# Patient Record
Sex: MALE | Race: BLACK OR AFRICAN AMERICAN | Employment: OTHER | URBAN - METROPOLITAN AREA
[De-identification: names, ages, dates, MRNs, and addresses within clinical notes are randomized per-mention and may not be internally consistent; named-entity substitution may affect disease eponyms.]

---

## 2018-09-11 ENCOUNTER — HOSPITAL ENCOUNTER (INPATIENT)
Age: 83
LOS: 45 days | Discharge: NURSING, REHAB OR LONG-TERM CARE | DRG: 003 | End: 2018-10-27
Attending: EMERGENCY MEDICINE | Admitting: INTERNAL MEDICINE
Payer: MEDICARE

## 2018-09-11 ENCOUNTER — APPOINTMENT (OUTPATIENT)
Dept: GENERAL RADIOLOGY | Age: 83
DRG: 003 | End: 2018-09-11
Attending: EMERGENCY MEDICINE
Payer: MEDICARE

## 2018-09-11 ENCOUNTER — APPOINTMENT (OUTPATIENT)
Dept: CT IMAGING | Age: 83
DRG: 003 | End: 2018-09-11
Attending: EMERGENCY MEDICINE
Payer: MEDICARE

## 2018-09-11 DIAGNOSIS — K92.0 HEMATEMESIS WITH NAUSEA: ICD-10-CM

## 2018-09-11 DIAGNOSIS — R53.81 DEBILITY: ICD-10-CM

## 2018-09-11 DIAGNOSIS — R04.2 HEMOPTYSIS: Primary | ICD-10-CM

## 2018-09-11 DIAGNOSIS — R41.89 UNRESPONSIVE: ICD-10-CM

## 2018-09-11 DIAGNOSIS — Z71.89 GOALS OF CARE, COUNSELING/DISCUSSION: ICD-10-CM

## 2018-09-11 DIAGNOSIS — J96.90 RESPIRATORY FAILURE REQUIRING INTUBATION (HCC): ICD-10-CM

## 2018-09-11 LAB
ALBUMIN SERPL-MCNC: 3.9 G/DL (ref 3.5–5)
ALBUMIN/GLOB SERPL: 0.8 {RATIO} (ref 1.1–2.2)
ALP SERPL-CCNC: 116 U/L (ref 45–117)
ALT SERPL-CCNC: 21 U/L (ref 12–78)
ANION GAP SERPL CALC-SCNC: 9 MMOL/L (ref 5–15)
AST SERPL-CCNC: 21 U/L (ref 15–37)
BASOPHILS # BLD: 0 K/UL (ref 0–0.1)
BASOPHILS NFR BLD: 0 % (ref 0–1)
BILIRUB SERPL-MCNC: 0.4 MG/DL (ref 0.2–1)
BUN SERPL-MCNC: 33 MG/DL (ref 6–20)
BUN/CREAT SERPL: 17 (ref 12–20)
CALCIUM SERPL-MCNC: 9.1 MG/DL (ref 8.5–10.1)
CHLORIDE SERPL-SCNC: 103 MMOL/L (ref 97–108)
CO2 SERPL-SCNC: 28 MMOL/L (ref 21–32)
CREAT SERPL-MCNC: 2 MG/DL (ref 0.7–1.3)
DIFFERENTIAL METHOD BLD: ABNORMAL
EOSINOPHIL # BLD: 0.2 K/UL (ref 0–0.4)
EOSINOPHIL NFR BLD: 1 % (ref 0–7)
ERYTHROCYTE [DISTWIDTH] IN BLOOD BY AUTOMATED COUNT: 15.5 % (ref 11.5–14.5)
GLOBULIN SER CALC-MCNC: 4.8 G/DL (ref 2–4)
GLUCOSE SERPL-MCNC: 130 MG/DL (ref 65–100)
HCT VFR BLD AUTO: 35 % (ref 36.6–50.3)
HGB BLD-MCNC: 11.1 G/DL (ref 12.1–17)
IMM GRANULOCYTES # BLD: 0.2 K/UL (ref 0–0.04)
IMM GRANULOCYTES NFR BLD AUTO: 1 % (ref 0–0.5)
INR PPP: 2.4 (ref 0.9–1.1)
LYMPHOCYTES # BLD: 0.4 K/UL (ref 0.8–3.5)
LYMPHOCYTES NFR BLD: 2 % (ref 12–49)
MCH RBC QN AUTO: 35.9 PG (ref 26–34)
MCHC RBC AUTO-ENTMCNC: 31.7 G/DL (ref 30–36.5)
MCV RBC AUTO: 113.3 FL (ref 80–99)
MONOCYTES # BLD: 1 K/UL (ref 0–1)
MONOCYTES NFR BLD: 5 % (ref 5–13)
NEUTS SEG # BLD: 18.8 K/UL (ref 1.8–8)
NEUTS SEG NFR BLD: 91 % (ref 32–75)
NRBC # BLD: 0 K/UL (ref 0–0.01)
NRBC BLD-RTO: 0 PER 100 WBC
PLATELET # BLD AUTO: 155 K/UL (ref 150–400)
PMV BLD AUTO: 11.8 FL (ref 8.9–12.9)
POTASSIUM SERPL-SCNC: 4.4 MMOL/L (ref 3.5–5.1)
PROT SERPL-MCNC: 8.7 G/DL (ref 6.4–8.2)
PROTHROMBIN TIME: 23.9 SEC (ref 9–11.1)
RBC # BLD AUTO: 3.09 M/UL (ref 4.1–5.7)
RBC MORPH BLD: ABNORMAL
SODIUM SERPL-SCNC: 140 MMOL/L (ref 136–145)
WBC # BLD AUTO: 20.6 K/UL (ref 4.1–11.1)

## 2018-09-11 PROCEDURE — 85610 PROTHROMBIN TIME: CPT | Performed by: EMERGENCY MEDICINE

## 2018-09-11 PROCEDURE — 71045 X-RAY EXAM CHEST 1 VIEW: CPT

## 2018-09-11 PROCEDURE — C9113 INJ PANTOPRAZOLE SODIUM, VIA: HCPCS | Performed by: EMERGENCY MEDICINE

## 2018-09-11 PROCEDURE — 82803 BLOOD GASES ANY COMBINATION: CPT | Performed by: EMERGENCY MEDICINE

## 2018-09-11 PROCEDURE — 99285 EMERGENCY DEPT VISIT HI MDM: CPT

## 2018-09-11 PROCEDURE — 85025 COMPLETE CBC W/AUTO DIFF WBC: CPT | Performed by: EMERGENCY MEDICINE

## 2018-09-11 PROCEDURE — 86900 BLOOD TYPING SEROLOGIC ABO: CPT | Performed by: EMERGENCY MEDICINE

## 2018-09-11 PROCEDURE — 96375 TX/PRO/DX INJ NEW DRUG ADDON: CPT

## 2018-09-11 PROCEDURE — 96361 HYDRATE IV INFUSION ADD-ON: CPT

## 2018-09-11 PROCEDURE — 83605 ASSAY OF LACTIC ACID: CPT | Performed by: EMERGENCY MEDICINE

## 2018-09-11 PROCEDURE — 80053 COMPREHEN METABOLIC PANEL: CPT | Performed by: EMERGENCY MEDICINE

## 2018-09-11 PROCEDURE — 74011250636 HC RX REV CODE- 250/636

## 2018-09-11 PROCEDURE — 36415 COLL VENOUS BLD VENIPUNCTURE: CPT | Performed by: EMERGENCY MEDICINE

## 2018-09-11 PROCEDURE — 74011250636 HC RX REV CODE- 250/636: Performed by: EMERGENCY MEDICINE

## 2018-09-11 PROCEDURE — 87040 BLOOD CULTURE FOR BACTERIA: CPT | Performed by: EMERGENCY MEDICINE

## 2018-09-11 PROCEDURE — 36600 WITHDRAWAL OF ARTERIAL BLOOD: CPT

## 2018-09-11 PROCEDURE — 74176 CT ABD & PELVIS W/O CONTRAST: CPT

## 2018-09-11 PROCEDURE — 86923 COMPATIBILITY TEST ELECTRIC: CPT | Performed by: EMERGENCY MEDICINE

## 2018-09-11 RX ORDER — ONDANSETRON 2 MG/ML
4 INJECTION INTRAMUSCULAR; INTRAVENOUS
Status: COMPLETED | OUTPATIENT
Start: 2018-09-11 | End: 2018-09-11

## 2018-09-11 RX ORDER — ONDANSETRON 2 MG/ML
INJECTION INTRAMUSCULAR; INTRAVENOUS
Status: COMPLETED
Start: 2018-09-11 | End: 2018-09-11

## 2018-09-11 RX ORDER — PANTOPRAZOLE SODIUM 40 MG/10ML
40 INJECTION, POWDER, LYOPHILIZED, FOR SOLUTION INTRAVENOUS
Status: COMPLETED | OUTPATIENT
Start: 2018-09-11 | End: 2018-09-11

## 2018-09-11 RX ADMIN — SODIUM CHLORIDE 1000 ML: 900 INJECTION, SOLUTION INTRAVENOUS at 22:34

## 2018-09-11 RX ADMIN — PANTOPRAZOLE SODIUM 40 MG: 40 INJECTION, POWDER, FOR SOLUTION INTRAVENOUS at 21:57

## 2018-09-11 RX ADMIN — ONDANSETRON 4 MG: 2 INJECTION INTRAMUSCULAR; INTRAVENOUS at 21:59

## 2018-09-12 ENCOUNTER — APPOINTMENT (OUTPATIENT)
Dept: ULTRASOUND IMAGING | Age: 83
DRG: 003 | End: 2018-09-12
Attending: INTERNAL MEDICINE
Payer: MEDICARE

## 2018-09-12 ENCOUNTER — APPOINTMENT (OUTPATIENT)
Dept: CT IMAGING | Age: 83
DRG: 003 | End: 2018-09-12
Attending: EMERGENCY MEDICINE
Payer: MEDICARE

## 2018-09-12 PROBLEM — K92.2 ACUTE GI BLEEDING: Status: ACTIVE | Noted: 2018-09-12

## 2018-09-12 PROBLEM — R04.89 PULMONARY HEMORRHAGE: Status: ACTIVE | Noted: 2018-09-12

## 2018-09-12 LAB
APPEARANCE UR: ABNORMAL
BACTERIA URNS QL MICRO: ABNORMAL /HPF
BILIRUB UR QL CFM: NEGATIVE
COLOR UR: ABNORMAL
EPITH CASTS URNS QL MICRO: ABNORMAL /LPF
GLUCOSE UR STRIP.AUTO-MCNC: NEGATIVE MG/DL
HGB BLD-MCNC: 7.6 G/DL (ref 12.1–17)
HGB BLD-MCNC: 7.9 G/DL (ref 12.1–17)
HGB BLD-MCNC: 7.9 G/DL (ref 12.1–17)
HGB UR QL STRIP: ABNORMAL
KETONES UR QL STRIP.AUTO: ABNORMAL MG/DL
LACTATE SERPL-SCNC: 1.4 MMOL/L (ref 0.4–2)
LEUKOCYTE ESTERASE UR QL STRIP.AUTO: ABNORMAL
NITRITE UR QL STRIP.AUTO: NEGATIVE
PH UR STRIP: 5 [PH] (ref 5–8)
PROT UR STRIP-MCNC: 30 MG/DL
RBC #/AREA URNS HPF: ABNORMAL /HPF (ref 0–5)
SP GR UR REFRACTOMETRY: 1.03 (ref 1–1.03)
UA: UC IF INDICATED,UAUC: ABNORMAL
UROBILINOGEN UR QL STRIP.AUTO: 1 EU/DL (ref 0.2–1)
WBC URNS QL MICRO: ABNORMAL /HPF (ref 0–4)

## 2018-09-12 PROCEDURE — 74011000250 HC RX REV CODE- 250: Performed by: INTERNAL MEDICINE

## 2018-09-12 PROCEDURE — 96366 THER/PROPH/DIAG IV INF ADDON: CPT

## 2018-09-12 PROCEDURE — 74011250636 HC RX REV CODE- 250/636: Performed by: INTERNAL MEDICINE

## 2018-09-12 PROCEDURE — 86038 ANTINUCLEAR ANTIBODIES: CPT | Performed by: INTERNAL MEDICINE

## 2018-09-12 PROCEDURE — 86235 NUCLEAR ANTIGEN ANTIBODY: CPT | Performed by: INTERNAL MEDICINE

## 2018-09-12 PROCEDURE — 81001 URINALYSIS AUTO W/SCOPE: CPT | Performed by: EMERGENCY MEDICINE

## 2018-09-12 PROCEDURE — 87070 CULTURE OTHR SPECIMN AEROBIC: CPT | Performed by: INTERNAL MEDICINE

## 2018-09-12 PROCEDURE — 76770 US EXAM ABDO BACK WALL COMP: CPT

## 2018-09-12 PROCEDURE — 96368 THER/DIAG CONCURRENT INF: CPT

## 2018-09-12 PROCEDURE — 86225 DNA ANTIBODY NATIVE: CPT | Performed by: INTERNAL MEDICINE

## 2018-09-12 PROCEDURE — 85018 HEMOGLOBIN: CPT | Performed by: INTERNAL MEDICINE

## 2018-09-12 PROCEDURE — C9113 INJ PANTOPRAZOLE SODIUM, VIA: HCPCS | Performed by: INTERNAL MEDICINE

## 2018-09-12 PROCEDURE — 84145 PROCALCITONIN (PCT): CPT | Performed by: INTERNAL MEDICINE

## 2018-09-12 PROCEDURE — 74011250636 HC RX REV CODE- 250/636: Performed by: EMERGENCY MEDICINE

## 2018-09-12 PROCEDURE — 83516 IMMUNOASSAY NONANTIBODY: CPT | Performed by: INTERNAL MEDICINE

## 2018-09-12 PROCEDURE — 65660000000 HC RM CCU STEPDOWN

## 2018-09-12 PROCEDURE — 71250 CT THORAX DX C-: CPT

## 2018-09-12 PROCEDURE — 96365 THER/PROPH/DIAG IV INF INIT: CPT

## 2018-09-12 PROCEDURE — 88112 CYTOPATH CELL ENHANCE TECH: CPT | Performed by: INTERNAL MEDICINE

## 2018-09-12 PROCEDURE — 77030032490 HC SLV COMPR SCD KNE COVD -B

## 2018-09-12 PROCEDURE — 77010033678 HC OXYGEN DAILY

## 2018-09-12 PROCEDURE — 83520 IMMUNOASSAY QUANT NOS NONAB: CPT | Performed by: INTERNAL MEDICINE

## 2018-09-12 PROCEDURE — 74011250637 HC RX REV CODE- 250/637: Performed by: INTERNAL MEDICINE

## 2018-09-12 PROCEDURE — 74011000258 HC RX REV CODE- 258: Performed by: EMERGENCY MEDICINE

## 2018-09-12 PROCEDURE — 36415 COLL VENOUS BLD VENIPUNCTURE: CPT | Performed by: INTERNAL MEDICINE

## 2018-09-12 PROCEDURE — 87086 URINE CULTURE/COLONY COUNT: CPT | Performed by: EMERGENCY MEDICINE

## 2018-09-12 RX ORDER — CHOLECALCIFEROL TAB 125 MCG (5000 UNIT) 125 MCG
5000 TAB ORAL DAILY
COMMUNITY

## 2018-09-12 RX ORDER — AMIODARONE HYDROCHLORIDE 200 MG/1
200 TABLET ORAL EVERY OTHER DAY
Status: DISCONTINUED | OUTPATIENT
Start: 2018-09-12 | End: 2018-09-14

## 2018-09-12 RX ORDER — DOCUSATE SODIUM 100 MG/1
100 CAPSULE, LIQUID FILLED ORAL
Status: DISCONTINUED | OUTPATIENT
Start: 2018-09-12 | End: 2018-10-01

## 2018-09-12 RX ORDER — WARFARIN SODIUM 5 MG/1
5 TABLET ORAL
Status: ON HOLD | COMMUNITY
End: 2018-09-12 | Stop reason: SINTOL

## 2018-09-12 RX ORDER — WARFARIN SODIUM 5 MG/1
10 TABLET ORAL
Status: ON HOLD | COMMUNITY
End: 2018-09-12 | Stop reason: SINTOL

## 2018-09-12 RX ORDER — SODIUM CHLORIDE 0.9 % (FLUSH) 0.9 %
5-10 SYRINGE (ML) INJECTION AS NEEDED
Status: DISCONTINUED | OUTPATIENT
Start: 2018-09-12 | End: 2018-10-27 | Stop reason: HOSPADM

## 2018-09-12 RX ORDER — AMIODARONE HYDROCHLORIDE 200 MG/1
200 TABLET ORAL EVERY OTHER DAY
COMMUNITY
Start: 2018-09-12

## 2018-09-12 RX ORDER — DORZOLAMIDE HYDROCHLORIDE AND TIMOLOL MALEATE 20; 5 MG/ML; MG/ML
1 SOLUTION/ DROPS OPHTHALMIC 2 TIMES DAILY
Status: DISCONTINUED | OUTPATIENT
Start: 2018-09-12 | End: 2018-10-27 | Stop reason: HOSPADM

## 2018-09-12 RX ORDER — ALBUTEROL SULFATE 90 UG/1
2 AEROSOL, METERED RESPIRATORY (INHALATION)
Status: DISCONTINUED | OUTPATIENT
Start: 2018-09-12 | End: 2018-10-27 | Stop reason: HOSPADM

## 2018-09-12 RX ORDER — METRONIDAZOLE 500 MG/100ML
500 INJECTION, SOLUTION INTRAVENOUS EVERY 8 HOURS
Status: DISCONTINUED | OUTPATIENT
Start: 2018-09-12 | End: 2018-09-15 | Stop reason: ALTCHOICE

## 2018-09-12 RX ORDER — AMIODARONE HYDROCHLORIDE 200 MG/1
200 TABLET ORAL EVERY OTHER DAY
Status: DISCONTINUED | OUTPATIENT
Start: 2018-09-12 | End: 2018-09-12

## 2018-09-12 RX ORDER — ACETAMINOPHEN 325 MG/1
650 TABLET ORAL
Status: DISCONTINUED | OUTPATIENT
Start: 2018-09-12 | End: 2018-10-01

## 2018-09-12 RX ORDER — SODIUM CHLORIDE 0.9 % (FLUSH) 0.9 %
5-10 SYRINGE (ML) INJECTION EVERY 8 HOURS
Status: DISCONTINUED | OUTPATIENT
Start: 2018-09-12 | End: 2018-10-27 | Stop reason: HOSPADM

## 2018-09-12 RX ORDER — FUROSEMIDE 80 MG/1
80 TABLET ORAL DAILY
COMMUNITY

## 2018-09-12 RX ORDER — ONDANSETRON 2 MG/ML
4 INJECTION INTRAMUSCULAR; INTRAVENOUS
Status: DISCONTINUED | OUTPATIENT
Start: 2018-09-12 | End: 2018-10-27 | Stop reason: HOSPADM

## 2018-09-12 RX ORDER — PRAVASTATIN SODIUM 40 MG/1
40 TABLET ORAL
Status: DISCONTINUED | OUTPATIENT
Start: 2018-09-12 | End: 2018-09-23

## 2018-09-12 RX ORDER — DILTIAZEM HYDROCHLORIDE 180 MG/1
360 CAPSULE, COATED, EXTENDED RELEASE ORAL DAILY
Status: DISCONTINUED | OUTPATIENT
Start: 2018-09-12 | End: 2018-09-12

## 2018-09-12 RX ORDER — DILTIAZEM HYDROCHLORIDE 360 MG/1
360 CAPSULE, EXTENDED RELEASE ORAL DAILY
COMMUNITY

## 2018-09-12 RX ORDER — LATANOPROST 50 UG/ML
1 SOLUTION/ DROPS OPHTHALMIC
Status: DISCONTINUED | OUTPATIENT
Start: 2018-09-12 | End: 2018-10-27 | Stop reason: HOSPADM

## 2018-09-12 RX ADMIN — CEFTRIAXONE SODIUM 2 G: 2 INJECTION, POWDER, FOR SOLUTION INTRAMUSCULAR; INTRAVENOUS at 01:12

## 2018-09-12 RX ADMIN — Medication 10 ML: at 14:00

## 2018-09-12 RX ADMIN — DORZOLAMIDE HYDROCHLORIDE AND TIMOLOL MALEATE 1 DROP: 20; 5 SOLUTION/ DROPS OPHTHALMIC at 18:35

## 2018-09-12 RX ADMIN — PRAVASTATIN SODIUM 40 MG: 40 TABLET ORAL at 21:46

## 2018-09-12 RX ADMIN — LATANOPROST 1 DROP: 50 SOLUTION OPHTHALMIC at 21:47

## 2018-09-12 RX ADMIN — SODIUM CHLORIDE 1000 ML: 900 INJECTION, SOLUTION INTRAVENOUS at 01:06

## 2018-09-12 RX ADMIN — DORZOLAMIDE HYDROCHLORIDE AND TIMOLOL MALEATE 1 DROP: 20; 5 SOLUTION/ DROPS OPHTHALMIC at 10:55

## 2018-09-12 RX ADMIN — AZITHROMYCIN MONOHYDRATE 500 MG: 500 INJECTION, POWDER, LYOPHILIZED, FOR SOLUTION INTRAVENOUS at 01:06

## 2018-09-12 RX ADMIN — SODIUM CHLORIDE 40 MG: 9 INJECTION, SOLUTION INTRAMUSCULAR; INTRAVENOUS; SUBCUTANEOUS at 21:46

## 2018-09-12 RX ADMIN — UMECLIDINIUM 1 PUFF: 62.5 AEROSOL, POWDER ORAL at 10:56

## 2018-09-12 RX ADMIN — AMIODARONE HYDROCHLORIDE 200 MG: 200 TABLET ORAL at 08:17

## 2018-09-12 RX ADMIN — Medication 10 ML: at 21:47

## 2018-09-12 RX ADMIN — SODIUM CHLORIDE 40 MG: 9 INJECTION, SOLUTION INTRAMUSCULAR; INTRAVENOUS; SUBCUTANEOUS at 08:18

## 2018-09-12 RX ADMIN — METRONIDAZOLE 500 MG: 500 INJECTION, SOLUTION INTRAVENOUS at 19:36

## 2018-09-12 NOTE — ED NOTES
Q6H Hg drawn and sent. Pt assisted to move up in bed for comfort. Remains on NC 6L . DR Shankar Agent to bedside. GI will not pursue work up at this time.   Pt cleared to eat by GI

## 2018-09-12 NOTE — ED NOTES
Bedside shift change report given to Clark (oncoming nurse) by Dmitry Jerry RN (offgoing nurse). Report included the following information ED Summary. Pt reports some SOB, continues to cough bloody sputum.

## 2018-09-12 NOTE — PROGRESS NOTES
1155 - TRANSFER - IN REPORT: 
 
Verbal report received from Dolly(name) on [address] [facility].  being received from ED(unit) for routine progression of care Report consisted of patients Situation, Background, Assessment and  
Recommendations(SBAR). Information from the following report(s) SBAR, Kardex, ED Summary, Procedure Summary, Intake/Output, MAR and Recent Results was reviewed with the receiving nurse. Opportunity for questions and clarification was provided.

## 2018-09-12 NOTE — ED PROVIDER NOTES
EMERGENCY DEPARTMENT HISTORY AND PHYSICAL EXAM 
 
 
Date: 9/11/2018 Patient Name: Dayron Villa 
 
History of Presenting Illness Chief Complaint Patient presents with  Vomiting Pt presents via EMS. pt called EMS because he was vomiting blood. patient reported to have been coughing up blood since Sunday. History Provided By: Patient HPI: Dayron Villa, 80 y.o. male with PMHx significant for AAA, HTN, glaucoma, a-fib, presents via EMS to the ED with cc of hematemesis at 1730 tonight with subsequent constant cough with bloody sputum. Pt reports that he started coughing bloody sputum starting on 9/9 with associated stomach tightening. He notes that since hematemetic episode tonight, he has had some relief from his abd tightness. His wife noted pt had dark stools this PM. Pt reports that he is normally on 2.5L O2 at home, however, tonight his spO2 was 86% on 2.5L. Pt denies taking any meds for his symptoms. There are no other complaints, changes, or physical findings at this time. Social Hx: Former Tobacco, -EtOH, -Illicit Drug Use PCP: PROVIDER UNKNOWN Current Facility-Administered Medications Medication Dose Route Frequency Provider Last Rate Last Dose  cefTRIAXone (ROCEPHIN) 2 g in 0.9% sodium chloride (MBP/ADV) 50 mL  2 g IntraVENous Q24H Susan Cox  mL/hr at 09/12/18 0112 2 g at 09/12/18 0112  
 azithromycin (ZITHROMAX) 500 mg in 0.9% sodium chloride (MBP/ADV) 250 mL  500 mg IntraVENous Q24H Susan Cox  mL/hr at 09/12/18 0106 500 mg at 09/12/18 0106  sodium chloride 0.9 % bolus infusion 1,000 mL  1,000 mL IntraVENous ONCE Susan Cox  mL/hr at 09/12/18 0106 1,000 mL at 09/12/18 0106 Current Outpatient Prescriptions Medication Sig Dispense Refill  furosemide (LASIX) 80 mg tablet Take 80 mg by mouth daily.  warfarin (COUMADIN) 5 mg tablet Take 5 mg by mouth six (6) days a week. Indications: take 1 tab daily except on Mondays  warfarin (COUMADIN) 5 mg tablet Take 10 mg by mouth every Monday.  ferrous sulfate (IRON) 325 mg (65 mg iron) cpER Take 1 Tab by mouth daily.  dilTIAZem (TIAZAC) 360 mg ER capsule Take 360 mg by mouth daily.  cholecalciferol, VITAMIN D3, (VITAMIN D3) 5,000 unit tab tablet Take 5,000 Units by mouth daily.  amiodarone (CORDARONE) 200 mg tablet Take 200 mg by mouth every other day.  tiotropium bromide 1.25 mcg/actuation mist Take  by inhalation.  latanoprost (XALATAN) 0.005 % ophthalmic solution Administer 1 Drop to both eyes nightly.  albuterol (PROAIR HFA) 90 mcg/actuation inhaler Take 2 Puffs by inhalation every four (4) hours as needed for Wheezing.  dorzolamide-timolol (COSOPT) 2-0.5 % ophthalmic solution Administer 1 Drop to both eyes two (2) times a day.  simvastatin (ZOCOR) 40 mg tablet Take 40 mg by mouth nightly.  nitroglycerin (NITROSTAT) 0.4 mg SL tablet by SubLINGual route every five (5) minutes as needed for Chest Pain. Past History Past Medical History: 
Past Medical History:  
Diagnosis Date  Aneurysm ([facility] [address] AAA  Arrhythmia   
 atrial fibrillation 2016  Hypertension  Other ill-defined conditions(799.89)   
 hx of broken arm left/cast  
 Other ill-defined conditions(799.89)   
 glaucoma  Other ill-defined conditions(799.89)   
 elevated cholesterol  Other ill-defined conditions(799.89)   
 hx bursitis knees Past Surgical History: 
Past Surgical History:  
Procedure Laterality Date  ABDOMEN SURGERY PROC UNLISTED  1/16/13 AORTIC ABDOMINAL ANUERYSM REPAIR ENDOVASCULAR   
 HX HEENT    
 bilat cataract  HX ORTHOPAEDIC    
 ligament surgery left arm  HX OTHER SURGICAL    
 broken left foot casted and healed  UPPER GI ENDOSCOPY,BIOPSY  4/10/2015 Family History: 
Family History Problem Relation Age of Onset  Cancer Mother  Cancer Father Social History: 
Social History Substance Use Topics  Smoking status: Former Smoker Packs/day: 0.50 Years: 20.00  Smokeless tobacco: Never Used  Alcohol use Yes Comment: social rare Allergies: 
No Known Allergies Review of Systems Review of Systems Constitutional: Negative for chills and fever. HENT: Negative. Negative for congestion, rhinorrhea, sneezing and sore throat. Eyes: Negative. Negative for redness and visual disturbance. Respiratory: Positive for cough (bloody sputum). Negative for shortness of breath and wheezing. Cardiovascular: Negative. Negative for chest pain. Gastrointestinal: Positive for abdominal pain (tightness), nausea and vomiting (hematemesis). Negative for diarrhea. +Dark stool Genitourinary: Negative. Negative for difficulty urinating, discharge and frequency. Musculoskeletal: Negative. Negative for arthralgias, back pain, myalgias and neck stiffness. Skin: Negative. Negative for color change and rash. Neurological: Negative. Negative for dizziness, syncope, weakness, numbness and headaches. Hematological: Negative for adenopathy. Psychiatric/Behavioral: Negative. All other systems reviewed and are negative. Physical Exam  
Physical Exam  
Constitutional: He is oriented to person, place, and time. He appears well-nourished. He appears distressed. Bright red blood in emesis bag with partially digested food HENT:  
Head: Atraumatic. Eyes: EOM are normal.  
Cardiovascular: Normal rate, regular rhythm, normal heart sounds and intact distal pulses. Exam reveals no gallop and no friction rub. No murmur heard. Diffuse bilat LE edema, pitting 2+, symmetric Pulmonary/Chest: No respiratory distress. He has wheezes. He has no rales. He exhibits no tenderness. Mild increased WOB Hypoxia on RA (mid80s)- requiring 4-5 L NC to maintain 02 sats > 94% Abdominal: Soft. Bowel sounds are normal. He exhibits distension.  He exhibits no mass. There is no tenderness. There is no rebound and no guarding. Musculoskeletal: Normal range of motion. He exhibits no edema or tenderness. Moving all extremities Neurological: He is alert and oriented to person, place, and time. Psychiatric: He has a normal mood and affect. Nursing note and vitals reviewed. Diagnostic Study Results Labs - Recent Results (from the past 12 hour(s)) PROTHROMBIN TIME + INR Collection Time: 09/11/18  9:55 PM  
Result Value Ref Range INR 2.4 (H) 0.9 - 1.1 Prothrombin time 23.9 (H) 9.0 - 11.1 sec CBC WITH AUTOMATED DIFF Collection Time: 09/11/18  9:55 PM  
Result Value Ref Range WBC 20.6 (H) 4.1 - 11.1 K/uL  
 RBC 3.09 (L) 4.10 - 5.70 M/uL  
 HGB 11.1 (L) 12.1 - 17.0 g/dL HCT 35.0 (L) 36.6 - 50.3 % .3 (H) 80.0 - 99.0 FL  
 MCH 35.9 (H) 26.0 - 34.0 PG  
 MCHC 31.7 30.0 - 36.5 g/dL  
 RDW 15.5 (H) 11.5 - 14.5 % PLATELET 787 425 - 441 K/uL MPV 11.8 8.9 - 12.9 FL  
 NRBC 0.0 0  WBC ABSOLUTE NRBC 0.00 0.00 - 0.01 K/uL NEUTROPHILS 91 (H) 32 - 75 % LYMPHOCYTES 2 (L) 12 - 49 % MONOCYTES 5 5 - 13 % EOSINOPHILS 1 0 - 7 % BASOPHILS 0 0 - 1 % IMMATURE GRANULOCYTES 1 (H) 0.0 - 0.5 % ABS. NEUTROPHILS 18.8 (H) 1.8 - 8.0 K/UL  
 ABS. LYMPHOCYTES 0.4 (L) 0.8 - 3.5 K/UL  
 ABS. MONOCYTES 1.0 0.0 - 1.0 K/UL  
 ABS. EOSINOPHILS 0.2 0.0 - 0.4 K/UL  
 ABS. BASOPHILS 0.0 0.0 - 0.1 K/UL  
 ABS. IMM. GRANS. 0.2 (H) 0.00 - 0.04 K/UL  
 DF AUTOMATED    
 RBC COMMENTS MACROCYTOSIS 
1+ METABOLIC PANEL, COMPREHENSIVE Collection Time: 09/11/18  9:55 PM  
Result Value Ref Range Sodium 140 136 - 145 mmol/L Potassium 4.4 3.5 - 5.1 mmol/L Chloride 103 97 - 108 mmol/L  
 CO2 28 21 - 32 mmol/L Anion gap 9 5 - 15 mmol/L Glucose 130 (H) 65 - 100 mg/dL BUN 33 (H) 6 - 20 MG/DL Creatinine 2.00 (H) 0.70 - 1.30 MG/DL  
 BUN/Creatinine ratio 17 12 - 20 GFR est AA 39 (L) >60 ml/min/1.73m2 GFR est non-AA 32 (L) >60 ml/min/1.73m2 Calcium 9.1 8.5 - 10.1 MG/DL Bilirubin, total 0.4 0.2 - 1.0 MG/DL  
 ALT (SGPT) 21 12 - 78 U/L  
 AST (SGOT) 21 15 - 37 U/L Alk. phosphatase 116 45 - 117 U/L Protein, total 8.7 (H) 6.4 - 8.2 g/dL Albumin 3.9 3.5 - 5.0 g/dL Globulin 4.8 (H) 2.0 - 4.0 g/dL A-G Ratio 0.8 (L) 1.1 - 2.2 TYPE & SCREEN Collection Time: 09/11/18  9:55 PM  
Result Value Ref Range Crossmatch Expiration 09/14/2018 ABO/Rh(D) B NEGATIVE Antibody screen NEG   
LACTIC ACID Collection Time: 09/11/18 11:15 PM  
Result Value Ref Range Lactic acid 1.4 0.4 - 2.0 MMOL/L Radiologic Studies -  
CT CHEST WO CONT Final Result CT ABD PELV WO CONT Final Result XR CHEST PORT Final Result CT Results  (Last 48 hours) 09/12/18 0057  CT CHEST WO CONT Final result Impression:  IMPRESSION:  
Severe emphysema with right basilar airspace disease and an oval 5.5 x 4.5 cm  
lesion with fluid fluid level concerning for internal hemorrhage. No internal  
gas. Differential diagnosis includes an infectious process including abscess,  
although neoplasm with internal hemorrhage also a possibility. Narrative:  INDICATION: hemoptysis, eval abscess vs hemorrhage COMPARISON: None TECHNIQUE:  Noncontrast 5 mm axial images were obtained through the chest. CT  
dose reduction was achieved through use of a standardized protocol tailored for  
this examination and automatic exposure control for dose modulation. FINDINGS:  
   
THYROID: Unremarkable. MEDIASTINUM/TRAVIS: Small calcified subcarinal lymph nodes. HEART/PERICARDIUM: Coronary atherosclerosis. Pacemaker. No pericardial effusion. Not significantly enlarged. LUNGS/PLEURA: Severe emphysema. Patchy right basilar airspace disease with a 5.5  
x 4.5 cm oval lesion in the right lower lobe with fluid fluid level.  No internal  
 gas. No significant pleural effusion. No pneumothorax. INCIDENTALLY IMAGED UPPER ABDOMEN: No significant abnormality. BONES: No destructive bone lesion. ADDITIONAL COMMENTS:  N/A.  
   
  
 09/12/18 0008  CT ABD PELV WO CONT Final result Impression:  IMPRESSION:  
   
1. Patchy airspace disease in the right lung base with an associated hyperdense 5.2 x 4.3 cm oval lesion. This may contain a fluid/fluid level indicating  
abscess/hemorrhage, with neoplasm felt to be less likely. However correlation  
with clinical findings and close follow-up recommended. 2. No acute abdominal or pelvic process. Narrative:  INDICATION: Abd pain, leukocytosis COMPARISON: None TECHNIQUE:   
Noncontrast thin axial images were obtained through the abdomen and pelvis. Coronal and sagittal reconstructions were generated. CT dose reduction was  
achieved through use of a standardized protocol tailored for this examination  
and automatic exposure control for dose modulation. FINDINGS:   
LUNG BASES: Patchy airspace disease in the right lung base, with a 5.2 x 4.3 cm   
mass lesion in the lower lobe. The lesion is slightly hyperdense with a possible  
fluid fluid level best seen on sagittal image 63. No pleural effusion. LIVER: No mass or biliary dilatation. GALLBLADDER: Unremarkable. SPLEEN: No enlargement or lesion. PANCREAS: No mass or ductal dilatation. ADRENALS: No mass. KIDNEYS: No nephrolithiasis or hydronephrosis. 2.2 cm right renal cyst.  
GI TRACT:  No bowel obstruction. Diverticulosis of the sigmoid colon without  
diverticulitis. PERITONEUM: No free air or free fluid. APPENDIX: Unremarkable. RETROPERITONEUM: Previous abdominal aortic aneurysm endograft repair. No  
lymphadenopathy. ADDITIONAL COMMENTS: N/A. URINARY BLADDER: Unremarkable. REPRODUCTIVE ORGANS: Enlarged prostate. LYMPH NODES:  None enlarged. FREE FLUID:  None. BONES: No destructive bone lesion. ADDITIONAL COMMENTS: N/A. CXR Results  (Last 48 hours) 09/11/18 2212  XR CHEST PORT Final result Impression:  IMPRESSION:  
Suspect early interstitial edema pattern. .  . Narrative:  INDICATION:  hypoxia EXAM: Chest single view. COMPARISON: 4/8/2015. FINDINGS: A single frontal view of the chest at 2206 hours shows bibasilar  
interstitial prominence with a mid inspiratory effort and bibasilar  
atelectasis. .  The heart, mediastinum and pulmonary vasculature are stable . Transvenous pacemaker from the right has been introduced with leads over the  
right atrium and right ventricle. The bony thorax is unremarkable for age. .  
   
  
  
 
Medical Decision Making I am the first provider for this patient. I reviewed the vital signs, available nursing notes, past medical history, past surgical history, family history and social history. Vital Signs-Reviewed the patient's vital signs. Patient Vitals for the past 12 hrs: 
 Temp Pulse Resp BP SpO2  
09/12/18 0126 - 69 18 - 94 % 09/12/18 0100 - 71 22 - 94 % 09/12/18 0023 - 74 18 - 95 % 09/11/18 2322 - 74 19 - 95 % 09/11/18 2315 100.1 °F (37.8 °C) - - - -  
09/11/18 2300 - 73 17 102/71 92 % 09/11/18 2232 - 74 19 98/46 92 % 09/11/18 2230 - 75 17 97/44 93 % 09/11/18 2200 - 77 22 109/50 92 % 09/11/18 2145 - 77 22 103/45 98 % 09/11/18 2141 99 °F (37.2 °C) 78 13 111/40 93 % Pulse Oximetry Analysis - 95% on NC 6L Records Reviewed: Nursing Notes, Old Medical Records, Ambulance Run Sheet, Previous Radiology Studies and Previous Laboratory Studies Provider Notes (Medical Decision Making): Pt with few days of hemoptysis not with episode of hematemesis. Possible later related to swallowing blood versus two separate problems.  Pt also with worsening SOB and oxygen requirement now, which is he typically doesn't require. Infectious etiology, neoplasm, concerning on  Coumadin and/or GI Bleed. ED Course:  
Initial assessment performed. The patients presenting problems have been discussed, and they are in agreement with the care plan formulated and outlined with them. I have encouraged them to ask questions as they arise throughout their visit. Progress Notes: 
CONSULT NOTE:  
10:59 PM 
Elvis Mcqueen MD spoke with Dr. Daya Meyer, Specialty: GI 
Discussed pt's hx, disposition, and available diagnostic and imaging results. Reviewed care plans. Consultant agrees with plans as outlined. He agreed with Protonix and fluid support. Admit to medicine. If there is any change in status and needs acute attention, call him back and he will see pt. Did not feel that pt needs NG tube. Written by DOTTY Edwardsibe, as dictated by Elvis Mcqueen MD. 
 
11:52 PM 
Overall endorses feeling better, nausea improved. No significant emesis but has continued to spit up blood. BP stable but low 100s/50s receiving IV fluids. updated on the plan. Family updated concurrently. Pt with hemorrhagic lesion right lung likely responsible for hemoptysis and possibly patient swallowing blood he is spitting up that led to episode of hematemesis. Written by DOTTY Edwardsibe, as dictated by Elvis Mcqueen MD  
 
12:32 AM 
Asked family about any increased NSAID use and over the last couple weeks, pt has been taking ibuprofen regularly after a fall. Written by DOTTY Edwards, as dictated by Elvis Mcqueen MD 
 
CONSULT NOTE:  
12:39 AM 
Elvis Mcqueen MD spoke with Dr. Elsy Madrigal, Specialty: Hospitalist 
Discussed pt's hx, disposition, and available diagnostic and imaging results. Reviewed care plans. Consultant will evaluate pt for admission. Agrees to cover pt for community acquired PNA during the interim. Written by DOTTY Edwards, as dictated by Elvis Mcqueen MD. Critical Care Time: CRITICAL CARE NOTE : 
 
11:18 PM 
 IMPENDING DETERIORATION -Respiratory ASSOCIATED RISK FACTORS - Hypoxia and Bleeding MANAGEMENT- Bedside Assessment and Supervision of Care INTERPRETATION -  Xrays, CT Scan, Blood Gases, ECG and Blood Pressure INTERVENTIONS - hemodynamic mngmt CASE REVIEW - Hospitalist, Medical Sub-Specialist, Nursing and Family TREATMENT RESPONSE -Stable PERFORMED BY - Self NOTES   : 
 
I have spent 122 minutes of critical care time involved in lab review, consultations with specialist, family decision- making, bedside attention and documentation. During this entire length of time I was immediately available to the patient Written by DOTTY Irizarry, as dictated by Silvina Yu MD 
 
Disposition: PLAN FOR ADMISSION: 
12:39 PM 
The patient is being admitted to the hospital. The results of their tests and reasons for their admission have been discussed with the patient and/or available family. The patient/family has conveyed agreement and understanding for the need to be admitted and for their admission diagnosis. Consultation has been made with the inpatient physician specialist for hospitalization. Written by DOTTY Irizarry, as dictated by Silvina Yu MD 
 
CLINICAL IMPRESSION: 
1. Hemoptysis 2. Hematemesis with nausea Plan: 1. Admit Diagnosis Clinical Impression: 1. Hemoptysis 2. Hematemesis with nausea Attestations: This note is prepared by Yadi Kessler, acting as scribe for MD Silvina Hernandez MD: The scribe's documentation has been prepared under my direction and personally reviewed by me in its entirety. I confirm that the note above accurately reflects all work, treatment, procedures, and medical decision making performed by me.

## 2018-09-12 NOTE — CONSULTS
[facility] Consult Service Note  Pulmonary, Critical Care, and Sleep Medicine    Name: Shari Heredia MRN: [id number]   : 1934 Hospital: [facility]   Date: 2018   Hospital Day: 2       Subjective/Interval History:   I have reviewed the notes from other providers and old records readily available and summarized findings below with the flowsheet. Seen earlier today on rounds. IMPRESSION:   1. Acute hemoptysis- doubt pulmonary  Hemorrhage- Ct scan show spillage of blood, has lung mass( CA vs inflammatory pseudotumor) and hematuria; could have Wegener's? Pulmonary renal syndrome but also anticoagulated;   2. Lung mass RLL- right lung base- would not be visible on conventional bronch and yield from transbronchial biopsy not guaranteed; had no lesion on chest Ct 2016   3. Acute kidney injury likely prerenal and related to medications (Lasix) Baseline creatinine is around 0.9 and currently creatinine is elevated at 2.0  4. Hematuria Abnormal urinalysis  5. Sepsis due to suspected pneumonia Patient reports having exertional shortness of breath along with cough and hemoptysis; CT of chest shows severe emphysema with the right basilar airspace disease in the oval mass with fluid level concerning for internal hemorrhage versus pneumonia ; inr is 2.4  6. 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 Anemia  8. Former 50+ pack yr smoker quit in ? 9. Asbestos exposure  10. History of atrial fibrillation on anticoagulation with warfarin  11. Hx of CAD s/p PCI in the past home amiodarone. Hold Cardizem  12. Dyslipidemia  13. Body mass index is 32.1 kg/(m^2). 15. Multiorgan dysfunction as outlined above: Pt has one or more acute or chronic illnesses with severe exacerbation with progression or side effects of treatment that poses a threat to life or bodily function  15.  Additional workup outlined below  16. Pt is requiring Drug therapy requiring intensive monitoring for toxicity  17. Pt is unstable, unpredictable needing PCU/CCU monitoring; is critically ill and at high risk of sudden decline and decompensation with life threatening consequenses and continued end organ dysfunction and failure  18. Prognosis guarded with significant risk of sudden decline       RECOMMENDATIONS/PLAN:   1. Too risky for bronch or biopsy while anticoagulated  2. Sputum culture  3. Sputum cytology  4. Empiric abx to treat possible lung abscess  5. Follow cultures  6. Daily coags  7. Serologies ANCA panel, JOSS, anti GBM  8. Agree with stopping warfarin  9. Renal ultrasound  10. Renal consult? 11. Supplemental O2 to keep sats > 93%  12. Labs to follow electrolytes, renal function and and blood counts  13. Glucose monitoring and SSI  14. Bronchial hygiene with respiratory therapy techniques, bronchodilators  15. DVT, SUP prophylaxis  16. Pt needs IV fluids with additives and Drug therapy requiring intensive monitoring for toxicity  17. Prescription drug management with home med reconciliation reviewed          My assessment/management discussed with: Consultants, Nursing, Pharmacy, Case Management, PT, OT, Respiratory Therapy, Hospitalist and Family for coordination of care    Pt's condition is acute and unstable requiring inpatient hospitalization. This care involved high complexity decision making which includes independently reviewing the patient's past medical records, current laboratory results, medication profiles that were immediately available to me and actual Xray images at the bedside in order to assess, support vital system function, and to treat this degree of vital organ system failure, and to prevent further deterioration of the patients condition.      Risk of deterioration: medium and high   [x] High complexity decision making was performed  [x] See my orders for details  Tubes: Subjective/Initial History:     I was asked by Lesa Yanez MD to see Robles Parsons  a 80 y.o.  male in consultation for a chief complaint of hemoptysis and abnormal     \"Ulysses BERMUDEZ is a 80 y.o.  male with hx Afib on Coumadin, CAD with prior PCI, AAA, HTN, and COPD on 2.5L via NC, who . .. was admitted via ER on presentation with 3d hx protracted coughing and hemoptysis, that later was associated with reported hematemesis. He noted during same time course that he experienced \"tightness\" in his abdomen that did not change with PO or defecation. He denied N, melena, BRBPR, hematochezia, or dysphagia. He is not on ASA or NSAIDs as OP. His wife reported that his stools were darker. He denies CP, increased SOB. He has not had any hematemesis here, but continued to cough and demonstrate hemoptysis here in ER. ER eval demonstrated patchy airspace disease in the right lung base with an associated hyperdense RLL 5.2 x 4.3 cm oval lesion, but no acute abdominal or pelvic process. WBC 20.6, BUN Cr 33/2.0, INR 2.4. Initial Hgb was 11.1, but when rechecked 8hrs later was noted to be 7.9. He had undergone EGD 2015 confirming gastritis without h.pylori infection. Last colonoscopy 2007 with internal hemorrhoids and sigmoid diverticulosis. Severe emphysema with right basilar airspace disease and an oval 5.5 x 4.5 cm lesion with fluid fluid level concerning for internal hemorrhage. \"    Pt seen by Dr. Jef Johnson who feels that source of bleeding is not Gi tract. I reviewed office notes where he was last seen this year by our NP. I have not seen pt since 2016. He was noted to have ABNORMAL CHEST CT (R93.8)   Jan 2015 scattered perihilar adenopathy does not explain his other sx. 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 Pt just quit smoking after 50+ years Jan 2016 CT scan with emphysma. no lung mass 12/07/15: CTA impression: No pulmonary emboli.  Mild ILD, similar to prior study. New irregular 10.6mm RLL lung nodule for f/u in 3 months. This could be infectious or inflammatory in nature, or potentially a small malignancy. [facility] Chest CT reviewed Jan 2016 no mass. Emphysema with mild pulmonary HTN. Pt has very severe COPD who quit smoking since Feb 2015, started age 16. Tried Albuterol or Combivent on and off for years but really only prn per PCP. Cost has been extravagant, especially Spiriva which he used on and off for three. June 2015 FEV1 1.16 to 1.4 liters (<50%). Pt has had no dysphagia, choking. No pneumonia. No chest pain. Weight stable. No sign or sx of chest infection. Claims he just completed course of abx couple weeks ago from our office but we have no record of a prescription order. Denied gross hematuria. No Known Allergies     MAR reviewed and pertinent medications noted or modified as needed     Current Facility-Administered Medications   Medication    cefTRIAXone (ROCEPHIN) 2 g in 0.9% sodium chloride (MBP/ADV) 50 mL    azithromycin (ZITHROMAX) 500 mg in 0.9% sodium chloride (MBP/ADV) 250 mL    albuterol (PROVENTIL HFA, VENTOLIN HFA, PROAIR HFA) inhaler 2 Puff    dorzolamide-timolol (COSOPT) 22.3-6.8 mg/mL ophthalmic solution 1 Drop    latanoprost (XALATAN) 0.005 % ophthalmic solution 1 Drop    pravastatin (PRAVACHOL) tablet 40 mg    umeclidinium (INCRUSE ELLIPTA) 62.5 mcg/actuation    sodium chloride (NS) flush 5-10 mL    sodium chloride (NS) flush 5-10 mL    acetaminophen (TYLENOL) tablet 650 mg    ondansetron (ZOFRAN) injection 4 mg    docusate sodium (COLACE) capsule 100 mg    pantoprazole (PROTONIX) 40 mg in sodium chloride 0.9% 10 mL injection    amiodarone (CORDARONE) tablet 200 mg     Current Outpatient Prescriptions   Medication Sig    furosemide (LASIX) 80 mg tablet Take 80 mg by mouth daily.  warfarin (COUMADIN) 5 mg tablet Take 5 mg by mouth six (6) days a week.  Indications: take 1 tab daily except on Mondays    warfarin (COUMADIN) 5 mg tablet Take 10 mg by mouth every Monday.  ferrous sulfate (IRON) 325 mg (65 mg iron) cpER Take 1 Tab by mouth daily.  dilTIAZem (TIAZAC) 360 mg ER capsule Take 360 mg by mouth daily.  cholecalciferol, VITAMIN D3, (VITAMIN D3) 5,000 unit tab tablet Take 5,000 Units by mouth daily.  amiodarone (CORDARONE) 200 mg tablet Take 200 mg by mouth every other day.  tiotropium bromide 1.25 mcg/actuation mist Take  by inhalation.  latanoprost (XALATAN) 0.005 % ophthalmic solution Administer 1 Drop to both eyes nightly.  albuterol (PROAIR HFA) 90 mcg/actuation inhaler Take 2 Puffs by inhalation every four (4) hours as needed for Wheezing.  dorzolamide-timolol (COSOPT) 2-0.5 % ophthalmic solution Administer 1 Drop to both eyes two (2) times a day.  simvastatin (ZOCOR) 40 mg tablet Take 40 mg by mouth nightly.  nitroglycerin (NITROSTAT) 0.4 mg SL tablet by SubLINGual route every five (5) minutes as needed for Chest Pain. Patient PCP: PROVIDER UNKNOWN  PMH:  has a past medical history of Aneurysm ([facility] [address] Arrhythmia; Hypertension; Other ill-defined conditions(799.89); Other ill-defined conditions(799.89); Other ill-defined conditions(799.89); and Other ill-defined conditions(799.89). He also has no past medical history of Difficult intubation; Malignant hyperthermia due to anesthesia; Nausea & vomiting; Pseudocholinesterase deficiency; or Unspecified adverse effect of anesthesia. PSH:   has a past surgical history that includes hx orthopaedic; hx other surgical; pr abdomen surgery proc unlisted (1/16/13); hx heent; and upper gi endoscopy,biopsy (4/10/2015). FHX: family history includes Cancer in his father and mother. SHX:  reports that he has quit smoking. He has a 10.00 pack-year smoking history. He has never used smokeless tobacco. He reports that he drinks alcohol.  He reports that he uses illicit drugs, including Prescription and OTC.     ROS:A comprehensive review of systems was negative except for that written in the HPI. Objective:     Vital Signs: Telemetry:    normal sinus rhythm Intake/Output:   Visit Vitals    /50    Pulse 63    Temp 98.2 °F (36.8 °C)    Resp 17    Ht 6' 2\" (1.88 m)    Wt 113.4 kg (250 lb)    SpO2 98%    BMI 32.1 kg/m2       Temp (24hrs), Av.1 °F (37.3 °C), Min:98.2 °F (36.8 °C), Max:100.1 °F (37.8 °C)        O2 Device: Nasal cannula O2 Flow Rate (L/min): 6 l/min       Wt Readings from Last 4 Encounters:   18 113.4 kg (250 lb)   16 111.1 kg (245 lb)   16 106.6 kg (235 lb)   04/08/15 103.4 kg (228 lb)          Intake/Output Summary (Last 24 hours) at 18 1008  Last data filed at 18 0757   Gross per 24 hour   Intake                0 ml   Output              150 ml   Net             -150 ml       Last shift:       07 -  1900  In: -   Out: 150 [Urine:150]  Last 3 shifts:         Physical Exam:    General:   male; well developed and well nourished, in no respiratory distress and acyanotic and alert; NC;    HEAD: Normocephalic, without obvious abnormality, atraumatic   EYES: conjunctivae clear. PERRL,  AN Icteric sclerae   NOSE: nares normal, no drainage, no nasal flaring,    THROAT: normal; Lips, mucosa dry; No Thrush;  crowded airway; tongue midline   Neck: Supple, symmetrical, trachea midline,  No accessory mm use; No Stridor/ cuff leak, No goiter or thyroid tenderness   LYMPH: No abnormally enlarged lymph nodes. in neck or groin   Chest: increased AP diameter   Lungs: rales right base    Heart: Regular rate and rhythm; NO edema   Abdomen: soft, non-tender, without masses or organomegaly, protuberant, distended   : no [place]    Musculoskeletal: mild kyphosis;  No spine or CVA tenderness; negative, cyanosis, clubbing; no joint swelling or erythema   Neuro: alert; speech fluent ;withdraws to pain; unable to check gait and station;  following simple commands   Psych: oriented to time, place and person; No agitation;  normal affect;    Skin: Pallor;    Pulses:Bilateral, Radial, 2+   Capillary refill: abnormal: ; sluggish capillary refill, pale,    Data:     All lab results for the last 24 hours reviewed. Labs:    Recent Labs      09/12/18   0744  09/11/18 2155   WBC   --   20.6*   HGB  7.9*  11.1*   PLT   --   155   INR   --   2.4*     Recent Labs      09/11/18   2315  09/11/18   2155   NA   --   140   K   --   4.4   CL   --   103   CO2   --   28   GLU   --   130*   BUN   --   33*   CREA   --   2.00*   CA   --   9.1   LAC  1.4   --    ALB   --   3.9   SGOT   --   21   ALT   --   21     No results for input(s): PH, PCO2, PO2, HCO3, FIO2 in the last 72 hours. No results for input(s): CPK, CKNDX, TROIQ in the last 72 hours. No lab exists for component: CPKMB  No results found for: BNPP, BNP   Lab Results   Component Value Date/Time    Culture result: NO GROWTH AFTER 7 HOURS 09/11/2018 11:15 PM   No results found for: TSH, TSHEXT    Imaging:          Results from Hospital Encounter encounter on 09/11/18   XR CHEST PORT   Narrative INDICATION:  hypoxia     EXAM: Chest single view. COMPARISON: 4/8/2015. FINDINGS: A single frontal view of the chest at 2206 hours shows bibasilar  interstitial prominence with a mid inspiratory effort and bibasilar  atelectasis. .  The heart, mediastinum and pulmonary vasculature are stable . Transvenous pacemaker from the right has been introduced with leads over the  right atrium and right ventricle. The bony thorax is unremarkable for age. .         Impression IMPRESSION:  Suspect early interstitial edema pattern. .  .             Results from [place] encounter on 09/11/18   CT CHEST WO CONT   Narrative INDICATION: hemoptysis, eval abscess vs hemorrhage    COMPARISON: None    TECHNIQUE:  Noncontrast 5 mm axial images were obtained through the chest. CT  dose reduction was achieved through use of a standardized protocol tailored for  this examination and automatic exposure control for dose modulation. FINDINGS:    THYROID: Unremarkable. MEDIASTINUM/TRAVIS: Small calcified subcarinal lymph nodes. HEART/PERICARDIUM: Coronary atherosclerosis. Pacemaker. No pericardial effusion. Not significantly enlarged. LUNGS/PLEURA: Severe emphysema. Patchy right basilar airspace disease with a 5.5  x 4.5 cm oval lesion in the right lower lobe with fluid fluid level. No internal  gas. No significant pleural effusion. No pneumothorax. INCIDENTALLY IMAGED UPPER ABDOMEN: No significant abnormality. BONES: No destructive bone lesion. ADDITIONAL COMMENTS:  N/A. Impression IMPRESSION:  Severe emphysema with right basilar airspace disease and an oval 5.5 x 4.5 cm  lesion with fluid fluid level concerning for internal hemorrhage. No internal  gas. Differential diagnosis includes an infectious process including abscess,  although neoplasm with internal hemorrhage also a possibility. I have personally and independently reviewed the patients interval and diagnostic data, radiographs and have reviewed the reports. I have ordered additional labs to follow the current medical conditions and to monitor treatment responses over the next 24 hours or sooner if needed. If the pt needs,  additional imaging will be obtained to follow longitudinal changes found on the most current imaging.       Medical Decision Making Today:  · Reviewed the flowsheet and previous days notes  · Reviewed and summarized records or history from previous days note or discussions with staff, family  · Parenteral controlled substances - Reviewed/ Adjusted / Hyla [employer] / Started  · High Risk Drug therapy requiring intensive monitoring for toxicity: eg steroids, pressors, antibiotics  · Reviewed and/or ordered Clinical lab tests  · Reviewed and/or ordered Radiology tests  · Reviewed and/or ordered of Medicine tests  · Independently visualized radiologic Images  · I have personally reviewed the patients ECG / Telemetry        Musa Joseph MD

## 2018-09-12 NOTE — H&P
Hospitalist Admission NoteNAME: Sukumar Marshall  
:  1934 MRN:  [id number] Date/Time:  2018 4:35 AM 
 
Patient PCP: PROVIDER UNKNOWN 
________________________________________________________________________ My assessment of this patient's clinical condition and my plan of care is as follows. Assessment / Plan: 
Acute upper GI bleed (hematemesis) hemodynamically stable Patient reports that he started to have blood in his phlegm and subsequently went on to develop hematemesis and also had few episodes of dark stools He also had some abdominal pain along with nausea He does have prior history of peptic ulcer disease in the past 
Start Protonix 40 mg IV every 12, hemoglobin every 6 hours, gastroenterology has already been consulted Insert 2 large-bore IV lines Consider fluids based on pt's blood pressure and o2 requirements due to pulmonary hemorrhage Sepsis due to suspected pneumonia with possible pulmonary hemorrhage Patient reports having exertional shortness of breath along with cough and hemoptysis CT of chest shows severe emphysema with the right basilar airspace disease in the oval mass with fluid level concerning for internal hemorrhage versus pneumonia Given his symptoms, fever and leukocytosis will treat this as pneumonia and assess in am 
Consult pulmonary since there is slight concern for pulmonary hemorrhage Blood cultures have already been sent explained Currently blood pressure is stable, consider starting on IV fluids if blood pressure is low History of atrial fibrillation on anticoagulation with warfarin Hx of CAD s/p PCI in the past 
Hold warfarin and resume home amiodarone. Hold Cardizem due to borderline low blood pressure 
inr is 2.4 Acute kidney injury likely prerenal and related to medications (Lasix) Baseline creatinine is around 0.9 and currently creatinine is elevated at 2.0 Hold home Lasix for now and repeat BMP in a.m. Consider starting on IV fluids if no improvement in creatinine by tomorrow Dyslipidemia COPD Resume home Pravachol, includes Abnormal urinalysis Asymptomatic, urine culture has been sent On ceftriaxone for pneumonia which should cover for UTI as well if  this is true infection History of glaucoma Resume home eyedrops    
  
     
   
 
 
Code Status: Full Surrogate Decision Maker: Wife Ligia Villasenor DVT Prophylaxis: [facility] GI Prophylaxis: protonix Baseline: From home independent Subjective: CHIEF COMPLAINT: GI bleeding HISTORY OF PRESENT ILLNESS:    
This is a 80-year-old male with past medical history of coronary artery disease, atrial fibrillation on anticoagulation with warfarin is coming to the hospital with chief complaints of vomiting blood since the last 2 days.  Patient reports being in his usual state of health until about 2 days ago when he started not feeling well. Avni Conti reports that he initially is noticed blood in his phlegm upon coughing and subsequently went on to develop bloody vomiting's.  He also reports that yesterday he developed abdominal pain in his abdomen became tense was distended.  He also reports noticing dark stools in the last few days. Avni Conti also reports increasing shortness of breath since the last few days. Avni Conti does not report any chest pain, palpitations or syncopal episodes. Avni Conti reports that he occasionally takes Motrin for musculoskeletal pain On arrival to the hospital he was noted to have borderline fever of 100.1 and was also hypoxic on 2.5 L per ER documentation.  On lab work he was noted to have a white count of 20,000.  He was also noted to have a creatinine of 2.0 with a lactic acid of 1.4.  G was contacted and was started on ceftriaxone and azithromycin. We were asked to admit for work up and evaluation of the above problems. Past Medical History:  
Diagnosis Date  Aneurysm ([facility] [address] AAA  Arrhythmia   
 atrial fibrillation 2016  Hypertension  Other ill-defined conditions(799.89)   
 hx of broken arm left/cast  
 Other ill-defined conditions(799.89)   
 glaucoma  Other ill-defined conditions(799.89)   
 elevated cholesterol  Other ill-defined conditions(799.89)   
 hx bursitis knees Past Surgical History:  
Procedure Laterality Date  ABDOMEN SURGERY PROC UNLISTED  1/16/13 AORTIC ABDOMINAL ANUERYSM REPAIR ENDOVASCULAR   
 HX HEENT    
 bilat cataract  HX ORTHOPAEDIC    
 ligament surgery left arm  HX OTHER SURGICAL    
 broken left foot casted and healed  UPPER GI ENDOSCOPY,BIOPSY  4/10/2015 Social History Substance Use Topics  Smoking status: Former Smoker Packs/day: 0.50 Years: 20.00  Smokeless tobacco: Never Used  Alcohol use Yes Comment: social rare Family History Problem Relation Age of Onset  Cancer Mother  Cancer Father No Known Allergies Prior to Admission medications Medication Sig Start Date End Date Taking? Authorizing Provider  
furosemide (LASIX) 80 mg tablet Take 80 mg by mouth daily. Yes Jessica Calderón MD  
warfarin (COUMADIN) 5 mg tablet Take 5 mg by mouth six (6) days a week. Indications: take 1 tab daily except on Mondays   Yes Jessica Calderón MD  
warfarin (COUMADIN) 5 mg tablet Take 10 mg by mouth every Monday. Yes Jessica Calderón MD  
ferrous sulfate (IRON) 325 mg (65 mg iron) cpER Take 1 Tab by mouth daily. Yes Jessica Calderón MD  
dilTIAZem (TIAZAC) 360 mg ER capsule Take 360 mg by mouth daily. Yes Jessica Calderón MD  
cholecalciferol, VITAMIN D3, (VITAMIN D3) 5,000 unit tab tablet Take 5,000 Units by mouth daily. Yes Jessica Calderón MD  
amiodarone (CORDARONE) 200 mg tablet Take 200 mg by mouth every other day. 9/12/18  Yes Jessica Calderón MD  
tiotropium bromide 1.25 mcg/actuation mist Take  by inhalation.    Yes Historical Provider  
latanoprost (XALATAN) 0.005 % ophthalmic solution Administer 1 Drop to both eyes nightly. Yes Historical Provider  
albuterol (PROAIR HFA) 90 mcg/actuation inhaler Take 2 Puffs by inhalation every four (4) hours as needed for Wheezing. Yes Historical Provider  
dorzolamide-timolol (COSOPT) 2-0.5 % ophthalmic solution Administer 1 Drop to both eyes two (2) times a day. Yes Historical Provider  
simvastatin (ZOCOR) 40 mg tablet Take 40 mg by mouth nightly. Yes Historical Provider  
nitroglycerin (NITROSTAT) 0.4 mg SL tablet by SubLINGual route every five (5) minutes as needed for Chest Pain. Historical Provider REVIEW OF SYSTEMS:    
I am not able to complete the review of systems because: The patient is intubated and sedated The patient has altered mental status due to his acute medical problems The patient has baseline aphasia from prior stroke(s) The patient has baseline dementia and is not reliable historian The patient is in acute medical distress and unable to provide information Total of 12 systems reviewed as follows:   
   POSITIVE= underlined text  Negative = text not underlined General:  fever, chills, sweats, generalized weakness, weight loss/gain,  
   loss of appetite Eyes:    blurred vision, eye pain, loss of vision, double vision ENT:    rhinorrhea, pharyngitis Respiratory:   cough, sputum production, SOB, ROY, wheezing, pleuritic pain  
Cardiology:   chest pain, palpitations, orthopnea, PND, edema, syncope Gastrointestinal:  abdominal pain , N/V, diarrhea, dysphagia, constipation, bleeding Genitourinary:  frequency, urgency, dysuria, hematuria, incontinence Muskuloskeletal :  arthralgia, myalgia, back pain Hematology:  easy bruising, nose or gum bleeding, lymphadenopathy Dermatological: rash, ulceration, pruritis, color change / jaundice Endocrine:   hot flashes or polydipsia Neurological:  headache, dizziness, confusion, focal weakness, paresthesia, Speech difficulties, memory loss, gait difficulty Psychological: Feelings of anxiety, depression, agitation Objective: VITALS:   
Visit Vitals  /44  Pulse 69  Temp 100.1 °F (37.8 °C)  Resp 18  Ht 6' 2\" (1.88 m)  Wt 113.4 kg (250 lb)  SpO2 94%  BMI 32.1 kg/m2 PHYSICAL EXAM: 
 
General:    Alert, cooperative, no distress, appears stated age. HEENT: Atraumatic, anicteric sclerae, pink conjunctivae No oral ulcers, mucosa moist, throat clear Neck:  Supple, symmetrical,  thyroid: non tender Lungs:   Clear to auscultation bilaterally. No Wheezing or Rhonchi. No rales. Chest wall:  No tenderness  No Accessory muscle use. Heart:   Regular  rhythm,  No  murmur   No edema Abdomen:   Soft, non-tender. distended. Bowel sounds normal 
Extremities: No cyanosis. No clubbing,   
  Skin turgor normal, Capillary refill normal, Radial dial pulse 2+ Skin:     Not pale. Not Jaundiced  No rashes Psych:  Not anxious or agitated. Neurologic: EOMs intact. No facial asymmetry. No aphasia or slurred speech. Symmetrical strength, Sensation grossly intact. Alert and oriented X 4.  
 
_______________________________________________________________________ Care Plan discussed with: 
  Comments Patient y Family RN y   
Care Manager Consultant:     
_______________________________________________________________________ Expected  Disposition:  
Home with Family y HH/PT/OT/RN   
SNF/LTC   
MERCEDES   
________________________________________________________________________ TOTAL TIME:  60  Minutes Critical Care Provided     Minutes non procedure based Comments  
 y Reviewed previous records  
>50% of visit spent in counseling and coordination of care y Discussion with patient and/or family and questions answered 
  
 
________________________________________________________________________ Signed: Jim Keller MD 
 
Procedures: see electronic medical records for all procedures/Xrays and details which were not copied into this note but were reviewed prior to creation of Plan. LAB DATA REVIEWED:   
Recent Results (from the past 24 hour(s)) PROTHROMBIN TIME + INR Collection Time: 09/11/18  9:55 PM  
Result Value Ref Range INR 2.4 (H) 0.9 - 1.1 Prothrombin time 23.9 (H) 9.0 - 11.1 sec CBC WITH AUTOMATED DIFF Collection Time: 09/11/18  9:55 PM  
Result Value Ref Range WBC 20.6 (H) 4.1 - 11.1 K/uL  
 RBC 3.09 (L) 4.10 - 5.70 M/uL  
 HGB 11.1 (L) 12.1 - 17.0 g/dL HCT 35.0 (L) 36.6 - 50.3 % .3 (H) 80.0 - 99.0 FL  
 MCH 35.9 (H) 26.0 - 34.0 PG  
 MCHC 31.7 30.0 - 36.5 g/dL  
 RDW 15.5 (H) 11.5 - 14.5 % PLATELET 448 548 - 535 K/uL MPV 11.8 8.9 - 12.9 FL  
 NRBC 0.0 0  WBC ABSOLUTE NRBC 0.00 0.00 - 0.01 K/uL NEUTROPHILS 91 (H) 32 - 75 % LYMPHOCYTES 2 (L) 12 - 49 % MONOCYTES 5 5 - 13 % EOSINOPHILS 1 0 - 7 % BASOPHILS 0 0 - 1 % IMMATURE GRANULOCYTES 1 (H) 0.0 - 0.5 % ABS. NEUTROPHILS 18.8 (H) 1.8 - 8.0 K/UL  
 ABS. LYMPHOCYTES 0.4 (L) 0.8 - 3.5 K/UL  
 ABS. MONOCYTES 1.0 0.0 - 1.0 K/UL  
 ABS. EOSINOPHILS 0.2 0.0 - 0.4 K/UL  
 ABS. BASOPHILS 0.0 0.0 - 0.1 K/UL  
 ABS. IMM. GRANS. 0.2 (H) 0.00 - 0.04 K/UL  
 DF AUTOMATED    
 RBC COMMENTS MACROCYTOSIS 
1+ METABOLIC PANEL, COMPREHENSIVE Collection Time: 09/11/18  9:55 PM  
Result Value Ref Range Sodium 140 136 - 145 mmol/L Potassium 4.4 3.5 - 5.1 mmol/L Chloride 103 97 - 108 mmol/L  
 CO2 28 21 - 32 mmol/L Anion gap 9 5 - 15 mmol/L Glucose 130 (H) 65 - 100 mg/dL BUN 33 (H) 6 - 20 MG/DL Creatinine 2.00 (H) 0.70 - 1.30 MG/DL  
 BUN/Creatinine ratio 17 12 - 20 GFR est AA 39 (L) >60 ml/min/1.73m2 GFR est non-AA 32 (L) >60 ml/min/1.73m2 Calcium 9.1 8.5 - 10.1 MG/DL Bilirubin, total 0.4 0.2 - 1.0 MG/DL  
 ALT (SGPT) 21 12 - 78 U/L  
 AST (SGOT) 21 15 - 37 U/L Alk. phosphatase 116 45 - 117 U/L Protein, total 8.7 (H) 6.4 - 8.2 g/dL Albumin 3.9 3.5 - 5.0 g/dL Globulin 4.8 (H) 2.0 - 4.0 g/dL A-G Ratio 0.8 (L) 1.1 - 2.2 TYPE & SCREEN Collection Time: 09/11/18  9:55 PM  
Result Value Ref Range Crossmatch Expiration 09/14/2018 ABO/Rh(D) B NEGATIVE Antibody screen NEG   
LACTIC ACID Collection Time: 09/11/18 11:15 PM  
Result Value Ref Range Lactic acid 1.4 0.4 - 2.0 MMOL/L  
URINALYSIS W/ REFLEX CULTURE Collection Time: 09/12/18  1:33 AM  
Result Value Ref Range Color DARK YELLOW Appearance CLOUDY (A) CLEAR Specific gravity 1.026 1.003 - 1.030    
 pH (UA) 5.0 5.0 - 8.0 Protein 30 (A) NEG mg/dL Glucose NEGATIVE  NEG mg/dL Ketone TRACE (A) NEG mg/dL Blood LARGE (A) NEG Urobilinogen 1.0 0.2 - 1.0 EU/dL Nitrites NEGATIVE  NEG Leukocyte Esterase MODERATE (A) NEG    
 WBC 5-10 0 - 4 /hpf  
 RBC 20-50 0 - 5 /hpf Epithelial cells FEW FEW /lpf Bacteria 1+ (A) NEG /hpf  
 UA:UC IF INDICATED URINE CULTURE ORDERED (A) CNI    
BILIRUBIN, CONFIRM Collection Time: 09/12/18  1:33 AM  
Result Value Ref Range  Bilirubin UA, confirm NEGATIVE  NEG

## 2018-09-12 NOTE — CONSULTS
GI Consultation Note Nina Moody)    NAME: Surendra Moscoso : 1934 MRN: [id number]   ATTG: Hospitalist PCP: PROVIDER UNKNOWN  Date/Time:  2018 8:09 AM  Subjective:   REASON FOR CONSULT:      Brandon Denson is a 80 y.o.  male with hx Afib on Coumadin, CAD with prior PCI, AAA, HTN, and COPD on 2.5L via NC, who I was asked to see for evaluation of GI bleeding. He was admitted via ER on presentation with 3d hx protracted coughing and hemoptysis, that later was associated with reported hematemesis. He noted during same time course that he experienced \"tightness\" in his abdomen that did not change with PO or defecation. He denied N, melena, BRBPR, hematochezia, or dysphagia. He is not on ASA or NSAIDs as OP. His wife reported that his stools were darker. He denies CP, increased SOB. He has not had any hematemesis here, but continued to cough and demonstrate hemoptysis here in ER. ER eval demonstrated patchy airspace disease in the right lung base with an associated hyperdense RLL 5.2 x 4.3 cm oval lesion, but no acute abdominal or pelvic process. WBC 20.6, BUN Cr 33/2.0, INR 2.4. Initial Hgb was 11.1, but when rechecked 8hrs later was noted to be 7.9. He had undergone EGD  confirming gastritis without h.pylori infection. Last colonoscopy  with internal hemorrhoids and sigmoid diverticulosis.     Past Medical History:   Diagnosis Date    Aneurysm [facility])     AAA    Arrhythmia     atrial fibrillation 2016    Hypertension     Other ill-defined conditions(799.89)     hx of broken arm left/cast    Other ill-defined conditions(799.89)     glaucoma    Other ill-defined conditions(799.89)     elevated cholesterol    Other ill-defined conditions(799.89)     hx bursitis knees      Past Surgical History:   Procedure Laterality Date    ABDOMEN SURGERY PROC UNLISTED  13    AORTIC ABDOMINAL ANUERYSM REPAIR ENDOVASCULAR     HX HEENT      bilat cataract    HX ORTHOPAEDIC      ligament surgery left arm    HX OTHER SURGICAL      broken left foot casted and healed    UPPER GI ENDOSCOPY,BIOPSY  4/10/2015          Social History   Substance Use Topics    Smoking status: Former Smoker     Packs/day: 0.50     Years: 20.00    Smokeless tobacco: Never Used    Alcohol use Yes      Comment: social rare      Family History   Problem Relation Age of Onset    Cancer Mother     Cancer Father       No Known Allergies   Home Medications:  Prior to Admission Medications   Prescriptions Last Dose Informant Patient Reported? Taking? albuterol (PROAIR HFA) 90 mcg/actuation inhaler 9/4/2018 at Unknown time  Yes Yes   Sig: Take 2 Puffs by inhalation every four (4) hours as needed for Wheezing. amiodarone (CORDARONE) 200 mg tablet 9/10/2018  Yes Yes   Sig: Take 200 mg by mouth every other day. cholecalciferol, VITAMIN D3, (VITAMIN D3) 5,000 unit tab tablet 9/11/2018 at Unknown time  Yes Yes   Sig: Take 5,000 Units by mouth daily. dilTIAZem (TIAZAC) 360 mg ER capsule 9/11/2018 at Unknown time  Yes Yes   Sig: Take 360 mg by mouth daily. dorzolamide-timolol (COSOPT) 2-0.5 % ophthalmic solution 9/11/2018 at Unknown time Self Yes Yes   Sig: Administer 1 Drop to both eyes two (2) times a day. ferrous sulfate (IRON) 325 mg (65 mg iron) cpER 9/11/2018 at Unknown time  Yes Yes   Sig: Take 1 Tab by mouth daily. furosemide (LASIX) 80 mg tablet 9/11/2018 at Unknown time  Yes Yes   Sig: Take 80 mg by mouth daily. latanoprost (XALATAN) 0.005 % ophthalmic solution 9/10/2018 at Unknown time  Yes Yes   Sig: Administer 1 Drop to both eyes nightly. nitroglycerin (NITROSTAT) 0.4 mg SL tablet Unknown at Unknown time  Yes No   Sig: by SubLINGual route every five (5) minutes as needed for Chest Pain.   simvastatin (ZOCOR) 40 mg tablet 9/10/2018 at Unknown time  Yes Yes   Sig: Take 40 mg by mouth nightly. tiotropium bromide 1.25 mcg/actuation mist 9/11/2018 at Unknown time  Yes Yes   Sig: Take  by inhalation.    warfarin (COUMADIN) 5 mg tablet 9/10/2018 at Unknown time  Yes Yes   Sig: Take 5 mg by mouth six (6) days a week. Indications: take 1 tab daily except on Mondays   warfarin (COUMADIN) 5 mg tablet 9/4/2018 at Unknown time  Yes Yes   Sig: Take 10 mg by mouth every Monday. Facility-Administered Medications: None     Hospital medications:  Current Facility-Administered Medications   Medication Dose Route Frequency    cefTRIAXone (ROCEPHIN) 2 g in 0.9% sodium chloride (MBP/ADV) 50 mL  2 g IntraVENous Q24H    azithromycin (ZITHROMAX) 500 mg in 0.9% sodium chloride (MBP/ADV) 250 mL  500 mg IntraVENous Q24H    albuterol (PROVENTIL HFA, VENTOLIN HFA, PROAIR HFA) inhaler 2 Puff  2 Puff Inhalation Q4H PRN    dorzolamide-timolol (COSOPT) 22.3-6.8 mg/mL ophthalmic solution 1 Drop  1 Drop Both Eyes BID    latanoprost (XALATAN) 0.005 % ophthalmic solution 1 Drop  1 Drop Both Eyes QHS    pravastatin (PRAVACHOL) tablet 40 mg  40 mg Oral QHS    umeclidinium (INCRUSE ELLIPTA) 62.5 mcg/actuation  1 Puff Inhalation DAILY    sodium chloride (NS) flush 5-10 mL  5-10 mL IntraVENous Q8H    sodium chloride (NS) flush 5-10 mL  5-10 mL IntraVENous PRN    acetaminophen (TYLENOL) tablet 650 mg  650 mg Oral Q6H PRN    ondansetron (ZOFRAN) injection 4 mg  4 mg IntraVENous Q6H PRN    docusate sodium (COLACE) capsule 100 mg  100 mg Oral DAILY PRN    pantoprazole (PROTONIX) 40 mg in sodium chloride 0.9% 10 mL injection  40 mg IntraVENous Q12H    amiodarone (CORDARONE) tablet 200 mg  200 mg Oral EVERY OTHER DAY     Current Outpatient Prescriptions   Medication Sig    furosemide (LASIX) 80 mg tablet Take 80 mg by mouth daily.  warfarin (COUMADIN) 5 mg tablet Take 5 mg by mouth six (6) days a week. Indications: take 1 tab daily except on Mondays    warfarin (COUMADIN) 5 mg tablet Take 10 mg by mouth every Monday.  ferrous sulfate (IRON) 325 mg (65 mg iron) cpER Take 1 Tab by mouth daily.     dilTIAZem (TIAZAC) 360 mg ER capsule Take 360 mg by mouth daily.    cholecalciferol, VITAMIN D3, (VITAMIN D3) 5,000 unit tab tablet Take 5,000 Units by mouth daily.  amiodarone (CORDARONE) 200 mg tablet Take 200 mg by mouth every other day.  tiotropium bromide 1.25 mcg/actuation mist Take  by inhalation.  latanoprost (XALATAN) 0.005 % ophthalmic solution Administer 1 Drop to both eyes nightly.  albuterol (PROAIR HFA) 90 mcg/actuation inhaler Take 2 Puffs by inhalation every four (4) hours as needed for Wheezing.  dorzolamide-timolol (COSOPT) 2-0.5 % ophthalmic solution Administer 1 Drop to both eyes two (2) times a day.  simvastatin (ZOCOR) 40 mg tablet Take 40 mg by mouth nightly.  nitroglycerin (NITROSTAT) 0.4 mg SL tablet by SubLINGual route every five (5) minutes as needed for Chest Pain. REVIEW OF SYSTEMS:     [x]    Total of 11 systems reviewed as follows:  Const:   negative fever, negative chills, negative weight loss  Eyes:   negative diplopia or visual changes, negative eye pain  ENT:   negative coryza, negative sore throat  Resp:   negative dyspnea  Cards:  negative for chest pain, palpitations, lower extremity edema  :  negative for frequency, dysuria and hematuria  Skin:   negative for rash and pruritus  Heme:   negative for easy bruising and gum/nose bleeding  MS:  negative for myalgias, arthralgias, back pain and muscle weakness  Neurolo:  negative for headaches, dizziness, vertigo, memory problems   Psych:  negative for feelings of anxiety, depression     Pertinent Positives include : +cough, hemoptysis    Objective:   VITALS:    Visit Vitals    /50    Pulse 63    Temp 98.2 °F (36.8 °C)    Resp 17    Ht 6' 2\" (1.88 m)    Wt 113.4 kg (250 lb)    SpO2 98%    BMI 32.1 kg/m2     Temp (24hrs), Av.1 °F (37.3 °C), Min:98.2 °F (36.8 °C), Max:100.1 °F (37.8 °C)    PHYSICAL EXAM:   General:    Alert, cooperative, no distress, appears stated age. Head:   Normocephalic, without obvious abnormality, atraumatic.   Eyes: Conjunctivae clear, anicteric sclerae. Pupils are equal  Nose:  Nares normal. No drainage or sinus tenderness. Throat:    Lips, mucosa, and tongue normal.  No Thrush  Neck:  Supple, symmetrical,  no adenopathy, thyroid: non tender  Back:    Symmetric,  No CVA tenderness. Lungs:   Decreased BS at R base. No wheezing/rhonchi/rales. Chest wall:  No tenderness or deformity. No Accessory muscle use. Heart:   Regular rate and rhythm,  no murmur, rub or gallop. Abdomen:   Soft, NT, +minimally tympanitic, minimally distended. NABS. No M/G/R  Extremities: Atraumatic, No cyanosis. 2+ b/l MICHELLE. No clubbing  Skin:     Texture, turgor normal. No rashes/lesions/jaundice  Lymph: Cervical, supraclavicular normal.  Psych:  Good insight. Not depressed. Not anxious or agitated. Neurologic: EOMs intact. Normal  strength, A/O X 3. LAB DATA REVIEWED:    Recent Results (from the past 48 hour(s))   PROTHROMBIN TIME + INR    Collection Time: 09/11/18  9:55 PM   Result Value Ref Range    INR 2.4 (H) 0.9 - 1.1      Prothrombin time 23.9 (H) 9.0 - 11.1 sec   CBC WITH AUTOMATED DIFF    Collection Time: 09/11/18  9:55 PM   Result Value Ref Range    WBC 20.6 (H) 4.1 - 11.1 K/uL    RBC 3.09 (L) 4.10 - 5.70 M/uL    HGB 11.1 (L) 12.1 - 17.0 g/dL    HCT 35.0 (L) 36.6 - 50.3 %    .3 (H) 80.0 - 99.0 FL    MCH 35.9 (H) 26.0 - 34.0 PG    MCHC 31.7 30.0 - 36.5 g/dL    RDW 15.5 (H) 11.5 - 14.5 %    PLATELET 708 880 - 810 K/uL    MPV 11.8 8.9 - 12.9 FL    NRBC 0.0 0  WBC    ABSOLUTE NRBC 0.00 0.00 - 0.01 K/uL    NEUTROPHILS 91 (H) 32 - 75 %    LYMPHOCYTES 2 (L) 12 - 49 %    MONOCYTES 5 5 - 13 %    EOSINOPHILS 1 0 - 7 %    BASOPHILS 0 0 - 1 %    IMMATURE GRANULOCYTES 1 (H) 0.0 - 0.5 %    ABS. NEUTROPHILS 18.8 (H) 1.8 - 8.0 K/UL    ABS. LYMPHOCYTES 0.4 (L) 0.8 - 3.5 K/UL    ABS. MONOCYTES 1.0 0.0 - 1.0 K/UL    ABS. EOSINOPHILS 0.2 0.0 - 0.4 K/UL    ABS. BASOPHILS 0.0 0.0 - 0.1 K/UL    ABS. IMM.  GRANS. 0.2 (H) 0.00 - 0.04 K/UL DF AUTOMATED      RBC COMMENTS MACROCYTOSIS  1+       METABOLIC PANEL, COMPREHENSIVE    Collection Time: 09/11/18  9:55 PM   Result Value Ref Range    Sodium 140 136 - 145 mmol/L    Potassium 4.4 3.5 - 5.1 mmol/L    Chloride 103 97 - 108 mmol/L    CO2 28 21 - 32 mmol/L    Anion gap 9 5 - 15 mmol/L    Glucose 130 (H) 65 - 100 mg/dL    BUN 33 (H) 6 - 20 MG/DL    Creatinine 2.00 (H) 0.70 - 1.30 MG/DL    BUN/Creatinine ratio 17 12 - 20      GFR est AA 39 (L) >60 ml/min/1.73m2    GFR est non-AA 32 (L) >60 ml/min/1.73m2    Calcium 9.1 8.5 - 10.1 MG/DL    Bilirubin, total 0.4 0.2 - 1.0 MG/DL    ALT (SGPT) 21 12 - 78 U/L    AST (SGOT) 21 15 - 37 U/L    Alk.  phosphatase 116 45 - 117 U/L    Protein, total 8.7 (H) 6.4 - 8.2 g/dL    Albumin 3.9 3.5 - 5.0 g/dL    Globulin 4.8 (H) 2.0 - 4.0 g/dL    A-G Ratio 0.8 (L) 1.1 - 2.2     TYPE & SCREEN    Collection Time: 09/11/18  9:55 PM   Result Value Ref Range    Crossmatch Expiration 09/14/2018     ABO/Rh(D) B NEGATIVE     Antibody screen NEG    CULTURE, BLOOD, PAIRED    Collection Time: 09/11/18 11:15 PM   Result Value Ref Range    Special Requests: NO SPECIAL REQUESTS      Culture result: NO GROWTH AFTER 7 HOURS     LACTIC ACID    Collection Time: 09/11/18 11:15 PM   Result Value Ref Range    Lactic acid 1.4 0.4 - 2.0 MMOL/L   URINALYSIS W/ REFLEX CULTURE    Collection Time: 09/12/18  1:33 AM   Result Value Ref Range    Color DARK YELLOW      Appearance CLOUDY (A) CLEAR      Specific gravity 1.026 1.003 - 1.030      pH (UA) 5.0 5.0 - 8.0      Protein 30 (A) NEG mg/dL    Glucose NEGATIVE  NEG mg/dL    Ketone TRACE (A) NEG mg/dL    Blood LARGE (A) NEG      Urobilinogen 1.0 0.2 - 1.0 EU/dL    Nitrites NEGATIVE  NEG      Leukocyte Esterase MODERATE (A) NEG      WBC 5-10 0 - 4 /hpf    RBC 20-50 0 - 5 /hpf    Epithelial cells FEW FEW /lpf    Bacteria 1+ (A) NEG /hpf    UA:UC IF INDICATED URINE CULTURE ORDERED (A) CNI     BILIRUBIN, CONFIRM    Collection Time: 09/12/18  1:33 AM Result Value Ref Range    Bilirubin UA, confirm NEGATIVE  NEG     HEMOGLOBIN    Collection Time: 09/12/18  7:44 AM   Result Value Ref Range    HGB 7.9 (L) 12.1 - 17.0 g/dL     IMAGING RESULTS:   [x]      I have personally reviewed the actual   []    CXR  [x]    CT  []     US    CT ABD/PELVIS WO CONTRAST 9/12/18  FINDINGS:   LUNG BASES: Patchy airspace disease in the right lung base, with a 5.2 x 4.3 cm   mass lesion in the lower lobe. The lesion is slightly hyperdense with a possible  fluid fluid level best seen on sagittal image 63. No pleural effusion. LIVER: No mass or biliary dilatation. GALLBLADDER: Unremarkable. SPLEEN: No enlargement or lesion. PANCREAS: No mass or ductal dilatation. ADRENALS: No mass. KIDNEYS: No nephrolithiasis or hydronephrosis. 2.2 cm right renal cyst.  GI TRACT:  No bowel obstruction. Diverticulosis of the sigmoid colon without  diverticulitis. PERITONEUM: No free air or free fluid. APPENDIX: Unremarkable. RETROPERITONEUM: Previous abdominal aortic aneurysm endograft repair. No  lymphadenopathy. URINARY BLADDER: Unremarkable. REPRODUCTIVE ORGANS: Enlarged prostate. LYMPH NODES:  None enlarged. FREE FLUID:  None. BONES: No destructive bone lesion. IMPRESSION:  1. Patchy airspace disease in the right lung base with an associated hyperdense  5.2 x 4.3 cm oval lesion. This may contain a fluid/fluid level indicating  abscess/hemorrhage, with neoplasm felt to be less likely. However correlation  with clinical findings and close follow-up recommended. 2. No acute abdominal or pelvic process. CT CHEST WO CONTRAST 9/12/18  FINDINGS:  THYROID: Unremarkable. MEDIASTINUM/TRAVIS: Small calcified subcarinal lymph nodes. HEART/PERICARDIUM: Coronary atherosclerosis. Pacemaker. No pericardial effusion. Not significantly enlarged. LUNGS/PLEURA: Severe emphysema. Patchy right basilar airspace disease with a 5.5  x 4.5 cm oval lesion in the right lower lobe with fluid fluid level.  No internal  gas. No significant pleural effusion. No pneumothorax. INCIDENTALLY IMAGED UPPER ABDOMEN: No significant abnormality. BONES: No destructive bone lesion. IMPRESSION:  Severe emphysema with right basilar airspace disease and an oval 5.5 x 4.5 cm  lesion with fluid fluid level concerning for internal hemorrhage. No internal  gas. Differential diagnosis includes an infectious process including abscess,  although neoplasm with internal hemorrhage also a possibility. Recommendations/Plan:      Active Problems:    Pulmonary hemorrhage (9/12/2018)      Acute GI bleeding (9/12/2018)       ___________________________________________________  RECOMMENDATIONS:    81yo M with reported hematemesis and confirmed hemoptysis with leukocytosis associated with air-space disease c/w pneumonia and possible RLL abscess. The reported dark stool may have represented swallowed blood related to his hemoptysis. I do not think GI bleeding is prior process at this time and would best be managed conservatively in setting of probable pneumonia.   Plan:  1) IV PPI  2) Hold Coumadin for now  3) 2 peripheral IVs  4) Type and screen  5) Obtain pulmonary consult  6) No plan for EGd at this time  7) OK to feed from GI standpoint    Discussed Code Status:    [x]    Full Code      []    DNR    ___________________________________________________  Care Plan discussed with:    [x]    Patient   []    Family   [x]    Nursing   []    Attending  Total Time :  50   minutes   ___________________________________________________  GI: Med Salcedo MD

## 2018-09-12 NOTE — ROUTINE PROCESS
TRANSFER - OUT REPORT: 
 
Verbal report given to Alfredo Pinzon RN (name) on Portia Gutierrez  being transferred to [phone] (unit) for routine progression of care Report consisted of patients Situation, Background, Assessment and  
Recommendations(SBAR). Information from the following report(s) ED Summary and MAR was reviewed with the receiving nurse. Lines:  
Peripheral IV 09/11/18 Right Antecubital (Active) Site Assessment Clean, dry, & intact 9/11/2018  9:54 PM  
Phlebitis Assessment 0 9/11/2018  9:54 PM  
Infiltration Assessment 0 9/11/2018  9:54 PM  
Dressing Status Clean, dry, & intact 9/11/2018  9:54 PM  
Hub Color/Line Status Green 9/11/2018  9:54 PM  
   
Peripheral IV 09/11/18 Left Antecubital (Active) Site Assessment Clean, dry, & intact 9/11/2018 10:02 PM  
Phlebitis Assessment 0 9/11/2018 10:02 PM  
Infiltration Assessment 0 9/11/2018 10:02 PM  
Dressing Status Clean, dry, & intact 9/11/2018 10:02 PM  
Hub Color/Line Status Pink 9/11/2018 10:02 PM  
  
 
Opportunity for questions and clarification was provided. Patient transported with: 
 Monitor for PCU Transported to [address] [facility] by ED tech.

## 2018-09-12 NOTE — ED NOTES
RN unable to take report at this time. Room assigned twice and just now receiving page. Will review chart and call back in few minutes.

## 2018-09-12 NOTE — ED NOTES
Patient arrived via EMS. Patient presented with complaint of vomiting blood. Per report patient had been coughing up blood since Tuesday started vomiting blood today. Per report, patient abdomen is tight but non-tender. Patient stated he takes warfarin. Patient is alert and oriented. Patient stated his wife is on her way to the hospital. Will continue to monitor and assess patient needs.

## 2018-09-12 NOTE — PROGRESS NOTES
Pt admitted this morning. Chart reviewed. Agree with plans set forth by Dr Claudia Paiz. No EGD today. Keep NPO until pulm eval pt. Hold coumadin, monitor H/H, transfuse prn 
Cont' empiric abx

## 2018-09-12 NOTE — PROGRESS NOTES
ABG obtained as ordered, results as follows: 
 
pH 7.389 
pCO2 42.8 
pO2 65.8 HCO3 25.3 BE 0.1 Pt is currently on 6lpm n/c.

## 2018-09-13 LAB
ANA SER QL: POSITIVE
ANION GAP SERPL CALC-SCNC: 6 MMOL/L (ref 5–15)
ANTICHROMATIN ABS, ACHRLT: <0.2 AI (ref 0–0.9)
APTT PPP: 37.4 SEC (ref 22.1–32)
BACTERIA SPEC CULT: NORMAL
BASOPHILS # BLD: 0 K/UL (ref 0–0.1)
BASOPHILS NFR BLD: 0 % (ref 0–1)
BUN SERPL-MCNC: 21 MG/DL (ref 6–20)
BUN/CREAT SERPL: 18 (ref 12–20)
C-ANCA TITR SER IF: NORMAL TITER
CALCIUM SERPL-MCNC: 7.6 MG/DL (ref 8.5–10.1)
CC UR VC: NORMAL
CHLORIDE SERPL-SCNC: 108 MMOL/L (ref 97–108)
CO2 SERPL-SCNC: 26 MMOL/L (ref 21–32)
CREAT SERPL-MCNC: 1.2 MG/DL (ref 0.7–1.3)
DIFFERENTIAL METHOD BLD: ABNORMAL
DSDNA AB SER-ACNC: <1 IU/ML (ref 0–9)
ENA SM AB SER-ACNC: <0.2 AI (ref 0–0.9)
ENA SM+RNP AB SER-ACNC: <0.2 AI (ref 0–0.9)
ENA SS-A AB SER-ACNC: <0.2 AI (ref 0–0.9)
ENA SS-B AB SER-ACNC: <0.2 AI (ref 0–0.9)
EOSINOPHIL # BLD: 0.1 K/UL (ref 0–0.4)
EOSINOPHIL NFR BLD: 1 % (ref 0–7)
ERYTHROCYTE [DISTWIDTH] IN BLOOD BY AUTOMATED COUNT: 15.8 % (ref 11.5–14.5)
FIBRINOGEN PPP-MCNC: 454 MG/DL (ref 200–475)
GBM IGG SER-ACNC: 5 UNITS (ref 0–20)
GLUCOSE SERPL-MCNC: 109 MG/DL (ref 65–100)
HCT VFR BLD AUTO: 24.9 % (ref 36.6–50.3)
HGB BLD-MCNC: 7 G/DL (ref 12.1–17)
HGB BLD-MCNC: 7.5 G/DL (ref 12.1–17)
HGB BLD-MCNC: 7.7 G/DL (ref 12.1–17)
IMM GRANULOCYTES # BLD: 0.1 K/UL (ref 0–0.04)
IMM GRANULOCYTES NFR BLD AUTO: 1 % (ref 0–0.5)
INR PPP: 2 (ref 0.9–1.1)
LYMPHOCYTES # BLD: 0.8 K/UL (ref 0.8–3.5)
LYMPHOCYTES NFR BLD: 9 % (ref 12–49)
MCH RBC QN AUTO: 35.4 PG (ref 26–34)
MCHC RBC AUTO-ENTMCNC: 30.1 G/DL (ref 30–36.5)
MCV RBC AUTO: 117.5 FL (ref 80–99)
MONOCYTES # BLD: 1.2 K/UL (ref 0–1)
MONOCYTES NFR BLD: 14 % (ref 5–13)
MYELOPEROXIDASE AB SER IA-ACNC: <9 U/ML (ref 0–9)
NEUTS SEG # BLD: 6.7 K/UL (ref 1.8–8)
NEUTS SEG NFR BLD: 75 % (ref 32–75)
NRBC # BLD: 0 K/UL (ref 0–0.01)
NRBC BLD-RTO: 0 PER 100 WBC
P-ANCA ATYPICAL TITR SER IF: NORMAL TITER
P-ANCA TITR SER IF: NORMAL TITER
PLATELET # BLD AUTO: 123 K/UL (ref 150–400)
PMV BLD AUTO: 11.5 FL (ref 8.9–12.9)
POTASSIUM SERPL-SCNC: 4.5 MMOL/L (ref 3.5–5.1)
PROTEINASE3 AB SER IA-ACNC: <3.5 U/ML (ref 0–3.5)
PROTHROMBIN TIME: 19.9 SEC (ref 9–11.1)
RBC # BLD AUTO: 2.12 M/UL (ref 4.1–5.7)
RBC MORPH BLD: ABNORMAL
RNP ABS, RNPRLT: <0.2 AI (ref 0–0.9)
SCLERODERMA AB, RSCLT: 1.1 AI (ref 0–0.9)
SEE BELOW:, 164879: ABNORMAL
SERVICE CMNT-IMP: NORMAL
SODIUM SERPL-SCNC: 140 MMOL/L (ref 136–145)
THERAPEUTIC RANGE,PTTT: ABNORMAL SECS (ref 58–77)
WBC # BLD AUTO: 8.9 K/UL (ref 4.1–11.1)

## 2018-09-13 PROCEDURE — 36415 COLL VENOUS BLD VENIPUNCTURE: CPT | Performed by: INTERNAL MEDICINE

## 2018-09-13 PROCEDURE — 77010033678 HC OXYGEN DAILY

## 2018-09-13 PROCEDURE — 85025 COMPLETE CBC W/AUTO DIFF WBC: CPT

## 2018-09-13 PROCEDURE — 74011250636 HC RX REV CODE- 250/636: Performed by: INTERNAL MEDICINE

## 2018-09-13 PROCEDURE — 80048 BASIC METABOLIC PNL TOTAL CA: CPT | Performed by: INTERNAL MEDICINE

## 2018-09-13 PROCEDURE — 74011000250 HC RX REV CODE- 250: Performed by: INTERNAL MEDICINE

## 2018-09-13 PROCEDURE — 74011250636 HC RX REV CODE- 250/636: Performed by: EMERGENCY MEDICINE

## 2018-09-13 PROCEDURE — 85610 PROTHROMBIN TIME: CPT | Performed by: INTERNAL MEDICINE

## 2018-09-13 PROCEDURE — 65660000000 HC RM CCU STEPDOWN

## 2018-09-13 PROCEDURE — 74011250637 HC RX REV CODE- 250/637: Performed by: INTERNAL MEDICINE

## 2018-09-13 PROCEDURE — 85018 HEMOGLOBIN: CPT | Performed by: INTERNAL MEDICINE

## 2018-09-13 PROCEDURE — 74011000258 HC RX REV CODE- 258: Performed by: EMERGENCY MEDICINE

## 2018-09-13 PROCEDURE — C9113 INJ PANTOPRAZOLE SODIUM, VIA: HCPCS | Performed by: INTERNAL MEDICINE

## 2018-09-13 RX ORDER — PANTOPRAZOLE SODIUM 40 MG/1
40 TABLET, DELAYED RELEASE ORAL
Status: DISCONTINUED | OUTPATIENT
Start: 2018-09-14 | End: 2018-09-15

## 2018-09-13 RX ADMIN — DORZOLAMIDE HYDROCHLORIDE AND TIMOLOL MALEATE 1 DROP: 20; 5 SOLUTION/ DROPS OPHTHALMIC at 19:24

## 2018-09-13 RX ADMIN — ACETAMINOPHEN 650 MG: 325 TABLET ORAL at 16:34

## 2018-09-13 RX ADMIN — DORZOLAMIDE HYDROCHLORIDE AND TIMOLOL MALEATE 1 DROP: 20; 5 SOLUTION/ DROPS OPHTHALMIC at 10:29

## 2018-09-13 RX ADMIN — PRAVASTATIN SODIUM 40 MG: 40 TABLET ORAL at 21:45

## 2018-09-13 RX ADMIN — METRONIDAZOLE 500 MG: 500 INJECTION, SOLUTION INTRAVENOUS at 03:42

## 2018-09-13 RX ADMIN — Medication 10 ML: at 16:35

## 2018-09-13 RX ADMIN — Medication 10 ML: at 21:45

## 2018-09-13 RX ADMIN — ONDANSETRON 4 MG: 2 INJECTION INTRAMUSCULAR; INTRAVENOUS at 05:56

## 2018-09-13 RX ADMIN — UMECLIDINIUM 1 PUFF: 62.5 AEROSOL, POWDER ORAL at 10:28

## 2018-09-13 RX ADMIN — METRONIDAZOLE 500 MG: 500 INJECTION, SOLUTION INTRAVENOUS at 19:59

## 2018-09-13 RX ADMIN — CEFTRIAXONE SODIUM 2 G: 2 INJECTION, POWDER, FOR SOLUTION INTRAMUSCULAR; INTRAVENOUS at 01:02

## 2018-09-13 RX ADMIN — SODIUM CHLORIDE 40 MG: 9 INJECTION, SOLUTION INTRAMUSCULAR; INTRAVENOUS; SUBCUTANEOUS at 10:28

## 2018-09-13 RX ADMIN — ACETAMINOPHEN 650 MG: 325 TABLET ORAL at 05:56

## 2018-09-13 RX ADMIN — ONDANSETRON 4 MG: 2 INJECTION INTRAMUSCULAR; INTRAVENOUS at 21:45

## 2018-09-13 RX ADMIN — LATANOPROST 1 DROP: 50 SOLUTION OPHTHALMIC at 22:00

## 2018-09-13 RX ADMIN — METRONIDAZOLE 500 MG: 500 INJECTION, SOLUTION INTRAVENOUS at 12:06

## 2018-09-13 RX ADMIN — Medication 10 ML: at 05:25

## 2018-09-13 NOTE — PROGRESS NOTES
GI Progress Note Lolis Harman) NAME:Ulysses Valle QFX:[birth year] VGS:[id number] ATTG: MD Usman  PCP: PROVIDER UNKNOWN 
Date/Time:  9/13/2018 10:21 AM  
Assessment:  
· Hematemesis- resolved; I suspect this was in fact hemoptysis · Anemia- stable Plan:  
· Continue daily PPI · Advance diet after completion of pulmonary w/u · No EGD planned Will sign off Subjective:  
Discussed with RN events overnight. Continued hemoptysis. No melena or hematemesis. Tolerating PO Complaint Y/N Description Abdominal Pain n Hematemesis n Hematochezia n Melena n   
Constipation n   
Diarrhea n Dyspepsia n Dysphagia n   
Jaundiced n   
Nausea/vomiting n Review of Systems: 
Symptom Y/N Comments  Symptom Y/N Comments Fever/Chills n   Chest Pain n   
Cough y bloody  Headaches n Sputum y bloody  Joint Pain n   
SOB/ROY n   Pruritis/Rash n Tolerating Diet y clear  Other Could NOT obtain due to:   
 
Objective: VITALS:  
Last 24hrs VS reviewed since prior progress note. Most recent are: 
Visit Vitals  /52  Pulse 60  Temp 98.7 °F (37.1 °C)  Resp 18  Ht 6' 2\" (1.88 m)  Wt 113.4 kg (250 lb)  SpO2 98%  BMI 32.1 kg/m2 Intake/Output Summary (Last 24 hours) at 09/13/18 1021 Last data filed at 09/13/18 8773 Gross per 24 hour Intake             1220 ml Output              725 ml Net              495 ml PHYSICAL EXAM: 
General: WD, WN. Alert, cooperative, no acute distress   
HEENT: NC, Atraumatic. PERRL. Anicteric sclerae. Lungs:  Decreased BS at R base. No Wheezing. Heart:  Regular  rhythm,  No murmur/Rub/Gallops Abdomen: Soft, Non distended, Non tender.  +BS Extremities: No c/c/e Neurologic:  CN 2-12 gi, A/O X 3. No acute neurological distress Psych:   Good insight. Not anxious nor agitated. Lab and Radiology Data Reviewed: (see below) Medications Reviewed: (see below) PMH/SH reviewed - no change compared to H&P 
 ________________________________________________________________________ Total time spent with patient: 15 minutes ________________________________________________________________________ Care Plan discussed with: 
Patient y Family RN y  
           Consultant:    
 
Sukhdeep Figueroa MD  
 
Procedures: see electronic medical records for all procedures/Xrays and details which were not copied into this note but were reviewed prior to creation of Plan. LABS: 
Recent Labs  
   09/13/18 0526 09/12/18 2151 09/11/18 2155 WBC   --    --    --   20.6* HGB  7.7*  7.6*   < >  11.1* HCT   --    --    --   35.0*  
PLT   --    --    --   155  
 < > = values in this interval not displayed. Recent Labs  
   09/13/18 0344 09/11/18 2155 NA  140  140  
K  4.5  4.4  
CL  108  103 CO2  26  28 BUN  21*  33* CREA  1.20  2.00* GLU  109*  130* CA  7.6*  9.1 Recent Labs  
   09/11/18 2155 SGOT  21  
AP  116  
TP  8.7* ALB  3.9 GLOB  4.8* Recent Labs  
   09/13/18 0344 09/11/18 2155 INR  2.0*  2.4* PTP  19.9*  23.9* APTT  37.4*   -- No results for input(s): FE, TIBC, PSAT, FERR in the last 72 hours. Lab Results Component Value Date/Time Folate 12.7 04/12/2015 04:50 AM  
 
No results for input(s): PH, PCO2, PO2 in the last 72 hours. No results for input(s): CPK, CKMB in the last 72 hours. No lab exists for component: TROPONINI Lab Results Component Value Date/Time  Color DARK YELLOW 09/12/2018 01:33 AM  
 Appearance CLOUDY (A) 09/12/2018 01:33 AM  
 Specific gravity 1.026 09/12/2018 01:33 AM  
 pH (UA) 5.0 09/12/2018 01:33 AM  
 Protein 30 (A) 09/12/2018 01:33 AM  
 Glucose NEGATIVE  09/12/2018 01:33 AM  
 Ketone TRACE (A) 09/12/2018 01:33 AM  
 Urobilinogen 1.0 09/12/2018 01:33 AM  
 Nitrites NEGATIVE  09/12/2018 01:33 AM  
 Leukocyte Esterase MODERATE (A) 09/12/2018 01:33 AM  
 Epithelial cells FEW 09/12/2018 01:33 AM  
 Bacteria 1+ (A) 09/12/2018 01:33 AM  
 WBC 5-10 09/12/2018 01:33 AM  
 RBC 20-50 09/12/2018 01:33 AM  
 
 
MEDICATIONS: 
Current Facility-Administered Medications Medication Dose Route Frequency  cefTRIAXone (ROCEPHIN) 2 g in 0.9% sodium chloride (MBP/ADV) 50 mL  2 g IntraVENous Q24H  
 albuterol (PROVENTIL HFA, VENTOLIN HFA, PROAIR HFA) inhaler 2 Puff  2 Puff Inhalation Q4H PRN  
 dorzolamide-timolol (COSOPT) 22.3-6.8 mg/mL ophthalmic solution 1 Drop  1 Drop Both Eyes BID  latanoprost (XALATAN) 0.005 % ophthalmic solution 1 Drop  1 Drop Both Eyes QHS  pravastatin (PRAVACHOL) tablet 40 mg  40 mg Oral QHS  umeclidinium (INCRUSE ELLIPTA) 62.5 mcg/actuation  1 Puff Inhalation DAILY  sodium chloride (NS) flush 5-10 mL  5-10 mL IntraVENous Q8H  
 sodium chloride (NS) flush 5-10 mL  5-10 mL IntraVENous PRN  
 acetaminophen (TYLENOL) tablet 650 mg  650 mg Oral Q6H PRN  
 ondansetron (ZOFRAN) injection 4 mg  4 mg IntraVENous Q6H PRN  
 docusate sodium (COLACE) capsule 100 mg  100 mg Oral DAILY PRN  pantoprazole (PROTONIX) 40 mg in sodium chloride 0.9% 10 mL injection  40 mg IntraVENous Q12H  
 amiodarone (CORDARONE) tablet 200 mg  200 mg Oral EVERY OTHER DAY  metroNIDAZOLE (FLAGYL) IVPB premix 500 mg  500 mg IntraVENous Q8H

## 2018-09-13 NOTE — PROGRESS NOTES
9:56AM 
 
Reason for Admission:   GI bleed RRAT Score:     15 Do you (patient/family) have any concerns for transition/discharge? None Plan for utilizing home health:     TBD Likelihood of readmission? Moderate Transition of Care Plan:      Home with spouse CM met with pt to complete assessment and discuss d/c planning. Pt is an 79 yo male admitted for GI bleed. Pt lives with his wife, Arnulfo Curtis, in a 2 story home. At baseline, pt is independent for ADLs and IADLs, including driving. Pt denies any DME use. No PT/OT ordered IP. Pt states that his PCP was Dr. Mark Whittington in [address] [place] [address]. but he is switching to a different provider in the same practice. Pt's wife will be the one to transport him home at d/c. CM will continue to follow and assist with d/c planning. Care Management Interventions PCP Verified by CM: Yes (Dr. Yue Herrera practice in [facility]) Mode of Transport at Discharge: Other (see comment) (Pt's wife ) Transition of Care Consult (CM Consult): Discharge Planning Discharge Durable Medical Equipment: No 
Physical Therapy Consult: No 
Occupational Therapy Consult: No 
Speech Therapy Consult: No 
Current Support Network: Own Home, Lives with Spouse Confirm Follow Up Transport: Family Plan discussed with Pt/Family/Caregiver: Yes Discharge Location Discharge Placement: Home with family assistance BHAVANI Cid Care Manager

## 2018-09-13 NOTE — PROGRESS NOTES
0515: Lab notified this RN of CBC sample not being enough and needs to be redrawn. Notified Primary RN [id number]

## 2018-09-13 NOTE — PROGRESS NOTES
Hospitalist Progress Note NAME: Tien Oseguera  
:  1934 MRN:  [id number] Assessment / Plan: 
Acute upper GI bleed(hematemesis) -may be due to hemoptysis since patient had been coughing as well. Patient admitted to taking total 800 mg/day Ibuprofen last few weeks for pain on left side. -GI consulted. It was felt that dark stool may be due swallowed blood. No EGD planned. -PPI Hemoptysis Sepsis due to suspected PNA w/ possible pulmonary hemorrhage 
-CT of chest shows severe emphysema with the right basilar airspace disease in the oval mass with fluid level concerning for internal hemorrhage versus pneumonia. -WBC elevated on admission. Now improved. -follow cultures 
-cont abx 
-Serology for ANCA, JOSS, anti GBM pending. RLL mass - unable to determine if hemorrhage 
-consider Wegener's per Dr. Juarez Margarette given hemoptysis and hematuria. -workup as above. -consider navigational bronch as outpatient. However, patient would have to be off anticoagulation. Acute blood loss anemia on admission 
-Hgb 11 on admission. Down to 7.5 on . 
-serial H&H 
 
H/o atrial fibrillation on chronic anticoagulation w/ coumadin 
-cont amiodarone 
-cardizem on hold due to borderline low bp 
-hold coumadin 
 
Coumadin-induced coagulopathy 
-INR remains elevated. ALAN - likely prerenal related to Lasix 
-follow renal function UTI, acute cystitis w/ hematuria, POA 
-cont abx; follow urine cx. COPD - stable HLD 
-cont pravachol Obesity - BMI 32 Body mass index is 32.1 kg/(m^2). Code status: Full Prophylaxis: SCD's Recommended Disposition: Home w/Family Subjective: Chief Complaint / Reason for Physician Visit Follow up for hemoptysis. Discussed with RN events overnight. Denies cp/sob/abd pain. No further cough. Review of Systems: 
Symptom Y/N Comments  Symptom Y/N Comments Fever/Chills    Chest Pain Poor Appetite    Edema Cough    Abdominal Pain Sputum    Joint Pain SOB/ROY    Pruritis/Rash Nausea/vomit    Tolerating PT/OT Diarrhea    Tolerating Diet Constipation    Other Could NOT obtain due to:   
 
Objective: VITALS:  
Last 24hrs VS reviewed since prior progress note. Most recent are: 
Patient Vitals for the past 24 hrs: 
 Temp Pulse Resp BP SpO2  
09/13/18 1126 98.5 °F (36.9 °C) 60 18 114/55 98 % 09/13/18 0341 98.7 °F (37.1 °C) 60 18 104/52 98 % 09/13/18 0103 98.6 °F (37 °C) 60 18 113/56 97 % 09/12/18 1942 - 60 18 102/52 97 % 09/12/18 1535 - 60 - - 99 % 09/12/18 1534 97.9 °F (36.6 °C) 60 19 97/52 98 % Intake/Output Summary (Last 24 hours) at 09/13/18 1422 Last data filed at 09/13/18 3066 Gross per 24 hour Intake             1220 ml Output              725 ml Net              495 ml PHYSICAL EXAM: 
General: WD, WN. Alert, cooperative, no acute distress   
EENT:  EOMI. Anicteric sclerae. MMM Resp:  CTA bilaterally, no wheezing or rales. No accessory muscle use CV:  Regular  rhythm,  1+ edema GI:  Soft, Non distended, Non tender.  +Bowel sounds Neurologic:  Alert and oriented X 3, normal speech, Psych:   Good insight. Not anxious nor agitated Skin:  No rashes. No jaundice Reviewed most current lab test results and cultures  YES Reviewed most current radiology test results   YES Review and summation of old records today    NO Reviewed patient's current orders and MAR    YES 
PMH/SH reviewed - no change compared to H&P 
________________________________________________________________________ Care Plan discussed with: 
  Comments Patient x Family RN Care Manager Consultant Multidiciplinary team rounds were held today with , nursing, pharmacist and clinical coordinator. Patient's plan of care was discussed; medications were reviewed and discharge planning was addressed. ________________________________________________________________________ Total NON critical care TIME:  30  Minutes Total CRITICAL CARE TIME Spent:   Minutes non procedure based Comments >50% of visit spent in counseling and coordination of care    
________________________________________________________________________ Leonardo Magana MD  
 
Procedures: see electronic medical records for all procedures/Xrays and details which were not copied into this note but were reviewed prior to creation of Plan. LABS: 
I reviewed today's most current labs and imaging studies. Pertinent labs include: 
Recent Labs  
   09/13/18 
 1042  09/13/18 
 0526  09/12/18 
 2151   09/11/18 
 2155 WBC  8.9   --    --    --   20.6* HGB  7.5*  7.7*  7.6*   < >  11.1*  
HCT  24.9*   --    --    --   35.0*  
PLT  123*   --    --    --   155  
 < > = values in this interval not displayed. Recent Labs  
   09/13/18 
 0344  09/11/18 
 2155 NA  140  140  
K  4.5  4.4  
CL  108  103 CO2  26  28 GLU  109*  130* BUN  21*  33* CREA  1.20  2.00* CA  7.6*  9.1 ALB   --   3.9 TBILI   --   0.4 SGOT   --   21 ALT   --   21 INR  2.0*  2.4* Signed: Leonardo Magana MD

## 2018-09-13 NOTE — PROGRESS NOTES
[facility] Consult Service NotePulmonary, Critical Care, and Sleep Medicine Name: Portia Gutierrez MRN: [id number] : 1934 Hospital: [facility] Date: 2018   Hospital Day: 3 Subjective/Interval History:  
I have reviewed the notes from other providers and old records readily available and summarized findings below with the flowsheet. Seen earlier today on rounds. IMPRESSION:  
1. Acute hemoptysis- doubt pulmonary  Hemorrhage- Ct scan show spillage of blood, has lung mass( CA vs inflammatory pseudotumor) and hematuria; could have Wegener's? Pulmonary renal syndrome but also anticoagulated; 2. Lung mass RLL- right lung base- would not be visible on conventional bronch and yield from transbronchial biopsy not guaranteed; had no lesion on chest Ct 2016 3. Coagulopathy from warfarin per ; INR down from 2.4 to 2.0 
4. Acute kidney injury likely prerenal and related to medications (Lasix) Baseline creatinine is around 0.9 and currently creatinine is elevated at 2.0 5. Hematuria Abnormal urinalysis 6. Sepsis due to suspected pneumonia Patient reports having exertional shortness of breath along with cough and hemoptysis; CT of chest shows severe emphysema with the right basilar airspace disease in the oval mass with fluid level concerning for internal hemorrhage versus pneumonia ;  
7. 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  
8. Anemia 9. Former 50+ pack yr smoker quit in ? 10. Asbestos exposure 11. History of atrial fibrillation on anticoagulation with warfarin 12. Hx of CAD s/p PCI in the past home amiodarone. Hold Cardizem 13. Dyslipidemia Body mass index is 32.1 kg/(m^2).  
15. Multiorgan dysfunction as outlined above: Pt has one or more acute or chronic illnesses with severe exacerbation with progression or side effects of treatment that poses a threat to life or bodily function 15. Additional workup outlined below 16. Pt is requiring Drug therapy requiring intensive monitoring for toxicity 17. Pt is unstable, unpredictable needing PCU/CCU monitoring; is critically ill and at high risk of sudden decline and decompensation with life threatening consequenses and continued end organ dysfunction and failure 18. Prognosis guarded with significant risk of sudden decline RECOMMENDATIONS/PLAN:  
1. Too risky for bronch or biopsy while anticoagulated; may be able to avoid bronch if cytology positive. The mass will not be visible by bronch; an option might be navigational bronch which we can help to arrange as outpt but would need to be off anticoagulation 2. Suggest asking  if warfarin should be restarted 3. Sputum culture 4. Sputum cytology 5. Empiric abx to treat possible lung abscess 6. Follow cultures 7. Daily coags off warfarin 8. Serologies ANCA panel, JOSS, anti GBM pending 9. Agree with stopping warfarin 10. Renal ultrasound 11. Renal consult? 12. Supplemental O2 to keep sats > 93% 13. Labs to follow electrolytes, renal function and and blood counts 14. Glucose monitoring and SSI 15. Bronchial hygiene with respiratory therapy techniques, bronchodilators 16. DVT, SUP prophylaxis 17. Pt needs IV fluids with additives and Drug therapy requiring intensive monitoring for toxicity 18. Prescription drug management with home med reconciliation reviewed My assessment/management discussed with: Consultants, Nursing, Pharmacy, Case Management, PT, OT, Respiratory Therapy, Hospitalist and Family for coordination of care Pt's condition is acute and unstable requiring inpatient hospitalization.  This care involved high complexity decision making which includes independently reviewing the patient's past medical records, current laboratory results, medication profiles that were immediately available to me and actual Xray images at the bedside in order to assess, support vital system function, and to treat this degree of vital organ system failure, and to prevent further deterioration of the patients condition. Risk of deterioration: medium and high  
[x] High complexity decision making was performed [x] See my orders for details Tubes:    
 
Subjective/Initial History:  
 
I was asked by Mirtha Jimenez MD to see Rubia Pradhan  a 80 y.o.  male in consultation for a chief complaint of hemoptysis and abnormal  
 
\"Ulysses BERMUDEZ is a 80 y.o.  male with hx Afib on Coumadin, CAD with prior PCI, AAA, HTN, and COPD on 2.5L via NC, who . .. was admitted via ER on presentation with 3d hx protracted coughing and hemoptysis, that later was associated with reported hematemesis. He noted during same time course that he experienced \"tightness\" in his abdomen that did not change with PO or defecation. He denied N, melena, BRBPR, hematochezia, or dysphagia. He is not on ASA or NSAIDs as OP. His wife reported that his stools were darker. He denies CP, increased SOB. He has not had any hematemesis here, but continued to cough and demonstrate hemoptysis here in ER. ER eval demonstrated patchy airspace disease in the right lung base with an associated hyperdense RLL 5.2 x 4.3 cm oval lesion, but no acute abdominal or pelvic process. WBC 20.6, BUN Cr 33/2.0, INR 2.4. Initial Hgb was 11.1, but when rechecked 8hrs later was noted to be 7.9. He had undergone EGD 2015 confirming gastritis without h.pylori infection. Last colonoscopy 2007 with internal hemorrhoids and sigmoid diverticulosis. Severe emphysema with right basilar airspace disease and an oval 5.5 x 4.5 cm lesion with fluid fluid level concerning for internal hemorrhage.  \" 
 
Pt seen by Dr. Xander Hale who feels that source of bleeding is not Gi tract. I reviewed office notes where he was last seen this year by our NP. I have not seen pt since 2016. He was noted to have ABNORMAL CHEST CT (R93.8)   Jan 2015 scattered perihilar adenopathy does not explain his other sx. 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 Pt just quit smoking after 50+ years Jan 2016 CT scan with emphysma. no lung mass 12/07/15: CTA impression: No pulmonary emboli. Mild ILD, similar to prior study. New irregular 10.6mm RLL lung nodule for f/u in 3 months. This could be infectious or inflammatory in nature, or potentially a small malignancy. [address] Chest CT reviewed Jan 2016 no mass. Emphysema with mild pulmonary HTN. Pt has very severe COPD who quit smoking since Feb 2015, started age 16. Tried Albuterol or Combivent on and off for years but really only prn per PCP. Cost has been extravagant, especially Spiriva which he used on and off for three. June 2015 FEV1 1.16 to 1.4 liters (<50%). Pt has had no dysphagia, choking. No pneumonia. No chest pain. Weight stable. No sign or sx of chest infection. Claims he just completed course of abx couple weeks ago from our office but we have no record of a prescription order. Denied gross hematuria. No Known Allergies MAR reviewed and pertinent medications noted or modified as needed Current Facility-Administered Medications Medication  [START ON 9/14/2018] pantoprazole (PROTONIX) tablet 40 mg  
 cefTRIAXone (ROCEPHIN) 2 g in 0.9% sodium chloride (MBP/ADV) 50 mL  albuterol (PROVENTIL HFA, VENTOLIN HFA, PROAIR HFA) inhaler 2 Puff  dorzolamide-timolol (COSOPT) 22.3-6.8 mg/mL ophthalmic solution 1 Drop  latanoprost (XALATAN) 0.005 % ophthalmic solution 1 Drop  pravastatin (PRAVACHOL) tablet 40 mg  
 umeclidinium (INCRUSE ELLIPTA) 62.5 mcg/actuation  sodium chloride (NS) flush 5-10 mL  sodium chloride (NS) flush 5-10 mL  acetaminophen (TYLENOL) tablet 650 mg  
  ondansetron (ZOFRAN) injection 4 mg  docusate sodium (COLACE) capsule 100 mg  
 amiodarone (CORDARONE) tablet 200 mg  
 metroNIDAZOLE (FLAGYL) IVPB premix 500 mg Patient PCP: PROVIDER UNKNOWN 
PMH:  has a past medical history of Aneurysm ([facility] [address] Arrhythmia; Hypertension; Other ill-defined conditions(799.89); Other ill-defined conditions(799.89); Other ill-defined conditions(799.89); and Other ill-defined conditions(799.89). He also has no past medical history of Difficult intubation; Malignant hyperthermia due to anesthesia; Nausea & vomiting; Pseudocholinesterase deficiency; or Unspecified adverse effect of anesthesia. PSH:   has a past surgical history that includes hx orthopaedic; hx other surgical; pr abdomen surgery proc unlisted (13); hx heent; and upper gi endoscopy,biopsy (4/10/2015). FHX: family history includes Cancer in his father and mother. SHX:  reports that he has quit smoking. He has a 10.00 pack-year smoking history. He has never used smokeless tobacco. He reports that he drinks alcohol. He reports that he uses illicit drugs, including Prescription and OTC. 
  
ROS:A comprehensive review of systems was negative except for that written in the HPI. Objective: 
 
Vital Signs: Telemetry:    normal sinus rhythmIntake/Output:  
Visit Vitals  /55  Pulse 60  Temp 98.5 °F (36.9 °C)  Resp 18  Ht 6' 2\" (1.88 m)  Wt 113.4 kg (250 lb)  SpO2 98%  BMI 32.1 kg/m2 Temp (24hrs), Av.4 °F (36.9 °C), Min:97.9 °F (36.6 °C), Max:98.7 °F (37.1 °C) O2 Device: Nasal cannula O2 Flow Rate (L/min): 5 l/min Wt Readings from Last 4 Encounters:  
18 113.4 kg (250 lb) 16 111.1 kg (245 lb)  
16 106.6 kg (235 lb) 04/08/15 103.4 kg (228 lb) Intake/Output Summary (Last 24 hours) at 18 1239 Last data filed at 18 0549 Gross per 24 hour Intake             1220 ml Output              725 ml  
 Net              495 ml Last shift:      09/13 0701 - 09/13 1900 In: -  
Out: 100 [Urine:100] Last 3 shifts: 09/11 1901 - 09/13 0700 In: 1798 [P.O.:920; I.V.:300] Out: 775 [Urine:775] Physical Exam:  
 General:   male; well developed and well nourished, in no respiratory distress and acyanotic and alert; NC;  
 HEAD: Normocephalic, without obvious abnormality, atraumatic EYES: conjunctivae clear. PERRL,  AN Icteric sclerae NOSE: nares normal, no drainage, no nasal flaring, THROAT: normal; Lips, mucosa dry; No Thrush;  crowded airway; tongue midline Neck: Supple, symmetrical, trachea midline,  No accessory mm use; No Stridor/ cuff leak, No goiter or thyroid tenderness LYMPH: No abnormally enlarged lymph nodes. in neck or groin Chest: increased AP diameter Lungs: rales right base Heart: Regular rate and rhythm; NO edema Abdomen: soft, non-tender, without masses or organomegaly, protuberant, distended : no [place] Musculoskeletal: mild kyphosis; No spine or CVA tenderness; negative, cyanosis, clubbing; no joint swelling or erythema Neuro: alert; speech fluent ;withdraws to pain; unable to check gait and station;  following simple commands Psych: oriented to time, place and person; No agitation;  normal affect;  
 Skin: Pallor;  
 Pulses:Bilateral, Radial, 2+ Capillary refill: abnormal: ; sluggish capillary refill, pale, 
 
Data:  
 
All lab results for the last 24 hours reviewed. Labs: 
 
Recent Labs  
   09/13/18 
 1042  09/13/18 
 0526  09/13/18 
 0344  09/12/18 
 2151 09/11/18 
 2155 WBC  8.9   --    --    --    --   20.6* HGB  7.5*  7.7*   --   7.6*   < >  11.1*  
PLT  123*   --    --    --    --   155 INR   --    --   2.0*   --    --   2.4* APTT   --    --   37.4*   --    --    --   
 < > = values in this interval not displayed. Recent Labs  
   09/13/18 
 0344  09/11/18 
 2315  09/11/18 
 5159 NA  140   --   140 K  4.5   --   4.4 CL  108   --   103 CO2  26   --   28  
GLU  109*   --   130* BUN  21*   --   33* CREA  1.20   --   2.00* CA  7.6*   --   9.1 LAC   --   1.4   --   
ALB   --    --   3.9 SGOT   --    --   21 ALT   --    --   21 No results for input(s): PH, PCO2, PO2, HCO3, FIO2 in the last 72 hours. No results for input(s): CPK, CKNDX, TROIQ in the last 72 hours. No lab exists for component: CPKMB No results found for: BNPP, BNP Lab Results Component Value Date/Time Culture result: PENDING 09/12/2018 03:50 PM  
 Culture result: PENDING 09/12/2018 11:21 AM  
 Culture result: NO GROWTH 1 DAY 09/12/2018 01:33 AM  
No results found for: TSH, TSHEXT, TSHEXT Imaging: 
 
   
 
 
Results from Hospital Encounter encounter on 09/11/18 XR CHEST PORT Narrative INDICATION:  hypoxia EXAM: Chest single view. COMPARISON: 4/8/2015. FINDINGS: A single frontal view of the chest at 2206 hours shows bibasilar 
interstitial prominence with a mid inspiratory effort and bibasilar 
atelectasis. .  The heart, mediastinum and pulmonary vasculature are stable . Transvenous pacemaker from the right has been introduced with leads over the 
right atrium and right ventricle. The bony thorax is unremarkable for age. . 
   
 
 Impression IMPRESSION: 
Suspect early interstitial edema pattern. .  . Results from Hospital Encounter encounter on 09/11/18 CT CHEST WO CONT Narrative INDICATION: hemoptysis, eval abscess vs hemorrhage COMPARISON: None TECHNIQUE:  Noncontrast 5 mm axial images were obtained through the chest. CT 
dose reduction was achieved through use of a standardized protocol tailored for 
this examination and automatic exposure control for dose modulation. FINDINGS: 
 
THYROID: Unremarkable. MEDIASTINUM/TRAVIS: Small calcified subcarinal lymph nodes. HEART/PERICARDIUM: Coronary atherosclerosis. Pacemaker. No pericardial effusion. Not significantly enlarged. LUNGS/PLEURA: Severe emphysema. Patchy right basilar airspace disease with a 5.5 
x 4.5 cm oval lesion in the right lower lobe with fluid fluid level. No internal 
gas. No significant pleural effusion. No pneumothorax. INCIDENTALLY IMAGED UPPER ABDOMEN: No significant abnormality. BONES: No destructive bone lesion. ADDITIONAL COMMENTS:  N/A. Impression IMPRESSION: 
Severe emphysema with right basilar airspace disease and an oval 5.5 x 4.5 cm 
lesion with fluid fluid level concerning for internal hemorrhage. No internal 
gas. Differential diagnosis includes an infectious process including abscess, 
although neoplasm with internal hemorrhage also a possibility. I have personally and independently reviewed the patients interval and diagnostic data, radiographs and have reviewed the reports. Medical Decision Making Today: · Reviewed the flowsheet and previous days notes · Reviewed and summarized records or history from previous days note or discussions with staff, family · Parenteral controlled substances - Reviewed/ Adjusted / Weaned / Started · High Risk Drug therapy requiring intensive monitoring for toxicity: eg steroids, pressors, antibiotics · Reviewed and/or ordered Clinical lab tests · Reviewed and/or ordered Radiology tests · Reviewed and/or ordered of Medicine tests · Independently visualized radiologic Images · I have personally reviewed the patients ECG / Telemetry Vahe Mae MD

## 2018-09-13 NOTE — PROGRESS NOTES
0830 - Pt coughing up moderate amount of bright red sputum in emesis bag,. Notified Dr. Joe Elias on rounds. 1153 - H&H sent. >7. No actions taken at this time per parameters. 1454 - Pt removed his oxygen and had tubing ion his hands. Sats dropped to 76% quickly. Replaced O2 NC and sats came back up to 93% immediately. 1634 - Pt c/o abdominal pain. Requested tylenol for pain. Provided. 1715 - Pt states tylenol helped. No pain at this time.

## 2018-09-14 ENCOUNTER — APPOINTMENT (OUTPATIENT)
Dept: INTERVENTIONAL RADIOLOGY/VASCULAR | Age: 83
DRG: 003 | End: 2018-09-14
Attending: INTERNAL MEDICINE
Payer: MEDICARE

## 2018-09-14 ENCOUNTER — APPOINTMENT (OUTPATIENT)
Dept: GENERAL RADIOLOGY | Age: 83
DRG: 003 | End: 2018-09-14
Attending: INTERNAL MEDICINE
Payer: MEDICARE

## 2018-09-14 LAB
ALBUMIN SERPL-MCNC: 2.5 G/DL (ref 3.5–5)
ALBUMIN/GLOB SERPL: 0.6 {RATIO} (ref 1.1–2.2)
ALP SERPL-CCNC: 78 U/L (ref 45–117)
ALT SERPL-CCNC: 35 U/L (ref 12–78)
ANION GAP SERPL CALC-SCNC: 10 MMOL/L (ref 5–15)
ANION GAP SERPL CALC-SCNC: 5 MMOL/L (ref 5–15)
APTT PPP: 36.2 SEC (ref 22.1–32)
AST SERPL-CCNC: 49 U/L (ref 15–37)
BACTERIA SPEC CULT: ABNORMAL
BACTERIA SPEC CULT: ABNORMAL
BACTERIA SPEC CULT: NORMAL
BASOPHILS # BLD: 0 K/UL (ref 0–0.1)
BASOPHILS NFR BLD: 0 % (ref 0–1)
BILIRUB SERPL-MCNC: 0.5 MG/DL (ref 0.2–1)
BUN SERPL-MCNC: 18 MG/DL (ref 6–20)
BUN SERPL-MCNC: 19 MG/DL (ref 6–20)
BUN/CREAT SERPL: 13 (ref 12–20)
BUN/CREAT SERPL: 15 (ref 12–20)
CALCIUM SERPL-MCNC: 7.6 MG/DL (ref 8.5–10.1)
CALCIUM SERPL-MCNC: 8.1 MG/DL (ref 8.5–10.1)
CHLORIDE SERPL-SCNC: 104 MMOL/L (ref 97–108)
CHLORIDE SERPL-SCNC: 107 MMOL/L (ref 97–108)
CO2 SERPL-SCNC: 21 MMOL/L (ref 21–32)
CO2 SERPL-SCNC: 27 MMOL/L (ref 21–32)
CREAT SERPL-MCNC: 1.19 MG/DL (ref 0.7–1.3)
CREAT SERPL-MCNC: 1.41 MG/DL (ref 0.7–1.3)
DIFFERENTIAL METHOD BLD: ABNORMAL
EOSINOPHIL # BLD: 0 K/UL (ref 0–0.4)
EOSINOPHIL NFR BLD: 0 % (ref 0–7)
ERYTHROCYTE [DISTWIDTH] IN BLOOD BY AUTOMATED COUNT: 15.8 % (ref 11.5–14.5)
FIBRINOGEN PPP-MCNC: 546 MG/DL (ref 200–475)
GLOBULIN SER CALC-MCNC: 4.2 G/DL (ref 2–4)
GLUCOSE SERPL-MCNC: 110 MG/DL (ref 65–100)
GLUCOSE SERPL-MCNC: 193 MG/DL (ref 65–100)
GRAM STN SPEC: ABNORMAL
GRAM STN SPEC: NORMAL
HCT VFR BLD AUTO: 22.4 % (ref 36.6–50.3)
HGB BLD-MCNC: 6.8 G/DL (ref 12.1–17)
HGB BLD-MCNC: 6.9 G/DL (ref 12.1–17)
HGB BLD-MCNC: 7.1 G/DL (ref 12.1–17)
IMM GRANULOCYTES # BLD: 0 K/UL (ref 0–0.04)
IMM GRANULOCYTES NFR BLD AUTO: 0 % (ref 0–0.5)
INR PPP: 1.6 (ref 0.9–1.1)
INR PPP: 1.8 (ref 0.9–1.1)
LACTATE SERPL-SCNC: 2.9 MMOL/L (ref 0.4–2)
LYMPHOCYTES # BLD: 1.5 K/UL (ref 0.8–3.5)
LYMPHOCYTES NFR BLD: 12 % (ref 12–49)
MAGNESIUM SERPL-MCNC: 2.6 MG/DL (ref 1.6–2.4)
MCH RBC QN AUTO: 36.1 PG (ref 26–34)
MCHC RBC AUTO-ENTMCNC: 30.8 G/DL (ref 30–36.5)
MCV RBC AUTO: 117.3 FL (ref 80–99)
METAMYELOCYTES NFR BLD MANUAL: 2 %
MONOCYTES # BLD: 1.2 K/UL (ref 0–1)
MONOCYTES NFR BLD: 10 % (ref 5–13)
MYELOCYTES NFR BLD MANUAL: 3 %
NEUTS BAND NFR BLD MANUAL: 3 %
NEUTS SEG # BLD: 9 K/UL (ref 1.8–8)
NEUTS SEG NFR BLD: 70 % (ref 32–75)
NRBC # BLD: 0.02 K/UL (ref 0–0.01)
NRBC BLD-RTO: 0.2 PER 100 WBC
PHOSPHATE SERPL-MCNC: 4.2 MG/DL (ref 2.6–4.7)
PLATELET # BLD AUTO: 133 K/UL (ref 150–400)
PMV BLD AUTO: 12 FL (ref 8.9–12.9)
POTASSIUM SERPL-SCNC: 4.4 MMOL/L (ref 3.5–5.1)
POTASSIUM SERPL-SCNC: 4.7 MMOL/L (ref 3.5–5.1)
PROCALCITONIN SERPL-MCNC: 1.69 NG/ML (ref 0–0.08)
PROT SERPL-MCNC: 6.7 G/DL (ref 6.4–8.2)
PROTHROMBIN TIME: 16.2 SEC (ref 9–11.1)
PROTHROMBIN TIME: 17.7 SEC (ref 9–11.1)
RBC # BLD AUTO: 1.91 M/UL (ref 4.1–5.7)
RBC MORPH BLD: ABNORMAL
SERVICE CMNT-IMP: ABNORMAL
SERVICE CMNT-IMP: NORMAL
SODIUM SERPL-SCNC: 135 MMOL/L (ref 136–145)
SODIUM SERPL-SCNC: 139 MMOL/L (ref 136–145)
THERAPEUTIC RANGE,PTTT: ABNORMAL SECS (ref 58–77)
WBC # BLD AUTO: 12.3 K/UL (ref 4.1–11.1)

## 2018-09-14 PROCEDURE — 74011250637 HC RX REV CODE- 250/637: Performed by: INTERNAL MEDICINE

## 2018-09-14 PROCEDURE — 84100 ASSAY OF PHOSPHORUS: CPT | Performed by: INTERNAL MEDICINE

## 2018-09-14 PROCEDURE — 74011000250 HC RX REV CODE- 250: Performed by: INTERNAL MEDICINE

## 2018-09-14 PROCEDURE — 83605 ASSAY OF LACTIC ACID: CPT | Performed by: INTERNAL MEDICINE

## 2018-09-14 PROCEDURE — 5A1955Z RESPIRATORY VENTILATION, GREATER THAN 96 CONSECUTIVE HOURS: ICD-10-PCS | Performed by: INTERNAL MEDICINE

## 2018-09-14 PROCEDURE — C1769 GUIDE WIRE: HCPCS

## 2018-09-14 PROCEDURE — 77030016712 HC CATH ANGI DX SVU3 ANGI -B

## 2018-09-14 PROCEDURE — C1892 INTRO/SHEATH,FIXED,PEEL-AWAY: HCPCS

## 2018-09-14 PROCEDURE — 74011636320 HC RX REV CODE- 636/320

## 2018-09-14 PROCEDURE — 77030010324 HC TBNG CNTRST MRTM -A

## 2018-09-14 PROCEDURE — 77030008771 HC TU NG SALEM SUMP -A

## 2018-09-14 PROCEDURE — C1894 INTRO/SHEATH, NON-LASER: HCPCS

## 2018-09-14 PROCEDURE — 94640 AIRWAY INHALATION TREATMENT: CPT

## 2018-09-14 PROCEDURE — 77030019563 HC DEV ATTCH FEED HOLL -A

## 2018-09-14 PROCEDURE — P9016 RBC LEUKOCYTES REDUCED: HCPCS | Performed by: EMERGENCY MEDICINE

## 2018-09-14 PROCEDURE — 74011250636 HC RX REV CODE- 250/636: Performed by: INTERNAL MEDICINE

## 2018-09-14 PROCEDURE — 77030011229 HC DIL VESL COON COOK -A

## 2018-09-14 PROCEDURE — B31L1ZZ FLUOROSCOPY OF INTERCOSTAL AND BRONCHIAL ARTERIES USING LOW OSMOLAR CONTRAST: ICD-10-PCS | Performed by: RADIOLOGY

## 2018-09-14 PROCEDURE — 93041 RHYTHM ECG TRACING: CPT

## 2018-09-14 PROCEDURE — 71045 X-RAY EXAM CHEST 1 VIEW: CPT

## 2018-09-14 PROCEDURE — 77030013140 HC MSK NEB VYRM -A

## 2018-09-14 PROCEDURE — 0BH17EZ INSERTION OF ENDOTRACHEAL AIRWAY INTO TRACHEA, VIA NATURAL OR ARTIFICIAL OPENING: ICD-10-PCS | Performed by: INTERNAL MEDICINE

## 2018-09-14 PROCEDURE — B3101ZZ FLUOROSCOPY OF THORACIC AORTA USING LOW OSMOLAR CONTRAST: ICD-10-PCS | Performed by: RADIOLOGY

## 2018-09-14 PROCEDURE — 77030026918 HC ADMN ST IV BLD BD -A

## 2018-09-14 PROCEDURE — 74011000258 HC RX REV CODE- 258: Performed by: INTERNAL MEDICINE

## 2018-09-14 PROCEDURE — 36245 INS CATH ABD/L-EXT ART 1ST: CPT

## 2018-09-14 PROCEDURE — 36415 COLL VENOUS BLD VENIPUNCTURE: CPT | Performed by: INTERNAL MEDICINE

## 2018-09-14 PROCEDURE — 85025 COMPLETE CBC W/AUTO DIFF WBC: CPT | Performed by: INTERNAL MEDICINE

## 2018-09-14 PROCEDURE — 77030021533 HC CATH ANGI DX PRFMA MRTM -A

## 2018-09-14 PROCEDURE — 85610 PROTHROMBIN TIME: CPT | Performed by: INTERNAL MEDICINE

## 2018-09-14 PROCEDURE — 83735 ASSAY OF MAGNESIUM: CPT | Performed by: INTERNAL MEDICINE

## 2018-09-14 PROCEDURE — 74011636320 HC RX REV CODE- 636/320: Performed by: RADIOLOGY

## 2018-09-14 PROCEDURE — 74011000250 HC RX REV CODE- 250: Performed by: RADIOLOGY

## 2018-09-14 PROCEDURE — 74011250636 HC RX REV CODE- 250/636: Performed by: EMERGENCY MEDICINE

## 2018-09-14 PROCEDURE — 77010033678 HC OXYGEN DAILY

## 2018-09-14 PROCEDURE — 80053 COMPREHEN METABOLIC PANEL: CPT | Performed by: INTERNAL MEDICINE

## 2018-09-14 PROCEDURE — 94002 VENT MGMT INPAT INIT DAY: CPT

## 2018-09-14 PROCEDURE — 77030018729 HC ELECTRD DEFIB PAD CARD -B

## 2018-09-14 PROCEDURE — 74011000258 HC RX REV CODE- 258: Performed by: EMERGENCY MEDICINE

## 2018-09-14 PROCEDURE — C1760 CLOSURE DEV, VASC: HCPCS

## 2018-09-14 PROCEDURE — 02HV33Z INSERTION OF INFUSION DEVICE INTO SUPERIOR VENA CAVA, PERCUTANEOUS APPROACH: ICD-10-PCS | Performed by: ANESTHESIOLOGY

## 2018-09-14 PROCEDURE — 5A12012 PERFORMANCE OF CARDIAC OUTPUT, SINGLE, MANUAL: ICD-10-PCS | Performed by: INTERNAL MEDICINE

## 2018-09-14 PROCEDURE — 75726 ARTERY X-RAYS ABDOMEN: CPT

## 2018-09-14 PROCEDURE — 36430 TRANSFUSION BLD/BLD COMPNT: CPT

## 2018-09-14 PROCEDURE — 65620000000 HC RM CCU GENERAL

## 2018-09-14 PROCEDURE — 85018 HEMOGLOBIN: CPT | Performed by: INTERNAL MEDICINE

## 2018-09-14 PROCEDURE — 74011250636 HC RX REV CODE- 250/636: Performed by: RADIOLOGY

## 2018-09-14 RX ORDER — HEPARIN SODIUM 200 [USP'U]/100ML
200 INJECTION, SOLUTION INTRAVENOUS ONCE
Status: COMPLETED | OUTPATIENT
Start: 2018-09-14 | End: 2018-09-14

## 2018-09-14 RX ORDER — SODIUM CHLORIDE 9 MG/ML
50 INJECTION, SOLUTION INTRAVENOUS CONTINUOUS
Status: DISCONTINUED | OUTPATIENT
Start: 2018-09-14 | End: 2018-09-21

## 2018-09-14 RX ORDER — EPINEPHRINE 0.1 MG/ML
INJECTION INTRACARDIAC; INTRAVENOUS
Status: COMPLETED | OUTPATIENT
Start: 2018-09-14 | End: 2018-09-14

## 2018-09-14 RX ORDER — ALBUTEROL SULFATE 0.83 MG/ML
2.5 SOLUTION RESPIRATORY (INHALATION)
Status: DISCONTINUED | OUTPATIENT
Start: 2018-09-14 | End: 2018-10-01

## 2018-09-14 RX ORDER — NOREPINEPHRINE BITARTRATE/D5W 8 MG/250ML
2-16 PLASTIC BAG, INJECTION (ML) INTRAVENOUS
Status: DISCONTINUED | OUTPATIENT
Start: 2018-09-14 | End: 2018-09-19

## 2018-09-14 RX ORDER — FENTANYL CITRATE 50 UG/ML
25 INJECTION, SOLUTION INTRAMUSCULAR; INTRAVENOUS
Status: DISCONTINUED | OUTPATIENT
Start: 2018-09-14 | End: 2018-10-10

## 2018-09-14 RX ORDER — FENTANYL CITRATE 50 UG/ML
100 INJECTION, SOLUTION INTRAMUSCULAR; INTRAVENOUS
Status: DISCONTINUED | OUTPATIENT
Start: 2018-09-14 | End: 2018-09-14

## 2018-09-14 RX ORDER — LIDOCAINE HYDROCHLORIDE 20 MG/ML
10 INJECTION, SOLUTION INFILTRATION; PERINEURAL ONCE
Status: COMPLETED | OUTPATIENT
Start: 2018-09-14 | End: 2018-09-14

## 2018-09-14 RX ORDER — IODIXANOL 320 MG/ML
INJECTION, SOLUTION INTRAVASCULAR
Status: COMPLETED
Start: 2018-09-14 | End: 2018-09-14

## 2018-09-14 RX ORDER — IODIXANOL 320 MG/ML
300 INJECTION, SOLUTION INTRAVASCULAR ONCE
Status: COMPLETED | OUTPATIENT
Start: 2018-09-14 | End: 2018-09-14

## 2018-09-14 RX ORDER — PROPOFOL 10 MG/ML
0-50 VIAL (ML) INTRAVENOUS
Status: DISCONTINUED | OUTPATIENT
Start: 2018-09-14 | End: 2018-09-22

## 2018-09-14 RX ORDER — SODIUM CHLORIDE 9 MG/ML
250 INJECTION, SOLUTION INTRAVENOUS AS NEEDED
Status: DISCONTINUED | OUTPATIENT
Start: 2018-09-14 | End: 2018-09-17 | Stop reason: ALTCHOICE

## 2018-09-14 RX ORDER — SODIUM CHLORIDE 9 MG/ML
25 INJECTION, SOLUTION INTRAVENOUS CONTINUOUS
Status: DISCONTINUED | OUTPATIENT
Start: 2018-09-14 | End: 2018-09-14

## 2018-09-14 RX ADMIN — Medication 1 MCG/MIN: at 21:05

## 2018-09-14 RX ADMIN — CEFTRIAXONE SODIUM 2 G: 2 INJECTION, POWDER, FOR SOLUTION INTRAMUSCULAR; INTRAVENOUS at 02:34

## 2018-09-14 RX ADMIN — ALBUTEROL SULFATE 2.5 MG: 2.5 SOLUTION RESPIRATORY (INHALATION) at 23:40

## 2018-09-14 RX ADMIN — FENTANYL CITRATE 25 MCG: 50 INJECTION, SOLUTION INTRAMUSCULAR; INTRAVENOUS at 14:20

## 2018-09-14 RX ADMIN — AMIODARONE HYDROCHLORIDE 200 MG: 200 TABLET ORAL at 08:22

## 2018-09-14 RX ADMIN — IOPAMIDOL 165 ML: 755 INJECTION, SOLUTION INTRAVENOUS at 15:00

## 2018-09-14 RX ADMIN — EPINEPHRINE 1 MG: 0.1 INJECTION, SOLUTION ENDOTRACHEAL; INTRACARDIAC; INTRAVENOUS at 16:06

## 2018-09-14 RX ADMIN — LIDOCAINE HYDROCHLORIDE 200 MG: 20 INJECTION, SOLUTION INFILTRATION; PERINEURAL at 15:00

## 2018-09-14 RX ADMIN — DORZOLAMIDE HYDROCHLORIDE AND TIMOLOL MALEATE 1 DROP: 20; 5 SOLUTION/ DROPS OPHTHALMIC at 20:17

## 2018-09-14 RX ADMIN — PROPOFOL 40 MCG/KG/MIN: 10 INJECTION, EMULSION INTRAVENOUS at 19:23

## 2018-09-14 RX ADMIN — PROPOFOL 30 MCG/KG/MIN: 10 INJECTION, EMULSION INTRAVENOUS at 23:50

## 2018-09-14 RX ADMIN — METRONIDAZOLE 500 MG: 500 INJECTION, SOLUTION INTRAVENOUS at 02:34

## 2018-09-14 RX ADMIN — SODIUM CHLORIDE 100 ML/HR: 900 INJECTION, SOLUTION INTRAVENOUS at 16:00

## 2018-09-14 RX ADMIN — SODIUM CHLORIDE 25 ML/HR: 900 INJECTION, SOLUTION INTRAVENOUS at 14:21

## 2018-09-14 RX ADMIN — ACETAMINOPHEN 650 MG: 325 TABLET ORAL at 02:37

## 2018-09-14 RX ADMIN — SODIUM BICARBONATE 2 ML: 0.2 INJECTION, SOLUTION INTRAVENOUS at 15:00

## 2018-09-14 RX ADMIN — METRONIDAZOLE 500 MG: 500 INJECTION, SOLUTION INTRAVENOUS at 11:25

## 2018-09-14 RX ADMIN — ACETAMINOPHEN 650 MG: 325 TABLET ORAL at 11:47

## 2018-09-14 RX ADMIN — HEPARIN SODIUM IN SODIUM CHLORIDE 200 UNITS: 200 INJECTION INTRAVENOUS at 15:00

## 2018-09-14 RX ADMIN — PANTOPRAZOLE SODIUM 40 MG: 40 TABLET, DELAYED RELEASE ORAL at 08:22

## 2018-09-14 RX ADMIN — IODIXANOL 200 ML: 320 INJECTION, SOLUTION INTRAVASCULAR at 15:00

## 2018-09-14 RX ADMIN — Medication 10 ML: at 18:21

## 2018-09-14 RX ADMIN — DORZOLAMIDE HYDROCHLORIDE AND TIMOLOL MALEATE 1 DROP: 20; 5 SOLUTION/ DROPS OPHTHALMIC at 08:23

## 2018-09-14 RX ADMIN — Medication 10 ML: at 06:00

## 2018-09-14 RX ADMIN — LATANOPROST 1 DROP: 50 SOLUTION OPHTHALMIC at 23:02

## 2018-09-14 RX ADMIN — PROPOFOL 30 MCG/KG/MIN: 10 INJECTION, EMULSION INTRAVENOUS at 17:00

## 2018-09-14 RX ADMIN — CALCIUM GLUCONATE 2 G: 94 INJECTION, SOLUTION INTRAVENOUS at 18:00

## 2018-09-14 RX ADMIN — EPINEPHRINE 1 MG: 0.1 INJECTION, SOLUTION ENDOTRACHEAL; INTRACARDIAC; INTRAVENOUS at 16:03

## 2018-09-14 RX ADMIN — UMECLIDINIUM 1 PUFF: 62.5 AEROSOL, POWDER ORAL at 08:22

## 2018-09-14 RX ADMIN — METRONIDAZOLE 500 MG: 500 INJECTION, SOLUTION INTRAVENOUS at 20:14

## 2018-09-14 RX ADMIN — Medication 10 ML: at 23:01

## 2018-09-14 RX ADMIN — ALBUTEROL SULFATE 2.5 MG: 2.5 SOLUTION RESPIRATORY (INHALATION) at 20:07

## 2018-09-14 NOTE — CONSULTS
[address] [place]  MR#: [id number]  : 1934  ACCOUNT #: [de-identified]   DATE OF SERVICE: 2018    HISTORY OF PRESENT ILLNESS:  The patient is an 80-year-old gentleman who was seen in reference to his prior history of atrial fibrillation. He has presented with increasing difficulty with hemoptysis and anemia, and has a lung mass that has been evaluated by Dr. Holley Vale. The patient tells me he is not having any chest pain, but he is short of breath if he tries to do anything. PAST MEDICAL HISTORY:  1. He has severe, chronic obstructive pulmonary disease. 2.  He has a history of permanent pacemaker placed -- . 3.  History of right coronary drug-eluting stent placed 03/15.  4.  History of atrial fibrillation with maintenance of sinus rhythm for approximately 1 year. SOCIAL HISTORY:  He is . Wife is very attentive. Tobacco:  He is a former smoker, stopped in 2014. FAMILY HISTORY:  Mother  at 64 with colon cancer. Father  at 80 of cancer. 2 brothers, 1 , but was a long-term smoker. He had endograft repair of abdominal aortic aneurysm --   by Dr. Katlin Hood. REVIEW OF SYSTEMS:    CONSTITUTIONAL:  He has had difficulty with cough, he has been weak, he has had shortness of breath. In general, he has felt poorly. He has not had any fever or weight change. No headaches or blurred vision. RESPIRATORY:  He has had cough and hemoptysis. GASTROINTESTINAL:  He has had no indigestion or heartburn. GENITOURINARY:  No frequency, dysuria. PHYSICAL EXAMINATION:  VITAL SIGNS:  Weight approximately 250, blood pressure 115/80, pulses are regular. HEAD, EARS, EYES, NOSE, AND THROAT:  Negative. GENERAL:  He is alert and oriented. LUNGS:  Reveal scattered rhonchi and rales. CARDIOVASCULAR:  Regular rhythm. ABDOMEN:  Nontender, obese. EXTREMITIES:  1+ edema. SKIN:  Warm and dry. LABORATORY DATA:    1.   EKG: Regular rhythm is evident. 2.  Pacemaker check:  Pacemaker check confirms atrial and ventricular pacing with 1:1 capture. 3.  2D echo:  A 2D echo done 02/2018 reveals EF 55% to 60%. There was RV hypertrophy. There was moderate pulmonary hypertension with RV systolic pressure of 47 mmHg. PROBLEM:    1. Hemoptysis. 2.  Severe pulmonary emphysema. 3.  Coronary heart disease, status post right coronary stent -- 03/15.  4.  History of atrial fibrillation. 5.  Status post permanent pacemaker. DISCUSSION AND RECOMMENDATION:  This gentleman appears to be in sinus rhythm. Given his hemoptysis, he certainly will need to discontinue anticoagulation. I believe at this time, I will discontinue his amiodarone as well, until his pulmonary situation is clarified. Thank you very much for asking me to see this pleasant gentleman. I will follow up with you.       MD ANA Batres / [email or web address].[email or web address]  D: 09/14/2018 18:07     T: 09/14/2018 19:28  JOB #: [id number]  CC: Racheal Long MD

## 2018-09-14 NOTE — PROGRESS NOTES
PCU SHIFT NURSING NOTE Bedside shift change report given to Siobhan Palacios (oncoming nurse) by Jim Inman RN (offgoing nurse). Report included the following information SBAR and Kardex. Shift Summary: 9337 
0252- AM medications given. Hgb sent. Dr. Quinton Hope into round. Patient continues to cough up moderated amount of bright red sputum in emesis bag. Patient sates it has been the isabel eamount for 5 days now. Will notify Dr. Monster Martin on rounds. 0845- EKG complete. 5073- Dr. Columba Serrano aware of HBG 6.8. Will redraw in 6 hours. 1200-Full CHG bath complete. Skin intact no breakdown noted. Patient tolerated well. Tylenol given for chest discomfort form cough. 12- Dr. Monster Martin updated of patient, Patient  NPO. Going down via bed by transport to IR. Dr. Monster Martin made wife aware patient going for embolization. Dr. Columba Serrano made aware. 1600- IR nurses at bedside to bring patient up. Patient  Unresponsive. RRT/Code blue called. Compression started. [id number]- Patient transfered to CCU. Bedside report given to [facility]. Family in waiting room, jose at side. Admission Date 9/11/2018 Admission Diagnosis Acute GI bleeding Pulmonary hemorrhage Consults IP CONSULT TO GASTROENTEROLOGY 
IP CONSULT TO HOSPITALIST 
IP CONSULT TO PULMONOLOGY Consults []PT []OT []Speech  
[]Case Management  
  
[] Palliative Cardiac Monitoring Order []Yes []No  
 
IV drips []Yes Drip:                            Dose: 
Drip:                            Dose: 
Drip:                            Dose:  
[]No  
 
GI Prophylaxis []Yes []No  
 
 
 
DVT Prophylaxis SCDs:  Sequential Compression Device: Bilateral  
     
 Dirk stockings:     
  
[] Medication []Contraindicated []None Activity Level Activity Level: Up with Assistance Activity Assistance: Partial (one person) Purposeful Rounding every 1-2 hour? []Yes Carmona Score  Total Score: 4 Bed Alarm (If score 3 or >) []Yes [] Refused (See signed refusal form in chart) Ayaz Score  Ayaz Score: 13 Ayaz Score (if score 14 or less) []PMT consult  
[]Wound Care consult []Specialty bed  
[] Nutrition consult Needs prior to discharge:  
Home O2 required:   
[]Yes []No  
 If yes, how much O2 required? Other:  
 Last Bowel Movement: Last Bowel Movement Date: 09/11/18 Influenza Vaccine Received Flu Vaccine for Current Season (usually Sept-March): Not Flu Season Pneumonia Vaccine Diet Active Orders Diet DIET CLEAR LIQUID  
  
LDAs Peripheral IV 09/11/18 Right Antecubital (Active) Site Assessment Clean, dry, & intact 9/14/2018  3:30 AM  
Phlebitis Assessment 0 9/14/2018  3:30 AM  
Infiltration Assessment 0 9/14/2018  3:30 AM  
Dressing Status Clean, dry, & intact 9/14/2018  3:30 AM  
Dressing Type Transparent 9/14/2018  3:30 AM  
Hub Color/Line Status Infusing 9/14/2018  3:30 AM  
Alcohol Cap Used Yes 9/14/2018  3:30 AM  
                  
Urinary Catheter Intake & Output Date 09/13/18 0700 - 09/14/18 0524 09/14/18 0700 - 09/15/18 3873 Shift 2802-0317 3062-7006 24 Hour Total 6784-7119 8478-7845 24 Hour Total  
I 
N 
T 
A 
K 
E 
 P.O. 1800  1800     
   P. O. 1800  1800 I.V. 
(mL/kg/hr) 100 
(0.1) 100 
(0.1) 200 
(0.1) Volume (metroNIDAZOLE (FLAGYL) IVPB premix 500 mg) 100 100 200 Shift Total 
(mL/kg) 1900 
(16.8) 100 
(0.9) 2000 
(17.6) O 
U T 
P 
U Ga Livier Urine (mL/kg/hr) 550 
(0.4) 925 
(0.7) 1475 
(0.5) Urine  925 925 Urine Voided 550  550 Emesis/NG output 20  20 Emesis 20  20 Shift Total 
(mL/kg) 570 
(5) 925 
(8.2) 1495 
(13.2) NET 1330 -825 505 Weight (kg) 113.4 113.4 113.4 113.4 113.4 113.4 Readmission Risk Assessment Tool Score Medium Risk 15 Total Score 3 Has Seen PCP in Last 6 Months (Yes=3, No=0) 2 . Living with Significant Other. Assisted Living. LTAC. SNF. or  
Rehab  
 5 Pt. Coverage (Medicare=5 , Medicaid, or Self-Pay=4) 5 Charlson Comorbidity Score (Age + Comorbid Conditions) Criteria that do not apply:  
 Patient Length of Stay (>5 days = 3) IP Visits Last 12 Months (1-3=4, 4=9, >4=11) Expected Length of Stay 4d 21h Actual Length of Stay 2

## 2018-09-14 NOTE — PROGRESS NOTES
Called to room 2265 for Code Blue in progress. Patient with PEA arrest. See code documentation for meds and interventions. Patient intubated by Dr. Chadnan Harden. Dr. Fabiola Velez and Dr. Juana Young at the bedside. Patient transferred to [phone] by Code team @ [phone].

## 2018-09-14 NOTE — PROGRESS NOTES
Patient arrived from room alert& oriented X4 and on 5L NC. Consent obtained after MD Ruiz Pack spoke with patient.

## 2018-09-14 NOTE — PROCEDURES
Interventional Radiology  Procedure Note        9/14/2018 4:46 PM    Patient: Mikayla Valdes     Informed consent obtained    Diagnosis: Hemoptysis     Procedure(s): Thoracic aortic and intercostal arteriograms demonstrating no enlarged bronchial artery supplying the right lower lobe of the lung. A normal appearing and normal sized right bronchial artery is seen without any hyperplasia or dominant right lower lobe supply. No embolization was performed.     Specimens removed:  none    Complications: None    Primary Physician: Niyah Guillermo MD    Recomendations: N/A    Discharge Disposition: Floor     Full dictated report to follow    Signed By: Niyah Guillermo MD

## 2018-09-14 NOTE — PROCEDURES
[facility]  Pulmonary, Critical Care, and Sleep Medicine    Name: Sha Hernandez MRN: [id number]   : 1934 Hospital: [facility]   Date: 2018        Intubation Report    Procedure: Intubation    Indication: Acute Respiratory Failure, Massive Pulmonary Hemorrhage, PEA arrest.     Consent/Treatment:Pt was emergently intubated during CPR. Dx: Pt has PEA arrest.     Anesthesia:   None, during CPR. Procedure Details:   Used the glidescope to evaluate the airway. Using MAC 4 blade, placed a 8. 0 ET tube, 25 cm at lip. The oropharynx was very bloody. Appeared to have a moderate amount of bleeding from airway. Able to pass the ET tube through the cords. +ETco2 change. Bilateral breath sounds were present.      Specimens:   none    Complications: none    Estimated Blood Loss: Minimal    Fly Hawley MD

## 2018-09-14 NOTE — PROGRESS NOTES
Patient take back to room, patient was alert during transport, once in room while hooking patient up to room monitors and suctioning patient. Patient began having anginal respiration and code called.

## 2018-09-14 NOTE — PROGRESS NOTES
[facility] Pulmonary Consult Service NotePulmonary, Critical Care, and Sleep Medicine Name: Jayne Jones MRN: [id number] : 1934 Hospital: [facility] Date: 2018   Hospital Day: 4 Moderate amounts of  Pulmonary hemorrhage S/p PEA arrest, severe acute respiratory failure. Required CPR, Intubation, support. Critically ill, 35 min CC, EOP. Pt just came back from angio and had acute cardiopulmonary arrest and moderate pulmonary hemorrhage. He underwent CPR for 6 mins and had ROSC. Pt was then hypertensive. Airway was placed. Had moderate pulmonary hemorrhage when first intubated. Was placed on mechanical vent support. Transferred to the ICU. Subjective/Interval History:  
I have reviewed the notes from other providers and old records readily available and summarized findings below with the flowsheet. Seen earlier today on rounds.  more hemoptysis all night and this AM. INR down to 1.8. Procalcitonin high at 1.69. No fever. On O2. Pt removed his oxygen and had tubing in his hands. Sats dropped to 86% . Replaced O2 NC and sats came back up to 93% immediately. JOSS positive but minimally positive anti sclerodermal ab. ANCA negative. D/w Dr. Raymundo Saliva earlier. REviewed options with pt and wife on phone [phone]. IMPRESSION:  
1. Acute hemoptysis- doubt pulmonary  Hemorrhage- Ct scan show spillage of blood, has lung mass( CA vs inflammatory pseudotumor) and microscopic hematuria; ANCA negative; JOSS barely positive. No longer anticoagulated but INR 1.8. Not getting better. Suspect LUNG mass the culprit but not sure if vessel present to embolize. Bronch will not help 2. Elevated procalcitonin- need to treat for possible lung abscess/pseudotumor 3. Lung mass RLL- right lung base- would not be visible on conventional bronch and yield from transbronchial biopsy not guaranteed; had no lesion on chest Ct 2016 4. Coagulopathy from warfarin per ; INR down from 2.4 to 2.0 
5. Acute kidney injury likely prerenal and related to medications (Lasix) Baseline creatinine is around 0.9 and currently creatinine is elevated at 1.2 down from 2.0 6. Hematuria Abnormal urinalysis 7. Sepsis due to suspected pneumonia Patient reports having exertional shortness of breath along with cough and hemoptysis; CT of chest shows severe emphysema with the right basilar airspace disease in the oval mass with fluid level concerning for internal hemorrhage versus pneumonia ;  
8. 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  
9. Anemia 10. Former 50+ pack yr smoker quit in 2015? 11. Asbestos exposure 12. History of atrial fibrillation on anticoagulation with warfarin 13. Hx of CAD s/p PCI in the past home amiodarone. Hold Cardizem 14. Dyslipidemia Body mass index is 32.1 kg/(m^2). 15. Multiorgan dysfunction as outlined above: Pt has one or more acute or chronic illnesses with severe exacerbation with progression or side effects of treatment that poses a threat to life or bodily function 16. Additional workup outlined below 17. Pt is requiring Drug therapy requiring intensive monitoring for toxicity 18. Pt is unstable, unpredictable needing PCU monitoring; is critically ill and at high risk of sudden decline and decompensation with life threatening consequenses and continued end organ dysfunction and failure 19. Prognosis guarded with significant risk of sudden decline RECOMMENDATIONS/PLAN:  
1. Consult IR for bronchial arteriogram with possible embolization - pt and wife understand risk of not proceeding and potential benefits-  
2. Too risky for bronch or biopsy while anticoagulated; may be able to avoid bronch if cytology positive.  The mass will not be visible by bronch; an option might be navigational bronch which we can help to arrange as outpt but would need to be off anticoagulation 3. Transfuse per Dr. Gibson Smith 4.  following if warfarin should be restarted 5. Sputum culture 6. Sputum cytology repeated and pending 7. Empiric abx to treat possible lung abscess 8. Follow cultures 9. Daily coags off warfarin 10. Agree with stopping warfarin for now 11. Supplemental O2 to keep sats > 93% 12. Labs to follow electrolytes, renal function and and blood counts 13. Glucose monitoring and SSI 14. Bronchial hygiene with respiratory therapy techniques, bronchodilators 15. DVT, SUP prophylaxis 16. Pt needs IV fluids with additives and Drug therapy requiring intensive monitoring for toxicity 17. Prescription drug management with home med reconciliation reviewed My assessment/management discussed with: Consultants, Nursing, Pharmacy, Case Management, PT, OT, Respiratory Therapy, Hospitalist and Family for coordination of care Pt's condition is acute and unstable requiring inpatient hospitalization. This care involved high complexity decision making which includes independently reviewing the patient's past medical records, current laboratory results, medication profiles that were immediately available to me and actual Xray images at the bedside in order to assess, support vital system function, and to treat this degree of vital organ system failure, and to prevent further deterioration of the patients condition. Risk of deterioration: medium and high  
[x] High complexity decision making was performed [x] See my orders for details Tubes:    
 
Subjective/Initial History:  
 
I was asked by Tam Verdugo MD to see Maritza Kinney  a 80 y.o.  male in consultation for a chief complaint of hemoptysis and abnormal  
 
\"Ulysses BERMUDEZ is a 80 y.o.  male with hx Afib on Coumadin, CAD with prior PCI, AAA, HTN, and COPD on 2.5L via NC, who . .. was admitted via ER on presentation with 3d hx protracted coughing and hemoptysis, that later was associated with reported hematemesis. He noted during same time course that he experienced \"tightness\" in his abdomen that did not change with PO or defecation. He denied N, melena, BRBPR, hematochezia, or dysphagia. He is not on ASA or NSAIDs as OP. His wife reported that his stools were darker. He denies CP, increased SOB. He has not had any hematemesis here, but continued to cough and demonstrate hemoptysis here in ER. ER eval demonstrated patchy airspace disease in the right lung base with an associated hyperdense RLL 5.2 x 4.3 cm oval lesion, but no acute abdominal or pelvic process. WBC 20.6, BUN Cr 33/2.0, INR 2.4. Initial Hgb was 11.1, but when rechecked 8hrs later was noted to be 7.9. He had undergone EGD 2015 confirming gastritis without h.pylori infection. Last colonoscopy 2007 with internal hemorrhoids and sigmoid diverticulosis. Severe emphysema with right basilar airspace disease and an oval 5.5 x 4.5 cm lesion with fluid fluid level concerning for internal hemorrhage. \" 
 
Pt seen by Dr. Jef Johnson who feels that source of bleeding is not Gi tract. I reviewed office notes where he was last seen this year by our NP. I have not seen pt since 2016. He was noted to have ABNORMAL CHEST CT (R93.8)   Jan 2015 scattered perihilar adenopathy does not explain his other sx. 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 Pt just quit smoking after 50+ years Jan 2016 CT scan with emphysma. no lung mass 12/07/15: CTA impression: No pulmonary emboli. Mild ILD, similar to prior study. New irregular 10.6mm RLL lung nodule for f/u in 3 months. This could be infectious or inflammatory in nature, or potentially a small malignancy. [address] Chest CT reviewed Jan 2016 no mass. Emphysema with mild pulmonary HTN. Pt has very severe COPD who quit smoking since Feb 2015, started age 16. Tried Albuterol or Combivent on and off for years but really only prn per PCP. Cost has been extravagant, especially Spiriva which he used on and off for three. June 2015 FEV1 1.16 to 1.4 liters (<50%). Pt has had no dysphagia, choking. No pneumonia. No chest pain. Weight stable. No sign or sx of chest infection. Claims he just completed course of abx couple weeks ago from our office but we have no record of a prescription order. Denied gross hematuria. No Known Allergies MAR reviewed and pertinent medications noted or modified as needed Current Facility-Administered Medications Medication  calcium gluconate 2 g in 0.9% sodium chloride 100 mL IVPB  EPINEPHrine (ADRENALIN) 0.1 mg/mL syringe  NOREPINephrine (LEVOPHED) 8 mg in 5% dextrose 250mL infusion  pantoprazole (PROTONIX) tablet 40 mg  
 cefTRIAXone (ROCEPHIN) 2 g in 0.9% sodium chloride (MBP/ADV) 50 mL  albuterol (PROVENTIL HFA, VENTOLIN HFA, PROAIR HFA) inhaler 2 Puff  dorzolamide-timolol (COSOPT) 22.3-6.8 mg/mL ophthalmic solution 1 Drop  latanoprost (XALATAN) 0.005 % ophthalmic solution 1 Drop  pravastatin (PRAVACHOL) tablet 40 mg  
 umeclidinium (INCRUSE ELLIPTA) 62.5 mcg/actuation  sodium chloride (NS) flush 5-10 mL  sodium chloride (NS) flush 5-10 mL  acetaminophen (TYLENOL) tablet 650 mg  
 ondansetron (ZOFRAN) injection 4 mg  docusate sodium (COLACE) capsule 100 mg  
 metroNIDAZOLE (FLAGYL) IVPB premix 500 mg Patient PCP: PROVIDER UNKNOWN 
PMH:  has a past medical history of Aneurysm ([facility] [address] Arrhythmia; Hypertension; Other ill-defined conditions(799.89); Other ill-defined conditions(799.89); Other ill-defined conditions(799.89); and Other ill-defined conditions(799.89). He also has no past medical history of Difficult intubation; Malignant hyperthermia due to anesthesia; Nausea & vomiting; Pseudocholinesterase deficiency; or Unspecified adverse effect of anesthesia. PSH:   has a past surgical history that includes hx orthopaedic; hx other surgical; pr abdomen surgery proc unlisted (13); hx heent; and upper gi endoscopy,biopsy (4/10/2015). FHX: family history includes Cancer in his father and mother. SHX:  reports that he has quit smoking. He has a 10.00 pack-year smoking history. He has never used smokeless tobacco. He reports that he drinks alcohol. He reports that he uses illicit drugs, including Prescription and OTC. 
  
ROS:A comprehensive review of systems was negative except for that written in the HPI. Objective: 
 
Vital Signs: Telemetry:    normal sinus rhythmIntake/Output:  
Visit Vitals  /68  Pulse (P) 97  Temp 98.4 °F (36.9 °C)  Resp (P) 25  
 Ht 6' 2\" (1.88 m)  Wt 113.4 kg (250 lb)  SpO2 (P) 97%  BMI 32.1 kg/m2 Temp (24hrs), Av.6 °F (37 °C), Min:98.1 °F (36.7 °C), Max:99.2 °F (37.3 °C) O2 Device: Ventilator O2 Flow Rate (L/min): 5 l/min Wt Readings from Last 4 Encounters:  
18 113.4 kg (250 lb) 16 111.1 kg (245 lb)  
16 106.6 kg (235 lb) 04/08/15 103.4 kg (228 lb) Intake/Output Summary (Last 24 hours) at 18 1622 Last data filed at 18 1129 Gross per 24 hour Intake              820 ml Output             1475 ml Net             -655 ml Last shift:      701 - 1900 In: -  
Out: 537 [ECWYC:290] Last 3 shifts: 1901 -  0700 In: 2500 [P.O.:2000; I.V.:500] Out: Alber Mcdonald [AWCJN:8020] Physical Exam:  
 General:   male; with agonal respiration, severe hypoxia. Not able to protect airway. HEAD: Normocephalic, without obvious abnormality, atraumatic EYES: conjunctivae clear. PERRL,  AN Icteric sclerae NOSE: nares normal, no drainage, no nasal flaring, THROAT: normal; Lips, mucosa dry; No Thrush;  crowded airway; tongue midline Neck: Supple, symmetrical, trachea midline,  No accessory mm use;  No Stridor/ cuff leak, No goiter or thyroid tenderness LYMPH: No abnormally enlarged lymph nodes. in neck or groin Chest: increased AP diameter Lungs: agonal, bilateral rhonchi. Heart: Regular rate and rhythm; NO edema Abdomen: soft, non-tender, without masses or organomegaly, protuberant, distended : no [place] Musculoskeletal: mild kyphosis; No spine or CVA tenderness; negative, cyanosis, clubbing; no joint swelling or erythema Neuro: was not  Responsive, had a gag with ET tube suctioning. Psych: oriented to time, place and person; No agitation;  normal affect;  
 Skin: Pallor;  
 Pulses:Bilateral, Radial, 2+ Capillary refill: abnormal: ; sluggish capillary refill, pale, 
 
Data:  
 
All lab results for the last 24 hours reviewed. Labs: 
 
Recent Labs  
   09/14/18 
 7753  09/14/18 
 0028  09/13/18 
 1929  09/13/18 
 1042   09/13/18 
 0344   09/11/18 
 2155 WBC   --    --    --   8.9   --    --    --   20.6* HGB  6.8*  7.1*  7.0*  7.5*   < >   --    < >  11.1*  
PLT   --    --    --   123*   --    --    --   155 INR   --   1.8*   --    --    --   2.0*   --   2.4* APTT   --   36.2*   --    --    --   37.4*   --    --   
 < > = values in this interval not displayed. Recent Labs  
   09/14/18 
 0028  09/13/18 0344 09/11/18 
 2315  09/11/18 
 2155 NA  139  140   --   140  
K  4.7  4.5   --   4.4 CL  107  108   --   103 CO2  27  26   --   28  
GLU  110*  109*   --   130* BUN  18  21*   --   33* CREA  1.19  1.20   --   2.00* CA  8.1*  7.6*   --   9.1 LAC   --    --   1.4   --   
ALB   --    --    --   3.9 SGOT   --    --    --   21 ALT   --    --    --   21 No results for input(s): PH, PCO2, PO2, HCO3, FIO2 in the last 72 hours. No results for input(s): CPK, CKNDX, TROIQ in the last 72 hours. No lab exists for component: CPKMB No results found for: BNPP, BNP Lab Results Component Value Date/Time Culture result: LIGHT NORMAL RESPIRATORY SHELDON 09/12/2018 03:50 PM  
 Culture result: MODERATE NORMAL RESPIRATORY SHELDON 09/12/2018 11:21 AM  
 Culture result: SCANT YEAST, (APPARENT CANDIDA ALBICANS) (A) 09/12/2018 11:21 AM  
No results found for: TSH, TSHEXT, TSHEXT Imaging: 
 
   
 
 
Results from Hospital Encounter encounter on 09/11/18 XR CHEST PORT Narrative INDICATION:  hypoxia EXAM: Chest single view. COMPARISON: 4/8/2015. FINDINGS: A single frontal view of the chest at 2206 hours shows bibasilar 
interstitial prominence with a mid inspiratory effort and bibasilar 
atelectasis. .  The heart, mediastinum and pulmonary vasculature are stable . Transvenous pacemaker from the right has been introduced with leads over the 
right atrium and right ventricle. The bony thorax is unremarkable for age. . 
   
 
 Impression IMPRESSION: 
Suspect early interstitial edema pattern. .  . Results from Hospital Encounter encounter on 09/11/18 CT CHEST WO CONT Narrative INDICATION: hemoptysis, eval abscess vs hemorrhage COMPARISON: None TECHNIQUE:  Noncontrast 5 mm axial images were obtained through the chest. CT 
dose reduction was achieved through use of a standardized protocol tailored for 
this examination and automatic exposure control for dose modulation. FINDINGS: 
 
THYROID: Unremarkable. MEDIASTINUM/TRAVIS: Small calcified subcarinal lymph nodes. HEART/PERICARDIUM: Coronary atherosclerosis. Pacemaker. No pericardial effusion. Not significantly enlarged. LUNGS/PLEURA: Severe emphysema. Patchy right basilar airspace disease with a 5.5 
x 4.5 cm oval lesion in the right lower lobe with fluid fluid level. No internal 
gas. No significant pleural effusion. No pneumothorax. INCIDENTALLY IMAGED UPPER ABDOMEN: No significant abnormality. BONES: No destructive bone lesion. ADDITIONAL COMMENTS:  N/A.  
   
 
 Impression IMPRESSION: 
 Severe emphysema with right basilar airspace disease and an oval 5.5 x 4.5 cm 
lesion with fluid fluid level concerning for internal hemorrhage. No internal 
gas. Differential diagnosis includes an infectious process including abscess, 
although neoplasm with internal hemorrhage also a possibility. Post intubation: 9-14-18: CXR was reviewed Had bilateral infiltrates. ET tube in position. I have personally and independently reviewed the patients interval and diagnostic data, radiographs and have reviewed the reports. Medical Decision Making Today: · Reviewed the flowsheet and previous days notes · Reviewed and summarized records or history from previous days note or discussions with staff, family · Parenteral controlled substances - Reviewed/ Adjusted / Weaned / Started · High Risk Drug therapy requiring intensive monitoring for toxicity: eg steroids, pressors, antibiotics · Reviewed and/or ordered Clinical lab tests · Reviewed and/or ordered Radiology tests · Reviewed and/or ordered of Medicine tests · Independently visualized radiologic Images · I have personally reviewed the patients ECG / Telemetry Sue Beal MD

## 2018-09-14 NOTE — PROGRESS NOTES
Hospitalist Progress Note NAME: Susan Baxter  
:  1934 MRN:  [id number] Assessment / Plan: 
Acute upper GI bleed(hematemesis) -may be due to hemoptysis since patient had been coughing as well. Patient admitted to taking total 800 mg/day Ibuprofen last few weeks for pain on left side. -GI consulted. It was felt that dark stool may be due swallowed blood. No EGD planned. -PPI 
 
-continue to hold anticoagulation. Follow H&H. Sepsis due to suspected PNA w/ possible pulmonary hemorrhage 
-CT of chest shows severe emphysema with the right basilar airspace disease in the oval mass with fluid level concerning for internal hemorrhage versus pneumonia. -WBC elevated on admission. Now improved. -follow cultures 
-cont abx Hemoptysis 
-unknown etiology. -work up for possible rheumatologic cause neg thus far: 
ANCA, anti-GBM, MPO ab, WV-3 ab, SSA/SSB, RNP ab, Arredondo/RNP, dsDNA, and Arredondo ab all negative. JOSS +. 
 
 
RLL mass - unable to determine if hemorrhage 
-Dr. Siddharth Pike considered Wegener's as possible cause since patient w/ hemoptysis and hematuria. However, work up neg thus far. 
-consider navigational bronch as outpatient. However, patient would have to be off anticoagulation per Dr. Siddharth Pike. Acute blood loss anemia on admission 
-Hgb 11 on admission. Trending down. 
-serial H&H 
 
H/o atrial fibrillation on chronic anticoagulation w/ coumadin 
-cont amiodarone 
-cardizem on hold due to borderline low bp 
-hold coumadin 
 
Coumadin-induced coagulopathy 
-INR remains elevated. Holding coumadin. ALAN - likely prerenal related to Lasix 
-follow renal function UTI, acute cystitis w/ hematuria, POA 
-cont abx; follow urine cx. COPD - stable HLD 
-cont pravachol Obesity - BMI 32 Plan: As above. If Hgb remains < 7, then transfuse PRBC. Addendum: 
Code blue called. Patient w/ agonal breathing after coming back from radiology. Loss pulse. Code blue called. CPR initiated. Epi x 2 given. Dr. Deejay Caldwell from critical care present. Patient intubated. Much blood suctioned. Pulse obtained. Patient moved to ICU. Family updated. Patient's condition critical . Body mass index is 32.1 kg/(m^2). Code status: Full Prophylaxis: SCD's Recommended Disposition: Home w/Family Subjective: Chief Complaint / Reason for Physician Visit Follow up for hemoptysis. Discussed with RN events overnight. C/o some sob this am. Still coughing up blood. Denies chest pain. Review of Systems: 
Symptom Y/N Comments  Symptom Y/N Comments Fever/Chills    Chest Pain Poor Appetite    Edema Cough    Abdominal Pain n   
Sputum    Joint Pain SOB/ROY    Pruritis/Rash Nausea/vomit n   Tolerating PT/OT Diarrhea    Tolerating Diet Constipation    Other Could NOT obtain due to:   
 
Objective: VITALS:  
Last 24hrs VS reviewed since prior progress note. Most recent are: 
Patient Vitals for the past 24 hrs: 
 Temp Pulse Resp BP SpO2  
09/14/18 1030 98.4 °F (36.9 °C) 60 24 128/49 96 % 09/14/18 0724 98.9 °F (37.2 °C) 66 24 128/56 96 % 09/14/18 0330 98.4 °F (36.9 °C) - 24 - -  
09/14/18 0100 - 66 - - 92 % 09/14/18 0000 98.1 °F (36.7 °C) 65 21 124/56 92 % 09/13/18 1945 98.8 °F (37.1 °C) 60 20 129/59 96 % 09/13/18 1638 99.2 °F (37.3 °C) 62 22 126/58 96 % 09/13/18 1133 - 60 - 114/55 98 % 09/13/18 1126 98.5 °F (36.9 °C) 60 18 114/55 98 % Intake/Output Summary (Last 24 hours) at 09/14/18 1055 Last data filed at 09/14/18 4376 Gross per 24 hour Intake             1520 ml Output             1575 ml Net              -55 ml PHYSICAL EXAM: 
General: WD, WN. Alert, cooperative, no acute distress   
EENT:  EOMI. Anicteric sclerae. MMM Resp:  Upper airway congestion. No accessory muscle use CV:  Regular  rhythm,  1+ bilateral leg edema GI:  Soft, Non distended, Non tender.  +Bowel sounds Neurologic:  Alert and oriented X 3, normal speech, Psych:   Appropriate affect Skin:  No rashes. No jaundice Reviewed most current lab test results and cultures  YES Reviewed most current radiology test results   YES Review and summation of old records today    NO Reviewed patient's current orders and MAR    YES 
PMH/SH reviewed - no change compared to H&P 
________________________________________________________________________ Care Plan discussed with: 
  Comments Patient x Family RN x Care Manager Consultant Multidiciplinary team rounds were held today with , nursing, pharmacist and clinical coordinator. Patient's plan of care was discussed; medications were reviewed and discharge planning was addressed. ________________________________________________________________________ Total NON critical care TIME:  30  Minutes Total CRITICAL CARE TIME Spent:   Minutes non procedure based Comments >50% of visit spent in counseling and coordination of care    
________________________________________________________________________ Katherine Barragan MD  
 
Procedures: see electronic medical records for all procedures/Xrays and details which were not copied into this note but were reviewed prior to creation of Plan. LABS: 
I reviewed today's most current labs and imaging studies. Pertinent labs include: 
Recent Labs  
   09/14/18 
 0824  09/14/18 
 0028  09/13/18 
 1929  09/13/18 
 1042   09/11/18 
 2155 WBC   --    --    --   8.9   --   20.6* HGB  6.8*  7.1*  7.0*  7.5*   < >  11.1* HCT   --    --    --   24.9*   --   35.0*  
PLT   --    --    --   123*   --   155  
 < > = values in this interval not displayed. Recent Labs  
   09/14/18 
 0028  09/13/18 
 0344  09/11/18 
 2155 NA  139  140  140  
K  4.7  4.5  4.4  
CL  107  108  103 CO2  27  26  28 GLU  110*  109*  130* BUN  18  21*  33* CREA  1.19  1.20  2.00* CA  8.1*  7.6*  9.1 ALB   --    --   3.9 TBILI   --    --   0.4 SGOT   --    --   21 ALT   --    --   21 INR  1.8*  2.0*  2.4* Signed: Valeriy Donis MD

## 2018-09-14 NOTE — PROGRESS NOTES
1620  Received patient from PCU intubated,unresponsive. Placed on vent. AC 14,500cc,100%,+6. Dr. Reanna Valdez at bedside. ogt placed. Labs sent, portable cxr obtained. Propofol infusing at 40mcgs/min. Blood suctioned from ET tube and OGT. Condom catheter placed. 1800  Dr. Shari Mccormick notified of lactic acid 2.9. Received order to transfuse 1 unit PRBC if hemoglobin less than 7. Family at bedside. Consent for blood obtained.

## 2018-09-14 NOTE — PROGRESS NOTES
Problem: Falls - Risk of 
Goal: *Absence of Falls Document Betty Benavides Fall Risk and appropriate interventions in the flowsheet. Outcome: Progressing Towards Goal 
Fall Risk Interventions: 
Mobility Interventions: Patient to call before getting OOB Mentation Interventions: Bed/chair exit alarm Medication Interventions: Bed/chair exit alarm, Patient to call before getting OOB, Teach patient to arise slowly Elimination Interventions: Call light in reach History of Falls Interventions: Door open when patient unattended, Bed/chair exit alarm Problem: Pressure Injury - Risk of 
Goal: *Prevention of pressure injury Document Ayaz Scale and appropriate interventions in the flowsheet. Outcome: Progressing Towards Goal 
Pressure Injury Interventions: 
Sensory Interventions: Float heels, Keep linens dry and wrinkle-free, Minimize linen layers Moisture Interventions: Absorbent underpads Activity Interventions: Increase time out of bed Mobility Interventions: Float heels Nutrition Interventions: Document food/fluid/supplement intake Friction and Shear Interventions: Feet elevated on foot rest, Minimize layers

## 2018-09-14 NOTE — PROGRESS NOTES
[facility] Pulmonary Consult Service NotePulmonary, Critical Care, and Sleep Medicine Name: Mikayla Valdes MRN: [id number] : 1934 Hospital: [facility] Date: 2018   Hospital Day: 4 Subjective/Interval History:  
I have reviewed the notes from other providers and old records readily available and summarized findings below with the flowsheet. Seen earlier today on rounds.  more hemoptysis all night and this AM. INR down to 1.8. Procalcitonin high at 1.69. No fever. On O2. Pt removed his oxygen and had tubing in his hands. Sats dropped to 86% . Replaced O2 NC and sats came back up to 93% immediately. JOSS positive but minimally positive anti sclerodermal ab. ANCA negative. D/w Dr. Halie Johnson earlier. REviewed options with pt and wife on phone [phone]. IMPRESSION:  
1. Acute hemoptysis- doubt pulmonary  Hemorrhage- Ct scan show spillage of blood, has lung mass( CA vs inflammatory pseudotumor) and microscopic hematuria; ANCA negative; JOSS barely positive. No longer anticoagulated but INR 1.8. Not getting better. Suspect LUNG mass the culprit but not sure if vessel present to embolize. Bronch will not help 2. Elevated procalcitonin- need to treat for possible lung abscess/pseudotumor 3. Lung mass RLL- right lung base- would not be visible on conventional bronch and yield from transbronchial biopsy not guaranteed; had no lesion on chest Ct 2016 4. Coagulopathy from warfarin per ; INR down from 2.4 to 2.0 
5. Acute kidney injury likely prerenal and related to medications (Lasix) Baseline creatinine is around 0.9 and currently creatinine is elevated at 1.2 down from 2.0 6. Hematuria Abnormal urinalysis 7.  Sepsis due to suspected pneumonia Patient reports having exertional shortness of breath along with cough and hemoptysis; CT of chest shows severe emphysema with the right basilar airspace disease in the oval mass with fluid level concerning for internal hemorrhage versus pneumonia ;  
8. 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  
9. Anemia 10. Former 50+ pack yr smoker quit in 2015? 11. Asbestos exposure 12. History of atrial fibrillation on anticoagulation with warfarin 13. Hx of CAD s/p PCI in the past home amiodarone. Hold Cardizem 14. Dyslipidemia Body mass index is 32.1 kg/(m^2). 15. Multiorgan dysfunction as outlined above: Pt has one or more acute or chronic illnesses with severe exacerbation with progression or side effects of treatment that poses a threat to life or bodily function 16. Additional workup outlined below 17. Pt is requiring Drug therapy requiring intensive monitoring for toxicity 18. Pt is unstable, unpredictable needing PCU monitoring; is critically ill and at high risk of sudden decline and decompensation with life threatening consequenses and continued end organ dysfunction and failure 19. Prognosis guarded with significant risk of sudden decline RECOMMENDATIONS/PLAN:  
1. Consult IR for bronchial arteriogram with possible embolization - pt and wife understand risk of not proceeding and potential benefits-  
2. Too risky for bronch or biopsy while anticoagulated; may be able to avoid bronch if cytology positive. The mass will not be visible by bronch; an option might be navigational bronch which we can help to arrange as outpt but would need to be off anticoagulation 3. Transfuse per Dr. Buck Ashley 4.  following if warfarin should be restarted 5. Sputum culture 6. Sputum cytology repeated and pending 7. Empiric abx to treat possible lung abscess 8. Follow cultures 9. Daily coags off warfarin 10. Agree with stopping warfarin for now 11. Supplemental O2 to keep sats > 93% 12. Labs to follow electrolytes, renal function and and blood counts 13. Glucose monitoring and SSI 14. Bronchial hygiene with respiratory therapy techniques, bronchodilators 15. DVT, SUP prophylaxis 16. Pt needs IV fluids with additives and Drug therapy requiring intensive monitoring for toxicity 17. Prescription drug management with home med reconciliation reviewed My assessment/management discussed with: Consultants, Nursing, Pharmacy, Case Management, PT, OT, Respiratory Therapy, Hospitalist and Family for coordination of care Pt's condition is acute and unstable requiring inpatient hospitalization. This care involved high complexity decision making which includes independently reviewing the patient's past medical records, current laboratory results, medication profiles that were immediately available to me and actual Xray images at the bedside in order to assess, support vital system function, and to treat this degree of vital organ system failure, and to prevent further deterioration of the patients condition. Risk of deterioration: medium and high  
[x] High complexity decision making was performed [x] See my orders for details Tubes:    
 
Subjective/Initial History:  
 
I was asked by Mercy Diaz MD to see Wells Kehr  a 80 y.o.  male in consultation for a chief complaint of hemoptysis and abnormal  
 
\"Ulysses BERMUDEZ is a 80 y.o.  male with hx Afib on Coumadin, CAD with prior PCI, AAA, HTN, and COPD on 2.5L via NC, who . .. was admitted via ER on presentation with 3d hx protracted coughing and hemoptysis, that later was associated with reported hematemesis. He noted during same time course that he experienced \"tightness\" in his abdomen that did not change with PO or defecation. He denied N, melena, BRBPR, hematochezia, or dysphagia. He is not on ASA or NSAIDs as OP. His wife reported that his stools were darker. He denies CP, increased SOB. He has not had any hematemesis here, but continued to cough and demonstrate hemoptysis here in ER. ER eval demonstrated patchy airspace disease in the right lung base with an associated hyperdense RLL 5.2 x 4.3 cm oval lesion, but no acute abdominal or pelvic process. WBC 20.6, BUN Cr 33/2.0, INR 2.4. Initial Hgb was 11.1, but when rechecked 8hrs later was noted to be 7.9. He had undergone EGD 2015 confirming gastritis without h.pylori infection. Last colonoscopy 2007 with internal hemorrhoids and sigmoid diverticulosis. Severe emphysema with right basilar airspace disease and an oval 5.5 x 4.5 cm lesion with fluid fluid level concerning for internal hemorrhage. \" 
 
Pt seen by Dr. Lani Ng who feels that source of bleeding is not Gi tract. I reviewed office notes where he was last seen this year by our NP. I have not seen pt since 2016. He was noted to have ABNORMAL CHEST CT (R93.8)   Jan 2015 scattered perihilar adenopathy does not explain his other sx. 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 Pt just quit smoking after 50+ years Jan 2016 CT scan with emphysma. no lung mass 12/07/15: CTA impression: No pulmonary emboli. Mild ILD, similar to prior study. New irregular 10.6mm RLL lung nodule for f/u in 3 months. This could be infectious or inflammatory in nature, or potentially a small malignancy. [facility] Chest CT reviewed Jan 2016 no mass. Emphysema with mild pulmonary HTN. Pt has very severe COPD who quit smoking since Feb 2015, started age 16. Tried Albuterol or Combivent on and off for years but really only prn per PCP. Cost has been extravagant, especially Spiriva which he used on and off for three. June 2015 FEV1 1.16 to 1.4 liters (<50%). Pt has had no dysphagia, choking. No pneumonia. No chest pain. Weight stable. No sign or sx of chest infection. Claims he just completed course of abx couple weeks ago from our office but we have no record of a prescription order. Denied gross hematuria. No Known Allergies MAR reviewed and pertinent medications noted or modified as needed Current Facility-Administered Medications Medication  calcium gluconate 2 g in 0.9% sodium chloride 100 mL IVPB  
 0.9% sodium chloride infusion  fentaNYL citrate (PF) injection 100 mcg  iopamidol (ISOVUE-370) 76 % injection 300 mL  sodium bicarbonate (4%) (NEUT) injection 2 mL  lidocaine (XYLOCAINE) 20 mg/mL (2 %) injection 200 mg  
 heparinized saline 2 units/mL infusion 200 Units  pantoprazole (PROTONIX) tablet 40 mg  
 cefTRIAXone (ROCEPHIN) 2 g in 0.9% sodium chloride (MBP/ADV) 50 mL  albuterol (PROVENTIL HFA, VENTOLIN HFA, PROAIR HFA) inhaler 2 Puff  dorzolamide-timolol (COSOPT) 22.3-6.8 mg/mL ophthalmic solution 1 Drop  latanoprost (XALATAN) 0.005 % ophthalmic solution 1 Drop  pravastatin (PRAVACHOL) tablet 40 mg  
 umeclidinium (INCRUSE ELLIPTA) 62.5 mcg/actuation  sodium chloride (NS) flush 5-10 mL  sodium chloride (NS) flush 5-10 mL  acetaminophen (TYLENOL) tablet 650 mg  
 ondansetron (ZOFRAN) injection 4 mg  docusate sodium (COLACE) capsule 100 mg  
 metroNIDAZOLE (FLAGYL) IVPB premix 500 mg Patient PCP: PROVIDER UNKNOWN 
PMH:  has a past medical history of Aneurysm (Devorah Colder); Arrhythmia; Hypertension; Other ill-defined conditions(799.89); Other ill-defined conditions(799.89); Other ill-defined conditions(799.89); and Other ill-defined conditions(799.89). He also has no past medical history of Difficult intubation; Malignant hyperthermia due to anesthesia; Nausea & vomiting; Pseudocholinesterase deficiency; or Unspecified adverse effect of anesthesia. PSH:   has a past surgical history that includes hx orthopaedic; hx other surgical; pr abdomen surgery proc unlisted (1/16/13); hx heent; and upper gi endoscopy,biopsy (4/10/2015). FHX: family history includes Cancer in his father and mother. SHX:  reports that he has quit smoking.  He has a 10.00 pack-year smoking history. He has never used smokeless tobacco. He reports that he drinks alcohol. He reports that he uses illicit drugs, including Prescription and OTC. 
  
ROS:A comprehensive review of systems was negative except for that written in the HPI. Objective: 
 
Vital Signs: Telemetry:    normal sinus rhythmIntake/Output:  
Visit Vitals  /51 (BP 1 Location: Left arm, BP Patient Position: Head of bed elevated (Comment degrees))  Pulse 62  Temp 98.4 °F (36.9 °C)  Resp 20  
 Ht 6' 2\" (1.88 m)  Wt 113.4 kg (250 lb)  SpO2 94%  BMI 32.1 kg/m2 Temp (24hrs), Av.6 °F (37 °C), Min:98.1 °F (36.7 °C), Max:99.2 °F (37.3 °C) O2 Device: Nasal cannula O2 Flow Rate (L/min): 5 l/min Wt Readings from Last 4 Encounters:  
18 113.4 kg (250 lb) 16 111.1 kg (245 lb)  
16 106.6 kg (235 lb) 04/08/15 103.4 kg (228 lb) Intake/Output Summary (Last 24 hours) at 18 1426 Last data filed at 18 1129 Gross per 24 hour Intake              820 ml Output             1475 ml Net             -655 ml Last shift:       07 -  1900 In: -  
Out: 669 [[id number]] Last 3 shifts: 1901 -  0700 In: 2500 [P.O.:2000; I.V.:500] Out: Kip Whitehead [[id number]] Physical Exam:  
 General:   male; well developed and well nourished, in no respiratory distress and acyanotic and alert; NC;  
 HEAD: Normocephalic, without obvious abnormality, atraumatic EYES: conjunctivae clear. PERRL,  AN Icteric sclerae NOSE: nares normal, no drainage, no nasal flaring, THROAT: normal; Lips, mucosa dry; No Thrush;  crowded airway; tongue midline Neck: Supple, symmetrical, trachea midline,  No accessory mm use; No Stridor/ cuff leak, No goiter or thyroid tenderness LYMPH: No abnormally enlarged lymph nodes. in neck or groin Chest: increased AP diameter Lungs: rales right base Heart: Regular rate and rhythm; NO edema Abdomen: soft, non-tender, without masses or organomegaly, protuberant, distended : no [place]ton Musculoskeletal: mild kyphosis; No spine or CVA tenderness; negative, cyanosis, clubbing; no joint swelling or erythema Neuro: alert; speech fluent ;withdraws to pain; unable to check gait and station;  following simple commands Psych: oriented to time, place and person; No agitation;  normal affect;  
 Skin: Pallor;  
 Pulses:Bilateral, Radial, 2+ Capillary refill: abnormal: ; sluggish capillary refill, pale, 
 
Data:  
 
All lab results for the last 24 hours reviewed. Labs: 
 
Recent Labs  
   09/14/18 
 8050  09/14/18 
 0028  09/13/18 
 1929  09/13/18 
 1042   09/13/18 0344 09/11/18 
 2155 WBC   --    --    --   8.9   --    --    --   20.6* HGB  6.8*  7.1*  7.0*  7.5*   < >   --    < >  11.1*  
PLT   --    --    --   123*   --    --    --   155 INR   --   1.8*   --    --    --   2.0*   --   2.4* APTT   --   36.2*   --    --    --   37.4*   --    --   
 < > = values in this interval not displayed. Recent Labs  
   09/14/18 0028 09/13/18 0344 09/11/18 
 2315  09/11/18 
 2155 NA  139  140   --   140  
K  4.7  4.5   --   4.4 CL  107  108   --   103 CO2  27  26   --   28  
GLU  110*  109*   --   130* BUN  18  21*   --   33* CREA  1.19  1.20   --   2.00* CA  8.1*  7.6*   --   9.1 LAC   --    --   1.4   --   
ALB   --    --    --   3.9 SGOT   --    --    --   21 ALT   --    --    --   21 No results for input(s): PH, PCO2, PO2, HCO3, FIO2 in the last 72 hours. No results for input(s): CPK, CKNDX, TROIQ in the last 72 hours. No lab exists for component: CPKMB No results found for: BNPP, BNP Lab Results Component Value Date/Time  Culture result: LIGHT NORMAL RESPIRATORY SHELDON 09/12/2018 03:50 PM  
 Culture result: MODERATE NORMAL RESPIRATORY SHELDON 09/12/2018 11:21 AM  
 Culture result: SCANT YEAST, (APPARENT CANDIDA ALBICANS) (A) 09/12/2018 11:21 AM  
 No results found for: TSH, TSHEXT, TSHEXT Imaging: 
 
   
 
 
Results from Hospital Encounter encounter on 09/11/18 XR CHEST PORT Narrative INDICATION:  hypoxia EXAM: Chest single view. COMPARISON: 4/8/2015. FINDINGS: A single frontal view of the chest at 2206 hours shows bibasilar 
interstitial prominence with a mid inspiratory effort and bibasilar 
atelectasis. .  The heart, mediastinum and pulmonary vasculature are stable . Transvenous pacemaker from the right has been introduced with leads over the 
right atrium and right ventricle. The bony thorax is unremarkable for age. . 
   
 
 Impression IMPRESSION: 
Suspect early interstitial edema pattern. .  . Results from Hospital Encounter encounter on 09/11/18 CT CHEST WO CONT Narrative INDICATION: hemoptysis, eval abscess vs hemorrhage COMPARISON: None TECHNIQUE:  Noncontrast 5 mm axial images were obtained through the chest. CT 
dose reduction was achieved through use of a standardized protocol tailored for 
this examination and automatic exposure control for dose modulation. FINDINGS: 
 
THYROID: Unremarkable. MEDIASTINUM/TRAVIS: Small calcified subcarinal lymph nodes. HEART/PERICARDIUM: Coronary atherosclerosis. Pacemaker. No pericardial effusion. Not significantly enlarged. LUNGS/PLEURA: Severe emphysema. Patchy right basilar airspace disease with a 5.5 
x 4.5 cm oval lesion in the right lower lobe with fluid fluid level. No internal 
gas. No significant pleural effusion. No pneumothorax. INCIDENTALLY IMAGED UPPER ABDOMEN: No significant abnormality. BONES: No destructive bone lesion. ADDITIONAL COMMENTS:  N/A. Impression IMPRESSION: 
Severe emphysema with right basilar airspace disease and an oval 5.5 x 4.5 cm 
lesion with fluid fluid level concerning for internal hemorrhage. No internal 
gas.  Differential diagnosis includes an infectious process including abscess, 
 although neoplasm with internal hemorrhage also a possibility. I have personally and independently reviewed the patients interval and diagnostic data, radiographs and have reviewed the reports. Medical Decision Making Today: · Reviewed the flowsheet and previous days notes · Reviewed and summarized records or history from previous days note or discussions with staff, family · Parenteral controlled substances - Reviewed/ Adjusted / Weaned / Started · High Risk Drug therapy requiring intensive monitoring for toxicity: eg steroids, pressors, antibiotics · Reviewed and/or ordered Clinical lab tests · Reviewed and/or ordered Radiology tests · Reviewed and/or ordered of Medicine tests · Independently visualized radiologic Images · I have personally reviewed the patients ECG / Telemetry David Ashraf MD

## 2018-09-14 NOTE — H&P
Radiology History and Physical 
 
Patient: Jayne Jones 80 y.o. male Chief Complaint: Vomiting (Pt presents via EMS. pt called EMS because he was vomiting blood. patient reported to have been coughing up blood since Sunday. ) History of Present Illness: hemoptysis History: 
 
Past Medical History:  
Diagnosis Date  Aneurysm ([address] AAA  Arrhythmia   
 atrial fibrillation 2016  Hypertension  Other ill-defined conditions(799.89)   
 hx of broken arm left/cast  
 Other ill-defined conditions(799.89)   
 glaucoma  Other ill-defined conditions(799.89)   
 elevated cholesterol  Other ill-defined conditions(799.89)   
 hx bursitis knees Family History Problem Relation Age of Onset  Cancer Mother  Cancer Father Social History Social History  Marital status:  Spouse name: N/A  
 Number of children: N/A  
 Years of education: N/A Occupational History  Not on file. Social History Main Topics  Smoking status: Former Smoker Packs/day: 0.50 Years: 20.00  Smokeless tobacco: Never Used  Alcohol use Yes Comment: social rare  Drug use: Yes Special: Prescription, OTC  Sexual activity: Not on file Other Topics Concern  Not on file Social History Narrative Allergies: No Known Allergies Current Medications: 
Current Facility-Administered Medications Medication Dose Route Frequency  calcium gluconate 2 g in 0.9% sodium chloride 100 mL IVPB  2 g IntraVENous DAILY  0.9% sodium chloride infusion  25 mL/hr IntraVENous CONTINUOUS  
 fentaNYL citrate (PF) injection 100 mcg  100 mcg IntraVENous Multiple  iopamidol (ISOVUE-370) 76 % injection 300 mL  300 mL InterCATHeter ONCE  
 sodium bicarbonate (4%) (NEUT) injection 2 mL  2 mL SubCUTAneous ONCE  
 lidocaine (XYLOCAINE) 20 mg/mL (2 %) injection 200 mg  10 mL SubCUTAneous ONCE  
  heparinized saline 2 units/mL infusion 200 Units  200 Units IntraSHEAth ONCE  pantoprazole (PROTONIX) tablet 40 mg  40 mg Oral ACB  cefTRIAXone (ROCEPHIN) 2 g in 0.9% sodium chloride (MBP/ADV) 50 mL  2 g IntraVENous Q24H  
 albuterol (PROVENTIL HFA, VENTOLIN HFA, PROAIR HFA) inhaler 2 Puff  2 Puff Inhalation Q4H PRN  
 dorzolamide-timolol (COSOPT) 22.3-6.8 mg/mL ophthalmic solution 1 Drop  1 Drop Both Eyes BID  latanoprost (XALATAN) 0.005 % ophthalmic solution 1 Drop  1 Drop Both Eyes QHS  pravastatin (PRAVACHOL) tablet 40 mg  40 mg Oral QHS  umeclidinium (INCRUSE ELLIPTA) 62.5 mcg/actuation  1 Puff Inhalation DAILY  sodium chloride (NS) flush 5-10 mL  5-10 mL IntraVENous Q8H  
 sodium chloride (NS) flush 5-10 mL  5-10 mL IntraVENous PRN  
 acetaminophen (TYLENOL) tablet 650 mg  650 mg Oral Q6H PRN  
 ondansetron (ZOFRAN) injection 4 mg  4 mg IntraVENous Q6H PRN  
 docusate sodium (COLACE) capsule 100 mg  100 mg Oral DAILY PRN  
 metroNIDAZOLE (FLAGYL) IVPB premix 500 mg  500 mg IntraVENous Q8H Physical Exam: 
Blood pressure 121/51, pulse 62, temperature 98.4 °F (36.9 °C), resp. rate 20, height 6' 2\" (1.88 m), weight 113.4 kg (250 lb), SpO2 94 %. LUNG: clear to auscultation bilaterally, HEART: regular rate and rhythm, S1, S2 normal, no murmur, click, rub or gallop Alerts:   
Hospital Problems  Date Reviewed: 4/8/2015 Codes Class Noted POA Pulmonary hemorrhage ICD-10-CM: R04.89 ICD-9-CM: 786.30  9/12/2018 Unknown Acute GI bleeding ICD-10-CM: K92.2 ICD-9-CM: 578.9  9/12/2018 Unknown Laboratory:     
Recent Labs  
   09/14/18 
 0824  09/14/18 
 0028   09/13/18 
 1042 HGB  6.8*  7.1*   < >  7.5* HCT   --    --    --   24.9* WBC   --    --    --   8.9 PLT   --    --    --   123* INR   --   1.8*   --    --   
BUN   --   18   --    --   
CREA   --   1.19   --    --   
K   --   4.7   --    --   
 < > = values in this interval not displayed. Plan of Care/Planned Procedure: 
Risks, benefits, and alternatives reviewed with patient and he agrees to proceed with the procedure. Plan is for bronchial arteriogram with possible embolization Aleida Paul MD

## 2018-09-15 ENCOUNTER — APPOINTMENT (OUTPATIENT)
Dept: GENERAL RADIOLOGY | Age: 83
DRG: 003 | End: 2018-09-15
Attending: INTERNAL MEDICINE
Payer: MEDICARE

## 2018-09-15 LAB
ABO + RH BLD: NORMAL
ALBUMIN SERPL-MCNC: 2.4 G/DL (ref 3.5–5)
ALBUMIN/GLOB SERPL: 0.6 {RATIO} (ref 1.1–2.2)
ALP SERPL-CCNC: 68 U/L (ref 45–117)
ALT SERPL-CCNC: 33 U/L (ref 12–78)
ANION GAP SERPL CALC-SCNC: 5 MMOL/L (ref 5–15)
APTT PPP: 33.4 SEC (ref 22.1–32)
AST SERPL-CCNC: 49 U/L (ref 15–37)
BASOPHILS # BLD: 0 K/UL (ref 0–0.1)
BASOPHILS NFR BLD: 0 % (ref 0–1)
BILIRUB SERPL-MCNC: 0.4 MG/DL (ref 0.2–1)
BLD PROD TYP BPU: NORMAL
BLOOD GROUP ANTIBODIES SERPL: NORMAL
BPU ID: NORMAL
BUN SERPL-MCNC: 19 MG/DL (ref 6–20)
BUN/CREAT SERPL: 15 (ref 12–20)
CALCIUM SERPL-MCNC: 8 MG/DL (ref 8.5–10.1)
CHLORIDE SERPL-SCNC: 106 MMOL/L (ref 97–108)
CO2 SERPL-SCNC: 26 MMOL/L (ref 21–32)
CREAT SERPL-MCNC: 1.28 MG/DL (ref 0.7–1.3)
CROSSMATCH RESULT,%XM: NORMAL
DIFFERENTIAL METHOD BLD: ABNORMAL
EOSINOPHIL # BLD: 0 K/UL (ref 0–0.4)
EOSINOPHIL NFR BLD: 0 % (ref 0–7)
ERYTHROCYTE [DISTWIDTH] IN BLOOD BY AUTOMATED COUNT: 19.7 % (ref 11.5–14.5)
FIBRINOGEN PPP-MCNC: 536 MG/DL (ref 200–475)
GLOBULIN SER CALC-MCNC: 3.9 G/DL (ref 2–4)
GLUCOSE SERPL-MCNC: 119 MG/DL (ref 65–100)
HCT VFR BLD AUTO: 23.6 % (ref 36.6–50.3)
HGB BLD-MCNC: 7.3 G/DL (ref 12.1–17)
HGB BLD-MCNC: 7.5 G/DL (ref 12.1–17)
IMM GRANULOCYTES # BLD: 0.2 K/UL (ref 0–0.04)
IMM GRANULOCYTES NFR BLD AUTO: 2 % (ref 0–0.5)
INR PPP: 1.4 (ref 0.9–1.1)
LACTATE SERPL-SCNC: 0.9 MMOL/L (ref 0.4–2)
LYMPHOCYTES # BLD: 0.6 K/UL (ref 0.8–3.5)
LYMPHOCYTES NFR BLD: 5 % (ref 12–49)
MAGNESIUM SERPL-MCNC: 2.6 MG/DL (ref 1.6–2.4)
MCH RBC QN AUTO: 34.3 PG (ref 26–34)
MCHC RBC AUTO-ENTMCNC: 30.9 G/DL (ref 30–36.5)
MCV RBC AUTO: 110.8 FL (ref 80–99)
MONOCYTES # BLD: 1.7 K/UL (ref 0–1)
MONOCYTES NFR BLD: 14 % (ref 5–13)
NEUTS SEG # BLD: 9.6 K/UL (ref 1.8–8)
NEUTS SEG NFR BLD: 79 % (ref 32–75)
NRBC # BLD: 0 K/UL (ref 0–0.01)
NRBC BLD-RTO: 0 PER 100 WBC
PHOSPHATE SERPL-MCNC: 2.7 MG/DL (ref 2.6–4.7)
PLATELET # BLD AUTO: 111 K/UL (ref 150–400)
PMV BLD AUTO: 11.8 FL (ref 8.9–12.9)
POTASSIUM SERPL-SCNC: 5 MMOL/L (ref 3.5–5.1)
PROT SERPL-MCNC: 6.3 G/DL (ref 6.4–8.2)
PROTHROMBIN TIME: 14.2 SEC (ref 9–11.1)
RBC # BLD AUTO: 2.13 M/UL (ref 4.1–5.7)
RBC MORPH BLD: ABNORMAL
RBC MORPH BLD: ABNORMAL
SODIUM SERPL-SCNC: 137 MMOL/L (ref 136–145)
SPECIMEN EXP DATE BLD: NORMAL
STATUS OF UNIT,%ST: NORMAL
THERAPEUTIC RANGE,PTTT: ABNORMAL SECS (ref 58–77)
UNIT DIVISION, %UDIV: 0
WBC # BLD AUTO: 12.1 K/UL (ref 4.1–11.1)

## 2018-09-15 PROCEDURE — 74011000258 HC RX REV CODE- 258: Performed by: INTERNAL MEDICINE

## 2018-09-15 PROCEDURE — 80053 COMPREHEN METABOLIC PANEL: CPT | Performed by: INTERNAL MEDICINE

## 2018-09-15 PROCEDURE — 74011000250 HC RX REV CODE- 250: Performed by: INTERNAL MEDICINE

## 2018-09-15 PROCEDURE — 94003 VENT MGMT INPAT SUBQ DAY: CPT

## 2018-09-15 PROCEDURE — C9113 INJ PANTOPRAZOLE SODIUM, VIA: HCPCS | Performed by: INTERNAL MEDICINE

## 2018-09-15 PROCEDURE — 94640 AIRWAY INHALATION TREATMENT: CPT

## 2018-09-15 PROCEDURE — 74011000258 HC RX REV CODE- 258: Performed by: EMERGENCY MEDICINE

## 2018-09-15 PROCEDURE — 83735 ASSAY OF MAGNESIUM: CPT | Performed by: INTERNAL MEDICINE

## 2018-09-15 PROCEDURE — 85730 THROMBOPLASTIN TIME PARTIAL: CPT | Performed by: INTERNAL MEDICINE

## 2018-09-15 PROCEDURE — 83605 ASSAY OF LACTIC ACID: CPT

## 2018-09-15 PROCEDURE — 85610 PROTHROMBIN TIME: CPT | Performed by: INTERNAL MEDICINE

## 2018-09-15 PROCEDURE — 74011250637 HC RX REV CODE- 250/637: Performed by: INTERNAL MEDICINE

## 2018-09-15 PROCEDURE — 87070 CULTURE OTHR SPECIMN AEROBIC: CPT | Performed by: INTERNAL MEDICINE

## 2018-09-15 PROCEDURE — 65620000000 HC RM CCU GENERAL

## 2018-09-15 PROCEDURE — 84100 ASSAY OF PHOSPHORUS: CPT | Performed by: INTERNAL MEDICINE

## 2018-09-15 PROCEDURE — 74011250636 HC RX REV CODE- 250/636: Performed by: EMERGENCY MEDICINE

## 2018-09-15 PROCEDURE — 71045 X-RAY EXAM CHEST 1 VIEW: CPT

## 2018-09-15 PROCEDURE — 85384 FIBRINOGEN ACTIVITY: CPT | Performed by: INTERNAL MEDICINE

## 2018-09-15 PROCEDURE — 85025 COMPLETE CBC W/AUTO DIFF WBC: CPT | Performed by: INTERNAL MEDICINE

## 2018-09-15 PROCEDURE — 85018 HEMOGLOBIN: CPT | Performed by: INTERNAL MEDICINE

## 2018-09-15 PROCEDURE — 74011250636 HC RX REV CODE- 250/636: Performed by: INTERNAL MEDICINE

## 2018-09-15 PROCEDURE — 36415 COLL VENOUS BLD VENIPUNCTURE: CPT

## 2018-09-15 RX ORDER — MUPIROCIN 20 MG/G
OINTMENT TOPICAL 2 TIMES DAILY
Status: COMPLETED | OUTPATIENT
Start: 2018-09-15 | End: 2018-09-19

## 2018-09-15 RX ADMIN — ALBUTEROL SULFATE 2.5 MG: 2.5 SOLUTION RESPIRATORY (INHALATION) at 07:41

## 2018-09-15 RX ADMIN — PIPERACILLIN SODIUM,TAZOBACTAM SODIUM 4.5 G: 4; .5 INJECTION, POWDER, FOR SOLUTION INTRAVENOUS at 10:13

## 2018-09-15 RX ADMIN — PROPOFOL 30 MCG/KG/MIN: 10 INJECTION, EMULSION INTRAVENOUS at 04:37

## 2018-09-15 RX ADMIN — METRONIDAZOLE 500 MG: 500 INJECTION, SOLUTION INTRAVENOUS at 04:37

## 2018-09-15 RX ADMIN — METRONIDAZOLE 500 MG: 500 INJECTION, SOLUTION INTRAVENOUS at 10:45

## 2018-09-15 RX ADMIN — FENTANYL CITRATE 25 MCG: 50 INJECTION, SOLUTION INTRAMUSCULAR; INTRAVENOUS at 15:33

## 2018-09-15 RX ADMIN — SODIUM CHLORIDE 40 MG: 9 INJECTION, SOLUTION INTRAMUSCULAR; INTRAVENOUS; SUBCUTANEOUS at 10:01

## 2018-09-15 RX ADMIN — PROPOFOL 30 MCG/KG/MIN: 10 INJECTION, EMULSION INTRAVENOUS at 15:54

## 2018-09-15 RX ADMIN — ALBUTEROL SULFATE 2.5 MG: 2.5 SOLUTION RESPIRATORY (INHALATION) at 15:14

## 2018-09-15 RX ADMIN — MUPIROCIN: 20 OINTMENT TOPICAL at 19:04

## 2018-09-15 RX ADMIN — SODIUM CHLORIDE 100 ML/HR: 900 INJECTION, SOLUTION INTRAVENOUS at 03:57

## 2018-09-15 RX ADMIN — ALBUTEROL SULFATE 2.5 MG: 2.5 SOLUTION RESPIRATORY (INHALATION) at 03:57

## 2018-09-15 RX ADMIN — MUPIROCIN: 20 OINTMENT TOPICAL at 10:00

## 2018-09-15 RX ADMIN — DORZOLAMIDE HYDROCHLORIDE AND TIMOLOL MALEATE 1 DROP: 20; 5 SOLUTION/ DROPS OPHTHALMIC at 18:55

## 2018-09-15 RX ADMIN — Medication 10 ML: at 06:05

## 2018-09-15 RX ADMIN — ALBUTEROL SULFATE 2.5 MG: 2.5 SOLUTION RESPIRATORY (INHALATION) at 19:35

## 2018-09-15 RX ADMIN — PIPERACILLIN SODIUM,TAZOBACTAM SODIUM 4.5 G: 4; .5 INJECTION, POWDER, FOR SOLUTION INTRAVENOUS at 23:33

## 2018-09-15 RX ADMIN — PIPERACILLIN SODIUM,TAZOBACTAM SODIUM 4.5 G: 4; .5 INJECTION, POWDER, FOR SOLUTION INTRAVENOUS at 15:34

## 2018-09-15 RX ADMIN — ALBUTEROL SULFATE 2.5 MG: 2.5 SOLUTION RESPIRATORY (INHALATION) at 11:16

## 2018-09-15 RX ADMIN — CEFTRIAXONE SODIUM 2 G: 2 INJECTION, POWDER, FOR SOLUTION INTRAMUSCULAR; INTRAVENOUS at 00:31

## 2018-09-15 RX ADMIN — PROPOFOL 35 MCG/KG/MIN: 10 INJECTION, EMULSION INTRAVENOUS at 19:04

## 2018-09-15 RX ADMIN — ALBUTEROL SULFATE 2.5 MG: 2.5 SOLUTION RESPIRATORY (INHALATION) at 23:23

## 2018-09-15 RX ADMIN — PROPOFOL 25 MCG/KG/MIN: 10 INJECTION, EMULSION INTRAVENOUS at 10:04

## 2018-09-15 RX ADMIN — Medication 2 MCG/MIN: at 01:15

## 2018-09-15 RX ADMIN — SODIUM CHLORIDE 50 ML/HR: 900 INJECTION, SOLUTION INTRAVENOUS at 22:29

## 2018-09-15 RX ADMIN — CALCIUM GLUCONATE 2 G: 94 INJECTION, SOLUTION INTRAVENOUS at 09:05

## 2018-09-15 RX ADMIN — DORZOLAMIDE HYDROCHLORIDE AND TIMOLOL MALEATE 1 DROP: 20; 5 SOLUTION/ DROPS OPHTHALMIC at 09:05

## 2018-09-15 RX ADMIN — Medication 10 ML: at 15:34

## 2018-09-15 RX ADMIN — Medication 10 ML: at 21:41

## 2018-09-15 RX ADMIN — LATANOPROST 1 DROP: 50 SOLUTION OPHTHALMIC at 21:40

## 2018-09-15 RX ADMIN — PROPOFOL 35 MCG/KG/MIN: 10 INJECTION, EMULSION INTRAVENOUS at 19:06

## 2018-09-15 NOTE — PROGRESS NOTES
Central Line Procedure Note Indication: Inadequate venous access Patient positioned in Trendelenburg. 7-Step Sterility Protocol followed. (cap, mask sterile gown, sterile gloves, large sterile sheet, hand hygiene, 2% chlorhexidine for cutaneous antisepsis) 5 mL 1% Lidocaine placed at insertion site. Right internal jugular cannulated x 1 attempt(s) utilizing the Seldinger technique. Catheter secured & Biopatch applied. Sterile Tegaderm placed. CXR pending.    
Care turned over to covering Attending MD.

## 2018-09-15 NOTE — PROGRESS NOTES
[facility] Pulmonary Consult Service NotePulmonary, Critical Care, and Sleep Medicine Name: Katerina Meneses MRN: [id number] : 1934 Hospital: [facility] Date: 9/15/2018   Hospital Day: 5 Subjective/Interval History:  
I have reviewed the notes from other providers and old records readily available and summarized findings below with the flowsheet. Seen earlier today on rounds.  more hemoptysis all night and this AM. INR down to 1.8. Procalcitonin high at 1.69. No fever. On O2. Pt removed his oxygen and had tubing in his hands. Sats dropped to 86% . Replaced O2 NC and sats came back up to 93% immediately. JOSS positive but minimally positive anti sclerodermal ab. ANCA negative. D/w Dr. Asia Hoang earlier. REviewed options with pt and wife on phone [phone]. 9-15-18: From yesterday,  Had moderate amounts of  Pulmonary hemorrhage S/p PEA arrest, severe acute respiratory failure. Required CPR, Intubation, support. Critically ill, 35 min CC, EOP. Pt just came back from angio and had acute cardiopulmonary arrest and moderate pulmonary hemorrhage. He underwent CPR for 6 mins and had ROSC. Pt was then hypertensive. Airway was placed. Had moderate pulmonary hemorrhage when first intubated. Was placed on mechanical vent support. Transferred to the ICU. Since last night on levophed drip. On diprivan for sedation, Still some bloody secretions from Et tube, NG tube. HOlding anticoagulation for now. IMPRESSION:  
1. S/p PEA arrest post anigo yesterday pm. Had 6 min of CPR and then ROSC. 2. Acute hemoptysis- doubt pulmonary  Hemorrhage- Ct scan show spillage of blood, has lung mass( CA vs inflammatory pseudotumor) and microscopic hematuria; ANCA negative; JOSS barely positive. No longer anticoagulated but INR 1.8. Not getting better. Suspect LUNG mass the culprit but not sure if vessel present to embolize. Bronch will not help 3. Elevated procalcitonin- need to treat for possible lung abscess/pseudotumor 4. Lung mass RLL- right lung base- would not be visible on conventional bronch and yield from transbronchial biopsy not guaranteed; had no lesion on chest Ct Jan 2016 5. Coagulopathy from warfarin per ; INR down from 2.4 to 2.0, now normalized, will hold anticoagulation for now. 6. Acute kidney injury likely prerenal and related to medications (Lasix) Baseline creatinine is around 0.9 and currently creatinine is elevated at 1.2 down from 2.0 7. Hematuria Abnormal urinalysis 8. Sepsis due to suspected pneumonia Patient reports having exertional shortness of breath along with cough and hemoptysis; CT of chest shows severe emphysema with the right basilar airspace disease in the oval mass with fluid level concerning for internal hemorrhage versus pneumonia ;  
9. 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  
10. Anemia 11. Former 50+ pack yr smoker quit in 2015? 12. Asbestos exposure 13. History of atrial fibrillation on anticoagulation with warfarin 14. Hx of CAD s/p PCI in the past home amiodarone. Hold Cardizem 15. Dyslipidemia Body mass index is 30.97 kg/(m^2). 12. Multiorgan dysfunction as outlined above: Pt has one or more acute or chronic illnesses with severe exacerbation with progression or side effects of treatment that poses a threat to life or bodily function 17. Additional workup outlined below 18. Pt is requiring Drug therapy requiring intensive monitoring for toxicity 19. Pt is unstable, unpredictable needing PCU monitoring; is critically ill and at high risk of sudden decline and decompensation with life threatening consequenses and continued end organ dysfunction and failure 20. Prognosis guarded with significant risk of sudden decline 21. Critically ill, 40 min cc, EOP.  Discussed with nurse this am. High risk of decompensation. RECOMMENDATIONS/PLAN:  
1. Hold anticoagulation for now. Place on SCDS. 2. May need bronch for recurrent bleeding. May be able to avoid bronch if cytology positive. The mass will not be visible by bronch; an option might be navigational bronch which we can help to arrange as outpt but would need to be off anticoagulation 3. Transfuse per Dr. Gibson Smith 4. Sputum culture 5. Sputum cytology repeated and pending 6. Pressors as needed to keep MAP over 65. On levophed drip at this point. 7. Empiric abx to treat possible lung abscess, changed to zosyn. 8. Follow cultures 9. Ongoing vent support for now. NOt ready for SAT, SBT due to moderate hemoptysis yesterday. 10. Labs to follow electrolytes, renal function and and blood counts 11. Glucose monitoring and SSI 12. Bronchial hygiene with respiratory therapy techniques, bronchodilators 13. DVT, SUP prophylaxis 14. Pt needs IV fluids with additives and Drug therapy requiring intensive monitoring for toxicity 15. Prescription drug management with home med reconciliation reviewed My assessment/management discussed with: Consultants, Nursing, Pharmacy, Case Management, PT, OT, Respiratory Therapy, Hospitalist and Family for coordination of care Pt's condition is acute and unstable requiring inpatient hospitalization. This care involved high complexity decision making which includes independently reviewing the patient's past medical records, current laboratory results, medication profiles that were immediately available to me and actual Xray images at the bedside in order to assess, support vital system function, and to treat this degree of vital organ system failure, and to prevent further deterioration of the patients condition. Risk of deterioration: medium and high  
[x] High complexity decision making was performed [x] See my orders for details Tubes:    
 
Subjective/Initial History: I was asked by Kp Abraham MD to see Loren Higginbotham  a 80 y.o.  male in consultation for a chief complaint of hemoptysis and abnormal  
 
\"Ulysses BERMUDEZ is a 80 y.o.  male with hx Afib on Coumadin, CAD with prior PCI, AAA, HTN, and COPD on 2.5L via NC, who . .. was admitted via ER on presentation with 3d hx protracted coughing and hemoptysis, that later was associated with reported hematemesis. He noted during same time course that he experienced \"tightness\" in his abdomen that did not change with PO or defecation. He denied N, melena, BRBPR, hematochezia, or dysphagia. He is not on ASA or NSAIDs as OP. His wife reported that his stools were darker. He denies CP, increased SOB. He has not had any hematemesis here, but continued to cough and demonstrate hemoptysis here in ER. ER eval demonstrated patchy airspace disease in the right lung base with an associated hyperdense RLL 5.2 x 4.3 cm oval lesion, but no acute abdominal or pelvic process. WBC 20.6, BUN Cr 33/2.0, INR 2.4. Initial Hgb was 11.1, but when rechecked 8hrs later was noted to be 7.9. He had undergone EGD 2015 confirming gastritis without h.pylori infection. Last colonoscopy 2007 with internal hemorrhoids and sigmoid diverticulosis. Severe emphysema with right basilar airspace disease and an oval 5.5 x 4.5 cm lesion with fluid fluid level concerning for internal hemorrhage. \" 
 
Pt seen by Dr. Romario Kendall who feels that source of bleeding is not Gi tract. I reviewed office notes where he was last seen this year by our NP. I have not seen pt since 2016. He was noted to have ABNORMAL CHEST CT (R93.8)   Jan 2015 scattered perihilar adenopathy does not explain his other sx. 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 Pt just quit smoking after 50+ years Jan 2016 CT scan with emphysma. no lung mass 12/07/15: CTA impression: No pulmonary emboli.  Mild ILD, similar to prior study. New irregular 10.6mm RLL lung nodule for f/u in 3 months. This could be infectious or inflammatory in nature, or potentially a small malignancy. [facility] Chest CT reviewed Jan 2016 no mass. Emphysema with mild pulmonary HTN. Pt has very severe COPD who quit smoking since Feb 2015, started age 16. Tried Albuterol or Combivent on and off for years but really only prn per PCP. Cost has been extravagant, especially Spiriva which he used on and off for three. June 2015 FEV1 1.16 to 1.4 liters (<50%). Pt has had no dysphagia, choking. No pneumonia. No chest pain. Weight stable. No sign or sx of chest infection. Claims he just completed course of abx couple weeks ago from our office but we have no record of a prescription order. Denied gross hematuria. No Known Allergies MAR reviewed and pertinent medications noted or modified as needed Current Facility-Administered Medications Medication  mupirocin (BACTROBAN) 2 % ointment  pantoprazole (PROTONIX) 40 mg in sodium chloride 0.9% 10 mL injection  calcium gluconate 2 g in 0.9% sodium chloride 100 mL IVPB  NOREPINephrine (LEVOPHED) 8 mg in 5% dextrose 250mL infusion  propofol (DIPRIVAN) infusion  albuterol (PROVENTIL VENTOLIN) nebulizer solution 2.5 mg  
 fentaNYL citrate (PF) injection 25 mcg  
 0.9% sodium chloride infusion  
 0.9% sodium chloride infusion 250 mL  cefTRIAXone (ROCEPHIN) 2 g in 0.9% sodium chloride (MBP/ADV) 50 mL  albuterol (PROVENTIL HFA, VENTOLIN HFA, PROAIR HFA) inhaler 2 Puff  dorzolamide-timolol (COSOPT) 22.3-6.8 mg/mL ophthalmic solution 1 Drop  latanoprost (XALATAN) 0.005 % ophthalmic solution 1 Drop  pravastatin (PRAVACHOL) tablet 40 mg  
 sodium chloride (NS) flush 5-10 mL  sodium chloride (NS) flush 5-10 mL  acetaminophen (TYLENOL) tablet 650 mg  
 ondansetron (ZOFRAN) injection 4 mg  docusate sodium (COLACE) capsule 100 mg  
  metroNIDAZOLE (FLAGYL) IVPB premix 500 mg Patient PCP: PROVIDER UNKNOWN 
PMH:  has a past medical history of Aneurysm ([facility] [address] Arrhythmia; Hypertension; Other ill-defined conditions(799.89); Other ill-defined conditions(799.89); Other ill-defined conditions(799.89); and Other ill-defined conditions(799.89). He also has no past medical history of Difficult intubation; Malignant hyperthermia due to anesthesia; Nausea & vomiting; Pseudocholinesterase deficiency; or Unspecified adverse effect of anesthesia. PSH:   has a past surgical history that includes hx orthopaedic; hx other surgical; pr abdomen surgery proc unlisted (13); hx heent; and upper gi endoscopy,biopsy (4/10/2015). FHX: family history includes Cancer in his father and mother. SHX:  reports that he has quit smoking. He has a 10.00 pack-year smoking history. He has never used smokeless tobacco. He reports that he drinks alcohol. He reports that he uses illicit drugs, including Prescription and OTC. 
  
ROS:A comprehensive review of systems was negative except for that written in the HPI. Objective: 
 
Vital Signs: Telemetry:    normal sinus rhythmIntake/Output:  
Visit Vitals  /50  Pulse 86  Temp 99 °F (37.2 °C)  Resp 23  
 Ht 6' 2\" (1.88 m)  Wt 109.4 kg (241 lb 2.9 oz)  SpO2 99%  BMI 30.97 kg/m2 Temp (24hrs), Av.4 °F (36.9 °C), Min:98.1 °F (36.7 °C), Max:99 °F (37.2 °C) O2 Device: Ventilator O2 Flow Rate (L/min): 5 l/min Wt Readings from Last 4 Encounters:  
09/15/18 109.4 kg (241 lb 2.9 oz) 16 111.1 kg (245 lb)  
16 106.6 kg (235 lb) 04/08/15 103.4 kg (228 lb) Intake/Output Summary (Last 24 hours) at 09/15/18 5279 Last data filed at 09/15/18 8851 Gross per 24 hour Intake          2885.34 ml Output              970 ml Net          1915.34 ml Last shift:      09/15 0701 - 09/15 1900 In: 432 [I.V.:432] Out: 175 [Urine:175] Last 3 shifts: 09/13 1901 - 09/15 0700 In: 2553.4 [I.V.:2213.8] Out: 1620 [XLFNF:8081] Physical Exam:  
 General:   male; with Et tube in place. Has right IJ CVC. On sedation and on vent. HEAD: Normocephalic, without obvious abnormality, atraumatic EYES: conjunctivae clear. PERRL,  AN Icteric sclerae NOSE: nares normal, no drainage, no nasal flaring, THROAT: normal; Lips, mucosa dry; No Thrush;  crowded airway; tongue midline Neck: Supple, symmetrical, trachea midline,  No accessory mm use; No Stridor/ cuff leak, No goiter or thyroid tenderness LYMPH: No abnormally enlarged lymph nodes. in neck or groin Chest: increased AP diameter Lungs: agonal, bilateral rhonchi. Seems to have good air  Movement Bilaterally. Heart: Regular rate and rhythm; NO edema Abdomen: soft, non-tender, without masses or organomegaly, protuberant, distended : no [place] Musculoskeletal: mild kyphosis; No spine or CVA tenderness; negative, cyanosis, clubbing; no joint swelling or erythema Neuro: was not  Responsive, had a gag with ET tube suctioning. , Sedated not able to fully assess. Psych: NOt able to assess due to being on sedation and on vent. Skin: Pallor;  
 Pulses:Bilateral, Radial, 2+ Capillary refill: abnormal: ; sluggish capillary refill, pale, 
 
Data:  
 
All lab results for the last 24 hours reviewed. Labs: 
 
Recent Labs  
   09/15/18 
 3592  09/15/18 
 0059  09/14/18 
 1653   09/14/18 
 0028   09/13/18 
 1042   09/13/18 
 0344 WBC   --   12.1*  12.3*   --    --    --   8.9   --    --   
HGB  7.5*  7.3*  6.9*   < >  7.1*   < >  7.5*   < >   --   
PLT   --   111*  133*   --    --    --   123*   --    --   
INR   --   1.4*  1.6*   --   1.8*   --    --    --   2.0* APTT   --   33.4*   --    --   36.2*   --    --    --   37.4*  
 < > = values in this interval not displayed. Recent Labs  
   09/15/18 
 0059  09/14/18 
 1653  09/14/18 
 0028 NA  137  135*  139  
K  5.0  4.4  4.7 CL  106  104  107 CO2  26  21  27 GLU  119*  193*  110* BUN  19  19  18 CREA  1.28  1.41*  1.19  
CA  8.0*  7.6*  8.1*  
MG  2.6*  2.6*   --   
PHOS  2.7  4.2   --   
LAC  0.9  2.9*   --   
ALB  2.4*  2.5*   --   
SGOT  49*  49*   --   
ALT  33  35   -- No results for input(s): PH, PCO2, PO2, HCO3, FIO2 in the last 72 hours. No results for input(s): CPK, CKNDX, TROIQ in the last 72 hours. No lab exists for component: CPKMB No results found for: BNPP, BNP Lab Results Component Value Date/Time Culture result: LIGHT NORMAL RESPIRATORY SHELDON 09/12/2018 03:50 PM  
 Culture result: MODERATE NORMAL RESPIRATORY SHELDON 09/12/2018 11:21 AM  
 Culture result: SCANT YEAST, (APPARENT CANDIDA ALBICANS) (A) 09/12/2018 11:21 AM  
No results found for: TSH, TSHEXT, TSHEXT Imaging: 
 
   
 
 
Results from Hospital Encounter encounter on 09/11/18 XR CHEST PORT Narrative PORTABLE CHEST RADIOGRAPH/S: 9/14/2018 8:46 PM 
 
Clinical history: Line placement INDICATION:   central line placement COMPARISON: 9/14/2018 FINDINGS: 
AP portable upright view of the chest demonstrates stable  cardiopericardial 
silhouette. Right IJ triple-lumen catheter tip projects over superior vena cava. Interstitial and parenchymal edema is slightly more pronounced on the prior 
study. ET tube unchanged. NG tube extends below the diaphragm. The lungs are 
adequately expanded. There is no pneumothorax. The osseous structures are 
unremarkable. Patient is on a cardiac monitor. Impression IMPRESSION: 
Right IJ catheter tip in superior vena cava and appropriate position for use. Increased interstitial and parenchymal edema. Interval NG tube as well. No other change. Results from Hospital Encounter encounter on 09/11/18 CT CHEST WO CONT Narrative INDICATION: hemoptysis, eval abscess vs hemorrhage COMPARISON: None TECHNIQUE:  Noncontrast 5 mm axial images were obtained through the chest. CT 
dose reduction was achieved through use of a standardized protocol tailored for 
this examination and automatic exposure control for dose modulation. FINDINGS: 
 
THYROID: Unremarkable. MEDIASTINUM/TRAVIS: Small calcified subcarinal lymph nodes. HEART/PERICARDIUM: Coronary atherosclerosis. Pacemaker. No pericardial effusion. Not significantly enlarged. LUNGS/PLEURA: Severe emphysema. Patchy right basilar airspace disease with a 5.5 
x 4.5 cm oval lesion in the right lower lobe with fluid fluid level. No internal 
gas. No significant pleural effusion. No pneumothorax. INCIDENTALLY IMAGED UPPER ABDOMEN: No significant abnormality. BONES: No destructive bone lesion. ADDITIONAL COMMENTS:  N/A. Impression IMPRESSION: 
Severe emphysema with right basilar airspace disease and an oval 5.5 x 4.5 cm 
lesion with fluid fluid level concerning for internal hemorrhage. No internal 
gas. Differential diagnosis includes an infectious process including abscess, 
although neoplasm with internal hemorrhage also a possibility. Post intubation: 9-14-18: CXR was reviewed Had bilateral infiltrates. ET tube in position. CXR: 9-15-18:  
Frontal chest radiograph submitted for review.  
  
Endotracheal tube is in stable position. Stable right-sided central line and 
partially visualized feeding tube. Right chest pacing device is again seen. Stable heart size. Unchanged interstitial edema and right basilar airspace 
disease. No definite new lung abnormality. Bilateral small effusions. No 
Pneumothorax. ET tube ok position.  
  
IMPRESSION: 
  
Overall stable exam  
 
 
I have personally and independently reviewed the patients interval and diagnostic data, radiographs and have reviewed the reports. Medical Decision Making Today: · Reviewed the flowsheet and previous days notes · Reviewed and summarized records or history from previous days note or discussions with staff, family · Parenteral controlled substances - Reviewed/ Adjusted / Weaned / Started · High Risk Drug therapy requiring intensive monitoring for toxicity: eg steroids, pressors, antibiotics · Reviewed and/or ordered Clinical lab tests · Reviewed and/or ordered Radiology tests · Reviewed and/or ordered of Medicine tests · Independently visualized radiologic Images · I have personally reviewed the patients ECG / Telemetry Michelle Euceda MD

## 2018-09-15 NOTE — PROGRESS NOTES
Problem: Pressure Injury - Risk of 
Goal: *Prevention of pressure injury Document Ayaz Scale and appropriate interventions in the flowsheet. Outcome: Progressing Towards Goal 
Pressure Injury Interventions: 
Sensory Interventions: Assess changes in LOC, Pressure redistribution bed/mattress (bed type), Turn and reposition approx. every two hours (pillows and wedges if needed) Moisture Interventions: Apply protective barrier, creams and emollients, Absorbent underpads, Maintain skin hydration (lotion/cream), Minimize layers Activity Interventions: Assess need for specialty bed, Pressure redistribution bed/mattress(bed type) Mobility Interventions: Float heels, HOB 30 degrees or less, Pressure redistribution bed/mattress (bed type), Turn and reposition approx. every two hours(pillow and wedges) Nutrition Interventions: Document food/fluid/supplement intake Friction and Shear Interventions: Feet elevated on foot rest, Minimize layers

## 2018-09-15 NOTE — PROGRESS NOTES
1930 Bedside and Verbal shift change report received from Irish Stinson. Report included the following information SBAR, Kardex, Procedure Summary, Intake/Output, MAR, Recent Results, Med Rec Status and Cardiac Rhythm Afib/AV Paced. 2000 In to assess patient, removed wrist restraints, patient making no attempts to pull out lines. 2030 Dr Oseas Nix in to place CVL. Consent at bedside. 2100 Chest xray complete and okay to use line given 
 
2200 Patient receiving 1 unit PRBC's at this time. 2501 [facility] Spoke with patient's wife and updated on patient status. 0100 Patient in bed resting quietly, turned and repositioned. Levophed drip continues at 2 mcg/min. Propofol infusing at 30 mcg/kg/min. Patient MAP >65.  
 
0300 Patient resting comfortably. 7309 Patient bathed, CHG wipes and basin bath. Repositioned in bed, linens changed

## 2018-09-15 NOTE — PROGRESS NOTES
Hospitalist Progress Note NAME: Mikayla Valdes  
:  1934 MRN:  [id number] Assessment / Plan: PEA arrest  Code blue called . Patient w/ agonal breathing after coming back from radiology after arteriogram completed. No pulse detected. CPR initiated. Epi x 2 given. Pulse obtained. Intubated by Dr. Sheryl Leos. Much bloody secretions obtained. Transferred to ICU. Family was updated at time of code. Acute upper GI bleed(hematemesis) -may be due to hemoptysis since patient had been coughing as well. Patient admitted to taking total 800 mg/day Ibuprofen last few weeks for pain on left side. -GI consulted. It was felt that dark stool may be due swallowed blood. No EGD planned. -PPI 
 
-continue to hold anticoagulation. Follow H&H. Sepsis due to suspected PNA w/ possible pulmonary hemorrhage 
-CT of chest shows severe emphysema with the right basilar airspace disease in the oval mass with fluid level concerning for internal hemorrhage versus pneumonia. -WBC elevated on admission. Now improved. -follow cultures 
-cont abx 
-Cytology ordered. Pending. Hemoptysis 
-unknown etiology. -work up for possible rheumatologic cause neg thus far: 
ANCA, anti-GBM, MPO ab, FL-3 ab, SSA/SSB, RNP ab, Arredondo/RNP, dsDNA, and Arredondo ab all negative. JOSS +. 
 
-Arteriogram : Thoracic aortic and intercostal arteriograms as described above demonstrating no 
enlarged bronchial artery supplying the right lower lobe of the lung. A normal 
appearing and normal sized right bronchial artery is seen without any 
hyperplasia or dominant right lower lobe supply. No embolization was performed. RLL mass - unable to determine if hemorrhage 
-Dr. Bessy Yan considered Wegener's as possible cause since patient w/ hemoptysis and hematuria. However, work up neg thus far. 
-consider navigational bronch as outpatient. However, patient would have to be off anticoagulation per Dr. Bessy Yan. Acute blood loss anemia on admission 
-Hgb 11 on admission.   
-serial H&H 
 
H/o atrial fibrillation on chronic anticoagulation w/ coumadin 
-cont amiodarone 
-cardizem on hold due to borderline low bp 
-hold coumadin 
 
Coumadin-induced coagulopathy 
-INR remains elevated. Holding coumadin. ALAN - likely prerenal related to Lasix 
-follow renal function UTI, acute cystitis w/ hematuria, POA 
-cont abx; follow urine cx. COPD - stable HLD 
-cont pravachol Obesity - BMI 32 Plan: As above. Transfuse for Hgb < 7 Levophed for pressor support Body mass index is 30.97 kg/(m^2). Code status: Full Prophylaxis: SCD's Recommended Disposition: Home w/Family Subjective: Chief Complaint / Reason for Physician Visit Follow up for hemoptysis. Discussed with RN events overnight. D/w nurse. Patient opens eyes when sedation decreased. Review of Systems: 
Symptom Y/N Comments  Symptom Y/N Comments Fever/Chills    Chest Pain Poor Appetite    Edema Cough    Abdominal Pain Sputum    Joint Pain SOB/ROY    Pruritis/Rash Nausea/vomit    Tolerating PT/OT Diarrhea    Tolerating Diet Constipation    Other Could NOT obtain due to: sedated Objective: VITALS:  
Last 24hrs VS reviewed since prior progress note. Most recent are: 
Patient Vitals for the past 24 hrs: 
 Temp Pulse Resp BP SpO2  
09/15/18 0900 - 86 23 122/50 99 % 09/15/18 0847 - 76 21 (!) 87/42 100 % 09/15/18 0830 - 73 17 97/48 100 % 09/15/18 0815 - 77 12 106/49 100 % 09/15/18 0800 99 °F (37.2 °C) 78 17 91/46 100 % 09/15/18 0745 - 75 15 (!) 95/38 100 % 09/15/18 0741 - 74 16 - 100 % 09/15/18 0715 - 77 14 93/48 100 % 09/15/18 0700 - 75 16 103/54 100 % 09/15/18 0645 - 80 18 103/53 100 % 09/15/18 0630 - 82 11 97/57 100 % 09/15/18 0600 - 80 23 118/49 -  
09/15/18 0530 - 76 19 109/41 100 % 09/15/18 0500 - 79 18 109/50 100 % 09/15/18 0430 - 76 (!) 4 100/43 100 % 09/15/18 0400 - 75 11 98/41 100 % 09/15/18 0343 - 75 16 - 100 % 09/15/18 0330 - 77 17 108/55 100 % 09/15/18 0300 98.1 °F (36.7 °C) 71 17 108/46 100 % 09/15/18 0230 - 72 16 106/56 100 % 09/15/18 0200 - 73 17 99/48 100 % 09/15/18 0130 - 73 19 95/53 100 % 09/15/18 0100 - 85 17 108/65 100 % 09/15/18 0030 - 80 17 110/56 100 % 09/15/18 0000 98.1 °F (36.7 °C) 79 14 99/41 100 % 09/14/18 2335 - 83 16 - 99 % 09/14/18 2330 - 79 17 98/53 99 % 09/14/18 2300 98.1 °F (36.7 °C) 75 17 101/53 100 % 09/14/18 2230 - 77 16 93/56 100 % 09/14/18 2200 - 79 15 98/56 100 % 09/14/18 2130 - 76 19 (!) 80/28 100 % 09/14/18 2111 - 80 20 (!) 78/40 100 % 09/14/18 2100 - 79 15 (!) 76/38 100 % 09/14/18 2050 98.6 °F (37 °C) 72 23 90/56 99 % 09/14/18 2014 - - - - 97 % 09/14/18 2002 - 76 14 - 97 % 09/14/18 2000 98.6 °F (37 °C) 77 15 90/56 97 % 09/14/18 1900 - 80 12 91/53 99 % 09/14/18 1800 - 82 15 100/52 99 % 09/14/18 1700 - 100 19 100/52 100 % 09/14/18 1645 - - - 102/55 -  
09/14/18 1644 - 96 14 - 100 % 09/14/18 1620 - 97 25 - 97 % 09/14/18 1619 - (!) 101 - 145/68 99 % 09/14/18 1611 - (!) 119 - (!) 199/91 -  
09/14/18 1607 - (!) 119 - - -  
09/14/18 1537 - 69 18 152/88 100 % 09/14/18 1533 - 82 20 175/88 100 % 09/14/18 1527 - 79 21 146/65 100 % 09/14/18 1523 - 74 20 150/80 100 % 09/14/18 1517 - 71 19 148/73 100 % 09/14/18 1513 - 69 20 143/70 100 % 09/14/18 1507 - 68 16 140/64 100 % 09/14/18 1503 - 66 14 144/60 100 % 09/14/18 1457 - 65 15 141/59 100 % 09/14/18 1453 - 65 18 141/58 100 % 09/14/18 1447 - 62 12 139/64 100 % 09/14/18 1443 - 63 13 123/74 100 % 09/14/18 1437 - 63 15 127/59 99 % 09/14/18 1433 - 63 20 129/61 99 % 09/14/18 1427 - 63 16 124/55 96 % 09/14/18 1423 - 62 13 131/67 94 % 09/14/18 1333 - 62 20 121/51 94 % Intake/Output Summary (Last 24 hours) at 09/15/18 1056 Last data filed at 09/15/18 2188 Gross per 24 hour Intake          2885.34 ml Output              970 ml Net          1915.34 ml PHYSICAL EXAM: 
General: WD, WN. Intubated. No distress. EENT:   Anicteric sclerae. MMM;  ET tube in place. Resp:  Decreased breath sounds. No accessory muscle use CV:  Regular  rhythm,  1+ bilateral leg edema GI:  Soft, Non distended, Non tender.  +Bowel sounds Neurologic:  Sedated. No posturing. Psych:   No agitation Skin:  No rashes. No jaundice Reviewed most current lab test results and cultures  YES Reviewed most current radiology test results   YES Review and summation of old records today    NO Reviewed patient's current orders and MAR    YES 
PMH/ reviewed - no change compared to H&P 
________________________________________________________________________ Care Plan discussed with: 
  Comments Patient Family RN x Care Manager Consultant Multidiciplinary team rounds were held today with , nursing, pharmacist and clinical coordinator. Patient's plan of care was discussed; medications were reviewed and discharge planning was addressed. ________________________________________________________________________ Total NON critical care TIME:  30  Minutes Total CRITICAL CARE TIME Spent:   Minutes non procedure based Comments >50% of visit spent in counseling and coordination of care    
________________________________________________________________________ Naima Singh MD  
 
Procedures: see electronic medical records for all procedures/Xrays and details which were not copied into this note but were reviewed prior to creation of Plan. LABS: 
I reviewed today's most current labs and imaging studies. Pertinent labs include: 
Recent Labs  
   09/15/18 
 0721  09/15/18 
 0059  09/14/18 
 1653   09/13/18 
 1042 WBC   --   12.1*  12.3*   --   8.9 HGB  7.5*  7.3*  6.9*   < >  7.5* HCT   --   23.6*  22.4*   --   24.9*  
PLT   --   111*  133*   --   123* < > = values in this interval not displayed. Recent Labs  
   09/15/18 
 0059  09/14/18 
 1653  09/14/18 
 0028 NA  137  135*  139  
K  5.0  4.4  4.7 CL  106  104  107 CO2  26  21  27 GLU  119*  193*  110* BUN  19  19  18 CREA  1.28  1.41*  1.19  
CA  8.0*  7.6*  8.1*  
MG  2.6*  2.6*   --   
PHOS  2.7  4.2   --   
ALB  2.4*  2.5*   --   
TBILI  0.4  0.5   --   
SGOT  49*  49*   --   
ALT  33  35   --   
INR  1.4*  1.6*  1.8* Signed: Parker Brice MD

## 2018-09-15 NOTE — PROGRESS NOTES
Problem: Falls - Risk of 
Goal: *Absence of Falls Document Barbara Chaney Fall Risk and appropriate interventions in the flowsheet. Outcome: Progressing Towards Goal 
Fall Risk Interventions: 
Mobility Interventions: Patient to call before getting OOB Mentation Interventions: Bed/chair exit alarm Medication Interventions: Bed/chair exit alarm, Patient to call before getting OOB, Teach patient to arise slowly Elimination Interventions: Call light in reach History of Falls Interventions: Door open when patient unattended, Bed/chair exit alarm

## 2018-09-15 NOTE — PROGRESS NOTES
Bedside and Verbal shift change report given to Giovanna Torres RN (oncoming nurse) by Raphael Delgado (offgoing nurse). Report included the following information SBAR, Kardex, Procedure Summary, Intake/Output, MAR, Recent Results, Med Rec Status and Cardiac Rhythm Afib/Paced.

## 2018-09-16 ENCOUNTER — APPOINTMENT (OUTPATIENT)
Dept: GENERAL RADIOLOGY | Age: 83
DRG: 003 | End: 2018-09-16
Attending: INTERNAL MEDICINE
Payer: MEDICARE

## 2018-09-16 LAB
ALBUMIN SERPL-MCNC: 2.3 G/DL (ref 3.5–5)
ALBUMIN/GLOB SERPL: 0.6 {RATIO} (ref 1.1–2.2)
ALP SERPL-CCNC: 63 U/L (ref 45–117)
ALT SERPL-CCNC: 25 U/L (ref 12–78)
ANION GAP SERPL CALC-SCNC: 6 MMOL/L (ref 5–15)
APTT PPP: 30.5 SEC (ref 22.1–32)
AST SERPL-CCNC: 23 U/L (ref 15–37)
BACTERIA SPEC CULT: NORMAL
BASOPHILS # BLD: 0 K/UL (ref 0–0.1)
BASOPHILS NFR BLD: 0 % (ref 0–1)
BILIRUB SERPL-MCNC: 0.4 MG/DL (ref 0.2–1)
BUN SERPL-MCNC: 16 MG/DL (ref 6–20)
BUN/CREAT SERPL: 14 (ref 12–20)
CALCIUM SERPL-MCNC: 8.1 MG/DL (ref 8.5–10.1)
CHLORIDE SERPL-SCNC: 109 MMOL/L (ref 97–108)
CO2 SERPL-SCNC: 25 MMOL/L (ref 21–32)
CREAT SERPL-MCNC: 1.16 MG/DL (ref 0.7–1.3)
DIFFERENTIAL METHOD BLD: ABNORMAL
EOSINOPHIL # BLD: 0 K/UL (ref 0–0.4)
EOSINOPHIL NFR BLD: 0 % (ref 0–7)
ERYTHROCYTE [DISTWIDTH] IN BLOOD BY AUTOMATED COUNT: 18.4 % (ref 11.5–14.5)
FIBRINOGEN PPP-MCNC: 666 MG/DL (ref 200–475)
GLOBULIN SER CALC-MCNC: 4 G/DL (ref 2–4)
GLUCOSE SERPL-MCNC: 120 MG/DL (ref 65–100)
HCT VFR BLD AUTO: 22.8 % (ref 36.6–50.3)
HGB BLD-MCNC: 7.2 G/DL (ref 12.1–17)
HGB BLD-MCNC: 7.2 G/DL (ref 12.1–17)
IMM GRANULOCYTES # BLD: 0.1 K/UL (ref 0–0.04)
IMM GRANULOCYTES NFR BLD AUTO: 1 % (ref 0–0.5)
INR PPP: 1.3 (ref 0.9–1.1)
LYMPHOCYTES # BLD: 0.8 K/UL (ref 0.8–3.5)
LYMPHOCYTES NFR BLD: 8 % (ref 12–49)
MAGNESIUM SERPL-MCNC: 2.5 MG/DL (ref 1.6–2.4)
MCH RBC QN AUTO: 34.3 PG (ref 26–34)
MCHC RBC AUTO-ENTMCNC: 31.6 G/DL (ref 30–36.5)
MCV RBC AUTO: 108.6 FL (ref 80–99)
MONOCYTES # BLD: 1.4 K/UL (ref 0–1)
MONOCYTES NFR BLD: 14 % (ref 5–13)
NEUTS SEG # BLD: 7.5 K/UL (ref 1.8–8)
NEUTS SEG NFR BLD: 77 % (ref 32–75)
NRBC # BLD: 0 K/UL (ref 0–0.01)
NRBC BLD-RTO: 0 PER 100 WBC
PHOSPHATE SERPL-MCNC: 2.3 MG/DL (ref 2.6–4.7)
PLATELET # BLD AUTO: 128 K/UL (ref 150–400)
PMV BLD AUTO: 11.5 FL (ref 8.9–12.9)
POTASSIUM SERPL-SCNC: 4.6 MMOL/L (ref 3.5–5.1)
PROT SERPL-MCNC: 6.3 G/DL (ref 6.4–8.2)
PROTHROMBIN TIME: 13 SEC (ref 9–11.1)
RBC # BLD AUTO: 2.1 M/UL (ref 4.1–5.7)
RBC MORPH BLD: ABNORMAL
RBC MORPH BLD: ABNORMAL
SERVICE CMNT-IMP: NORMAL
SODIUM SERPL-SCNC: 140 MMOL/L (ref 136–145)
THERAPEUTIC RANGE,PTTT: NORMAL SECS (ref 58–77)
WBC # BLD AUTO: 9.8 K/UL (ref 4.1–11.1)

## 2018-09-16 PROCEDURE — 74011000258 HC RX REV CODE- 258: Performed by: INTERNAL MEDICINE

## 2018-09-16 PROCEDURE — 80053 COMPREHEN METABOLIC PANEL: CPT | Performed by: INTERNAL MEDICINE

## 2018-09-16 PROCEDURE — 36415 COLL VENOUS BLD VENIPUNCTURE: CPT | Performed by: INTERNAL MEDICINE

## 2018-09-16 PROCEDURE — 87116 MYCOBACTERIA CULTURE: CPT | Performed by: INTERNAL MEDICINE

## 2018-09-16 PROCEDURE — 85384 FIBRINOGEN ACTIVITY: CPT | Performed by: INTERNAL MEDICINE

## 2018-09-16 PROCEDURE — 71045 X-RAY EXAM CHEST 1 VIEW: CPT

## 2018-09-16 PROCEDURE — 77030018836 HC SOL IRR NACL ICUM -A

## 2018-09-16 PROCEDURE — 0B9F8ZZ DRAINAGE OF RIGHT LOWER LUNG LOBE, VIA NATURAL OR ARTIFICIAL OPENING ENDOSCOPIC: ICD-10-PCS | Performed by: INTERNAL MEDICINE

## 2018-09-16 PROCEDURE — 74011000250 HC RX REV CODE- 250: Performed by: INTERNAL MEDICINE

## 2018-09-16 PROCEDURE — 76010000379 HC BRONCHOSCOPY DIAG/THERAPEUTIC

## 2018-09-16 PROCEDURE — 83735 ASSAY OF MAGNESIUM: CPT | Performed by: INTERNAL MEDICINE

## 2018-09-16 PROCEDURE — 84100 ASSAY OF PHOSPHORUS: CPT | Performed by: INTERNAL MEDICINE

## 2018-09-16 PROCEDURE — 94003 VENT MGMT INPAT SUBQ DAY: CPT

## 2018-09-16 PROCEDURE — 87070 CULTURE OTHR SPECIMN AEROBIC: CPT | Performed by: INTERNAL MEDICINE

## 2018-09-16 PROCEDURE — 94640 AIRWAY INHALATION TREATMENT: CPT

## 2018-09-16 PROCEDURE — 74011250636 HC RX REV CODE- 250/636: Performed by: INTERNAL MEDICINE

## 2018-09-16 PROCEDURE — C9113 INJ PANTOPRAZOLE SODIUM, VIA: HCPCS | Performed by: INTERNAL MEDICINE

## 2018-09-16 PROCEDURE — 85730 THROMBOPLASTIN TIME PARTIAL: CPT | Performed by: INTERNAL MEDICINE

## 2018-09-16 PROCEDURE — 88112 CYTOPATH CELL ENHANCE TECH: CPT | Performed by: INTERNAL MEDICINE

## 2018-09-16 PROCEDURE — 85025 COMPLETE CBC W/AUTO DIFF WBC: CPT | Performed by: INTERNAL MEDICINE

## 2018-09-16 PROCEDURE — 74011250637 HC RX REV CODE- 250/637: Performed by: INTERNAL MEDICINE

## 2018-09-16 PROCEDURE — 87102 FUNGUS ISOLATION CULTURE: CPT | Performed by: INTERNAL MEDICINE

## 2018-09-16 PROCEDURE — 65620000000 HC RM CCU GENERAL

## 2018-09-16 PROCEDURE — 85018 HEMOGLOBIN: CPT | Performed by: INTERNAL MEDICINE

## 2018-09-16 PROCEDURE — 85610 PROTHROMBIN TIME: CPT | Performed by: INTERNAL MEDICINE

## 2018-09-16 RX ORDER — SODIUM,POTASSIUM PHOSPHATES 280-250MG
2 POWDER IN PACKET (EA) ORAL
Status: COMPLETED | OUTPATIENT
Start: 2018-09-16 | End: 2018-09-16

## 2018-09-16 RX ORDER — CHLORHEXIDINE GLUCONATE 1.2 MG/ML
15 RINSE ORAL 2 TIMES DAILY
Status: DISCONTINUED | OUTPATIENT
Start: 2018-09-16 | End: 2018-10-27 | Stop reason: HOSPADM

## 2018-09-16 RX ADMIN — ALBUTEROL SULFATE 2.5 MG: 2.5 SOLUTION RESPIRATORY (INHALATION) at 13:25

## 2018-09-16 RX ADMIN — DORZOLAMIDE HYDROCHLORIDE AND TIMOLOL MALEATE 1 DROP: 20; 5 SOLUTION/ DROPS OPHTHALMIC at 09:59

## 2018-09-16 RX ADMIN — Medication 7 MCG/MIN: at 22:22

## 2018-09-16 RX ADMIN — CHLORHEXIDINE GLUCONATE 15 ML: 1.2 RINSE ORAL at 17:35

## 2018-09-16 RX ADMIN — MUPIROCIN: 20 OINTMENT TOPICAL at 10:00

## 2018-09-16 RX ADMIN — PROPOFOL 35 MCG/KG/MIN: 10 INJECTION, EMULSION INTRAVENOUS at 09:58

## 2018-09-16 RX ADMIN — PROPOFOL 35 MCG/KG/MIN: 10 INJECTION, EMULSION INTRAVENOUS at 16:45

## 2018-09-16 RX ADMIN — Medication 10 ML: at 22:16

## 2018-09-16 RX ADMIN — Medication 10 ML: at 06:24

## 2018-09-16 RX ADMIN — ALBUTEROL SULFATE 2.5 MG: 2.5 SOLUTION RESPIRATORY (INHALATION) at 03:22

## 2018-09-16 RX ADMIN — PIPERACILLIN SODIUM,TAZOBACTAM SODIUM 4.5 G: 4; .5 INJECTION, POWDER, FOR SOLUTION INTRAVENOUS at 23:39

## 2018-09-16 RX ADMIN — PIPERACILLIN SODIUM,TAZOBACTAM SODIUM 4.5 G: 4; .5 INJECTION, POWDER, FOR SOLUTION INTRAVENOUS at 10:00

## 2018-09-16 RX ADMIN — SODIUM CHLORIDE 50 ML/HR: 900 INJECTION, SOLUTION INTRAVENOUS at 18:12

## 2018-09-16 RX ADMIN — LATANOPROST 1 DROP: 50 SOLUTION OPHTHALMIC at 22:00

## 2018-09-16 RX ADMIN — PROPOFOL 30 MCG/KG/MIN: 10 INJECTION, EMULSION INTRAVENOUS at 20:45

## 2018-09-16 RX ADMIN — PIPERACILLIN SODIUM,TAZOBACTAM SODIUM 4.5 G: 4; .5 INJECTION, POWDER, FOR SOLUTION INTRAVENOUS at 16:44

## 2018-09-16 RX ADMIN — ALBUTEROL SULFATE 2.5 MG: 2.5 SOLUTION RESPIRATORY (INHALATION) at 16:59

## 2018-09-16 RX ADMIN — CHLORHEXIDINE GLUCONATE 15 ML: 1.2 RINSE ORAL at 09:00

## 2018-09-16 RX ADMIN — ALBUTEROL SULFATE 2.5 MG: 2.5 SOLUTION RESPIRATORY (INHALATION) at 19:42

## 2018-09-16 RX ADMIN — Medication 5 MCG/MIN: at 00:06

## 2018-09-16 RX ADMIN — PROPOFOL 30 MCG/KG/MIN: 10 INJECTION, EMULSION INTRAVENOUS at 04:07

## 2018-09-16 RX ADMIN — Medication 10 ML: at 14:00

## 2018-09-16 RX ADMIN — DORZOLAMIDE HYDROCHLORIDE AND TIMOLOL MALEATE 1 DROP: 20; 5 SOLUTION/ DROPS OPHTHALMIC at 17:35

## 2018-09-16 RX ADMIN — POTASSIUM & SODIUM PHOSPHATES POWDER PACK 280-160-250 MG 2 PACKET: 280-160-250 PACK at 10:01

## 2018-09-16 RX ADMIN — ALBUTEROL SULFATE 2.5 MG: 2.5 SOLUTION RESPIRATORY (INHALATION) at 07:38

## 2018-09-16 RX ADMIN — PROPOFOL 30 MCG/KG/MIN: 10 INJECTION, EMULSION INTRAVENOUS at 00:06

## 2018-09-16 RX ADMIN — SODIUM CHLORIDE 40 MG: 9 INJECTION, SOLUTION INTRAMUSCULAR; INTRAVENOUS; SUBCUTANEOUS at 10:00

## 2018-09-16 RX ADMIN — MUPIROCIN: 20 OINTMENT TOPICAL at 17:35

## 2018-09-16 NOTE — PROGRESS NOTES
Bedside and Verbal shift change report given to Grisel Floyd RN (oncoming nurse) by Junior Neida dorantes. Report included the following information SBAR, Kardex, Procedure Summary, Intake/Output, MAR, Recent Results, Cardiac Rhythm Afib and Alarm Parameters .

## 2018-09-16 NOTE — PROGRESS NOTES
[facility] Consult Service NotePulmonary, Critical Care, and Sleep Medicine Name: Jaycee Max MRN: [id number] : 1934 Hospital: [facility] Date: 2018   Hospital Day: 6 Subjective/Interval History:  
I have reviewed the notes from other providers and old records readily available and summarized findings below with the flowsheet. Seen earlier today on rounds.  more hemoptysis all night and this AM. INR down to 1.8. Procalcitonin high at 1.69. No fever. On O2. Pt removed his oxygen and had tubing in his hands. Sats dropped to 86% . Replaced O2 NC and sats came back up to 93% immediately. JOSS positive but minimally positive anti sclerodermal ab. ANCA negative. D/w Dr. Daljit Gutiérrez earlier. REviewed options with pt and wife on phone [phone]. 9-15-18: From yesterday,  Had moderate amounts of  Pulmonary hemorrhage S/p PEA arrest, severe acute respiratory failure. Required CPR, Intubation, support. Critically ill, 35 min CC, EOP. Pt just came back from angio and had acute cardiopulmonary arrest and moderate pulmonary hemorrhage. He underwent CPR for 6 mins and had ROSC. Pt was then hypertensive. Airway was placed. Had moderate pulmonary hemorrhage when first intubated. Was placed on mechanical vent support. Transferred to the ICU. Since last night on levophed drip. On diprivan for sedation, Still some bloody secretions from Et tube, NG tube. HOlding anticoagulation for now. 18: Pt had more hemoptysis in his ET tube this am.  
STill on levophed. Has not required blood transfusion. This am discussed with his wife and got permission for Bronch. NO overt endobronchial lesions. Did have bleeding from RLL. NO other acute issues last 24 hrs. IMPRESSION:  
1. S/p PEA arrest post anigo yesterday pm. Had 6 min of CPR and then ROSC. 2. Acute hemoptysis- Noted to have bleeding from RLL on Bronch.  No endobronchial lesion. has lung mass( CA vs inflammatory pseudotumor) and microscopic hematuria; ANCA negative; JOSS barely positive. No longer anticoagulated but INR 1.8. Not getting better. Suspect LUNG mass the culprit but not sure if vessel present to embolize. Bronch will not help 3. Elevated procalcitonin- need to treat for possible lung abscess/pseudotumor 4. Lung mass RLL- right lung base- would not be visible on conventional bronch and yield from transbronchial biopsy not guaranteed; had no lesion on chest Ct Jan 2016 5. Coagulopathy from warfarin per ; INR down from 2.4 to 2.0, now normalized, will hold anticoagulation for now. 6. Acute kidney injury likely prerenal and related to medications (Lasix) Baseline creatinine is around 0.9 and currently creatinine is elevated at 1.2 down from 2.0 7. Hematuria Abnormal urinalysis 8. Sepsis due to suspected pneumonia Patient reports having exertional shortness of breath along with cough and hemoptysis; CT of chest shows severe emphysema with the right basilar airspace disease in the oval mass with fluid level concerning for internal hemorrhage versus pneumonia ;  
9. 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  
10. Anemia 11. Former 50+ pack yr smoker quit in 2015? 12. Asbestos exposure 13. History of atrial fibrillation on anticoagulation with warfarin 14. Hx of CAD s/p PCI in the past home amiodarone. Hold Cardizem 15. Dyslipidemia Body mass index is 31.53 kg/(m^2). 12. Multiorgan dysfunction as outlined above: Pt has one or more acute or chronic illnesses with severe exacerbation with progression or side effects of treatment that poses a threat to life or bodily function 17. Additional workup outlined below 18. Pt is requiring Drug therapy requiring intensive monitoring for toxicity 19.  Pt is unstable, unpredictable needing PCU monitoring; is critically ill and at high risk of sudden decline and decompensation with life threatening consequenses and continued end organ dysfunction and failure 20. Prognosis guarded with significant risk of sudden decline 21. Critically ill, 35 min cc, EOP. Discussed with nurse this am. High risk of decompensation. RECOMMENDATIONS/PLAN:  
1. If massive bleeding Would advance ET tube to Left mainstem bronchus. 2. Obtained Bronchial washing for Cx and Cytology this am.  
3. Hold anticoagulation for now. Place on SCDS. 4. May need bronch for recurrent bleeding. May be able to avoid bronch if cytology positive. The mass will not be visible by bronch; an option might be navigational bronch which we can help to arrange as outpt but would need to be off anticoagulation 5. Transfuse per Dr. Columba Serrano 6. Sputum culture 7. Sputum cytology repeated and pending 8. Pressors as needed to keep MAP over 65. On levophed drip at this point. 9. Empiric abx to treat possible lung abscess, changed to zosyn. 10. Follow cultures 11. Ongoing vent support for now. NOt ready for SAT, SBT due to moderate hemoptysis yesterday. 12. Labs to follow electrolytes, renal function and and blood counts 13. Glucose monitoring and SSI 14. Bronchial hygiene with respiratory therapy techniques, bronchodilators 15. DVT, SUP prophylaxis 16. Pt needs IV fluids with additives and Drug therapy requiring intensive monitoring for toxicity 17. Prescription drug management with home med reconciliation reviewed My assessment/management discussed with: Consultants, Nursing, Pharmacy, Case Management, PT, OT, Respiratory Therapy, Hospitalist and Family for coordination of care Pt's condition is acute and unstable requiring inpatient hospitalization.  This care involved high complexity decision making which includes independently reviewing the patient's past medical records, current laboratory results, medication profiles that were immediately available to me and actual Xray images at the bedside in order to assess, support vital system function, and to treat this degree of vital organ system failure, and to prevent further deterioration of the patients condition. Risk of deterioration: medium and high  
[x] High complexity decision making was performed [x] See my orders for details Tubes:    
 
Subjective/Initial History:  
 
I was asked by Vilma Triplett MD to see Sha Hernandez  a 80 y.o.  male in consultation for a chief complaint of hemoptysis and abnormal  
 
\"Ulysses BERMUDEZ is a 80 y.o.  male with hx Afib on Coumadin, CAD with prior PCI, AAA, HTN, and COPD on 2.5L via NC, who . .. was admitted via ER on presentation with 3d hx protracted coughing and hemoptysis, that later was associated with reported hematemesis. He noted during same time course that he experienced \"tightness\" in his abdomen that did not change with PO or defecation. He denied N, melena, BRBPR, hematochezia, or dysphagia. He is not on ASA or NSAIDs as OP. His wife reported that his stools were darker. He denies CP, increased SOB. He has not had any hematemesis here, but continued to cough and demonstrate hemoptysis here in ER. ER eval demonstrated patchy airspace disease in the right lung base with an associated hyperdense RLL 5.2 x 4.3 cm oval lesion, but no acute abdominal or pelvic process. WBC 20.6, BUN Cr 33/2.0, INR 2.4. Initial Hgb was 11.1, but when rechecked 8hrs later was noted to be 7.9. He had undergone EGD 2015 confirming gastritis without h.pylori infection. Last colonoscopy 2007 with internal hemorrhoids and sigmoid diverticulosis. Severe emphysema with right basilar airspace disease and an oval 5.5 x 4.5 cm lesion with fluid fluid level concerning for internal hemorrhage.  \" 
 
Pt seen by Dr. Edinson Parra who feels that source of bleeding is not Gi tract. I reviewed office notes where he was last seen this year by our NP. I have not seen pt since 2016. He was noted to have ABNORMAL CHEST CT (R93.8)   Jan 2015 scattered perihilar adenopathy does not explain his other sx. 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 Pt just quit smoking after 50+ years Jan 2016 CT scan with emphysma. no lung mass 12/07/15: CTA impression: No pulmonary emboli. Mild ILD, similar to prior study. New irregular 10.6mm RLL lung nodule for f/u in 3 months. This could be infectious or inflammatory in nature, or potentially a small malignancy. [facility] Chest CT reviewed Jan 2016 no mass. Emphysema with mild pulmonary HTN. Pt has very severe COPD who quit smoking since Feb 2015, started age 16. Tried Albuterol or Combivent on and off for years but really only prn per PCP. Cost has been extravagant, especially Spiriva which he used on and off for three. June 2015 FEV1 1.16 to 1.4 liters (<50%). Pt has had no dysphagia, choking. No pneumonia. No chest pain. Weight stable. No sign or sx of chest infection. Claims he just completed course of abx couple weeks ago from our office but we have no record of a prescription order. Denied gross hematuria. No Known Allergies MAR reviewed and pertinent medications noted or modified as needed Current Facility-Administered Medications Medication  chlorhexidine (PERIDEX) 0.12 % mouthwash 15 mL  mupirocin (BACTROBAN) 2 % ointment  pantoprazole (PROTONIX) 40 mg in sodium chloride 0.9% 10 mL injection  piperacillin-tazobactam (ZOSYN) 4.5 g in 0.9% sodium chloride (MBP/ADV) 100 mL  NOREPINephrine (LEVOPHED) 8 mg in 5% dextrose 250mL infusion  propofol (DIPRIVAN) infusion  albuterol (PROVENTIL VENTOLIN) nebulizer solution 2.5 mg  
 fentaNYL citrate (PF) injection 25 mcg  
 0.9% sodium chloride infusion  
 0.9% sodium chloride infusion 250 mL  albuterol (PROVENTIL HFA, VENTOLIN HFA, PROAIR HFA) inhaler 2 Puff  dorzolamide-timolol (COSOPT) 22.3-6.8 mg/mL ophthalmic solution 1 Drop  latanoprost (XALATAN) 0.005 % ophthalmic solution 1 Drop  pravastatin (PRAVACHOL) tablet 40 mg  
 sodium chloride (NS) flush 5-10 mL  sodium chloride (NS) flush 5-10 mL  acetaminophen (TYLENOL) tablet 650 mg  
 ondansetron (ZOFRAN) injection 4 mg  docusate sodium (COLACE) capsule 100 mg Patient PCP: PROVIDER UNKNOWN 
PMH:  has a past medical history of Aneurysm ([facility] [address] Arrhythmia; Hypertension; Other ill-defined conditions(799.89); Other ill-defined conditions(799.89); Other ill-defined conditions(799.89); and Other ill-defined conditions(799.89). He also has no past medical history of Difficult intubation; Malignant hyperthermia due to anesthesia; Nausea & vomiting; Pseudocholinesterase deficiency; or Unspecified adverse effect of anesthesia. PSH:   has a past surgical history that includes hx orthopaedic; hx other surgical; pr abdomen surgery proc unlisted (13); hx heent; and upper gi endoscopy,biopsy (4/10/2015). FHX: family history includes Cancer in his father and mother. SHX:  reports that he has quit smoking. He has a 10.00 pack-year smoking history. He has never used smokeless tobacco. He reports that he drinks alcohol. He reports that he uses illicit drugs, including Prescription and OTC. 
  
ROS:A comprehensive review of systems was negative except for that written in the HPI. Objective: 
 
Vital Signs: Telemetry:    normal sinus rhythmIntake/Output:  
Visit Vitals  /52  Pulse 96  Temp 99 °F (37.2 °C)  Resp 20  
 Ht 6' 2\" (1.88 m)  Wt 111.4 kg (245 lb 9.5 oz)  SpO2 100%  BMI 31.53 kg/m2 Temp (24hrs), Av °F (37.2 °C), Min:98.3 °F (36.8 °C), Max:99.7 °F (37.6 °C) O2 Device: Ventilator O2 Flow Rate (L/min): 5 l/min Wt Readings from Last 4 Encounters: 09/16/18 111.4 kg (245 lb 9.5 oz) 05/05/16 111.1 kg (245 lb)  
03/25/16 106.6 kg (235 lb) 04/08/15 103.4 kg (228 lb) Intake/Output Summary (Last 24 hours) at 09/16/18 1111 Last data filed at 09/16/18 1022 Gross per 24 hour Intake          1935.52 ml Output             1510 ml Net           425.52 ml Last shift:      09/16 0701 - 09/16 1900 In: 61 Out: 475 [Urine:475] Last 3 shifts: 09/14 1901 - 09/16 0700 In: 4408.8 [I.V.:4069.2] Out: 1905 [DLPMO:[id number]] Physical Exam:  
 General:   male; with Et tube in place. Has right IJ CVC. On sedation and on vent. Has moderate blood in Et Tube when seen earlier. HEAD: Normocephalic, without obvious abnormality, atraumatic EYES: conjunctivae clear. PERRL,  AN Icteric sclerae NOSE: nares normal, no drainage, no nasal flaring, THROAT: normal; Lips, mucosa dry; No Thrush;  crowded airway; tongue midline Neck: Supple, symmetrical, trachea midline,  No accessory mm use; No Stridor/ cuff leak, No goiter or thyroid tenderness LYMPH: No abnormally enlarged lymph nodes. in neck or groin Chest: increased AP diameter Lungs: agonal, bilateral rhonchi. Seems to have good air  Movement Bilaterally. Heart: Regular rate and rhythm; NO edema Abdomen: soft, non-tender, without masses or organomegaly, protuberant, distended : no [place] Musculoskeletal: mild kyphosis; No spine or CVA tenderness; negative, cyanosis, clubbing; no joint swelling or erythema Neuro: was not  Responsive, had a gag with ET tube suctioning. , Sedated not able to fully assess. Psych: NOt able to assess due to being on sedation and on vent. Skin: Pallor;  
 Pulses:Bilateral, Radial, 2+ Capillary refill: abnormal: ; sluggish capillary refill, pale, 
 
Data:  
 
All lab results for the last 24 hours reviewed. Labs: 
 
Recent Labs  
   09/16/18 
 0412  09/15/18 
 1908  09/15/18 
 1320   09/15/18 
 0059  09/14/18 99 [id number]   09/14/18 
 0028 WBC  9.8   --    --    --   12.1*  12.3*   --    --   
HGB  7.2*  7.5*  7.5*   < >  7.3*  6.9*   < >  7.1*  
PLT  128*   --    --    --   111*  133*   --    --   
INR  1.3*   --    --    --   1.4*  1.6*   --   1.8* APTT  30.5   --    --    --   33.4*   --    --   36.2*  
 < > = values in this interval not displayed. Recent Labs  
   09/16/18 
 0412  09/15/18 
 0059  09/14/18 
 1653 NA  140  137  135* K  4.6  5.0  4.4 CL  109*  106  104 CO2  25  26  21 GLU  120*  119*  193* BUN  16  19  19 CREA  1.16  1.28  1.41* CA  8.1*  8.0*  7.6*  
MG  2.5*  2.6*  2.6* PHOS  2.3*  2.7  4.2 LAC   --   0.9  2.9* ALB  2.3*  2.4*  2.5* SGOT  23  49*  49* ALT  25  33  35 No results for input(s): PH, PCO2, PO2, HCO3, FIO2 in the last 72 hours. No results for input(s): CPK, CKNDX, TROIQ in the last 72 hours. No lab exists for component: CPKMB No results found for: BNPP, BNP Lab Results Component Value Date/Time Culture result: PENDING 09/15/2018 01:20 PM  
 Culture result: LIGHT NORMAL RESPIRATORY SHELDON 09/12/2018 03:50 PM  
 Culture result: MODERATE NORMAL RESPIRATORY SHELDON 09/12/2018 11:21 AM  
 Culture result: SCANT YEAST, (APPARENT CANDIDA ALBICANS) (A) 09/12/2018 11:21 AM  
No results found for: TSH, TSHEXT, TSHEXT Imaging: 
 
   
 
 
Results from Hospital Encounter encounter on 09/11/18 XR CHEST PORT Narrative INDICATION:    interval changes. EXAMINATION:  AP CHEST, PORTABLE 
 
COMPARISON: 9/15/2018 FINDINGS: Single AP portable view of the chest at 436 hours demonstrates no 
change in position of the lines and tubes. The cardiomediastinal silhouette is 
stable. There is no new airspace disease. There are chronic bibasilar opacities 
with chronic cardiomegaly. Bilateral pleural effusions are suspected. Impression IMPRESSION:  
No significant change. Results from Hospital Encounter encounter on 09/11/18 CT CHEST WO CONT Narrative INDICATION: hemoptysis, eval abscess vs hemorrhage COMPARISON: None TECHNIQUE:  Noncontrast 5 mm axial images were obtained through the chest. CT 
dose reduction was achieved through use of a standardized protocol tailored for 
this examination and automatic exposure control for dose modulation. FINDINGS: 
 
THYROID: Unremarkable. MEDIASTINUM/TRAVIS: Small calcified subcarinal lymph nodes. HEART/PERICARDIUM: Coronary atherosclerosis. Pacemaker. No pericardial effusion. Not significantly enlarged. LUNGS/PLEURA: Severe emphysema. Patchy right basilar airspace disease with a 5.5 
x 4.5 cm oval lesion in the right lower lobe with fluid fluid level. No internal 
gas. No significant pleural effusion. No pneumothorax. INCIDENTALLY IMAGED UPPER ABDOMEN: No significant abnormality. BONES: No destructive bone lesion. ADDITIONAL COMMENTS:  N/A. Impression IMPRESSION: 
Severe emphysema with right basilar airspace disease and an oval 5.5 x 4.5 cm 
lesion with fluid fluid level concerning for internal hemorrhage. No internal 
gas. Differential diagnosis includes an infectious process including abscess, 
although neoplasm with internal hemorrhage also a possibility. Post intubation: 9-14-18: CXR was reviewed Had bilateral infiltrates. ET tube in position. CXR: 9-15-18:  
Frontal chest radiograph submitted for review.  
  
Endotracheal tube is in stable position. Stable right-sided central line and 
partially visualized feeding tube. Right chest pacing device is again seen. Stable heart size. Unchanged interstitial edema and right basilar airspace 
disease. No definite new lung abnormality. Bilateral small effusions. No 
Pneumothorax. ET tube ok position.  
  
IMPRESSION: 
  
Overall stable exam  
 
9-16-18: CXR:  
COMPARISON: 9/15/2018 
  
FINDINGS: Single AP portable view of the chest at 436 hours demonstrates no 
change in position of the lines and tubes.  The cardiomediastinal silhouette is 
stable. There is no new airspace disease. There are chronic bibasilar opacities 
with chronic cardiomegaly. Bilateral pleural effusions are suspected. 
  
IMPRESSION:  
No significant change. I have personally and independently reviewed the patients interval and diagnostic data, radiographs and have reviewed the reports. Medical Decision Making Today: · Reviewed the flowsheet and previous days notes · Reviewed and summarized records or history from previous days note or discussions with staff, family · Parenteral controlled substances - Reviewed/ Adjusted / Weaned / Started · High Risk Drug therapy requiring intensive monitoring for toxicity: eg steroids, pressors, antibiotics · Reviewed and/or ordered Clinical lab tests · Reviewed and/or ordered Radiology tests · Reviewed and/or ordered of Medicine tests · Independently visualized radiologic Images · I have personally reviewed the patients ECG / Telemetry Siva Garsia MD

## 2018-09-16 NOTE — PROCEDURES
[facility]  Pulmonary, Critical Care, and Sleep Medicine    Name: Bravo Cool MRN: [id number]   : 1934 Hospital: [address]   Date: 2018        Bronchoscopy Report    Procedure: Diagnostic bronchoscopy. Indication: Hemoptysis    Consent/Treatment: Informed consent was obtained from the  family after risks, benefits and alternatives were explained. Timeout verified the correct patient and correct procedure. Anesthesia:   Patient on ventilator and receiving  Propofol drip 30 mcg, lidocaine down ET tube. Pt was on vent. Hemodynamic and oxygen monitoring in place. Procedure Details:   -- The bronchoscope was introduced through an endotracheal tube. -- The vocal cords not seen. .  -- The trachea and susy were completely inspected and noted to have moderate blood present. .  -- The right-sided endobronchial anatomy was completely inspected and noted to be anatomically normal. Did have ongoing blood from the RLL. NO endobronchial lesions. Performed bronchial washing in multiple segment bleeding seem to decrease. But was still present. -- The left-sided endobronchial anatomy was completely inspected and were found to be normal.     Specimens: Will sent bronchial washing from RLL for Culture and Cytology.        Complications: none    Estimated Blood Loss: less than 50     Alison Adams MD

## 2018-09-16 NOTE — PROGRESS NOTES
1144 
Bronch completed, tolerated well. Propofol and Lidocaine used during procedure. Collected bronchial washing from 2 bottles, Bottle #2 Obtained from RLL sent for cytology. Specimens sent to lab via Gilberto Guzman, [employer].

## 2018-09-16 NOTE — PROGRESS NOTES
Problem: Falls - Risk of 
Goal: *Absence of Falls Document Prisca Pierre Fall Risk and appropriate interventions in the flowsheet. Outcome: Progressing Towards Goal 
Fall Risk Interventions: 
Mobility Interventions: Assess mobility with egress test 
 
Mentation Interventions: Adequate sleep, hydration, pain control, Door open when patient unattended, Evaluate medications/consider consulting pharmacy, More frequent rounding, Reorient patient Medication Interventions: Assess postural VS orthostatic hypotension, Bed/chair exit alarm, Evaluate medications/consider consulting pharmacy Elimination Interventions: Call light in reach, Bed/chair exit alarm, Toileting schedule/hourly rounds History of Falls Interventions: Bed/chair exit alarm, Door open when patient unattended, Investigate reason for fall, Evaluate medications/consider consulting pharmacy, Consult care management for discharge planning

## 2018-09-16 NOTE — PROGRESS NOTES
Hospitalist Progress Note NAME: Mack Torres  
:  1934 MRN:  [id number] Assessment / Plan: PEA arrest  Code blue called . Patient w/ agonal breathing after coming back from radiology after arteriogram completed. No pulse detected. CPR initiated. Epi x 2 given. Pulse obtained. Intubated by Dr. Teresa Luna. Much bloody secretions obtained. Transferred to ICU. Family was updated at time of code. Acute upper GI bleed(hematemesis) -may be due to hemoptysis since patient had been coughing as well. Patient admitted to taking total 800 mg/day Ibuprofen last few weeks for pain on left side. -GI consulted. It was felt that dark stool may be due swallowed blood. No EGD planned. -PPI 
 
-continue to hold anticoagulation. Follow H&H. Sepsis due to suspected PNA w/ possible pulmonary hemorrhage 
-CT of chest shows severe emphysema with the right basilar airspace disease in the oval mass with fluid level concerning for internal hemorrhage versus pneumonia. -WBC elevated on admission. Now improved. -follow cultures 
-cont abx 
-Cytology ordered. Pending. Hemoptysis 
-unknown etiology. -work up for possible rheumatologic cause neg thus far: 
ANCA, anti-GBM, MPO ab, CT-3 ab, SSA/SSB, RNP ab, Arredondo/RNP, dsDNA, and Arredondo ab all negative. JOSS +. 
 
-Arteriogram : Thoracic aortic and intercostal arteriograms as described above demonstrating no 
enlarged bronchial artery supplying the right lower lobe of the lung. A normal 
appearing and normal sized right bronchial artery is seen without any 
hyperplasia or dominant right lower lobe supply. No embolization was performed. RLL mass - unable to determine if hemorrhage 
-Dr. Luba Dias considered Wegener's as possible cause since patient w/ hemoptysis and hematuria. However, work up neg thus far. -diagnostic bronch done . No endobronchial lesions seen.  
 
Acute blood loss anemia on admission 
-Hgb 11 on admission.   
-serial H&H 
 
 H/o atrial fibrillation on chronic anticoagulation w/ coumadin 
-cont amiodarone 
-cardizem on hold due to borderline low bp 
-hold coumadin 
 
Coumadin-induced coagulopathy 
-INR remains elevated. Holding coumadin. ALAN - likely prerenal related to Lasix 
-follow renal function UTI, acute cystitis w/ hematuria, POA 
-cont abx; follow urine cx. COPD - stable HLD 
-cont pravachol Obesity - BMI 32 Plan: As above. Transfuse for Hgb < 7 Levophed for pressor support Body mass index is 31.53 kg/(m^2). Code status: Full Prophylaxis: SCD's Recommended Disposition: Home w/Family Subjective: Chief Complaint / Reason for Physician Visit Follow up for hemoptysis. Discussed with RN events overnight. D/w nurse. Patient opens eyes when sedation decreased. Review of Systems: 
Symptom Y/N Comments  Symptom Y/N Comments Fever/Chills    Chest Pain Poor Appetite    Edema Cough    Abdominal Pain Sputum    Joint Pain SOB/ROY    Pruritis/Rash Nausea/vomit    Tolerating PT/OT Diarrhea    Tolerating Diet Constipation    Other Could NOT obtain due to: sedated Objective: VITALS:  
Last 24hrs VS reviewed since prior progress note. Most recent are: 
Patient Vitals for the past 24 hrs: 
 Temp Pulse Resp BP SpO2  
09/16/18 1326 - - - - 100 % 09/16/18 1130 - 94 23 113/52 100 % 09/16/18 1110 99.1 °F (37.3 °C) 96 20 122/52 100 % 09/16/18 1109 - 96 17 - 100 % 09/16/18 1048 99 °F (37.2 °C) - - 113/51 -  
09/16/18 1042 - 90 17 - 100 % 09/16/18 1015 - 96 19 128/56 100 % 09/16/18 1000 - 94 19 126/58 100 % 09/16/18 0945 - 95 21 117/59 92 % 09/16/18 0930 - 88 24 111/48 91 % 09/16/18 0915 - 97 20 123/58 91 % 09/16/18 0900 - 96 18 126/59 92 % 09/16/18 0845 - 93 17 116/55 91 % 09/16/18 0830 - 93 17 108/52 92 % 09/16/18 0815 - 92 17 114/44 92 % 09/16/18 0800 99 °F (37.2 °C) 97 16 116/57 97 % 09/16/18 0745 - 92 15 124/55 99 % 09/16/18 0738 - 91 19 - 99 % 09/16/18 0730 - 98 16 126/57 99 % 09/16/18 0715 - 96 20 122/51 99 % 09/16/18 0700 - (!) 102 24 127/53 98 % 09/16/18 0600 - 89 18 116/53 98 % 09/16/18 0530 - 100 17 123/63 99 % 09/16/18 0500 - 98 18 114/54 100 % 09/16/18 0430 - 90 16 115/58 99 % 09/16/18 0400 98.4 °F (36.9 °C) 87 18 114/46 99 % 09/16/18 0330 - 88 18 112/56 99 % 09/16/18 0322 - 84 18 - 99 % 09/16/18 0100 - 93 16 114/48 100 % 09/16/18 0000 99.1 °F (37.3 °C) 95 18 113/56 100 % 09/15/18 2330 - 87 16 116/54 100 % 09/15/18 2323 - 94 18 - 100 % 09/15/18 2200 - 86 15 114/50 100 % 09/15/18 2130 - 89 16 118/52 100 % 09/15/18 2100 - 87 16 118/60 100 % 09/15/18 2000 99.3 °F (37.4 °C) 94 15 106/50 100 % 09/15/18 1935 - (!) 101 18 - 100 % 09/15/18 1900 - (!) 105 17 112/55 100 % 09/15/18 1845 - (!) 105 15 118/63 100 % 09/15/18 1830 - (!) 102 17 117/53 100 % 09/15/18 1815 - (!) 110 15 123/61 100 % 09/15/18 1800 98.3 °F (36.8 °C) (!) 105 16 130/57 100 % 09/15/18 1745 - 94 17 119/55 100 % 09/15/18 1730 - (!) 103 18 121/57 100 % 09/15/18 1715 - (!) 107 18 119/57 100 % 09/15/18 1700 - 94 18 118/51 100 % 09/15/18 1645 - 100 19 117/52 100 % 09/15/18 1630 - 87 17 124/66 99 % 09/15/18 1615 - (!) 116 13 112/47 100 % 09/15/18 1600 99.7 °F (37.6 °C) 89 17 110/52 99 % 09/15/18 1545 - (!) 117 15 105/53 100 % 09/15/18 1530 - (!) 118 24 131/62 100 % 09/15/18 1515 - - - - 100 % 09/15/18 1440 - 99 16 - 100 % Intake/Output Summary (Last 24 hours) at 09/16/18 1403 Last data filed at 09/16/18 1022 Gross per 24 hour Intake          1935.52 ml Output             1385 ml Net           550.52 ml PHYSICAL EXAM: 
General: WD, WN. Intubated. No distress. EENT:   Anicteric sclerae. MMM;  ET tube in place. Resp:  Decreased breath sounds. Clear anteriorly. No accessory muscle use CV:  Regular  rhythm,  1+ bilateral leg edema GI:  Soft, Non distended, Non tender.  +Bowel sounds Neurologic:  Sedated. No posturing. Psych:   No agitation Skin:  No rashes. No jaundice Reviewed most current lab test results and cultures  YES Reviewed most current radiology test results   YES Review and summation of old records today    NO Reviewed patient's current orders and MAR    YES 
PMH/SH reviewed - no change compared to H&P 
________________________________________________________________________ Care Plan discussed with: 
  Comments Patient Family RN x Care Manager Consultant Multidiciplinary team rounds were held today with , nursing, pharmacist and clinical coordinator. Patient's plan of care was discussed; medications were reviewed and discharge planning was addressed. ________________________________________________________________________ Total NON critical care TIME:  30  Minutes Total CRITICAL CARE TIME Spent:   Minutes non procedure based Comments >50% of visit spent in counseling and coordination of care    
________________________________________________________________________ Jun Mao MD  
 
Procedures: see electronic medical records for all procedures/Xrays and details which were not copied into this note but were reviewed prior to creation of Plan. LABS: 
I reviewed today's most current labs and imaging studies. Pertinent labs include: 
Recent Labs  
   09/16/18 
 0412  09/15/18 
 1908  09/15/18 
 1320   09/15/18 
 0059  09/14/18 
 1653 WBC  9.8   --    --    --   12.1*  12.3* HGB  7.2*  7.5*  7.5*   < >  7.3*  6.9*  
HCT  22.8*   --    --    --   23.6*  22.4*  
PLT  128*   --    --    --   111*  133*  
 < > = values in this interval not displayed. Recent Labs  
   09/16/18 
 0412  09/15/18 
 0059  09/14/18 
 1653 NA  140  137  135* K  4.6  5.0  4.4 CL  109*  106  104 CO2  25  26  21 GLU  120*  119*  193* BUN  16  19  19  
 CREA  1.16  1.28  1.41* CA  8.1*  8.0*  7.6*  
MG  2.5*  2.6*  2.6* PHOS  2.3*  2.7  4.2 ALB  2.3*  2.4*  2.5* TBILI  0.4  0.4  0.5 SGOT  23  49*  49* ALT  25  33  35 INR  1.3*  1.4*  1.6* Signed: Krista Montejo MD

## 2018-09-17 ENCOUNTER — APPOINTMENT (OUTPATIENT)
Dept: GENERAL RADIOLOGY | Age: 83
DRG: 003 | End: 2018-09-17
Attending: INTERNAL MEDICINE
Payer: MEDICARE

## 2018-09-17 ENCOUNTER — APPOINTMENT (OUTPATIENT)
Dept: CT IMAGING | Age: 83
DRG: 003 | End: 2018-09-17
Attending: INTERNAL MEDICINE
Payer: MEDICARE

## 2018-09-17 ENCOUNTER — APPOINTMENT (OUTPATIENT)
Dept: INTERVENTIONAL RADIOLOGY/VASCULAR | Age: 83
DRG: 003 | End: 2018-09-17
Attending: INTERNAL MEDICINE
Payer: MEDICARE

## 2018-09-17 LAB
ALBUMIN SERPL-MCNC: 2.2 G/DL (ref 3.5–5)
ALBUMIN/GLOB SERPL: 0.5 {RATIO} (ref 1.1–2.2)
ALP SERPL-CCNC: 64 U/L (ref 45–117)
ALT SERPL-CCNC: 21 U/L (ref 12–78)
ANION GAP SERPL CALC-SCNC: 6 MMOL/L (ref 5–15)
APTT PPP: 28.6 SEC (ref 22.1–32)
ARTERIAL PATENCY WRIST A: ABNORMAL
AST SERPL-CCNC: 19 U/L (ref 15–37)
ATRIAL RATE: 68 BPM
BACTERIA SPEC CULT: NORMAL
BASE DEFICIT BLDA-SCNC: 1.6 MMOL/L
BASOPHILS # BLD: 0 K/UL (ref 0–0.1)
BASOPHILS NFR BLD: 0 % (ref 0–1)
BDY SITE: ABNORMAL
BILIRUB SERPL-MCNC: 0.5 MG/DL (ref 0.2–1)
BUN SERPL-MCNC: 15 MG/DL (ref 6–20)
BUN/CREAT SERPL: 15 (ref 12–20)
CALCIUM SERPL-MCNC: 8 MG/DL (ref 8.5–10.1)
CALCULATED R AXIS, ECG10: 46 DEGREES
CALCULATED T AXIS, ECG11: 86 DEGREES
CHLORIDE SERPL-SCNC: 110 MMOL/L (ref 97–108)
CO2 SERPL-SCNC: 27 MMOL/L (ref 21–32)
CREAT SERPL-MCNC: 1.02 MG/DL (ref 0.7–1.3)
DIAGNOSIS, 93000: NORMAL
DIFFERENTIAL METHOD BLD: ABNORMAL
EOSINOPHIL # BLD: 0 K/UL (ref 0–0.4)
EOSINOPHIL NFR BLD: 0 % (ref 0–7)
EPAP/CPAP/PEEP, PAPEEP: 6
ERYTHROCYTE [DISTWIDTH] IN BLOOD BY AUTOMATED COUNT: 18.4 % (ref 11.5–14.5)
FIBRINOGEN PPP-MCNC: 724 MG/DL (ref 200–475)
FIO2 ON VENT: 60 %
GAS FLOW.O2 SETTING OXYMISER: 14 L/MIN
GLOBULIN SER CALC-MCNC: 4.2 G/DL (ref 2–4)
GLUCOSE SERPL-MCNC: 128 MG/DL (ref 65–100)
GRAM STN SPEC: NORMAL
HCO3 BLDA-SCNC: 23 MMOL/L (ref 22–26)
HCT VFR BLD AUTO: 22.6 % (ref 36.6–50.3)
HGB BLD-MCNC: 7.1 G/DL (ref 12.1–17)
IMM GRANULOCYTES # BLD: 0.1 K/UL (ref 0–0.04)
IMM GRANULOCYTES NFR BLD AUTO: 1 % (ref 0–0.5)
INR PPP: 1.2 (ref 0.9–1.1)
LYMPHOCYTES # BLD: 0.7 K/UL (ref 0.8–3.5)
LYMPHOCYTES NFR BLD: 6 % (ref 12–49)
MAGNESIUM SERPL-MCNC: 2.4 MG/DL (ref 1.6–2.4)
MCH RBC QN AUTO: 34.6 PG (ref 26–34)
MCHC RBC AUTO-ENTMCNC: 31.4 G/DL (ref 30–36.5)
MCV RBC AUTO: 110.2 FL (ref 80–99)
MONOCYTES # BLD: 1.4 K/UL (ref 0–1)
MONOCYTES NFR BLD: 13 % (ref 5–13)
NEUTS SEG # BLD: 8.9 K/UL (ref 1.8–8)
NEUTS SEG NFR BLD: 80 % (ref 32–75)
NRBC # BLD: 0 K/UL (ref 0–0.01)
NRBC BLD-RTO: 0 PER 100 WBC
PCO2 BLDA: 39 MMHG (ref 35–45)
PH BLDA: 7.39 [PH] (ref 7.35–7.45)
PHOSPHATE SERPL-MCNC: 2 MG/DL (ref 2.6–4.7)
PLATELET # BLD AUTO: 143 K/UL (ref 150–400)
PMV BLD AUTO: 11.5 FL (ref 8.9–12.9)
PO2 BLDA: 72 MMHG (ref 80–100)
POTASSIUM SERPL-SCNC: 4.1 MMOL/L (ref 3.5–5.1)
PROT SERPL-MCNC: 6.4 G/DL (ref 6.4–8.2)
PROTHROMBIN TIME: 11.9 SEC (ref 9–11.1)
Q-T INTERVAL, ECG07: 414 MS
QRS DURATION, ECG06: 104 MS
QTC CALCULATION (BEZET), ECG08: 437 MS
RBC # BLD AUTO: 2.05 M/UL (ref 4.1–5.7)
RBC MORPH BLD: ABNORMAL
SAO2 % BLD: 94 % (ref 92–97)
SAO2% DEVICE SAO2% SENSOR NAME: ABNORMAL
SERVICE CMNT-IMP: NORMAL
SODIUM SERPL-SCNC: 143 MMOL/L (ref 136–145)
SPECIMEN SITE: ABNORMAL
THERAPEUTIC RANGE,PTTT: NORMAL SECS (ref 58–77)
VENTILATION MODE VENT: ABNORMAL
VENTRICULAR RATE, ECG03: 67 BPM
VT SETTING VENT: 500 ML
WBC # BLD AUTO: 11.1 K/UL (ref 4.1–11.1)

## 2018-09-17 PROCEDURE — C1894 INTRO/SHEATH, NON-LASER: HCPCS

## 2018-09-17 PROCEDURE — C1769 GUIDE WIRE: HCPCS

## 2018-09-17 PROCEDURE — 74011250636 HC RX REV CODE- 250/636: Performed by: INTERNAL MEDICINE

## 2018-09-17 PROCEDURE — 74011000250 HC RX REV CODE- 250: Performed by: INTERNAL MEDICINE

## 2018-09-17 PROCEDURE — 77030007163 HC COIL EMB AZUR TERU -G

## 2018-09-17 PROCEDURE — 37244 VASC EMBOLIZE/OCCLUDE BLEED: CPT

## 2018-09-17 PROCEDURE — 77030021533 HC CATH ANGI DX PRFMA MRTM -A

## 2018-09-17 PROCEDURE — 77030033508 HC COIL EMB AZUR CX TERU -G

## 2018-09-17 PROCEDURE — 80053 COMPREHEN METABOLIC PANEL: CPT | Performed by: INTERNAL MEDICINE

## 2018-09-17 PROCEDURE — 77030007178 HC COIL EMB NEST COOK -B

## 2018-09-17 PROCEDURE — C1887 CATHETER, GUIDING: HCPCS

## 2018-09-17 PROCEDURE — 94003 VENT MGMT INPAT SUBQ DAY: CPT

## 2018-09-17 PROCEDURE — B31S1ZZ FLUOROSCOPY OF RIGHT PULMONARY ARTERY USING LOW OSMOLAR CONTRAST: ICD-10-PCS | Performed by: RADIOLOGY

## 2018-09-17 PROCEDURE — 74011000258 HC RX REV CODE- 258: Performed by: INTERNAL MEDICINE

## 2018-09-17 PROCEDURE — C1892 INTRO/SHEATH,FIXED,PEEL-AWAY: HCPCS

## 2018-09-17 PROCEDURE — 71045 X-RAY EXAM CHEST 1 VIEW: CPT

## 2018-09-17 PROCEDURE — 84100 ASSAY OF PHOSPHORUS: CPT | Performed by: INTERNAL MEDICINE

## 2018-09-17 PROCEDURE — 36014 PLACE CATHETER IN ARTERY: CPT

## 2018-09-17 PROCEDURE — 85610 PROTHROMBIN TIME: CPT | Performed by: INTERNAL MEDICINE

## 2018-09-17 PROCEDURE — 77030020268 HC MISC GENERAL SUPPLY

## 2018-09-17 PROCEDURE — 75741 ARTERY X-RAYS LUNG: CPT

## 2018-09-17 PROCEDURE — 94640 AIRWAY INHALATION TREATMENT: CPT

## 2018-09-17 PROCEDURE — 85384 FIBRINOGEN ACTIVITY: CPT | Performed by: INTERNAL MEDICINE

## 2018-09-17 PROCEDURE — 82803 BLOOD GASES ANY COMBINATION: CPT | Performed by: INTERNAL MEDICINE

## 2018-09-17 PROCEDURE — C9113 INJ PANTOPRAZOLE SODIUM, VIA: HCPCS | Performed by: INTERNAL MEDICINE

## 2018-09-17 PROCEDURE — 36415 COLL VENOUS BLD VENIPUNCTURE: CPT | Performed by: INTERNAL MEDICINE

## 2018-09-17 PROCEDURE — 71275 CT ANGIOGRAPHY CHEST: CPT

## 2018-09-17 PROCEDURE — 02LQ3DZ OCCLUSION OF RIGHT PULMONARY ARTERY WITH INTRALUMINAL DEVICE, PERCUTANEOUS APPROACH: ICD-10-PCS | Performed by: RADIOLOGY

## 2018-09-17 PROCEDURE — 85025 COMPLETE CBC W/AUTO DIFF WBC: CPT | Performed by: INTERNAL MEDICINE

## 2018-09-17 PROCEDURE — 74011636320 HC RX REV CODE- 636/320: Performed by: RADIOLOGY

## 2018-09-17 PROCEDURE — 77030009378 HC DEV TORQ OLCT F/GWIRE MANIP COOK -A

## 2018-09-17 PROCEDURE — 86923 COMPATIBILITY TEST ELECTRIC: CPT | Performed by: INTERNAL MEDICINE

## 2018-09-17 PROCEDURE — 36600 WITHDRAWAL OF ARTERIAL BLOOD: CPT | Performed by: INTERNAL MEDICINE

## 2018-09-17 PROCEDURE — 86900 BLOOD TYPING SEROLOGIC ABO: CPT | Performed by: INTERNAL MEDICINE

## 2018-09-17 PROCEDURE — 77030007181 HC COIL EMB TORN COOK -B

## 2018-09-17 PROCEDURE — 77030014021 HC SYR ANGI FIX LOK MRTM -A

## 2018-09-17 PROCEDURE — 65620000000 HC RM CCU GENERAL

## 2018-09-17 PROCEDURE — 85730 THROMBOPLASTIN TIME PARTIAL: CPT | Performed by: INTERNAL MEDICINE

## 2018-09-17 PROCEDURE — 77030019698 HC SYR ANGI MDLON MRTM -A

## 2018-09-17 PROCEDURE — 77030014109 HC TRNSDUC SP PROC MRTM -B

## 2018-09-17 PROCEDURE — 77030004822 HC CATH COIL PUSH BSC -C

## 2018-09-17 PROCEDURE — 74011636320 HC RX REV CODE- 636/320: Performed by: INTERNAL MEDICINE

## 2018-09-17 PROCEDURE — 83735 ASSAY OF MAGNESIUM: CPT | Performed by: INTERNAL MEDICINE

## 2018-09-17 PROCEDURE — 77030010324 HC TBNG CNTRST MRTM -A

## 2018-09-17 PROCEDURE — 74011250636 HC RX REV CODE- 250/636: Performed by: RADIOLOGY

## 2018-09-17 RX ORDER — SODIUM CHLORIDE 0.9 % (FLUSH) 0.9 %
10 SYRINGE (ML) INJECTION
Status: COMPLETED | OUTPATIENT
Start: 2018-09-17 | End: 2018-09-17

## 2018-09-17 RX ORDER — LIDOCAINE HYDROCHLORIDE 20 MG/ML
50 INJECTION, SOLUTION INFILTRATION; PERINEURAL ONCE
Status: COMPLETED | OUTPATIENT
Start: 2018-09-17 | End: 2018-09-17

## 2018-09-17 RX ORDER — HEPARIN SODIUM 200 [USP'U]/100ML
800 INJECTION, SOLUTION INTRAVENOUS ONCE
Status: COMPLETED | OUTPATIENT
Start: 2018-09-17 | End: 2018-09-17

## 2018-09-17 RX ORDER — SODIUM CHLORIDE 9 MG/ML
50 INJECTION, SOLUTION INTRAVENOUS
Status: COMPLETED | OUTPATIENT
Start: 2018-09-17 | End: 2018-09-20

## 2018-09-17 RX ADMIN — ALBUTEROL SULFATE 2.5 MG: 2.5 SOLUTION RESPIRATORY (INHALATION) at 00:01

## 2018-09-17 RX ADMIN — MUPIROCIN: 20 OINTMENT TOPICAL at 12:39

## 2018-09-17 RX ADMIN — PROPOFOL 30 MCG/KG/MIN: 10 INJECTION, EMULSION INTRAVENOUS at 00:44

## 2018-09-17 RX ADMIN — PIPERACILLIN SODIUM,TAZOBACTAM SODIUM 4.5 G: 4; .5 INJECTION, POWDER, FOR SOLUTION INTRAVENOUS at 09:10

## 2018-09-17 RX ADMIN — ALBUTEROL SULFATE 2.5 MG: 2.5 SOLUTION RESPIRATORY (INHALATION) at 07:34

## 2018-09-17 RX ADMIN — PROPOFOL 30 MCG/KG/MIN: 10 INJECTION, EMULSION INTRAVENOUS at 20:02

## 2018-09-17 RX ADMIN — ALBUTEROL SULFATE 2.5 MG: 2.5 SOLUTION RESPIRATORY (INHALATION) at 03:46

## 2018-09-17 RX ADMIN — HEPARIN SODIUM IN SODIUM CHLORIDE 400 UNITS: 200 INJECTION INTRAVENOUS at 16:00

## 2018-09-17 RX ADMIN — SODIUM CHLORIDE 50 ML/HR: 900 INJECTION, SOLUTION INTRAVENOUS at 09:00

## 2018-09-17 RX ADMIN — PIPERACILLIN SODIUM,TAZOBACTAM SODIUM 4.5 G: 4; .5 INJECTION, POWDER, FOR SOLUTION INTRAVENOUS at 18:23

## 2018-09-17 RX ADMIN — CHLORHEXIDINE GLUCONATE 15 ML: 1.2 RINSE ORAL at 18:23

## 2018-09-17 RX ADMIN — SODIUM CHLORIDE 40 MG: 9 INJECTION, SOLUTION INTRAMUSCULAR; INTRAVENOUS; SUBCUTANEOUS at 09:10

## 2018-09-17 RX ADMIN — IOPAMIDOL 100 ML: 755 INJECTION, SOLUTION INTRAVENOUS at 09:00

## 2018-09-17 RX ADMIN — ALBUTEROL SULFATE 2.5 MG: 2.5 SOLUTION RESPIRATORY (INHALATION) at 11:17

## 2018-09-17 RX ADMIN — POTASSIUM PHOSPHATE, MONOBASIC AND POTASSIUM PHOSPHATE, DIBASIC: 224; 236 INJECTION, SOLUTION INTRAVENOUS at 12:42

## 2018-09-17 RX ADMIN — ALBUTEROL SULFATE 2.5 MG: 2.5 SOLUTION RESPIRATORY (INHALATION) at 19:25

## 2018-09-17 RX ADMIN — PROPOFOL 20 MCG/KG/MIN: 10 INJECTION, EMULSION INTRAVENOUS at 05:28

## 2018-09-17 RX ADMIN — CHLORHEXIDINE GLUCONATE 15 ML: 1.2 RINSE ORAL at 08:14

## 2018-09-17 RX ADMIN — IOPAMIDOL 260 ML: 755 INJECTION, SOLUTION INTRAVENOUS at 16:00

## 2018-09-17 RX ADMIN — Medication 10 ML: at 18:23

## 2018-09-17 RX ADMIN — MUPIROCIN: 20 OINTMENT TOPICAL at 18:23

## 2018-09-17 RX ADMIN — Medication 10 ML: at 05:05

## 2018-09-17 RX ADMIN — DORZOLAMIDE HYDROCHLORIDE AND TIMOLOL MALEATE 1 DROP: 20; 5 SOLUTION/ DROPS OPHTHALMIC at 12:39

## 2018-09-17 RX ADMIN — FENTANYL CITRATE 25 MCG: 50 INJECTION, SOLUTION INTRAMUSCULAR; INTRAVENOUS at 01:19

## 2018-09-17 RX ADMIN — ALBUTEROL SULFATE 2.5 MG: 2.5 SOLUTION RESPIRATORY (INHALATION) at 23:05

## 2018-09-17 RX ADMIN — DORZOLAMIDE HYDROCHLORIDE AND TIMOLOL MALEATE 1 DROP: 20; 5 SOLUTION/ DROPS OPHTHALMIC at 18:31

## 2018-09-17 RX ADMIN — Medication 10 ML: at 09:00

## 2018-09-17 RX ADMIN — PROPOFOL 30 MCG/KG/MIN: 10 INJECTION, EMULSION INTRAVENOUS at 12:48

## 2018-09-17 RX ADMIN — LIDOCAINE HYDROCHLORIDE 200 MG: 20 INJECTION, SOLUTION INFILTRATION; PERINEURAL at 16:00

## 2018-09-17 NOTE — PROGRESS NOTES
[facility] Consult Service NotePulmonary, Critical Care, and Sleep Medicine Name: Rubia Pradhan MRN: [id number] : 1934 Hospital: [facility] Date: 2018   Hospital Day: 7 Subjective/Interval History:  
I have reviewed the notes from other providers and old records readily available and summarized findings below with the flowsheet. Seen earlier today on rounds.  more hemoptysis all night and this AM. INR down to 1.8. Procalcitonin high at 1.69. No fever. On O2. Pt removed his oxygen and had tubing in his hands. Sats dropped to 86% . Replaced O2 NC and sats came back up to 93% immediately. JOSS positive but minimally positive anti sclerodermal ab. ANCA negative. D/w Dr. Alona Freitas earlier. REviewed options with pt and wife on phone [phone]. 9-15-18: From yesterday,  Had moderate amounts of  Pulmonary hemorrhage S/p PEA arrest, severe acute respiratory failure. Required CPR, Intubation, support. Critically ill, 35 min CC, EOP. Pt just came back from angio and had acute cardiopulmonary arrest and moderate pulmonary hemorrhage. He underwent CPR for 6 mins and had ROSC. Pt was then hypertensive. Airway was placed. Had moderate pulmonary hemorrhage when first intubated. Was placed on mechanical vent support. Transferred to the ICU. Since last night on levophed drip. On diprivan for sedation, Still some bloody secretions from Et tube, NG tube. HOlding anticoagulation for now. 18: Pt had more hemoptysis in his ET tube this am.  
STill on levophed. Has not required blood transfusion. This am discussed with his wife and got permission for Bronch. NO overt endobronchial lesions. Did have bleeding from RLL. NO other acute issues last 24 hrs.  
 
 Pt on vent, arousable when sedation reduced. d/ wDr. Becki Pizza and nursing. More bleeding overnight.  Bronch at bedside yesterday showed ongoing blood from the RLL. NO endobronchial lesions. Performed bronchial washing in multiple segment bleeding seem to decrease. But was still present. ET suction with moderate thick bloody sputum this AM. On IV levophed. IMPRESSION:  
1. S/p PEA arrest post anigo 9/14 pm. Had 6 min of CPR and then ROSC. 2. Acute hemoptysis- persistent; anbio didf not show hypertrophied vessel but still bleeding- Noted to have bleeding from RLL on Bronch. No endobronchial lesion. has lung mass( CA vs inflammatory pseudotumor) and microscopic hematuria; ANCA negative; JOSS barely positive. No longer anticoagulated but INR 1.8. Not getting better. Suspect LUNG mass the culprit but not sure if vessel present to embolize. Bronch will not help; 3. Elevated procalcitonin- treating for possible lung abscess/pseudotumor 4. Lung mass RLL- right lung base- would not be visible on conventional bronch and yield from transbronchial biopsy not guaranteed; had no lesion on chest Ct Jan 2016 5. Coagulopathy from warfarin per ; INR down from 2.4 to 2.0, now normalized, will hold anticoagulation for now. 6. Acute kidney injury likely prerenal and related to medications (Lasix) Baseline creatinine is around 0.9 and currently creatinine is elevated at 1.2 down from 2.0 7. Hematuria Abnormal urinalysis 8. Sepsis due to suspected pneumonia Patient reports having exertional shortness of breath along with cough and hemoptysis; CT of chest shows severe emphysema with the right basilar airspace disease in the oval mass with fluid level concerning for internal hemorrhage versus pneumonia ;  
9. 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  
10. Anemia 11. Former 50+ pack yr smoker quit in 2015? 12. Asbestos exposure 13. History of atrial fibrillation on anticoagulation with warfarin 14. Hx of CAD s/p PCI in the past home amiodarone. Hold Cardizem 15. Dyslipidemia Body mass index is 29.47 kg/(m^2). 12. Multiorgan dysfunction as outlined above: Pt has one or more acute or chronic illnesses with severe exacerbation with progression or side effects of treatment that poses a threat to life or bodily function 17. Additional workup outlined below 18. Pt is requiring Drug therapy requiring intensive monitoring for toxicity 19. Pt is unstable, unpredictable needing PCU monitoring; is critically ill and at high risk of sudden decline and decompensation with life threatening consequenses and continued end organ dysfunction and failure 20. Prognosis guarded with significant risk of sudden decline 21. Critically ill, 35 min cc, EOP. Discussed with nurse this am. High risk of decompensation. RECOMMENDATIONS/PLAN:  
1. CTA chest 
2. Will ask IR to review CTA for another bronchial arteriogram for empiric embolization 3. If massive bleeding Would advance ET tube to Left mainstem bronchus 4. Follow Bronchial washing for Cx and Cytology this am.  
5. Hold anticoagulation for now. Place on SCDS. 6. May need bronch for recurrent bleeding. May be able to avoid bronch if cytology positive. The mass will not be visible by bronch; an option might be navigational bronch which we can help to arrange as outpt but would need to be off anticoagulation 7. If cytology positive for mailgnancy, would ask Rad Onc to see for XRT 8. Transfuse per Dr. Leslye Byrd 9. Sputum culture 10. Sputum cytology repeated and pending 11. Pressors as needed to keep MAP over 65. On levophed drip at this point. 12. Empiric abx to treat possible lung abscess, changed to zosyn. 13. Follow cultures 14. Ongoing vent support for now. NOt ready for SAT, SBT due to moderate hemoptysis yesterday. 15. Labs to follow electrolytes, renal function and and blood counts 16. Glucose monitoring and SSI 17. Bronchial hygiene with respiratory therapy techniques, bronchodilators 18. DVT, SUP prophylaxis 19. Pt needs IV fluids with additives and Drug therapy requiring intensive monitoring for toxicity 20. Prescription drug management with home med reconciliation reviewed My assessment/management discussed with: Consultants, Nursing, Pharmacy, Case Management, PT, OT, Respiratory Therapy, Hospitalist and Family for coordination of care Pt's condition is acute and unstable requiring inpatient hospitalization. This care involved high complexity decision making which includes independently reviewing the patient's past medical records, current laboratory results, medication profiles that were immediately available to me and actual Xray images at the bedside in order to assess, support vital system function, and to treat this degree of vital organ system failure, and to prevent further deterioration of the patients condition. Risk of deterioration: medium and high  
[x] High complexity decision making was performed [x] See my orders for details Tubes:    
 
Subjective/Initial History:  
 
I was asked by Shamir Monsalve MD to see Cordell Pruett  a 80 y.o.  male in consultation for a chief complaint of hemoptysis and abnormal  
 
\"Ulysses BERMUDEZ is a 80 y.o.  male with hx Afib on Coumadin, CAD with prior PCI, AAA, HTN, and COPD on 2.5L via NC, who . .. was admitted via ER on presentation with 3d hx protracted coughing and hemoptysis, that later was associated with reported hematemesis. He noted during same time course that he experienced \"tightness\" in his abdomen that did not change with PO or defecation. He denied N, melena, BRBPR, hematochezia, or dysphagia. He is not on ASA or NSAIDs as OP. His wife reported that his stools were darker. He denies CP, increased SOB. He has not had any hematemesis here, but continued to cough and demonstrate hemoptysis here in ER.  
 
ER eval demonstrated patchy airspace disease in the right lung base with an associated hyperdense RLL 5.2 x 4.3 cm oval lesion, but no acute abdominal or pelvic process. WBC 20.6, BUN Cr 33/2.0, INR 2.4. Initial Hgb was 11.1, but when rechecked 8hrs later was noted to be 7.9. He had undergone EGD 2015 confirming gastritis without h.pylori infection. Last colonoscopy 2007 with internal hemorrhoids and sigmoid diverticulosis. Severe emphysema with right basilar airspace disease and an oval 5.5 x 4.5 cm lesion with fluid fluid level concerning for internal hemorrhage. \" 
 
Pt seen by Dr. July Leger who feels that source of bleeding is not Gi tract. I reviewed office notes where he was last seen this year by our NP. I have not seen pt since 2016. He was noted to have ABNORMAL CHEST CT (R93.8)   Jan 2015 scattered perihilar adenopathy does not explain his other sx. 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 Pt just quit smoking after 50+ years Jan 2016 CT scan with emphysma. no lung mass 12/07/15: CTA impression: No pulmonary emboli. Mild ILD, similar to prior study. New irregular 10.6mm RLL lung nodule for f/u in 3 months. This could be infectious or inflammatory in nature, or potentially a small malignancy. [facility] Chest CT reviewed Jan 2016 no mass. Emphysema with mild pulmonary HTN. Pt has very severe COPD who quit smoking since Feb 2015, started age 16. Tried Albuterol or Combivent on and off for years but really only prn per PCP. Cost has been extravagant, especially Spiriva which he used on and off for three. June 2015 FEV1 1.16 to 1.4 liters (<50%). Pt has had no dysphagia, choking. No pneumonia. No chest pain. Weight stable. No sign or sx of chest infection. Claims he just completed course of abx couple weeks ago from our office but we have no record of a prescription order. Denied gross hematuria. No Known Allergies MAR reviewed and pertinent medications noted or modified as needed Current Facility-Administered Medications Medication  chlorhexidine (PERIDEX) 0.12 % mouthwash 15 mL  mupirocin (BACTROBAN) 2 % ointment  pantoprazole (PROTONIX) 40 mg in sodium chloride 0.9% 10 mL injection  piperacillin-tazobactam (ZOSYN) 4.5 g in 0.9% sodium chloride (MBP/ADV) 100 mL  NOREPINephrine (LEVOPHED) 8 mg in 5% dextrose 250mL infusion  propofol (DIPRIVAN) infusion  albuterol (PROVENTIL VENTOLIN) nebulizer solution 2.5 mg  
 fentaNYL citrate (PF) injection 25 mcg  
 0.9% sodium chloride infusion  albuterol (PROVENTIL HFA, VENTOLIN HFA, PROAIR HFA) inhaler 2 Puff  dorzolamide-timolol (COSOPT) 22.3-6.8 mg/mL ophthalmic solution 1 Drop  latanoprost (XALATAN) 0.005 % ophthalmic solution 1 Drop  pravastatin (PRAVACHOL) tablet 40 mg  
 sodium chloride (NS) flush 5-10 mL  sodium chloride (NS) flush 5-10 mL  acetaminophen (TYLENOL) tablet 650 mg  
 ondansetron (ZOFRAN) injection 4 mg  docusate sodium (COLACE) capsule 100 mg Patient PCP: PROVIDER UNKNOWN 
PMH:  has a past medical history of Aneurysm ([facility] [address] Arrhythmia; Hypertension; Other ill-defined conditions(799.89); Other ill-defined conditions(799.89); Other ill-defined conditions(799.89); and Other ill-defined conditions(799.89). He also has no past medical history of Difficult intubation; Malignant hyperthermia due to anesthesia; Nausea & vomiting; Pseudocholinesterase deficiency; or Unspecified adverse effect of anesthesia. PSH:   has a past surgical history that includes hx orthopaedic; hx other surgical; pr abdomen surgery proc unlisted (1/16/13); hx heent; and upper gi endoscopy,biopsy (4/10/2015). FHX: family history includes Cancer in his father and mother. SHX:  reports that he has quit smoking. He has a 10.00 pack-year smoking history. He has never used smokeless tobacco. He reports that he drinks alcohol. He reports that he uses illicit drugs, including Prescription and OTC. ROS:A comprehensive review of systems was negative except for that written in the HPI. Objective: 
 
Vital Signs: Telemetry:    normal sinus rhythmIntake/Output:  
Visit Vitals  /49  Pulse 87  Temp 98.9 °F (37.2 °C)  Resp 23  
 Ht 6' 2\" (1.88 m)  Wt 104.1 kg (229 lb 8 oz)  SpO2 97%  BMI 29.47 kg/m2 Temp (24hrs), Av.2 °F (37.3 °C), Min:98.9 °F (37.2 °C), Max:99.6 °F (37.6 °C) O2 Device: Endotracheal tube, Ventilator O2 Flow Rate (L/min): 5 l/min Wt Readings from Last 4 Encounters:  
18 104.1 kg (229 lb 8 oz) 16 111.1 kg (245 lb)  
16 106.6 kg (235 lb) 04/08/15 103.4 kg (228 lb) Intake/Output Summary (Last 24 hours) at 18 3242 Last data filed at 18 0700 Gross per 24 hour Intake          2530.61 ml Output             1409 ml Net          1121.61 ml Last shift:        
Last 3 shifts: 09/15 1901 -  0700 In: 3540.7 [I.V.:3450.7] Out: 2169 [LXQLV:[id number]] Physical Exam:  
 General:   male; with Et tube in place. Has right IJ CVC. On sedation and on vent. Has moderate blood in Et Tube when seen earlier. HEAD: Normocephalic, without obvious abnormality, atraumatic EYES: conjunctivae clear. PERRL,  AN Icteric sclerae NOSE: nares normal, no drainage, no nasal flaring, THROAT: normal; Lips, mucosa dry; No Thrush;  crowded airway; tongue midline Neck: Supple, symmetrical, trachea midline,  No accessory mm use; No Stridor/ cuff leak, No goiter or thyroid tenderness LYMPH: No abnormally enlarged lymph nodes. in neck or groin Chest: increased AP diameter Lungs: agonal, bilateral rhonchi. Seems to have good air  Movement Bilaterally. Heart: Regular rate and rhythm; NO edema Abdomen: soft, non-tender, without masses or organomegaly, protuberant, distended : no Overton Musculoskeletal: mild kyphosis;  No spine or CVA tenderness; negative, cyanosis, clubbing; no joint swelling or erythema Neuro: was not  Responsive, had a gag with ET tube suctioning. , Sedated not able to fully assess. Psych: NOt able to assess due to being on sedation and on vent. Skin: Pallor;  
 Pulses:Bilateral, Radial, 2+ Capillary refill: abnormal: ; sluggish capillary refill, pale, 
 
Data:  
 
All lab results for the last 24 hours reviewed. Labs: 
 
Recent Labs  
   09/17/18 
 0303  09/16/18 
 1648  09/16/18 
 6640   09/15/18 
 0059 WBC  11.1   --   9.8   --   12.1* HGB  7.1*  7.2*  7.2*   < >  7.3*  
PLT  143*   --   128*   --   111* INR  1.2*   --   1.3*   --   1.4* APTT  28.6   --   30.5   --   33.4*  
 < > = values in this interval not displayed. Recent Labs  
   09/17/18 
 0303  09/16/18 
 4712  09/15/18 
 0059  09/14/18 
 1653 NA  143  140  137  135* K  4.1  4.6  5.0  4.4 CL  110*  109*  106  104 CO2  27  25  26  21 GLU  128*  120*  119*  193* BUN  15  16  19  19 CREA  1.02  1.16  1.28  1.41* CA  8.0*  8.1*  8.0*  7.6*  
MG  2.4  2.5*  2.6*  2.6* PHOS  2.0*  2.3*  2.7  4.2 LAC   --    --   0.9  2.9* ALB  2.2*  2.3*  2.4*  2.5* SGOT  19  23  49*  49* ALT  21  25  33  35 No results for input(s): PH, PCO2, PO2, HCO3, FIO2 in the last 72 hours. No results for input(s): CPK, CKNDX, TROIQ in the last 72 hours. No lab exists for component: CPKMB No results found for: BNPP, BNP Lab Results Component Value Date/Time Culture result: PENDING 09/16/2018 11:15 AM  
 Culture result: LIGHT APPARENT NORMAL RESPIRATORY SHELDON 09/15/2018 01:20 PM  
 Culture result: LIGHT NORMAL RESPIRATORY SHELDON 09/12/2018 03:50 PM  
No results found for: TSH, TSHEXT, TSHEXT Imaging: 
 
   
 
 
Results from Hospital Encounter encounter on 09/11/18 XR CHEST PORT Narrative EXAM:  XR CHEST PORT INDICATION: Tube Placement COMPARISON: One day prior.  
 
TECHNIQUE: Single frontal view of the chest. 
 
 FINDINGS: Bilateral layering effusions and adjacent airspace disease. Unchanged 
interstitial edema. No visualized pneumothorax. Multiple support lines and tubes 
do not appear changed in position. Unchanged enlarged cardiomediastinal 
silhouette. Impression IMPRESSION: Similar appearance of the chest including bilateral effusions and 
adjacent airspace disease as well as interstitial edema. Results from Hospital Encounter encounter on 09/11/18 CT CHEST WO CONT Narrative INDICATION: hemoptysis, eval abscess vs hemorrhage COMPARISON: None TECHNIQUE:  Noncontrast 5 mm axial images were obtained through the chest. CT 
dose reduction was achieved through use of a standardized protocol tailored for 
this examination and automatic exposure control for dose modulation. FINDINGS: 
 
THYROID: Unremarkable. MEDIASTINUM/TRAVIS: Small calcified subcarinal lymph nodes. HEART/PERICARDIUM: Coronary atherosclerosis. Pacemaker. No pericardial effusion. Not significantly enlarged. LUNGS/PLEURA: Severe emphysema. Patchy right basilar airspace disease with a 5.5 
x 4.5 cm oval lesion in the right lower lobe with fluid fluid level. No internal 
gas. No significant pleural effusion. No pneumothorax. INCIDENTALLY IMAGED UPPER ABDOMEN: No significant abnormality. BONES: No destructive bone lesion. ADDITIONAL COMMENTS:  N/A. Impression IMPRESSION: 
Severe emphysema with right basilar airspace disease and an oval 5.5 x 4.5 cm 
lesion with fluid fluid level concerning for internal hemorrhage. No internal 
gas. Differential diagnosis includes an infectious process including abscess, 
although neoplasm with internal hemorrhage also a possibility. Post intubation: 9-14-18: CXR was reviewed Had bilateral infiltrates. ET tube in position. CXR: 9-15-18:  
Frontal chest radiograph submitted for review.  
  
Endotracheal tube is in stable position.  Stable right-sided central line and 
 partially visualized feeding tube. Right chest pacing device is again seen. Stable heart size. Unchanged interstitial edema and right basilar airspace 
disease. No definite new lung abnormality. Bilateral small effusions. No 
Pneumothorax. ET tube ok position.  
  
IMPRESSION: 
  
Overall stable exam  
 
9-16-18: CXR:  
COMPARISON: 9/15/2018 
  
FINDINGS: Single AP portable view of the chest at 436 hours demonstrates no 
change in position of the lines and tubes. The cardiomediastinal silhouette is 
stable. There is no new airspace disease. There are chronic bibasilar opacities 
with chronic cardiomegaly. Bilateral pleural effusions are suspected. 
  
IMPRESSION:  
No significant change. I have personally and independently reviewed the patients interval and diagnostic data, radiographs and have reviewed the reports. Medical Decision Making Today: · Reviewed the flowsheet and previous days notes · Reviewed and summarized records or history from previous days note or discussions with staff, family · Parenteral controlled substances - Reviewed/ Adjusted / Weaned / Started · High Risk Drug therapy requiring intensive monitoring for toxicity: eg steroids, pressors, antibiotics · Reviewed and/or ordered Clinical lab tests · Reviewed and/or ordered Radiology tests · Reviewed and/or ordered of Medicine tests · Independently visualized radiologic Images · I have personally reviewed the patients ECG / Telemetry Nickie Sheridan MD

## 2018-09-17 NOTE — PROGRESS NOTES
Bedside and Verbal shift change report given to [address] RN ([place]) (oncoming nurse) by Sarah Zacarias RN (offgoing nurse). Report included the following information SBAR, Kardex, Intake/Output, MAR, Recent Results, Med Rec Status and Cardiac Rhythm AFib. A/C14, 500, 6 peep, 70%. RR 19. Sedated on propofol. Responds to pian/stiumulus. Cough and gag present with suction and movement. Pupils equal and reactive. Does not appear to be in pain. Tolerating ventilation. Lungs coarse, bloody/tan secretions from ETT. OGT to suction (placement verified but have been having trouble getting it to suction per report). BS hypoactive. Pulses palpable. Skin intact. 2-3+ edema, pitting in some areas. Scattered ecchymosis. 2000 RT decreased FIO2 60%. O2 sats dipped to 90-91% for about 30 minutes, after suctioning have remained around 95-95%. 2055 Updated wife on current status. [address] Decreased propofol to 30, patient was able to follow simple commands, squeezing both hands. Tried to open his eyes. 0000 No change in assessment. Bloody secretions continue from ETT, has not increased in amount. Elizabeth Sagastume RN informed Dr. Louann Staton of [facility] UO, MD wants to monitor through the night, ok with him making about 20 an hour. [address] Patient bathed. CHG completed. O2 sats 91-92% after suctioning 96%. TAPs system criteria met and placed under patient. [address] Pacemaker firing randomly, not capturing. Seems to be happening when patient is turned on the right side. Took pillow out and the pacing has stopped. [address] Dark red blood from ETT, a little more in amount. VSS, Will continue to monitor and will draw labs early. [address] [facility] Labs drawn. [facility] 7.1.  
 
0400 No change in assessment. [place] Patient does not qualify for SBT, FIO2 60%. Slowly weaning propofol. O2 sats dip to 91% when patient needs to be suctioned and increases up to 95-97%. 2-3+ edema continues, elevating limbs; UO about 30-40 an hour.  Titrating levophed to keep MAP>65. Type and screen , redrawn and sent to lab.  
 
0630 Propofol at 10 and levophed at 3. Patient nodding appropriately and trying to tiesha BUE some. UO about 400. 150 from NGT. About 225 of blood in cannister from ETT suctioning. Bedside and Verbal shift change report given to  [facility] (oncoming nurse) by Rylee Deluca RN (offgoing nurse). Report included the following information SBAR, Kardex, Intake/Output, MAR, Recent Results, Med Rec Status and Cardiac Rhythm Afib.

## 2018-09-17 NOTE — PROGRESS NOTES
1930 - Bedside and Verbal shift change report given to Debbie Robledo RN (oncoming nurse) by Cydney Porter, PADDY (offgoing nurse). Report included the following information SBAR, Kardex, Procedure Summary, Intake/Output, MAR and Cardiac Rhythm A-fib . 2000 - Shift assessment completed. HR 95, RR 21, /51, O2 97, temp 99.6. On vent AC 14, 500, 70%, 6. Pt resting comfortably. Not opening eyes or following commands. Gums at ETT. ET suction with moderate thick bloody sputum. Upper and Lower extremity 2+ edema. Urine output adequate at this time. Propofol @ 35, Levo @ 6.  
 
2200 - Pt responding to voice and attempting to follow commands with propofol turned down to 30. Urine output adequate but decreasing over the past 2 hours. Will monitor. Pt O2 sats 98% with RR 21 although breathing appears less labored. 2350 - Reassessment completed. Pt VSS. O2 94%. Suctioned pt and O2 back up to %. Urine output 23 mL each hour for the past 2 hours. Will page hospitalist. 
 
Twila Ya - Spoke to Dr Keri Alexander regarding decreased urine output. Advised to watch output, no action ordered. 0400 - Reassessment completed. No change in pt status. 6349 - Propofol decreased to 15. Pt tolerating well. Bedside and Verbal shift change report given to Mary Henderson RN (oncoming nurse) by Maria Del Carmen Figueroa RN (offgoing nurse). Report included the following information SBAR, Kardex, Intake/Output, MAR, Recent Results and Cardiac Rhythm A-Fib.

## 2018-09-17 NOTE — PROGRESS NOTES
1900 Report received from Ayah Uribe RN 
 
2000 Assessment complete. Patient is intubated and sedated. Does withdrawal to pain. Lung sounds are coarse. Bowel sounds are hypoactive. Pulses are palpable. Right IJ with Normal Saline infusing @ 50mL/hr, Propofol @ 30mcg/kg/min, Levophed @ 5mcg/min. Overton catheter in place draining hazy paige urine. Right groin insertion site from IR. Dressing is clear dry and intact. No bleeding, No hematoma. 0000 Reassessment complete. No changes from previous. 0400 Reassessment complete. No changes from previous. 8576 Levophed gtt down to 2mcg/min.  Will continue to titrate to keep SBP> 90 
 
0700 Report given to [facility]

## 2018-09-17 NOTE — PROCEDURES
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 The feeding artery was coiled and occluded. Full dictation to follow. Pt tolerated the procedure well.      Specimens removed:  None     Complications: None    Primary Physician: Agnes Paul MD    Recomendations: N/A    Discharge Disposition: ICU    Full dictated report to follow    Signed By: Agnes Paul MD

## 2018-09-17 NOTE — PROGRESS NOTES
Attended Critical Care Unit Interdisciplinary Rounds, where patient care was discussed. MARCO ANTONIO De Leon, [facility], [facility] Service  [phone] ([phone])

## 2018-09-17 NOTE — PROGRESS NOTES
Nutrition Assessment: 
 
RECOMMENDATIONS:  
Consider initiation of TF via OGT (Pt NPO x 5 days): Recommend TwoCal HN @ 10mL/h, advance rate 10mL q 8h as tolerated to Goal Rate of 30mL/h + Prosource BID + 50mL H2O flush q 4h (provides 1560kcals/90gPro/804mL) DIETITIANS INTERVENTIONS/PLAN:  
TF recommendations Monitor pressors ASSESSMENT:  
Pt admitted with pulmonary hemorrhage. PMH: CAD, HTN, COPD. Chart reviewed, case discussed during CCU rounds. Pt intubated s/p PEA arrest on 9/14. He is NPO x 5 days today. GI signed off as bleeding was 2' hemoptysis. MST negative for previous nutritional risk factors. OGT to suction with minimal OP. Propofol @ 20.4mL/h, which provides 538 kcals daily. Levo at 5. Per discussion with Dr. Loida Parikh, plan is to hold off on TF today and possibly start it tomorrow. Provided TF recommendations above. Phos 2.0, being repleted. Pt is up 4.5L and has moderate edema. TF at goal rate + propofol will meet 96% kcal and 108% protein needs. SUBJECTIVE/OBJECTIVE:  
Pt intubated and sedated Diet Order: NPO 
% Eaten:  No data found. Pertinent Medications:protonix, zosyn, KPhos; [email or web address]); Drips: propofol, levo. Chemistries: 
Lab Results Component Value Date/Time Sodium 143 09/17/2018 03:03 AM  
 Potassium 4.1 09/17/2018 03:03 AM  
 Chloride 110 (H) 09/17/2018 03:03 AM  
 CO2 27 09/17/2018 03:03 AM  
 Anion gap 6 09/17/2018 03:03 AM  
 Glucose 128 (H) 09/17/2018 03:03 AM  
 BUN 15 09/17/2018 03:03 AM  
 Creatinine 1.02 09/17/2018 03:03 AM  
 BUN/Creatinine ratio 15 09/17/2018 03:03 AM  
 GFR est AA >60 09/17/2018 03:03 AM  
 GFR est non-AA >60 09/17/2018 03:03 AM  
 Calcium 8.0 (L) 09/17/2018 03:03 AM  
 AST (SGOT) 19 09/17/2018 03:03 AM  
 Alk.  phosphatase 64 09/17/2018 03:03 AM  
 Protein, total 6.4 09/17/2018 03:03 AM  
 Albumin 2.2 (L) 09/17/2018 03:03 AM  
 Globulin 4.2 (H) 09/17/2018 03:03 AM  
 A-G Ratio 0.5 (L) 09/17/2018 03:03 AM  
 ALT (SGPT) 21 09/17/2018 03:03 AM  
  
Anthropometrics: Height: 6' 2\" (188 cm) Weight: 104.1 kg (229 lb 8 oz)  []bed scale    []stated   [x]unknown(9/17) IBW (%IBW):   ( ) UBW (%UBW):   (  %) BMI: Body mass index is 29.47 kg/(m^2). This BMI is indicative of: 
[]Underweight   []Normal   [x]Overweight   [] Obesity   [] Extreme Obesity (BMI>40) Estimated Nutrition Needs (Based on): 2196 Kcals/day (PSU (MSJ 1801)) , 83 g (0.8gPro/kg) Protein Carbohydrate: At Least 130 g/day  Fluids: 1800 mL/day or per MD 
 
Last BM: 9/11   [x]Active     []Hyperactive  []Hypoactive       [] Absent   BS Skin:    [x] Intact   [] Incision  [] Breakdown   [] DTI   [] Tears/Excoriation/Abrasion  [x]Edema(+2-generalized; +2 nonpitting-BUE; +1-BLE) [] Other: Wt Readings from Last 30 Encounters:  
09/17/18 104.1 kg (229 lb 8 oz) 05/05/16 111.1 kg (245 lb)  
03/25/16 106.6 kg (235 lb) 04/08/15 103.4 kg (228 lb)  
03/25/15 100.7 kg (222 lb)  
01/16/13 105.3 kg (232 lb 3.2 oz) 01/03/13 103.3 kg (227 lb 11.8 oz) NUTRITION DIAGNOSES:  
Problem:  Inadequate protein-energy intake Etiology: related to pt NPO 2' vent Signs/Symptoms: as evidenced by NPO + propofol meets <25% kcal and 0% protein needs. NUTRITION INTERVENTIONS: 
  Enteral/Parenteral Nutrition: Initiate enteral nutrition GOAL:  
Pt will be start on nutrition vs nutrition support in 2-3 days. Cultural, Jehovah's witness, or Ethnic Dietary Needs: None LEARNING NEEDS (Diet, Food/Nutrient-Drug Interaction):  
 [x] None Identified 
 [] Identified and Education Provided/Documented 
 [] Identified and Pt declined/was not appropriate [x] Interdisciplinary Care Plan Reviewed/Documented  
 [x] Participated in Discharge Planning: Unable to determine 
 [x] Interdisciplinary Rounds NUTRITION RISK:  
 [x] High              [] Moderate           []  Low  []  Minimal/Uncompromised PT SEEN FOR:  
 []  MD Consult: []Calorie Count []Diabetic Diet Education []Diet Education []Electrolyte Management []General Nutrition Management and Supplements []Management of Tube Feeding []TPN Recommendations []  RN Referral:  []MST score >=2 
   []Enteral/Parenteral Nutrition PTA []Pregnant: Gestational DM or Multigestation  
[]  Low BMI []  Re-Screen  
[]  LOS [x]  NPO/clears x 5 days []  New TF/TPN Dinora Gomez RD, [facility] Pager [phone] Weekend Pager [phone]

## 2018-09-17 NOTE — PROGRESS NOTES
0800  Spoke with Dr. Sanchez Chappell at bedside regarding pt condition. Pt still having significant bright red blood from ETT with suctioning. Pt to go for CTA chest. 
0910  Pt taken to CT 
0945  Return from [address] [facility] with pt's wife via phone to update her on pt condition and plan for procedure this afternoon. Telephone consent obtained. Dr. Kashmir Arellano spoke with pt's wife via phone to explain procedure. 1230  Pt's RR 25 and pt abdominal breathing. Pt appears uncomfortable. Propofol increased to 30mcg/kg/min. [id number]  Wife called for update. Discussed procedure scheduled for 1530. 
1510  Pt taken to IR for arteriogram.  Propofol increased to 50mcg/kg/min and levo increased to 7mcg for procedure. [phone]  Pt returned from [place] [id number] with pt's wife and gave her post-procedure update.

## 2018-09-17 NOTE — INTERDISCIPLINARY ROUNDS
Interdisciplinary team rounds were held 9/17/18 with the following team members:Care Management, Diabetes Treatment Specialist, Nursing, Nutrition, Pastoral Care, Pharmacy, Physical Therapy, Physician, Respiratory Therapy and Clinical Coordinator. Plan of care discussed. Goal: See MD orders and progress notes for further  interventions and desired outcomes.

## 2018-09-17 NOTE — PROGRESS NOTES
Hospitalist Progress Note NAME: Loren Higginbotham  
:  1934 MRN:  [id number] Assessment / Plan: PEA arrest  Code blue called . Patient w/ agonal breathing after coming back from radiology after arteriogram completed. No pulse detected. CPR initiated. Epi x 2 given. Pulse obtained. Intubated by Dr. Stephie Majano. Much bloody secretions obtained. Transferred to ICU. Family was updated at time of code. Acute respiratory w/ hypoxia, s/p cardiac arrest 
-vent management per Pulmonary Acute upper GI bleed(hematemesis) -may be due to hemoptysis since patient had been coughing as well. Patient admitted to taking total 800 mg/day Ibuprofen last few weeks for pain on left side. -GI consulted. It was felt that dark stool may be due swallowed blood. No EGD planned. -PPI 
 
-continue to hold anticoagulation. Follow H&H. Sepsis due to suspected PNA w/ possible pulmonary hemorrhage 
-CT of chest shows severe emphysema with the right basilar airspace disease in the oval mass with fluid level concerning for internal hemorrhage versus pneumonia. -WBC elevated on admission. Now improved. -follow cultures 
-cont abx 
-sputum cytology collected  negative. Hemoptysis 
-unknown etiology. -work up for possible rheumatologic cause neg thus far: 
ANCA, anti-GBM, MPO ab, AR-3 ab, SSA/SSB, RNP ab, Arredondo/RNP, dsDNA, and Arredondo ab all negative. JOSS +. 
 
-Arteriogram : Thoracic aortic and intercostal arteriograms as described above demonstrating no 
enlarged bronchial artery supplying the right lower lobe of the lung. A normal 
appearing and normal sized right bronchial artery is seen without any 
hyperplasia or dominant right lower lobe supply. No embolization was performed. RLL mass - unable to determine if hemorrhage 
-Dr. Evelyn White considered Wegener's as possible cause since patient w/ hemoptysis and hematuria. However, work up neg thus far. -diagnostic bronch done 9/16. No endobronchial lesions seen. CTA chest done 9/17 due to persistent bloody pulmonary secretions. Results: 
Right lower lobe masslike abnormality demonstrates increased internal density 
and has increased in size measuring 6.7 x 6.7 cm, compared to 5.7 x 4.4 cm. This 
is compatible with internal hemorrhage. There is new moderate right lower lobe 
partial collapse/atelectasis.  
  
Normal sized right bronchial artery is visualized without any hyperplasia or or 
evidence for supply to the right lower lobe lung abnormality. It is unlikely 
that the hemoptysis related to bronchial arterial injury/supply. Prior bronchial 
arteriogram was unremarkable. No evidence for pulmonary artery pseudoaneurysm or 
active hemorrhage. However, it is more likely that the masslike abnormality in 
the right lower lobe the lung has eroded into an adjacent pulmonary artery then 
a bronchial artery. Therefore, plan is for pulmonary arteriogram with possible 
embolization if an abnormality is seen in the right lower lobe. 6 mm and 4 mm right lung nodules. Small bilateral pleural effusions. Minimally enlarged bilateral hilar and mediastinal lymph nodes Acute blood loss anemia on admission 
-Hgb 11 on admission.   
-serial H&H 
 
H/o atrial fibrillation on chronic anticoagulation w/ coumadin 
-amiodarone on hold 
-cardizem on hold due to borderline low bp 
-hold coumadin 
 
Coumadin-induced coagulopathy 
-INR improved. Holding coumadin. ALAN - likely prerenal related to Lasix. Renal function stable. 
-avoid nephrotoxic agents. UTI, acute cystitis w/ hematuria, POA 
-cont abx; follow urine cx. COPD - stable HLD 
-cont pravachol Obesity - BMI 32 Plan: As above. Transfuse for Hgb < 7 Cont Levophed for pressor support Difficult case. Body mass index is 29.47 kg/(m^2). Code status: Full Prophylaxis: SCD's Recommended Disposition: Home w/Family Subjective: Chief Complaint / Reason for Physician Visit Follow up for hemoptysis. Discussed with RN events overnight. D/w nurse. Still w/ bloody pulmonary secretions. Review of Systems: 
Symptom Y/N Comments  Symptom Y/N Comments Fever/Chills    Chest Pain Poor Appetite    Edema Cough    Abdominal Pain Sputum    Joint Pain SOB/ROY    Pruritis/Rash Nausea/vomit    Tolerating PT/OT Diarrhea    Tolerating Diet Constipation    Other Could NOT obtain due to: sedated Objective: VITALS:  
Last 24hrs VS reviewed since prior progress note. Most recent are: 
Patient Vitals for the past 24 hrs: 
 Temp Pulse Resp BP SpO2  
09/17/18 1130 - - - 130/55 -  
09/17/18 1122 - 98 24 - 93 % 09/17/18 1117 - - - - 93 % 09/17/18 1030 - 91 21 128/55 93 % 09/17/18 1000 - 96 - (!) 113/35 (!) 21 % 09/17/18 0949 - - - (!) 113/39 -  
09/17/18 0907 - - - 108/45 -  
09/17/18 0902 - - - (!) 88/32 -  
09/17/18 0900 - 90 18 (!) 92/26 -  
09/17/18 6019 - - - (!) 83/32 -  
09/17/18 0830 - - - (!) 86/23 -  
09/17/18 0815 - 88 - 119/47 -  
09/17/18 0800 99.2 °F (37.3 °C) 87 19 110/58 97 % 09/17/18 0735 - 87 23 - 97 % 09/17/18 0700 - 90 22 110/49 97 % 09/17/18 0600 - 87 23 118/62 97 % 09/17/18 0500 - 99 23 114/60 94 % 09/17/18 0400 98.9 °F (37.2 °C) 97 21 115/52 98 % 09/17/18 0346 - 98 22 - 98 % 09/17/18 0300 - 95 21 123/66 97 % 09/17/18 0200 - 100 25 118/62 96 % 09/17/18 0100 - 98 24 115/54 95 % 09/17/18 0002 - 98 21 - 98 % 09/17/18 0001 99.4 °F (37.4 °C) 95 22 116/57 97 % 09/16/18 2300 - (!) 101 19 104/49 98 % 09/16/18 2200 - 97 21 106/48 98 % 09/16/18 2100 - 98 23 112/52 94 % 09/16/18 2000 99.6 °F (37.6 °C) 95 21 109/51 97 % 09/16/18 1942 - 87 18 - 99 % 09/16/18 1930 - 97 17 110/50 97 % 09/16/18 1900 - 95 16 102/51 98 % 09/16/18 1800 - 97 21 113/49 99 % 09/16/18 1745 - 93 23 115/57 97 % 09/16/18 1730 - 98 21 113/59 96 % 09/16/18 1715 - 93 20 108/52 97 % 09/16/18 1701 - - - - 99 % 09/16/18 1700 - 89 22 119/67 99 % 09/16/18 1645 - 96 22 111/59 98 % 09/16/18 1630 - 95 21 103/59 98 % 09/16/18 1615 - 95 22 113/58 100 % 09/16/18 1600 99.4 °F (37.4 °C) 98 22 124/57 98 % 09/16/18 1545 - 96 22 128/65 97 % 09/16/18 1530 - 97 23 127/66 97 % 09/16/18 1515 - 96 21 121/58 100 % 09/16/18 1514 - 93 22 - 100 % 09/16/18 1500 - 96 19 125/56 100 % 09/16/18 1445 - 96 18 119/57 100 % 09/16/18 1430 - 93 20 119/56 100 % 09/16/18 1415 - 94 20 116/53 100 % 09/16/18 1400 - 97 17 134/59 100 % 09/16/18 1345 - 100 14 114/65 100 % 09/16/18 1330 - 95 17 126/59 100 % 09/16/18 1326 - - - - 100 % 09/16/18 1315 - 99 21 130/53 100 % 09/16/18 1300 - 97 20 125/56 100 % 09/16/18 1245 - 99 19 119/65 99 % 09/16/18 1230 - 99 19 125/56 99 % Intake/Output Summary (Last 24 hours) at 09/17/18 1224 Last data filed at 09/17/18 1000 Gross per 24 hour Intake          2147.54 ml Output             1044 ml Net          1103.54 ml PHYSICAL EXAM: 
General: WD, WN. Intubated. No distress. EENT:   Anicteric sclerae. MMM;  ET tube in place. Resp:  Decreased breath sounds. Clear anteriorly. No rhonchi. No accessory muscle use CV:  Regular  rhythm,  1+ bilateral leg edema GI:  Soft, Non distended, Non tender.  +Bowel sounds Neurologic:  Sedated. No posturing. Psych:   No agitation Skin:  No rashes. No jaundice Reviewed most current lab test results and cultures  YES Reviewed most current radiology test results   YES Review and summation of old records today    NO Reviewed patient's current orders and MAR    YES 
PMH/ reviewed - no change compared to H&P 
________________________________________________________________________ Care Plan discussed with: 
  Comments Patient Family RN x Care Manager Consultant                   Multidiciplinary team rounds were held today with case manager, nursing, pharmacist and clinical coordinator. Patient's plan of care was discussed; medications were reviewed and discharge planning was addressed. ________________________________________________________________________ Total NON critical care TIME:  30  Minutes Total CRITICAL CARE TIME Spent:   Minutes non procedure based Comments >50% of visit spent in counseling and coordination of care    
________________________________________________________________________ Mike Martinez MD  
 
Procedures: see electronic medical records for all procedures/Xrays and details which were not copied into this note but were reviewed prior to creation of Plan. LABS: 
I reviewed today's most current labs and imaging studies. Pertinent labs include: 
Recent Labs  
   09/17/18 
 0303  09/16/18 
 1648  09/16/18 
 0412   09/15/18 
 0059 WBC  11.1   --   9.8   --   12.1* HGB  7.1*  7.2*  7.2*   < >  7.3* HCT  22.6*   --   22.8*   --   23.6*  
PLT  143*   --   128*   --   111*  
 < > = values in this interval not displayed. Recent Labs  
   09/17/18 
 0303  09/16/18 
 6924  09/15/18 
 0059 NA  143  140  137  
K  4.1  4.6  5.0  
CL  110*  109*  106 CO2  27  25  26 GLU  128*  120*  119* BUN  15  16  19 CREA  1.02  1.16  1.28  
CA  8.0*  8.1*  8.0*  
MG  2.4  2.5*  2.6* PHOS  2.0*  2.3*  2.7 ALB  2.2*  2.3*  2.4* TBILI  0.5  0.4  0.4 SGOT  19  23  49* ALT  21  25  33 INR  1.2*  1.3*  1.4* Signed: Mike Martinez MD

## 2018-09-17 NOTE — PROGRESS NOTES
Spiritual Care Assessment/Progress Note [facility] 
 
 
NAME: Robles Parsons      MRN: [id number] AGE: 80 y.o. SEX: male Uats[place] Affiliation: Fredy Herndon Language: [place] 9/17/2018     Total Time (in minutes): 5 Spiritual Assessment begun in [facility] 2 CRITICAL CARE 1 through conversation with: 
  
    [x]Patient        [] Family    [] Friend(s) Reason for Consult: Initial/Spiritual assessment, critical care Spiritual beliefs: (Please include comment if needed) 
   [] Identifies with a mitchel tradition:     
   [] Supported by a mitchel community:        
   [] Claims no spiritual orientation:       
   [] Seeking spiritual identity:            
   [] Adheres to an individual form of spirituality:       
   [x] Not able to assess:                   
 
    
Identified resources for coping:  
   [] Prayer                           
   [] Music                  [] Guided Imagery 
   [] Family/friends                 [] Pet visits [] Devotional reading                         [x] Unknown 
   [] Other:                                          
 
 
Interventions offered during this visit: (See comments for more details) Patient Interventions: Interdisciplinary rounds Plan of Care: 
 
 [] Support spiritual and/or cultural needs  
 [] Support AMD and/or advance care planning process    
 [] Support grieving process 
 [] Coordinate Rites and/or Rituals  
 [] Coordination with community clergy 
 [x] No spiritual needs identified at this time 
 [] Detailed Plan of Care below (See Comments)  [] Make referral to Music Therapy 
[] Make referral to Pet Therapy    
[] Make referral to Addiction services 
[] Make referral to [facility] 
[] Make referral to Spiritual Care Partner 
[] No future visits requested       
[] Follow up visits as needed Comments: Attempted to complete initial spiritual assessment with patient on CCU. Patient is currently intubated and not able to participate in visit. No family present at this time. Chaplains are available as needed and desired for support to patient and/or family. Chaplain Val Arias M.Div.  Paging Service [phone])

## 2018-09-17 NOTE — H&P
Radiology History and Physical 
 
Patient: Susan Baxter 80 y.o. male Chief Complaint: Vomiting (Pt presents via EMS. pt called EMS because he was vomiting blood. patient reported to have been coughing up blood since Sunday. ) History of Present Illness: hemoptysis History: 
 
Past Medical History:  
Diagnosis Date  Aneurysm ([address] AAA  Arrhythmia   
 atrial fibrillation 2016  Hypertension  Other ill-defined conditions(799.89)   
 hx of broken arm left/cast  
 Other ill-defined conditions(799.89)   
 glaucoma  Other ill-defined conditions(799.89)   
 elevated cholesterol  Other ill-defined conditions(799.89)   
 hx bursitis knees Family History Problem Relation Age of Onset  Cancer Mother  Cancer Father Social History Social History  Marital status:  Spouse name: N/A  
 Number of children: N/A  
 Years of education: N/A Occupational History  Not on file. Social History Main Topics  Smoking status: Former Smoker Packs/day: 0.50 Years: 20.00  Smokeless tobacco: Never Used  Alcohol use Yes Comment: social rare  Drug use: Yes Special: Prescription, OTC  Sexual activity: Not on file Other Topics Concern  Not on file Social History Narrative Allergies: No Known Allergies Current Medications: 
Current Facility-Administered Medications Medication Dose Route Frequency  potassium phosphate 30 mmol in 0.9% sodium chloride 250 mL infusion   IntraVENous ONCE  
 heparinized saline 2 units/mL infusion 1,600 Units  800 mL Irrigation ONCE  
 lidocaine (XYLOCAINE) 20 mg/mL (2 %) injection 1,000 mg  50 mL SubCUTAneous ONCE  
 iopamidol (ISOVUE-370) 76 % injection 300 mL  300 mL IntraarTERial ONCE  chlorhexidine (PERIDEX) 0.12 % mouthwash 15 mL  15 mL Oral BID  mupirocin (BACTROBAN) 2 % ointment   Both Nostrils BID  
  pantoprazole (PROTONIX) 40 mg in sodium chloride 0.9% 10 mL injection  40 mg IntraVENous DAILY  piperacillin-tazobactam (ZOSYN) 4.5 g in 0.9% sodium chloride (MBP/ADV) 100 mL  4.5 g IntraVENous Q8H  
 NOREPINephrine (LEVOPHED) 8 mg in 5% dextrose 250mL infusion  2-16 mcg/min IntraVENous TITRATE  propofol (DIPRIVAN) infusion  0-50 mcg/kg/min IntraVENous TITRATE  albuterol (PROVENTIL VENTOLIN) nebulizer solution 2.5 mg  2.5 mg Nebulization Q4H RT  
 fentaNYL citrate (PF) injection 25 mcg  25 mcg IntraVENous Q4H PRN  
 0.9% sodium chloride infusion  50 mL/hr IntraVENous CONTINUOUS  
 albuterol (PROVENTIL HFA, VENTOLIN HFA, PROAIR HFA) inhaler 2 Puff  2 Puff Inhalation Q4H PRN  
 dorzolamide-timolol (COSOPT) 22.3-6.8 mg/mL ophthalmic solution 1 Drop  1 Drop Both Eyes BID  latanoprost (XALATAN) 0.005 % ophthalmic solution 1 Drop  1 Drop Both Eyes QHS  pravastatin (PRAVACHOL) tablet 40 mg  40 mg Oral QHS  sodium chloride (NS) flush 5-10 mL  5-10 mL IntraVENous Q8H  
 sodium chloride (NS) flush 5-10 mL  5-10 mL IntraVENous PRN  
 acetaminophen (TYLENOL) tablet 650 mg  650 mg Oral Q6H PRN  
 ondansetron (ZOFRAN) injection 4 mg  4 mg IntraVENous Q6H PRN  
 docusate sodium (COLACE) capsule 100 mg  100 mg Oral DAILY PRN Physical Exam: 
Blood pressure 108/54, pulse 88, temperature 98.7 °F (37.1 °C), resp. rate 19, height 6' 2\" (1.88 m), weight 104.1 kg (229 lb 8 oz), SpO2 97 %. LUNG: clear to auscultation bilaterally, HEART: regular rate and rhythm, S1, S2 normal, no murmur, click, rub or gallop Alerts:   
Hospital Problems  Date Reviewed: 4/8/2015 Codes Class Noted POA Pulmonary hemorrhage ICD-10-CM: R04.89 ICD-9-CM: 786.30  9/12/2018 Unknown Acute GI bleeding ICD-10-CM: K92.2 ICD-9-CM: 578.9  9/12/2018 Unknown Laboratory:     
Recent Labs  
   09/17/18 
 0303 HGB  7.1*  
HCT  22.6* WBC  11.1 PLT  143* INR  1.2*  
BUN  15  
CREA  1.02  
 K  4.1 Plan of Care/Planned Procedure: 
Risks, benefits, and alternatives reviewed with patient and he agrees to proceed with the procedure. Plan is for pulmonary arteriogram with possible embolization Alexandria Hannah MD

## 2018-09-18 ENCOUNTER — APPOINTMENT (OUTPATIENT)
Dept: GENERAL RADIOLOGY | Age: 83
DRG: 003 | End: 2018-09-18
Attending: INTERNAL MEDICINE
Payer: MEDICARE

## 2018-09-18 LAB
ALBUMIN SERPL-MCNC: 2.1 G/DL (ref 3.5–5)
ALBUMIN/GLOB SERPL: 0.5 {RATIO} (ref 1.1–2.2)
ALP SERPL-CCNC: 59 U/L (ref 45–117)
ALT SERPL-CCNC: 18 U/L (ref 12–78)
ANION GAP SERPL CALC-SCNC: 7 MMOL/L (ref 5–15)
APTT PPP: 29.5 SEC (ref 22.1–32)
ARTERIAL PATENCY WRIST A: YES
AST SERPL-CCNC: 18 U/L (ref 15–37)
BACTERIA SPEC CULT: NORMAL
BASE DEFICIT BLDA-SCNC: 1.4 MMOL/L
BASOPHILS # BLD: 0 K/UL (ref 0–0.1)
BASOPHILS NFR BLD: 0 % (ref 0–1)
BDY SITE: ABNORMAL
BILIRUB SERPL-MCNC: 0.4 MG/DL (ref 0.2–1)
BUN SERPL-MCNC: 13 MG/DL (ref 6–20)
BUN/CREAT SERPL: 15 (ref 12–20)
CA-I BLD-SCNC: 1.12 MMOL/L (ref 1.13–1.32)
CALCIUM SERPL-MCNC: 8 MG/DL (ref 8.5–10.1)
CHLORIDE SERPL-SCNC: 111 MMOL/L (ref 97–108)
CO2 SERPL-SCNC: 25 MMOL/L (ref 21–32)
CREAT SERPL-MCNC: 0.87 MG/DL (ref 0.7–1.3)
DIFFERENTIAL METHOD BLD: ABNORMAL
EOSINOPHIL # BLD: 0.1 K/UL (ref 0–0.4)
EOSINOPHIL NFR BLD: 1 % (ref 0–7)
EPAP/CPAP/PEEP, PAPEEP: 6
ERYTHROCYTE [DISTWIDTH] IN BLOOD BY AUTOMATED COUNT: 18.6 % (ref 11.5–14.5)
FIBRINOGEN PPP-MCNC: >800 MG/DL (ref 200–475)
FIO2 ON VENT: 60 %
GAS FLOW.O2 SETTING OXYMISER: 14 L/MIN
GLOBULIN SER CALC-MCNC: 4.3 G/DL (ref 2–4)
GLUCOSE SERPL-MCNC: 109 MG/DL (ref 65–100)
GRAM STN SPEC: NORMAL
HCO3 BLDA-SCNC: 24 MMOL/L (ref 22–26)
HCT VFR BLD AUTO: 22.1 % (ref 36.6–50.3)
HGB BLD-MCNC: 6.8 G/DL (ref 12.1–17)
IMM GRANULOCYTES # BLD: 0.2 K/UL (ref 0–0.04)
IMM GRANULOCYTES NFR BLD AUTO: 2 % (ref 0–0.5)
INR PPP: 1.1 (ref 0.9–1.1)
LYMPHOCYTES # BLD: 0.5 K/UL (ref 0.8–3.5)
LYMPHOCYTES NFR BLD: 5 % (ref 12–49)
MAGNESIUM SERPL-MCNC: 2.5 MG/DL (ref 1.6–2.4)
MCH RBC QN AUTO: 34.9 PG (ref 26–34)
MCHC RBC AUTO-ENTMCNC: 30.8 G/DL (ref 30–36.5)
MCV RBC AUTO: 113.3 FL (ref 80–99)
MONOCYTES # BLD: 1.1 K/UL (ref 0–1)
MONOCYTES NFR BLD: 11 % (ref 5–13)
NEUTS SEG # BLD: 8.3 K/UL (ref 1.8–8)
NEUTS SEG NFR BLD: 81 % (ref 32–75)
NRBC # BLD: 0 K/UL (ref 0–0.01)
NRBC BLD-RTO: 0 PER 100 WBC
PCO2 BLDA: 44 MMHG (ref 35–45)
PH BLDA: 7.36 [PH] (ref 7.35–7.45)
PHOSPHATE SERPL-MCNC: 2.4 MG/DL (ref 2.6–4.7)
PLATELET # BLD AUTO: 133 K/UL (ref 150–400)
PMV BLD AUTO: 11.5 FL (ref 8.9–12.9)
PO2 BLDA: 101 MMHG (ref 80–100)
POTASSIUM SERPL-SCNC: 4.1 MMOL/L (ref 3.5–5.1)
PROT SERPL-MCNC: 6.4 G/DL (ref 6.4–8.2)
PROTHROMBIN TIME: 11.3 SEC (ref 9–11.1)
RBC # BLD AUTO: 1.95 M/UL (ref 4.1–5.7)
RBC MORPH BLD: ABNORMAL
SAO2 % BLD: 97 % (ref 92–97)
SAO2% DEVICE SAO2% SENSOR NAME: ABNORMAL
SERVICE CMNT-IMP: NORMAL
SODIUM SERPL-SCNC: 143 MMOL/L (ref 136–145)
SPECIMEN SITE: ABNORMAL
THERAPEUTIC RANGE,PTTT: NORMAL SECS (ref 58–77)
VENTILATION MODE VENT: ABNORMAL
VT SETTING VENT: 500 ML
WBC # BLD AUTO: 10.2 K/UL (ref 4.1–11.1)

## 2018-09-18 PROCEDURE — C9113 INJ PANTOPRAZOLE SODIUM, VIA: HCPCS | Performed by: INTERNAL MEDICINE

## 2018-09-18 PROCEDURE — 74011250636 HC RX REV CODE- 250/636: Performed by: INTERNAL MEDICINE

## 2018-09-18 PROCEDURE — 82330 ASSAY OF CALCIUM: CPT | Performed by: INTERNAL MEDICINE

## 2018-09-18 PROCEDURE — 74011000250 HC RX REV CODE- 250: Performed by: INTERNAL MEDICINE

## 2018-09-18 PROCEDURE — 65620000000 HC RM CCU GENERAL

## 2018-09-18 PROCEDURE — 36600 WITHDRAWAL OF ARTERIAL BLOOD: CPT | Performed by: INTERNAL MEDICINE

## 2018-09-18 PROCEDURE — 94003 VENT MGMT INPAT SUBQ DAY: CPT

## 2018-09-18 PROCEDURE — 85610 PROTHROMBIN TIME: CPT | Performed by: INTERNAL MEDICINE

## 2018-09-18 PROCEDURE — 85730 THROMBOPLASTIN TIME PARTIAL: CPT | Performed by: INTERNAL MEDICINE

## 2018-09-18 PROCEDURE — 83735 ASSAY OF MAGNESIUM: CPT | Performed by: INTERNAL MEDICINE

## 2018-09-18 PROCEDURE — 85025 COMPLETE CBC W/AUTO DIFF WBC: CPT | Performed by: INTERNAL MEDICINE

## 2018-09-18 PROCEDURE — 94640 AIRWAY INHALATION TREATMENT: CPT

## 2018-09-18 PROCEDURE — 36430 TRANSFUSION BLD/BLD COMPNT: CPT

## 2018-09-18 PROCEDURE — 84100 ASSAY OF PHOSPHORUS: CPT | Performed by: INTERNAL MEDICINE

## 2018-09-18 PROCEDURE — 82803 BLOOD GASES ANY COMBINATION: CPT | Performed by: INTERNAL MEDICINE

## 2018-09-18 PROCEDURE — 80053 COMPREHEN METABOLIC PANEL: CPT | Performed by: INTERNAL MEDICINE

## 2018-09-18 PROCEDURE — 74011000258 HC RX REV CODE- 258: Performed by: INTERNAL MEDICINE

## 2018-09-18 PROCEDURE — P9016 RBC LEUKOCYTES REDUCED: HCPCS | Performed by: INTERNAL MEDICINE

## 2018-09-18 PROCEDURE — 36415 COLL VENOUS BLD VENIPUNCTURE: CPT | Performed by: INTERNAL MEDICINE

## 2018-09-18 PROCEDURE — 71045 X-RAY EXAM CHEST 1 VIEW: CPT

## 2018-09-18 PROCEDURE — 74011250637 HC RX REV CODE- 250/637: Performed by: INTERNAL MEDICINE

## 2018-09-18 PROCEDURE — 85384 FIBRINOGEN ACTIVITY: CPT | Performed by: INTERNAL MEDICINE

## 2018-09-18 RX ORDER — SODIUM CHLORIDE 9 MG/ML
250 INJECTION, SOLUTION INTRAVENOUS AS NEEDED
Status: DISCONTINUED | OUTPATIENT
Start: 2018-09-18 | End: 2018-09-29 | Stop reason: ALTCHOICE

## 2018-09-18 RX ADMIN — PROPOFOL 16 MCG/KG/MIN: 10 INJECTION, EMULSION INTRAVENOUS at 11:27

## 2018-09-18 RX ADMIN — ALBUTEROL SULFATE 2.5 MG: 2.5 SOLUTION RESPIRATORY (INHALATION) at 23:00

## 2018-09-18 RX ADMIN — Medication 10 ML: at 22:02

## 2018-09-18 RX ADMIN — PROPOFOL 30 MCG/KG/MIN: 10 INJECTION, EMULSION INTRAVENOUS at 06:16

## 2018-09-18 RX ADMIN — PIPERACILLIN SODIUM,TAZOBACTAM SODIUM 4.5 G: 4; .5 INJECTION, POWDER, FOR SOLUTION INTRAVENOUS at 17:39

## 2018-09-18 RX ADMIN — PRAVASTATIN SODIUM 40 MG: 40 TABLET ORAL at 21:59

## 2018-09-18 RX ADMIN — DORZOLAMIDE HYDROCHLORIDE AND TIMOLOL MALEATE 1 DROP: 20; 5 SOLUTION/ DROPS OPHTHALMIC at 17:40

## 2018-09-18 RX ADMIN — CHLORHEXIDINE GLUCONATE 15 ML: 1.2 RINSE ORAL at 17:41

## 2018-09-18 RX ADMIN — ALBUTEROL SULFATE 2.5 MG: 2.5 SOLUTION RESPIRATORY (INHALATION) at 11:10

## 2018-09-18 RX ADMIN — ALBUTEROL SULFATE 2.5 MG: 2.5 SOLUTION RESPIRATORY (INHALATION) at 15:20

## 2018-09-18 RX ADMIN — PRAVASTATIN SODIUM 40 MG: 40 TABLET ORAL at 00:34

## 2018-09-18 RX ADMIN — MUPIROCIN: 20 OINTMENT TOPICAL at 09:10

## 2018-09-18 RX ADMIN — LATANOPROST 1 DROP: 50 SOLUTION OPHTHALMIC at 22:02

## 2018-09-18 RX ADMIN — ALBUTEROL SULFATE 2.5 MG: 2.5 SOLUTION RESPIRATORY (INHALATION) at 03:05

## 2018-09-18 RX ADMIN — CHLORHEXIDINE GLUCONATE 15 ML: 1.2 RINSE ORAL at 09:09

## 2018-09-18 RX ADMIN — PIPERACILLIN SODIUM,TAZOBACTAM SODIUM 4.5 G: 4; .5 INJECTION, POWDER, FOR SOLUTION INTRAVENOUS at 00:34

## 2018-09-18 RX ADMIN — POTASSIUM PHOSPHATE, MONOBASIC AND POTASSIUM PHOSPHATE, DIBASIC: 224; 236 INJECTION, SOLUTION INTRAVENOUS at 11:02

## 2018-09-18 RX ADMIN — SODIUM CHLORIDE 40 MG: 9 INJECTION, SOLUTION INTRAMUSCULAR; INTRAVENOUS; SUBCUTANEOUS at 09:09

## 2018-09-18 RX ADMIN — SODIUM CHLORIDE 50 ML/HR: 900 INJECTION, SOLUTION INTRAVENOUS at 17:42

## 2018-09-18 RX ADMIN — Medication 10 ML: at 17:40

## 2018-09-18 RX ADMIN — ALBUTEROL SULFATE 2.5 MG: 2.5 SOLUTION RESPIRATORY (INHALATION) at 19:27

## 2018-09-18 RX ADMIN — ALBUTEROL SULFATE 2.5 MG: 2.5 SOLUTION RESPIRATORY (INHALATION) at 07:54

## 2018-09-18 RX ADMIN — MUPIROCIN: 20 OINTMENT TOPICAL at 17:40

## 2018-09-18 RX ADMIN — Medication 10 ML: at 00:34

## 2018-09-18 RX ADMIN — DORZOLAMIDE HYDROCHLORIDE AND TIMOLOL MALEATE 1 DROP: 20; 5 SOLUTION/ DROPS OPHTHALMIC at 09:09

## 2018-09-18 RX ADMIN — PROPOFOL 20 MCG/KG/MIN: 10 INJECTION, EMULSION INTRAVENOUS at 21:54

## 2018-09-18 RX ADMIN — LATANOPROST 1 DROP: 50 SOLUTION OPHTHALMIC at 00:34

## 2018-09-18 RX ADMIN — PIPERACILLIN SODIUM,TAZOBACTAM SODIUM 4.5 G: 4; .5 INJECTION, POWDER, FOR SOLUTION INTRAVENOUS at 09:08

## 2018-09-18 NOTE — PROGRESS NOTES
[facility] Consult Service NotePulmonary, Critical Care, and Sleep Medicine Name: Rubia Pradhan MRN: [id number] : 1934 Hospital: [facility] Date: 2018   Hospital Day: 8 IMPRESSION:  
1. S/p PEA arrest post anigo 9/14 pm. Had 6 min of CPR and then ROSC. 2. Acute hemoptysis- Noted to have bleeding from RLL on Bronch. No endobronchial lesion. has lung mass(CA vs inflammatory pseudotumor) and microscopic hematuria; ANCA negative; JOSS barely positive. Suspect LUNG mass the culprit. Bronch will not help; 3. Elevated procalcitonin- treating for possible lung abscess/pseudotumor 4. Lung mass RLL- right lung base- would not be visible on conventional bronch and yield from transbronchial biopsy not guaranteed; had no lesion on chest Ct 2016 5. Coagulopathy from warfarin per , hold anticoagulation for now. 6. Acute kidney injury likely prerenal and related to medications (Lasix) Baseline creatinine is around 0.9 7. Hematuria 8. Sepsis due to suspected pneumonia. Patient reports having exertional shortness of breath along with cough and hemoptysis; CT of chest shows severe emphysema with the right basilar airspace disease in the oval mass with fluid level concerning for internal hemorrhage versus pneumonia ;  
9. 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  
10. Anemia 11. Former 50+ pack yr smoker quit in ? 12. Asbestos exposure 13. History of atrial fibrillation on anticoagulation with warfarin 14. Hx of CAD s/p PCI in the past home amiodarone. Hold Cardizem 15. Dyslipidemia 16. Multiorgan dysfunction as outlined above: Pt has one or more acute or chronic illnesses with severe exacerbation with progression or side effects of treatment that poses a threat to life or bodily function 17. Additional workup outlined below 25. Pt is requiring Drug therapy requiring intensive monitoring for toxicity 19. Pt is unstable, unpredictable needing PCU monitoring; is critically ill and at high risk of sudden decline and decompensation with life threatening consequenses and continued end organ dysfunction and failure 20. Prognosis guarded with significant risk of sudden decline 21. Critically ill, 35 min cc, EOP. Discussed with nurse this am. High risk of decompensation. RECOMMENDATIONS/PLAN:  
1. Full vent support 2. IR performed embolization of pulmonary vein 3. Follow Bronchial washing for Cx and Cytology 4. Hold anticoagulation for now. Place on SCDS. 5. If cytology positive for mailgnancy, would ask Rad Onc to see for XRT 6. Transfuse today 7. Wean off pressors today 8. Empiric abx to treat possible lung abscess 9. Follow cultures 10. Labs to follow electrolytes, renal function and and blood counts 11. Glucose monitoring and SSI 12. Bronchial hygiene with respiratory therapy techniques, bronchodilators 13. DVT, SUP prophylaxis 14. Pt needs IV fluids with additives and Drug therapy requiring intensive monitoring for toxicity 15. Prescription drug management with home med reconciliation reviewed My assessment/management discussed with: Consultants, Nursing, Pharmacy, Case Management, PT, OT, Respiratory Therapy, Hospitalist and Family for coordination of care Pt's condition is acute and unstable requiring inpatient hospitalization. This care involved high complexity decision making which includes independently reviewing the patient's past medical records, current laboratory results, medication profiles that were immediately available to me and actual Xray images at the bedside in order to assess, support vital system function, and to treat this degree of vital organ system failure, and to prevent further deterioration of the patients condition. Risk of deterioration: medium and high [x] High complexity decision making was performed [x] See my orders for details Tubes:    
 
Subjective/Initial History: S/P pulmonary vein embolization Decreased bloody secretions overnight No acute events overnight MAR reviewed and pertinent medications noted or modified as needed Current Facility-Administered Medications Medication  chlorhexidine (PERIDEX) 0.12 % mouthwash 15 mL  mupirocin (BACTROBAN) 2 % ointment  pantoprazole (PROTONIX) 40 mg in sodium chloride 0.9% 10 mL injection  piperacillin-tazobactam (ZOSYN) 4.5 g in 0.9% sodium chloride (MBP/ADV) 100 mL  NOREPINephrine (LEVOPHED) 8 mg in 5% dextrose 250mL infusion  propofol (DIPRIVAN) infusion  albuterol (PROVENTIL VENTOLIN) nebulizer solution 2.5 mg  
 fentaNYL citrate (PF) injection 25 mcg  
 0.9% sodium chloride infusion  albuterol (PROVENTIL HFA, VENTOLIN HFA, PROAIR HFA) inhaler 2 Puff  dorzolamide-timolol (COSOPT) 22.3-6.8 mg/mL ophthalmic solution 1 Drop  latanoprost (XALATAN) 0.005 % ophthalmic solution 1 Drop  pravastatin (PRAVACHOL) tablet 40 mg  
 sodium chloride (NS) flush 5-10 mL  sodium chloride (NS) flush 5-10 mL  acetaminophen (TYLENOL) tablet 650 mg  
 ondansetron (ZOFRAN) injection 4 mg  docusate sodium (COLACE) capsule 100 mg  
  
 
  
ROS:A comprehensive review of systems was negative except for that written in the HPI. Objective: 
 
Vital Signs: Telemetry:    normal sinus rhythmIntake/Output:  
Visit Vitals  /46  Pulse 76  Temp 98.6 °F (37 °C)  Resp 21  
 Ht 6' 2\" (1.88 m)  Wt 104.1 kg (229 lb 8 oz)  SpO2 95%  BMI 29.47 kg/m2 Temp (24hrs), Av.5 °F (36.9 °C), Min:98.1 °F (36.7 °C), Max:98.7 °F (37.1 °C) O2 Device: Endotracheal tube, Ventilator O2 Flow Rate (L/min): 5 l/min Wt Readings from Last 4 Encounters:  
18 104.1 kg (229 lb 8 oz) 16 111.1 kg (245 lb)  
16 106.6 kg (235 lb) 04/08/15 103.4 kg (228 lb) Intake/Output Summary (Last 24 hours) at 09/18/18 5887 Last data filed at 09/18/18 0600 Gross per 24 hour Intake          2006.25 ml Output             1065 ml Net           941.25 ml Last shift:        
Last 3 shifts: 09/16 1901 - 09/18 0700 In: 3171.5 [I.V.:3141.5] Out: 1759 [RHUNH:[id number]] Physical Exam:  
 General:   male; with Et tube in place. Has right IJ CVC. On sedation and on vent. HEAD: Normocephalic, without obvious abnormality, atraumatic EYES: conjunctivae clear. PERRL,  AN Icteric sclerae NOSE: nares normal, no drainage, no nasal flaring, THROAT: normal; Lips, mucosa dry; No Thrush;  crowded airway; tongue midline Neck: Supple, symmetrical, trachea midline,  No accessory mm use; No Stridor/ cuff leak, No goiter or thyroid tenderness LYMPH: No abnormally enlarged lymph nodes. in neck or groin Chest: increased AP diameter Lungs: agonal, bilateral rhonchi. Seems to have good air  Movement Bilaterally. Heart: Regular rate and rhythm; NO edema Abdomen: soft, non-tender, without masses or organomegaly, protuberant, distended : no Overton Musculoskeletal: mild kyphosis; No spine or CVA tenderness; negative, cyanosis, clubbing; no joint swelling or erythema Neuro: was not  Responsive, had a gag with ET tube suctioning. , Sedated not able to fully assess. Psych: Not able to assess due to being on sedation and on vent. Skin: Pallor;  
 Pulses:Bilateral, Radial, 2+ Capillary refill: abnormal: ; sluggish capillary refill, pale, 
 
Data:  
 
All lab results for the last 24 hours reviewed. Labs: 
 
Recent Labs  
   09/18/18 
 0409  09/17/18 
 0303  09/16/18 
 1648  09/16/18 
 0149 WBC  10.2  11.1   --   9.8 HGB  6.8*  7.1*  7.2*  7.2*  
PLT  133*  143*   --   128* INR  1.1  1.2*   --   1.3* APTT  29.5  28.6   --   30.5 Recent Labs  
   09/18/18 
 0409  09/17/18 
 0303  09/16/18 0412  
NA  143  143  140  
K  4.1  4.1  4.6 CL  111*  110*  109* CO2  25  27  25 GLU  109*  128*  120* BUN  13  15  16 CREA  0.87  1.02  1.16  
CA  8.0*  8.0*  8.1*  
MG  2.5*  2.4  2.5* PHOS  2.4*  2.0*  2.3* ALB  2.1*  2.2*  2.3*  
SGOT  18  19  23 ALT  18  21  25 Recent Labs  
   09/18/18 
 0511  09/17/18 
 1009 PH  7.36  7.39  
PCO2  44  39 PO2  101*  72* HCO3  24  23 FIO2  60  60 No results for input(s): CPK, CKNDX, TROIQ in the last 72 hours. No lab exists for component: CPKMB No results found for: BNPP, BNP Lab Results Component Value Date/Time Culture result: MODERATE NORMAL RESPIRATORY SHELDON SO FAR 09/16/2018 11:15 AM  
 Culture result: LIGHT NORMAL RESPIRATORY SHELDON 09/15/2018 01:20 PM  
 Culture result: LIGHT NORMAL RESPIRATORY SHELDON 09/12/2018 03:50 PM  
No results found for: TSH, TSHEXT, TSHEXT Imaging: CXR: mild edema. I have personally and independently reviewed the patients interval and diagnostic data, radiographs and have reviewed the reports. Medical Decision Making Today: · Reviewed the flowsheet and previous days notes · Reviewed and summarized records or history from previous days note or discussions with staff, family · Parenteral controlled substances - Reviewed/ Adjusted / Weaned / Started · High Risk Drug therapy requiring intensive monitoring for toxicity: eg steroids, pressors, antibiotics · Reviewed and/or ordered Clinical lab tests · Reviewed and/or ordered Radiology tests · Reviewed and/or ordered of Medicine tests · Independently visualized radiologic Images · I have personally reviewed the patients ECG / Telemetry Milagros Lopez MD

## 2018-09-18 NOTE — PROGRESS NOTES
Nutrition:  Chart reviewed, case discussed during CCU rounds. Pt remains intubated and sedated on propofol. Per discussion with Dr. Rebel Reed okay to start TF via OGT. Initiate TwoCal HN @ 10mL/h, advance rate 10mL q 8h as tolerated to Goal Rate of 30mL/h + Prosource BID + 50mL H2O flush q 4h (provides 1560kcals/90gPro/804mL) Will continue to monitor pt's case closely. Thank you! Elsa Hendricks RD, [facility] Pager [phone]

## 2018-09-18 NOTE — PROGRESS NOTES
Hospitalist Progress Note NAME: Rubia Pradhan  
:  1934 MRN:  [id number] Assessment / Plan: PEA arrest  Code blue called . Patient w/ agonal breathing after coming back from radiology after arteriogram completed. No pulse detected. CPR initiated. Epi x 2 given. Pulse obtained. Intubated by Dr. Becki Soliman. Much bloody secretions obtained. Transferred to ICU. Family was updated at time of code. Acute respiratory w/ hypoxia, s/p cardiac arrest 
-vent management per Pulmonary Acute upper GI bleed(hematemesis) -may be due to hemoptysis since patient had been coughing as well. Patient admitted to taking total 800 mg/day Ibuprofen last few weeks for pain on left side. -GI consulted. It was felt that dark stool may be due swallowed blood. No EGD planned. -PPI 
-continue to hold anticoagulation. Follow H&H. 
-transfusing gtoday Sepsis due to suspected PNA w/ possible pulmonary hemorrhage 
-CT of chest shows severe emphysema with the right basilar airspace disease in the oval mass with fluid level concerning for internal hemorrhage versus pneumonia. -WBC elevated on admission. Now improved. -follow cultures 
-cont abx 
-sputum cytology collected  negative. Hemoptysis 
-unknown etiology. -work up for possible rheumatologic cause neg thus far: 
ANCA, anti-GBM, MPO ab, WA-3 ab, SSA/SSB, RNP ab, Arredondo/RNP, dsDNA, and Arredondo ab all negative. JOSS +. 
-Arteriogram : Thoracic aortic and intercostal arteriograms as described above demonstrating no 
enlarged bronchial artery supplying the right lower lobe of the lung. A normal 
appearing and normal sized right bronchial artery is seen without any 
hyperplasia or dominant right lower lobe supply. No embolization was performed. RLL mass - unable to determine if hemorrhage 
-Dr. Olimpia Ugalde considered Wegener's as possible cause since patient w/ hemoptysis and hematuria. However, work up neg thus far. -diagnostic bronch done 9/16. No endobronchial lesions seen. CTA chest done 9/17 due to persistent bloody pulmonary secretions. Results: 
Right lower lobe masslike abnormality demonstrates increased internal density 
and has increased in size measuring 6.7 x 6.7 cm, compared to 5.7 x 4.4 cm. This 
is compatible with internal hemorrhage. There is new moderate right lower lobe 
partial collapse/atelectasis.  
  
Normal sized right bronchial artery is visualized without any hyperplasia or or 
evidence for supply to the right lower lobe lung abnormality. It is unlikely 
that the hemoptysis related to bronchial arterial injury/supply. Prior bronchial 
arteriogram was unremarkable. No evidence for pulmonary artery pseudoaneurysm or 
active hemorrhage. However, it is more likely that the masslike abnormality in 
the right lower lobe the lung has eroded into an adjacent pulmonary artery then 
a bronchial artery. Therefore, plan is for pulmonary arteriogram with possible 
embolization if an abnormality is seen in the right lower lobe. 6 mm and 4 mm right lung nodules. Small bilateral pleural effusions. Minimally enlarged bilateral hilar and mediastinal lymph nodes Acute blood loss anemia on admission 
-Hgb 11 on admission.   
-serial H&H 
 
H/o atrial fibrillation on chronic anticoagulation w/ coumadin 
-amiodarone on hold 
-cardizem on hold due to borderline low bp 
-hold coumadin 
 
Coumadin-induced coagulopathy 
-INR improved. Holding coumadin. ALAN - likely prerenal related to Lasix. Renal function stable. 
-avoid nephrotoxic agents. UTI, acute cystitis w/ hematuria, POA 
-cont abx; follow urine cx. COPD - stable HLD 
-cont pravachol Obesity - BMI 32 Cont Levophed for pressor support Difficult case. Code: Full DVT prophylaxis: SCDs GI prophylaxis: PPI Subjective: Chief Complaint / Reason for Physician Visit Follow up for hemoptysis. Intubated, sedated. Discussed with RN events overnight. Review of Systems: 
Symptom Y/N Comments  Symptom Y/N Comments Fever/Chills    Chest Pain Poor Appetite    Edema Cough    Abdominal Pain Sputum    Joint Pain SOB/ROY    Pruritis/Rash Nausea/vomit    Tolerating PT/OT Diarrhea    Tolerating Diet Constipation    Other Could NOT obtain due to: sedated Objective: VITALS:  
Last 24hrs VS reviewed since prior progress note. Most recent are: 
Patient Vitals for the past 24 hrs: 
 Temp Pulse Resp BP SpO2  
09/18/18 1200 98.6 °F (37 °C) 77 28 113/53 98 % 09/18/18 1145 98.6 °F (37 °C) 76 27 112/56 99 % 09/18/18 1132 - 70 24 - 99 % 09/18/18 1130 - 72 25 111/51 99 % 09/18/18 1128 98.1 °F (36.7 °C) 72 22 111/54 99 % 09/18/18 1110 - - - - 100 % 09/18/18 1100 98.1 °F (36.7 °C) 74 23 107/52 99 % 09/18/18 1030 - 72 22 112/57 99 % 09/18/18 1000 98.6 °F (37 °C) 76 22 113/53 99 % 09/18/18 0930 - 77 23 106/56 98 % 09/18/18 0915 99.1 °F (37.3 °C) 79 21 119/58 98 % 09/18/18 0900 - 80 22 119/58 99 % 09/18/18 0852 98.6 °F (37 °C) 88 22 118/60 99 % 09/18/18 0830 - 83 22 118/60 98 % 09/18/18 0800 - 75 24 99/51 99 % 09/18/18 0755 - - - - 95 % 09/18/18 0745 - 76 21 - 96 % 09/18/18 0730 - 74 22 107/46 97 % 09/18/18 0700 - 77 23 106/54 96 % 09/18/18 0630 - 69 20 97/46 94 % 09/18/18 0600 - 70 20 (!) 88/44 95 % 09/18/18 0530 - 70 19 96/46 97 % 09/18/18 0500 - 75 19 102/55 99 % 09/18/18 0430 - 80 19 105/49 99 % 09/18/18 0400 98.6 °F (37 °C) 77 18 111/52 98 % 09/18/18 0342 - 83 17 - 98 % 09/18/18 0330 - 78 22 111/54 98 % 09/18/18 0305 - - - - 98 % 09/18/18 0300 - 75 22 110/52 100 % 09/18/18 0230 - 77 20 111/52 100 % 09/18/18 0200 - 78 19 106/55 100 % 09/18/18 0130 - 76 20 108/51 100 % 09/18/18 0100 - 78 18 107/50 99 % 09/18/18 0030 - 78 17 119/46 100 % 09/18/18 0001 98.7 °F (37.1 °C) 79 20 115/59 100 % 09/17/18 2330 - 76 20 108/50 100 % 09/17/18 2327 - 76 21 - 99 % 09/17/18 2305 - - - - 99 % 09/17/18 2300 - 78 20 109/53 99 % 09/17/18 2230 - 76 20 110/57 100 % 09/17/18 2200 - 80 21 120/50 100 % 09/17/18 2130 - 81 21 123/47 100 % 09/17/18 2100 - 84 19 125/58 99 % 09/17/18 2030 - 84 18 112/42 98 % 09/17/18 2000 98.2 °F (36.8 °C) 84 18 111/42 98 % 09/17/18 1930 - 84 18 112/45 99 % 09/17/18 1925 - - - - 99 % 09/17/18 1915 - 88 19 117/49 99 % 09/17/18 1900 - 86 20 118/45 99 % 09/17/18 1847 - 89 18 - 99 % 09/17/18 1845 - 88 21 128/48 99 % 09/17/18 1830 - 90 21 132/42 98 % 09/17/18 1815 98.1 °F (36.7 °C) 92 20 141/58 98 % 09/17/18 1745 - 98 20 131/56 96 % 09/17/18 1740 - 94 20 129/57 96 % 09/17/18 1735 - 94 20 124/45 96 % 09/17/18 1730 - 88 20 131/55 96 % 09/17/18 1725 - 87 21 128/51 96 % 09/17/18 1720 - 93 22 140/68 96 % 09/17/18 1715 - 94 23 122/58 94 % 09/17/18 1710 - 98 19 135/52 96 % 09/17/18 1705 - 92 20 131/56 96 % 09/17/18 1700 - 83 21 129/48 96 % 09/17/18 1655 - 96 20 124/56 96 % 09/17/18 1650 - 90 21 (!) 108/39 96 % 09/17/18 1645 - 95 20 122/50 96 % 09/17/18 1640 - 93 20 120/49 97 % 09/17/18 1635 - 96 21 142/55 97 % 09/17/18 1630 - (!) 102 23 125/54 97 % 09/17/18 1625 - 89 19 122/60 97 % 09/17/18 1620 - 92 22 126/58 97 % 09/17/18 1615 - 90 23 121/58 98 % 09/17/18 1610 - (!) 103 25 125/68 98 % 09/17/18 1605 - 97 29 122/54 97 % 09/17/18 1601 - 93 25 137/58 96 % 09/17/18 1558 - 96 26 124/56 97 % 09/17/18 1501 - 88 19 - 97 % 09/17/18 1454 - 89 20 111/48 97 % 09/17/18 1430 - 89 20 108/54 97 % 09/17/18 1400 - 92 28 119/55 94 % 09/17/18 1330 - 87 26 124/51 95 % Intake/Output Summary (Last 24 hours) at 09/18/18 1321 Last data filed at 09/18/18 1141 Gross per 24 hour Intake          2316.09 ml Output              905 ml Net          1411.09 ml PHYSICAL EXAM: 
General: WD, WN. Intubated. No distress. EENT:   Anicteric sclerae. MMM;  ET tube in place. Resp:  Decreased breath sounds. Clear anteriorly. No rhonchi. No accessory muscle use CV:  Regular  rhythm,  1+ bilateral leg edema GI:  Soft, Non distended, Non tender.  +Bowel sounds Neurologic:  Sedated. No posturing. Psych:   No agitation Skin:  No rashes. No jaundice Reviewed most current lab test results and cultures  YES Reviewed most current radiology test results   YES Review and summation of old records today    NO Reviewed patient's current orders and MAR    YES 
PMH/SH reviewed - no change compared to H&P 
________________________________________________________________________ Care Plan discussed with: 
  Comments Patient Family RN x Care Manager Consultant Multidiciplinary team rounds were held today with , nursing, pharmacist and clinical coordinator. Patient's plan of care was discussed; medications were reviewed and discharge planning was addressed. ________________________________________________________________________ Total NON critical care TIME:  25  Minutes Total CRITICAL CARE TIME Spent:   Minutes non procedure based Comments >50% of visit spent in counseling and coordination of care    
________________________________________________________________________ Mounika Lopez MD  
 
Procedures: see electronic medical records for all procedures/Xrays and details which were not copied into this note but were reviewed prior to creation of Plan. LABS: 
I reviewed today's most current labs and imaging studies. Pertinent labs include: 
Recent Labs  
   09/18/18 
 0409  09/17/18 
 0303  09/16/18 
 1648  09/16/18 
 0827 WBC  10.2  11.1   --   9.8 HGB  6.8*  7.1*  7.2*  7.2* HCT  22.1*  22.6*   --   22.8*  
PLT  133*  143*   --   128* Recent Labs  
   09/18/18 
 0409  09/17/18 
 0303  09/16/18 
 6343 NA  143  143  140  
K  4.1  4.1  4.6 CL  111*  110*  109* CO2  25  27  25 GLU  109*  128*  120* BUN  13  15  16 CREA  0.87  1.02  1.16  
CA  8.0*  8.0*  8.1*  
MG  2.5*  2.4  2.5* PHOS  2.4*  2.0*  2.3* ALB  2.1*  2.2*  2.3* TBILI  0.4  0.5  0.4 SGOT  18  19  23 ALT  18  21  25 INR  1.1  1.2*  1.3* Signed: Zenaida Finn MD

## 2018-09-18 NOTE — INTERDISCIPLINARY ROUNDS
Interdisciplinary team rounds were held 9/18/18 with the following team members:Care Management, Diabetes Treatment Specialist, Nursing, Nutrition, Pharmacy, Physical Therapy, Physician and Clinical Coordinator. Plan of care discussed. Goal: See MD orders and progress notes for further  interventions and desired outcomes.

## 2018-09-19 ENCOUNTER — APPOINTMENT (OUTPATIENT)
Dept: GENERAL RADIOLOGY | Age: 83
DRG: 003 | End: 2018-09-19
Attending: INTERNAL MEDICINE
Payer: MEDICARE

## 2018-09-19 LAB
ALBUMIN SERPL-MCNC: 2 G/DL (ref 3.5–5)
ALBUMIN/GLOB SERPL: 0.4 {RATIO} (ref 1.1–2.2)
ALP SERPL-CCNC: 70 U/L (ref 45–117)
ALT SERPL-CCNC: 22 U/L (ref 12–78)
ANION GAP SERPL CALC-SCNC: 7 MMOL/L (ref 5–15)
APTT PPP: 28.3 SEC (ref 22.1–32)
ARTERIAL PATENCY WRIST A: YES
AST SERPL-CCNC: 30 U/L (ref 15–37)
BASE DEFICIT BLDA-SCNC: 2.7 MMOL/L
BDY SITE: ABNORMAL
BILIRUB SERPL-MCNC: 0.8 MG/DL (ref 0.2–1)
BUN SERPL-MCNC: 23 MG/DL (ref 6–20)
BUN/CREAT SERPL: 20 (ref 12–20)
CALCIUM SERPL-MCNC: 8 MG/DL (ref 8.5–10.1)
CHLORIDE SERPL-SCNC: 112 MMOL/L (ref 97–108)
CO2 SERPL-SCNC: 25 MMOL/L (ref 21–32)
CREAT SERPL-MCNC: 1.14 MG/DL (ref 0.7–1.3)
EPAP/CPAP/PEEP, PAPEEP: 6
ERYTHROCYTE [DISTWIDTH] IN BLOOD BY AUTOMATED COUNT: 21.8 % (ref 11.5–14.5)
FIBRINOGEN PPP-MCNC: >800 MG/DL (ref 200–475)
FIO2 ON VENT: 50 %
GAS FLOW.O2 SETTING OXYMISER: 12 L/MIN
GLOBULIN SER CALC-MCNC: 4.5 G/DL (ref 2–4)
GLUCOSE SERPL-MCNC: 129 MG/DL (ref 65–100)
HCO3 BLDA-SCNC: 22 MMOL/L (ref 22–26)
HCT VFR BLD AUTO: 25.4 % (ref 36.6–50.3)
HGB BLD-MCNC: 7.9 G/DL (ref 12.1–17)
INR PPP: 1.1 (ref 0.9–1.1)
MCH RBC QN AUTO: 33.1 PG (ref 26–34)
MCHC RBC AUTO-ENTMCNC: 31.1 G/DL (ref 30–36.5)
MCV RBC AUTO: 106.3 FL (ref 80–99)
NRBC # BLD: 0.02 K/UL (ref 0–0.01)
NRBC BLD-RTO: 0.2 PER 100 WBC
PCO2 BLDA: 39 MMHG (ref 35–45)
PH BLDA: 7.37 [PH] (ref 7.35–7.45)
PLATELET # BLD AUTO: 119 K/UL (ref 150–400)
PMV BLD AUTO: 11.5 FL (ref 8.9–12.9)
PO2 BLDA: 69 MMHG (ref 80–100)
POTASSIUM SERPL-SCNC: 4.1 MMOL/L (ref 3.5–5.1)
PROT SERPL-MCNC: 6.5 G/DL (ref 6.4–8.2)
PROTHROMBIN TIME: 11.2 SEC (ref 9–11.1)
RBC # BLD AUTO: 2.39 M/UL (ref 4.1–5.7)
SAO2 % BLD: 94 % (ref 92–97)
SAO2% DEVICE SAO2% SENSOR NAME: ABNORMAL
SODIUM SERPL-SCNC: 144 MMOL/L (ref 136–145)
SPECIMEN SITE: ABNORMAL
THERAPEUTIC RANGE,PTTT: ABNORMAL SECS (ref 58–77)
VENTILATION MODE VENT: ABNORMAL
VT SETTING VENT: 500 ML
WBC # BLD AUTO: 11.6 K/UL (ref 4.1–11.1)

## 2018-09-19 PROCEDURE — 94640 AIRWAY INHALATION TREATMENT: CPT

## 2018-09-19 PROCEDURE — 85027 COMPLETE CBC AUTOMATED: CPT | Performed by: INTERNAL MEDICINE

## 2018-09-19 PROCEDURE — 65620000000 HC RM CCU GENERAL

## 2018-09-19 PROCEDURE — 82803 BLOOD GASES ANY COMBINATION: CPT | Performed by: INTERNAL MEDICINE

## 2018-09-19 PROCEDURE — 36600 WITHDRAWAL OF ARTERIAL BLOOD: CPT | Performed by: INTERNAL MEDICINE

## 2018-09-19 PROCEDURE — C9113 INJ PANTOPRAZOLE SODIUM, VIA: HCPCS | Performed by: INTERNAL MEDICINE

## 2018-09-19 PROCEDURE — 85610 PROTHROMBIN TIME: CPT | Performed by: INTERNAL MEDICINE

## 2018-09-19 PROCEDURE — 94003 VENT MGMT INPAT SUBQ DAY: CPT

## 2018-09-19 PROCEDURE — 74011250637 HC RX REV CODE- 250/637: Performed by: INTERNAL MEDICINE

## 2018-09-19 PROCEDURE — 74011000258 HC RX REV CODE- 258: Performed by: INTERNAL MEDICINE

## 2018-09-19 PROCEDURE — 74011250636 HC RX REV CODE- 250/636: Performed by: INTERNAL MEDICINE

## 2018-09-19 PROCEDURE — 80053 COMPREHEN METABOLIC PANEL: CPT | Performed by: INTERNAL MEDICINE

## 2018-09-19 PROCEDURE — 71045 X-RAY EXAM CHEST 1 VIEW: CPT

## 2018-09-19 PROCEDURE — 74011000250 HC RX REV CODE- 250: Performed by: INTERNAL MEDICINE

## 2018-09-19 PROCEDURE — 36415 COLL VENOUS BLD VENIPUNCTURE: CPT | Performed by: INTERNAL MEDICINE

## 2018-09-19 PROCEDURE — 36430 TRANSFUSION BLD/BLD COMPNT: CPT

## 2018-09-19 RX ADMIN — Medication 10 ML: at 13:25

## 2018-09-19 RX ADMIN — PIPERACILLIN SODIUM,TAZOBACTAM SODIUM 4.5 G: 4; .5 INJECTION, POWDER, FOR SOLUTION INTRAVENOUS at 00:26

## 2018-09-19 RX ADMIN — PROPOFOL 18 MCG/KG/MIN: 10 INJECTION, EMULSION INTRAVENOUS at 18:55

## 2018-09-19 RX ADMIN — MUPIROCIN: 20 OINTMENT TOPICAL at 17:06

## 2018-09-19 RX ADMIN — SODIUM CHLORIDE 50 ML/HR: 900 INJECTION, SOLUTION INTRAVENOUS at 10:53

## 2018-09-19 RX ADMIN — CHLORHEXIDINE GLUCONATE 15 ML: 1.2 RINSE ORAL at 17:06

## 2018-09-19 RX ADMIN — ALBUTEROL SULFATE 2.5 MG: 2.5 SOLUTION RESPIRATORY (INHALATION) at 23:25

## 2018-09-19 RX ADMIN — PIPERACILLIN SODIUM,TAZOBACTAM SODIUM 4.5 G: 4; .5 INJECTION, POWDER, FOR SOLUTION INTRAVENOUS at 17:02

## 2018-09-19 RX ADMIN — PROPOFOL 18 MCG/KG/MIN: 10 INJECTION, EMULSION INTRAVENOUS at 11:14

## 2018-09-19 RX ADMIN — PROPOFOL 18 MCG/KG/MIN: 10 INJECTION, EMULSION INTRAVENOUS at 06:55

## 2018-09-19 RX ADMIN — ALBUTEROL SULFATE 2.5 MG: 2.5 SOLUTION RESPIRATORY (INHALATION) at 11:08

## 2018-09-19 RX ADMIN — MUPIROCIN: 20 OINTMENT TOPICAL at 08:10

## 2018-09-19 RX ADMIN — ALBUTEROL SULFATE 2.5 MG: 2.5 SOLUTION RESPIRATORY (INHALATION) at 07:18

## 2018-09-19 RX ADMIN — Medication 10 ML: at 05:28

## 2018-09-19 RX ADMIN — PRAVASTATIN SODIUM 40 MG: 40 TABLET ORAL at 21:28

## 2018-09-19 RX ADMIN — CHLORHEXIDINE GLUCONATE 15 ML: 1.2 RINSE ORAL at 08:10

## 2018-09-19 RX ADMIN — DORZOLAMIDE HYDROCHLORIDE AND TIMOLOL MALEATE 1 DROP: 20; 5 SOLUTION/ DROPS OPHTHALMIC at 17:06

## 2018-09-19 RX ADMIN — LATANOPROST 1 DROP: 50 SOLUTION OPHTHALMIC at 21:29

## 2018-09-19 RX ADMIN — PIPERACILLIN SODIUM,TAZOBACTAM SODIUM 4.5 G: 4; .5 INJECTION, POWDER, FOR SOLUTION INTRAVENOUS at 08:34

## 2018-09-19 RX ADMIN — DORZOLAMIDE HYDROCHLORIDE AND TIMOLOL MALEATE 1 DROP: 20; 5 SOLUTION/ DROPS OPHTHALMIC at 08:10

## 2018-09-19 RX ADMIN — SODIUM CHLORIDE 40 MG: 9 INJECTION, SOLUTION INTRAMUSCULAR; INTRAVENOUS; SUBCUTANEOUS at 08:35

## 2018-09-19 RX ADMIN — Medication 10 ML: at 21:29

## 2018-09-19 RX ADMIN — ALBUTEROL SULFATE 2.5 MG: 2.5 SOLUTION RESPIRATORY (INHALATION) at 19:38

## 2018-09-19 RX ADMIN — ALBUTEROL SULFATE 2.5 MG: 2.5 SOLUTION RESPIRATORY (INHALATION) at 15:37

## 2018-09-19 RX ADMIN — ALBUTEROL SULFATE 2.5 MG: 2.5 SOLUTION RESPIRATORY (INHALATION) at 03:00

## 2018-09-19 NOTE — PROGRESS NOTES
[facility] Pulmonary Consult Service NotePulmonary, Critical Care, and Sleep Medicine Name: Dwain Walker MRN: [id number] : 1934 Hospital: [facility] Date: 2018   Hospital Day: 5 IMPRESSION:  
1. S/p PEA arrest post anigo 9/14 pm. Had 6 min of CPR and then ROSC. 2. Acute hemoptysis- Noted to have bleeding from RLL on Bronch. No endobronchial lesion. has lung mass(CA vs inflammatory pseudotumor) and microscopic hematuria; ANCA negative; JOSS barely positive. Suspect LUNG mass the culprit. Bronch will not help; 3. Elevated procalcitonin- treating for possible lung abscess/pseudotumor 4. Lung mass RLL- right lung base- would not be visible on conventional bronch and yield from transbronchial biopsy not guaranteed; had no lesion on chest Ct 2016 5. Coagulopathy from warfarin per , hold anticoagulation for now. 6. Acute kidney injury likely prerenal and related to medications (Lasix) Baseline creatinine is around 0.9 7. Hematuria 8. Sepsis due to suspected pneumonia. Patient reports having exertional shortness of breath along with cough and hemoptysis; CT of chest shows severe emphysema with the right basilar airspace disease in the oval mass with fluid level concerning for internal hemorrhage versus pneumonia ;  
9. 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  
10. Anemia 11. Former 50+ pack yr smoker quit in ? 12. Asbestos exposure 13. History of atrial fibrillation on anticoagulation with warfarin 14. Hx of CAD s/p PCI in the past home amiodarone. Hold Cardizem 15. Dyslipidemia 16. Multiorgan dysfunction as outlined above: Pt has one or more acute or chronic illnesses with severe exacerbation with progression or side effects of treatment that poses a threat to life or bodily function 17. Additional workup outlined below 25. Pt is requiring Drug therapy requiring intensive monitoring for toxicity 19. Pt is unstable, unpredictable needing PCU monitoring; is critically ill and at high risk of sudden decline and decompensation with life threatening consequenses and continued end organ dysfunction and failure 20. Prognosis guarded with significant risk of sudden decline 21. Critically ill, 35 min cc, EOP. Discussed with nurse this am. High risk of decompensation. RECOMMENDATIONS/PLAN:  
1. Full vent support 2. IR performed embolization of pulmonary artery 3. Follow Bronchial washing for Cx and Cytology 4. Hold anticoagulation for now. SCDS. 5. If cytology positive for mailgnancy, would ask Rad Onc to see for XRT 6. off pressors 7. Empiric abx to treat possible lung abscess 8. Follow cultures 9. Labs to follow electrolytes, renal function and and blood counts 10. Glucose monitoring and SSI 11. Bronchial hygiene with respiratory therapy techniques, bronchodilators 12. DVT, SUP prophylaxis 13. Pt needs IV fluids with additives and Drug therapy requiring intensive monitoring for toxicity 14. Prescription drug management with home med reconciliation reviewed My assessment/management discussed with: Consultants, Nursing, Pharmacy, Case Management, PT, OT, Respiratory Therapy, Hospitalist and Family for coordination of care Pt's condition is acute and unstable requiring inpatient hospitalization. This care involved high complexity decision making which includes independently reviewing the patient's past medical records, current laboratory results, medication profiles that were immediately available to me and actual Xray images at the bedside in order to assess, support vital system function, and to treat this degree of vital organ system failure, and to prevent further deterioration of the patients condition. Risk of deterioration: medium and high [x] High complexity decision making was performed [x] See my orders for details Tubes:    
 
Subjective/Initial History: S/P pulmonary artery embolization Decreased bloody secretions No acute events overnight Still on full vent support MAR reviewed and pertinent medications noted or modified as needed Current Facility-Administered Medications Medication  0.9% sodium chloride infusion 250 mL  chlorhexidine (PERIDEX) 0.12 % mouthwash 15 mL  mupirocin (BACTROBAN) 2 % ointment  pantoprazole (PROTONIX) 40 mg in sodium chloride 0.9% 10 mL injection  piperacillin-tazobactam (ZOSYN) 4.5 g in 0.9% sodium chloride (MBP/ADV) 100 mL  NOREPINephrine (LEVOPHED) 8 mg in 5% dextrose 250mL infusion  propofol (DIPRIVAN) infusion  albuterol (PROVENTIL VENTOLIN) nebulizer solution 2.5 mg  
 fentaNYL citrate (PF) injection 25 mcg  
 0.9% sodium chloride infusion  albuterol (PROVENTIL HFA, VENTOLIN HFA, PROAIR HFA) inhaler 2 Puff  dorzolamide-timolol (COSOPT) 22.3-6.8 mg/mL ophthalmic solution 1 Drop  latanoprost (XALATAN) 0.005 % ophthalmic solution 1 Drop  pravastatin (PRAVACHOL) tablet 40 mg  
 sodium chloride (NS) flush 5-10 mL  sodium chloride (NS) flush 5-10 mL  acetaminophen (TYLENOL) tablet 650 mg  
 ondansetron (ZOFRAN) injection 4 mg  docusate sodium (COLACE) capsule 100 mg  
  
 
  
ROS:A comprehensive review of systems was negative except for that written in the HPI. Objective: 
 
Vital Signs: Telemetry:    normal sinus rhythmIntake/Output:  
Visit Vitals  /58  Pulse 84  Temp 99.4 °F (37.4 °C)  Resp 21  
 Ht 6' 2\" (1.88 m)  Wt 104.1 kg (229 lb 8 oz)  SpO2 100%  BMI 29.47 kg/m2 Temp (24hrs), Av.6 °F (37 °C), Min:98.1 °F (36.7 °C), Max:99.4 °F (37.4 °C) O2 Device: Endotracheal tube, Ventilator O2 Flow Rate (L/min): 5 l/min Wt Readings from Last 4 Encounters:  
18 104.1 kg (229 lb 8 oz) 05/05/16 111.1 kg (245 lb)  
03/25/16 106.6 kg (235 lb) 04/08/15 103.4 kg (228 lb) Intake/Output Summary (Last 24 hours) at 09/19/18 0815 Last data filed at 09/19/18 0700 Gross per 24 hour Intake          3019.18 ml Output              595 ml Net          2424.18 ml Last shift:        
Last 3 shifts: 09/17 1901 - 09/19 0700 In: 4188.1 [I.V.:2857.7] Out: 1120 [BRZSH:5483] Physical Exam:  
 General:   male; with Et tube in place. Has right IJ CVC. On sedation and on vent. HEAD: Normocephalic, without obvious abnormality, atraumatic EYES: conjunctivae clear. PERRL,  AN Icteric sclerae NOSE: nares normal, no drainage, no nasal flaring, THROAT: normal; Lips, mucosa dry; No Thrush;  crowded airway; tongue midline Neck: Supple, symmetrical, trachea midline,  No accessory mm use; No Stridor/ cuff leak, No goiter or thyroid tenderness LYMPH: No abnormally enlarged lymph nodes. in neck or groin Chest: increased AP diameter Lungs: agonal, bilateral rhonchi. Seems to have good air  Movement Bilaterally. Heart: Regular rate and rhythm; NO edema Abdomen: soft, non-tender, without masses or organomegaly, protuberant, distended : no Overton Musculoskeletal: mild kyphosis; No spine or CVA tenderness; negative, cyanosis, clubbing; no joint swelling or erythema Neuro: was not  Responsive, had a gag with ET tube suctioning. , Sedated not able to fully assess. Psych: Not able to assess due to being on sedation and on vent. Skin: Pallor;  
 Pulses:Bilateral, Radial, 2+ Data:  
 
All lab results for the last 24 hours reviewed. Labs: 
 
Recent Labs  
   09/19/18 
 0510  09/18/18 
 0409  09/17/18 
 0303 WBC  11.6*  10.2  11.1 HGB  7.9*  6.8*  7.1*  
PLT  119*  133*  143* INR  1.1  1.1  1.2* APTT  28.3  29.5  28.6 Recent Labs  
   09/19/18 
 0510  09/18/18 
 0409  09/17/18 
 0303 NA  144  143  143  
K  4.1  4.1  4.1 CL  112*  111*  110* CO2  25  25  27 GLU  129*  109*  128* BUN  23*  13  15 CREA  1.14  0.87  1.02  
CA  8.0*  8.0*  8.0*  
MG   --   2.5*  2.4 PHOS   --   2.4*  2.0* ALB  2.0*  2.1*  2.2*  
SGOT  30  18  19 ALT  22  18  21 Recent Labs  
   09/19/18 
 0407  09/18/18 
 0511  09/17/18 
 1009 PH  7.37  7.36  7.39  
PCO2  39  44  39 PO2  69*  101*  72* HCO3  22  24  23 FIO2  50  60  60 No results for input(s): CPK, CKNDX, TROIQ in the last 72 hours. No lab exists for component: CPKMB No results found for: BNPP, BNP Lab Results Component Value Date/Time Culture result: HEAVY NORMAL RESPIRATORY SHELDON 09/16/2018 11:15 AM  
 Culture result: LIGHT NORMAL RESPIRATORY SHELDON 09/15/2018 01:20 PM  
 Culture result: LIGHT NORMAL RESPIRATORY SHELDON 09/12/2018 03:50 PM  
No results found for: TSH, TSHEXT, TSHEXT Imaging: CXR: mild edema, no acute changes I have personally and independently reviewed the patients interval and diagnostic data, radiographs and have reviewed the reports. Medical Decision Making Today: · Reviewed the flowsheet and previous days notes · Reviewed and summarized records or history from previous days note or discussions with staff, family · Parenteral controlled substances - Reviewed/ Adjusted / Weaned / Started · High Risk Drug therapy requiring intensive monitoring for toxicity: eg steroids, pressors, antibiotics · Reviewed and/or ordered Clinical lab tests · Reviewed and/or ordered Radiology tests · Reviewed and/or ordered of Medicine tests · Independently visualized radiologic Images · I have personally reviewed the patients ECG / Telemetry Amy Worthington MD

## 2018-09-19 NOTE — PROGRESS NOTES
Hospitalist Progress Note NAME: Bravo Cool  
:  1934 MRN:  [id number] Assessment / Plan: PEA arrest  Code blue called . Patient w/ agonal breathing after coming back from radiology after arteriogram completed. No pulse detected. CPR initiated. Epi x 2 given. Pulse obtained. Intubated by Dr. Kam Hence. Much bloody secretions obtained. Transferred to ICU. Family was updated at time of code. Acute respiratory w/ hypoxia, s/p cardiac arrest 
Sepsis due to suspected PNA w/ possible pulmonary hemorrhage 
-CT of chest shows severe emphysema with the right basilar airspace disease in the oval mass with fluid level concerning for internal hemorrhage versus pneumonia. -Bcx Neg, sputum cytology collected  negative except scant yeast. 
-cont zosyn. End date  
-cytology from bronchial washing on  is atypical, favor benign reactive process 
-vent management per pulm Hemoptysis 
-noted to have bleeding from RLL on bronchoscopy. -work up for possible rheumatologic cause neg thus far: 
ANCA, anti-GBM, MPO ab, CA-3 ab, SSA/SSB, RNP ab, Arredondo/RNP, dsDNA, and Arredondo ab all negative. JOSS +. 
-Arteriogram : Thoracic aortic and intercostal arteriograms as described above demonstrating no 
enlarged bronchial artery supplying the right lower lobe of the lung. A normal 
appearing and normal sized right bronchial artery is seen without any 
hyperplasia or dominant right lower lobe supply. No embolization was performed. RLL mass - unable to determine if hemorrhage 
-Dr. Sha Pavonly considered Wegener's as possible cause since patient w/ hemoptysis and hematuria. However, work up neg thus far. -diagnostic bronch done . No endobronchial lesions seen. -CTA chest done  due to persistent bloody pulmonary secretions. Results: 
Right lower lobe masslike abnormality demonstrates increased internal density 
and has increased in size measuring 6.7 x 6.7 cm, compared to 5.7 x 4.4 cm. This is compatible with internal hemorrhage. There is new moderate right lower lobe 
partial collapse/atelectasis.   
Normal sized right bronchial artery is visualized without any hyperplasia or or 
evidence for supply to the right lower lobe lung abnormality. It is unlikely 
that the hemoptysis related to bronchial arterial injury/supply. Prior bronchial 
arteriogram was unremarkable. No evidence for pulmonary artery pseudoaneurysm or 
active hemorrhage. However, it is more likely that the masslike abnormality in 
the right lower lobe the lung has eroded into an adjacent pulmonary artery then 
a bronchial artery. Therefore, plan is for pulmonary arteriogram with possible 
embolization if an abnormality is seen in the right lower lobe. 6 mm and 4 mm right lung nodules. Small bilateral pleural effusions. Minimally enlarged bilateral hilar and mediastinal lymph nodes Acute blood loss anemia on admission 
-Hgb 11 on admission. S/p 3 units PRBC so far 
-serial H&H 
 
H/o atrial fibrillation on chronic anticoagulation w/ coumadin 
-amiodarone on hold 
-cardizem on hold due to borderline low bp 
-hold coumadin due to hemoptysis Coumadin-induced coagulopathy 
-INR 1.1. Holding coumadin. ALAN - likely prerenal related to Lasix. Renal function stable. 
-avoid nephrotoxic agents. UTI, acute cystitis w/ hematuria, POA 
-on abx as above. No Ucx sent on admission COPD - stable HLD 
-cont pravachol Obesity - BMI 32 Code: Full DVT prophylaxis: SCDs GI prophylaxis: PPI Subjective: Chief Complaint / Reason for Physician Visit Follow up for hemoptysis. Intubated, sedated. no acute changes. Had multiple BM this morning Discussed with RN events overnight. Review of Systems: 
Symptom Y/N Comments  Symptom Y/N Comments Fever/Chills    Chest Pain Poor Appetite    Edema Cough    Abdominal Pain Sputum    Joint Pain SOB/ROY    Pruritis/Rash Nausea/vomit    Tolerating PT/OT Diarrhea    Tolerating Diet Constipation    Other Could NOT obtain due to: sedated Objective: VITALS:  
Last 24hrs VS reviewed since prior progress note. Most recent are: 
Patient Vitals for the past 24 hrs: 
 Temp Pulse Resp BP SpO2  
09/19/18 1300 - 73 24 119/57 94 % 09/19/18 1230 - 77 25 116/53 94 % 09/19/18 1200 - 73 28 110/53 94 % 09/19/18 1130 - 71 26 115/61 94 % 09/19/18 1109 - - - - 95 % 09/19/18 1101 - 75 22 - 93 % 09/19/18 1100 - 73 21 105/57 93 % 09/19/18 1030 - 74 21 113/50 92 % 09/19/18 1000 - 71 23 106/55 93 % 09/19/18 0930 - 79 26 - 93 % 09/19/18 0900 - 72 23 106/47 95 % 09/19/18 0830 - 74 26 99/43 94 % 09/19/18 0800 - 80 27 113/51 93 % 09/19/18 0730 - 75 28 102/52 95 % 09/19/18 0718 - - - - 100 % 09/19/18 0700 - 79 20 118/58 94 % 09/19/18 0600 - 80 27 113/52 94 % 09/19/18 0500 - 76 27 93/41 94 % 09/19/18 0400 99.4 °F (37.4 °C) 78 26 113/51 94 % 09/19/18 0300 - 78 28 109/46 96 % 09/19/18 0200 - 76 27 106/53 95 % 09/19/18 0100 - 72 26 100/48 97 % 09/19/18 0000 98.2 °F (36.8 °C) 73 28 100/52 96 % 09/18/18 2333 - 72 21 - 96 % 09/18/18 2300 - 68 28 107/48 96 % 09/18/18 2200 - 70 25 108/48 96 % 09/18/18 2100 - 72 24 101/50 96 % 09/18/18 2000 98.7 °F (37.1 °C) 70 24 105/60 96 % 09/18/18 1927 - - - - 96 % 09/18/18 1918 - 68 21 - 96 % 09/18/18 1900 - 78 25 102/50 96 % Intake/Output Summary (Last 24 hours) at 09/19/18 1331 Last data filed at 09/19/18 0700 Gross per 24 hour Intake          2228.02 ml Output              520 ml Net          1708.02 ml PHYSICAL EXAM: 
General: WD, WN. Intubated. No distress. EENT:   Anicteric sclerae. MMM;  ET tube in place. Resp:  Decreased breath sounds. Clear anteriorly. No rhonchi. No accessory muscle use CV:  Regular  rhythm,  1+ bilateral leg edema GI:  Soft, Non distended, Non tender.  +Bowel sounds Neurologic:  Sedated. No posturing. Psych:   No agitation Skin:  No rashes. No jaundice Reviewed most current lab test results and cultures  YES Reviewed most current radiology test results   YES Review and summation of old records today    NO Reviewed patient's current orders and MAR    YES 
PMH/SH reviewed - no change compared to H&P 
________________________________________________________________________ Care Plan discussed with: 
  Comments Patient Family RN x Care Manager Consultant Multidiciplinary team rounds were held today with , nursing, pharmacist and clinical coordinator. Patient's plan of care was discussed; medications were reviewed and discharge planning was addressed. ________________________________________________________________________ Total NON critical care TIME:  25  Minutes Total CRITICAL CARE TIME Spent:   Minutes non procedure based Comments >50% of visit spent in counseling and coordination of care    
________________________________________________________________________ Hong Watters MD  
 
Procedures: see electronic medical records for all procedures/Xrays and details which were not copied into this note but were reviewed prior to creation of Plan. LABS: 
I reviewed today's most current labs and imaging studies. Pertinent labs include: 
Recent Labs  
   09/19/18 
 0510  09/18/18 
 0409  09/17/18 
 0303 WBC  11.6*  10.2  11.1 HGB  7.9*  6.8*  7.1*  
HCT  25.4*  22.1*  22.6*  
PLT  119*  133*  143* Recent Labs  
   09/19/18 
 0510  09/18/18 
 0409  09/17/18 
 0303 NA  144  143  143  
K  4.1  4.1  4.1 CL  112*  111*  110* CO2  25  25  27 GLU  129*  109*  128* BUN  23*  13  15 CREA  1.14  0.87  1.02  
CA  8.0*  8.0*  8.0*  
MG   --   2.5*  2.4 PHOS   --   2.4*  2.0* ALB  2.0*  2.1*  2.2* TBILI  0.8  0.4  0.5 SGOT  30  18  19 ALT  22  18  21 INR  1.1  1.1  1.2*  
 
 
 Signed: Shanika Mcwilliams MD

## 2018-09-19 NOTE — PROGRESS NOTES
09/19/18 4103 ABCDEF Bundle SBT Safety Screen Passed Yes SBT Trial Passed No  
SBT Trial Reason for Failure Respiratory rate > 35;RSBI>105

## 2018-09-19 NOTE — PROGRESS NOTES
1930 - Bedside and Verbal shift change report given to Rozelle Brunner (oncoming nurse) by Sandy Denton (offgoing nurse). Report included the following information SBAR, Kardex, OR Summary, Procedure Summary, Intake/Output, MAR, Recent Results, Med Rec Status and Cardiac Rhythm A fib.  
 
2000 - Shift assessment complete. See flow sheet for details. Intubated, light sedation, decreased commands following.  
 
2200 - Repositioned for comfort, call bell within reach. ETT / OGT in place. Bloody secretion noted with inline suction. 0000 - Reassessment complete, no changes noted from previous assessment. See summary for details. 0200 - Repositioned for comfort, call bell within reach. 0220 - Incontinence care done, large dark BM noted. Bathed with soap / water / CHG wipes. Bed pad changed. 0400 - Reassessment complete, no changes noted from previous assessment. See summary for details. Tube feed increased to 20 ml /hr 
 
0555 - Propofol on hold for SAT. 7007 - Resumed propofol @ 18 mcg to keep comfortable. 0730 - Bedside and Verbal shift change report given to Sandy Denton (oncoming nurse) by Rozelle Brunner (offgoing nurse). Report included the following information SBAR, Kardex, ED Summary, Procedure Summary, Intake/Output, MAR, Recent Results, Med Rec Status and Cardiac Rhythm A fib.

## 2018-09-20 ENCOUNTER — APPOINTMENT (OUTPATIENT)
Dept: GENERAL RADIOLOGY | Age: 83
DRG: 003 | End: 2018-09-20
Attending: INTERNAL MEDICINE
Payer: MEDICARE

## 2018-09-20 LAB
ABO + RH BLD: NORMAL
ABO + RH BLD: NORMAL
ANION GAP SERPL CALC-SCNC: 4 MMOL/L (ref 5–15)
APTT PPP: 28.2 SEC (ref 22.1–32)
ARTERIAL PATENCY WRIST A: YES
BASE DEFICIT BLDA-SCNC: 1.4 MMOL/L
BDY SITE: ABNORMAL
BLD PROD TYP BPU: NORMAL
BLD PROD TYP BPU: NORMAL
BLOOD GROUP ANTIBODIES SERPL: NORMAL
BLOOD GROUP ANTIBODIES SERPL: NORMAL
BPU ID: NORMAL
BPU ID: NORMAL
BUN SERPL-MCNC: 28 MG/DL (ref 6–20)
BUN/CREAT SERPL: 25 (ref 12–20)
CALCIUM SERPL-MCNC: 7.7 MG/DL (ref 8.5–10.1)
CHLORIDE SERPL-SCNC: 114 MMOL/L (ref 97–108)
CO2 SERPL-SCNC: 27 MMOL/L (ref 21–32)
CREAT SERPL-MCNC: 1.13 MG/DL (ref 0.7–1.3)
CROSSMATCH RESULT,%XM: NORMAL
CROSSMATCH RESULT,%XM: NORMAL
EPAP/CPAP/PEEP, PAPEEP: 6
ERYTHROCYTE [DISTWIDTH] IN BLOOD BY AUTOMATED COUNT: 21.5 % (ref 11.5–14.5)
FIBRINOGEN PPP-MCNC: >800 MG/DL (ref 200–475)
FIO2 ON VENT: 50 %
GAS FLOW.O2 SETTING OXYMISER: 12 L/MIN
GLUCOSE SERPL-MCNC: 141 MG/DL (ref 65–100)
HCO3 BLDA-SCNC: 24 MMOL/L (ref 22–26)
HCT VFR BLD AUTO: 25.6 % (ref 36.6–50.3)
HGB BLD-MCNC: 7.8 G/DL (ref 12.1–17)
INR PPP: 1.1 (ref 0.9–1.1)
MCH RBC QN AUTO: 33.1 PG (ref 26–34)
MCHC RBC AUTO-ENTMCNC: 30.5 G/DL (ref 30–36.5)
MCV RBC AUTO: 108.5 FL (ref 80–99)
NRBC # BLD: 0 K/UL (ref 0–0.01)
NRBC BLD-RTO: 0 PER 100 WBC
PCO2 BLDA: 43 MMHG (ref 35–45)
PH BLDA: 7.37 [PH] (ref 7.35–7.45)
PLATELET # BLD AUTO: 121 K/UL (ref 150–400)
PMV BLD AUTO: 11.6 FL (ref 8.9–12.9)
PO2 BLDA: 65 MMHG (ref 80–100)
POTASSIUM SERPL-SCNC: 3.7 MMOL/L (ref 3.5–5.1)
PROTHROMBIN TIME: 11 SEC (ref 9–11.1)
RBC # BLD AUTO: 2.36 M/UL (ref 4.1–5.7)
SAO2 % BLD: 92 % (ref 92–97)
SAO2% DEVICE SAO2% SENSOR NAME: ABNORMAL
SODIUM SERPL-SCNC: 145 MMOL/L (ref 136–145)
SPECIMEN EXP DATE BLD: NORMAL
SPECIMEN EXP DATE BLD: NORMAL
SPECIMEN SITE: ABNORMAL
STATUS OF UNIT,%ST: NORMAL
STATUS OF UNIT,%ST: NORMAL
THERAPEUTIC RANGE,PTTT: ABNORMAL SECS (ref 58–77)
UNIT DIVISION, %UDIV: 0
UNIT DIVISION, %UDIV: 0
VENTILATION MODE VENT: ABNORMAL
VT SETTING VENT: 500 ML
WBC # BLD AUTO: 10.8 K/UL (ref 4.1–11.1)

## 2018-09-20 PROCEDURE — 77030033269 HC SLV COMPR SCD KNE2 CARD -B

## 2018-09-20 PROCEDURE — 65620000000 HC RM CCU GENERAL

## 2018-09-20 PROCEDURE — 85610 PROTHROMBIN TIME: CPT | Performed by: INTERNAL MEDICINE

## 2018-09-20 PROCEDURE — 36600 WITHDRAWAL OF ARTERIAL BLOOD: CPT | Performed by: INTERNAL MEDICINE

## 2018-09-20 PROCEDURE — 94003 VENT MGMT INPAT SUBQ DAY: CPT

## 2018-09-20 PROCEDURE — 74011250636 HC RX REV CODE- 250/636: Performed by: INTERNAL MEDICINE

## 2018-09-20 PROCEDURE — 80048 BASIC METABOLIC PNL TOTAL CA: CPT | Performed by: INTERNAL MEDICINE

## 2018-09-20 PROCEDURE — 85027 COMPLETE CBC AUTOMATED: CPT | Performed by: INTERNAL MEDICINE

## 2018-09-20 PROCEDURE — 94640 AIRWAY INHALATION TREATMENT: CPT

## 2018-09-20 PROCEDURE — 74011000250 HC RX REV CODE- 250: Performed by: INTERNAL MEDICINE

## 2018-09-20 PROCEDURE — 77030037878 HC DRSG MEPILEX >48IN BORD MOLN -B

## 2018-09-20 PROCEDURE — 74011000258 HC RX REV CODE- 258: Performed by: INTERNAL MEDICINE

## 2018-09-20 PROCEDURE — 77030018798 HC PMP KT ENTRL FED COVD -A

## 2018-09-20 PROCEDURE — 74011250637 HC RX REV CODE- 250/637: Performed by: INTERNAL MEDICINE

## 2018-09-20 PROCEDURE — 82803 BLOOD GASES ANY COMBINATION: CPT | Performed by: INTERNAL MEDICINE

## 2018-09-20 PROCEDURE — C9113 INJ PANTOPRAZOLE SODIUM, VIA: HCPCS | Performed by: INTERNAL MEDICINE

## 2018-09-20 PROCEDURE — 71045 X-RAY EXAM CHEST 1 VIEW: CPT

## 2018-09-20 PROCEDURE — 86900 BLOOD TYPING SEROLOGIC ABO: CPT | Performed by: INTERNAL MEDICINE

## 2018-09-20 PROCEDURE — 36415 COLL VENOUS BLD VENIPUNCTURE: CPT | Performed by: INTERNAL MEDICINE

## 2018-09-20 RX ADMIN — SODIUM CHLORIDE 50 ML/HR: 900 INJECTION, SOLUTION INTRAVENOUS at 05:50

## 2018-09-20 RX ADMIN — CHLORHEXIDINE GLUCONATE 15 ML: 1.2 RINSE ORAL at 08:12

## 2018-09-20 RX ADMIN — ALBUTEROL SULFATE 2.5 MG: 2.5 SOLUTION RESPIRATORY (INHALATION) at 08:06

## 2018-09-20 RX ADMIN — PRAVASTATIN SODIUM 40 MG: 40 TABLET ORAL at 21:43

## 2018-09-20 RX ADMIN — ALBUTEROL SULFATE 2.5 MG: 2.5 SOLUTION RESPIRATORY (INHALATION) at 15:52

## 2018-09-20 RX ADMIN — ALBUTEROL SULFATE 2.5 MG: 2.5 SOLUTION RESPIRATORY (INHALATION) at 20:36

## 2018-09-20 RX ADMIN — PROPOFOL 15 MCG/KG/MIN: 10 INJECTION, EMULSION INTRAVENOUS at 12:21

## 2018-09-20 RX ADMIN — Medication 10 ML: at 13:43

## 2018-09-20 RX ADMIN — CHLORHEXIDINE GLUCONATE 15 ML: 1.2 RINSE ORAL at 17:52

## 2018-09-20 RX ADMIN — PIPERACILLIN SODIUM,TAZOBACTAM SODIUM 4.5 G: 4; .5 INJECTION, POWDER, FOR SOLUTION INTRAVENOUS at 00:29

## 2018-09-20 RX ADMIN — ALBUTEROL SULFATE 2.5 MG: 2.5 SOLUTION RESPIRATORY (INHALATION) at 11:37

## 2018-09-20 RX ADMIN — FAMOTIDINE 20 MG: 10 INJECTION, SOLUTION INTRAVENOUS at 20:06

## 2018-09-20 RX ADMIN — DORZOLAMIDE HYDROCHLORIDE AND TIMOLOL MALEATE 1 DROP: 20; 5 SOLUTION/ DROPS OPHTHALMIC at 17:52

## 2018-09-20 RX ADMIN — PROPOFOL 15 MCG/KG/MIN: 10 INJECTION, EMULSION INTRAVENOUS at 20:05

## 2018-09-20 RX ADMIN — DORZOLAMIDE HYDROCHLORIDE AND TIMOLOL MALEATE 1 DROP: 20; 5 SOLUTION/ DROPS OPHTHALMIC at 08:13

## 2018-09-20 RX ADMIN — Medication 10 ML: at 05:48

## 2018-09-20 RX ADMIN — PROPOFOL 18 MCG/KG/MIN: 10 INJECTION, EMULSION INTRAVENOUS at 00:30

## 2018-09-20 RX ADMIN — SODIUM CHLORIDE 40 MG: 9 INJECTION, SOLUTION INTRAMUSCULAR; INTRAVENOUS; SUBCUTANEOUS at 08:12

## 2018-09-20 RX ADMIN — LATANOPROST 1 DROP: 50 SOLUTION OPHTHALMIC at 21:43

## 2018-09-20 RX ADMIN — ALBUTEROL SULFATE 2.5 MG: 2.5 SOLUTION RESPIRATORY (INHALATION) at 03:00

## 2018-09-20 RX ADMIN — Medication 10 ML: at 21:44

## 2018-09-20 NOTE — PROGRESS NOTES
[facility] [place] Pul[facility] Consult Service NotePulmonary, Critical Care, and Sleep Medicine Name: Alix Notice MRN: [id number] : 1934 Hospital: [facility] Date: 2018   Hospital Day: 10 IMPRESSION:  
1. S/p PEA arrest post anigo 9/14 pm. Had 6 min of CPR and then ROSC. 2. Acute hemoptysis- Noted to have bleeding from RLL on Bronch. No endobronchial lesion. has lung mass(CA vs inflammatory pseudotumor) and microscopic hematuria; ANCA negative; JOSS barely positive. Suspect LUNG mass the culprit. Bronch will not help; 3. Elevated procalcitonin- treating for possible lung abscess/pseudotumor 4. Lung mass RLL- right lung base- would not be visible on conventional bronch and yield from transbronchial biopsy not guaranteed; had no lesion on chest Ct 2016 5. Coagulopathy from warfarin per , hold anticoagulation for now. 6. Acute kidney injury likely prerenal and related to medications (Lasix) Baseline creatinine is around 0.9 7. Hematuria 8. Sepsis due to suspected pneumonia. Patient reports having exertional shortness of breath along with cough and hemoptysis; CT of chest shows severe emphysema with the right basilar airspace disease in the oval mass with fluid level concerning for internal hemorrhage versus pneumonia ;  
9. 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  
10. Anemia 11. Former 50+ pack yr smoker quit in ? 12. Asbestos exposure 13. History of atrial fibrillation on anticoagulation with warfarin 14. Hx of CAD s/p PCI in the past home amiodarone. Hold Cardizem 15. Dyslipidemia 16. Multiorgan dysfunction as outlined above: Pt has one or more acute or chronic illnesses with severe exacerbation with progression or side effects of treatment that poses a threat to life or bodily function 17. Additional workup outlined below 25. Pt is requiring Drug therapy requiring intensive monitoring for toxicity 19. Pt is unstable, unpredictable needing PCU monitoring; is critically ill and at high risk of sudden decline and decompensation with life threatening consequenses and continued end organ dysfunction and failure 20. Prognosis guarded with significant risk of sudden decline 21. Critically ill, 35 min cc, EOP. Discussed with nurse this am. High risk of decompensation. RECOMMENDATIONS/PLAN:  
1. Full vent support 2. IR performed embolization of pulmonary artery 3. Hold anticoagulation for now. SCDS. 4. off pressors 5. Empiric abx to treat possible lung abscess 6. Follow cultures 7. Labs to follow electrolytes, renal function and and blood counts 8. Glucose monitoring and SSI 9. Bronchial hygiene with respiratory therapy techniques, bronchodilators 10. DVT, SUP prophylaxis 11. Pt needs IV fluids with additives and Drug therapy requiring intensive monitoring for toxicity 12. Prescription drug management with home med reconciliation reviewed My assessment/management discussed with: Consultants, Nursing, Pharmacy, Case Management, PT, OT, Respiratory Therapy, Hospitalist and Family for coordination of care Pt's condition is acute and unstable requiring inpatient hospitalization. This care involved high complexity decision making which includes independently reviewing the patient's past medical records, current laboratory results, medication profiles that were immediately available to me and actual Xray images at the bedside in order to assess, support vital system function, and to treat this degree of vital organ system failure, and to prevent further deterioration of the patients condition. Risk of deterioration: medium and high  
[x] High complexity decision making was performed [x] See my orders for details Tubes:    
 
Subjective/Initial History: S/P pulmonary artery embolization Decreased bloody secretions No acute events overnight Still on full vent support MAR reviewed and pertinent medications noted or modified as needed Current Facility-Administered Medications Medication  0.9% sodium chloride infusion 250 mL  chlorhexidine (PERIDEX) 0.12 % mouthwash 15 mL  pantoprazole (PROTONIX) 40 mg in sodium chloride 0.9% 10 mL injection  propofol (DIPRIVAN) infusion  albuterol (PROVENTIL VENTOLIN) nebulizer solution 2.5 mg  
 fentaNYL citrate (PF) injection 25 mcg  
 0.9% sodium chloride infusion  albuterol (PROVENTIL HFA, VENTOLIN HFA, PROAIR HFA) inhaler 2 Puff  dorzolamide-timolol (COSOPT) 22.3-6.8 mg/mL ophthalmic solution 1 Drop  latanoprost (XALATAN) 0.005 % ophthalmic solution 1 Drop  pravastatin (PRAVACHOL) tablet 40 mg  
 sodium chloride (NS) flush 5-10 mL  sodium chloride (NS) flush 5-10 mL  acetaminophen (TYLENOL) tablet 650 mg  
 ondansetron (ZOFRAN) injection 4 mg  docusate sodium (COLACE) capsule 100 mg  
  
 
  
ROS:A comprehensive review of systems was negative except for that written in the HPI. Objective: 
 
Vital Signs: Telemetry:    normal sinus rhythmIntake/Output:  
Visit Vitals  /48  Pulse 68  Temp 98.2 °F (36.8 °C)  Resp 19  
 Ht 6' 2\" (1.88 m)  Wt 115.6 kg (254 lb 13.6 oz)  SpO2 96%  BMI 32.72 kg/m2 Temp (24hrs), Av.1 °F (37.3 °C), Min:98.2 °F (36.8 °C), Max:99.7 °F (37.6 °C) O2 Device: Ventilator O2 Flow Rate (L/min): 5 l/min Wt Readings from Last 4 Encounters:  
18 115.6 kg (254 lb 13.6 oz) 16 111.1 kg (245 lb)  
16 106.6 kg (235 lb) 04/08/15 103.4 kg (228 lb) Intake/Output Summary (Last 24 hours) at 18 0750 Last data filed at 18 0700 Gross per 24 hour Intake          2757.76 ml Output              750 ml Net          2007.76 ml Last shift:        
Last 3 shifts: 1901 -  07 In: 3919.4 [I.V.:2689.4] Out: 1195 [[facility]:[id number]] Physical Exam:  
 General:   male; with Et tube in place. Has right IJ CVC. On sedation and on vent. HEAD: Normocephalic, without obvious abnormality, atraumatic EYES: conjunctivae clear. PERRL,  AN Icteric sclerae NOSE: nares normal, no drainage, no nasal flaring, THROAT: normal; Lips, mucosa dry; No Thrush;  crowded airway; tongue midline Neck: Supple, symmetrical, trachea midline,  No accessory mm use; No Stridor/ cuff leak, No goiter or thyroid tenderness LYMPH: No abnormally enlarged lymph nodes. in neck or groin Chest: increased AP diameter Lungs: agonal, bilateral rhonchi. Seems to have good air  Movement Bilaterally. Heart: Regular rate and rhythm; NO edema Abdomen: soft, non-tender, without masses or organomegaly, protuberant, distended : no [place] Musculoskeletal: mild kyphosis; No spine or CVA tenderness; negative, cyanosis, clubbing; no joint swelling or erythema Neuro: was not  Responsive, had a gag with ET tube suctioning. , Sedated not able to fully assess. Psych: Not able to assess due to being on sedation and on vent. Skin: Pallor;  
 Pulses:Bilateral, Radial, 2+ Data:  
 
All lab results for the last 24 hours reviewed. Labs: 
 
Recent Labs  
   09/20/18 
 0313  09/19/18 
 0510  09/18/18 
 0409 WBC  10.8  11.6*  10.2 HGB  7.8*  7.9*  6.8*  
PLT  121*  119*  133* INR  1.1  1.1  1.1 APTT  28.2  28.3  29.5 Recent Labs  
   09/20/18 
 0313  09/19/18 
 0510  09/18/18 
 0409 NA  145  144  143  
K  3.7  4.1  4.1 CL  114*  112*  111* CO2  27  25  25 GLU  141*  129*  109* BUN  28*  23*  13  
CREA  1.13  1.14  0.87 CA  7.7*  8.0*  8.0*  
MG   --    --   2.5* PHOS   --    --   2.4* ALB   --   2.0*  2.1*  
SGOT   --   30  18 ALT   --   22  18 Recent Labs  
   09/20/18 
 0433  09/19/18 
 0407  09/18/18 
 3339 PH  7.37  7.37  7.36  
PCO2  43  39  44 PO2  65*  69*  101* HCO3  24  22  24 FIO2  50  50  60 No results for input(s): CPK, CKNDX, TROIQ in the last 72 hours. No lab exists for component: CPKMB No results found for: BNPP, BNP Lab Results Component Value Date/Time Culture result: HEAVY NORMAL RESPIRATORY SHELDON 09/16/2018 11:15 AM  
 Culture result: LIGHT NORMAL RESPIRATORY SHELDON 09/15/2018 01:20 PM  
 Culture result: LIGHT NORMAL RESPIRATORY SHELDON 09/12/2018 03:50 PM  
No results found for: TSH, TSHEXT, TSHEXT Imaging: CXR: no acute changes I have personally and independently reviewed the patients interval and diagnostic data, radiographs and have reviewed the reports. Medical Decision Making Today: · Reviewed the flowsheet and previous days notes · Reviewed and summarized records or history from previous days note or discussions with staff, family · Parenteral controlled substances - Reviewed/ Adjusted / Weaned / Started · High Risk Drug therapy requiring intensive monitoring for toxicity: eg steroids, pressors, antibiotics · Reviewed and/or ordered Clinical lab tests · Reviewed and/or ordered Radiology tests · Reviewed and/or ordered of Medicine tests · Independently visualized radiologic Images · I have personally reviewed the patients ECG / Telemetry Yara Martinez MD

## 2018-09-20 NOTE — PROGRESS NOTES
Attended Critical Care Unit Interdisciplinary Rounds, where patient care was discussed. MARCO ANTONIO Cummins, [facility] Paging Service  [phone])

## 2018-09-20 NOTE — PROGRESS NOTES
Nutrition Assessment: 
 
RECOMMENDATIONS:  
Continue TF as ordered ASSESSMENT:  
Chart reviewed, case discussed during CCU rounds. Pt remains intubated and sedated on propofol @ 10.9mL/h, which provides 288 kcals daily. TF at goal rate with minimal residuals. Wt gain noted, pt is up 9.8L. BM noted today. TF + propofol meets 80% kcal and 108% protein needs, okay for now given BMI >30. TF currently meets 16kcals/kg which is inline with permissive underfeeding as 100% protein needs are met. Dietitians Intervention(s)/Plan(s): Continue TF SUBJECTIVE/OBJECTIVE:  
Pt intubated and sedated Diet Order: NPO, Other (comment) (TF via OGT: TwoCal @ 30mL/h + Prosource BID + 50mL flush q 4h (provides 1560kcals/90gPro/804mL) ) 
% Eaten:  No data found. TwoCal HN  at 30 mL/hr flush with 50 mL  Q4H  via OG Tube   Residuals: 0 mL Pertinent Medications:pepcid; [email or web address]); Drips: propofol. Chemistries: 
Lab Results Component Value Date/Time Sodium 145 09/20/2018 03:13 AM  
 Potassium 3.7 09/20/2018 03:13 AM  
 Chloride 114 (H) 09/20/2018 03:13 AM  
 CO2 27 09/20/2018 03:13 AM  
 Anion gap 4 (L) 09/20/2018 03:13 AM  
 Glucose 141 (H) 09/20/2018 03:13 AM  
 BUN 28 (H) 09/20/2018 03:13 AM  
 Creatinine 1.13 09/20/2018 03:13 AM  
 BUN/Creatinine ratio 25 (H) 09/20/2018 03:13 AM  
 GFR est AA >60 09/20/2018 03:13 AM  
 GFR est non-AA >60 09/20/2018 03:13 AM  
 Calcium 7.7 (L) 09/20/2018 03:13 AM  
 Albumin 2.0 (L) 09/19/2018 05:10 AM  
  
Anthropometrics: Height: 6' 2\" (188 cm) Weight: 115.6 kg (254 lb 13.6 oz)   []bed scale    []stated   [x]unknown(9/20) IBW (%IBW):   ( ) UBW (%UBW):   (  %) BMI: Body mass index is 32.72 kg/(m^2). This BMI is indicative of: 
[]Underweight   []Normal   []Overweight   [x] Obesity   [] Extreme Obesity (BMI>40) Estimated Nutrition Needs (Based on): 2316 Kcals/day (PSU (MSJ 1916)) , 83 g (0.8gPro/kg) Protein Carbohydrate: At Least 130 g/day  Fluids: 1800 mL/day or per MD  
 
Last BM: 9/20   []Active     []Hyperactive  [x]Hypoactive       [] Absent   BS Skin:    [x] Intact   [] Incision  [] Breakdown   [] DTI   [] Tears/Excoriation/Abrasion  [x]Edema(+3-generalized; +2-BUE; +2 pitting-BLE)  [] Other: Wt Readings from Last 30 Encounters:  
09/20/18 115.6 kg (254 lb 13.6 oz) 05/05/16 111.1 kg (245 lb)  
03/25/16 106.6 kg (235 lb) 04/08/15 103.4 kg (228 lb)  
03/25/15 100.7 kg (222 lb)  
01/16/13 105.3 kg (232 lb 3.2 oz) 01/03/13 103.3 kg (227 lb 11.8 oz) NUTRITION DIAGNOSES:  
Problem:  Inadequate protein-energy intake Etiology: related to pt NPO 2' vent Signs/Symptoms: as evidenced by NPO + propofol meets <25% kcal and 0% protein needs. Previous dx re: inadequate protein energy intake resolved, TF + propofol meets 80% kcal and 100% protein needs. NUTRITION INTERVENTIONS: 
  Enteral/Parenteral Nutrition: Other (Continue TF as ordered) GOAL:  
Pt will tolerate TF @ goal rate with residuals <250mL, meeting >80% kcal and 100% protein needs in 2-4 days. NUTRITION MONITORING AND EVALUATION Previous Goal: Pt will be started on nutrition vs nutrition support in 2-3 days Previous Goal Met: Yes Previous Recommendations Implemented: Yes Cultural, Baptist, or Ethnic Dietary Needs: None LEARNING NEEDS (Diet, Food/Nutrient-Drug Interaction):  
 [x] None Identified 
 [] Identified and Education Provided/Documented 
 [] Identified and Pt declined/was not appropriate [x] Interdisciplinary Care Plan Reviewed/Documented  
 [x] Participated in Discharge Planning: Unable to determine  
 [x] Interdisciplinary Rounds NUTRITION RISK:  
 [x] High              [] Moderate           []  Low  []  Minimal/Uncompromised [address], [facility] Pager [phone] Weekend Pager [phone]

## 2018-09-20 NOTE — CONSULTS
Palliative Medicine Consult  Aleks: [phone] ([phone])    Patient Name: Sha Hernandez  YOB: 1934    Date of Initial Consult: 9/20/18  Reason for Consult: care decisons  Requesting Provider: Willam Yoder MD  Primary Care Physician: PROVIDER UNKNOWN     SUMMARY:   Sha Hernandez is a 80 y.o. Male with a past history of cad s/p stent , severe COPD, oxygen dependant , atrial fibrillation on anticoagulation , who was admitted on 9/11/2018 from home  with a diagnosis of  Hematemesis , hemoptysis  sob , ough and lung mass with hemorrhage . Hospital course is notable :   Code blue 9/14 PEA arrest , after coming back from radiolgy after arteriogram .  Diagnostis bronch 9/16/18 negative for endo bronchial region . 9/17 : pulmonary artery embolization . Current medical issues leading to Palliative Medicine involvement include:  lung mass with pulmonary Hemorrhage , severe copd, acute hypoxic resp failure s/p cardiac arrest .    Lives at home with his wife of 46 years ,  Has six children his two sons lives with him , he has h/o smoking quit 5 years ago , retired  , at [employer] he is independent in function , though slowed down in last 4 years . PALLIATIVE DIAGNOSES:     1. Goals of care   2. Unresponsive   3. Sob   4. Blood loss anemia   5. Anasarca  6. Lung mass with pulmonary hemorrhage . PLAN:   1. Met with patient wife /next of kin Tate Ryder , son Aurelia Ellis and sister Iron Gonsalez (from out of town). 2. Introduce palliative care service in detailed. 3. Wife shared patient had been very functional active , positive person , however has slowed down in last  4 years because of COPD, stopped driving last year , independent for activities of daily living . 4.  His wife has not had a chance of talking to any phyiscian since he is here , however has been updated by the nursing staff , wife perceives , patient is doing \" better \".  5. Family would like to know more specific information .  6. We agreed to meet with family including pulmonologist  Dr Rashmi Sheppard tomorrow at 1 pm .  7. Wife shared patient  has not completed advance directives. 8. We will continue to build rapport and support . 9. Initial consult note routed to primary continuity provider  10. Communicated plan of care with: Palliative IDT, bed side Rn and Dr Rashmi Sheppard . GOALS OF CARE / TREATMENT PREFERENCES:     GOALS OF CARE:  Patient/Health Care Proxy Stated Goals:  (not discussed today .)      TREATMENT PREFERENCES:   Code Status: Full Code    Advance Care Planning:  Advance Care Planning 9/12/2018   Patient's Healthcare Decision Maker is: Legal Next of Kin   Primary Decision Maker Name -   Confirm Advance Directive None   Patient Would Like to Complete Advance Directive -   Does the patient have other document types -           Other Instructions: Other:    As far as possible, the palliative care team has discussed with patient / health care proxy about goals of care / treatment preferences for patient. HISTORY:     History obtained from: chart , wife and bed side Rn .    CHIEF COMPLAINT: admitted for above , currently intubated and sedated. HPI/SUBJECTIVE:    The patient is:       [] Non-participatory due to:   Per wife patient had sudden onset of  abdominal pain x few hrs , coughed up blood , subsequently \" vomitted blood \" and was brought to ER by EMS . Currently intubated and sedated .     Clinical Pain Assessment (nonverbal scale for severity on nonverbal patients):   Clinical Pain Assessment  Severity: 0     Activity (Movement): Lying quietly, normal position    Duration: for how long has pt been experiencing pain (e.g., 2 days, 1 month, years)  Frequency: how often pain is an issue (e.g., several times per day, once every few days, constant)     FUNCTIONAL ASSESSMENT:     Palliative Performance Scale (PPS):  PPS: 30       PSYCHOSOCIAL/SPIRITUAL SCREENING:     Palliative IDT has assessed this patient for cultural preferences / practices and a referral made as appropriate to needs (Cultural Services, Patient Advocacy, Ethics, etc.)    Advance Care Planning:  Advance Care Planning 9/12/2018   Patient's Healthcare Decision Maker is: Legal Next Alton Hernandez   Primary Decision Maker Name -   Confirm Advance Directive None   Patient Would Like to Complete Advance Directive -   Does the patient have other document types -       Any spiritual / Alevism concerns:  [] Yes /  [x] No    Caregiver Burnout:  [] Yes /  [x] No /  [] No Caregiver Present      Anticipatory grief assessment:   [] Normal  / [] Maladaptive       ESAS Anxiety:      ESAS Depression:     Unable to determine above because of patient factors. REVIEW OF SYSTEMS:     Positive and pertinent negative findings in ROS are noted above in HPI. The following systems were [] reviewed / [x] unable to be reviewed as noted in HPI  Other findings are noted below. Systems: constitutional, ears/nose/mouth/throat, respiratory, gastrointestinal, genitourinary, musculoskeletal, integumentary, neurologic, psychiatric, endocrine. Positive findings noted below. Modified ESAS Completed by: provider           Pain: 0     Nausea: 0     Dyspnea: 0           Stool Occurrence(s): 1        PHYSICAL EXAM:     From RN flowsheet:  Wt Readings from Last 3 Encounters:   09/20/18 254 lb 13.6 oz (115.6 kg)   05/05/16 245 lb (111.1 kg)   03/25/16 235 lb (106.6 kg)     Blood pressure 126/53, pulse 80, temperature 98.8 °F (37.1 °C), resp. rate 25, height 6' 2\" (1.88 m), weight 254 lb 13.6 oz (115.6 kg), SpO2 97 %. Pain Scale 1: Behavioral Pain Scale (BPS)  Pain Intensity 1: 3     Pain Location 1: Abdomen     Pain Description 1: Aching  Pain Intervention(s) 1: Medication (see MAR) (pre medicate for bath )  Last bowel movement, if known:     Constitutional: intubated , sedated  Eyes: pupils equal, anicteric  ENMT: Et tube in place , OG tube .   Cardiovascular: regular rhythm. Respiratory: breathing not labored, symmetric  Gastrointestinal: soft non-tender, +bowel sounds  Skin: warm, dry, anasarca. Neurologic: intubated sedated. HISTORY:     Active Problems:    Pulmonary hemorrhage (9/12/2018)      Acute GI bleeding (9/12/2018)      Past Medical History:   Diagnosis Date    Aneurysm ([facility] [address]     AAA    Arrhythmia     atrial fibrillation 2016    Hypertension     Other ill-defined conditions(799.89)     hx of broken arm left/cast    Other ill-defined conditions(799.89)     glaucoma    Other ill-defined conditions(799.89)     elevated cholesterol    Other ill-defined conditions(799.89)     hx bursitis knees      Past Surgical History:   Procedure Laterality Date    ABDOMEN SURGERY PROC UNLISTED  1/16/13    AORTIC ABDOMINAL ANUERYSM REPAIR ENDOVASCULAR     HX HEENT      bilat cataract    HX ORTHOPAEDIC      ligament surgery left arm    HX OTHER SURGICAL      broken left foot casted and healed    UPPER GI ENDOSCOPY,BIOPSY  4/10/2015           Family History   Problem Relation Age of Onset    Cancer Mother     Cancer Father       History reviewed, no pertinent family history.   Social History   Substance Use Topics    Smoking status: Former Smoker     Packs/day: 0.50     Years: 20.00    Smokeless tobacco: Never Used    Alcohol use Yes      Comment: social rare     No Known Allergies   Current Facility-Administered Medications   Medication Dose Route Frequency    0.45% sodium chloride infusion  50 mL/hr IntraVENous CONTINUOUS    famotidine (PF) (PEPCID) 20 mg in sodium chloride 0.9% 10 mL injection  20 mg IntraVENous Q12H    0.9% sodium chloride infusion 250 mL  250 mL IntraVENous PRN    chlorhexidine (PERIDEX) 0.12 % mouthwash 15 mL  15 mL Oral BID    propofol (DIPRIVAN) infusion  0-50 mcg/kg/min IntraVENous TITRATE    albuterol (PROVENTIL VENTOLIN) nebulizer solution 2.5 mg  2.5 mg Nebulization Q4H RT    fentaNYL citrate (PF) injection 25 mcg  25 mcg IntraVENous Q4H PRN    albuterol (PROVENTIL HFA, VENTOLIN HFA, PROAIR HFA) inhaler 2 Puff  2 Puff Inhalation Q4H PRN    dorzolamide-timolol (COSOPT) 22.3-6.8 mg/mL ophthalmic solution 1 Drop  1 Drop Both Eyes BID    latanoprost (XALATAN) 0.005 % ophthalmic solution 1 Drop  1 Drop Both Eyes QHS    pravastatin (PRAVACHOL) tablet 40 mg  40 mg Oral QHS    sodium chloride (NS) flush 5-10 mL  5-10 mL IntraVENous Q8H    sodium chloride (NS) flush 5-10 mL  5-10 mL IntraVENous PRN    acetaminophen (TYLENOL) tablet 650 mg  650 mg Oral Q6H PRN    ondansetron (ZOFRAN) injection 4 mg  4 mg IntraVENous Q6H PRN    docusate sodium (COLACE) capsule 100 mg  100 mg Oral DAILY PRN          LAB AND IMAGING FINDINGS:     Lab Results   Component Value Date/Time    WBC 10.1 09/21/2018 04:09 AM    HGB 7.7 (L) 09/21/2018 04:09 AM    PLATELET 419 (L) 69/06/4374 04:09 AM     Lab Results   Component Value Date/Time    Sodium 149 (H) 09/21/2018 04:09 AM    Potassium 4.1 09/21/2018 04:09 AM    Chloride 116 (H) 09/21/2018 04:09 AM    CO2 28 09/21/2018 04:09 AM    BUN 30 (H) 09/21/2018 04:09 AM    Creatinine 1.01 09/21/2018 04:09 AM    Calcium 8.1 (L) 09/21/2018 04:09 AM    Magnesium 2.5 (H) 09/18/2018 04:09 AM    Phosphorus 2.4 (L) 09/18/2018 04:09 AM      Lab Results   Component Value Date/Time    AST (SGOT) 30 09/19/2018 05:10 AM    Alk.  phosphatase 70 09/19/2018 05:10 AM    Protein, total 6.5 09/19/2018 05:10 AM    Albumin 2.0 (L) 09/19/2018 05:10 AM    Globulin 4.5 (H) 09/19/2018 05:10 AM     Lab Results   Component Value Date/Time    INR 1.1 09/21/2018 04:09 AM    Prothrombin time 10.9 09/21/2018 04:09 AM    aPTT 28.3 09/21/2018 04:09 AM      No results found for: IRON, FE, TIBC, IBCT, PSAT, FERR   Lab Results   Component Value Date/Time    pH 7.36 09/21/2018 06:04 AM    PCO2 48 (H) 09/21/2018 06:04 AM    PO2 85 09/21/2018 06:04 AM     No components found for: [place]   Lab Results   Component Value Date/Time    CK 54 04/08/2015 12:35 PM    CK - MB 2.8 04/08/2015 12:35 PM     CTA of chest 9/17/18 : IMPRESSION  IMPRESSION:     Right lower lobe masslike abnormality demonstrates increased internal density  and has increased in size measuring 6.7 x 6.7 cm, compared to 5.7 x 4.4 cm. This  is compatible with internal hemorrhage. There is new moderate right lower lobe  partial collapse/atelectasis.      Normal sized right bronchial artery is visualized without any hyperplasia or or  evidence for supply to the right lower lobe lung abnormality. It is unlikely  that the hemoptysis related to bronchial arterial injury/supply. Prior bronchial  arteriogram was unremarkable. No evidence for pulmonary artery pseudoaneurysm or  active hemorrhage. However, it is more likely that the masslike abnormality in  the right lower lobe the lung has eroded into an adjacent pulmonary artery then  a bronchial artery. Therefore, plan is for pulmonary arteriogram with possible  embolization if an abnormality is seen in the right lower lobe.              Total time:   Counseling / coordination time, spent as noted above:   > 50% counseling / coordination?:     Prolonged service was provided for  []30 min   []75 min in face to face time in the presence of the patient, spent as noted above. Time Start:   Time End:   Note: this can only be billed with 39495 (initial) or 04239 (follow up). If multiple start / stop times, list each separately.

## 2018-09-20 NOTE — PROGRESS NOTES
09/20/18 6384 ABCDEF Bundle SBT Safety Screen Passed No  
SBT Screen Reason for Failure FiO2 > 50%; Increased inspiratory effort

## 2018-09-20 NOTE — PROGRESS NOTES
Care Management: 
 
Patient post code and critical in the ICU. He is vented and prognosis poor. Palliative working with family. Chart reviewed and we will cont to follow for discharge needs as appropriate. Bianka Erazo [facility] [id number]

## 2018-09-20 NOTE — PROGRESS NOTES
1930 - Bedside and Verbal shift change report given to Torin Jovel (oncoming nurse) by Brendia Meckel (offgoing nurse). Report included the following information SBAR, Kardex, ED Summary, OR Summary, Procedure Summary, Intake/Output, MAR, Recent Results, Med Rec Status and Cardiac Rhythm A fib.  
 
2000 - Shift assessment complete. Family at bedside questions answered, reassurance  Given. Sedated, intubated, decreased commands following. Bloody drainage noted from suction in inline catheter. 2200 - Repositioned for comfort, call bell within reach. 0000 - Reassessment complete, no changes from previous assessment. See summary for details. Bloody drainage from inline suction, large black BM and dark urine noted on assessment. Will continue to monitor VS. 
 
0028 - Incontinence care done, bathed with soap / water / CHG wipes. Linen / Callejas Siddiqui / bed pad changed. Repositioned for comfort. 0200 - Sedated in bed, call light within reach. 0330 - Type and screen done. 0400 - Reassessment complete , no changes noted from previous assessment see flow sheet for details. CL dressing changed. 0530 - Propofol held for SAT / SBT failed with FIO2 > 60  %. 
 
0600 - Sedated in bed, call bell within reach. 0730 - Bedside and Verbal shift change report given to Rex Flores (oncoming nurse) by Torin Jovel (offgoing nurse). Report included the following information SBAR, Kardex, ED Summary, OR Summary, Procedure Summary, Intake/Output, MAR, Recent Results, Med Rec Status and Cardiac Rhythm Paced.

## 2018-09-20 NOTE — PROGRESS NOTES
600 Francois Espinoza EdD, MDiv [facility] Page [phone] ([phone])Spiritual Care Assessment/Progress Note Formerly [facility], [facility] 
 
 
NAME: Shari Heredia      MRN: [id number] AGE: 80 y.o. SEX: male Hindu Affiliation: Wong Language: English  
 
9/20/2018     Total Time (in minutes): 15 Spiritual Assessment begun in [facility] 2 CRITICAL CARE 1 through conversation with: 
  
    []Patient        [x] Family    [] Friend(s) Reason for Consult: Palliative Care, Initial/Spiritual Assessment Spiritual beliefs: (Please include comment if needed) 
   [] Identifies with a mitchel tradition:     
   [] Supported by a mitchel community:        
   [] Claims no spiritual orientation:       
   [] Seeking spiritual identity:            
   [] Adheres to an individual form of spirituality:       
   [x] Not able to assess:                   
 
    
Identified resources for coping:  
   [] Prayer                           
   [] Music                  [] Guided Imagery 
   [] Family/friends                 [] Pet visits [] Devotional reading                         [x] Unknown 
   [] Other:                                          
 
 
Interventions offered during this visit: (See comments for more details) Patient Interventions: Interdisciplinary rounds Family/Friend(s): Affirmation of mitchel, Affirmation of emotions/emotional suffering, Iconic (affirming the presence of God/Higher Power), Prayer (assurance of), Prayer (actual) Plan of Care: 
 
 [] Support spiritual and/or cultural needs  
 [] Support AMD and/or advance care planning process    
 [] Support grieving process 
 [] Coordinate Rites and/or Rituals  
 [] Coordination with community clergy 
 [x] No spiritual needs identified at this time 
 [] Detailed Plan of Care below (See Comments)  [] Make referral to Music Therapy 
[] Make referral to Pet Therapy    
[] Make referral to Addiction services [] Make referral to [facility] 
[] Make referral to Spiritual Care Partner 
[] No future visits requested       
[x] Follow up visits as needed Comments: The patient was in bed and intubated. The patient's spouse of 47 years, patient's sister, and the oldest son were present. The patient's spouse responded to my inquiry about the patient in general. The spouse of the patient expressed her understanding is that the patient is improving. I offered prayer as requested by the patient's sister and supported by others. We touched and had prayer. I assured the family that I will keep the patient in my prayers.

## 2018-09-20 NOTE — PROGRESS NOTES
Hospitalist Progress Note NAME: Elana Alexandre  
:  1934 MRN:  [id number] Assessment / Plan: PEA arrest  Code blue called . Patient w/ agonal breathing after coming back from radiology after arteriogram completed. No pulse detected. CPR initiated. Epi x 2 given. Pulse obtained. Intubated by Dr. Mio Gurrola. Much bloody secretions obtained. Transferred to ICU. Family was updated at time of code. Acute respiratory w/ hypoxia, s/p cardiac arrest 
Sepsis due to suspected PNA w/ possible pulmonary hemorrhage 
-CT of chest shows severe emphysema with the right basilar airspace disease in the oval mass with fluid level concerning for internal hemorrhage versus pneumonia. -Bcx Neg, sputum cytology collected  negative except scant yeast. 
-cont zosyn for possible lung abscess. End date  
-cytology from bronchial washing on  is atypical, favor benign reactive process 
-vent management per pulm Hemoptysis 
-noted to have bleeding from RLL on bronchoscopy. -work up for possible rheumatologic cause neg thus far: 
ANCA, anti-GBM, MPO ab, NH-3 ab, SSA/SSB, RNP ab, Arredondo/RNP, dsDNA, and Arredondo ab all negative. JOSS +. 
-Arteriogram : Thoracic aortic and intercostal arteriograms as described above demonstrating no 
enlarged bronchial artery supplying the right lower lobe of the lung. A normal 
appearing and normal sized right bronchial artery is seen without any 
hyperplasia or dominant right lower lobe supply. No embolization was performed. RLL mass - unable to determine if hemorrhage 
-Dr. Sanchez Chappell considered Wegener's as possible cause since patient w/ hemoptysis and hematuria. However, work up neg thus far. -diagnostic bronch done . No endobronchial lesions seen. -CTA chest done  due to persistent bloody pulmonary secretions. Right lower lobe masslike abnormality demonstrates increased internal density and has increased in size measuring 6.7 x 6.7 cm, compared to 5.7 x 4.4 cm. This 
is compatible with internal hemorrhage. There is new moderate right lower lobe 
partial collapse/atelectasis.   
Normal sized right bronchial artery is visualized without any hyperplasia or or evidence for supply to the right lower lobe lung abnormality. It is unlikely that the hemoptysis related to bronchial arterial injury/supply. Prior bronchial arteriogram was unremarkable. No evidence for pulmonary artery pseudoaneurysm or active hemorrhage. However, it is more likely that the masslike abnormality in the right lower lobe the lung has eroded into an adjacent pulmonary artery then a bronchial artery. Therefore, plan is for pulmonary arteriogram with possible embolization if an abnormality is seen in the right lower lobe. 6 mm and 4 mm right lung nodules. Small bilateral pleural effusions. Minimally enlarged bilateral hilar and mediastinal lymph nodes Acute blood loss anemia on admission 
-Hgb 11 on admission. S/p 3 units PRBC so far 
-serial H&H 
 
H/o atrial fibrillation on chronic anticoagulation w/ coumadin 
-amiodarone on hold 
-cardizem on hold due to borderline low bp 
-hold coumadin due to hemoptysis Coumadin-induced coagulopathy 
-INR 1.1. Holding coumadin. ALAN - likely prerenal related to Lasix. Renal function stable. 
-avoid nephrotoxic agents. UTI, acute cystitis w/ hematuria, POA 
-on abx as above. No Ucx sent on admission COPD - stable HLD 
-cont pravachol Obesity - BMI 32 Code: Full DVT prophylaxis: SCDs GI prophylaxis: PPI Subjective: Chief Complaint / Reason for Physician Visit Follow up for hemoptysis. Intubated, sedated Discussed with RN events overnight. Review of Systems: 
Symptom Y/N Comments  Symptom Y/N Comments Fever/Chills    Chest Pain Poor Appetite    Edema Cough    Abdominal Pain Sputum    Joint Pain SOB/ROY    Pruritis/Rash Nausea/vomit    Tolerating PT/OT Diarrhea    Tolerating Diet Constipation    Other Could NOT obtain due to: sedated Objective: VITALS:  
Last 24hrs VS reviewed since prior progress note. Most recent are: 
Patient Vitals for the past 24 hrs: 
 Temp Pulse Resp BP SpO2  
09/20/18 1600 97.9 °F (36.6 °C) 71 25 109/45 98 %  
09/20/18 1552 - - - - 98 %  
09/20/18 1500 - 69 24 126/58 100 % 09/20/18 1400 - 70 25 122/62 97 % 09/20/18 1300 - 70 25 120/57 97 % 09/20/18 1200 97.8 °F (36.6 °C) 71 25 128/58 98 %  
09/20/18 1137 - - - - 98 %  
09/20/18 1100 - 68 20 117/59 97 % 09/20/18 1000 - 68 19 117/56 100 % 09/20/18 0900 - 68 22 104/59 98 %  
09/20/18 0806 - - - - 97 % 09/20/18 0800 99.1 °F (37.3 °C) 72 25 116/51 97 % 09/20/18 0758 - 66 24 - 97 % 09/20/18 0700 - 68 19 122/48 96 %  
09/20/18 0645 - 78 22 114/44 96 %  
09/20/18 0600 - 75 26 93/46 96 %  
09/20/18 0500 - 75 25 (!) 97/38 96 %  
09/20/18 0430 - 75 28 (!) 94/37 92 %  
09/20/18 0400 98.2 °F (36.8 °C) 74 26 (!) 94/37 93 % 09/20/18 0353 - 73 28 - 94 % 09/20/18 0330 - 75 23 104/44 92 %  
09/20/18 0300 - 75 25 (!) 97/36 91 %  
09/20/18 0230 - 73 26 113/48 92 %  
09/20/18 0200 - 74 (!) 36 110/49 93 % 09/20/18 0130 - 73 24 115/54 92 %  
09/20/18 0100 - 73 24 115/54 92 %  
09/20/18 0030 - 75 28 120/51 94 % 09/20/18 0000 99.7 °F (37.6 °C) 71 27 112/52 95 % 09/19/18 2330 - 68 25 119/50 94 % 09/19/18 2325 - - - - 94 % 09/19/18 2300 - 71 25 130/54 94 % 09/19/18 2230 - 69 24 118/52 96 % 09/19/18 2200 - 73 26 125/46 93 % 09/19/18 2130 - 68 24 118/50 98 % 09/19/18 2100 - 72 22 103/41 100 % 09/19/18 2030 - 73 29 109/50 (!) 89 % 09/19/18 2000 99.1 °F (37.3 °C) 69 27 108/49 94 % 09/19/18 1938 - - - - 97 % 09/19/18 1930 - 69 30 100/52 95 % 09/19/18 1902 - 68 24 - 95 % 09/19/18 1900 - 69 26 102/50 95 % 09/19/18 1830 - 70 25 104/45 95 % 09/19/18 1800 - 70 28 113/49 97 % 09/19/18 1730 - 69 23 106/47 95 % 09/19/18 1700 - 75 29 113/47 95 % Intake/Output Summary (Last 24 hours) at 09/20/18 1636 Last data filed at 09/20/18 1600 Gross per 24 hour Intake          3592.26 ml Output              985 ml Net          2607.26 ml PHYSICAL EXAM: 
General: WD, WN. Intubated. No distress. EENT:   Anicteric sclerae. MMM;  ET tube in place. Resp:  Decreased breath sounds. Clear anteriorly. No rhonchi. No accessory muscle use CV:  Regular  rhythm,  1+ bilateral leg edema GI:  Soft, Non distended, Non tender.  +Bowel sounds Neurologic:  Sedated. No posturing. Psych:   No agitation Skin:  No rashes. No jaundice Reviewed most current lab test results and cultures  YES Reviewed most current radiology test results   YES Review and summation of old records today    NO Reviewed patient's current orders and MAR    YES 
PMH/ reviewed - no change compared to H&P 
________________________________________________________________________ Care Plan discussed with: 
  Comments Patient Family RN x Care Manager Consultant Multidiciplinary team rounds were held today with , nursing, pharmacist and clinical coordinator. Patient's plan of care was discussed; medications were reviewed and discharge planning was addressed. ________________________________________________________________________ Total NON critical care TIME:  25  Minutes Total CRITICAL CARE TIME Spent:   Minutes non procedure based Comments >50% of visit spent in counseling and coordination of care    
________________________________________________________________________ Ventura Soto MD  
 
Procedures: see electronic medical records for all procedures/Xrays and details which were not copied into this note but were reviewed prior to creation of Plan. LABS: 
I reviewed today's most current labs and imaging studies. Pertinent labs include: 
Recent Labs  
   09/20/18 1678  09/19/18 
 0510  09/18/18 
 0505 WBC  10.8  11.6*  10.2 HGB  7.8*  7.9*  6.8* HCT  25.6*  25.4*  22.1*  
PLT  121*  119*  133* Recent Labs  
   09/20/18 
 0313  09/19/18 
 0510  09/18/18 
 0409 NA  145  144  143  
K  3.7  4.1  4.1 CL  114*  112*  111* CO2  27  25  25 GLU  141*  129*  109* BUN  28*  23*  13  
CREA  1.13  1.14  0.87 CA  7.7*  8.0*  8.0*  
MG   --    --   2.5* PHOS   --    --   2.4* ALB   --   2.0*  2.1* TBILI   --   0.8  0.4 SGOT   --   30  18 ALT   --   22  18 INR  1.1  1.1  1.1 Signed: Ventura Soto MD

## 2018-09-20 NOTE — PROGRESS NOTES
0700 Bedside and Verbal shift change report received from Magdalena Bernard, [address] (offgoing nurse). Report included the following information SBAR, Kardex, Intake/Output, MAR, Accordion and Recent Results. 0800 Assessment complete. See flowsheet for details. 1200 Reassessment unchanged. Will continue to monitor. 1330 Family at bedside. Update given. 1600 Reassessment unchanged. Will continue to monitor. 1900 Bedside and Verbal shift change report given to Patrick RN (oncoming nurse). Report included the following information SBAR, Kardex, Intake/Output, MAR, Accordion and Recent Results.

## 2018-09-20 NOTE — PROGRESS NOTES
Problem: Falls - Risk of 
Goal: *Absence of Falls Document Kimberley Chiquis Fall Risk and appropriate interventions in the flowsheet. Outcome: Progressing Towards Goal 
Fall Risk Interventions: 
 
Mobility Interventions: Bed/chair exit alarm, Communicate number of staff needed for ambulation/transfer, Mechanical lift, Strengthening exercises (ROM-active/passive) Mentation Interventions: Adequate sleep, hydration, pain control, Bed/chair exit alarm, Door open when patient unattended, Evaluate medications/consider consulting pharmacy, More frequent rounding, Reorient patient Medication Interventions: Bed/chair exit alarm, Evaluate medications/consider consulting pharmacy Elimination Interventions: Bed/chair exit alarm, Call light in reach, Toileting schedule/hourly rounds History of Falls Interventions: Bed/chair exit alarm, Consult care management for discharge planning, Evaluate medications/consider consulting pharmacy, Investigate reason for fall, Room close to nurse's station

## 2018-09-21 ENCOUNTER — APPOINTMENT (OUTPATIENT)
Dept: GENERAL RADIOLOGY | Age: 83
DRG: 003 | End: 2018-09-21
Attending: INTERNAL MEDICINE
Payer: MEDICARE

## 2018-09-21 LAB
ANION GAP SERPL CALC-SCNC: 5 MMOL/L (ref 5–15)
APTT PPP: 28.3 SEC (ref 22.1–32)
ARTERIAL PATENCY WRIST A: ABNORMAL
BASE EXCESS BLDA CALC-SCNC: 0 MMOL/L
BDY SITE: ABNORMAL
BUN SERPL-MCNC: 30 MG/DL (ref 6–20)
BUN/CREAT SERPL: 30 (ref 12–20)
CALCIUM SERPL-MCNC: 8.1 MG/DL (ref 8.5–10.1)
CHLORIDE SERPL-SCNC: 116 MMOL/L (ref 97–108)
CO2 SERPL-SCNC: 28 MMOL/L (ref 21–32)
CREAT SERPL-MCNC: 1.01 MG/DL (ref 0.7–1.3)
EPAP/CPAP/PEEP, PAPEEP: 6
ERYTHROCYTE [DISTWIDTH] IN BLOOD BY AUTOMATED COUNT: 21.2 % (ref 11.5–14.5)
FIBRINOGEN PPP-MCNC: >800 MG/DL (ref 200–475)
FIO2 ON VENT: 60 %
GAS FLOW.O2 SETTING OXYMISER: 12 L/MIN
GLUCOSE SERPL-MCNC: 140 MG/DL (ref 65–100)
HCO3 BLDA-SCNC: 26 MMOL/L (ref 22–26)
HCT VFR BLD AUTO: 26 % (ref 36.6–50.3)
HGB BLD-MCNC: 7.7 G/DL (ref 12.1–17)
INR PPP: 1.1 (ref 0.9–1.1)
MCH RBC QN AUTO: 33.2 PG (ref 26–34)
MCHC RBC AUTO-ENTMCNC: 29.6 G/DL (ref 30–36.5)
MCV RBC AUTO: 112.1 FL (ref 80–99)
NRBC # BLD: 0.02 K/UL (ref 0–0.01)
NRBC BLD-RTO: 0.2 PER 100 WBC
PCO2 BLDA: 48 MMHG (ref 35–45)
PH BLDA: 7.36 [PH] (ref 7.35–7.45)
PLATELET # BLD AUTO: 123 K/UL (ref 150–400)
PMV BLD AUTO: 12.2 FL (ref 8.9–12.9)
PO2 BLDA: 85 MMHG (ref 80–100)
POTASSIUM SERPL-SCNC: 4.1 MMOL/L (ref 3.5–5.1)
PROTHROMBIN TIME: 10.9 SEC (ref 9–11.1)
RBC # BLD AUTO: 2.32 M/UL (ref 4.1–5.7)
SAO2 % BLD: 96 % (ref 92–97)
SAO2% DEVICE SAO2% SENSOR NAME: ABNORMAL
SODIUM SERPL-SCNC: 149 MMOL/L (ref 136–145)
SPECIMEN SITE: ABNORMAL
THERAPEUTIC RANGE,PTTT: ABNORMAL SECS (ref 58–77)
VENTILATION MODE VENT: ABNORMAL
VT SETTING VENT: 500 ML
WBC # BLD AUTO: 10.1 K/UL (ref 4.1–11.1)

## 2018-09-21 PROCEDURE — 85610 PROTHROMBIN TIME: CPT | Performed by: INTERNAL MEDICINE

## 2018-09-21 PROCEDURE — 94640 AIRWAY INHALATION TREATMENT: CPT

## 2018-09-21 PROCEDURE — 74011000250 HC RX REV CODE- 250: Performed by: INTERNAL MEDICINE

## 2018-09-21 PROCEDURE — 36415 COLL VENOUS BLD VENIPUNCTURE: CPT | Performed by: INTERNAL MEDICINE

## 2018-09-21 PROCEDURE — 85027 COMPLETE CBC AUTOMATED: CPT | Performed by: INTERNAL MEDICINE

## 2018-09-21 PROCEDURE — 74011250636 HC RX REV CODE- 250/636: Performed by: INTERNAL MEDICINE

## 2018-09-21 PROCEDURE — 80048 BASIC METABOLIC PNL TOTAL CA: CPT | Performed by: INTERNAL MEDICINE

## 2018-09-21 PROCEDURE — 36600 WITHDRAWAL OF ARTERIAL BLOOD: CPT | Performed by: INTERNAL MEDICINE

## 2018-09-21 PROCEDURE — 65620000000 HC RM CCU GENERAL

## 2018-09-21 PROCEDURE — 71045 X-RAY EXAM CHEST 1 VIEW: CPT

## 2018-09-21 PROCEDURE — 77030020291 HC FLEXSEAL FMS BMS -C

## 2018-09-21 PROCEDURE — 82803 BLOOD GASES ANY COMBINATION: CPT | Performed by: INTERNAL MEDICINE

## 2018-09-21 PROCEDURE — 94003 VENT MGMT INPAT SUBQ DAY: CPT

## 2018-09-21 PROCEDURE — 74011000258 HC RX REV CODE- 258: Performed by: INTERNAL MEDICINE

## 2018-09-21 PROCEDURE — 74011250637 HC RX REV CODE- 250/637: Performed by: INTERNAL MEDICINE

## 2018-09-21 RX ORDER — SODIUM CHLORIDE 450 MG/100ML
25 INJECTION, SOLUTION INTRAVENOUS CONTINUOUS
Status: DISCONTINUED | OUTPATIENT
Start: 2018-09-21 | End: 2018-09-29 | Stop reason: ALTCHOICE

## 2018-09-21 RX ADMIN — CHLORHEXIDINE GLUCONATE 15 ML: 1.2 RINSE ORAL at 08:34

## 2018-09-21 RX ADMIN — Medication 10 ML: at 15:58

## 2018-09-21 RX ADMIN — ALBUTEROL SULFATE 2.5 MG: 2.5 SOLUTION RESPIRATORY (INHALATION) at 15:01

## 2018-09-21 RX ADMIN — FAMOTIDINE 20 MG: 10 INJECTION, SOLUTION INTRAVENOUS at 08:33

## 2018-09-21 RX ADMIN — PROPOFOL 15 MCG/KG/MIN: 10 INJECTION, EMULSION INTRAVENOUS at 05:03

## 2018-09-21 RX ADMIN — ALBUTEROL SULFATE 2.5 MG: 2.5 SOLUTION RESPIRATORY (INHALATION) at 08:07

## 2018-09-21 RX ADMIN — SODIUM CHLORIDE 50 ML/HR: 450 INJECTION, SOLUTION INTRAVENOUS at 21:55

## 2018-09-21 RX ADMIN — ALBUTEROL SULFATE 2.5 MG: 2.5 SOLUTION RESPIRATORY (INHALATION) at 11:21

## 2018-09-21 RX ADMIN — Medication 10 ML: at 21:58

## 2018-09-21 RX ADMIN — CHLORHEXIDINE GLUCONATE 15 ML: 1.2 RINSE ORAL at 15:56

## 2018-09-21 RX ADMIN — FAMOTIDINE 20 MG: 10 INJECTION, SOLUTION INTRAVENOUS at 21:55

## 2018-09-21 RX ADMIN — ALBUTEROL SULFATE 2.5 MG: 2.5 SOLUTION RESPIRATORY (INHALATION) at 03:46

## 2018-09-21 RX ADMIN — CHLORHEXIDINE GLUCONATE 15 ML: 1.2 RINSE ORAL at 18:08

## 2018-09-21 RX ADMIN — DORZOLAMIDE HYDROCHLORIDE AND TIMOLOL MALEATE 1 DROP: 20; 5 SOLUTION/ DROPS OPHTHALMIC at 18:08

## 2018-09-21 RX ADMIN — ALBUTEROL SULFATE 2.5 MG: 2.5 SOLUTION RESPIRATORY (INHALATION) at 00:09

## 2018-09-21 RX ADMIN — LATANOPROST 1 DROP: 50 SOLUTION OPHTHALMIC at 18:07

## 2018-09-21 RX ADMIN — DORZOLAMIDE HYDROCHLORIDE AND TIMOLOL MALEATE 1 DROP: 20; 5 SOLUTION/ DROPS OPHTHALMIC at 08:34

## 2018-09-21 RX ADMIN — ALBUTEROL SULFATE 2.5 MG: 2.5 SOLUTION RESPIRATORY (INHALATION) at 19:47

## 2018-09-21 RX ADMIN — Medication 10 ML: at 08:33

## 2018-09-21 RX ADMIN — PROPOFOL 15 MCG/KG/MIN: 10 INJECTION, EMULSION INTRAVENOUS at 11:25

## 2018-09-21 RX ADMIN — PRAVASTATIN SODIUM 40 MG: 40 TABLET ORAL at 21:56

## 2018-09-21 RX ADMIN — SODIUM CHLORIDE 50 ML/HR: 450 INJECTION, SOLUTION INTRAVENOUS at 10:00

## 2018-09-21 RX ADMIN — LATANOPROST 1 DROP: 50 SOLUTION OPHTHALMIC at 22:00

## 2018-09-21 NOTE — PROGRESS NOTES
1900- Report from [facility] SERV. Patient ventilated. Stable VSS at this time. Afib on CM. Anasarca noted. Protein +2cal HN via OG at 30, NG advanced from 50 to 54. Vent settings A/C RR12, , Fi02 60%, 6 PEEP.

## 2018-09-21 NOTE — PROGRESS NOTES
Hospitalist Progress Note NAME: Kvng Zamorano  
:  1934 MRN:  [id number] Assessment / Plan: PEA arrest  Code blue called . Patient w/ agonal breathing after coming back from radiology after arteriogram completed. No pulse detected. CPR initiated. Epi x 2 given. Pulse obtained. Intubated by Dr. Wily Godinez. Much bloody secretions obtained. Transferred to ICU. Acute respiratory w/ hypoxia, s/p cardiac arrest 
Sepsis due to suspected PNA w/ possible pulmonary hemorrhage 
-CT of chest shows severe emphysema with the right basilar airspace disease in the oval mass with fluid level concerning for internal hemorrhage versus pneumonia. -Bcx Neg, sputum cytology collected  negative except scant yeast. 
-cont zosyn for possible lung abscess. End date  
-cytology from bronchial washing on  is atypical, favor benign reactive process 
-off pressors 
-vent management per pulm Hemoptysis 
-noted to have bleeding from RLL on bronchoscopy. -work up for possible rheumatologic cause neg thus far: 
ANCA, anti-GBM, MPO ab, NV-3 ab, SSA/SSB, RNP ab, Arredondo/RNP, dsDNA, and Arredondo ab all negative. JOSS +. 
-Arteriogram : Thoracic aortic and intercostal arteriograms as described above demonstrating no 
enlarged bronchial artery supplying the right lower lobe of the lung. A normal 
appearing and normal sized right bronchial artery is seen without any 
hyperplasia or dominant right lower lobe supply. No embolization was performed. RLL mass  
-work up for [employer] neg thus far. -diagnostic bronch done . No endobronchial lesions seen. -CTA chest done  due to persistent bloody pulmonary secretions. Right lower lobe masslike abnormality demonstrates increased internal density 
and has increased in size measuring 6.7 x 6.7 cm, compared to 5.7 x 4.4 cm. This 
is compatible with internal hemorrhage. There is new moderate right lower lobe 
partial collapse/atelectasis.    
 Normal sized right bronchial artery is visualized without any hyperplasia or or evidence for supply to the right lower lobe lung abnormality. It is unlikely that the hemoptysis related to bronchial arterial injury/supply. Prior bronchial arteriogram was unremarkable. No evidence for pulmonary artery pseudoaneurysm or active hemorrhage. However, it is more likely that the masslike abnormality in the right lower lobe the lung has eroded into an adjacent pulmonary artery then a bronchial artery. Therefore, plan is for pulmonary arteriogram with possible embolization if an abnormality is seen in the right lower lobe. 6 mm and 4 mm right lung nodules. Small bilateral pleural effusions. Minimally enlarged bilateral hilar and mediastinal lymph nodes Hypernatremia 
-on 1/2NS, monitor lab Acute blood loss anemia on admission 
-s/p 3 units PRBC so far 
-serial H&H, transfuse prn 
 
H/o atrial fibrillation on chronic anticoagulation w/ coumadin 
-amiodarone on hold 
-cardizem on hold due to borderline low bp 
-hold coumadin due to hemoptysis Coumadin-induced coagulopathy 
-INR 1.1. Holding coumadin. ALAN - likely prerenal related to Lasix. Renal function stable. 
-avoid nephrotoxic agents. UTI, acute cystitis w/ hematuria, POA 
-on abx as above. No Ucx sent on admission COPD - stable HLD 
-cont pravachol Obesity - BMI 32 Code: Full DVT prophylaxis: SCDs GI prophylaxis: PPI Subjective: Chief Complaint / Reason for Physician Visit Follow up for hemoptysis. Intubated, sedated Discussed with RN events overnight. Review of Systems: 
Symptom Y/N Comments  Symptom Y/N Comments Fever/Chills    Chest Pain Poor Appetite    Edema Cough    Abdominal Pain Sputum    Joint Pain SOB/ROY    Pruritis/Rash Nausea/vomit    Tolerating PT/OT Diarrhea    Tolerating Diet Constipation    Other Could NOT obtain due to: sedated Objective: VITALS:  
 Last 24hrs VS reviewed since prior progress note. Most recent are: 
Patient Vitals for the past 24 hrs: 
 Temp Pulse Resp BP SpO2  
09/21/18 1300 - 75 25 118/44 96 %  
09/21/18 1200 98.6 °F (37 °C) 75 26 120/52 97 % 09/21/18 1122 - 72 25 - 96 %  
09/21/18 1121 - - - - 96 %  
09/21/18 1100 - 73 26 117/57 98 %  
09/21/18 1000 - 74 25 119/53 95 % 09/21/18 0900 - 80 25 126/53 97 % 09/21/18 0808 - 74 26 - 94 % 09/21/18 0807 - - - - 94 % 09/21/18 0800 98.8 °F (37.1 °C) 71 26 131/50 94 % 09/21/18 0700 - 74 27 132/46 91 %  
09/21/18 0600 - 70 23 124/54 97 % 09/21/18 0500 - 73 23 121/60 97 % 09/21/18 0400 98.7 °F (37.1 °C) 65 20 118/64 96 %  
09/21/18 0342 - 72 20 - 96 %  
09/21/18 0300 - 73 20 124/49 96 %  
09/21/18 0200 - 73 24 119/49 97 % 09/21/18 0100 - 76 26 118/52 97 % 09/21/18 0006 - 74 22 - 96 %  
09/21/18 0000 99.4 °F (37.4 °C) 70 20 115/51 96 %  
09/20/18 2300 - 75 23 124/51 96 %  
09/20/18 2200 - 75 25 116/60 95 % 09/20/18 2100 - 74 21 121/48 97 % 09/20/18 2032 - 72 24 - 97 % 09/20/18 2000 99.5 °F (37.5 °C) 78 26 129/58 97 % 09/20/18 1900 - 72 27 121/56 96 %  
09/20/18 1800 - 71 25 106/48 98 %  
09/20/18 1700 - 68 24 116/47 97 % 09/20/18 1600 97.9 °F (36.6 °C) 71 25 109/45 98 %  
09/20/18 1552 - - - - 98 %  
09/20/18 1500 - 69 24 126/58 100 % 09/20/18 1400 - 70 25 122/62 97 % Intake/Output Summary (Last 24 hours) at 09/21/18 1354 Last data filed at 09/21/18 1200 Gross per 24 hour Intake           2514.1 ml Output             1285 ml Net           1229.1 ml PHYSICAL EXAM: 
General: WD, WN. Intubated. No distress. EENT:   Anicteric sclerae. MMM;  ET tube in place. Resp:  Decreased breath sounds. Clear anteriorly. No rhonchi. No accessory muscle use CV:  Regular  rhythm,  1+ bilateral leg edema GI:  Soft, Non distended, Non tender.  +Bowel sounds Neurologic:  Sedated. No posturing. Psych:   No agitation Skin:  No rashes. No jaundice Reviewed most current lab test results and cultures  YES Reviewed most current radiology test results   YES Review and summation of old records today    NO Reviewed patient's current orders and MAR    YES 
PMH/SH reviewed - no change compared to H&P 
________________________________________________________________________ Care Plan discussed with: 
  Comments Patient Family RN x Care Manager Consultant Multidiciplinary team rounds were held today with , nursing, pharmacist and clinical coordinator. Patient's plan of care was discussed; medications were reviewed and discharge planning was addressed. ________________________________________________________________________ Total NON critical care TIME:  25  Minutes Total CRITICAL CARE TIME Spent:   Minutes non procedure based Comments >50% of visit spent in counseling and coordination of care    
________________________________________________________________________ Elier Mcknight MD  
 
Procedures: see electronic medical records for all procedures/Xrays and details which were not copied into this note but were reviewed prior to creation of Plan. LABS: 
I reviewed today's most current labs and imaging studies. Pertinent labs include: 
Recent Labs  
   09/21/18 
 0409  09/20/18 
 0313  09/19/18 
 0510 WBC  10.1  10.8  11.6* HGB  7.7*  7.8*  7.9*  
HCT  26.0*  25.6*  25.4*  
PLT  123*  121*  119* Recent Labs  
   09/21/18 
 0409  09/20/18 
 0313  09/19/18 
 0510 NA  149*  145  144  
K  4.1  3.7  4.1 CL  116*  114*  112* CO2  28  27  25 GLU  140*  141*  129* BUN  30*  28*  23* CREA  1.01  1.13  1.14  
CA  8.1*  7.7*  8.0* ALB   --    --   2.0*  
TBILI   --    --   0.8 SGOT   --    --   30 ALT   --    --   22 INR  1.1  1.1  1.1 Signed: Elier Mcknight MD

## 2018-09-21 NOTE — PROGRESS NOTES
[facility] Consult Service NotePulmonary, Critical Care, and Sleep Medicine Name: Jaycee Max MRN: [id number] : 1934 Hospital: [facility] Date: 2018   Hospital Day: 6 IMPRESSION:  
1. S/p PEA arrest post anigo 9/14 pm. Had 6 min of CPR and then ROSC. 2. Acute hemoptysis- Noted to have bleeding from RLL on Bronch. No endobronchial lesion. has lung mass(CA vs inflammatory pseudotumor) and microscopic hematuria; ANCA negative; JOSS barely positive. Suspect LUNG mass the culprit. Bronch will not help; 3. Elevated procalcitonin- treating for possible lung abscess/pseudotumor 4. Lung mass RLL- right lung base- would not be visible on conventional bronch and yield from transbronchial biopsy not guaranteed; had no lesion on chest Ct 2016 5. Coagulopathy from warfarin per , hold anticoagulation for now. 6. Acute kidney injury likely prerenal and related to medications (Lasix) Baseline creatinine is around 0.9 7. Hematuria 8. Sepsis due to suspected pneumonia. Patient reports having exertional shortness of breath along with cough and hemoptysis; CT of chest shows severe emphysema with the right basilar airspace disease in the oval mass with fluid level concerning for internal hemorrhage versus pneumonia ;  
9. 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  
10. Anemia 11. Former 50+ pack yr smoker quit in ? 12. Asbestos exposure 13. History of atrial fibrillation on anticoagulation with warfarin 14. Hx of CAD s/p PCI in the past home amiodarone. Hold Cardizem 15. Dyslipidemia 16. Multiorgan dysfunction as outlined above: Pt has one or more acute or chronic illnesses with severe exacerbation with progression or side effects of treatment that poses a threat to life or bodily function 17. Additional workup outlined below 25. Pt is requiring Drug therapy requiring intensive monitoring for toxicity 19. Pt is unstable, unpredictable needing PCU monitoring; is critically ill and at high risk of sudden decline and decompensation with life threatening consequenses and continued end organ dysfunction and failure 20. Prognosis guarded with significant risk of sudden decline 21. Critically ill, 35 min cc, EOP. Discussed with nurse this am. High risk of decompensation. RECOMMENDATIONS/PLAN:  
1. Full vent support, not ready for SBT 2. IR performed embolization of pulmonary artery 3. Hold anticoagulation for now. SCDS. 4. off pressors 5. Empiric abx to treat possible lung abscess 6. Follow cultures 7. Labs to follow electrolytes, renal function and and blood counts 8. Glucose monitoring and SSI 9. Bronchial hygiene with respiratory therapy techniques, bronchodilators 10. DVT, SUP prophylaxis 11. Pt needs IV fluids with additives and Drug therapy requiring intensive monitoring for toxicity 12. Prescription drug management with home med reconciliation reviewed My assessment/management discussed with: Consultants, Nursing, Pharmacy, Case Management, PT, OT, Respiratory Therapy, Hospitalist and Family for coordination of care Pt's condition is acute and unstable requiring inpatient hospitalization. This care involved high complexity decision making which includes independently reviewing the patient's past medical records, current laboratory results, medication profiles that were immediately available to me and actual Xray images at the bedside in order to assess, support vital system function, and to treat this degree of vital organ system failure, and to prevent further deterioration of the patients condition. Risk of deterioration: medium and high  
[x] High complexity decision making was performed [x] See my orders for details Tubes:    
 
Subjective/Initial History: S/P pulmonary artery embolization No acute events overnight Still on full vent support MAR reviewed and pertinent medications noted or modified as needed Current Facility-Administered Medications Medication  famotidine (PF) (PEPCID) 20 mg in sodium chloride 0.9% 10 mL injection  0.9% sodium chloride infusion 250 mL  chlorhexidine (PERIDEX) 0.12 % mouthwash 15 mL  propofol (DIPRIVAN) infusion  albuterol (PROVENTIL VENTOLIN) nebulizer solution 2.5 mg  
 fentaNYL citrate (PF) injection 25 mcg  
 0.9% sodium chloride infusion  albuterol (PROVENTIL HFA, VENTOLIN HFA, PROAIR HFA) inhaler 2 Puff  dorzolamide-timolol (COSOPT) 22.3-6.8 mg/mL ophthalmic solution 1 Drop  latanoprost (XALATAN) 0.005 % ophthalmic solution 1 Drop  pravastatin (PRAVACHOL) tablet 40 mg  
 sodium chloride (NS) flush 5-10 mL  sodium chloride (NS) flush 5-10 mL  acetaminophen (TYLENOL) tablet 650 mg  
 ondansetron (ZOFRAN) injection 4 mg  docusate sodium (COLACE) capsule 100 mg  
  
 
  
ROS:A comprehensive review of systems was negative except for that written in the HPI. Objective: 
 
Vital Signs: Telemetry:    normal sinus rhythmIntake/Output:  
Visit Vitals  /54  Pulse 70  Temp 98.7 °F (37.1 °C)  Resp 23  
 Ht 6' 2\" (1.88 m)  Wt 115.6 kg (254 lb 13.6 oz)  SpO2 94%  BMI 32.72 kg/m2 Temp (24hrs), Av.7 °F (37.1 °C), Min:97.8 °F (36.6 °C), Max:99.5 °F (37.5 °C) O2 Device: Ventilator O2 Flow Rate (L/min): 5 l/min Wt Readings from Last 4 Encounters:  
18 115.6 kg (254 lb 13.6 oz) 16 111.1 kg (245 lb)  
16 106.6 kg (235 lb) 04/08/15 103.4 kg (228 lb) Intake/Output Summary (Last 24 hours) at 18 7507 Last data filed at 18 0600 Gross per 24 hour Intake          2472.05 ml Output             1020 ml Net          1452.05 ml Last shift:        
Last 3 shifts:  1901 -  0700 In: 3964.3 [I.V.:2169.3] Out: 1485 [SYUCW:8117] Physical Exam:  
 General:   male; with Et tube in place. Has right IJ CVC. On sedation and on vent. HEAD: Normocephalic, without obvious abnormality, atraumatic EYES: conjunctivae clear. PERRL,  AN Icteric sclerae NOSE: nares normal, no drainage, no nasal flaring, THROAT: normal; Lips, mucosa dry; No Thrush;  crowded airway; tongue midline Neck: Supple, symmetrical, trachea midline,  No accessory mm use; No Stridor/ cuff leak, No goiter or thyroid tenderness LYMPH: No abnormally enlarged lymph nodes. in neck or groin Chest: increased AP diameter Lungs: agonal, bilateral rhonchi. Seems to have good air  Movement Bilaterally. Heart: Regular rate and rhythm; NO edema Abdomen: soft, non-tender, without masses or organomegaly, protuberant, distended : no Overton Musculoskeletal: mild kyphosis; No spine or CVA tenderness; negative, cyanosis, clubbing; no joint swelling or erythema Neuro: was not  Responsive, had a gag with ET tube suctioning. , Sedated not able to fully assess. Psych: Not able to assess due to being on sedation and on vent. Skin: Pallor;  
 Pulses:Bilateral, Radial, 2+ Data:  
 
All lab results for the last 24 hours reviewed. Labs: 
 
Recent Labs  
   09/21/18 
 0409  09/20/18 
 0313  09/19/18 
 0510 WBC  10.1  10.8  11.6* HGB  7.7*  7.8*  7.9*  
PLT  123*  121*  119* INR  1.1  1.1  1.1 APTT  28.3  28.2  28.3 Recent Labs  
   09/21/18 
 0409  09/20/18 
 0313  09/19/18 
 0510 NA  149*  145  144  
K  4.1  3.7  4.1 CL  116*  114*  112* CO2  28  27  25 GLU  140*  141*  129* BUN  30*  28*  23* CREA  1.01  1.13  1.14  
CA  8.1*  7.7*  8.0* ALB   --    --   2.0*  
SGOT   --    --   30 ALT   --    --   22 Recent Labs  
   09/21/18 
 0604  09/20/18 
 2321  09/19/18 
 0407 PH  7.36  7.37  7.37  
PCO2  48*  43  39 PO2  85  65*  69* HCO3  26  24  22 FIO2  60  50  50 No results for input(s): CPK, CKNDX, TROIQ in the last 72 hours. No lab exists for component: CPKMB No results found for: BNPP, BNP Lab Results Component Value Date/Time Culture result: HEAVY NORMAL RESPIRATORY SHELDON 09/16/2018 11:15 AM  
 Culture result: LIGHT NORMAL RESPIRATORY SHELDON 09/15/2018 01:20 PM  
 Culture result: LIGHT NORMAL RESPIRATORY SHELDON 09/12/2018 03:50 PM  
No results found for: TSH, TSHEXT, TSHEXT Imaging: CXR: no acute changes I have personally and independently reviewed the patients interval and diagnostic data, radiographs and have reviewed the reports. Medical Decision Making Today: · Reviewed the flowsheet and previous days notes · Reviewed and summarized records or history from previous days note or discussions with staff, family · Parenteral controlled substances - Reviewed/ Adjusted / Weaned / Started · High Risk Drug therapy requiring intensive monitoring for toxicity: eg steroids, pressors, antibiotics · Reviewed and/or ordered Clinical lab tests · Reviewed and/or ordered Radiology tests · Reviewed and/or ordered of Medicine tests · Independently visualized radiologic Images · I have personally reviewed the patients ECG / Telemetry Megan Celeste MD

## 2018-09-21 NOTE — PROGRESS NOTES
0700: Bedside report received, orders and chart reviewed. IV drips and Fluids verified with previous RN. Cardiac rhythm reviewed and verified. 0815: Assessment completed and noted. VSS. Copious mouth and ETT secretions. Pt turned and noted to have large liquid BM. Pebbles-care completed, linen changed. 4085: Interdisciplinary Rounds completed. Orders received. Pt had large liquid brown/black BM. Orders for Flexi-seal received from Dr. Shaunna Lanes. 1048: Flexiseal placed per order. Linens changed, Gown changed. Pt bathed. Pt tolerated well. VSS. Pt turned to left side. Will continue to monitor. 1200: Assessment completed and noted. No significant changes noted. Will continue to monitor. 1311: Bedside shift change report given to Edinson Carranza RN by Alex Beebe RN. Report included the following information SBAR, Kardex, OR Summary, Procedure Summary, Intake/Output, MAR, Accordion, Recent Results, Cardiac Rhythm Afib/flutter/Paced and Alarm Parameters .

## 2018-09-21 NOTE — PROGRESS NOTES
Attended family meeting with Dr. Katie Alvarez, Dr. Michael Mathew, patient's sister, son,and spouse. Family was made aware of the patient's medical challenges and impending decisions. Katya1 Francois Espinoza EdD, MDiv For [facility] Page [phone])

## 2018-09-21 NOTE — CONSULTS
Palliative Medicine Consult  Aleks: [phone])    Patient Name: Babar Gray  YOB: 1934    Date of Initial Consult: 9/20/18  Reason for Consult: care decisons  Requesting Provider: Ginette Severance MD  Primary Care Physician: PROVIDER UNKNOWN     SUMMARY:   Babar Gray is a 80 y.o. Male with a past history of cad s/p stent , severe COPD, oxygen dependant , atrial fibrillation on anticoagulation , who was admitted on 9/11/2018 from home  with a diagnosis of  Hematemesis , hemoptysis  sob , ough and lung mass with hemorrhage . Hospital course is notable :   Code blue 9/14 PEA arrest , after coming back from radiolgy after arteriogram .  Diagnostis bronch 9/16/18 negative for endo bronchial region . 9/17 : pulmonary artery embolization . Current medical issues leading to Palliative Medicine involvement include:  lung mass with pulmonary Hemorrhage , severe copd, acute hypoxic resp failure s/p cardiac arrest .    Lives at home with his wife of 46 years ,  Has six children his two sons lives with him , he has h/o smoking quit 5 years ago , retired  , at [employer] he is independent in function , though slowed down in last 4 years . PALLIATIVE DIAGNOSES:     1. Goals of care   2. Unresponsive   3. Sob   4. Blood loss anemia   5. Anasarca  6. Lung mass with pulmonary hemorrhage . PLAN:   1. Myself , dr Ginette Severance and chaplain Samir alaniz met with patient wife /next of kin Niko Nair , son Gaby Fraser and sister Lonnei Noyola (from out of town). 2. Dr Ginette Severance lead the meeting . 3. Wife percieves , patient has \" no cancer and is getting better \" . 4. Long meting , dr Kimberly Barragan explained in detail regarding on evidence of progress, vent dependancy , there is probability of lung cancer , however he is high risk for biopsy . Explained 14 days cut off time for trach , long course of rehab if he gets better , and the chances are he may not get better.   5. Wife tells us \" I understand all the information , however am not able to accept it \"  6. Wife is tear ful \" I am going to take one day at a time \".  7. She hopes he can come \" home\"  8. Wife shared patient  has not completed advance directives, and she does not know if patient would want to live tied to tube and machines . 9. Family appreciated the information , I have encouraged family to call us for any questions , support . 10. We will continue to build rapport and support . 11. Initial consult note routed to primary continuity provider  12. Communicated plan of care with: Palliative IDT, bed side Rn .       GOALS OF CARE / TREATMENT PREFERENCES:     GOALS OF CARE:  Patient/Health Care Proxy Stated Goals:  (not discussed today .)      TREATMENT PREFERENCES:   Code Status: Full Code    Advance Care Planning:  Advance Care Planning 9/12/2018   Patient's Healthcare Decision Maker is: Legal Next of Kin   Primary Decision Maker Name -   Confirm Advance Directive None   Patient Would Like to Complete Advance Directive -   Does the patient have other document types -           Other Instructions: Other:    As far as possible, the palliative care team has discussed with patient / health care proxy about goals of care / treatment preferences for patient. HISTORY:     History obtained from: chart , wife and bed side Rn .    CHIEF COMPLAINT: admitted for above , currently intubated and sedated. HPI/SUBJECTIVE:    The patient is:       [] Non-participatory due to:   Per wife patient had sudden onset of  abdominal pain x few hrs , coughed up blood , subsequently \" vomitted blood \" and was brought to ER by EMS . Currently intubated and sedated .     9/21/18 : intubated , sedated , anasarca, failed breathing trial .  Clinical Pain Assessment (nonverbal scale for severity on nonverbal patients):   Clinical Pain Assessment  Severity: 0     Activity (Movement): Lying quietly, normal position    Duration: for how long has pt been experiencing pain (e.g., 2 days, 1 month, years)  Frequency: how often pain is an issue (e.g., several times per day, once every few days, constant)     FUNCTIONAL ASSESSMENT:     Palliative Performance Scale (PPS):  PPS: 30       PSYCHOSOCIAL/SPIRITUAL SCREENING:     Palliative IDT has assessed this patient for cultural preferences / practices and a referral made as appropriate to needs (Cultural Services, Patient Advocacy, Ethics, etc.)    Advance Care Planning:  Advance Care Planning 9/12/2018   Patient's Healthcare Decision Maker is: Legal Next of Tye 69   Primary Decision Maker Name -   Confirm Advance Directive None   Patient Would Like to Complete Advance Directive -   Does the patient have other document types -       Any spiritual / Shinto concerns:  [] Yes /  [x] No    Caregiver Burnout:  [] Yes /  [x] No /  [] No Caregiver Present      Anticipatory grief assessment:   [] Normal  / [] Maladaptive       ESAS Anxiety:      ESAS Depression:     Unable to determine above because of patient factors. REVIEW OF SYSTEMS:     Positive and pertinent negative findings in ROS are noted above in HPI. The following systems were [] reviewed / [x] unable to be reviewed as noted in HPI  Other findings are noted below. Systems: constitutional, ears/nose/mouth/throat, respiratory, gastrointestinal, genitourinary, musculoskeletal, integumentary, neurologic, psychiatric, endocrine. Positive findings noted below. Modified ESAS Completed by: provider           Pain: 0     Nausea: 0     Dyspnea: 0           Stool Occurrence(s): 1        PHYSICAL EXAM:     From RN flowsheet:  Wt Readings from Last 3 Encounters:   09/20/18 254 lb 13.6 oz (115.6 kg)   05/05/16 245 lb (111.1 kg)   03/25/16 235 lb (106.6 kg)     Blood pressure 120/52, pulse 75, temperature 98.6 °F (37 °C), resp. rate 26, height 6' 2\" (1.88 m), weight 254 lb 13.6 oz (115.6 kg), SpO2 97 %.     Pain Scale 1: Behavioral Pain Scale (BPS)  Pain Intensity 1: 3     Pain Location 1: Abdomen     Pain Description 1: Aching  Pain Intervention(s) 1: Medication (see MAR) (pre medicate for bath )  Last bowel movement, if known:     Constitutional: intubated , sedated  Eyes: pupils equal, anicteric  ENMT: Et tube in place , OG tube . Cardiovascular: regular rhythm. Respiratory: breathing not labored, symmetric  Gastrointestinal: soft non-tender, +bowel sounds  Skin: warm, dry, anasarca. Neurologic: intubated sedated. HISTORY:     Active Problems:    Pulmonary hemorrhage (9/12/2018)      Acute GI bleeding (9/12/2018)      Past Medical History:   Diagnosis Date    Aneurysm ([facility] [address]     AAA    Arrhythmia     atrial fibrillation 2016    Hypertension     Other ill-defined conditions(799.89)     hx of broken arm left/cast    Other ill-defined conditions(799.89)     glaucoma    Other ill-defined conditions(799.89)     elevated cholesterol    Other ill-defined conditions(799.89)     hx bursitis knees      Past Surgical History:   Procedure Laterality Date    ABDOMEN SURGERY PROC UNLISTED  1/16/13    AORTIC ABDOMINAL ANUERYSM REPAIR ENDOVASCULAR     HX HEENT      bilat cataract    HX ORTHOPAEDIC      ligament surgery left arm    HX OTHER SURGICAL      broken left foot casted and healed    UPPER GI ENDOSCOPY,BIOPSY  4/10/2015           Family History   Problem Relation Age of Onset    Cancer Mother     Cancer Father       History reviewed, no pertinent family history.   Social History   Substance Use Topics    Smoking status: Former Smoker     Packs/day: 0.50     Years: 20.00    Smokeless tobacco: Never Used    Alcohol use Yes      Comment: social rare     No Known Allergies   Current Facility-Administered Medications   Medication Dose Route Frequency    0.45% sodium chloride infusion  50 mL/hr IntraVENous CONTINUOUS    famotidine (PF) (PEPCID) 20 mg in sodium chloride 0.9% 10 mL injection  20 mg IntraVENous Q12H    0.9% sodium chloride infusion 250 mL  250 mL IntraVENous PRN    chlorhexidine (PERIDEX) 0.12 % mouthwash 15 mL  15 mL Oral BID    propofol (DIPRIVAN) infusion  0-50 mcg/kg/min IntraVENous TITRATE    albuterol (PROVENTIL VENTOLIN) nebulizer solution 2.5 mg  2.5 mg Nebulization Q4H RT    fentaNYL citrate (PF) injection 25 mcg  25 mcg IntraVENous Q4H PRN    albuterol (PROVENTIL HFA, VENTOLIN HFA, PROAIR HFA) inhaler 2 Puff  2 Puff Inhalation Q4H PRN    dorzolamide-timolol (COSOPT) 22.3-6.8 mg/mL ophthalmic solution 1 Drop  1 Drop Both Eyes BID    latanoprost (XALATAN) 0.005 % ophthalmic solution 1 Drop  1 Drop Both Eyes QHS    pravastatin (PRAVACHOL) tablet 40 mg  40 mg Oral QHS    sodium chloride (NS) flush 5-10 mL  5-10 mL IntraVENous Q8H    sodium chloride (NS) flush 5-10 mL  5-10 mL IntraVENous PRN    acetaminophen (TYLENOL) tablet 650 mg  650 mg Oral Q6H PRN    ondansetron (ZOFRAN) injection 4 mg  4 mg IntraVENous Q6H PRN    docusate sodium (COLACE) capsule 100 mg  100 mg Oral DAILY PRN          LAB AND IMAGING FINDINGS:     Lab Results   Component Value Date/Time    WBC 10.1 09/21/2018 04:09 AM    HGB 7.7 (L) 09/21/2018 04:09 AM    PLATELET 865 (L) 30/49/1266 04:09 AM     Lab Results   Component Value Date/Time    Sodium 149 (H) 09/21/2018 04:09 AM    Potassium 4.1 09/21/2018 04:09 AM    Chloride 116 (H) 09/21/2018 04:09 AM    CO2 28 09/21/2018 04:09 AM    BUN 30 (H) 09/21/2018 04:09 AM    Creatinine 1.01 09/21/2018 04:09 AM    Calcium 8.1 (L) 09/21/2018 04:09 AM    Magnesium 2.5 (H) 09/18/2018 04:09 AM    Phosphorus 2.4 (L) 09/18/2018 04:09 AM      Lab Results   Component Value Date/Time    AST (SGOT) 30 09/19/2018 05:10 AM    Alk.  phosphatase 70 09/19/2018 05:10 AM    Protein, total 6.5 09/19/2018 05:10 AM    Albumin 2.0 (L) 09/19/2018 05:10 AM    Globulin 4.5 (H) 09/19/2018 05:10 AM     Lab Results   Component Value Date/Time    INR 1.1 09/21/2018 04:09 AM    Prothrombin time 10.9 09/21/2018 04:09 AM    aPTT 28.3 09/21/2018 04:09 AM      No results found for: IRON, FE, TIBC, IBCT, PSAT, FERR   Lab Results   Component Value Date/Time    pH 7.36 09/21/2018 06:04 AM    PCO2 48 (H) 09/21/2018 06:04 AM    PO2 85 09/21/2018 06:04 AM     No components found for: [place]   Lab Results   Component Value Date/Time    CK 54 04/08/2015 12:35 PM    CK - MB 2.8 04/08/2015 12:35 PM     CTA of chest 9/17/18 : IMPRESSION  IMPRESSION:     Right lower lobe masslike abnormality demonstrates increased internal density  and has increased in size measuring 6.7 x 6.7 cm, compared to 5.7 x 4.4 cm. This  is compatible with internal hemorrhage. There is new moderate right lower lobe  partial collapse/atelectasis.      Normal sized right bronchial artery is visualized without any hyperplasia or or  evidence for supply to the right lower lobe lung abnormality. It is unlikely  that the hemoptysis related to bronchial arterial injury/supply. Prior bronchial  arteriogram was unremarkable. No evidence for pulmonary artery pseudoaneurysm or  active hemorrhage. However, it is more likely that the masslike abnormality in  the right lower lobe the lung has eroded into an adjacent pulmonary artery then  a bronchial artery. Therefore, plan is for pulmonary arteriogram with possible  embolization if an abnormality is seen in the right lower lobe.              Total time:   Counseling / coordination time, spent as noted above:   > 50% counseling / coordination?:     Prolonged service was provided for  []30 min   []75 min in face to face time in the presence of the patient, spent as noted above. Time Start:   Time End:   Note: this can only be billed with 21960 (initial) or 10267 (follow up). If multiple start / stop times, list each separately.

## 2018-09-22 ENCOUNTER — APPOINTMENT (OUTPATIENT)
Dept: GENERAL RADIOLOGY | Age: 83
DRG: 003 | End: 2018-09-22
Attending: INTERNAL MEDICINE
Payer: MEDICARE

## 2018-09-22 LAB
ALBUMIN SERPL-MCNC: 2 G/DL (ref 3.5–5)
ALBUMIN/GLOB SERPL: 0.4 {RATIO} (ref 1.1–2.2)
ALP SERPL-CCNC: 146 U/L (ref 45–117)
ALT SERPL-CCNC: 83 U/L (ref 12–78)
ANION GAP SERPL CALC-SCNC: 4 MMOL/L (ref 5–15)
APTT PPP: 28.2 SEC (ref 22.1–32)
ARTERIAL PATENCY WRIST A: ABNORMAL
AST SERPL-CCNC: 139 U/L (ref 15–37)
BASE EXCESS BLDA CALC-SCNC: 0.3 MMOL/L
BASOPHILS # BLD: 0 K/UL (ref 0–0.1)
BASOPHILS NFR BLD: 0 % (ref 0–1)
BDY SITE: ABNORMAL
BILIRUB SERPL-MCNC: 0.5 MG/DL (ref 0.2–1)
BUN SERPL-MCNC: 30 MG/DL (ref 6–20)
BUN/CREAT SERPL: 30 (ref 12–20)
CALCIUM SERPL-MCNC: 8 MG/DL (ref 8.5–10.1)
CHLORIDE SERPL-SCNC: 117 MMOL/L (ref 97–108)
CO2 SERPL-SCNC: 29 MMOL/L (ref 21–32)
CREAT SERPL-MCNC: 0.99 MG/DL (ref 0.7–1.3)
DIFFERENTIAL METHOD BLD: ABNORMAL
EOSINOPHIL # BLD: 0 K/UL (ref 0–0.4)
EOSINOPHIL NFR BLD: 0 % (ref 0–7)
EPAP/CPAP/PEEP, PAPEEP: 6
ERYTHROCYTE [DISTWIDTH] IN BLOOD BY AUTOMATED COUNT: 21 % (ref 11.5–14.5)
FIBRINOGEN PPP-MCNC: >800 MG/DL (ref 200–475)
FIO2 ON VENT: 50 %
GAS FLOW.O2 SETTING OXYMISER: 12 L/MIN
GLOBULIN SER CALC-MCNC: 4.8 G/DL (ref 2–4)
GLUCOSE SERPL-MCNC: 144 MG/DL (ref 65–100)
HCO3 BLDA-SCNC: 25 MMOL/L (ref 22–26)
HCT VFR BLD AUTO: 26.9 % (ref 36.6–50.3)
HGB BLD-MCNC: 7.9 G/DL (ref 12.1–17)
IMM GRANULOCYTES # BLD: 0.2 K/UL (ref 0–0.04)
IMM GRANULOCYTES NFR BLD AUTO: 2 % (ref 0–0.5)
INR PPP: 1.1 (ref 0.9–1.1)
LYMPHOCYTES # BLD: 0.5 K/UL (ref 0.8–3.5)
LYMPHOCYTES NFR BLD: 4 % (ref 12–49)
MCH RBC QN AUTO: 32.8 PG (ref 26–34)
MCHC RBC AUTO-ENTMCNC: 29.4 G/DL (ref 30–36.5)
MCV RBC AUTO: 111.6 FL (ref 80–99)
MONOCYTES # BLD: 1.1 K/UL (ref 0–1)
MONOCYTES NFR BLD: 9 % (ref 5–13)
NEUTS SEG # BLD: 9.9 K/UL (ref 1.8–8)
NEUTS SEG NFR BLD: 85 % (ref 32–75)
NRBC # BLD: 0.02 K/UL (ref 0–0.01)
NRBC BLD-RTO: 0.2 PER 100 WBC
PCO2 BLDA: 42 MMHG (ref 35–45)
PH BLDA: 7.4 [PH] (ref 7.35–7.45)
PLATELET # BLD AUTO: 138 K/UL (ref 150–400)
PMV BLD AUTO: 11.5 FL (ref 8.9–12.9)
PO2 BLDA: 59 MMHG (ref 80–100)
POTASSIUM SERPL-SCNC: 4.3 MMOL/L (ref 3.5–5.1)
PROT SERPL-MCNC: 6.8 G/DL (ref 6.4–8.2)
PROTHROMBIN TIME: 11.2 SEC (ref 9–11.1)
RBC # BLD AUTO: 2.41 M/UL (ref 4.1–5.7)
RBC MORPH BLD: ABNORMAL
SAO2 % BLD: 91 % (ref 92–97)
SAO2% DEVICE SAO2% SENSOR NAME: ABNORMAL
SODIUM SERPL-SCNC: 150 MMOL/L (ref 136–145)
SPECIMEN SITE: ABNORMAL
THERAPEUTIC RANGE,PTTT: ABNORMAL SECS (ref 58–77)
VENTILATION MODE VENT: ABNORMAL
VT SETTING VENT: 500 ML
WBC # BLD AUTO: 11.7 K/UL (ref 4.1–11.1)

## 2018-09-22 PROCEDURE — 36415 COLL VENOUS BLD VENIPUNCTURE: CPT | Performed by: INTERNAL MEDICINE

## 2018-09-22 PROCEDURE — 74011250636 HC RX REV CODE- 250/636: Performed by: INTERNAL MEDICINE

## 2018-09-22 PROCEDURE — 36600 WITHDRAWAL OF ARTERIAL BLOOD: CPT | Performed by: INTERNAL MEDICINE

## 2018-09-22 PROCEDURE — 94640 AIRWAY INHALATION TREATMENT: CPT

## 2018-09-22 PROCEDURE — 94003 VENT MGMT INPAT SUBQ DAY: CPT

## 2018-09-22 PROCEDURE — 80053 COMPREHEN METABOLIC PANEL: CPT | Performed by: INTERNAL MEDICINE

## 2018-09-22 PROCEDURE — 65620000000 HC RM CCU GENERAL

## 2018-09-22 PROCEDURE — 71045 X-RAY EXAM CHEST 1 VIEW: CPT

## 2018-09-22 PROCEDURE — 85025 COMPLETE CBC W/AUTO DIFF WBC: CPT | Performed by: INTERNAL MEDICINE

## 2018-09-22 PROCEDURE — 85610 PROTHROMBIN TIME: CPT | Performed by: INTERNAL MEDICINE

## 2018-09-22 PROCEDURE — 77030018798 HC PMP KT ENTRL FED COVD -A

## 2018-09-22 PROCEDURE — 74011000258 HC RX REV CODE- 258: Performed by: INTERNAL MEDICINE

## 2018-09-22 PROCEDURE — 74011250637 HC RX REV CODE- 250/637: Performed by: INTERNAL MEDICINE

## 2018-09-22 PROCEDURE — 74011000250 HC RX REV CODE- 250: Performed by: INTERNAL MEDICINE

## 2018-09-22 PROCEDURE — 82803 BLOOD GASES ANY COMBINATION: CPT | Performed by: INTERNAL MEDICINE

## 2018-09-22 RX ORDER — FENTANYL CITRATE 50 UG/ML
25-50 INJECTION, SOLUTION INTRAMUSCULAR; INTRAVENOUS
Status: DISCONTINUED | OUTPATIENT
Start: 2018-09-22 | End: 2018-10-10

## 2018-09-22 RX ORDER — FUROSEMIDE 10 MG/ML
80 INJECTION INTRAMUSCULAR; INTRAVENOUS 2 TIMES DAILY
Status: DISCONTINUED | OUTPATIENT
Start: 2018-09-22 | End: 2018-09-24

## 2018-09-22 RX ADMIN — LATANOPROST 1 DROP: 50 SOLUTION OPHTHALMIC at 21:49

## 2018-09-22 RX ADMIN — ALBUTEROL SULFATE 2.5 MG: 2.5 SOLUTION RESPIRATORY (INHALATION) at 04:02

## 2018-09-22 RX ADMIN — Medication 10 ML: at 08:29

## 2018-09-22 RX ADMIN — ALBUTEROL SULFATE 2.5 MG: 2.5 SOLUTION RESPIRATORY (INHALATION) at 00:36

## 2018-09-22 RX ADMIN — ALBUTEROL SULFATE 2.5 MG: 2.5 SOLUTION RESPIRATORY (INHALATION) at 20:35

## 2018-09-22 RX ADMIN — Medication 10 ML: at 14:41

## 2018-09-22 RX ADMIN — PRAVASTATIN SODIUM 40 MG: 40 TABLET ORAL at 21:47

## 2018-09-22 RX ADMIN — FAMOTIDINE 20 MG: 10 INJECTION, SOLUTION INTRAVENOUS at 21:47

## 2018-09-22 RX ADMIN — FUROSEMIDE 80 MG: 10 INJECTION, SOLUTION INTRAMUSCULAR; INTRAVENOUS at 11:44

## 2018-09-22 RX ADMIN — ALBUTEROL SULFATE 2.5 MG: 2.5 SOLUTION RESPIRATORY (INHALATION) at 11:23

## 2018-09-22 RX ADMIN — ALBUTEROL SULFATE 2.5 MG: 2.5 SOLUTION RESPIRATORY (INHALATION) at 15:19

## 2018-09-22 RX ADMIN — ALBUTEROL SULFATE 2.5 MG: 2.5 SOLUTION RESPIRATORY (INHALATION) at 23:11

## 2018-09-22 RX ADMIN — Medication 10 ML: at 21:48

## 2018-09-22 RX ADMIN — DORZOLAMIDE HYDROCHLORIDE AND TIMOLOL MALEATE 1 DROP: 20; 5 SOLUTION/ DROPS OPHTHALMIC at 08:24

## 2018-09-22 RX ADMIN — FUROSEMIDE 80 MG: 10 INJECTION, SOLUTION INTRAMUSCULAR; INTRAVENOUS at 21:47

## 2018-09-22 RX ADMIN — CHLORHEXIDINE GLUCONATE 15 ML: 1.2 RINSE ORAL at 17:03

## 2018-09-22 RX ADMIN — SODIUM CHLORIDE 50 ML/HR: 450 INJECTION, SOLUTION INTRAVENOUS at 17:03

## 2018-09-22 RX ADMIN — FAMOTIDINE 20 MG: 10 INJECTION, SOLUTION INTRAVENOUS at 08:23

## 2018-09-22 RX ADMIN — Medication 10 ML: at 05:32

## 2018-09-22 RX ADMIN — CHLORHEXIDINE GLUCONATE 15 ML: 1.2 RINSE ORAL at 08:23

## 2018-09-22 RX ADMIN — DORZOLAMIDE HYDROCHLORIDE AND TIMOLOL MALEATE 1 DROP: 20; 5 SOLUTION/ DROPS OPHTHALMIC at 17:04

## 2018-09-22 RX ADMIN — ALBUTEROL SULFATE 2.5 MG: 2.5 SOLUTION RESPIRATORY (INHALATION) at 07:35

## 2018-09-22 NOTE — PROGRESS NOTES
Hospitalist Progress Note NAME: Loren Higginbotham  
:  1934 MRN:  [id number] Assessment / Plan: PEA arrest  Code blue called . Patient w/ agonal breathing after coming back from radiology after arteriogram completed. No pulse detected. CPR initiated. Epi x 2 given. Pulse obtained. Intubated by Dr. Stephie Majano. Much bloody secretions obtained. Transferred to ICU. Acute respiratory w/ hypoxia, s/p cardiac arrest 
Sepsis due to suspected PNA w/ possible pulmonary hemorrhage 
-CT of chest shows severe emphysema with the right basilar airspace disease in the oval mass with fluid level concerning for internal hemorrhage versus pneumonia. -Bcx Neg, sputum cytology collected  negative except scant yeast. 
-cont zosyn for possible lung abscess. End date  
-cytology from bronchial washing on  is atypical, favor benign reactive process 
-off pressors 
-vent management per pulm Hemoptysis 
-noted to have bleeding from RLL on bronchoscopy. -work up for possible rheumatologic cause neg thus far: 
ANCA, anti-GBM, MPO ab, PA-3 ab, SSA/SSB, RNP ab, Arredondo/RNP, dsDNA, and Arredondo ab all negative. JOSS +. 
-Arteriogram : Thoracic aortic and intercostal arteriograms as described above demonstrating no 
enlarged bronchial artery supplying the right lower lobe of the lung. A normal 
appearing and normal sized right bronchial artery is seen without any 
hyperplasia or dominant right lower lobe supply. No embolization was performed. RLL mass  
-work up for [employer] neg thus far. -diagnostic bronch done . No endobronchial lesions seen. -CTA chest done  due to persistent bloody pulmonary secretions. Right lower lobe masslike abnormality demonstrates increased internal density 
and has increased in size measuring 6.7 x 6.7 cm, compared to 5.7 x 4.4 cm. This 
is compatible with internal hemorrhage. There is new moderate right lower lobe 
partial collapse/atelectasis.    
 Normal sized right bronchial artery is visualized without any hyperplasia or or evidence for supply to the right lower lobe lung abnormality. It is unlikely that the hemoptysis related to bronchial arterial injury/supply. Prior bronchial arteriogram was unremarkable. No evidence for pulmonary artery pseudoaneurysm or active hemorrhage. However, it is more likely that the masslike abnormality in the right lower lobe the lung has eroded into an adjacent pulmonary artery then a bronchial artery. Therefore, plan is for pulmonary arteriogram with possible embolization if an abnormality is seen in the right lower lobe. 6 mm and 4 mm right lung nodules. Small bilateral pleural effusions. Minimally enlarged bilateral hilar and mediastinal lymph nodes Hypernatremia 
-on 1/2NS, monitor lab Acute blood loss anemia on admission 
-s/p 3 units PRBC so far 
-serial H&H, transfuse prn 
 
H/o atrial fibrillation on chronic anticoagulation w/ coumadin 
-amiodarone on hold 
-cardizem on hold due to borderline low bp 
-hold coumadin due to hemoptysis Coumadin-induced coagulopathy 
-INR 1.1. Holding coumadin. ALAN - likely prerenal related to Lasix. Renal function stable. 
-avoid nephrotoxic agents. UTI, acute cystitis w/ hematuria, POA 
-on abx as above. No Ucx sent on admission COPD - stable HLD 
-cont pravachol Obesity - BMI 32 Code: Full DVT prophylaxis: SCDs GI prophylaxis: PPI Subjective: Chief Complaint / Reason for Physician Visit Follow up for hemoptysis. Intubated, sedated. Still with old blood seen in NG tube Discussed with RN events overnight. Review of Systems: 
Symptom Y/N Comments  Symptom Y/N Comments Fever/Chills    Chest Pain Poor Appetite    Edema Cough    Abdominal Pain Sputum    Joint Pain SOB/ROY    Pruritis/Rash Nausea/vomit    Tolerating PT/OT Diarrhea    Tolerating Diet Constipation    Other Could NOT obtain due to: intubated Objective: VITALS:  
Last 24hrs VS reviewed since prior progress note. Most recent are: 
Patient Vitals for the past 24 hrs: 
 Temp Pulse Resp BP SpO2  
09/22/18 1148 98.8 °F (37.1 °C) - - - -  
09/22/18 1123 - 82 24 - 96 %  
09/22/18 1000 - 82 25 143/63 97 % 09/22/18 0900 - 86 25 (!) 154/93 99 % 09/22/18 0800 - 82 30 127/51 96 %  
09/22/18 0735 - 84 26 - 96 %  
09/22/18 0700 - 81 27 138/54 95 % 09/22/18 0600 - 86 25 125/53 95 % 09/22/18 0533 - 87 (!) 31 - 90 % 09/22/18 0500 - 84 24 139/62 95 % 09/22/18 0400 99.3 °F (37.4 °C) 81 24 138/62 96 %  
09/22/18 0357 - 83 24 - 95 % 09/22/18 0300 - 80 24 130/56 94 % 09/22/18 0200 - 83 25 138/59 94 % 09/22/18 0100 - 81 26 136/64 95 % 09/22/18 0033 - 84 24 - 96 %  
09/22/18 0000 - 87 25 146/74 97 % 09/21/18 2300 - 80 26 127/60 97 % 09/21/18 2200 - 82 26 121/51 96 %  
09/21/18 2000 99.5 °F (37.5 °C) 78 22 148/68 96 %  
09/21/18 1944 - 73 25 - 98 %  
09/21/18 1800 - 87 27 127/61 90 % 09/21/18 1700 - 80 27 126/54 94 % 09/21/18 1600 98.1 °F (36.7 °C) 76 25 126/54 96 %  
09/21/18 1502 - 73 25 - 97 % 09/21/18 1501 - - - - 96 %  
09/21/18 1500 99.1 °F (37.3 °C) 77 26 116/55 96 %  
09/21/18 1400 - 76 27 119/48 97 % Intake/Output Summary (Last 24 hours) at 09/22/18 1322 Last data filed at 09/22/18 1147 Gross per 24 hour Intake          1774.86 ml Output             1975 ml Net          -200.14 ml PHYSICAL EXAM: 
General: WD, WN. Intubated. No distress. EENT:   Anicteric sclerae. MMM;  ET tube in place. Resp:  Decreased breath sounds. Clear anteriorly. No rhonchi. No accessory muscle use CV:  Regular  rhythm,  1+ bilateral leg edema GI:  Soft, Non distended, Non tender.  +Bowel sounds Neurologic:  Sedated. No posturing. Psych:   No agitation Skin:  No rashes. No jaundice Reviewed most current lab test results and cultures  YES Reviewed most current radiology test results   YES 
 Review and summation of old records today    NO Reviewed patient's current orders and MAR    YES 
PMH/SH reviewed - no change compared to H&P 
________________________________________________________________________ Care Plan discussed with: 
  Comments Patient Family RN x Care Manager Consultant Multidiciplinary team rounds were held today with , nursing, pharmacist and clinical coordinator. Patient's plan of care was discussed; medications were reviewed and discharge planning was addressed. ________________________________________________________________________ Total NON critical care TIME:  25  Minutes Total CRITICAL CARE TIME Spent:   Minutes non procedure based Comments >50% of visit spent in counseling and coordination of care    
________________________________________________________________________ Odilon Coleman MD  
 
Procedures: see electronic medical records for all procedures/Xrays and details which were not copied into this note but were reviewed prior to creation of Plan. LABS: 
I reviewed today's most current labs and imaging studies. Pertinent labs include: 
Recent Labs  
   09/22/18 
 0307  09/21/18 
 0409  09/20/18 2021 WBC  11.7*  10.1  10.8 HGB  7.9*  7.7*  7.8* HCT  26.9*  26.0*  25.6*  
PLT  138*  123*  121* Recent Labs  
   09/22/18 
 0157  09/21/18 
 0409  09/20/18 
 1910 NA  150*  149*  145  
K  4.3  4.1  3.7 CL  117*  116*  114* CO2  29  28  27 GLU  144*  140*  141* BUN  30*  30*  28* CREA  0.99  1.01  1.13  
CA  8.0*  8.1*  7.7* ALB  2.0*   --    --   
TBILI  0.5   --    --   
SGOT  139*   --    --   
ALT  83*   --    --   
INR  1.1  1.1  1.1 Signed: Odilon Coleman MD

## 2018-09-22 NOTE — PROGRESS NOTES
[facility] Pulmonary Consult Service NotePulmonary, Critical Care, and Sleep Medicine Name: Viki Russo MRN: [id number] : 1934 Hospital: [facility] Date: 2018   Hospital Day: 12  
 
 old blood in ETT. Propofol has been stopped as of this AM. Unresponsive. Anasarca. D/w nursing at bedside. No fever. On TF. Overton was clogged, flushed and now patent. IMPRESSION:  
1. Acute respiratory failure-on vent- not ready to liberate- need to assess neurostatus 2. IR performed embolization of pulmonary artery 3. S/p PEA arrest post anigo  pm. Had 6 min of CPR and then ROSC. 4. Acute hemoptysis- Noted to have bleeding from RLL on Bronch. No endobronchial lesion. has lung mass(CA vs inflammatory pseudotumor) and microscopic hematuria; ANCA negative; JOSS barely positive. Suspect LUNG mass the culprit. Bronch will not help; 
5. Elevated procalcitonin- treating for possible lung abscess/pseudotumor 6. Lung mass RLL- right lung base- would not be visible on conventional bronch and yield from transbronchial biopsy not guaranteed; had no lesion on chest Ct 2016 7. Coagulopathy from warfarin per , hold anticoagulation for now. 8. Acute kidney injury likely prerenal and related to medications (Lasix) Baseline creatinine is around 0.9 9. Hematuria 10. Enechalopathy- High NA not helping 11. Volume overload- over 10 liters positive, was on Lasix at home; anasarca 12. Sepsis due to suspected pneumonia. Patient reports having exertional shortness of breath along with cough and hemoptysis; CT of chest shows severe emphysema with the right basilar airspace disease in the oval mass with fluid level concerning for internal hemorrhage versus pneumonia ;  
13. 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  
14. Anemia 15. Former 50+ pack yr smoker quit in ? 16. Asbestos exposure 17. History of atrial fibrillation on anticoagulation with warfarin 18. Hx of CAD s/p PCI in the past home amiodarone. Hold Cardizem 19. Dyslipidemia 20. Multiorgan dysfunction as outlined above: Pt has one or more acute or chronic illnesses with severe exacerbation with progression or side effects of treatment that poses a threat to life or bodily function 21. Additional workup outlined below 22. Pt is requiring Drug therapy requiring intensive monitoring for toxicity 23. Pt is unstable, unpredictable needing PCU monitoring; is critically ill and at high risk of sudden decline and decompensation with life threatening consequenses and continued end organ dysfunction and failure 24. Prognosis guarded with significant risk of sudden decline 25. Critically ill, 35 min cc, EOP. Discussed with nurse this am. High risk of decompensation. RECOMMENDATIONS/PLAN:  
1. Full vent support, not ready for SBT 2. Stop propofol 3. Add free water, follow Na 4. Follow neuro status, consider scan after weekend of head and lung 5. Hold anticoagulation for now. SCDS. 6. off pressors 7. Empiric abx to treat possible lung abscess 8. Follow cultures 9. Labs to follow electrolytes, renal function and and blood counts 10. Glucose monitoring and SSI 11. Bronchial hygiene with respiratory therapy techniques, bronchodilators 12. DVT, SUP prophylaxis 13. Pt needs IV fluids with additives and Drug therapy requiring intensive monitoring for toxicity 14. Prescription drug management with home med reconciliation reviewed My assessment/management discussed with: Consultants, Nursing, Pharmacy, Case Management, PT, OT, Respiratory Therapy, Hospitalist and Family for coordination of care Pt's condition is acute and unstable requiring inpatient hospitalization.  This care involved high complexity decision making which includes independently reviewing the patient's past medical records, current laboratory results, medication profiles that were immediately available to me and actual Xray images at the bedside in order to assess, support vital system function, and to treat this degree of vital organ system failure, and to prevent further deterioration of the patients condition. Risk of deterioration: high  
[x] High complexity decision making was performed [x] See my orders for details Tubes:    
 
Subjective/Initial History: S/P pulmonary artery embolization No acute events overnight Still on full vent support MAR reviewed and pertinent medications noted or modified as needed Current Facility-Administered Medications Medication  fentaNYL citrate (PF) injection 25-50 mcg  furosemide (LASIX) injection 80 mg  
 0.45% sodium chloride infusion  famotidine (PF) (PEPCID) 20 mg in sodium chloride 0.9% 10 mL injection  0.9% sodium chloride infusion 250 mL  chlorhexidine (PERIDEX) 0.12 % mouthwash 15 mL  albuterol (PROVENTIL VENTOLIN) nebulizer solution 2.5 mg  
 fentaNYL citrate (PF) injection 25 mcg  albuterol (PROVENTIL HFA, VENTOLIN HFA, PROAIR HFA) inhaler 2 Puff  dorzolamide-timolol (COSOPT) 22.3-6.8 mg/mL ophthalmic solution 1 Drop  latanoprost (XALATAN) 0.005 % ophthalmic solution 1 Drop  pravastatin (PRAVACHOL) tablet 40 mg  
 sodium chloride (NS) flush 5-10 mL  sodium chloride (NS) flush 5-10 mL  acetaminophen (TYLENOL) tablet 650 mg  
 ondansetron (ZOFRAN) injection 4 mg  docusate sodium (COLACE) capsule 100 mg  
  
 
  
ROS:A comprehensive review of systems was negative except for that written in the HPI. Objective: 
 
Vital Signs: Telemetry:    normal sinus rhythmIntake/Output:  
Visit Vitals  /63  Pulse 82  Temp 99.3 °F (37.4 °C)  Resp 25  
 Ht 6' 2\" (1.88 m)  Wt 120.6 kg (265 lb 14 oz)  SpO2 97%  BMI 34.14 kg/m2 Temp (24hrs), Av.9 °F (37.2 °C), Min:98.1 °F (36.7 °C), Max:99.5 °F (37.5 °C) O2 Device: Ventilator O2 Flow Rate (L/min): 5 l/min Wt Readings from Last 4 Encounters:  
18 120.6 kg (265 lb 14 oz) 16 111.1 kg (245 lb)  
16 106.6 kg (235 lb) 04/08/15 103.4 kg (228 lb) Intake/Output Summary (Last 24 hours) at 18 1101 Last data filed at 18 1046 Gross per 24 hour Intake          2039.71 ml Output             1975 ml Net            64.71 ml Last shift:       07 - 1900 In: 398.3 [I.V.:338.3] Out: 275 [Urine:200; Drains:75] Last 3 shifts: 1901 -  0700 In: 3740.6 [I.V.:2195.6] Out: 2400 [Urine:2235; Drains:165] Physical Exam:  
 General:   male; with Et tube in place. Has right IJ CVC. On sedation and on vent. HEAD: Normocephalic, without obvious abnormality, atraumatic EYES: conjunctivae clear. PERRL,  AN Icteric sclerae NOSE: nares normal, no drainage, no nasal flaring, THROAT: normal; Lips, mucosa dry; No Thrush;  crowded airway; tongue midline Neck: Supple, symmetrical, trachea midline,  No accessory mm use; No Stridor/ cuff leak, No goiter or thyroid tenderness LYMPH: No abnormally enlarged lymph nodes. in neck or groin Chest: increased AP diameter Lungs: agonal, bilateral rhonchi. Seems to have good air  Movement Bilaterally. Heart: Regular rate and rhythm; NO edema Abdomen: soft, non-tender, without masses or organomegaly, protuberant, distended : no Overton Musculoskeletal: mild kyphosis; No spine or CVA tenderness; negative, cyanosis, clubbing; no joint swelling or erythema Neuro: was not  Responsive, had a gag with ET tube suctioning. , Sedated not able to fully assess. Psych: Not able to assess due to being on sedation and on vent. Skin: Pallor;  
 Pulses:Bilateral, Radial, 2+ Data:  
 
All lab results for the last 24 hours reviewed. Labs: Recent Labs  
   09/22/18 
 2072  09/21/18 
 0409  09/20/18 
 2393 WBC  11.7*  10.1  10.8 HGB  7.9*  7.7*  7.8*  
PLT  138*  123*  121* INR  1.1  1.1  1.1 APTT  28.2  28.3  28.2 Recent Labs  
   09/22/18 
 7234  09/21/18 
 0409  09/20/18 
 9973 NA  150*  149*  145  
K  4.3  4.1  3.7 CL  117*  116*  114* CO2  29  28  27 GLU  144*  140*  141* BUN  30*  30*  28* CREA  0.99  1.01  1.13  
CA  8.0*  8.1*  7.7* ALB  2.0*   --    --   
SGOT  139*   --    --   
ALT  83*   --    --   
 
Recent Labs  
   09/22/18 
 0528  09/21/18 
 0604  09/20/18 
 0986 PH  7.40  7.36  7.37  
PCO2  42  48*  43  
PO2  59*  85  65* HCO3  25  26  24 FIO2  50  60  50 No results for input(s): CPK, CKNDX, TROIQ in the last 72 hours. No lab exists for component: CPKMB No results found for: BNPP, BNP Lab Results Component Value Date/Time Culture result: HEAVY NORMAL RESPIRATORY SHELDON 09/16/2018 11:15 AM  
 Culture result: LIGHT NORMAL RESPIRATORY SHELDON 09/15/2018 01:20 PM  
 Culture result: LIGHT NORMAL RESPIRATORY SHELDON 09/12/2018 03:50 PM  
No results found for: TSH, TSHEXT, TSHEXT Imaging: CXR: no acute changes I have personally and independently reviewed the patients interval and diagnostic data, radiographs and have reviewed the reports. Medical Decision Making Today: · Reviewed the flowsheet and previous days notes · Reviewed and summarized records or history from previous days note or discussions with staff, family · Parenteral controlled substances - Reviewed/ Adjusted / Weaned / Started · High Risk Drug therapy requiring intensive monitoring for toxicity: eg steroids, pressors, antibiotics · Reviewed and/or ordered Clinical lab tests · Reviewed and/or ordered Radiology tests · Reviewed and/or ordered of Medicine tests · Independently visualized radiologic Images · I have personally reviewed the patients ECG / Telemetry Cary Fry MD

## 2018-09-22 NOTE — PROGRESS NOTES
1930  
Report received from Lina Kruger of care discussed  
Gloriann Lat turned off to see if patient response increases in time 2501 [facility] Patient remains off sedation. No changes in condition 0000 Total bath given, CHG wipes [address] Patient fio2 decreased to 50%, will monitor 8842 Patient fio2 increased back to 60%. Patient remains off sedation, responds to painful stimulus  
0700 Bedside report given to Collin5 Lina Csat of care discussed

## 2018-09-23 ENCOUNTER — APPOINTMENT (OUTPATIENT)
Dept: GENERAL RADIOLOGY | Age: 83
DRG: 003 | End: 2018-09-23
Attending: INTERNAL MEDICINE
Payer: MEDICARE

## 2018-09-23 LAB
ALBUMIN SERPL-MCNC: 1.9 G/DL (ref 3.5–5)
ALBUMIN/GLOB SERPL: 0.4 {RATIO} (ref 1.1–2.2)
ALP SERPL-CCNC: 143 U/L (ref 45–117)
ALT SERPL-CCNC: 116 U/L (ref 12–78)
ANION GAP SERPL CALC-SCNC: 2 MMOL/L (ref 5–15)
APTT PPP: 26.1 SEC (ref 22.1–32)
AST SERPL-CCNC: 175 U/L (ref 15–37)
BILIRUB SERPL-MCNC: 0.5 MG/DL (ref 0.2–1)
BUN SERPL-MCNC: 33 MG/DL (ref 6–20)
BUN/CREAT SERPL: 30 (ref 12–20)
CALCIUM SERPL-MCNC: 8.2 MG/DL (ref 8.5–10.1)
CHLORIDE SERPL-SCNC: 113 MMOL/L (ref 97–108)
CO2 SERPL-SCNC: 34 MMOL/L (ref 21–32)
CREAT SERPL-MCNC: 1.1 MG/DL (ref 0.7–1.3)
ERYTHROCYTE [DISTWIDTH] IN BLOOD BY AUTOMATED COUNT: 20.6 % (ref 11.5–14.5)
FIBRINOGEN PPP-MCNC: >800 MG/DL (ref 200–475)
GLOBULIN SER CALC-MCNC: 5 G/DL (ref 2–4)
GLUCOSE SERPL-MCNC: 133 MG/DL (ref 65–100)
HCT VFR BLD AUTO: 26 % (ref 36.6–50.3)
HGB BLD-MCNC: 7.7 G/DL (ref 12.1–17)
INR PPP: 1.1 (ref 0.9–1.1)
MCH RBC QN AUTO: 32.9 PG (ref 26–34)
MCHC RBC AUTO-ENTMCNC: 29.6 G/DL (ref 30–36.5)
MCV RBC AUTO: 111.1 FL (ref 80–99)
NRBC # BLD: 0 K/UL (ref 0–0.01)
NRBC BLD-RTO: 0 PER 100 WBC
PLATELET # BLD AUTO: 142 K/UL (ref 150–400)
PMV BLD AUTO: 11.9 FL (ref 8.9–12.9)
POTASSIUM SERPL-SCNC: 3.8 MMOL/L (ref 3.5–5.1)
PROT SERPL-MCNC: 6.9 G/DL (ref 6.4–8.2)
PROTHROMBIN TIME: 11.6 SEC (ref 9–11.1)
RBC # BLD AUTO: 2.34 M/UL (ref 4.1–5.7)
SODIUM SERPL-SCNC: 149 MMOL/L (ref 136–145)
THERAPEUTIC RANGE,PTTT: ABNORMAL SECS (ref 58–77)
WBC # BLD AUTO: 11.5 K/UL (ref 4.1–11.1)

## 2018-09-23 PROCEDURE — 80053 COMPREHEN METABOLIC PANEL: CPT | Performed by: INTERNAL MEDICINE

## 2018-09-23 PROCEDURE — 74011250636 HC RX REV CODE- 250/636: Performed by: INTERNAL MEDICINE

## 2018-09-23 PROCEDURE — 85610 PROTHROMBIN TIME: CPT | Performed by: INTERNAL MEDICINE

## 2018-09-23 PROCEDURE — 36415 COLL VENOUS BLD VENIPUNCTURE: CPT | Performed by: INTERNAL MEDICINE

## 2018-09-23 PROCEDURE — 74011000250 HC RX REV CODE- 250: Performed by: INTERNAL MEDICINE

## 2018-09-23 PROCEDURE — 71045 X-RAY EXAM CHEST 1 VIEW: CPT

## 2018-09-23 PROCEDURE — 74011000258 HC RX REV CODE- 258: Performed by: INTERNAL MEDICINE

## 2018-09-23 PROCEDURE — 94003 VENT MGMT INPAT SUBQ DAY: CPT

## 2018-09-23 PROCEDURE — 74011250637 HC RX REV CODE- 250/637: Performed by: INTERNAL MEDICINE

## 2018-09-23 PROCEDURE — 65620000000 HC RM CCU GENERAL

## 2018-09-23 PROCEDURE — 94640 AIRWAY INHALATION TREATMENT: CPT

## 2018-09-23 PROCEDURE — 85027 COMPLETE CBC AUTOMATED: CPT | Performed by: INTERNAL MEDICINE

## 2018-09-23 RX ORDER — POTASSIUM CHLORIDE 1.5 G/1.77G
20 POWDER, FOR SOLUTION ORAL 2 TIMES DAILY
Status: COMPLETED | OUTPATIENT
Start: 2018-09-23 | End: 2018-09-24

## 2018-09-23 RX ADMIN — ALBUTEROL SULFATE 2.5 MG: 2.5 SOLUTION RESPIRATORY (INHALATION) at 07:48

## 2018-09-23 RX ADMIN — ALBUTEROL SULFATE 2.5 MG: 2.5 SOLUTION RESPIRATORY (INHALATION) at 20:10

## 2018-09-23 RX ADMIN — Medication 10 ML: at 07:44

## 2018-09-23 RX ADMIN — FAMOTIDINE 20 MG: 10 INJECTION, SOLUTION INTRAVENOUS at 21:30

## 2018-09-23 RX ADMIN — CHLORHEXIDINE GLUCONATE 15 ML: 1.2 RINSE ORAL at 08:22

## 2018-09-23 RX ADMIN — ALBUTEROL SULFATE 2.5 MG: 2.5 SOLUTION RESPIRATORY (INHALATION) at 11:18

## 2018-09-23 RX ADMIN — SODIUM CHLORIDE 50 ML/HR: 450 INJECTION, SOLUTION INTRAVENOUS at 23:57

## 2018-09-23 RX ADMIN — FUROSEMIDE 80 MG: 10 INJECTION, SOLUTION INTRAMUSCULAR; INTRAVENOUS at 17:34

## 2018-09-23 RX ADMIN — POTASSIUM CHLORIDE 20 MEQ: 1.5 POWDER, FOR SOLUTION ORAL at 17:34

## 2018-09-23 RX ADMIN — Medication 10 ML: at 21:46

## 2018-09-23 RX ADMIN — FAMOTIDINE 20 MG: 10 INJECTION, SOLUTION INTRAVENOUS at 08:22

## 2018-09-23 RX ADMIN — DORZOLAMIDE HYDROCHLORIDE AND TIMOLOL MALEATE 1 DROP: 20; 5 SOLUTION/ DROPS OPHTHALMIC at 18:15

## 2018-09-23 RX ADMIN — CHLORHEXIDINE GLUCONATE 15 ML: 1.2 RINSE ORAL at 18:15

## 2018-09-23 RX ADMIN — DORZOLAMIDE HYDROCHLORIDE AND TIMOLOL MALEATE 1 DROP: 20; 5 SOLUTION/ DROPS OPHTHALMIC at 08:22

## 2018-09-23 RX ADMIN — FUROSEMIDE 80 MG: 10 INJECTION, SOLUTION INTRAMUSCULAR; INTRAVENOUS at 08:22

## 2018-09-23 RX ADMIN — Medication 10 ML: at 15:46

## 2018-09-23 RX ADMIN — ACETAMINOPHEN 650 MG: 325 TABLET ORAL at 07:41

## 2018-09-23 RX ADMIN — ALBUTEROL SULFATE 2.5 MG: 2.5 SOLUTION RESPIRATORY (INHALATION) at 15:42

## 2018-09-23 RX ADMIN — Medication 10 ML: at 06:21

## 2018-09-23 RX ADMIN — ALBUTEROL SULFATE 2.5 MG: 2.5 SOLUTION RESPIRATORY (INHALATION) at 03:38

## 2018-09-23 RX ADMIN — POTASSIUM CHLORIDE 20 MEQ: 1.5 POWDER, FOR SOLUTION ORAL at 15:44

## 2018-09-23 RX ADMIN — LATANOPROST 1 DROP: 50 SOLUTION OPHTHALMIC at 21:47

## 2018-09-23 NOTE — PROGRESS NOTES
[facility] Pulmonary Consult Service NotePulmonary, Critical Care, and Sleep Medicine Name: Nancy Barker MRN: [id number] : 1934 Hospital: [facility] Date: 2018   Hospital Day: 13  
 
 old blood in ETT. Propofol has been stopped as of this AM. Unresponsive. Anasarca. D/w nursing at bedside. No fever. On TF. Overton was clogged, flushed and now patent.  good diuresis. Weak. Intermittently responds. No new hemoptysis on vent. K 3.8. LFTs up. Normal Bili IMPRESSION:  
1. Acute respiratory failure-on vent- not ready to liberate- need to assess neurostatus 2. IR performed embolization of pulmonary artery 3. S/p PEA arrest post anigo  pm. Had 6 min of CPR and then ROSC. 4. Acute hemoptysis- Noted to have bleeding from RLL on Bronch. No endobronchial lesion. has lung mass(CA vs inflammatory pseudotumor) and microscopic hematuria; ANCA negative; JOSS barely positive. Suspect LUNG mass the culprit. Bronch will not help; 
5. Elevated procalcitonin- treating for possible lung abscess/pseudotumor 6. Increasing LFTs-  
7. Lung mass RLL- right lung base- would not be visible on conventional bronch and yield from transbronchial biopsy not guaranteed; had no lesion on chest Ct 2016  
8. Coagulopathy from warfarin per , hold anticoagulation for now. 9. Acute kidney injury likely prerenal and related to medications (Lasix) Baseline creatinine is around 0.9 10. Hematuria normal serologies 11. Enechalopathy- High NA not helping 12. Volume overload- over 10 liters positive, was on Lasix at home; anasarca 13. Sepsis due to suspected pneumonia.  Patient reports having exertional shortness of breath along with cough and hemoptysis; CT of chest shows severe emphysema with the right basilar airspace disease in the oval mass with fluid level concerning for internal hemorrhage versus pneumonia ;  
 14. 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  
15. Anemia 16. Former 50+ pack yr smoker quit in 2015? 17. Asbestos exposure 18. History of atrial fibrillation on anticoagulation with warfarin 19. Hx of CAD s/p PCI in the past home amiodarone. Hold Cardizem 20. Dyslipidemia 21. Multiorgan dysfunction as outlined above: Pt has one or more acute or chronic illnesses with severe exacerbation with progression or side effects of treatment that poses a threat to life or bodily function 22. Additional workup outlined below 23. Pt is requiring Drug therapy requiring intensive monitoring for toxicity 24. Pt is unstable, unpredictable needing PCU monitoring; is critically ill and at high risk of sudden decline and decompensation with life threatening consequenses and continued end organ dysfunction and failure 25. Prognosis guarded with significant risk of sudden decline 26. Critically ill, 35 min cc, EOP. Discussed with nurse this am. High risk of decompensation. RECOMMENDATIONS/PLAN:  
1. Full vent support, not ready for SBT 2. Frequent neuro exams 3. Continue diuresis 4. Stop propofol 5. Stop pravachol 6. Add free water, follow Na 7. Follow neuro status, consider scan after weekend of head and lung 8. Hold anticoagulation for now. SCDS. 9. off pressors 10. Empiric abx to treat possible lung abscess 11. Follow cultures 12. Labs to follow electrolytes, renal function and and blood counts 13. Glucose monitoring and SSI 14. Bronchial hygiene with respiratory therapy techniques, bronchodilators 15. DVT, SUP prophylaxis 16. Pt needs IV fluids with additives and Drug therapy requiring intensive monitoring for toxicity 17. Prescription drug management with home med reconciliation reviewed My assessment/management discussed with: Consultants, Nursing, Pharmacy, Case Management, PT, OT, Respiratory Therapy, Hospitalist and Family for coordination of care Pt's condition is acute and unstable requiring inpatient hospitalization. This care involved high complexity decision making which includes independently reviewing the patient's past medical records, current laboratory results, medication profiles that were immediately available to me and actual Xray images at the bedside in order to assess, support vital system function, and to treat this degree of vital organ system failure, and to prevent further deterioration of the patients condition. Risk of deterioration: high  
[x] High complexity decision making was performed [x] See my orders for details Tubes:    
 
Subjective/Initial History: S/P pulmonary artery embolization No acute events overnight Still on full vent support MAR reviewed and pertinent medications noted or modified as needed Current Facility-Administered Medications Medication  fentaNYL citrate (PF) injection 25-50 mcg  furosemide (LASIX) injection 80 mg  
 0.45% sodium chloride infusion  famotidine (PF) (PEPCID) 20 mg in sodium chloride 0.9% 10 mL injection  0.9% sodium chloride infusion 250 mL  chlorhexidine (PERIDEX) 0.12 % mouthwash 15 mL  albuterol (PROVENTIL VENTOLIN) nebulizer solution 2.5 mg  
 fentaNYL citrate (PF) injection 25 mcg  albuterol (PROVENTIL HFA, VENTOLIN HFA, PROAIR HFA) inhaler 2 Puff  dorzolamide-timolol (COSOPT) 22.3-6.8 mg/mL ophthalmic solution 1 Drop  latanoprost (XALATAN) 0.005 % ophthalmic solution 1 Drop  pravastatin (PRAVACHOL) tablet 40 mg  
 sodium chloride (NS) flush 5-10 mL  sodium chloride (NS) flush 5-10 mL  acetaminophen (TYLENOL) tablet 650 mg  
 ondansetron (ZOFRAN) injection 4 mg  docusate sodium (COLACE) capsule 100 mg  
  
 
  
ROS:A comprehensive review of systems was negative except for that written in the HPI. Objective: Vital Signs: Telemetry:    normal sinus rhythmIntake/Output:  
Visit Vitals  /53  Pulse 85  Temp 99.1 °F (37.3 °C)  Resp 16  
 Ht 6' 2\" (1.88 m)  Wt 114.7 kg (252 lb 13.9 oz)  SpO2 99%  BMI 32.47 kg/m2 Temp (24hrs), Av.2 °F (37.3 °C), Min:99 °F (37.2 °C), Max:99.5 °F (37.5 °C) O2 Device: Ventilator O2 Flow Rate (L/min): 5 l/min Wt Readings from Last 4 Encounters:  
18 114.7 kg (252 lb 13.9 oz) 16 111.1 kg (245 lb)  
16 106.6 kg (235 lb) 04/08/15 103.4 kg (228 lb) Intake/Output Summary (Last 24 hours) at 18 1217 Last data filed at 18 1102 Gross per 24 hour Intake          1855.84 ml Output             6725 ml Net         -4869.16 ml Last shift:       07 -  1900 In: 375.2 [I.V.:219.2] Out: 2100 [Urine:2100] Last 3 shifts:  190 -  0700 In: 2699 [I.V.:1700] Out: 1158 [Urine:5650; Drains:200] Physical Exam:  
 General:   male; with Et tube in place. Has right IJ CVC. On sedation and on vent. HEAD: Normocephalic, without obvious abnormality, atraumatic EYES: conjunctivae clear. PERRL,  AN Icteric sclerae NOSE: nares normal, no drainage, no nasal flaring, THROAT: normal; Lips, mucosa dry; No Thrush;  crowded airway; tongue midline Neck: Supple, symmetrical, trachea midline,  No accessory mm use; No Stridor/ cuff leak, No goiter or thyroid tenderness LYMPH: No abnormally enlarged lymph nodes. in neck or groin Chest: increased AP diameter Lungs: agonal, bilateral rhonchi. Seems to have good air  Movement Bilaterally. Heart: Regular rate and rhythm; NO edema Abdomen: soft, non-tender, without masses or organomegaly, protuberant, distended : no Overton Musculoskeletal: mild kyphosis; No spine or CVA tenderness; negative, cyanosis, clubbing; no joint swelling or erythema  Neuro: was not  Responsive, had a gag with ET tube suctioning. , Sedated not able to fully assess. Psych: Not able to assess due to being on sedation and on vent. Skin: Pallor;  
 Pulses:Bilateral, Radial, 2+ Data:  
 
All lab results for the last 24 hours reviewed. Labs: 
 
Recent Labs  
   09/23/18 
 0248  09/22/18 
 5770  09/21/18 
 0409 WBC  11.5*  11.7*  10.1 HGB  7.7*  7.9*  7.7* PLT  142*  138*  123* INR  1.1  1.1  1.1 APTT  26.1  28.2  28.3 Recent Labs  
   09/23/18 
 0248  09/22/18 
 0307  09/21/18 
 0409 NA  149*  150*  149*  
K  3.8  4.3  4.1 CL  113*  117*  116* CO2  34*  29  28 GLU  133*  144*  140* BUN  33*  30*  30* CREA  1.10  0.99  1.01  
CA  8.2*  8.0*  8.1* ALB  1.9*  2.0*   --   
SGOT  175*  139*   --   
ALT  116*  83*   --   
 
Recent Labs  
   09/22/18 
 0528  09/21/18 
 0604 PH  7.40  7.36  
PCO2  42  48* PO2  59*  85 HCO3  25  26 FIO2  50  60 No results for input(s): CPK, CKNDX, TROIQ in the last 72 hours. No lab exists for component: CPKMB No results found for: BNPP, BNP Lab Results Component Value Date/Time Culture result: HEAVY NORMAL RESPIRATORY SHELDON 09/16/2018 11:15 AM  
 Culture result: LIGHT NORMAL RESPIRATORY SHELDON 09/15/2018 01:20 PM  
 Culture result: LIGHT NORMAL RESPIRATORY SHELDON 09/12/2018 03:50 PM  
No results found for: TSH, TSHEXT, TSHEXT Imaging: CXR: no acute changes I have personally and independently reviewed the patients interval and diagnostic data, radiographs and have reviewed the reports. Medical Decision Making Today: · Reviewed the flowsheet and previous days notes · Reviewed and summarized records or history from previous days note or discussions with staff, family · Parenteral controlled substances - Reviewed/ Adjusted / Weaned / Started · High Risk Drug therapy requiring intensive monitoring for toxicity: eg steroids, pressors, antibiotics · Reviewed and/or ordered Clinical lab tests · Reviewed and/or ordered Radiology tests · Reviewed and/or ordered of Medicine tests · Independently visualized radiologic Images · I have personally reviewed the patients ECG / Telemetry Odalis Ruff MD

## 2018-09-23 NOTE — PROGRESS NOTES
Hospitalist Progress Note NAME: Odilia Gutiérrez  
:  1934 MRN:  [id number] Assessment / Plan: PEA arrest  Code blue called . Patient w/ agonal breathing after coming back from radiology after arteriogram completed. No pulse detected. CPR initiated. Epi x 2 given. Pulse obtained. Intubated by Dr. Earl Sauer. Much bloody secretions obtained. Transferred to ICU. Acute respiratory w/ hypoxia, s/p cardiac arrest 
Sepsis due to suspected PNA w/ possible pulmonary hemorrhage 
-CT of chest shows severe emphysema with the right basilar airspace disease in the oval mass with fluid level concerning for internal hemorrhage versus pneumonia. -Bcx Neg, sputum cytology collected  negative except scant yeast. 
-cont zosyn for possible lung abscess. End date  
-cytology from bronchial washing on  is atypical, favor benign reactive process 
-off pressors 
-vent management per pulm Hemoptysis 
-noted to have bleeding from RLL on bronchoscopy. -work up for possible rheumatologic cause neg thus far: 
ANCA, anti-GBM, MPO ab, MI-3 ab, SSA/SSB, RNP ab, Arredondo/RNP, dsDNA, and Arredondo ab all negative. JOSS +. 
-Arteriogram : Thoracic aortic and intercostal arteriograms as described above demonstrating no 
enlarged bronchial artery supplying the right lower lobe of the lung. A normal 
appearing and normal sized right bronchial artery is seen without any 
hyperplasia or dominant right lower lobe supply. No embolization was performed. RLL mass  
-work up for [employer] neg thus far. -diagnostic bronch done . No endobronchial lesions seen. -CTA chest done  due to persistent bloody pulmonary secretions. Right lower lobe masslike abnormality demonstrates increased internal density 
and has increased in size measuring 6.7 x 6.7 cm, compared to 5.7 x 4.4 cm. This 
is compatible with internal hemorrhage. There is new moderate right lower lobe 
partial collapse/atelectasis.    
 Normal sized right bronchial artery is visualized without any hyperplasia or or evidence for supply to the right lower lobe lung abnormality. It is unlikely that the hemoptysis related to bronchial arterial injury/supply. Prior bronchial arteriogram was unremarkable. No evidence for pulmonary artery pseudoaneurysm or active hemorrhage. However, it is more likely that the masslike abnormality in the right lower lobe the lung has eroded into an adjacent pulmonary artery then a bronchial artery. Therefore, plan is for pulmonary arteriogram with possible embolization if an abnormality is seen in the right lower lobe. 6 mm and 4 mm right lung nodules. Small bilateral pleural effusions. Minimally enlarged bilateral hilar and mediastinal lymph nodes Hypernatremia 
-on 1/2NS, monitor lab Acute blood loss anemia on admission 
-s/p 3 units PRBC so far 
-serial H&H, transfuse prn 
 
H/o atrial fibrillation on chronic anticoagulation w/ coumadin 
-amiodarone on hold 
-cardizem on hold due to borderline low bp 
-hold coumadin due to hemoptysis Coumadin-induced coagulopathy 
-INR 1.1. Holding coumadin. ALAN - likely prerenal related to Lasix. Renal function stable. 
-avoid nephrotoxic agents. UTI, acute cystitis w/ hematuria, POA 
-on abx as above. No Ucx sent on admission COPD - stable HLD 
-cont pravachol Obesity - BMI 32 Code: Full DVT prophylaxis: SCDs GI prophylaxis: PPI Subjective: Chief Complaint / Reason for Physician Visit Follow up for hemoptysis. Intubated, sedated. No acute changes Discussed with RN events overnight. Review of Systems: 
Symptom Y/N Comments  Symptom Y/N Comments Fever/Chills    Chest Pain Poor Appetite    Edema Cough    Abdominal Pain Sputum    Joint Pain SOB/ROY    Pruritis/Rash Nausea/vomit    Tolerating PT/OT Diarrhea    Tolerating Diet Constipation    Other Could NOT obtain due to: intubated Objective: VITALS:  
Last 24hrs VS reviewed since prior progress note. Most recent are: 
Patient Vitals for the past 24 hrs: 
 Temp Pulse Resp BP SpO2  
09/23/18 1200 - 85 16 114/53 99 % 09/23/18 1118 - 88 19 - 100 % 09/23/18 1100 - 79 17 110/51 99 % 09/23/18 1000 - 83 24 115/53 97 % 09/23/18 0900 - 84 20 118/53 98 %  
09/23/18 0800 - 83 23 125/58 96 %  
09/23/18 0748 - 84 24 - 97 % 09/23/18 0743 99.1 °F (37.3 °C) - - - -  
09/23/18 0700 - 85 21 120/53 98 %  
09/23/18 0600 - 82 18 118/59 99 % 09/23/18 0500 - 84 21 128/49 98 %  
09/23/18 0400 99.1 °F (37.3 °C) 76 20 110/56 98 %  
09/23/18 0339 - 78 12 - 99 % 09/23/18 0337 - - - - 99 % 09/23/18 0200 - 80 18 104/49 98 %  
09/23/18 0100 - 82 20 124/45 94 % 09/23/18 0000 99.5 °F (37.5 °C) 89 22 124/54 97 % 09/22/18 2312 - 99 12 - 97 % 09/22/18 2310 - - - - 97 % 09/22/18 2300 - 91 23 150/62 96 %  
09/22/18 2200 - 82 23 131/53 98 %  
09/22/18 2100 - 74 19 119/48 99 % 09/22/18 2037 - 71 20 - 99 % 09/22/18 2030 - - - - 99 % 09/22/18 2000 99.5 °F (37.5 °C) 80 21 119/49 99 % 09/22/18 1800 - 80 21 116/52 97 % 09/22/18 1700 - 83 (!) 36 131/59 95 % 09/22/18 1600 - 88 22 143/58 95 % 09/22/18 1520 - 83 22 - 93 % 09/22/18 1501 99 °F (37.2 °C) - - - -  
09/22/18 1500 - 85 26 132/49 97 % 09/22/18 1400 - 82 23 131/51 97 % 09/22/18 1300 - 83 25 145/49 96 % Intake/Output Summary (Last 24 hours) at 09/23/18 1223 Last data filed at 09/23/18 1102 Gross per 24 hour Intake          1855.84 ml Output             6725 ml Net         -4869.16 ml PHYSICAL EXAM: 
General: WD, WN. Intubated. No distress. EENT:   Anicteric sclerae. MMM;  ET tube in place. Resp:  Decreased breath sounds. Clear anteriorly. No rhonchi. No accessory muscle use CV:  Regular  rhythm,  1+ bilateral leg edema GI:  Soft, Non distended, Non tender.  +Bowel sounds Neurologic:  Sedated. No posturing. Psych:   No agitation Skin:  No rashes. No jaundice Reviewed most current lab test results and cultures  YES Reviewed most current radiology test results   YES Review and summation of old records today    NO Reviewed patient's current orders and MAR    YES 
PMH/SH reviewed - no change compared to H&P 
________________________________________________________________________ Care Plan discussed with: 
  Comments Patient Family RN x Care Manager Consultant Multidiciplinary team rounds were held today with , nursing, pharmacist and clinical coordinator. Patient's plan of care was discussed; medications were reviewed and discharge planning was addressed. ________________________________________________________________________ Total NON critical care TIME:  25  Minutes Total CRITICAL CARE TIME Spent:   Minutes non procedure based Comments >50% of visit spent in counseling and coordination of care    
________________________________________________________________________ Ventura Soto MD  
 
Procedures: see electronic medical records for all procedures/Xrays and details which were not copied into this note but were reviewed prior to creation of Plan. LABS: 
I reviewed today's most current labs and imaging studies. Pertinent labs include: 
Recent Labs  
   09/23/18 
 0248 09/22/18 
 0307 09/21/18 
 0409 WBC  11.5*  11.7*  10.1 HGB  7.7*  7.9*  7.7* HCT  26.0*  26.9*  26.0*  
PLT  142*  138*  123* Recent Labs  
   09/23/18 
 0248 09/22/18 
 0307  09/21/18 
 0409 NA  149*  150*  149*  
K  3.8  4.3  4.1 CL  113*  117*  116* CO2  34*  29  28 GLU  133*  144*  140* BUN  33*  30*  30* CREA  1.10  0.99  1.01  
CA  8.2*  8.0*  8.1* ALB  1.9*  2.0*   --   
TBILI  0.5  0.5   --   
SGOT  175*  139*   --   
ALT  116*  83*   --   
INR  1.1  1.1  1.1 Signed: Ventura Soto MD

## 2018-09-23 NOTE — PROGRESS NOTES
1915 
Bedside report received from [address] [place] [address] discussed  
0000 Patient incontinent of urine, leaking around catheter site. Changed out catheter with 18 Salvadorean almaraz, sterile technique Total bath given, linens changed. Right IJ dressing changed 0400 Patient resting quietly with eyes closed. Responds to painful stimulus. 0700 Bedside report given to [address] [place] [address] discussed

## 2018-09-24 ENCOUNTER — APPOINTMENT (OUTPATIENT)
Dept: GENERAL RADIOLOGY | Age: 83
DRG: 003 | End: 2018-09-24
Attending: INTERNAL MEDICINE
Payer: MEDICARE

## 2018-09-24 LAB
ALBUMIN SERPL-MCNC: 1.9 G/DL (ref 3.5–5)
ALBUMIN/GLOB SERPL: 0.4 {RATIO} (ref 1.1–2.2)
ALP SERPL-CCNC: 146 U/L (ref 45–117)
ALT SERPL-CCNC: 166 U/L (ref 12–78)
ANION GAP SERPL CALC-SCNC: 3 MMOL/L (ref 5–15)
APTT PPP: 26 SEC (ref 22.1–32)
AST SERPL-CCNC: 240 U/L (ref 15–37)
BILIRUB SERPL-MCNC: 0.4 MG/DL (ref 0.2–1)
BUN SERPL-MCNC: 35 MG/DL (ref 6–20)
BUN/CREAT SERPL: 31 (ref 12–20)
CALCIUM SERPL-MCNC: 8.4 MG/DL (ref 8.5–10.1)
CHLORIDE SERPL-SCNC: 111 MMOL/L (ref 97–108)
CO2 SERPL-SCNC: 37 MMOL/L (ref 21–32)
CREAT SERPL-MCNC: 1.14 MG/DL (ref 0.7–1.3)
FIBRINOGEN PPP-MCNC: >800 MG/DL (ref 200–475)
GLOBULIN SER CALC-MCNC: 5.1 G/DL (ref 2–4)
GLUCOSE SERPL-MCNC: 139 MG/DL (ref 65–100)
INR PPP: 1.1 (ref 0.9–1.1)
MAGNESIUM SERPL-MCNC: 2.5 MG/DL (ref 1.6–2.4)
PHOSPHATE SERPL-MCNC: 2.4 MG/DL (ref 2.6–4.7)
POTASSIUM SERPL-SCNC: 3.5 MMOL/L (ref 3.5–5.1)
PROT SERPL-MCNC: 7 G/DL (ref 6.4–8.2)
PROTHROMBIN TIME: 11.4 SEC (ref 9–11.1)
SODIUM SERPL-SCNC: 151 MMOL/L (ref 136–145)
THERAPEUTIC RANGE,PTTT: ABNORMAL SECS (ref 58–77)

## 2018-09-24 PROCEDURE — 74011250637 HC RX REV CODE- 250/637: Performed by: INTERNAL MEDICINE

## 2018-09-24 PROCEDURE — 71045 X-RAY EXAM CHEST 1 VIEW: CPT

## 2018-09-24 PROCEDURE — 94003 VENT MGMT INPAT SUBQ DAY: CPT

## 2018-09-24 PROCEDURE — 74011000250 HC RX REV CODE- 250: Performed by: INTERNAL MEDICINE

## 2018-09-24 PROCEDURE — 85610 PROTHROMBIN TIME: CPT | Performed by: INTERNAL MEDICINE

## 2018-09-24 PROCEDURE — 94640 AIRWAY INHALATION TREATMENT: CPT

## 2018-09-24 PROCEDURE — 36415 COLL VENOUS BLD VENIPUNCTURE: CPT | Performed by: INTERNAL MEDICINE

## 2018-09-24 PROCEDURE — 65620000000 HC RM CCU GENERAL

## 2018-09-24 PROCEDURE — 83735 ASSAY OF MAGNESIUM: CPT | Performed by: INTERNAL MEDICINE

## 2018-09-24 PROCEDURE — 74011250636 HC RX REV CODE- 250/636: Performed by: INTERNAL MEDICINE

## 2018-09-24 PROCEDURE — 84100 ASSAY OF PHOSPHORUS: CPT | Performed by: INTERNAL MEDICINE

## 2018-09-24 PROCEDURE — 74011000258 HC RX REV CODE- 258: Performed by: INTERNAL MEDICINE

## 2018-09-24 PROCEDURE — 80053 COMPREHEN METABOLIC PANEL: CPT | Performed by: INTERNAL MEDICINE

## 2018-09-24 RX ADMIN — ALBUTEROL SULFATE 2.5 MG: 2.5 SOLUTION RESPIRATORY (INHALATION) at 00:04

## 2018-09-24 RX ADMIN — ALBUTEROL SULFATE 2.5 MG: 2.5 SOLUTION RESPIRATORY (INHALATION) at 07:28

## 2018-09-24 RX ADMIN — ALBUTEROL SULFATE 2.5 MG: 2.5 SOLUTION RESPIRATORY (INHALATION) at 11:11

## 2018-09-24 RX ADMIN — CHLORHEXIDINE GLUCONATE 15 ML: 1.2 RINSE ORAL at 09:58

## 2018-09-24 RX ADMIN — LATANOPROST 1 DROP: 50 SOLUTION OPHTHALMIC at 21:11

## 2018-09-24 RX ADMIN — SODIUM CHLORIDE: 900 INJECTION, SOLUTION INTRAVENOUS at 10:52

## 2018-09-24 RX ADMIN — ALBUTEROL SULFATE 2.5 MG: 2.5 SOLUTION RESPIRATORY (INHALATION) at 15:41

## 2018-09-24 RX ADMIN — Medication 10 ML: at 13:50

## 2018-09-24 RX ADMIN — FAMOTIDINE 20 MG: 10 INJECTION, SOLUTION INTRAVENOUS at 21:10

## 2018-09-24 RX ADMIN — POTASSIUM CHLORIDE 20 MEQ: 1.5 POWDER, FOR SOLUTION ORAL at 09:58

## 2018-09-24 RX ADMIN — ALBUTEROL SULFATE 2.5 MG: 2.5 SOLUTION RESPIRATORY (INHALATION) at 03:27

## 2018-09-24 RX ADMIN — Medication 10 ML: at 05:41

## 2018-09-24 RX ADMIN — POTASSIUM CHLORIDE 20 MEQ: 1.5 POWDER, FOR SOLUTION ORAL at 18:47

## 2018-09-24 RX ADMIN — CHLORHEXIDINE GLUCONATE 15 ML: 1.2 RINSE ORAL at 21:11

## 2018-09-24 RX ADMIN — ALBUTEROL SULFATE 2.5 MG: 2.5 SOLUTION RESPIRATORY (INHALATION) at 19:30

## 2018-09-24 RX ADMIN — DORZOLAMIDE HYDROCHLORIDE AND TIMOLOL MALEATE 1 DROP: 20; 5 SOLUTION/ DROPS OPHTHALMIC at 09:59

## 2018-09-24 RX ADMIN — FAMOTIDINE 20 MG: 10 INJECTION, SOLUTION INTRAVENOUS at 09:58

## 2018-09-24 RX ADMIN — Medication 10 ML: at 21:10

## 2018-09-24 NOTE — PROGRESS NOTES
1920  
Bedside report received from [address]  [facility] discussed 2130 Patient bathed. Shaved and top linens changed. CHG wipes used  
0000 Patient resting quietly with eyes closed. No ss of pain or distress observed 0600 Patient with uneventful night. VSS  
0700 Bedside report given to Lina Mccracken of care discussed

## 2018-09-24 NOTE — PROGRESS NOTES
1915- bedside report rec'd from [address] RN and assumed care of the pt. 
 
0345- pt resting well tonight. Continues to have large copious tan secretions. 0400- am labs drawn. 0700- report given to [facility], RN.

## 2018-09-24 NOTE — PROGRESS NOTES
Nutrition Assessment: 
 
INTERVENTIONS/RECOMMENDATIONS:  
Enteral/Parenteral Nutrition: Modify rate, concentration, composition, and schedule Recommend increasing TF rate: 
TwoCal @ 45 mL/h + 250 mL flush q 3 h (provides 2160kcals/90gPro/2756mL) ASSESSMENT:  
Chart reviewed; case discussed during CCU rounds. Rt remains intubated. No longer on propofol, needs re-estimated. TwoCal @ 45 mL/h meets 97% kcal needs and 98% protein needs. Diuresis on hold secondary to elevated Na. Pulmonology managing water flushes for now. Will continue to monitor tolerance of TF. Diet Order: NPO, Other (comment) (TF via OGT: TwoCal @ 30mL/h + Prosource BID + 50mL flush q 4h (provides 1560kcals/90gPro/804mL) ) 
% Eaten:  No data found. Pertinent Medications: [x] Reviewed []Other: Colace, Zofran, KCl, Kphos Pertinent Labs: [x]Reviewed  []Other: Na 151, phos 2.4, Mg 2.5 Food Allergies: [x]None []Other:   Last BM: 9/23   []Active     []Hyperactive  [x]Hypoactive       [] Absent  BS Skin:    [] Intact   [] Incision  [] Breakdown   [x]Edema 2+ generalized pitting, 1+ UE pitting, 2+ LE pitting  []Other: Anthropometrics: Height: 6' 2\" (188 cm) Weight: 114.7 kg (252 lb 13.9 oz) IBW (%IBW):   ( ) UBW (%UBW):   (  %) BMI: Body mass index is 32.47 kg/(m^2). This BMI is indicative of: 
[]Underweight   []Normal   []Overweight   [x] Obesity   [] Extreme Obesity (BMI>40) Last Weight Metrics: 
Weight Loss Metrics 9/23/2018 5/5/2016 3/25/2016 4/8/2015 3/25/2015 1/16/2013 1/3/2013 Today's Wt 252 lb 13.9 oz 245 lb 235 lb 228 lb 222 lb 232 lb 3.2 oz 227 lb 11.8 oz BMI 32.47 kg/m2 31.44 kg/m2 30.16 kg/m2 29.26 kg/m2 28.49 kg/m2 29.8 kg/m2 29.23 kg/m2 Estimated Nutrition Needs (Based on): 2316 Kcals/day (PSU (MSJ 1916)) , 83 g (0.8gPro/kg) Protein Carbohydrate: At Least 130 g/day  Fluids: 1800 mL/day NUTRITION DIAGNOSES:  
Problem:  Inadequate protein-energy intake Etiology: related to d/c propofol Signs/Symptoms: as evidenced by TF meets <65% kcal needs Previous Nutrition Dx:  [] Resolved  [] Unresolved           [x] Progressing NUTRITION INTERVENTIONS: 
  Enteral/Parenteral Nutrition: Other (Continue TF as ordered) GOAL:  
Pt will tolerate TF @ goal rate with residuals <250mL, meeting >80% kcal and 100% protein needs in 2-4 days. NUTRITION MONITORING AND EVALUATION Food/Nutrient Intake Outcomes: Enteral/parenteral nutrition intake, IV fluids Physical Signs/Symptoms Outcomes: GI, Glucose profile, GI profile, Electrolyte and renal profile, Weight/weight change Previous Goal Met: 
 [x] Met              [] Progressing Towards Goal              [] Not Progressing Towards Goal  
Previous Recommendations: 
 [x] Implemented          [] Not Implemented          [] Not Applicable LEARNING NEEDS (Diet, Food/Nutrient-Drug Interaction):  
 [x] None Identified 
 [] Identified and Education Provided/Documented 
 [] Identified and Pt declined/was not appropriate Cultural, Yarsani, OR Ethnic Dietary Needs:  
 [x] None Identified 
 [] Identified and Addressed 
 
 [x] Interdisciplinary Care Plan Reviewed/Documented  
 [x] Discharge Planning: TBD [x] Participated in Interdisciplinary Rounds NUTRITION RISK:  
 [x] Patient At Nutritional Risk 
  [x] High              [] Moderate/Mild           []  Low   
 [] Patient Not At Nutritional Risk [facility] Pager [phone] Weekend Pager [phone]

## 2018-09-24 NOTE — PROGRESS NOTES
Care Management: 
 
Patient remains critical and vent dependent in the ICU. Family meeting end of last week and wife would like to continue treatment. Palliative Care involved in case. Patient remains Full Code. Chart reviewed and we will cont to follow for possible placement needs. Sushil Jacobsen crm acm [id number]

## 2018-09-24 NOTE — PROGRESS NOTES
[facility] Pulmonary Consult Service NotePulmonary, Critical Care, and Sleep Medicine Name: Trudi Rao MRN: [id number] : 1934 Hospital: [facility] Date: 2018   Hospital Day: 15  
 
 old blood in ETT. Propofol has been stopped as of this AM. Unresponsive. Anasarca. D/w nursing at bedside. No fever. On TF. Overton was clogged, flushed and now patent.  good diuresis. Weak. Intermittently responds. No new hemoptysis on vent. K 3.8. LFTs up. Normal Bili 
 
 BUN/ Cr slightly up  After good diuresis. Weak. Intermittently responds. Thick secretions. No new hemoptysis on vent. K 3.5. LFTs up. Normal Bili. Hgb 7.5 IMPRESSION:  
1. Acute respiratory failure-on vent- not ready to liberate- need to assess neurostatus 2. IR performed embolization of pulmonary artery 3. S/p PEA arrest post anigo  pm. Had 6 min of CPR and then ROSC. 4. Acute hemoptysis- Noted to have bleeding from RLL on Bronch. No endobronchial lesion. has lung mass(CA vs inflammatory pseudotumor) and microscopic hematuria; ANCA negative; JOSS barely positive. Suspect LUNG mass the culprit. Bronch will not help; 
5. Elevated procalcitonin- treating for possible lung abscess/pseudotumor 6. Increasing LFTs-  
7. Lung mass RLL- right lung base- would not be visible on conventional bronch and yield from transbronchial biopsy not guaranteed; had no lesion on chest Ct 2016; cytology x 2 negative for malignant cells 8. Coagulopathy from warfarin per , hold anticoagulation for now. 9. Acute kidney injury likely prerenal and related to medications (Lasix) Baseline creatinine is around 0.9 10. Hematuria normal serologies 11. Enechalopathy- High NA not helping 12. Volume overload- over 10 liters positive, was on Lasix at home; anasarca 13. Sepsis due to suspected pneumonia.  Patient reports having exertional shortness of breath along with cough and hemoptysis; CT of chest shows severe emphysema with the right basilar airspace disease in the oval mass with fluid level concerning for internal hemorrhage versus pneumonia ;  
14. 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  
15. Anemia 16. Former 50+ pack yr smoker quit in 2015? 17. Asbestos exposure 18. History of atrial fibrillation on anticoagulation with warfarin 19. Hx of CAD s/p PCI in the past home amiodarone. Hold Cardizem 20. Dyslipidemia 21. Multiorgan dysfunction as outlined above: Pt has one or more acute or chronic illnesses with severe exacerbation with progression or side effects of treatment that poses a threat to life or bodily function 22. Additional workup outlined below 23. Pt is requiring Drug therapy requiring intensive monitoring for toxicity 24. Pt is unstable, unpredictable needing PCU monitoring; is critically ill and at high risk of sudden decline and decompensation with life threatening consequenses and continued end organ dysfunction and failure 25. Prognosis guarded with significant risk of sudden decline 26. Critically ill, 35 min cc, EOP. Discussed with nurse this am. High risk of decompensation. RECOMMENDATIONS/PLAN:  
1. Full vent support, not ready for SBT 2. Frequent neuro exams 3. stop diuresis today 4. Follow renal function- d/w wife on phone this AM 
5. Stop propofol 6. Stop pravachol 7. Enteral feedings 8. Add free water, follow Na 9. Follow neuro status, 
10. Transfuse prn to keep Hgb > 7 
11. Hold anticoagulation for now. SCDS. 12. off pressors 13. Empiric abx to treat possible lung abscess 14. Follow cultures 15. Labs to follow electrolytes, renal function and and blood counts 16. Glucose monitoring and SSI 17. Bronchial hygiene with respiratory therapy techniques, bronchodilators 18. DVT, SUP prophylaxis 19. Pt needs IV fluids with additives and Drug therapy requiring intensive monitoring for toxicity 20. Prescription drug management with home med reconciliation reviewed My assessment/management discussed with: Consultants, Nursing, Pharmacy, Case Management, PT, OT, Respiratory Therapy, Hospitalist and Family for coordination of care Pt's condition is acute and unstable requiring inpatient hospitalization. This care involved high complexity decision making which includes independently reviewing the patient's past medical records, current laboratory results, medication profiles that were immediately available to me and actual Xray images at the bedside in order to assess, support vital system function, and to treat this degree of vital organ system failure, and to prevent further deterioration of the patients condition. Risk of deterioration: high  
[x] High complexity decision making was performed [x] See my orders for details Tubes:    
 
Subjective/Initial History: S/P pulmonary artery embolization No acute events overnight Still on full vent support MAR reviewed and pertinent medications noted or modified as needed Current Facility-Administered Medications Medication  potassium chloride (KLOR-CON) packet 20 mEq  fentaNYL citrate (PF) injection 25-50 mcg  
 0.45% sodium chloride infusion  famotidine (PF) (PEPCID) 20 mg in sodium chloride 0.9% 10 mL injection  0.9% sodium chloride infusion 250 mL  chlorhexidine (PERIDEX) 0.12 % mouthwash 15 mL  albuterol (PROVENTIL VENTOLIN) nebulizer solution 2.5 mg  
 fentaNYL citrate (PF) injection 25 mcg  albuterol (PROVENTIL HFA, VENTOLIN HFA, PROAIR HFA) inhaler 2 Puff  dorzolamide-timolol (COSOPT) 22.3-6.8 mg/mL ophthalmic solution 1 Drop  latanoprost (XALATAN) 0.005 % ophthalmic solution 1 Drop  sodium chloride (NS) flush 5-10 mL  sodium chloride (NS) flush 5-10 mL  acetaminophen (TYLENOL) tablet 650 mg  
 ondansetron (ZOFRAN) injection 4 mg  docusate sodium (COLACE) capsule 100 mg  
  
 
  
ROS:A comprehensive review of systems was negative except for that written in the HPI. Objective: 
 
Vital Signs: Telemetry:    normal sinus rhythmIntake/Output:  
Visit Vitals  /48 (BP 1 Location: Left arm, BP Patient Position: At rest)  Pulse 81  Temp 98.3 °F (36.8 °C)  Resp 19  
 Ht 6' 2\" (1.88 m)  Wt 114.7 kg (252 lb 13.9 oz)  SpO2 100%  BMI 32.47 kg/m2 Temp (24hrs), Av.9 °F (37.2 °C), Min:98.3 °F (36.8 °C), Max:99.5 °F (37.5 °C) O2 Device: Endotracheal tube, Ventilator O2 Flow Rate (L/min): 5 l/min Wt Readings from Last 4 Encounters:  
18 114.7 kg (252 lb 13.9 oz) 16 111.1 kg (245 lb)  
16 106.6 kg (235 lb) 04/08/15 103.4 kg (228 lb) Intake/Output Summary (Last 24 hours) at 18 0722 Last data filed at 18 0600 Gross per 24 hour Intake          1714.17 ml Output             4905 ml Net         -3190.83 ml Last shift:        
Last 3 shifts:  1901 -  0700 In: 2663.3 [I.V.:1408.3] Out: 7230 [IYJAI:9532] Physical Exam:  
 General:   male; with Et tube in place. Has right IJ CVC. On sedation and on vent. HEAD: Normocephalic, without obvious abnormality, atraumatic EYES: conjunctivae clear. PERRL,  AN Icteric sclerae NOSE: nares normal, no drainage, no nasal flaring, THROAT: normal; Lips, mucosa dry; No Thrush;  crowded airway; tongue midline Neck: Supple, symmetrical, trachea midline,  No accessory mm use; No Stridor/ cuff leak, No goiter or thyroid tenderness LYMPH: No abnormally enlarged lymph nodes. in neck or groin Chest: increased AP diameter Lungs: agonal, bilateral rhonchi. Seems to have good air  Movement Bilaterally. Heart: Regular rate and rhythm; NO edema Abdomen: soft, non-tender, without masses or organomegaly, protuberant, distended : no Overton Musculoskeletal: mild kyphosis; No spine or CVA tenderness; negative, cyanosis, clubbing; no joint swelling or erythema Neuro: was not  Responsive, had a gag with ET tube suctioning. , Sedated not able to fully assess. Psych: Not able to assess due to being on sedation and on vent. Skin: Pallor;  
 Pulses:Bilateral, Radial, 2+ Data:  
 
All lab results for the last 24 hours reviewed. Labs: 
 
Recent Labs  
   09/24/18 0417 09/23/18 0248 09/22/18 
 0234 WBC   --   11.5*  11.7* HGB   --   7.7*  7.9*  
PLT   --   142*  138* INR  1.1  1.1  1.1 APTT  26.0  26.1  28.2 Recent Labs  
   09/24/18 0417 09/23/18 0248 09/22/18 
 2392 NA  151*  149*  150*  
K  3.5  3.8  4.3 CL  111*  113*  117* CO2  37*  34*  29 GLU  139*  133*  144* BUN  35*  33*  30* CREA  1.14  1.10  0.99 CA  8.4*  8.2*  8.0*  
MG  2.5*   --    --   
PHOS  2.4*   --    --   
ALB  1.9*  1.9*  2.0*  
SGOT  240*  175*  139* ALT  166*  116*  83* Recent Labs  
   09/22/18 
 0868 PH  7.40 PCO2  42 PO2  59* HCO3  25 FIO2  50 No results for input(s): CPK, CKNDX, TROIQ in the last 72 hours. No lab exists for component: CPKMB No results found for: BNPP, BNP Lab Results Component Value Date/Time Culture result: HEAVY NORMAL RESPIRATORY SHELDON 09/16/2018 11:15 AM  
 Culture result: LIGHT NORMAL RESPIRATORY SHELDON 09/15/2018 01:20 PM  
 Culture result: LIGHT NORMAL RESPIRATORY SHELDON 09/12/2018 03:50 PM  
No results found for: TSH, TSHEXT, TSHEXT Imaging: CXR: no acute changes I have personally and independently reviewed the patients interval and diagnostic data, radiographs and have reviewed the reports. Medical Decision Making Today: · Reviewed the flowsheet and previous days notes · Reviewed and summarized records or history from previous days note or discussions with staff, family · Parenteral controlled substances - Reviewed/ Adjusted / Weaned / Started · High Risk Drug therapy requiring intensive monitoring for toxicity: eg steroids, pressors, antibiotics · Reviewed and/or ordered Clinical lab tests · Reviewed and/or ordered Radiology tests · Reviewed and/or ordered of Medicine tests · Independently visualized radiologic Images · I have personally reviewed the patients ECG / Telemetry Carolyn Nye MD

## 2018-09-24 NOTE — PROGRESS NOTES
Hospitalist Progress Note NAME: Jaycee Max  
:  1934 MRN:  [id number] Assessment / Plan: PEA arrest  Code blue called . Patient w/ agonal breathing after coming back from radiology after arteriogram completed. No pulse detected. CPR initiated. Epi x 2 given. Pulse obtained. Intubated by Dr. Last Steiner. Much bloody secretions obtained. Transferred to ICU. Acute respiratory w/ hypoxia, s/p cardiac arrest 
Sepsis due to suspected PNA w/ possible pulmonary hemorrhage 
-CT of chest shows severe emphysema with the right basilar airspace disease in the oval mass with fluid level concerning for internal hemorrhage versus pneumonia. -Bcx Neg, sputum cytology collected  negative except scant yeast. 
-completed zosyn for possible lung abscess. End date  
-cytology from bronchial washing on  is atypical, favor benign reactive process 
-off pressors, on TF 
-vent management per pulm Hemoptysis 
-noted to have bleeding from RLL on bronchoscopy. -work up for possible rheumatologic cause neg thus far: 
ANCA, anti-GBM, MPO ab, SC-3 ab, SSA/SSB, RNP ab, Arredondo/RNP, dsDNA, and Arredondo ab all negative. JOSS +. 
-Arteriogram : Thoracic aortic and intercostal arteriograms as described above demonstrating no 
enlarged bronchial artery supplying the right lower lobe of the lung. A normal 
appearing and normal sized right bronchial artery is seen without any 
hyperplasia or dominant right lower lobe supply. No embolization was performed. RLL mass  
-work up for [employer] neg thus far. -diagnostic bronch done . No endobronchial lesions seen. -CTA chest done  due to persistent bloody pulmonary secretions. Right lower lobe masslike abnormality demonstrates increased internal density 
and has increased in size measuring 6.7 x 6.7 cm, compared to 5.7 x 4.4 cm. This 
is compatible with internal hemorrhage. There is new moderate right lower lobe partial collapse/atelectasis.   
Normal sized right bronchial artery is visualized without any hyperplasia or or evidence for supply to the right lower lobe lung abnormality. It is unlikely that the hemoptysis related to bronchial arterial injury/supply. Prior bronchial arteriogram was unremarkable. No evidence for pulmonary artery pseudoaneurysm or active hemorrhage. However, it is more likely that the masslike abnormality in the right lower lobe the lung has eroded into an adjacent pulmonary artery then a bronchial artery. Therefore, plan is for pulmonary arteriogram with possible embolization if an abnormality is seen in the right lower lobe. 6 mm and 4 mm right lung nodules. Small bilateral pleural effusions. Minimally enlarged bilateral hilar and mediastinal lymph nodes Hypernatremia 
-on 1/2NS, monitor lab Acute blood loss anemia on admission 
-s/p 3 units PRBC so far 
-serial H&H, transfuse prn 
 
H/o atrial fibrillation on chronic anticoagulation w/ coumadin 
-amiodarone on hold 
-cardizem on hold due to borderline low bp 
-hold coumadin due to hemoptysis Coumadin-induced coagulopathy Elevated LFT 
-INR 1.1. Holding coumadin. 
-monitor LFT ALAN - likely prerenal related to Lasix. Renal function stable. 
-avoid nephrotoxic agents. UTI, acute cystitis w/ hematuria, POA 
-on abx as above. No Ucx sent on admission COPD - stable HLD 
-stop pravachol Obesity - BMI 32 Code: Full DVT prophylaxis: SCDs GI prophylaxis: PPI Subjective: Chief Complaint / Reason for Physician Visit Follow up for hemoptysis. Remains intubated. No acute changes. Discussed with RN events overnight. Review of Systems: 
Symptom Y/N Comments  Symptom Y/N Comments Fever/Chills    Chest Pain Poor Appetite    Edema Cough    Abdominal Pain Sputum    Joint Pain SOB/ROY    Pruritis/Rash Nausea/vomit    Tolerating PT/OT Diarrhea    Tolerating Diet Constipation    Other Could NOT obtain due to: intubated Objective: VITALS:  
Last 24hrs VS reviewed since prior progress note. Most recent are: 
Patient Vitals for the past 24 hrs: 
 Temp Pulse Resp BP SpO2  
09/24/18 1330 - 81 20 119/51 97 % 09/24/18 1200 99 °F (37.2 °C) 78 19 110/57 95 % 09/24/18 1111 - 84 22 - 97 % 09/24/18 1100 - 89 18 138/59 100 % 09/24/18 1000 - 84 16 108/52 95 % 09/24/18 0900 - 90 17 118/50 93 % 09/24/18 0800 98.3 °F (36.8 °C) 93 18 124/48 100 % 09/24/18 0730 - 81 19 - 100 % 09/24/18 0722 98.3 °F (36.8 °C) 85 16 119/48 98 %  
09/24/18 0700 - 83 17 119/54 100 % 09/24/18 0600 - 89 18 120/50 99 % 09/24/18 0500 - 86 17 116/56 97 % 09/24/18 0400 - 89 18 119/49 97 % 09/24/18 0328 - 85 16 - (!) 10 % 09/24/18 0326 - - - - 100 % 09/24/18 0300 - 85 16 110/50 99 % 09/24/18 0200 - 84 19 104/48 100 % 09/24/18 0100 - 84 19 120/50 98 %  
09/24/18 0004 - 91 19 - 99 % 09/24/18 0002 - - - - 99 % 09/24/18 0000 - 84 18 103/55 99 % 09/23/18 2340 99.3 °F (37.4 °C) - - - -  
09/23/18 2300 - 87 20 103/51 99 % 09/23/18 2200 - 88 18 124/52 98 %  
09/23/18 2100 - 92 17 141/52 100 % 09/23/18 2011 - 82 17 - 98 %  
09/23/18 2009 - - - - 98 %  
09/23/18 2000 99.5 °F (37.5 °C) 88 20 131/51 99 % 09/23/18 1800 - 86 20 125/46 96 %  
09/23/18 1700 - 85 18 118/57 99 % 09/23/18 1630 98.9 °F (37.2 °C) - - - -  
09/23/18 1600 - 82 18 - 99 % 09/23/18 1543 - 85 21 - 99 % 09/23/18 1500 - 86 18 125/54 98 % Intake/Output Summary (Last 24 hours) at 09/24/18 1414 Last data filed at 09/24/18 1330 Gross per 24 hour Intake          2753.17 ml Output             3780 ml Net         -1026.83 ml PHYSICAL EXAM: 
General: WD, WN. Intubated. No distress. EENT:   Anicteric sclerae. MMM;  ET tube in place. Resp:  Decreased breath sounds. Clear anteriorly. No rhonchi. No accessory muscle use CV:  Regular  rhythm,  1+ bilateral leg edema GI:  Soft, Non distended, Non tender.  +Bowel sounds Neurologic:  Sedated. No posturing. Psych:   No agitation Skin:  No rashes. No jaundice Reviewed most current lab test results and cultures  YES Reviewed most current radiology test results   YES Review and summation of old records today    NO Reviewed patient's current orders and MAR    YES 
PMH/SH reviewed - no change compared to H&P 
________________________________________________________________________ Care Plan discussed with: 
  Comments Patient Family RN x Care Manager Consultant Multidiciplinary team rounds were held today with , nursing, pharmacist and clinical coordinator. Patient's plan of care was discussed; medications were reviewed and discharge planning was addressed. ________________________________________________________________________ Total NON critical care TIME:  25  Minutes Total CRITICAL CARE TIME Spent:   Minutes non procedure based Comments >50% of visit spent in counseling and coordination of care    
________________________________________________________________________ James Rucker MD  
 
Procedures: see electronic medical records for all procedures/Xrays and details which were not copied into this note but were reviewed prior to creation of Plan. LABS: 
I reviewed today's most current labs and imaging studies. Pertinent labs include: 
Recent Labs  
   09/23/18 0248 09/22/18 
 7772 WBC  11.5*  11.7* HGB  7.7*  7.9*  
HCT  26.0*  26.9*  
PLT  142*  138* Recent Labs  
   09/24/18 
 0417  09/23/18 0248  09/22/18 
 1134 NA  151*  149*  150*  
K  3.5  3.8  4.3 CL  111*  113*  117* CO2  37*  34*  29 GLU  139*  133*  144* BUN  35*  33*  30* CREA  1.14  1.10  0.99 CA  8.4*  8.2*  8.0*  
MG  2.5*   --    --   
PHOS  2.4*   --    --   
ALB  1.9*  1.9*  2.0*  
TBILI  0.4  0.5  0.5 SGOT  240*  175*  139* ALT  166*  116*  83* INR  1.1  1.1  1.1 Signed: Odilon Coleman MD

## 2018-09-24 NOTE — PROGRESS NOTES
1510  Bedside and verbal report from Chitra Pathak RN. 
1600  Assessment completed; agree with documented assessment [phone] from Chitra Pathak RN. Family members at bedside. 1800  Vitals stable. ETT suctioned for large amount thick tan secretions. 1900  Bedside and Verbal shift change report given to Ivone Verma RN (oncoming nurse) by Alo Tuttle RN (offgoing nurse). Report included the following information SBAR, Kardex, Intake/Output, MAR, Accordion, Recent Results, Med Rec Status, Cardiac Rhythm atrial fib/flutter and Alarm Parameters .

## 2018-09-24 NOTE — PROGRESS NOTES
Critical care interdisciplinary rounds held on [id number]. Following members present, Pharmacy, Diabetes Treatment, Case Management, Occupational Therapy, Physical Therapy, Pastoral Care  and Nutrition. Plan of care discussed. See clinical pathway fo plan of care and interventions and desired outcomes.

## 2018-09-24 NOTE — PROGRESS NOTES
Per Dr. Abhishek Pathak, advanced patient ET Tube 2cm. Tube was 22@ lips and is now 24@ lips. Will continue to monitor patient Johanne Gaona, RRT

## 2018-09-25 ENCOUNTER — APPOINTMENT (OUTPATIENT)
Dept: CT IMAGING | Age: 83
DRG: 003 | End: 2018-09-25
Attending: INTERNAL MEDICINE
Payer: MEDICARE

## 2018-09-25 ENCOUNTER — APPOINTMENT (OUTPATIENT)
Dept: GENERAL RADIOLOGY | Age: 83
DRG: 003 | End: 2018-09-25
Attending: INTERNAL MEDICINE
Payer: MEDICARE

## 2018-09-25 LAB
ALBUMIN SERPL-MCNC: 1.9 G/DL (ref 3.5–5)
ALBUMIN/GLOB SERPL: 0.4 {RATIO} (ref 1.1–2.2)
ALP SERPL-CCNC: 149 U/L (ref 45–117)
ALT SERPL-CCNC: 171 U/L (ref 12–78)
ANION GAP SERPL CALC-SCNC: 4 MMOL/L (ref 5–15)
APTT PPP: 26.1 SEC (ref 22.1–32)
AST SERPL-CCNC: 215 U/L (ref 15–37)
BILIRUB SERPL-MCNC: 0.5 MG/DL (ref 0.2–1)
BUN SERPL-MCNC: 32 MG/DL (ref 6–20)
BUN/CREAT SERPL: 34 (ref 12–20)
CALCIUM SERPL-MCNC: 8.4 MG/DL (ref 8.5–10.1)
CHLORIDE SERPL-SCNC: 111 MMOL/L (ref 97–108)
CO2 SERPL-SCNC: 35 MMOL/L (ref 21–32)
CREAT SERPL-MCNC: 0.95 MG/DL (ref 0.7–1.3)
ERYTHROCYTE [DISTWIDTH] IN BLOOD BY AUTOMATED COUNT: 20.4 % (ref 11.5–14.5)
FIBRINOGEN PPP-MCNC: >800 MG/DL (ref 200–475)
GLOBULIN SER CALC-MCNC: 5.3 G/DL (ref 2–4)
GLUCOSE SERPL-MCNC: 123 MG/DL (ref 65–100)
HCT VFR BLD AUTO: 25.9 % (ref 36.6–50.3)
HGB BLD-MCNC: 7.6 G/DL (ref 12.1–17)
INR PPP: 1.1 (ref 0.9–1.1)
MCH RBC QN AUTO: 33 PG (ref 26–34)
MCHC RBC AUTO-ENTMCNC: 29.3 G/DL (ref 30–36.5)
MCV RBC AUTO: 112.6 FL (ref 80–99)
NRBC # BLD: 0 K/UL (ref 0–0.01)
NRBC BLD-RTO: 0 PER 100 WBC
PHOSPHATE SERPL-MCNC: 2.3 MG/DL (ref 2.6–4.7)
PLATELET # BLD AUTO: 174 K/UL (ref 150–400)
PMV BLD AUTO: 11.8 FL (ref 8.9–12.9)
POTASSIUM SERPL-SCNC: 3.7 MMOL/L (ref 3.5–5.1)
PROT SERPL-MCNC: 7.2 G/DL (ref 6.4–8.2)
PROTHROMBIN TIME: 11.4 SEC (ref 9–11.1)
RBC # BLD AUTO: 2.3 M/UL (ref 4.1–5.7)
SODIUM SERPL-SCNC: 150 MMOL/L (ref 136–145)
THERAPEUTIC RANGE,PTTT: ABNORMAL SECS (ref 58–77)
WBC # BLD AUTO: 9.4 K/UL (ref 4.1–11.1)

## 2018-09-25 PROCEDURE — 94640 AIRWAY INHALATION TREATMENT: CPT

## 2018-09-25 PROCEDURE — 36415 COLL VENOUS BLD VENIPUNCTURE: CPT | Performed by: INTERNAL MEDICINE

## 2018-09-25 PROCEDURE — 85610 PROTHROMBIN TIME: CPT | Performed by: INTERNAL MEDICINE

## 2018-09-25 PROCEDURE — 74011000250 HC RX REV CODE- 250: Performed by: INTERNAL MEDICINE

## 2018-09-25 PROCEDURE — 84100 ASSAY OF PHOSPHORUS: CPT | Performed by: INTERNAL MEDICINE

## 2018-09-25 PROCEDURE — 80053 COMPREHEN METABOLIC PANEL: CPT | Performed by: INTERNAL MEDICINE

## 2018-09-25 PROCEDURE — 65620000000 HC RM CCU GENERAL

## 2018-09-25 PROCEDURE — 74011250636 HC RX REV CODE- 250/636: Performed by: INTERNAL MEDICINE

## 2018-09-25 PROCEDURE — 71045 X-RAY EXAM CHEST 1 VIEW: CPT

## 2018-09-25 PROCEDURE — 94003 VENT MGMT INPAT SUBQ DAY: CPT

## 2018-09-25 PROCEDURE — 71250 CT THORAX DX C-: CPT

## 2018-09-25 PROCEDURE — 85027 COMPLETE CBC AUTOMATED: CPT | Performed by: INTERNAL MEDICINE

## 2018-09-25 PROCEDURE — 74011000258 HC RX REV CODE- 258: Performed by: INTERNAL MEDICINE

## 2018-09-25 PROCEDURE — 77030018798 HC PMP KT ENTRL FED COVD -A

## 2018-09-25 PROCEDURE — 70450 CT HEAD/BRAIN W/O DYE: CPT

## 2018-09-25 PROCEDURE — 76450000000

## 2018-09-25 RX ADMIN — FAMOTIDINE 20 MG: 10 INJECTION, SOLUTION INTRAVENOUS at 21:12

## 2018-09-25 RX ADMIN — SODIUM CHLORIDE: 900 INJECTION, SOLUTION INTRAVENOUS at 11:52

## 2018-09-25 RX ADMIN — Medication 10 ML: at 13:28

## 2018-09-25 RX ADMIN — ALBUTEROL SULFATE 2.5 MG: 2.5 SOLUTION RESPIRATORY (INHALATION) at 11:48

## 2018-09-25 RX ADMIN — CHLORHEXIDINE GLUCONATE 15 ML: 1.2 RINSE ORAL at 21:12

## 2018-09-25 RX ADMIN — ALBUTEROL SULFATE 2.5 MG: 2.5 SOLUTION RESPIRATORY (INHALATION) at 07:29

## 2018-09-25 RX ADMIN — Medication 10 ML: at 21:12

## 2018-09-25 RX ADMIN — DORZOLAMIDE HYDROCHLORIDE AND TIMOLOL MALEATE 1 DROP: 20; 5 SOLUTION/ DROPS OPHTHALMIC at 10:34

## 2018-09-25 RX ADMIN — ALBUTEROL SULFATE 2.5 MG: 2.5 SOLUTION RESPIRATORY (INHALATION) at 03:02

## 2018-09-25 RX ADMIN — FAMOTIDINE 20 MG: 10 INJECTION, SOLUTION INTRAVENOUS at 08:37

## 2018-09-25 RX ADMIN — DORZOLAMIDE HYDROCHLORIDE AND TIMOLOL MALEATE 1 DROP: 20; 5 SOLUTION/ DROPS OPHTHALMIC at 17:51

## 2018-09-25 RX ADMIN — Medication 10 ML: at 05:17

## 2018-09-25 RX ADMIN — ALBUTEROL SULFATE 2.5 MG: 2.5 SOLUTION RESPIRATORY (INHALATION) at 19:11

## 2018-09-25 RX ADMIN — LATANOPROST 1 DROP: 50 SOLUTION OPHTHALMIC at 21:13

## 2018-09-25 RX ADMIN — ALBUTEROL SULFATE 2.5 MG: 2.5 SOLUTION RESPIRATORY (INHALATION) at 16:15

## 2018-09-25 RX ADMIN — SODIUM CHLORIDE 50 ML/HR: 450 INJECTION, SOLUTION INTRAVENOUS at 15:34

## 2018-09-25 RX ADMIN — CHLORHEXIDINE GLUCONATE 15 ML: 1.2 RINSE ORAL at 08:37

## 2018-09-25 NOTE — PROGRESS NOTES
Dr. Richard Arshad in to see patient. Tube feedings turned off at this time and patient placed with [facility] down to make sure that patient can tolerate head of bed down, so that patient can have CT scan today. 1627 Patient to CT scan at this time on portable vent and monitor. 2385 Patient back to room from CT scan. 1200 Family in to visit with patient. 1600 No changes. Dr. Richard Arshad in to talk with family. 1900 No changes. Report to Maria D Avendaño RN.

## 2018-09-25 NOTE — PROGRESS NOTES
Hospitalist Progress Note NAME: Edgardo Mace  
:  1934 MRN:  [id number] Assessment / Plan: PEA arrest  Code blue called . Patient w/ agonal breathing after coming back from radiology after arteriogram completed. No pulse detected. CPR initiated. Epi x 2 given. Pulse obtained. Intubated by Dr. Windy Mcneill. Much bloody secretions obtained. Transferred to ICU. -head CT  showed small age-indeterminate infarct in the left corona radiata. If there is concern for acute ischemia, consider MRI for further evaluation. Acute respiratory w/ hypoxia, s/p cardiac arrest 
Sepsis due to suspected PNA w/ possible pulmonary hemorrhage 
-CT of chest shows severe emphysema with the right basilar airspace disease in the oval mass with fluid level concerning for internal hemorrhage versus pneumonia. -repeat CT  showed persistent hematoma in the right lower lobe region with surrounding patchy 
atelectasis/infiltrate and small ipsilateral pleural effusion. Left basilar subsegmental atelectasis. Coils in right lower lobe region consistent with recent pulmonary artery branch embolization. 
-Bcx Neg, sputum cytology collected  negative except scant yeast. 
-completed zosyn for possible lung abscess. End date  
-cytology from bronchial washing on  is atypical, favor benign reactive process 
-off pressors, on TF 
-vent management per pulm Hemoptysis 
-noted to have bleeding from RLL on bronchoscopy. -work up for possible rheumatologic cause neg thus far: 
ANCA, anti-GBM, MPO ab, MO-3 ab, SSA/SSB, RNP ab, Arredondo/RNP, dsDNA, and Arredondo ab all negative. JOSS +. 
-Arteriogram : Thoracic aortic and intercostal arteriograms as described above demonstrating no 
enlarged bronchial artery supplying the right lower lobe of the lung. A normal 
appearing and normal sized right bronchial artery is seen without any 
hyperplasia or dominant right lower lobe supply.  No embolization was performed. RLL mass  
-work up for [employer] neg thus far. -diagnostic bronch done 9/16. No endobronchial lesions seen. -CTA chest done 9/17 due to persistent bloody pulmonary secretions. Right lower lobe masslike abnormality demonstrates increased internal density 
and has increased in size measuring 6.7 x 6.7 cm, compared to 5.7 x 4.4 cm. This 
is compatible with internal hemorrhage. There is new moderate right lower lobe 
partial collapse/atelectasis.   
Normal sized right bronchial artery is visualized without any hyperplasia or or evidence for supply to the right lower lobe lung abnormality. It is unlikely that the hemoptysis related to bronchial arterial injury/supply. Prior bronchial arteriogram was unremarkable. No evidence for pulmonary artery pseudoaneurysm or active hemorrhage. However, it is more likely that the masslike abnormality in the right lower lobe the lung has eroded into an adjacent pulmonary artery then a bronchial artery. Therefore, plan is for pulmonary arteriogram with possible embolization if an abnormality is seen in the right lower lobe. 6 mm and 4 mm right lung nodules. Small bilateral pleural effusions. Minimally enlarged bilateral hilar and mediastinal lymph nodes Hypernatremia 
-on 1/2NS. Add free water. monitor lab Acute blood loss anemia on admission 
-s/p 3 units PRBC so far 
-serial H&H, transfuse prn 
 
H/o atrial fibrillation on chronic anticoagulation w/ coumadin 
-amiodarone on hold 
-cardizem on hold due to borderline low bp 
-hold coumadin due to hemoptysis Coumadin-induced coagulopathy Elevated LFT 
-INR 1.1. Holding coumadin. 
-monitor LFT ALAN - likely prerenal related to Lasix. Renal function stable. 
-avoid nephrotoxic agents. UTI, acute cystitis w/ hematuria, POA 
-on abx as above. No Ucx sent on admission COPD - stable HLD 
-stop pravachol Obesity - BMI 32 Code: Full DVT prophylaxis: SCDs GI prophylaxis: PPI  
 
 Subjective: Chief Complaint / Reason for Physician Visit Follow up for hemoptysis. Remains intubated. No acute changes. Discussed with RN events overnight. Review of Systems: 
Symptom Y/N Comments  Symptom Y/N Comments Fever/Chills    Chest Pain Poor Appetite    Edema Cough    Abdominal Pain Sputum    Joint Pain SOB/ROY    Pruritis/Rash Nausea/vomit    Tolerating PT/OT Diarrhea    Tolerating Diet Constipation    Other Could NOT obtain due to: intubated Objective: VITALS:  
Last 24hrs VS reviewed since prior progress note. Most recent are: 
Patient Vitals for the past 24 hrs: 
 Temp Pulse Resp BP SpO2  
09/25/18 1200 99.5 °F (37.5 °C) 79 19 120/55 98 %  
09/25/18 1148 - 82 17 - 97 % 09/25/18 1100 - 80 18 120/60 98 %  
09/25/18 1000 - 80 19 104/50 96 %  
09/25/18 0900 - 80 20 118/57 98 %  
09/25/18 0800 99.8 °F (37.7 °C) 80 19 120/51 96 %  
09/25/18 0730 - 78 21 - 96 %  
09/25/18 0700 - 85 21 119/53 96 %  
09/25/18 0600 - 85 22 123/57 96 %  
09/25/18 0500 - 87 26 120/57 92 %  
09/25/18 0400 98.9 °F (37.2 °C) 83 19 113/52 96 %  
09/25/18 0302 - - - - 96 %  
09/25/18 0254 - 81 21 - 96 %  
09/25/18 0200 - 80 23 140/69 -  
09/25/18 0100 - 81 20 120/48 97 % 09/25/18 0000 98.9 °F (37.2 °C) 81 24 120/48 96 %  
09/24/18 2300 - 84 19 135/57 96 %  
09/24/18 2200 - 80 22 123/52 97 % 09/24/18 2100 - 83 22 118/50 96 %  
09/24/18 2000 99.5 °F (37.5 °C) 87 21 118/45 97 % 09/24/18 1930 - 83 20 - 97 % 09/24/18 1900 - 83 20 120/44 96 %  
09/24/18 1800 - 85 20 116/47 96 %  
09/24/18 1700 - 84 21 114/52 97 % 09/24/18 1613 - 81 22 - 99 % 09/24/18 1600 98.9 °F (37.2 °C) 80 19 111/44 97 % 09/24/18 1541 - 81 17 - 97 % 09/24/18 1519 - 81 16 - 97 % 09/24/18 1500 - 79 20 110/57 97 % 09/24/18 1400 - 78 19 114/50 98 %  
09/24/18 1330 - 81 20 119/51 97 % Intake/Output Summary (Last 24 hours) at 09/25/18 1322 Last data filed at 09/25/18 1200 Gross per 24 hour Intake             2835 ml Output             1502 ml Net             1333 ml PHYSICAL EXAM: 
General: WD, WN. Intubated. No distress. EENT:   Anicteric sclerae. MMM;  ET tube in place. Resp:  Decreased breath sounds. Clear anteriorly. No rhonchi. No accessory muscle use CV:  Regular  rhythm,  1+ bilateral leg edema GI:  Soft, Non distended, Non tender.  +Bowel sounds Neurologic:  Sedated. No posturing. Psych:   No agitation Skin:  No rashes. No jaundice Reviewed most current lab test results and cultures  YES Reviewed most current radiology test results   YES Review and summation of old records today    NO Reviewed patient's current orders and MAR    YES 
PMH/SH reviewed - no change compared to H&P 
________________________________________________________________________ Care Plan discussed with: 
  Comments Patient Family  x daughter RN x Care Manager Consultant Multidiciplinary team rounds were held today with , nursing, pharmacist and clinical coordinator. Patient's plan of care was discussed; medications were reviewed and discharge planning was addressed. ________________________________________________________________________ Total NON critical care TIME:  25  Minutes Total CRITICAL CARE TIME Spent:   Minutes non procedure based Comments >50% of visit spent in counseling and coordination of care    
________________________________________________________________________ Yumiko Florian MD  
 
Procedures: see electronic medical records for all procedures/Xrays and details which were not copied into this note but were reviewed prior to creation of Plan. LABS: 
I reviewed today's most current labs and imaging studies. Pertinent labs include: 
Recent Labs  
   09/25/18 
 0300  09/23/18 
 0248 WBC  9.4  11.5* HGB  7.6*  7.7* HCT  25.9*  26.0*  
PLT  174  142* Recent Labs  
   09/25/18 
 0300  09/24/18 8869  09/23/18 
 9412 NA  150*  151*  149*  
K  3.7  3.5  3.8 CL  111*  111*  113* CO2  35*  37*  34* GLU  123*  139*  133* BUN  32*  35*  33* CREA  0.95  1.14  1.10 CA  8.4*  8.4*  8.2* MG   --   2.5*   --   
PHOS  2.3*  2.4*   --   
ALB  1.9*  1.9*  1.9* TBILI  0.5  0.4  0.5 SGOT  215*  240*  175* ALT  171*  166*  116* INR  1.1  1.1  1.1 Signed: Milena Pritchard MD

## 2018-09-25 NOTE — PROGRESS NOTES
[facility] Pul[facility] Consult Service NotePulmonary, Critical Care, and Sleep Medicine Name: Kvng Zamorano MRN: [id number] : 1934 Hospital: [facility] Date: 2018   Hospital Day: 13  
 
 old blood in ETT. Propofol has been stopped as of this AM. Unresponsive. Anasarca. D/w nursing at bedside. No fever. On TF. Overton was clogged, flushed and now patent.  good diuresis. Weak. Intermittently responds. No new hemoptysis on vent. K 3.8. LFTs up. Normal Bili 
 
 BUN/ Cr slightly up  After good diuresis. Weak. Intermittently responds. Thick secretions. No new hemoptysis on vent. K 3.5. LFTs up. Normal Bili. Hgb 7.5 
 
 onvent. Willopen eyes, moves fingers and toes. No more hemoptysis. IMPRESSION:  
1. Acute respiratory failure-on vent- not ready to liberate- would like to see imrpoved neurostatus 2. IR performed embolization of RLL pulmonary artery 3. S/p PEA arrest post anigo  pm. Had 6 min of CPR and then ROSC. 4. Acute hemoptysis- Noted to have bleeding from RLL on Bronch. No endobronchial lesion. has lung mass(CA vs inflammatory pseudotumor) and microscopic hematuria; ANCA negative; JOSS barely positive. Suspect LUNG mass the culprit. Bronch will not help; 
5. HYpernatremia 6. Volume overload, anasarca 7. encehalopathy multifactorial - ssomnolent but arousable- has hearingl oss as well 8. Head CT small possible left CVA but age ? ? Significant and relevance? ? No bleeed which is good; following commands and moves fingers, toes 9. Elevated procalcitonin- treating for possible lung abscess/pseudotumor 10. Increasing LFTs- no worse 11. Lung mass RLL- right lung base- would not be visible on conventional bronch and yield from transbronchial biopsy not guaranteed; had no lesion on chest Ct 2016; cytology x 2 negative for malignant cells; repeat chest CT scan  no reall change though slightly smaller?  Has small effusion as well 12. Coagulopathy from warfarin per , hold anticoagulation for now. 13. Acute kidney injury likely prerenal and related to medications (Lasix) Baseline creatinine is around 0.9- no worse 14. Hematuria normal serologies 15. Enechalopathy- High NA not helping 16. Volume overload- over 10 liters positive, was on Lasix at home; anasarca 17. Sepsis due to suspected pneumonia. Patient reports having exertional shortness of breath along with cough and hemoptysis; CT of chest shows severe emphysema with the right basilar airspace disease in the oval mass with fluid level concerning for internal hemorrhage versus pneumonia ;  
18. 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  
19. Anemia 20. Former 50+ pack yr smoker quit in 2015? 21. Asbestos exposure 22. History of atrial fibrillation on anticoagulation with warfarin 23. Hx of CAD s/p PCI in the past home amiodarone. Hold Cardizem 24. Dyslipidemia 25. Multiorgan dysfunction as outlined above: Pt has one or more acute or chronic illnesses with severe exacerbation with progression or side effects of treatment that poses a threat to life or bodily function 26. Additional workup outlined below 27. Pt is requiring Drug therapy requiring intensive monitoring for toxicity 28. Pt is unstable, unpredictable needing PCU monitoring; is critically ill and at high risk of sudden decline and decompensation with life threatening consequenses and continued end organ dysfunction and failure 29. Prognosis guarded with significant risk of sudden decline 30. Critically ill, 35 min cc, EOP. Discussed with nurse this am. High risk of decompensation. RECOMMENDATIONS/PLAN:  
1. Full vent support, not ready for SBT 2. CT head and chest reviewed and discussed with nursing 3. Frequent neuro exams 4. Held diuresis for 24 hours, may resume tonight? 5. Follow renal function- d/w wife on phone this AM 
6. Stop propofol 7. Stop pravachol 8. Enteral feedings 9. Add free water, follow Na 
10. Follow neuro status, 
11. Transfuse prn to keep Hgb > 7 
12. Hold anticoagulation for now. SCDS. 13. off pressors 14. Empiric abx to treat possible lung abscess 15. Follow cultures 16. Labs to follow electrolytes, renal function and and blood counts 17. Glucose monitoring and SSI 18. Bronchial hygiene with respiratory therapy techniques, bronchodilators 19. DVT, SUP prophylaxis 20. Pt needs IV fluids with additives and Drug therapy requiring intensive monitoring for toxicity 21. Prescription drug management with home med reconciliation reviewed My assessment/management discussed with: Consultants, Nursing, Pharmacy, Case Management, PT, OT, Respiratory Therapy, Hospitalist and Family for coordination of care Pt's condition is acute and unstable requiring inpatient hospitalization. This care involved high complexity decision making which includes independently reviewing the patient's past medical records, current laboratory results, medication profiles that were immediately available to me and actual Xray images at the bedside in order to assess, support vital system function, and to treat this degree of vital organ system failure, and to prevent further deterioration of the patients condition. Risk of deterioration: high  
[x] High complexity decision making was performed [x] See my orders for details Tubes:    
 
Subjective/Initial History: S/P pulmonary artery embolization No acute events overnight Still on full vent support MAR reviewed and pertinent medications noted or modified as needed Current Facility-Administered Medications Medication  fentaNYL citrate (PF) injection 25-50 mcg  
 0.45% sodium chloride infusion  famotidine (PF) (PEPCID) 20 mg in sodium chloride 0.9% 10 mL injection  0.9% sodium chloride infusion 250 mL  chlorhexidine (PERIDEX) 0.12 % mouthwash 15 mL  albuterol (PROVENTIL VENTOLIN) nebulizer solution 2.5 mg  
 fentaNYL citrate (PF) injection 25 mcg  albuterol (PROVENTIL HFA, VENTOLIN HFA, PROAIR HFA) inhaler 2 Puff  dorzolamide-timolol (COSOPT) 22.3-6.8 mg/mL ophthalmic solution 1 Drop  latanoprost (XALATAN) 0.005 % ophthalmic solution 1 Drop  sodium chloride (NS) flush 5-10 mL  sodium chloride (NS) flush 5-10 mL  acetaminophen (TYLENOL) tablet 650 mg  
 ondansetron (ZOFRAN) injection 4 mg  docusate sodium (COLACE) capsule 100 mg  
  
 
  
ROS:A comprehensive review of systems was negative except for that written in the HPI. Objective: 
 
Vital Signs: Telemetry:    normal sinus rhythmIntake/Output:  
Visit Vitals  /57  Pulse 78  Temp 98.9 °F (37.2 °C)  Resp 21  
 Ht 6' 2\" (1.88 m)  Wt 111.4 kg (245 lb 9.5 oz)  SpO2 96%  BMI 31.53 kg/m2 Temp (24hrs), Av.9 °F (37.2 °C), Min:98.3 °F (36.8 °C), Max:99.5 °F (37.5 °C) O2 Device: Endotracheal tube, Ventilator O2 Flow Rate (L/min): 5 l/min Wt Readings from Last 4 Encounters:  
18 111.4 kg (245 lb 9.5 oz) 16 111.1 kg (245 lb)  
16 106.6 kg (235 lb) 04/08/15 103.4 kg (228 lb) Intake/Output Summary (Last 24 hours) at 18 2214 Last data filed at 18 0400 Gross per 24 hour Intake          3144.17 ml Output             1730 ml Net          1414.17 ml Last shift:        
Last 3 shifts:  1901 -  0700 In: 4274.2 [I.V.:1564.2] Out: 2552 [Urine:3785; Drains:100] Physical Exam:  
 General:   male; with Et tube in place. Has right IJ CVC. On sedation and on vent. HEAD: Normocephalic, without obvious abnormality, atraumatic EYES: conjunctivae clear. PERRL,  AN Icteric sclerae  NOSE: nares normal, no drainage, no nasal flaring,  
 THROAT: normal; Lips, mucosa dry; No Thrush;  crowded airway; tongue midline Neck: Supple, symmetrical, trachea midline,  No accessory mm use; No Stridor/ cuff leak, No goiter or thyroid tenderness LYMPH: No abnormally enlarged lymph nodes. in neck or groin Chest: increased AP diameter Lungs: agonal, bilateral rhonchi. Seems to have good air  Movement Bilaterally. Heart: Regular rate and rhythm; NO edema Abdomen: soft, non-tender, without masses or organomegaly, protuberant, distended : no Overton Musculoskeletal: mild kyphosis; No spine or CVA tenderness; negative, cyanosis, clubbing; no joint swelling or erythema Neuro: was not  Responsive, had a gag with ET tube suctioning. , Sedated not able to fully assess. Psych: Not able to assess due to being on sedation and on vent. Skin: Pallor;  
 Pulses:Bilateral, Radial, 2+ Data:  
 
All lab results for the last 24 hours reviewed. Labs: 
 
Recent Labs  
   09/25/18 0300 09/24/18 0417 09/23/18 
 0248 WBC  9.4   --   11.5* HGB  7.6*   --   7.7* PLT  174   --   142* INR  1.1  1.1  1.1 APTT  26.1  26.0  26.1 Recent Labs  
   09/25/18 0300 09/24/18 0417 09/23/18 
 0248 NA  150*  151*  149*  
K  3.7  3.5  3.8 CL  111*  111*  113* CO2  35*  37*  34* GLU  123*  139*  133* BUN  32*  35*  33* CREA  0.95  1.14  1.10 CA  8.4*  8.4*  8.2* MG   --   2.5*   --   
PHOS  2.3*  2.4*   --   
ALB  1.9*  1.9*  1.9*  
SGOT  215*  240*  175* ALT  171*  166*  116* No results for input(s): PH, PCO2, PO2, HCO3, FIO2 in the last 72 hours. No results for input(s): CPK, CKNDX, TROIQ in the last 72 hours. No lab exists for component: CPKMB No results found for: BNPP, BNP Lab Results Component Value Date/Time  Culture result: NO FUNGUS ISOLATED 8 DAYS 09/16/2018 11:18 AM  
 Culture result: HEAVY NORMAL RESPIRATORY SHELDON 09/16/2018 11:15 AM  
 Culture result: LIGHT NORMAL RESPIRATORY SHELDON 09/15/2018 01:20 PM  
No results found for: TSH, TSHEXT, TSHEXT Imaging: CXR: no acute changes I have personally and independently reviewed the patients interval and diagnostic data, radiographs and have reviewed the reports. Medical Decision Making Today: · Reviewed the flowsheet and previous days notes · Reviewed and summarized records or history from previous days note or discussions with staff, family · Parenteral controlled substances - Reviewed/ Adjusted / Weaned / Started · High Risk Drug therapy requiring intensive monitoring for toxicity: eg steroids, pressors, antibiotics · Reviewed and/or ordered Clinical lab tests · Reviewed and/or ordered Radiology tests · Reviewed and/or ordered of Medicine tests · Independently visualized radiologic Images · I have personally reviewed the patients ECG / Telemetry Odalis Ruff MD

## 2018-09-25 NOTE — PROGRESS NOTES
Nutrition:  Chart reviewed, case discussed during CCU rounds. Pt on TF at goal rate, Na remains elevated. Per discussion with Dr. Luba Dias, will increase 250mL H2O flushes to q 2h (TF + water flushes will provide 3756mL free water daily). Will continue to monitor Na level daily. Ruslan Glasgow RD, [facility] Pager [phone]

## 2018-09-25 NOTE — PROGRESS NOTES
Interdisciplinary team rounds were held 9/25/2018 with the following team members:Care Management, Diabetes Treatment Specialist, Nursing, Nutrition, Palliative Care, Pastoral Care, Pharmacy, Physician, respiratory therapy and Clinical Coordinator. Plan of care discussed. Goal: See MD orders and progress notes for further  interventions and desired outcomes.

## 2018-09-25 NOTE — PROGRESS NOTES
1900  
Bedside report received from Ethan Lainez, Jen of care discussed 0400 Total bath given, linens changed. flexiseal care done 7191 Tube feeding turned off. Patient having bronchoscopy scheduled for today  
0700 Bedside report given to 1401 Jen Cross of care discussed

## 2018-09-26 ENCOUNTER — APPOINTMENT (OUTPATIENT)
Dept: GENERAL RADIOLOGY | Age: 83
DRG: 003 | End: 2018-09-26
Attending: INTERNAL MEDICINE
Payer: MEDICARE

## 2018-09-26 LAB
ALBUMIN SERPL-MCNC: 1.9 G/DL (ref 3.5–5)
ALBUMIN/GLOB SERPL: 0.4 {RATIO} (ref 1.1–2.2)
ALP SERPL-CCNC: 137 U/L (ref 45–117)
ALT SERPL-CCNC: 139 U/L (ref 12–78)
ANION GAP SERPL CALC-SCNC: 3 MMOL/L (ref 5–15)
APTT PPP: 26.1 SEC (ref 22.1–32)
AST SERPL-CCNC: 133 U/L (ref 15–37)
BILIRUB SERPL-MCNC: 0.4 MG/DL (ref 0.2–1)
BUN SERPL-MCNC: 29 MG/DL (ref 6–20)
BUN/CREAT SERPL: 31 (ref 12–20)
CALCIUM SERPL-MCNC: 8.2 MG/DL (ref 8.5–10.1)
CHLORIDE SERPL-SCNC: 108 MMOL/L (ref 97–108)
CO2 SERPL-SCNC: 34 MMOL/L (ref 21–32)
CREAT SERPL-MCNC: 0.95 MG/DL (ref 0.7–1.3)
FIBRINOGEN PPP-MCNC: >800 MG/DL (ref 200–475)
GLOBULIN SER CALC-MCNC: 5 G/DL (ref 2–4)
GLUCOSE SERPL-MCNC: 117 MG/DL (ref 65–100)
INR PPP: 1.1 (ref 0.9–1.1)
PHOSPHATE SERPL-MCNC: 2.8 MG/DL (ref 2.6–4.7)
POTASSIUM SERPL-SCNC: 3.8 MMOL/L (ref 3.5–5.1)
PROT SERPL-MCNC: 6.9 G/DL (ref 6.4–8.2)
PROTHROMBIN TIME: 11.5 SEC (ref 9–11.1)
SODIUM SERPL-SCNC: 145 MMOL/L (ref 136–145)
THERAPEUTIC RANGE,PTTT: ABNORMAL SECS (ref 58–77)

## 2018-09-26 PROCEDURE — 0BJ08ZZ INSPECTION OF TRACHEOBRONCHIAL TREE, VIA NATURAL OR ARTIFICIAL OPENING ENDOSCOPIC: ICD-10-PCS | Performed by: INTERNAL MEDICINE

## 2018-09-26 PROCEDURE — 77030018798 HC PMP KT ENTRL FED COVD -A

## 2018-09-26 PROCEDURE — 71045 X-RAY EXAM CHEST 1 VIEW: CPT

## 2018-09-26 PROCEDURE — 80053 COMPREHEN METABOLIC PANEL: CPT | Performed by: INTERNAL MEDICINE

## 2018-09-26 PROCEDURE — 74011250636 HC RX REV CODE- 250/636: Performed by: INTERNAL MEDICINE

## 2018-09-26 PROCEDURE — 76010000379 HC BRONCHOSCOPY DIAG/THERAPEUTIC

## 2018-09-26 PROCEDURE — 74011000258 HC RX REV CODE- 258: Performed by: INTERNAL MEDICINE

## 2018-09-26 PROCEDURE — 94003 VENT MGMT INPAT SUBQ DAY: CPT

## 2018-09-26 PROCEDURE — 36415 COLL VENOUS BLD VENIPUNCTURE: CPT | Performed by: INTERNAL MEDICINE

## 2018-09-26 PROCEDURE — 74011250637 HC RX REV CODE- 250/637: Performed by: INTERNAL MEDICINE

## 2018-09-26 PROCEDURE — 85610 PROTHROMBIN TIME: CPT | Performed by: INTERNAL MEDICINE

## 2018-09-26 PROCEDURE — 94640 AIRWAY INHALATION TREATMENT: CPT

## 2018-09-26 PROCEDURE — 84100 ASSAY OF PHOSPHORUS: CPT | Performed by: INTERNAL MEDICINE

## 2018-09-26 PROCEDURE — 65620000000 HC RM CCU GENERAL

## 2018-09-26 PROCEDURE — 74011000250 HC RX REV CODE- 250: Performed by: INTERNAL MEDICINE

## 2018-09-26 RX ORDER — SCOLOPAMINE TRANSDERMAL SYSTEM 1 MG/1
1 PATCH, EXTENDED RELEASE TRANSDERMAL
Status: DISCONTINUED | OUTPATIENT
Start: 2018-09-26 | End: 2018-10-02

## 2018-09-26 RX ORDER — FUROSEMIDE 10 MG/ML
40 INJECTION INTRAMUSCULAR; INTRAVENOUS DAILY
Status: DISCONTINUED | OUTPATIENT
Start: 2018-09-26 | End: 2018-09-28

## 2018-09-26 RX ADMIN — FAMOTIDINE 20 MG: 10 INJECTION, SOLUTION INTRAVENOUS at 21:03

## 2018-09-26 RX ADMIN — ACETAMINOPHEN 650 MG: 325 TABLET ORAL at 23:48

## 2018-09-26 RX ADMIN — DORZOLAMIDE HYDROCHLORIDE AND TIMOLOL MALEATE 1 DROP: 20; 5 SOLUTION/ DROPS OPHTHALMIC at 19:37

## 2018-09-26 RX ADMIN — ALBUTEROL SULFATE 2.5 MG: 2.5 SOLUTION RESPIRATORY (INHALATION) at 00:34

## 2018-09-26 RX ADMIN — Medication 10 ML: at 14:09

## 2018-09-26 RX ADMIN — FUROSEMIDE 40 MG: 10 INJECTION, SOLUTION INTRAMUSCULAR; INTRAVENOUS at 10:03

## 2018-09-26 RX ADMIN — ALBUTEROL SULFATE 2.5 MG: 2.5 SOLUTION RESPIRATORY (INHALATION) at 04:21

## 2018-09-26 RX ADMIN — FENTANYL CITRATE 25 MCG: 50 INJECTION, SOLUTION INTRAMUSCULAR; INTRAVENOUS at 12:03

## 2018-09-26 RX ADMIN — DORZOLAMIDE HYDROCHLORIDE AND TIMOLOL MALEATE 1 DROP: 20; 5 SOLUTION/ DROPS OPHTHALMIC at 19:36

## 2018-09-26 RX ADMIN — Medication 10 ML: at 21:01

## 2018-09-26 RX ADMIN — ALBUTEROL SULFATE 2.5 MG: 2.5 SOLUTION RESPIRATORY (INHALATION) at 12:04

## 2018-09-26 RX ADMIN — LATANOPROST 1 DROP: 50 SOLUTION OPHTHALMIC at 21:01

## 2018-09-26 RX ADMIN — FAMOTIDINE 20 MG: 10 INJECTION, SOLUTION INTRAVENOUS at 09:58

## 2018-09-26 RX ADMIN — ALBUTEROL SULFATE 2.5 MG: 2.5 SOLUTION RESPIRATORY (INHALATION) at 07:41

## 2018-09-26 RX ADMIN — SODIUM CHLORIDE 50 ML/HR: 450 INJECTION, SOLUTION INTRAVENOUS at 10:09

## 2018-09-26 RX ADMIN — CHLORHEXIDINE GLUCONATE 15 ML: 1.2 RINSE ORAL at 20:47

## 2018-09-26 RX ADMIN — Medication 10 ML: at 05:20

## 2018-09-26 RX ADMIN — ALBUTEROL SULFATE 2.5 MG: 2.5 SOLUTION RESPIRATORY (INHALATION) at 23:00

## 2018-09-26 RX ADMIN — ALBUTEROL SULFATE 2.5 MG: 2.5 SOLUTION RESPIRATORY (INHALATION) at 16:14

## 2018-09-26 RX ADMIN — CHLORHEXIDINE GLUCONATE 15 ML: 1.2 RINSE ORAL at 10:04

## 2018-09-26 RX ADMIN — ALBUTEROL SULFATE 2.5 MG: 2.5 SOLUTION RESPIRATORY (INHALATION) at 19:03

## 2018-09-26 RX ADMIN — DORZOLAMIDE HYDROCHLORIDE AND TIMOLOL MALEATE 1 DROP: 20; 5 SOLUTION/ DROPS OPHTHALMIC at 10:03

## 2018-09-26 NOTE — PROGRESS NOTES
1349 time out for Bronch 1349 scope in 
1354 scope out 1407 OGT position confirmed, tube feeds resumed at 45/hr  
1600 Patient's wife and daughters at bedside, updated about plan. 1800 Pebbles care performed. 1900 Bedside shift change report given to Cortez (oncoming nurse) Ankit(offgoing nurse). Report included the following information SBAR, Kardex, ED Summary, Procedure Summary, Intake/Output, MAR, Accordion, Recent Results, Med Rec Status, Cardiac Rhythm Afib/Aflutter and Alarm Parameters .

## 2018-09-26 NOTE — PROGRESS NOTES
[facility] Consult Service NotePulmonary, Critical Care, and Sleep Medicine Name: Jayne Jones MRN: [id number] : 1934 Hospital: [facility] Date: 2018   Hospital Day: 12  
 
 old blood in ETT. Propofol has been stopped as of this AM. Unresponsive. Anasarca. D/w nursing at bedside. No fever. On TF. Overton was clogged, flushed and now patent.  good diuresis. Weak. Intermittently responds. No new hemoptysis on vent. K 3.8. LFTs up. Normal Bili 
 
 BUN/ Cr slightly up  After good diuresis. Weak. Intermittently responds. Thick secretions. No new hemoptysis on vent. K 3.5. LFTs up. Normal Bili. Hgb 7.5 
 
 onvent. Will open eyes, moves fingers and toes. No more hemoptysis.  Creatinine slightly better. dicsussed yesterday's chest and head Ct findings with wife an daughter. Anasraca. Weak despite Na better. No more hemoptysis IMPRESSION:  
1. Acute respiratory failure-on vent- not ready to liberate- would like to see imrpoved neurostatus 2. IR performed embolization of RLL pulmonary artery  
3. S/p PEA arrest post anigo  pm. Had 6 min of CPR and then ROSC. 4. Acute hemoptysis- Noted to have bleeding from RLL on Bronch. No endobronchial lesion. has lung mass(CA vs inflammatory pseudotumor) and microscopic hematuria; ANCA negative; JOSS barely positive. Suspect LUNG mass the culprit. Bronch will not help; 
5. HYpernatremia 6. Volume overload, anasarca 7. encehalopathy multifactorial - ssomnolent but arousable- has hearingl oss as well 8. Head CT small possible left CVA but age ? ? Significant and relevance? ? No bleeed which is good; following commands and moves fingers, toes 9. Elevated procalcitonin- treating for possible lung abscess/pseudotumor 10. Increasing LFTs- better 11.  Lung mass RLL- right lung base- would not be visible on conventional bronch and yield from transbronchial biopsy not guaranteed; had no lesion on chest Ct Jan 2016; cytology x 2 negative for malignant cells; repeat chest CT scan 9/25 no reall change though slightly smaller? Has small effusion as well 12. Coagulopathy from warfarin per , hold anticoagulation for now. 13. Acute kidney injury likely prerenal and related to medications (Lasix) Baseline creatinine is around 0.9- no worse 14. Hematuria normal serologies 15. Enechalopathy- High NA not helping 16. Volume overload- over 10 liters positive, was on Lasix at home; anasarca 17. Sepsis due to suspected pneumonia. Patient reports having exertional shortness of breath along with cough and hemoptysis; CT of chest shows severe emphysema with the right basilar airspace disease in the oval mass with fluid level concerning for internal hemorrhage versus pneumonia ;  
18. 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  
19. Anemia 20. Former 50+ pack yr smoker quit in 2015? 21. Asbestos exposure 22. History of atrial fibrillation on anticoagulation with warfarin 23. Hx of CAD s/p PCI in the past home amiodarone. Hold Cardizem 24. Dyslipidemia 25. Multiorgan dysfunction as outlined above: Pt has one or more acute or chronic illnesses with severe exacerbation with progression or side effects of treatment that poses a threat to life or bodily function 26. Additional workup outlined below 27. Pt is requiring Drug therapy requiring intensive monitoring for toxicity 28. Pt is unstable, unpredictable needing PCU monitoring; is critically ill and at high risk of sudden decline and decompensation with life threatening consequenses and continued end organ dysfunction and failure 29. Prognosis guarded with significant risk of sudden decline 30. Critically ill, 35 min cc, EOP. Discussed with nurse this am. High risk of decompensation. RECOMMENDATIONS/PLAN:  
1. Full vent support,  
2. Bedside bronch-  
3. Daily SAT, SBT 4. resume diuresis 5. Follow renal function- d/w wife on phone this AM 
6. Stop pravachol due to LFTs 7. Enteral feedings 8. Add free water, follow Na 9. Follow neuro status, 
10. Transfuse prn to keep Hgb > 7 
11. Hold anticoagulation for now. SCDS. 12. off pressors 13. Empiric abx to treat possible lung abscess 14. Follow cultures 15. Labs to follow electrolytes, renal function and and blood counts 16. Glucose monitoring and SSI 17. Bronchial hygiene with respiratory therapy techniques, bronchodilators 18. DVT, SUP prophylaxis 19. Pt needs IV fluids with additives and Drug therapy requiring intensive monitoring for toxicity 20. Prescription drug management with home med reconciliation reviewed My assessment/management discussed with: Consultants, Nursing, Pharmacy, Case Management, PT, OT, Respiratory Therapy, Hospitalist and Family for coordination of care Pt's condition is acute and unstable requiring inpatient hospitalization. This care involved high complexity decision making which includes independently reviewing the patient's past medical records, current laboratory results, medication profiles that were immediately available to me and actual Xray images at the bedside in order to assess, support vital system function, and to treat this degree of vital organ system failure, and to prevent further deterioration of the patients condition. Risk of deterioration: high  
[x] High complexity decision making was performed [x] See my orders for details Tubes:    
 
Subjective/Initial History:  
 
9/17 S/P pulmonary artery embolization MAR reviewed and pertinent medications noted or modified as needed Current Facility-Administered Medications Medication  furosemide (LASIX) injection 40 mg  
 scopolamine (TRANSDERM-SCOP) 1 mg over 3 days 1 Patch  fentaNYL citrate (PF) injection 25-50 mcg  
 0.45% sodium chloride infusion  famotidine (PF) (PEPCID) 20 mg in sodium chloride 0.9% 10 mL injection  0.9% sodium chloride infusion 250 mL  chlorhexidine (PERIDEX) 0.12 % mouthwash 15 mL  albuterol (PROVENTIL VENTOLIN) nebulizer solution 2.5 mg  
 fentaNYL citrate (PF) injection 25 mcg  albuterol (PROVENTIL HFA, VENTOLIN HFA, PROAIR HFA) inhaler 2 Puff  dorzolamide-timolol (COSOPT) 22.3-6.8 mg/mL ophthalmic solution 1 Drop  latanoprost (XALATAN) 0.005 % ophthalmic solution 1 Drop  sodium chloride (NS) flush 5-10 mL  sodium chloride (NS) flush 5-10 mL  acetaminophen (TYLENOL) tablet 650 mg  
 ondansetron (ZOFRAN) injection 4 mg  docusate sodium (COLACE) capsule 100 mg  
  
 
  
ROS:A comprehensive review of systems was negative except for that written in the HPI. Objective: 
 
Vital Signs: Telemetry:    normal sinus rhythmIntake/Output:  
Visit Vitals  /48 (BP 1 Location: Left arm, BP Patient Position: At rest)  Pulse 78  Temp 99.1 °F (37.3 °C)  Resp 21  
 Ht 6' 2\" (1.88 m)  Wt 111.4 kg (245 lb 9.5 oz)  SpO2 98%  BMI 31.53 kg/m2 Temp (24hrs), Av °F (37.2 °C), Min:98.5 °F (36.9 °C), Max:99.3 °F (37.4 °C) O2 Device: Endotracheal tube O2 Flow Rate (L/min): 5 l/min Wt Readings from Last 4 Encounters:  
18 111.4 kg (245 lb 9.5 oz) 16 111.1 kg (245 lb)  
16 106.6 kg (235 lb) 04/08/15 103.4 kg (228 lb) Intake/Output Summary (Last 24 hours) at 18 1445 Last data filed at 18 1200 Gross per 24 hour Intake             4725 ml Output             3339 ml Net             1386 ml Last shift:       07 -  1900 In: 350 [I.V.:350] Out: 2450 [Urine:2450] Last 3 shifts: 1901 -  0700 In: 1027 [I.V.:2160] Out:  [BPXLW:0207; Drains:250] Physical Exam: General:   male; with Et tube in place. Has right IJ CVC. On sedation and on vent. HEAD: Normocephalic, without obvious abnormality, atraumatic EYES: conjunctivae clear. PERRL,  AN Icteric sclerae NOSE: nares normal, no drainage, no nasal flaring, THROAT: normal; Lips, mucosa dry; No Thrush;  crowded airway; tongue midline Neck: Supple, symmetrical, trachea midline,  No accessory mm use; No Stridor/ cuff leak, No goiter or thyroid tenderness LYMPH: No abnormally enlarged lymph nodes. in neck or groin Chest: increased AP diameter Lungs: agonal, bilateral rhonchi. Seems to have good air  Movement Bilaterally. Heart: Regular rate and rhythm; NO edema Abdomen: soft, non-tender, without masses or organomegaly, protuberant, distended : no Overton Musculoskeletal: mild kyphosis; No spine or CVA tenderness; negative, cyanosis, clubbing; no joint swelling or erythema Neuro: was not  Responsive, had a gag with ET tube suctioning. , Sedated not able to fully assess. Psych: Not able to assess due to being on sedation and on vent. Skin: Pallor;  
 Pulses:Bilateral, Radial, 2+ Data:  
 
All lab results for the last 24 hours reviewed. Labs: 
 
Recent Labs  
   09/26/18 0352 09/25/18 
 0300  09/24/18 0417 WBC   --   9.4   --   
HGB   --   7.6*   --   
PLT   --   174   --   
INR  1.1  1.1  1.1 APTT  26.1  26.1  26.0 Recent Labs  
   09/26/18 
 0352 09/25/18 
 0300  09/24/18 
 0417 NA  145  150*  151*  
K  3.8  3.7  3.5 CL  108  111*  111* CO2  34*  35*  37* GLU  117*  123*  139* BUN  29*  32*  35* CREA  0.95  0.95  1.14  
CA  8.2*  8.4*  8.4* MG   --    --   2.5* PHOS  2.8  2.3*  2.4* ALB  1.9*  1.9*  1.9*  
SGOT  133*  215*  240* ALT  139*  171*  166* No results for input(s): PH, PCO2, PO2, HCO3, FIO2 in the last 72 hours. No results for input(s): CPK, CKNDX, TROIQ in the last 72 hours. No lab exists for component: CPKMB No results found for: BNPP, BNP Lab Results Component Value Date/Time Culture result: NO FUNGUS ISOLATED 8 DAYS 09/16/2018 11:18 AM  
 Culture result: HEAVY NORMAL RESPIRATORY SHELDON 09/16/2018 11:15 AM  
 Culture result: LIGHT NORMAL RESPIRATORY SHELDON 09/15/2018 01:20 PM  
No results found for: TSH, TSHEXT, TSHEXT Imaging: CXR: no acute changes I have personally and independently reviewed the patients interval and diagnostic data, radiographs and have reviewed the reports. Medical Decision Making Today: · Reviewed the flowsheet and previous days notes · Reviewed and summarized records or history from previous days note or discussions with staff, family · Parenteral controlled substances - Reviewed/ Adjusted / Weaned / Started · High Risk Drug therapy requiring intensive monitoring for toxicity: eg steroids, pressors, antibiotics · Reviewed and/or ordered Clinical lab tests · Reviewed and/or ordered Radiology tests · Reviewed and/or ordered of Medicine tests · Independently visualized radiologic Images · I have personally reviewed the patients ECG / Telemetry Una Hatch MD

## 2018-09-26 NOTE — PROGRESS NOTES
Interdisciplinary team rounds were held  9/26/2018 with the following team members: Care Management, [facility],Nursing, Nutrition, Pharmacy, Physician and  Respiratory therapy. Plan of care discussed. Goal: Bronchoscopy tentatively for today, adjust medications, continue to monitor and support. See MD orders and progress notes for further  interventions and desired outcomes.

## 2018-09-26 NOTE — PROGRESS NOTES
Hospitalist Progress Note NAME: Tien Oseguera  
:  1934 MRN:  [id number] Assessment / Plan: PEA arrest  Code blue called . Patient w/ agonal breathing after coming back from radiology after arteriogram completed. No pulse detected. CPR initiated. Epi x 2 given. Pulse obtained. Intubated by Dr. Angel Camarena. Much bloody secretions obtained. Transferred to ICU. -head CT  showed small age-indeterminate infarct in the left corona radiata. If there is concern for acute ischemia, consider MRI for further evaluation. Acute respiratory w/ hypoxia, s/p cardiac arrest 
Sepsis due to suspected PNA w/ possible pulmonary hemorrhage 
-CT of chest shows severe emphysema with the right basilar airspace disease in the oval mass with fluid level concerning for internal hemorrhage versus pneumonia. -repeat CT  showed persistent hematoma in the right lower lobe region with surrounding patchy 
atelectasis/infiltrate and small ipsilateral pleural effusion. Left basilar subsegmental atelectasis. Coils in right lower lobe region consistent with recent pulmonary artery branch embolization. 
-Bcx Neg, sputum cytology collected  negative except scant yeast. 
-completed zosyn for possible lung abscess. End date  
-cytology from bronchial washing on  is atypical, favor benign reactive process 
-off pressors, on TF 
-IV lasix 
-vent management per pulm Hemoptysis 
-noted to have bleeding from RLL on bronchoscopy. -work up for possible rheumatologic cause neg thus far: 
ANCA, anti-GBM, MPO ab, NM-3 ab, SSA/SSB, RNP ab, Arredondo/RNP, dsDNA, and Arredondo ab all negative. JOSS +. 
-Arteriogram : Thoracic aortic and intercostal arteriograms as described above demonstrating no 
enlarged bronchial artery supplying the right lower lobe of the lung. A normal 
appearing and normal sized right bronchial artery is seen without any 
hyperplasia or dominant right lower lobe supply.  No embolization was performed. RLL mass  
-work up for [employer] neg thus far. -diagnostic bronch done 9/16. No endobronchial lesions seen. -CTA chest done 9/17 due to persistent bloody pulmonary secretions. Right lower lobe masslike abnormality demonstrates increased internal density 
and has increased in size measuring 6.7 x 6.7 cm, compared to 5.7 x 4.4 cm. This 
is compatible with internal hemorrhage. There is new moderate right lower lobe 
partial collapse/atelectasis.   
Normal sized right bronchial artery is visualized without any hyperplasia or or evidence for supply to the right lower lobe lung abnormality. It is unlikely that the hemoptysis related to bronchial arterial injury/supply. Prior bronchial arteriogram was unremarkable. No evidence for pulmonary artery pseudoaneurysm or active hemorrhage. However, it is more likely that the masslike abnormality in the right lower lobe the lung has eroded into an adjacent pulmonary artery then a bronchial artery. Therefore, plan is for pulmonary arteriogram with possible embolization if an abnormality is seen in the right lower lobe. 6 mm and 4 mm right lung nodules. Small bilateral pleural effusions. Minimally enlarged bilateral hilar and mediastinal lymph nodes Hypernatremia 
-on 1/2NS. Add free water. monitor lab Acute blood loss anemia on admission 
-s/p 3 units PRBC so far 
-serial H&H, transfuse prn 
 
H/o atrial fibrillation on chronic anticoagulation w/ coumadin 
-amiodarone on hold 
-cardizem on hold due to borderline low bp 
-hold coumadin due to hemoptysis Coumadin-induced coagulopathy Elevated LFT 
-INR 1.1. Holding coumadin. 
-monitor LFT 
-stop statin ALAN - likely prerenal related to Lasix. Renal function stable. 
-avoid nephrotoxic agents. UTI, acute cystitis w/ hematuria, POA 
-on abx as above. No Ucx sent on admission COPD - stable HLD 
-stop pravachol due to elevated LFT Obesity - BMI 32 Code: Full DVT prophylaxis: SCDs GI prophylaxis: PPI Subjective: Chief Complaint / Reason for Physician Visit Follow up for hemoptysis. No acute changes. Discussed with RN events overnight. Review of Systems: 
Symptom Y/N Comments  Symptom Y/N Comments Fever/Chills    Chest Pain Poor Appetite    Edema Cough    Abdominal Pain Sputum    Joint Pain SOB/ROY    Pruritis/Rash Nausea/vomit    Tolerating PT/OT Diarrhea    Tolerating Diet Constipation    Other Could NOT obtain due to: intubated Objective: VITALS:  
Last 24hrs VS reviewed since prior progress note. Most recent are: 
Patient Vitals for the past 24 hrs: 
 Temp Pulse Resp BP SpO2  
09/26/18 1500 - 74 20 102/43 96 %  
09/26/18 1400 - 86 15 136/62 100 % 09/26/18 1300 - 72 20 90/41 97 % 09/26/18 1205 - - - - 98 %  
09/26/18 1200 99.1 °F (37.3 °C) 78 21 101/48 98 %  
09/26/18 1100 - 76 22 110/50 98 %  
09/26/18 1000 - 75 21 107/55 98 %  
09/26/18 0900 - 80 23 110/48 99 % 09/26/18 0800 99.3 °F (37.4 °C) 80 17 125/53 97 % 09/26/18 0742 - - - - 98 %  
09/26/18 0700 - 79 20 116/49 98 %  
09/26/18 0600 - 78 22 114/52 98 %  
09/26/18 0500 - 80 23 110/52 99 % 09/26/18 0421 - 85 24 - 100 % 09/26/18 0400 - 78 22 111/60 96 %  
09/26/18 0330 - - - - 96 %  
09/26/18 0300 - 75 22 105/43 96 %  
09/26/18 0200 - 76 22 122/62 97 % 09/26/18 0100 - 76 22 126/49 97 % 09/26/18 0034 - 83 24 - 96 %  
09/26/18 0000 99.1 °F (37.3 °C) 77 21 115/48 97 % 09/25/18 2300 - 76 21 120/59 97 % 09/25/18 2200 - 72 23 106/52 97 % 09/25/18 2100 - 78 22 126/60 98 %  
09/25/18 2000 98.5 °F (36.9 °C) 76 22 119/53 97 % 09/25/18 1911 - 74 20 - 98 %  
09/25/18 1900 - 77 20 121/53 98 %  
09/25/18 1800 - 82 21 136/67 97 % 09/25/18 1700 - 78 24 121/59 97 % 09/25/18 1615 - 82 23 - 96 %  
09/25/18 1600 98.9 °F (37.2 °C) 81 19 137/64 98 % Intake/Output Summary (Last 24 hours) at 09/26/18 1554 Last data filed at 09/26/18 1500 Gross per 24 hour Intake             4750 ml Output             3309 ml Net             1441 ml PHYSICAL EXAM: 
General: WD, WN. Intubated. No distress. EENT:   Anicteric sclerae. MMM;  ET tube in place. Resp:  Decreased breath sounds. Clear anteriorly. No rhonchi. No accessory muscle use CV:  Regular  rhythm,  1+ bilateral leg edema GI:  Soft, Non distended, Non tender.  +Bowel sounds Neurologic:  Sedated. No posturing. Psych:   No agitation Skin:  No rashes. No jaundice Reviewed most current lab test results and cultures  YES Reviewed most current radiology test results   YES Review and summation of old records today    NO Reviewed patient's current orders and MAR    YES 
PMH/SH reviewed - no change compared to H&P 
________________________________________________________________________ Care Plan discussed with: 
  Comments Patient Family RN x Care Manager Consultant Multidiciplinary team rounds were held today with , nursing, pharmacist and clinical coordinator. Patient's plan of care was discussed; medications were reviewed and discharge planning was addressed. ________________________________________________________________________ Total NON critical care TIME:  25  Minutes Total CRITICAL CARE TIME Spent:   Minutes non procedure based Comments >50% of visit spent in counseling and coordination of care    
________________________________________________________________________ Mariah Bejarano MD  
 
Procedures: see electronic medical records for all procedures/Xrays and details which were not copied into this note but were reviewed prior to creation of Plan. LABS: 
I reviewed today's most current labs and imaging studies. Pertinent labs include: 
Recent Labs  
   09/25/18 
 0300 WBC  9.4 HGB  7.6* HCT  25.9*  
PLT  174 Recent Labs  
   09/26/18 
 0352  09/25/18 
 0300  09/24/18 
 0417 NA  145  150*  151* K  3.8  3.7  3.5 CL  108  111*  111* CO2  34*  35*  37* GLU  117*  123*  139* BUN  29*  32*  35* CREA  0.95  0.95  1.14  
CA  8.2*  8.4*  8.4* MG   --    --   2.5* PHOS  2.8  2.3*  2.4* ALB  1.9*  1.9*  1.9* TBILI  0.4  0.5  0.4 SGOT  133*  215*  240* ALT  139*  171*  166* INR  1.1  1.1  1.1 Signed: Irma Sampson MD

## 2018-09-27 ENCOUNTER — APPOINTMENT (OUTPATIENT)
Dept: GENERAL RADIOLOGY | Age: 83
DRG: 003 | End: 2018-09-27
Attending: INTERNAL MEDICINE
Payer: MEDICARE

## 2018-09-27 LAB
ALBUMIN SERPL-MCNC: 1.9 G/DL (ref 3.5–5)
ALBUMIN/GLOB SERPL: 0.4 {RATIO} (ref 1.1–2.2)
ALP SERPL-CCNC: 132 U/L (ref 45–117)
ALT SERPL-CCNC: 102 U/L (ref 12–78)
ANION GAP SERPL CALC-SCNC: 4 MMOL/L (ref 5–15)
APTT PPP: 27.9 SEC (ref 22.1–32)
ARTERIAL PATENCY WRIST A: YES
AST SERPL-CCNC: 88 U/L (ref 15–37)
BASE EXCESS BLDA CALC-SCNC: 6.5 MMOL/L
BDY SITE: ABNORMAL
BILIRUB SERPL-MCNC: 0.4 MG/DL (ref 0.2–1)
BREATHS.SPONTANEOUS ON VENT: 24
BUN SERPL-MCNC: 31 MG/DL (ref 6–20)
BUN/CREAT SERPL: 29 (ref 12–20)
CALCIUM SERPL-MCNC: 8.3 MG/DL (ref 8.5–10.1)
CHLORIDE SERPL-SCNC: 106 MMOL/L (ref 97–108)
CO2 SERPL-SCNC: 33 MMOL/L (ref 21–32)
CREAT SERPL-MCNC: 1.07 MG/DL (ref 0.7–1.3)
EPAP/CPAP/PEEP, PAPEEP: 6
FIBRINOGEN PPP-MCNC: >800 MG/DL (ref 200–475)
FIO2 ON VENT: 40 %
GLOBULIN SER CALC-MCNC: 4.9 G/DL (ref 2–4)
GLUCOSE SERPL-MCNC: 123 MG/DL (ref 65–100)
HCO3 BLDA-SCNC: 33 MMOL/L (ref 22–26)
INR PPP: 1.2 (ref 0.9–1.1)
PCO2 BLDA: 52 MMHG (ref 35–45)
PH BLDA: 7.42 [PH] (ref 7.35–7.45)
PHOSPHATE SERPL-MCNC: 2.9 MG/DL (ref 2.6–4.7)
PO2 BLDA: 86 MMHG (ref 80–100)
POTASSIUM SERPL-SCNC: 3.6 MMOL/L (ref 3.5–5.1)
PRESSURE SUPPORT SETTING VENT: 6 CM[H2O]
PROT SERPL-MCNC: 6.8 G/DL (ref 6.4–8.2)
PROTHROMBIN TIME: 11.9 SEC (ref 9–11.1)
SAO2 % BLD: 97 % (ref 92–97)
SAO2% DEVICE SAO2% SENSOR NAME: ABNORMAL
SODIUM SERPL-SCNC: 143 MMOL/L (ref 136–145)
SPECIMEN SITE: ABNORMAL
THERAPEUTIC RANGE,PTTT: ABNORMAL SECS (ref 58–77)
VENTILATION MODE VENT: ABNORMAL

## 2018-09-27 PROCEDURE — 71045 X-RAY EXAM CHEST 1 VIEW: CPT

## 2018-09-27 PROCEDURE — 74011250636 HC RX REV CODE- 250/636: Performed by: INTERNAL MEDICINE

## 2018-09-27 PROCEDURE — 82803 BLOOD GASES ANY COMBINATION: CPT | Performed by: INTERNAL MEDICINE

## 2018-09-27 PROCEDURE — 80053 COMPREHEN METABOLIC PANEL: CPT | Performed by: INTERNAL MEDICINE

## 2018-09-27 PROCEDURE — 74011000250 HC RX REV CODE- 250: Performed by: INTERNAL MEDICINE

## 2018-09-27 PROCEDURE — 74018 RADEX ABDOMEN 1 VIEW: CPT

## 2018-09-27 PROCEDURE — 77030008771 HC TU NG SALEM SUMP -A

## 2018-09-27 PROCEDURE — 77030019563 HC DEV ATTCH FEED HOLL -A

## 2018-09-27 PROCEDURE — 74011000258 HC RX REV CODE- 258: Performed by: INTERNAL MEDICINE

## 2018-09-27 PROCEDURE — 94003 VENT MGMT INPAT SUBQ DAY: CPT

## 2018-09-27 PROCEDURE — 36600 WITHDRAWAL OF ARTERIAL BLOOD: CPT | Performed by: INTERNAL MEDICINE

## 2018-09-27 PROCEDURE — 36415 COLL VENOUS BLD VENIPUNCTURE: CPT | Performed by: INTERNAL MEDICINE

## 2018-09-27 PROCEDURE — 85610 PROTHROMBIN TIME: CPT | Performed by: INTERNAL MEDICINE

## 2018-09-27 PROCEDURE — 84100 ASSAY OF PHOSPHORUS: CPT | Performed by: INTERNAL MEDICINE

## 2018-09-27 PROCEDURE — 94640 AIRWAY INHALATION TREATMENT: CPT

## 2018-09-27 PROCEDURE — 65620000000 HC RM CCU GENERAL

## 2018-09-27 RX ORDER — GLYCOPYRROLATE 0.2 MG/ML
0.1 INJECTION INTRAMUSCULAR; INTRAVENOUS 3 TIMES DAILY
Status: COMPLETED | OUTPATIENT
Start: 2018-09-27 | End: 2018-09-27

## 2018-09-27 RX ORDER — POTASSIUM CHLORIDE 29.8 MG/ML
20 INJECTION INTRAVENOUS
Status: COMPLETED | OUTPATIENT
Start: 2018-09-27 | End: 2018-09-27

## 2018-09-27 RX ORDER — METHYLPHENIDATE HYDROCHLORIDE 5 MG/1
5 TABLET ORAL DAILY
Status: DISCONTINUED | OUTPATIENT
Start: 2018-09-28 | End: 2018-09-28

## 2018-09-27 RX ADMIN — GLYCOPYRROLATE 0.1 MG: 0.2 INJECTION, SOLUTION INTRAMUSCULAR; INTRAVENOUS at 17:42

## 2018-09-27 RX ADMIN — ALBUTEROL SULFATE 2.5 MG: 2.5 SOLUTION RESPIRATORY (INHALATION) at 19:18

## 2018-09-27 RX ADMIN — FAMOTIDINE 20 MG: 10 INJECTION, SOLUTION INTRAVENOUS at 21:04

## 2018-09-27 RX ADMIN — FUROSEMIDE 40 MG: 10 INJECTION, SOLUTION INTRAMUSCULAR; INTRAVENOUS at 10:15

## 2018-09-27 RX ADMIN — ALBUTEROL SULFATE 2.5 MG: 2.5 SOLUTION RESPIRATORY (INHALATION) at 15:45

## 2018-09-27 RX ADMIN — DORZOLAMIDE HYDROCHLORIDE AND TIMOLOL MALEATE 1 DROP: 20; 5 SOLUTION/ DROPS OPHTHALMIC at 17:48

## 2018-09-27 RX ADMIN — SODIUM CHLORIDE 50 ML/HR: 450 INJECTION, SOLUTION INTRAVENOUS at 06:56

## 2018-09-27 RX ADMIN — POTASSIUM CHLORIDE 20 MEQ: 400 INJECTION, SOLUTION INTRAVENOUS at 11:02

## 2018-09-27 RX ADMIN — ALBUTEROL SULFATE 2.5 MG: 2.5 SOLUTION RESPIRATORY (INHALATION) at 03:03

## 2018-09-27 RX ADMIN — Medication 10 ML: at 21:04

## 2018-09-27 RX ADMIN — Medication 10 ML: at 05:11

## 2018-09-27 RX ADMIN — FAMOTIDINE 20 MG: 10 INJECTION, SOLUTION INTRAVENOUS at 10:15

## 2018-09-27 RX ADMIN — DORZOLAMIDE HYDROCHLORIDE AND TIMOLOL MALEATE 1 DROP: 20; 5 SOLUTION/ DROPS OPHTHALMIC at 10:22

## 2018-09-27 RX ADMIN — GLYCOPYRROLATE 0.1 MG: 0.2 INJECTION, SOLUTION INTRAMUSCULAR; INTRAVENOUS at 10:15

## 2018-09-27 RX ADMIN — CHLORHEXIDINE GLUCONATE 15 ML: 1.2 RINSE ORAL at 09:12

## 2018-09-27 RX ADMIN — POTASSIUM CHLORIDE 20 MEQ: 400 INJECTION, SOLUTION INTRAVENOUS at 11:48

## 2018-09-27 RX ADMIN — POTASSIUM CHLORIDE 20 MEQ: 400 INJECTION, SOLUTION INTRAVENOUS at 10:16

## 2018-09-27 RX ADMIN — ALBUTEROL SULFATE 2.5 MG: 2.5 SOLUTION RESPIRATORY (INHALATION) at 23:28

## 2018-09-27 RX ADMIN — ALBUTEROL SULFATE 2.5 MG: 2.5 SOLUTION RESPIRATORY (INHALATION) at 11:11

## 2018-09-27 RX ADMIN — ALBUTEROL SULFATE 2.5 MG: 2.5 SOLUTION RESPIRATORY (INHALATION) at 07:46

## 2018-09-27 RX ADMIN — CHLORHEXIDINE GLUCONATE 15 ML: 1.2 RINSE ORAL at 21:05

## 2018-09-27 RX ADMIN — Medication 10 ML: at 14:30

## 2018-09-27 NOTE — PROGRESS NOTES
2000, Bedside and Verbal shift change report given to Jordin Thomas RN (oncoming nurse) by Thaddeus Moody RN (offgoing nurse). Report included the following information SBAR, Kardex, Intake/Output, MAR, Accordion, Recent Results, Med Rec Status and Cardiac Rhythm A Fib/ Paced. Temp;  100.1 axi > tylenol 2 tab PRN > 99.8 Neuro; confused slight drowsy, follow command,  eyes contact, pupils sluggish  GCS;  Eye; 3, verbal; 1 for ETT, motor; 6. 
Reassessment; no sedation, still drowsy. Respiratory; Sat 98% on Ventilator A/C mode, , RR 12, Peep 6, FiO2 40%, ETT 23 cm at gumline, secretion; small, thick Tan pink tinged, coarse diminished lung sounds. Reassessment; Passed SBT Cardiac; A Fib/Paced ( pacer at right side of the chest), 74 B/min, NIBP 119/48 mmHg, Reassessment; No changes GI; NPO with OG tube ( 55 cm at lips) TowCal HN 45 ml/h, free water 200 Q 2 hr, residual; nil, obese Abd semi soft with active bowel sound, on  NS 0.45% at 50 ml/hr. Flexiseal present with minimum browned blackish watery. Reassessment; No changes Renal; almaraz cath with adequate urine out put cloudy sedimentation present. Reassessment; Adequate UOP Skin; excoriation at sacral, edema both arms +2 , feet +3. Endo; none, Lines; ETT, right IJ CVP,OGT, Almaraz. Code; Full. Lab;  
DVT; SCD both legs. Plan; reorient the patient, ventilator management, pain and agitation management, follow lab tomorrow,   
0730, Bedside and Verbal shift change report given to SALOMÓN Chairez RN (oncoming nurse) by Jordin Thomas RN (offgoing nurse). Report included the following information SBAR, Kardex, Intake/Output, MAR, Accordion, Recent Results, Med Rec Status and Cardiac Rhythm A Fib/ paced.

## 2018-09-27 NOTE — PROGRESS NOTES
Interdisciplinary team rounds were held 9/27/2018  with the following team members:Care Management, Diabetes Treatment Specialist, Nursing, Nutrition, Physician and Respiratory Therapy. Plan of care discussed. Goal: See MD orders and progress notes for further  interventions and desired outcomes.

## 2018-09-27 NOTE — PROGRESS NOTES
Nutrition Assessment: 
 
RECOMMENDATIONS:  
Continue TF as ordered (Pulmonology managing H2O flushes, currently TF + water flushes provides 2556mL H2O daily) ASSESSMENT:  
Chart reviewed, case discussed during CCU rounds. Pt remains intubated and is off of sedation. TF at goal rate via OGT with minimal residuals, which meets 95% kcal and 108% protein needs. Appropriate at this time. Pulmonology has been managing H2O flushes daily to bring Na down (which is WNL today). Diuresis resumed, will monitor need for 2.5L free water tomorrow pending discussion during rounds. Noted plan for possible extubation today with plans to switch to NGT as it is suspected he will need to continue on NGT feedings given prolonged time on vent. Dietitians Intervention(s)/Plan(s): Continue TF as ordered SUBJECTIVE/OBJECTIVE:  
Pt intubated Diet Order: NPO, Other (comment) (TF via OGT: TwoCal HN @ 45mL/h + 150mL H2O flush q 2h (provides 2160kcals/90gPro/2556mL) ) 
% Eaten:  No data found. TwoCal HN  at 45 mL/hr flush with 150 mL  Other (q 2h )  via NG Tube   Residuals: 5 mL Pertinent Medications:pepcid, lasix, KCl; [email or web address]). Chemistries: 
Lab Results Component Value Date/Time Sodium 143 09/27/2018 04:25 AM  
 Potassium 3.6 09/27/2018 04:25 AM  
 Chloride 106 09/27/2018 04:25 AM  
 CO2 33 (H) 09/27/2018 04:25 AM  
 Anion gap 4 (L) 09/27/2018 04:25 AM  
 Glucose 123 (H) 09/27/2018 04:25 AM  
 BUN 31 (H) 09/27/2018 04:25 AM  
 Creatinine 1.07 09/27/2018 04:25 AM  
 BUN/Creatinine ratio 29 (H) 09/27/2018 04:25 AM  
 GFR est AA >60 09/27/2018 04:25 AM  
 GFR est non-AA >60 09/27/2018 04:25 AM  
 Calcium 8.3 (L) 09/27/2018 04:25 AM  
 Albumin 1.9 (L) 09/27/2018 04:25 AM  
  
Anthropometrics: Height: 6' 2\" (188 cm) Weight: 111.4 kg (245 lb 9.5 oz)   [x]bed scale (9/25)   []stated   []unknown IBW (%IBW):   ( ) UBW (%UBW):   (  %) BMI: Body mass index is 31.53 kg/(m^2). This BMI is indicative of: []Underweight   []Normal   []Overweight   [x] Obesity   [] Extreme Obesity (BMI>40) Estimated Nutrition Needs (Based on): 7239 MKZIZ/GNJ (U [facility] 2523)) , 83 g (0.8gPro/kg) Protein Carbohydrate: At Least 130 g/day  Fluids: 2000 mL/day or per Pulmonology Last BM: flexiseal-9/26   [x]Active     []Hyperactive  []Hypoactive       [] Absent   BS Skin:    [x] Intact   [] Incision  [] Breakdown   [] DTI   [] Tears/Excoriation/Abrasion  [x]Edema(+2-generalized; +1-BUE; +2 pitting-BLE)  [] Other: Wt Readings from Last 30 Encounters:  
09/25/18 111.4 kg (245 lb 9.5 oz) 05/05/16 111.1 kg (245 lb)  
03/25/16 106.6 kg (235 lb) 04/08/15 103.4 kg (228 lb)  
03/25/15 100.7 kg (222 lb)  
01/16/13 105.3 kg (232 lb 3.2 oz) 01/03/13 103.3 kg (227 lb 11.8 oz) NUTRITION DIAGNOSES:  
Problem:  No new nutritional dx at this time. NUTRITION INTERVENTIONS: 
  Enteral/Parenteral Nutrition: Other (Continue TF as ordered) GOAL:  
Pt will tolerate TF @ goal rate with residuals <250mL in 2-4 days. NUTRITION MONITORING AND EVALUATION Previous Goal: Pt will tolerate TF @ goal rate with residuals <250mL, meeting >80% kcal and 100% protein needs in 2-4 days Previous Goal Met: Yes Previous Recommendations Implemented: Yes Cultural, Judaism, or Ethnic Dietary Needs: None LEARNING NEEDS (Diet, Food/Nutrient-Drug Interaction):  
 [x] None Identified 
 [] Identified and Education Provided/Documented 
 [] Identified and Pt declined/was not appropriate [x] Interdisciplinary Care Plan Reviewed/Documented  
 [x] Participated in Discharge Planning: Unable to determine  
 [x] Interdisciplinary Rounds NUTRITION RISK:  
 [x] High              [] Moderate           []  Low  []  Minimal/Uncompromised Celina Ortega RD, [facility] Pager [phone] Weekend Pager [phone]

## 2018-09-27 NOTE — PROGRESS NOTES
Hospitalist Progress Note NAME: Clifton Galindo  
:  1934 MRN:  [id number] Assessment / Plan: PEA arrest  Code blue called . Patient w/ agonal breathing after coming back from radiology after arteriogram completed. No pulse detected. CPR initiated. Epi x 2 given. Pulse obtained. Intubated by Dr. Sheila Ricks. Much bloody secretions obtained. Transferred to ICU. -head CT  showed small age-indeterminate infarct in the left corona radiata. If there is concern for acute ischemia, consider MRI for further evaluation. Acute respiratory w/ hypoxia, s/p cardiac arrest 
Sepsis due to suspected PNA w/ possible pulmonary hemorrhage 
-CT of chest shows severe emphysema with the right basilar airspace disease in the oval mass with fluid level concerning for internal hemorrhage versus pneumonia. -repeat CT  showed persistent hematoma in the right lower lobe region with surrounding patchy 
atelectasis/infiltrate and small ipsilateral pleural effusion. Left basilar subsegmental atelectasis. Coils in right lower lobe region consistent with recent pulmonary artery branch embolization. 
-Bcx Neg, sputum cytology collected  negative except scant yeast. 
-completed zosyn for possible lung abscess. End date  
-cytology from bronchial washing on  is atypical, favor benign reactive process 
-off pressors, on TF 
-extubated on  and placed on VM 50% 
-continue diuresis with lasix 
-continue bronchodilator Hemoptysis 
-noted to have bleeding from RLL on bronchoscopy. -work up for possible rheumatologic cause neg thus far: 
ANCA, anti-GBM, MPO ab, NV-3 ab, SSA/SSB, RNP ab, Arredondo/RNP, dsDNA, and Arredondo ab all negative. JOSS +. 
-Arteriogram : Thoracic aortic and intercostal arteriograms as described above demonstrating no 
enlarged bronchial artery supplying the right lower lobe of the lung. A normal 
appearing and normal sized right bronchial artery is seen without any hyperplasia or dominant right lower lobe supply. No embolization was performed. RLL mass  
-work up for [employer] neg thus far. -diagnostic bronch done 9/16. No endobronchial lesions seen. -CTA chest done 9/17 due to persistent bloody pulmonary secretions. Right lower lobe masslike abnormality demonstrates increased internal density 
and has increased in size measuring 6.7 x 6.7 cm, compared to 5.7 x 4.4 cm. This 
is compatible with internal hemorrhage. There is new moderate right lower lobe 
partial collapse/atelectasis.   
Normal sized right bronchial artery is visualized without any hyperplasia or or evidence for supply to the right lower lobe lung abnormality. It is unlikely that the hemoptysis related to bronchial arterial injury/supply. Prior bronchial arteriogram was unremarkable. No evidence for pulmonary artery pseudoaneurysm or active hemorrhage. However, it is more likely that the masslike abnormality in the right lower lobe the lung has eroded into an adjacent pulmonary artery then a bronchial artery. Therefore, plan is for pulmonary arteriogram with possible embolization if an abnormality is seen in the right lower lobe. 6 mm and 4 mm right lung nodules. Small bilateral pleural effusions. Minimally enlarged bilateral hilar and mediastinal lymph nodes Hypernatremia: resolved -Monitor labs 
-continue IVFs Acute blood loss anemia on admission: stable 
-s/p 3 units PRBC so far 
-monitor labs H/o atrial fibrillation on chronic anticoagulation w/ coumadin 
-amiodarone on hold 
-cardizem on hold due to borderline low bp 
-hold coumadin due to hemoptysis Coumadin-induced coagulopathy Elevated LFT 
-INR 1.2 Holding coumadin. 
-monitor LFT 
-stop statin ALAN - likely prerenal related to Lasix. Renal function stable. 
-avoid nephrotoxic agents. UTI, acute cystitis w/ hematuria, POA 
-on abx as above. No Ucx sent on admission COPD - stable HLD 
 -stop pravachol due to elevated LFT Obesity - BMI 32 Code: Full DVT prophylaxis: SCDs GI prophylaxis: PPI Subjective: Chief Complaint / Reason for Physician Visit Was extubated today but remains on ventimask 50% and is very somnolent and lethargic. Not answering much other than denying chest pain then fell back to sleep. Review of Systems: 
Symptom Y/N Comments  Symptom Y/N Comments Fever/Chills    Chest Pain Poor Appetite    Edema Cough    Abdominal Pain Sputum    Joint Pain SOB/ROY    Pruritis/Rash Nausea/vomit    Tolerating PT/OT Diarrhea    Tolerating Diet Constipation    Other Could NOT obtain due to: somnolent Objective: VITALS:  
Last 24hrs VS reviewed since prior progress note. Most recent are: 
Patient Vitals for the past 24 hrs: 
 Temp Pulse Resp BP SpO2  
09/27/18 1919 - - - - 100 % 09/27/18 1800 - 88 20 117/50 99 % 09/27/18 1700 - 75 20 113/54 99 % 09/27/18 1602 98.4 °F (36.9 °C) 80 25 - 98 %  
09/27/18 1600 - 85 27 135/52 97 % 09/27/18 1545 - - - - 98 %  
09/27/18 1500 - 83 29 132/71 99 % 09/27/18 1400 - 92 25 (!) 131/103 99 % 09/27/18 1340 - 88 25 - 98 %  
09/27/18 1300 - 76 20 114/44 97 % 09/27/18 1210 99.1 °F (37.3 °C) - - - -  
09/27/18 1200 - - - (!) 123/24 -  
09/27/18 1111 - - - - 100 % 09/27/18 1100 - 90 20 137/75 100 % 09/27/18 1000 - 70 22 117/49 99 % 09/27/18 0900 - 75 22 117/54 98 %  
09/27/18 0815 99.9 °F (37.7 °C) 78 23 - 98 %  
09/27/18 0800 - 73 23 91/75 99 % 09/27/18 0748 - - - - 98 %  
09/27/18 0700 - 76 25 114/46 98 %  
09/27/18 0600 - 74 21 115/45 97 % 09/27/18 0500 - 75 21 107/49 97 % 09/27/18 0400 - 85 22 128/55 99 % 09/27/18 0329 - 77 21 - 98 %  
09/27/18 0303 - - - - 98 %  
09/27/18 0300 99.8 °F (37.7 °C) 79 20 108/54 98 %  
09/27/18 0200 - 81 21 105/48 97 % 09/27/18 0100 - 74 22 99/46 97 % 09/27/18 0000 100.1 °F (37.8 °C) 82 19 121/56 95 % 09/26/18 2318 - 81 21 - 98 % 09/26/18 2300 - 81 16 109/54 99 % 09/26/18 2200 - 74 21 101/48 98 %  
09/26/18 2100 - 76 21 103/41 98 %  
09/26/18 2000 100.1 °F (37.8 °C) 78 24 119/48 99 % Intake/Output Summary (Last 24 hours) at 09/27/18 1945 Last data filed at 09/27/18 1800 Gross per 24 hour Intake          4236.67 ml Output             1840 ml Net          2396.67 ml PHYSICAL EXAM: 
General: WD, WN. No distress. EENT:   Anicteric sclerae. MMM; 
Resp:  Decreased breath sounds. Clear anteriorly. No rhonchi. No accessory muscle use CV:  Regular  rhythm,  1+ bilateral leg edema GI:  Soft, Non distended, Non tender.  +Bowel sounds Neurologic:  Sedated. No posturing Psych:   No agitation Skin:  No rashes. No jaundice Reviewed most current lab test results and cultures  YES Reviewed most current radiology test results   YES Review and summation of old records today    NO Reviewed patient's current orders and MAR    YES 
PMH/ reviewed - no change compared to H&P 
________________________________________________________________________ Care Plan discussed with: 
  Comments Patient x But too somnolent Family RN x Care Manager Consultant Multidiciplinary team rounds were held today with , nursing, pharmacist and clinical coordinator. Patient's plan of care was discussed; medications were reviewed and discharge planning was addressed. ________________________________________________________________________ Total NON critical care TIME:    Minutes Total CRITICAL CARE TIME Spent:   Minutes non procedure based Comments >50% of visit spent in counseling and coordination of care    
________________________________________________________________________ Lesley Jerry MD  
 
Procedures: see electronic medical records for all procedures/Xrays and details which were not copied into this note but were reviewed prior to creation of Plan.    
 
LABS: 
 I reviewed today's most current labs and imaging studies. Pertinent labs include: 
Recent Labs  
   09/25/18 
 0300 WBC  9.4 HGB  7.6* HCT  25.9*  
PLT  174 Recent Labs  
   09/27/18 
 0425  09/26/18 
 0352  09/25/18 
 0300 NA  143  145  150*  
K  3.6  3.8  3.7 CL  106  108  111* CO2  33*  34*  35* GLU  123*  117*  123* BUN  31*  29*  32* CREA  1.07  0.95  0.95  
CA  8.3*  8.2*  8.4* PHOS  2.9  2.8  2.3* ALB  1.9*  1.9*  1.9* TBILI  0.4  0.4  0.5 SGOT  88*  133*  215* ALT  102*  139*  171* INR  1.2*  1.1  1.1 Signed: Kenneth Root MD

## 2018-09-27 NOTE — PROGRESS NOTES
09/27/18 0545 ABCDEF Bundle SBT Safety Screen Passed Yes SBT Trial Passed Yes Weaning Parameters Spontaneous Breathing Trial Complete Yes Resp Rate Observed 21 Ve 11.7  RSBI 44

## 2018-09-27 NOTE — PROGRESS NOTES
[facility] Pulmonary Consult Service NotePulmonary, Critical Care, and Sleep Medicine Name: Babar Gray MRN: [id number] : 1934 Hospital: [place] [address] Date: 2018   Hospital Day:  old blood in ETT. Propofol has been stopped as of this AM. Unresponsive. Anasarca. D/w nursing at bedside. No fever. On TF. Overton was clogged, flushed and now patent.  good diuresis. Weak. Intermittently responds. No new hemoptysis on vent. K 3.8. LFTs up. Normal Bili 
 
 BUN/ Cr slightly up  After good diuresis. Weak. Intermittently responds. Thick secretions. No new hemoptysis on vent. K 3.5. LFTs up. Normal Bili. Hgb 7.5 
 
 onvent. Will open eyes, moves fingers and toes. No more hemoptysis.  Creatinine slightly better. dicsussed yesterday's chest and head Ct findings with wife an daughter. Anasraca. Weak despite Na better. No more hemoptysis  bronch yesterday without residual clot or plug. Still has copious oral sectreions despite scopolamine pathc. Opening eyes, sticks out tongue and trying to move more. D/w nursing. passing SAT, SBt. Weak cough. Scant ET secretions IMPRESSION:  
1. Acute respiratory failure-on vent- not ready to liberate- would like to see imrpoved neurostatus 2. IR performed embolization of RLL pulmonary artery  
3. S/p PEA arrest post anigo  pm. Had 6 min of CPR and then ROSC. 4. Acute hemoptysis- Noted to have bleeding from RLL on Bronch. No endobronchial lesion. has lung mass(CA vs inflammatory pseudotumor) and microscopic hematuria; ANCA negative; JOSS barely positive. Suspect LUNG mass the culprit. Bronch will not help; 
5. HYpernatremia 6. Volume overload, anasarca 7. encehalopathy multifactorial - ssomnolent but arousable- has hearingl oss as well 8. Head CT small possible left CVA but age ? ? Significant and relevance? ? No bleeed which is good; following commands and moves fingers, toes 9. Elevated procalcitonin- treating for possible lung abscess/pseudotumor 10. Increasing LFTs- better 11. Lung mass RLL- right lung base- would not be visible on conventional bronch and yield from transbronchial biopsy not guaranteed; had no lesion on chest Ct Jan 2016; cytology x 2 negative for malignant cells; repeat chest CT scan 9/25 no reall change though slightly smaller? Has small effusion as well 12. Coagulopathy from warfarin per , hold anticoagulation for now. 13. Acute kidney injury likely prerenal and related to medications (Lasix) Baseline creatinine is around 0.9- no worse 14. Hematuria normal serologies 15. Enechalopathy- High NA not helping 16. Volume overload- over 10 liters positive, was on Lasix at home; anasarca 17. Sepsis due to suspected pneumonia. Patient reports having exertional shortness of breath along with cough and hemoptysis; CT of chest shows severe emphysema with the right basilar airspace disease in the oval mass with fluid level concerning for internal hemorrhage versus pneumonia ;  
18. 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  
19. Anemia 20. Former 50+ pack yr smoker quit in 2015? 21. Asbestos exposure 22. History of atrial fibrillation on anticoagulation with warfarin 23. Hx of CAD s/p PCI in the past home amiodarone. Hold Cardizem 24. Dyslipidemia 25. Multiorgan dysfunction as outlined above: Pt has one or more acute or chronic illnesses with severe exacerbation with progression or side effects of treatment that poses a threat to life or bodily function 26. Additional workup outlined below 27. Pt is requiring Drug therapy requiring intensive monitoring for toxicity 28. Pt is unstable, unpredictable needing PCU monitoring; is critically ill and at high risk of sudden decline and decompensation with life threatening consequenses and continued end organ dysfunction and failure 29. Prognosis guarded with significant risk of sudden decline 30. Critically ill, 35 min cc, EOP. Discussed with nurse this am. High risk of decompensation. RECOMMENDATIONS/PLAN:  
1. CPAP trial, abg after few hours 2. Reorient 3. IV robinul prn 4. Change OG to NGT 5. Hope to extubate 6. diuresis 7. Follow renal function- d/w wife on phone this AM 
8. Stop pravachol due to LFTs 9. Enteral feedings plus free water, follow Na 
10. Follow neuro status, 
11. Transfuse prn to keep Hgb > 7 
12. Hold anticoagulation for now. SCDS. 13. off pressors 14. Empiric abx to treat possible lung abscess 15. Follow cultures 16. Labs to follow electrolytes, renal function and and blood counts 17. Glucose monitoring and SSI 18. Bronchial hygiene with respiratory therapy techniques, bronchodilators 19. DVT, SUP prophylaxis 20. Pt needs IV fluids with additives and Drug therapy requiring intensive monitoring for toxicity 21. Prescription drug management with home med reconciliation reviewed My assessment/management discussed with: Consultants, Nursing, Pharmacy, Case Management, PT, OT, Respiratory Therapy, Hospitalist and Family for coordination of care Pt's condition is acute and unstable requiring inpatient hospitalization. This care involved high complexity decision making which includes independently reviewing the patient's past medical records, current laboratory results, medication profiles that were immediately available to me and actual Xray images at the bedside in order to assess, support vital system function, and to treat this degree of vital organ system failure, and to prevent further deterioration of the patients condition. Risk of deterioration: high  
[x] High complexity decision making was performed [x] See my orders for details Tubes:    
 
Subjective/Initial History:  
 
9/17 S/P pulmonary artery embolization MAR reviewed and pertinent medications noted or modified as needed Current Facility-Administered Medications Medication  furosemide (LASIX) injection 40 mg  
 scopolamine (TRANSDERM-SCOP) 1 mg over 3 days 1 Patch  
 fentaNYL citrate (PF) injection 25-50 mcg  
 0.45% sodium chloride infusion  famotidine (PF) (PEPCID) 20 mg in sodium chloride 0.9% 10 mL injection  0.9% sodium chloride infusion 250 mL  chlorhexidine (PERIDEX) 0.12 % mouthwash 15 mL  albuterol (PROVENTIL VENTOLIN) nebulizer solution 2.5 mg  
 fentaNYL citrate (PF) injection 25 mcg  albuterol (PROVENTIL HFA, VENTOLIN HFA, PROAIR HFA) inhaler 2 Puff  dorzolamide-timolol (COSOPT) 22.3-6.8 mg/mL ophthalmic solution 1 Drop  latanoprost (XALATAN) 0.005 % ophthalmic solution 1 Drop  sodium chloride (NS) flush 5-10 mL  sodium chloride (NS) flush 5-10 mL  acetaminophen (TYLENOL) tablet 650 mg  
 ondansetron (ZOFRAN) injection 4 mg  docusate sodium (COLACE) capsule 100 mg  
  
 
  
ROS:A comprehensive review of systems was negative except for that written in the HPI. Objective: 
 
Vital Signs: Telemetry:    normal sinus rhythmIntake/Output:  
Visit Vitals  /46  Pulse 76  Temp 99.8 °F (37.7 °C)  Resp 25  
 Ht 6' 2\" (1.88 m)  Wt 111.4 kg (245 lb 9.5 oz)  SpO2 98%  BMI 31.53 kg/m2 Temp (24hrs), Av.5 °F (37.5 °C), Min:98.3 °F (36.8 °C), Max:100.1 °F (37.8 °C) O2 Device: Endotracheal tube, Ventilator O2 Flow Rate (L/min): 5 l/min Wt Readings from Last 4 Encounters:  
18 111.4 kg (245 lb 9.5 oz) 16 111.1 kg (245 lb)  
16 106.6 kg (235 lb) 04/08/15 103.4 kg (228 lb) Intake/Output Summary (Last 24 hours) at 18 0750 Last data filed at 18 0600 Gross per 24 hour Intake             5215 ml Output             3555 ml Net             1660 ml Last shift:        
Last 3 shifts: 1901 -  0700 In: 9044 [I.V.:1750] Out: 9106 [LLFQR:8146; Drains:425] Physical Exam:  
 General:   male; with Et tube in place. Has right IJ CVC. On sedation and on vent. HEAD: Normocephalic, without obvious abnormality, atraumatic EYES: conjunctivae clear. PERRL,  AN Icteric sclerae NOSE: nares normal, no drainage, no nasal flaring, THROAT: normal; Lips, mucosa dry; No Thrush;  crowded airway; tongue midline Neck: Supple, symmetrical, trachea midline,  No accessory mm use; No Stridor/ cuff leak, No goiter or thyroid tenderness LYMPH: No abnormally enlarged lymph nodes. in neck or groin Chest: increased AP diameter Lungs: agonal, bilateral rhonchi. Seems to have good air  Movement Bilaterally. Heart: Regular rate and rhythm; NO edema Abdomen: soft, non-tender, without masses or organomegaly, protuberant, distended : no Overton Musculoskeletal: mild kyphosis; No spine or CVA tenderness; negative, cyanosis, clubbing; no joint swelling or erythema Neuro: was not  Responsive, had a gag with ET tube suctioning. , Sedated not able to fully assess. Psych: Not able to assess due to being on sedation and on vent. Skin: Pallor;  
 Pulses:Bilateral, Radial, 2+ Data:  
 
All lab results for the last 24 hours reviewed. Labs: 
 
Recent Labs  
   09/27/18 0425 09/26/18 0352 09/25/18 
 0300 WBC   --    --   9.4 HGB   --    --   7.6* PLT   --    --   174 INR  1.2*  1.1  1.1 APTT  27.9  26.1  26.1 Recent Labs  
   09/27/18 0425 09/26/18 
 0352  09/25/18 
 0300 NA  143  145  150*  
K  3.6  3.8  3.7 CL  106  108  111* CO2  33*  34*  35* GLU  123*  117*  123* BUN  31*  29*  32* CREA  1.07  0.95  0.95  
CA  8.3*  8.2*  8.4* PHOS  2.9  2.8  2.3* ALB  1.9*  1.9*  1.9*  
SGOT  88*  133*  215* ALT  102*  139*  171* No results for input(s): PH, PCO2, PO2, HCO3, FIO2 in the last 72 hours. No results for input(s): CPK, CKNDX, TROIQ in the last 72 hours. No lab exists for component: CPKMB No results found for: BNPP, BNP Lab Results Component Value Date/Time Culture result: NO FUNGUS ISOLATED 8 DAYS 09/16/2018 11:18 AM  
 Culture result: HEAVY NORMAL RESPIRATORY SHELDON 09/16/2018 11:15 AM  
 Culture result: LIGHT NORMAL RESPIRATORY SHELDON 09/15/2018 01:20 PM  
No results found for: TSH, TSHEXT, TSHEXT Imaging: CXR: no acute changes I have personally and independently reviewed the patients interval and diagnostic data, radiographs and have reviewed the reports. Medical Decision Making Today: · Reviewed the flowsheet and previous days notes · Reviewed and summarized records or history from previous days note or discussions with staff, family · Parenteral controlled substances - Reviewed/ Adjusted / Weaned / Started · High Risk Drug therapy requiring intensive monitoring for toxicity: eg steroids, pressors, antibiotics · Reviewed and/or ordered Clinical lab tests · Reviewed and/or ordered Radiology tests · Reviewed and/or ordered of Medicine tests · Independently visualized radiologic Images · I have personally reviewed the patients ECG / Telemetry Ronit Peres MD

## 2018-09-28 ENCOUNTER — APPOINTMENT (OUTPATIENT)
Dept: GENERAL RADIOLOGY | Age: 83
DRG: 003 | End: 2018-09-28
Attending: INTERNAL MEDICINE
Payer: MEDICARE

## 2018-09-28 LAB
ALBUMIN SERPL-MCNC: 2.1 G/DL (ref 3.5–5)
ALBUMIN/GLOB SERPL: 0.4 {RATIO} (ref 1.1–2.2)
ALP SERPL-CCNC: 151 U/L (ref 45–117)
ALT SERPL-CCNC: 98 U/L (ref 12–78)
ANION GAP SERPL CALC-SCNC: 4 MMOL/L (ref 5–15)
APTT PPP: 26.8 SEC (ref 22.1–32)
ARTERIAL PATENCY WRIST A: YES
ARTERIAL PATENCY WRIST A: YES
AST SERPL-CCNC: 77 U/L (ref 15–37)
BASE EXCESS BLDA CALC-SCNC: 3.6 MMOL/L
BASE EXCESS BLDA CALC-SCNC: 4 MMOL/L
BDY SITE: ABNORMAL
BDY SITE: ABNORMAL
BILIRUB SERPL-MCNC: 0.4 MG/DL (ref 0.2–1)
BUN SERPL-MCNC: 32 MG/DL (ref 6–20)
BUN/CREAT SERPL: 26 (ref 12–20)
CALCIUM SERPL-MCNC: 8.2 MG/DL (ref 8.5–10.1)
CHLORIDE SERPL-SCNC: 104 MMOL/L (ref 97–108)
CO2 SERPL-SCNC: 34 MMOL/L (ref 21–32)
CREAT SERPL-MCNC: 1.23 MG/DL (ref 0.7–1.3)
EPAP/CPAP/PEEP, PAPEEP: 6
FIBRINOGEN PPP-MCNC: >800 MG/DL (ref 200–475)
FIO2 ON VENT: 100 %
FIO2 ON VENT: 100 %
GAS FLOW.O2 SETTING OXYMISER: 16 L/MIN
GLOBULIN SER CALC-MCNC: 6 G/DL (ref 2–4)
GLUCOSE SERPL-MCNC: 151 MG/DL (ref 65–100)
HCO3 BLDA-SCNC: 33 MMOL/L (ref 22–26)
HCO3 BLDA-SCNC: 36 MMOL/L (ref 22–26)
INR PPP: 1.1 (ref 0.9–1.1)
MAGNESIUM SERPL-MCNC: 2.6 MG/DL (ref 1.6–2.4)
PCO2 BLDA: 101 MMHG (ref 35–45)
PCO2 BLDA: 69 MMHG (ref 35–45)
PH BLDA: 7.17 [PH] (ref 7.35–7.45)
PH BLDA: 7.3 [PH] (ref 7.35–7.45)
PHOSPHATE SERPL-MCNC: 4.4 MG/DL (ref 2.6–4.7)
PO2 BLDA: 203 MMHG (ref 80–100)
PO2 BLDA: 306 MMHG (ref 80–100)
POTASSIUM SERPL-SCNC: 4.8 MMOL/L (ref 3.5–5.1)
PROT SERPL-MCNC: 8.1 G/DL (ref 6.4–8.2)
PROTHROMBIN TIME: 11.6 SEC (ref 9–11.1)
SAO2 % BLD: 100 % (ref 92–97)
SAO2 % BLD: 99 % (ref 92–97)
SAO2% DEVICE SAO2% SENSOR NAME: ABNORMAL
SAO2% DEVICE SAO2% SENSOR NAME: ABNORMAL
SERVICE CMNT-IMP: ABNORMAL
SODIUM SERPL-SCNC: 142 MMOL/L (ref 136–145)
SPECIMEN SITE: ABNORMAL
SPECIMEN SITE: ABNORMAL
THERAPEUTIC RANGE,PTTT: ABNORMAL SECS (ref 58–77)
VENTILATION MODE VENT: ABNORMAL
VT SETTING VENT: 500 ML

## 2018-09-28 PROCEDURE — 74011250637 HC RX REV CODE- 250/637: Performed by: INTERNAL MEDICINE

## 2018-09-28 PROCEDURE — 74011250636 HC RX REV CODE- 250/636: Performed by: INTERNAL MEDICINE

## 2018-09-28 PROCEDURE — 71045 X-RAY EXAM CHEST 1 VIEW: CPT

## 2018-09-28 PROCEDURE — 5A1955Z RESPIRATORY VENTILATION, GREATER THAN 96 CONSECUTIVE HOURS: ICD-10-PCS | Performed by: ANESTHESIOLOGY

## 2018-09-28 PROCEDURE — 77030008683 HC TU ET CUF COVD -A

## 2018-09-28 PROCEDURE — 77010033678 HC OXYGEN DAILY

## 2018-09-28 PROCEDURE — 65620000000 HC RM CCU GENERAL

## 2018-09-28 PROCEDURE — 87070 CULTURE OTHR SPECIMN AEROBIC: CPT | Performed by: INTERNAL MEDICINE

## 2018-09-28 PROCEDURE — 83735 ASSAY OF MAGNESIUM: CPT | Performed by: INTERNAL MEDICINE

## 2018-09-28 PROCEDURE — 74011000258 HC RX REV CODE- 258: Performed by: INTERNAL MEDICINE

## 2018-09-28 PROCEDURE — 77030021678 HC GLIDESCP STAT DISP VERT -B

## 2018-09-28 PROCEDURE — 85610 PROTHROMBIN TIME: CPT | Performed by: INTERNAL MEDICINE

## 2018-09-28 PROCEDURE — 36600 WITHDRAWAL OF ARTERIAL BLOOD: CPT

## 2018-09-28 PROCEDURE — 36600 WITHDRAWAL OF ARTERIAL BLOOD: CPT | Performed by: INTERNAL MEDICINE

## 2018-09-28 PROCEDURE — 80053 COMPREHEN METABOLIC PANEL: CPT | Performed by: INTERNAL MEDICINE

## 2018-09-28 PROCEDURE — 82803 BLOOD GASES ANY COMBINATION: CPT

## 2018-09-28 PROCEDURE — 77030018719 HC DRSG PTCH ANTIMIC J&J -A

## 2018-09-28 PROCEDURE — 94002 VENT MGMT INPAT INIT DAY: CPT

## 2018-09-28 PROCEDURE — 77030018786 HC NDL GD F/USND BARD -B

## 2018-09-28 PROCEDURE — 76937 US GUIDE VASCULAR ACCESS: CPT

## 2018-09-28 PROCEDURE — 74011000250 HC RX REV CODE- 250: Performed by: INTERNAL MEDICINE

## 2018-09-28 PROCEDURE — 77030018798 HC PMP KT ENTRL FED COVD -A

## 2018-09-28 PROCEDURE — 36415 COLL VENOUS BLD VENIPUNCTURE: CPT | Performed by: INTERNAL MEDICINE

## 2018-09-28 PROCEDURE — 94760 N-INVAS EAR/PLS OXIMETRY 1: CPT

## 2018-09-28 PROCEDURE — 94640 AIRWAY INHALATION TREATMENT: CPT

## 2018-09-28 PROCEDURE — 84100 ASSAY OF PHOSPHORUS: CPT | Performed by: INTERNAL MEDICINE

## 2018-09-28 PROCEDURE — 76010000379 HC BRONCHOSCOPY DIAG/THERAPEUTIC

## 2018-09-28 PROCEDURE — 0BH17EZ INSERTION OF ENDOTRACHEAL AIRWAY INTO TRACHEA, VIA NATURAL OR ARTIFICIAL OPENING: ICD-10-PCS | Performed by: ANESTHESIOLOGY

## 2018-09-28 PROCEDURE — C1751 CATH, INF, PER/CENT/MIDLINE: HCPCS

## 2018-09-28 PROCEDURE — 36569 INSJ PICC 5 YR+ W/O IMAGING: CPT | Performed by: INTERNAL MEDICINE

## 2018-09-28 RX ORDER — SODIUM CHLORIDE 0.9 % (FLUSH) 0.9 %
10-30 SYRINGE (ML) INJECTION AS NEEDED
Status: DISCONTINUED | OUTPATIENT
Start: 2018-09-28 | End: 2018-10-27 | Stop reason: HOSPADM

## 2018-09-28 RX ORDER — GLYCOPYRROLATE 0.2 MG/ML
0.1 INJECTION INTRAMUSCULAR; INTRAVENOUS
Status: DISCONTINUED | OUTPATIENT
Start: 2018-09-28 | End: 2018-10-26

## 2018-09-28 RX ORDER — SODIUM CHLORIDE 0.9 % (FLUSH) 0.9 %
SYRINGE (ML) INJECTION
Status: COMPLETED
Start: 2018-09-28 | End: 2018-09-28

## 2018-09-28 RX ORDER — SODIUM CHLORIDE 0.9 % (FLUSH) 0.9 %
10 SYRINGE (ML) INJECTION EVERY 24 HOURS
Status: DISCONTINUED | OUTPATIENT
Start: 2018-09-28 | End: 2018-10-20 | Stop reason: SDUPTHER

## 2018-09-28 RX ORDER — ENOXAPARIN SODIUM 100 MG/ML
40 INJECTION SUBCUTANEOUS EVERY 24 HOURS
Status: DISCONTINUED | OUTPATIENT
Start: 2018-09-28 | End: 2018-09-29

## 2018-09-28 RX ORDER — PROPOFOL 10 MG/ML
5-50 VIAL (ML) INTRAVENOUS
Status: DISCONTINUED | OUTPATIENT
Start: 2018-09-28 | End: 2018-10-08

## 2018-09-28 RX ORDER — PROPOFOL 10 MG/ML
0-50 VIAL (ML) INTRAVENOUS
Status: DISCONTINUED | OUTPATIENT
Start: 2018-09-28 | End: 2018-09-28

## 2018-09-28 RX ORDER — FUROSEMIDE 10 MG/ML
40 INJECTION INTRAMUSCULAR; INTRAVENOUS
Status: DISCONTINUED | OUTPATIENT
Start: 2018-09-29 | End: 2018-10-02

## 2018-09-28 RX ORDER — SODIUM CHLORIDE 0.9 % (FLUSH) 0.9 %
10-40 SYRINGE (ML) INJECTION EVERY 8 HOURS
Status: DISCONTINUED | OUTPATIENT
Start: 2018-09-28 | End: 2018-10-27 | Stop reason: HOSPADM

## 2018-09-28 RX ORDER — HEPARIN 100 UNIT/ML
300 SYRINGE INTRAVENOUS AS NEEDED
Status: DISCONTINUED | OUTPATIENT
Start: 2018-09-28 | End: 2018-10-27 | Stop reason: HOSPADM

## 2018-09-28 RX ORDER — METHYLPHENIDATE HYDROCHLORIDE 5 MG/1
5 TABLET ORAL 2 TIMES DAILY
Status: DISCONTINUED | OUTPATIENT
Start: 2018-09-28 | End: 2018-10-01

## 2018-09-28 RX ORDER — SODIUM CHLORIDE 0.9 % (FLUSH) 0.9 %
20 SYRINGE (ML) INJECTION EVERY 24 HOURS
Status: DISCONTINUED | OUTPATIENT
Start: 2018-09-28 | End: 2018-10-20 | Stop reason: SDUPTHER

## 2018-09-28 RX ORDER — SODIUM CHLORIDE 0.9 % (FLUSH) 0.9 %
10 SYRINGE (ML) INJECTION AS NEEDED
Status: DISCONTINUED | OUTPATIENT
Start: 2018-09-28 | End: 2018-10-27 | Stop reason: HOSPADM

## 2018-09-28 RX ADMIN — METHYLPHENIDATE HYDROCHLORIDE 5 MG: 5 TABLET ORAL at 17:58

## 2018-09-28 RX ADMIN — FAMOTIDINE 20 MG: 10 INJECTION, SOLUTION INTRAVENOUS at 21:10

## 2018-09-28 RX ADMIN — Medication 10 ML: at 11:16

## 2018-09-28 RX ADMIN — ALBUTEROL SULFATE 2.5 MG: 2.5 SOLUTION RESPIRATORY (INHALATION) at 19:51

## 2018-09-28 RX ADMIN — DORZOLAMIDE HYDROCHLORIDE AND TIMOLOL MALEATE 1 DROP: 20; 5 SOLUTION/ DROPS OPHTHALMIC at 09:24

## 2018-09-28 RX ADMIN — FAMOTIDINE 20 MG: 10 INJECTION, SOLUTION INTRAVENOUS at 09:23

## 2018-09-28 RX ADMIN — PROPOFOL 15 MCG/KG/MIN: 10 INJECTION, EMULSION INTRAVENOUS at 11:16

## 2018-09-28 RX ADMIN — Medication 10 ML: at 14:12

## 2018-09-28 RX ADMIN — ALBUTEROL SULFATE 2.5 MG: 2.5 SOLUTION RESPIRATORY (INHALATION) at 15:38

## 2018-09-28 RX ADMIN — CHLORHEXIDINE GLUCONATE 15 ML: 1.2 RINSE ORAL at 21:10

## 2018-09-28 RX ADMIN — GLYCOPYRROLATE 0.1 MG: 0.2 INJECTION, SOLUTION INTRAMUSCULAR; INTRAVENOUS at 11:52

## 2018-09-28 RX ADMIN — CHLORHEXIDINE GLUCONATE 15 ML: 1.2 RINSE ORAL at 09:23

## 2018-09-28 RX ADMIN — ALBUTEROL SULFATE 2.5 MG: 2.5 SOLUTION RESPIRATORY (INHALATION) at 07:38

## 2018-09-28 RX ADMIN — Medication 10 ML: at 21:11

## 2018-09-28 RX ADMIN — DORZOLAMIDE HYDROCHLORIDE AND TIMOLOL MALEATE 1 DROP: 20; 5 SOLUTION/ DROPS OPHTHALMIC at 17:59

## 2018-09-28 RX ADMIN — LATANOPROST 1 DROP: 50 SOLUTION OPHTHALMIC at 21:10

## 2018-09-28 RX ADMIN — PROPOFOL 15 MCG/KG/MIN: 10 INJECTION, EMULSION INTRAVENOUS at 18:01

## 2018-09-28 RX ADMIN — METHYLPHENIDATE HYDROCHLORIDE 5 MG: 5 TABLET ORAL at 09:23

## 2018-09-28 RX ADMIN — ALBUTEROL SULFATE 2.5 MG: 2.5 SOLUTION RESPIRATORY (INHALATION) at 03:36

## 2018-09-28 RX ADMIN — ACETAMINOPHEN 650 MG: 325 TABLET ORAL at 09:23

## 2018-09-28 RX ADMIN — ALBUTEROL SULFATE 2.5 MG: 2.5 SOLUTION RESPIRATORY (INHALATION) at 11:03

## 2018-09-28 RX ADMIN — Medication 10 ML: at 14:11

## 2018-09-28 RX ADMIN — SODIUM CHLORIDE 25 ML/HR: 450 INJECTION, SOLUTION INTRAVENOUS at 14:08

## 2018-09-28 RX ADMIN — Medication 20 ML: at 11:17

## 2018-09-28 RX ADMIN — Medication 10 ML: at 05:08

## 2018-09-28 RX ADMIN — ENOXAPARIN SODIUM 40 MG: 40 INJECTION, SOLUTION INTRAVENOUS; SUBCUTANEOUS at 11:16

## 2018-09-28 NOTE — PROGRESS NOTES
0700: Bedside report received, orders and chart reviewed. IV drips and Fluids verified with previous RN. Cardiac rhythm reviewed and verified. Pt newly intubated, Pt appears comfortable at this time. Orders for Propofol noted, but not infusing at this time. 0815: Assessment completed and noted. 0930: Interdisciplinary rounds completed, orders received. 1009: PICC team @ bedside, See note for procedure. 1140: Bedside shift change report given to Herber Saez RN by Jeevan Holland RN. Report included the following information SBAR, Kardex, OR Summary, Procedure Summary, Intake/Output, MAR, Recent Results, Cardiac Rhythm NSR/Afib/Flutter/Paced and Alarm Parameters .

## 2018-09-28 NOTE — PROGRESS NOTES
1200 report received from Justino Connolly RN. Received in bed with eyes closed. Trace periorbital edema and generalized edema  Throughout. Upper extremities 2+. Edema ? RUE > Left.  radial pulses present. Distal pulses with doppler. Almaraz patent with brown/redish colored urine. Flexiseal with small amount of liquid brown stool. Scattered ecchymosis upper extremities. Scope SR with occasional paced beats. Copious oral secretions white drooling. Medicated with Robinul. ETT tan/ white secretions. Flickering of eye lids spontaneous but no spontaneous opening. Noted spontaneous movement of feet bilaterally and upper extremities non- purposeful. Complete hygiene care with linen changes. Right IJ quad lumen discontinue without difficulty. Right nare NG checked for residual minimal tan colored residual (10-20 ml). New PICC catheter left antecubital.  Lines patent. ? Cataract left eye 
1300 Family members in reporting squeezing of hand not observe, 
1330 Dr. Richard Arshad updating family (see MD notes). 1600 Repositioned with suctioning decrease in oral secretions noted. Scope appears atrial fib at times with paced beats vs. SR with paced abeats. [place] [address]. Dr. Robb Hidden in 
1800 repositioned and suctioned. Sons in briefly. Mouth and almaraz care. Currently scope SR.  
1900 ETT secretions remain thick and tan/slight red hue specimen collected and sent to lab. Family member in report given to ASHLEY Correa RN via [employer] and [employer]

## 2018-09-28 NOTE — PROGRESS NOTES
0444, Bedside and Verbal shift report given to PADDY Toro (back up nurse for ventilator management) by Renae Luke RN (primary  nurse). Report included the following information SBAR, Kardex, Intake/Output, MAR, Accordion, Recent Results, Med Rec Status and Cardiac Rhythm A Fib/ Paced. Temp;  99.3 axillary Neuro; lethargic, No follow command, no eyes contact, pupils sluggish  GCS;  Eye; 3, verbal; 1 for ETT, motor; 4. 
Reassessment; no sedation,respond to voice after intubation, ABGs better Respiratory; Sat 98% on Ventilator A/C mode, , RR 16, Peep 6, FiO2  100%, ETT 24 cm at gumline, secretion; medium, thick Tan to brown pink tinged, coarse expi wheezy rhonchi diminished lung sounds. Reassessment;  lung sound much better after intubation diminished at base Cardiac; A Fib/Paced ( pacer at right side of the chest), 108 B/min, NIBP 130/73 mmHg, Reassessment; better after intubation GI; NPO with OG tube ( 72 cm at lips) TowCal HN 45 ml/h, free water 150 Q 4 hr, residual; 15 ml tan, obese Abd semi soft with active bowel sound, on  NS 0.45% at 25 ml/hr. Flexiseal present with minimum browned blackish watery. Reassessment; No changes Renal; almaraz cath with adequate urine out put cloudy sedimentation present. Reassessment; Adequate UOP Skin; excoriation at sacral, edema both arms +2 , feet +3. Endo; none, Lines; ETT, right IJ CVP, NGT, Almaraz. Code; Full. Lab;  
DVT; SCD both legs. Plan; reorient the patient > no sedation for now, ventilator management, pain and agitation management, follow lab tomorrow,  
0700, no event after intubation, hands over to incoming nurse by primary nurse.

## 2018-09-28 NOTE — PROGRESS NOTES
Nutrition:  Chart reviewed, case discussed during CCU rounds. Per discussion with Dr. Olimpia Ugalde will decrease H2O flushes to 150mL q 4h, which will provide 1656mL free water daily. Mj Caicedo RD, [facility] Pager [phone]

## 2018-09-28 NOTE — PROGRESS NOTES
Intubation Note Called to bedside secondary to  impending respiratory failure. Patient pre-oxygenated with 100% oxygen. No meds needed. glidescope x 1 
 
8.0 ETT taped and secured at 24 cm at the teeth. 
 
+ Bilateral BS, + Chest rise, + ETCO2 CXR pending.  
 
Care turned over to covering Attending MD.

## 2018-09-28 NOTE — PROGRESS NOTES
Bedside and Verbal shift change report given to Delberta Gottron, RN (oncoming nurse) by Sam Bahena RN (offgoing nurse). Report included the following information SBAR, Kardex, Procedure Summary, Intake/Output, MAR, Accordion, Recent Results and Cardiac Rhythm NSR/Afib/Paced. Spoke to the patient. She states that she is still not doing the best but her headache is not as bad as it was yesterday in the ER. She rates her pain as a 5/10 on a 0/10 pain scale. The pain is located behind her right eye. Her right eye and right nostril are \"draining.\" She thinks she may have a sinus infection.     Denies vomiting, nausea, photophobia, vision changes, numbness or tingling, shortness of breath, chest pain, facial droop, and unilateral weakness.     She has not reached out to Dr. Fields's office yet.     Appointment made for tomorrow 8/21/18 for patient to see Dr. Marc. Instructed patient to call Dr. Fields's office to schedule an appointment. Also instructed her to return to the ER with any acutely worsening symptoms. Patient verbalized understanding; no further questions.

## 2018-09-28 NOTE — PROGRESS NOTES
1915-Received report from [place] on patient. According to report, patient has been lethargic since being extubated today. Dr. Evelyn White is aware. ABGs came back fine so there is currently no major concern about his LOC. Patient does respond to voice and follows commands. Upon assessing patient, patient has varying degrees of generalized edema, quad lumen RIJ is CDI and running NS @ 25ml/hr. NG tube is stabilized at 72cm and running Twocal HN at 45ml/hr. Patient HOB is raised at least 30 degrees. Almaraz is in placed and patent, draining paige, red tinged, cloudy urine with sediment. Flexiseal also intact, draining watery black/brown stool. Lung sounds are coarse. Patient on a 50% Venti mask. Respiratory therapist in room administering albuterol treatment. 2000- Patient's VS stable. Prompted patient to open eyes; patient complied. 2110- Patient's VS stable, meds given, mouth care performed, almaraz care performed. Prompted patient to squeeze hand and patient complied. Position changed slightly for comfort. 2220-Mobility team helped to turn patient to left side. 0000- Patient's VS stable, patient resting comfortably. Central line care performed. 0045-Patient repositioned to right side with Mobility team. Zinc cream applied to sacral area. 0200-Patient is expectorating white, thick mucus, orally. 0250-Patient becoming more responsive, trying to answer questions. 0315-Mobility team assisted in repositioning patient to Left side. 0330-Patient's breathing becoming increasingly labored, as well as gurgling can be heard in the back of the patient's throat. Requested that respiratory therapist deep suction patient. 0345-Respiratory therapist deep suctioned the patient. Removed a large amount of thick, white mucus from the patient's airway. Patient began to desat and had to be placed on a non-rebreather until sats came back up.   Patient has returned to his lethargic state from earlier this evening and is not responding. 0410-Suggested that ABGs be checked. Order placed. 0414- ABG results- pH-7.17, PCO2- 101, PO2-203, Bicarb-36  
 
0423-Pulmonary intensivist paged. Dr. Karen Adams returned page. Gave orders for patient to intubated, propofol to be used for sedation, and ABGs to be rechecked 30 mins after being placed on the ventilator. 3680-Anesthesia paged. Dr. Richard Soares will be intubating. 0443-Patient intubated without difficulty.

## 2018-09-28 NOTE — PROGRESS NOTES
PICC Education: Explained reason and rationale for PICC placement along with providing education in order to make an informed consent including nature, risks, benefits, potential complications, care and maintenance of PICC line. The opportunity for questions or concerns was given. A 'Patient PICC Handbook was also provided. Patient 's wife Ziyad Cartagena gave  consent for PICC procedure to be done at the bedside. Patient 's wife verbalizes understanding at this time. Pt is on ventilator and unable to make decisions. Procedure: 
Time out completed. Pre procedure assessment done. Maximum sterile barrier precautions observed throughout procedure. Lidocaine 1% 5 ml sc given prior to cannulation Cannulated Brachial vein using ultrasound guidance and modified seldinger technique. Inserted dual lumen 5 fr PICC in  Left arm using, [employer] Tip Location System and 3CG Patient has atrial fib rhythm. Blood return verified and flushed with 20ml NS in each port. Sterile dressing applied with Biopatch, Stat loc, occlusive dressing per protocol. Curios caps applied to each port. Reason for access  Replace IJ central line , long term antibiotics Complications related to insertion  None. Patient tolerated procedure well with minimal blood loss. PICC procedure performed by: Will Prado RN, BSN Assisted by: Jina Melchor RN Left  arm circumference: 36 cm. Catheter internal length:  45 Catheter external length: 0 cm katherine out PICC catheter occupies 10% of vein Brand of catheter BARD SOLO POWER PICC, Ref #[id number] Lot# [id number] EXP 2019-11-30. Primary nurse Angelica THOMASON, notified NOT TO USE PICC UNTIL VASCULAR ACCESS TEAM CALLS WITH CONFIRMATIION OF TIP LOCATION BY CXR PER RADIOLOGIST. @ [phone] PICC line placement PICC line with the tip over the distal superior vena cava.  , per Dr. Vedia Dance, radiologist.  Notified primary nurse Eliza Chi RN is in satisfactory position per radiologist and may be used as well as to hang new infusion tubing prior to use. Tawny Brown RN, BSN, [facility] Vascular Access Nurse

## 2018-09-28 NOTE — PROGRESS NOTES
Hospitalist Progress Note NAME: Portia Gutierrez  
:  1934 MRN:  [id number] Assessment / Plan: PEA arrest  Code blue called . Patient w/ agonal breathing after coming back from radiology after arteriogram completed. No pulse detected. CPR initiated. Epi x 2 given. Pulse obtained. Intubated by Dr. Ted White. Much bloody secretions obtained. Transferred to ICU. -head CT  showed small age-indeterminate infarct in the left corona radiata. If there is concern for acute ischemia, consider MRI for further evaluation. Acute respiratory w/ hypoxia, s/p cardiac arrest 
Sepsis due to suspected PNA w/ possible pulmonary hemorrhage 
-CT of chest shows severe emphysema with the right basilar airspace disease in the oval mass with fluid level concerning for internal hemorrhage versus pneumonia. -repeat CT  showed persistent hematoma in the right lower lobe region with surrounding patchy 
atelectasis/infiltrate and small ipsilateral pleural effusion. Left basilar subsegmental atelectasis. Coils in right lower lobe region consistent with recent pulmonary artery branch embolization. 
-Bcx Neg, sputum cytology collected  negative except scant yeast. 
-completed zosyn for possible lung abscess. End date  
-cytology from bronchial washing on  is atypical, favor benign reactive process 
-off pressors, on TF 
-extubated on  and placed on VM 50% BUT REINTUBATED NIGHT OF  
-continue diuresis with lasix 
-continue bronchodilator Hemoptysis 
-noted to have bleeding from RLL on bronchoscopy. -work up for possible rheumatologic cause neg thus far: 
ANCA, anti-GBM, MPO ab, GA-3 ab, SSA/SSB, RNP ab, Arredondo/RNP, dsDNA, and Arredondo ab all negative. JOSS +. 
-Arteriogram : Thoracic aortic and intercostal arteriograms as described above demonstrating no 
enlarged bronchial artery supplying the right lower lobe of the lung.  A normal 
 appearing and normal sized right bronchial artery is seen without any 
hyperplasia or dominant right lower lobe supply. No embolization was performed. RLL mass  
-work up for [employer] neg thus far. -diagnostic bronch done 9/16. No endobronchial lesions seen. -CTA chest done 9/17 due to persistent bloody pulmonary secretions. Right lower lobe masslike abnormality demonstrates increased internal density 
and has increased in size measuring 6.7 x 6.7 cm, compared to 5.7 x 4.4 cm. This 
is compatible with internal hemorrhage. There is new moderate right lower lobe 
partial collapse/atelectasis.   
Normal sized right bronchial artery is visualized without any hyperplasia or or evidence for supply to the right lower lobe lung abnormality. It is unlikely that the hemoptysis related to bronchial arterial injury/supply. Prior bronchial arteriogram was unremarkable. No evidence for pulmonary artery pseudoaneurysm or active hemorrhage. However, it is more likely that the masslike abnormality in the right lower lobe the lung has eroded into an adjacent pulmonary artery then a bronchial artery. Therefore, plan is for pulmonary arteriogram with possible embolization if an abnormality is seen in the right lower lobe. 6 mm and 4 mm right lung nodules. Small bilateral pleural effusions. Minimally enlarged bilateral hilar and mediastinal lymph nodes Hypernatremia: resolved Acute blood loss anemia on admission: stable 
-s/p 3 units PRBC so far 
-monitor labs H/o atrial fibrillation on chronic anticoagulation w/ coumadin 
-amiodarone on hold 
-cardizem on hold due to borderline low bp 
-hold coumadin due to hemoptysis Coumadin-induced coagulopathy Elevated LFT 
-INR 1.2 Holding coumadin. 
-monitor LFT 
-stop statin ALAN - likely prerenal related to Lasix. Renal function stable. 
-avoid nephrotoxic agents. UTI, acute cystitis w/ hematuria, POA 
- treated COPD - stable HLD 
 -statin held due to elevated LFT Obesity - BMI 32 Code: Full DVT prophylaxis: SCDs GI prophylaxis: PPI Subjective: Chief Complaint / Reason for Physician Visit 9/28  Required re-intubation last night. Now on sedation and not responsive because of this. 9/27  Was extubated today but remains on ventimask 50% and is very somnolent and lethargic. Not answering much other than denying chest pain then fell back to sleep. Review of Systems: 
Symptom Y/N Comments  Symptom Y/N Comments Fever/Chills    Chest Pain Poor Appetite    Edema Cough    Abdominal Pain Sputum    Joint Pain SOB/ROY    Pruritis/Rash Nausea/vomit    Tolerating PT/OT Diarrhea    Tolerating Diet Constipation    Other Could NOT obtain due to: sedated Objective: VITALS:  
Last 24hrs VS reviewed since prior progress note. Most recent are: 
Patient Vitals for the past 24 hrs: 
 Temp Pulse Resp BP SpO2  
09/28/18 1600 98.8 °F (37.1 °C) 74 18 101/49 100 % 09/28/18 1538 - 79 17 - 100 % 09/28/18 1500 - 77 15 98/42 99 % 09/28/18 1400 - 73 17 105/49 98 %  
09/28/18 1300 - 78 18 95/45 99 % 09/28/18 1200 99.3 °F (37.4 °C) 83 20 - 97 % 09/28/18 1103 - 76 17 - 96 %  
09/28/18 1100 - 74 19 95/48 97 % 09/28/18 1000 - 77 19 108/43 96 %  
09/28/18 0900 - 81 18 98/50 97 % 09/28/18 0800 100.1 °F (37.8 °C) 84 21 101/50 99 % 09/28/18 0745 100.1 °F (37.8 °C) 72 19 - 99 % 09/28/18 0739 - 78 16 - 100 % 09/28/18 0700 - 82 19 121/55 100 % 09/28/18 0600 98.6 °F (37 °C) 76 16 114/47 98 %  
09/28/18 0500 99.3 °F (37.4 °C) 87 18 111/50 100 % 09/28/18 0452 - - 16 - -  
09/28/18 0447 99.3 °F (37.4 °C) (!) 108 27 130/73 100 % 09/28/18 0400 - 78 (!) 31 161/67 90 % 09/28/18 0337 - - - - 93 % 09/28/18 0300 99 °F (37.2 °C) 81 21 145/45 96 %  
09/28/18 0200 99.1 °F (37.3 °C) 80 28 123/47 98 %  
09/28/18 0100 98.3 °F (36.8 °C) 80 (!) 33 130/56 97 % 09/28/18 0000 98 °F (36.7 °C) 82 28 121/47 98 %  
09/27/18 2328 - - - - 98 %  
09/27/18 2300 97.4 °F (36.3 °C) 78 29 128/57 97 % 09/27/18 2200 98.5 °F (36.9 °C) 69 26 108/45 100 % 09/27/18 2100 98.7 °F (37.1 °C) 77 26 111/42 97 % 09/27/18 2000 99 °F (37.2 °C) 77 26 116/50 96 %  
09/27/18 1919 - - - - 100 % 09/27/18 1900 - - - 106/47 -  
09/27/18 1800 - 88 20 117/50 99 % 09/27/18 1700 - 75 20 113/54 99 % Intake/Output Summary (Last 24 hours) at 09/28/18 1648 Last data filed at 09/28/18 1600 Gross per 24 hour Intake          2167.47 ml Output              475 ml Net          1692.47 ml PHYSICAL EXAM: 
General: sedated Resp:  On vent. Decreased BS bilaterally. CV:  Regular  rhythm,  1+ bilateral leg edema GI:  Soft, Non distended, Non tender.  +Bowel sounds Neurologic:  Sedated. No posturing Psych:   No agitation Skin:  No rashes. No jaundice Reviewed most current lab test results and cultures  YES Reviewed most current radiology test results   YES Review and summation of old records today    NO Reviewed patient's current orders and MAR    YES 
PMH/ reviewed - no change compared to H&P 
________________________________________________________________________ Care Plan discussed with: 
  Comments Patient Family RN x Care Manager Consultant Multidiciplinary team rounds were held today with , nursing, pharmacist and clinical coordinator. Patient's plan of care was discussed; medications were reviewed and discharge planning was addressed. ________________________________________________________________________ Total NON critical care TIME:    Minutes Total CRITICAL CARE TIME Spent:   Minutes non procedure based Comments >50% of visit spent in counseling and coordination of care    
________________________________________________________________________ [place], MD  
 
 Procedures: see electronic medical records for all procedures/Xrays and details which were not copied into this note but were reviewed prior to creation of Plan. LABS: 
I reviewed today's most current labs and imaging studies. Pertinent labs include: No results for input(s): WBC, HGB, HCT, PLT, HGBEXT, HCTEXT, PLTEXT, HGBEXT, HCTEXT, PLTEXT in the last 72 hours. Recent Labs  
   09/28/18 
 0500  09/27/18 
 0425  09/26/18 
 4350 NA  142  143  145  
K  4.8  3.6  3.8 CL  104  106  108 CO2  34*  33*  34* GLU  151*  123*  117* BUN  32*  31*  29* CREA  1.23  1.07  0.95  
CA  8.2*  8.3*  8.2* MG  2.6*   --    --   
PHOS  4.4  2.9  2.8 ALB  2.1*  1.9*  1.9* TBILI  0.4  0.4  0.4 SGOT  77*  88*  133* ALT  98*  102*  139* INR  1.1  1.2*  1.1 Signed: Eder Chand MD

## 2018-09-28 NOTE — PROGRESS NOTES
[facility] Consult Service NotePulmonary, Critical Care, and Sleep Medicine Name: Portia Gutierrez MRN: [id number] : 1934 Hospital: [facility] Date: 2018   Hospital Day: 25 Admitted with hemoptysis  S/P pulmonary artery embolization  old blood in ETT. Propofol has been stopped as of this AM. Unresponsive. Anasarca. D/w nursing at bedside. No fever. On TF. Overton was clogged, flushed and now patent.  good diuresis. Weak. Intermittently responds. No new hemoptysis on vent. K 3.8. LFTs up. Normal Bili 
 
 BUN/ Cr slightly up  After good diuresis. Weak. Intermittently responds. Thick secretions. No new hemoptysis on vent. K 3.5. LFTs up. Normal Bili. Hgb 7.5 
 
 onvent. Will open eyes, moves fingers and toes. No more hemoptysis.  Creatinine slightly better. dicsussed yesterday's chest and head Ct findings with wife an daughter. Anasraca. Weak despite Na better. No more hemoptysis  bronch yesterday without residual clot or plug. Still has copious oral sectreions despite scopolamine pathc. Opening eyes, sticks out tongue and trying to move more. D/w nursing. passing SAT, SBt. Weak cough. Scant ET secretions  extubated yesterday mid day. Sluggish rest of day. 4 AM ABg showed PCO2 > 100. Reintubated. Small mucous plug. Pt still sluggish. Drooling 2:30 PM spoke to wife, daughters at bedside and discussed management. Slow progress this week regarding responsiveness and awakening but last night demonstrated how pt still too weak to breathe entirely off the vent for long periods. Volume is an issue. Mentation is sluggish and cannot be blamed on COPD. When on vent and CO2 was normal he was still somnolent. He will need trach and PEG for safety, comfort and continued support. Am hopeful he can rally but no guarantees.  Of course he still has a RLL lung mass but for now, happy he has stopped bleeding. They will see how he does and discuss prospects but I endorsed trach, PEG and perhaps even LTACH if recovery remains slow. They will now be better prepared to discuss above with team next week IMPRESSION:  
1. Acute respiratory failure-on vent- not ready to liberate- would like to see imrpoved neurostatus 2. IR performed embolization of RLL pulmonary artery 9/17 
3. S/p PEA arrest post anigo 9/14 pm. Had 6 min of CPR and then ROSC. 4. Acute hemoptysis- Noted to have bleeding from RLL on Bronch. No endobronchial lesion. has lung mass(CA vs inflammatory pseudotumor) and microscopic hematuria; ANCA negative; JOSS barely positive. Suspect LUNG mass the culprit. Bronch will not help; CYTOLOGY negative malignancy x 2 
5. COPD moderate to severe at baseline but no exacerbation or hospitalizations for many yrs - not actively exacerbated 6. HYpernatremia- better 7. Volume overload, anasarca- after couple of days of IV lasix- becomes prerenal 
8. Severe hypoalbuminemia, cattabolic, malnutrition- third spacing 9. encehalopathy multifactorial - ssomnolent but arousable- has hearingl oss as well 10. Head CT small possible left CVA but age ? ? Significant and relevance? ? No bleeed which is good; following commands and moves fingers, toes 11. Elevated procalcitonin- treating for possible lung abscess/pseudotumor 12. malnutrition 13. Increasing LFTs- better 14. Lung mass RLL- right lung base- would not be visible on conventional bronch and yield from transbronchial biopsy not guaranteed; had no lesion on chest Ct Jan 2016; cytology x 2 negative for malignant cells; repeat chest CT scan 9/25 no reall change though slightly smaller? Has small effusion as well. Family aware still could be cancer 15. Coagulopathy from warfarin per , hold anticoagulation for now.   
16. Acute kidney injury likely prerenal and related to medications (Lasix) Baseline creatinine is around 0.9- no worse 17. Hematuria normal serologies 18. Enechalopathy- High NA not helping 19. Volume overload- over 10 liters positive, was on Lasix at home; anasarca 20. Sepsis due to suspected pneumonia. Patient reports having exertional shortness of breath along with cough and hemoptysis; CT of chest shows severe emphysema with the right basilar airspace disease in the oval mass with fluid level concerning for internal hemorrhage versus pneumonia ;  
21. 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  
22. Anemia 23. Former 50+ pack yr smoker quit in 2015? 
24. Asbestos exposure 25. History of atrial fibrillation on anticoagulation with warfarin 26. Hx of CAD s/p PCI in the past home amiodarone. Hold Cardizem 27. Dyslipidemia 28. Multiorgan dysfunction as outlined above: Pt has one or more acute or chronic illnesses with severe exacerbation with progression or side effects of treatment that poses a threat to life or bodily function 29. Additional workup outlined below 30. Pt is requiring Drug therapy requiring intensive monitoring for toxicity 31. Pt is unstable, unpredictable needing PCU monitoring; is critically ill and at high risk of sudden decline and decompensation with life threatening consequenses and continued end organ dysfunction and failure 32. Prognosis guarded with significant risk of sudden decline 33. Critically ill, 35 min cc, EOP. Discussed with nurse this am. High risk of decompensation. RECOMMENDATIONS/PLAN:  
1. CCU 2. Vent support 3. Low dose Adderall 4. Scopolamine and add robinul prn 5. Resume Tube feedings via NGT 6. Reorient 7. IV robinul prn 
8. diuresis - change to everyother day 9. Follow renal function-  
10. Will d/w wife on phone this AM 
11. Stop pravachol due to LFTs 12. Enteral feedings plus free water, follow Na 
13.  Follow neuro status, 
 14. Transfuse prn to keep Hgb > 7 
15. Hold anticoagulation for now. SCDS. 16. off pressors 17. Empiric abx completed to treat possible lung abscess - not seen on CXR but present on CT scan- 
18. Follow cultures 19. Labs to follow electrolytes, renal function and and blood counts 20. Glucose monitoring and SSI 21. Bronchial hygiene with respiratory therapy techniques, bronchodilators 22. Resume lovenox for DVT, SUP prophylaxis 23. Pt needs IV fluids with additives and Drug therapy requiring intensive monitoring for toxicity 24. Prescription drug management with home med reconciliation reviewed My assessment/management discussed with: Consultants, Nursing, Pharmacy, Case Management, PT, OT, Respiratory Therapy, Hospitalist and Family for coordination of care Pt's condition is acute and unstable requiring inpatient hospitalization. This care involved high complexity decision making which includes independently reviewing the patient's past medical records, current laboratory results, medication profiles that were immediately available to me and actual Xray images at the bedside in order to assess, support vital system function, and to treat this degree of vital organ system failure, and to prevent further deterioration of the patients condition. Risk of deterioration: high  
[x] High complexity decision making was performed [x] See my orders for details Tubes:    
 
Subjective/Initial History: MAR reviewed and pertinent medications noted or modified as needed Current Facility-Administered Medications Medication  propofol (DIPRIVAN) infusion  methylphenidate HCl (RITALIN) tablet 5 mg  furosemide (LASIX) injection 40 mg  
 scopolamine (TRANSDERM-SCOP) 1 mg over 3 days 1 Patch  
 fentaNYL citrate (PF) injection 25-50 mcg  
 0.45% sodium chloride infusion  famotidine (PF) (PEPCID) 20 mg in sodium chloride 0.9% 10 mL injection  0.9% sodium chloride infusion 250 mL  chlorhexidine (PERIDEX) 0.12 % mouthwash 15 mL  albuterol (PROVENTIL VENTOLIN) nebulizer solution 2.5 mg  
 fentaNYL citrate (PF) injection 25 mcg  albuterol (PROVENTIL HFA, VENTOLIN HFA, PROAIR HFA) inhaler 2 Puff  dorzolamide-timolol (COSOPT) 22.3-6.8 mg/mL ophthalmic solution 1 Drop  latanoprost (XALATAN) 0.005 % ophthalmic solution 1 Drop  sodium chloride (NS) flush 5-10 mL  sodium chloride (NS) flush 5-10 mL  acetaminophen (TYLENOL) tablet 650 mg  
 ondansetron (ZOFRAN) injection 4 mg  docusate sodium (COLACE) capsule 100 mg  
  
 
  
ROS:A comprehensive review of systems was negative except for that written in the HPI. Objective: 
 
Vital Signs: Telemetry:    normal sinus rhythmIntake/Output:  
Visit Vitals  /47 (BP 1 Location: Left arm, BP Patient Position: At rest)  Pulse 76  Temp 98.6 °F (37 °C)  Resp 16  
 Ht 6' 2\" (1.88 m)  Wt 111.4 kg (245 lb 9.5 oz)  SpO2 98%  BMI 31.53 kg/m2 Temp (24hrs), Av.8 °F (37.1 °C), Min:97.4 °F (36.3 °C), Max:99.9 °F (37.7 °C) O2 Device: Ventilator O2 Flow Rate (L/min): 12 l/min Wt Readings from Last 4 Encounters:  
18 111.4 kg (245 lb 9.5 oz) 16 111.1 kg (245 lb)  
16 106.6 kg (235 lb) 04/08/15 103.4 kg (228 lb) Intake/Output Summary (Last 24 hours) at 18 2193 Last data filed at 18 0400 Gross per 24 hour Intake          1851.67 ml Output             1310 ml Net           541.67 ml Last shift:        
Last 3 shifts:  1901 -  0700 In: 5391.7 [I.V.:1251.7] Out: Leif Oreilly [[id number]] Physical Exam:  
 General:   male; with Et tube in place. Has right IJ CVC. On sedation and on vent. HEAD: Normocephalic, without obvious abnormality, atraumatic EYES: conjunctivae clear. PERRL,  AN Icteric sclerae  NOSE: nares normal, no drainage, no nasal flaring,  
 THROAT: normal; Lips, mucosa drooling;  crowded airway; tongue midline Neck: Supple, symmetrical, trachea midline,  No accessory mm use; No Stridor/ cuff leak, No goiter or thyroid tenderness LYMPH: No abnormally enlarged lymph nodes. in neck or groin Chest: increased AP diameter Lungs: agonal, bilateral rhonchi. Seems to have good air  Movement Bilaterally. Heart: Regular rate and rhythm; NO edema Abdomen: soft, non-tender, without masses or organomegaly, protuberant, distended : no Overton Musculoskeletal: anasarca; no erythema Neuro: was not  Responsive, had a gag with ET tube suctioning. , not able to fully assess. Psych: Not able to assess; no agitation. Skin: Pallor;  
 Pulses:Bilateral, Radial, 2+ Data:  
 
All lab results for the last 24 hours reviewed. Labs: 
 
Recent Labs  
   09/28/18 
 0500  09/27/18 
 0425  09/26/18 
 2441 INR  1.1  1.2*  1.1 APTT  26.8  27.9  26.1 Recent Labs  
   09/28/18 
 0500  09/27/18 
 0425  09/26/18 
 8884 NA  142  143  145  
K  4.8  3.6  3.8 CL  104  106  108 CO2  34*  33*  34* GLU  151*  123*  117* BUN  32*  31*  29* CREA  1.23  1.07  0.95  
CA  8.2*  8.3*  8.2* MG  2.6*   --    --   
PHOS  4.4  2.9  2.8 ALB  2.1*  1.9*  1.9*  
SGOT  77*  88*  133* ALT  98*  102*  139* Recent Labs  
   09/28/18 
 0540  09/28/18 
 0410  09/27/18 
 1015 PH  7.30*  7.17*  7.42  
PCO2  69*  101*  52* PO2  306*  203*  86 HCO3  33*  36*  33* FIO2  100  100  40 No results for input(s): CPK, CKNDX, TROIQ in the last 72 hours. No lab exists for component: CPKMB No results found for: BNPP, BNP Lab Results Component Value Date/Time Culture result: NO FUNGUS ISOLATED 8 DAYS 09/16/2018 11:18 AM  
 Culture result: HEAVY NORMAL RESPIRATORY SHELDON 09/16/2018 11:15 AM  
 Culture result: LIGHT NORMAL RESPIRATORY SHELDON 09/15/2018 01:20 PM  
No results found for: TSH, TSHEXT, TSHEXT Imaging: CXR: no acute changes I have personally and independently reviewed the patients interval and diagnostic data, radiographs and have reviewed the reports. Medical Decision Making Today: · Reviewed the flowsheet and previous days notes · Reviewed and summarized records or history from previous days note or discussions with staff, family · Parenteral controlled substances - Reviewed/ Adjusted / Weaned / Started · High Risk Drug therapy requiring intensive monitoring for toxicity: eg steroids, pressors, antibiotics · Reviewed and/or ordered Clinical lab tests · Reviewed and/or ordered Radiology tests · Reviewed and/or ordered of Medicine tests · Independently visualized radiologic Images · I have personally reviewed the patients ECG / Telemetry Stevo Ramirez MD

## 2018-09-28 NOTE — PROCEDURES
[facility] Consult Service Progress NOTE  Pulmonary, Critical Care, and Sleep Medicine    Name: Dwain Walker MRN: [id number]   : 1934 Hospital: [facility]   Date: 2018  Admission Date: 2018       Bronchoscopy Report    Procedure: Diagnostic bronchoscopy. Indication: Abnormal chest imaging and Hemoptysis    Consent/Treatment: Informed consent was obtained from the  family after risks, benefits and alternatives were explained. Timeout verified the correct patient and correct procedure. Anesthesia:   Patient on ventilator and receiving  prn sedation  Fentanyl 100 mcg IV    Moderate conscious sedation was administered by the endoscopy nurse and was personally supervised by myself the bronchoscopist. The following parameters were monitored: Oxygen saturation, heart, blood pressure, respiratory rate, EKG, as well as adequacy of pulmonary ventilation and response to care. Total physician intraservice time was over 30 minutes. Details can be found in the procedural flowsheet. Procedure Details:   -- The bronchoscope was introduced through an endotracheal tube. -- The vocal cords not seen. -- The trachea and susy were completely inspected and were found to be normal.  -- The right-sided endobronchial anatomy was completely inspected and with few mucoid secretions. -- The left-sided endobronchial anatomy was completely inspected and with few mucoid secretions,   No old or active bleeding seen.      Specimens:   none    Rapid On-Site Evaluation: NA    Complications: none    Estimated Blood Loss: none    Kenny Mercado MD

## 2018-09-29 ENCOUNTER — APPOINTMENT (OUTPATIENT)
Dept: GENERAL RADIOLOGY | Age: 83
DRG: 003 | End: 2018-09-29
Attending: INTERNAL MEDICINE
Payer: MEDICARE

## 2018-09-29 LAB
ALBUMIN SERPL-MCNC: 1.9 G/DL (ref 3.5–5)
ALBUMIN/GLOB SERPL: 0.4 {RATIO} (ref 1.1–2.2)
ALP SERPL-CCNC: 131 U/L (ref 45–117)
ALT SERPL-CCNC: 75 U/L (ref 12–78)
ANION GAP SERPL CALC-SCNC: 2 MMOL/L (ref 5–15)
APTT PPP: 27.1 SEC (ref 22.1–32)
AST SERPL-CCNC: 65 U/L (ref 15–37)
BASOPHILS # BLD: 0 K/UL (ref 0–0.1)
BASOPHILS # BLD: 0.1 K/UL (ref 0–0.1)
BASOPHILS NFR BLD: 0 % (ref 0–1)
BASOPHILS NFR BLD: 1 % (ref 0–1)
BILIRUB SERPL-MCNC: 0.3 MG/DL (ref 0.2–1)
BUN SERPL-MCNC: 33 MG/DL (ref 6–20)
BUN/CREAT SERPL: 28 (ref 12–20)
CALCIUM SERPL-MCNC: 8.2 MG/DL (ref 8.5–10.1)
CHLORIDE SERPL-SCNC: 106 MMOL/L (ref 97–108)
CO2 SERPL-SCNC: 34 MMOL/L (ref 21–32)
CREAT SERPL-MCNC: 1.17 MG/DL (ref 0.7–1.3)
DIFFERENTIAL METHOD BLD: ABNORMAL
DIFFERENTIAL METHOD BLD: ABNORMAL
EOSINOPHIL # BLD: 0.2 K/UL (ref 0–0.4)
EOSINOPHIL # BLD: 0.2 K/UL (ref 0–0.4)
EOSINOPHIL NFR BLD: 2 % (ref 0–7)
EOSINOPHIL NFR BLD: 3 % (ref 0–7)
ERYTHROCYTE [DISTWIDTH] IN BLOOD BY AUTOMATED COUNT: 20.1 % (ref 11.5–14.5)
ERYTHROCYTE [DISTWIDTH] IN BLOOD BY AUTOMATED COUNT: 22 % (ref 11.5–14.5)
FIBRINOGEN PPP-MCNC: 757 MG/DL (ref 200–475)
GLOBULIN SER CALC-MCNC: 5 G/DL (ref 2–4)
GLUCOSE SERPL-MCNC: 127 MG/DL (ref 65–100)
HCT VFR BLD AUTO: 20.3 % (ref 36.6–50.3)
HCT VFR BLD AUTO: 21.2 % (ref 36.6–50.3)
HCT VFR BLD AUTO: 23.5 % (ref 36.6–50.3)
HGB BLD-MCNC: 6 G/DL (ref 12.1–17)
HGB BLD-MCNC: 6.2 G/DL (ref 12.1–17)
HGB BLD-MCNC: 7.1 G/DL (ref 12.1–17)
IMM GRANULOCYTES # BLD: 0.1 K/UL (ref 0–0.04)
IMM GRANULOCYTES # BLD: 0.1 K/UL (ref 0–0.04)
IMM GRANULOCYTES NFR BLD AUTO: 1 % (ref 0–0.5)
IMM GRANULOCYTES NFR BLD AUTO: 2 % (ref 0–0.5)
INR PPP: 1.1 (ref 0.9–1.1)
LYMPHOCYTES # BLD: 0.6 K/UL (ref 0.8–3.5)
LYMPHOCYTES # BLD: 0.6 K/UL (ref 0.8–3.5)
LYMPHOCYTES NFR BLD: 8 % (ref 12–49)
LYMPHOCYTES NFR BLD: 9 % (ref 12–49)
MCH RBC QN AUTO: 31.8 PG (ref 26–34)
MCH RBC QN AUTO: 32.3 PG (ref 26–34)
MCHC RBC AUTO-ENTMCNC: 29.2 G/DL (ref 30–36.5)
MCHC RBC AUTO-ENTMCNC: 30.2 G/DL (ref 30–36.5)
MCV RBC AUTO: 105.4 FL (ref 80–99)
MCV RBC AUTO: 110.4 FL (ref 80–99)
MONOCYTES # BLD: 0.7 K/UL (ref 0–1)
MONOCYTES # BLD: 0.7 K/UL (ref 0–1)
MONOCYTES NFR BLD: 11 % (ref 5–13)
MONOCYTES NFR BLD: 9 % (ref 5–13)
NEUTS SEG # BLD: 4.9 K/UL (ref 1.8–8)
NEUTS SEG # BLD: 6.1 K/UL (ref 1.8–8)
NEUTS SEG NFR BLD: 74 % (ref 32–75)
NEUTS SEG NFR BLD: 80 % (ref 32–75)
NRBC # BLD: 0 K/UL (ref 0–0.01)
NRBC # BLD: 0 K/UL (ref 0–0.01)
NRBC BLD-RTO: 0 PER 100 WBC
NRBC BLD-RTO: 0 PER 100 WBC
PHOSPHATE SERPL-MCNC: 1.9 MG/DL (ref 2.6–4.7)
PLATELET # BLD AUTO: 143 K/UL (ref 150–400)
PLATELET # BLD AUTO: 162 K/UL (ref 150–400)
PMV BLD AUTO: 12.4 FL (ref 8.9–12.9)
PMV BLD AUTO: 12.6 FL (ref 8.9–12.9)
POTASSIUM SERPL-SCNC: 4.1 MMOL/L (ref 3.5–5.1)
PROT SERPL-MCNC: 6.9 G/DL (ref 6.4–8.2)
PROTHROMBIN TIME: 11.3 SEC (ref 9–11.1)
RBC # BLD AUTO: 1.92 M/UL (ref 4.1–5.7)
RBC # BLD AUTO: 2.23 M/UL (ref 4.1–5.7)
RBC MORPH BLD: ABNORMAL
SODIUM SERPL-SCNC: 142 MMOL/L (ref 136–145)
THERAPEUTIC RANGE,PTTT: ABNORMAL SECS (ref 58–77)
WBC # BLD AUTO: 6.6 K/UL (ref 4.1–11.1)
WBC # BLD AUTO: 7.7 K/UL (ref 4.1–11.1)

## 2018-09-29 PROCEDURE — 85018 HEMOGLOBIN: CPT | Performed by: INTERNAL MEDICINE

## 2018-09-29 PROCEDURE — 36592 COLLECT BLOOD FROM PICC: CPT

## 2018-09-29 PROCEDURE — 85610 PROTHROMBIN TIME: CPT | Performed by: INTERNAL MEDICINE

## 2018-09-29 PROCEDURE — 86923 COMPATIBILITY TEST ELECTRIC: CPT | Performed by: INTERNAL MEDICINE

## 2018-09-29 PROCEDURE — 74011250636 HC RX REV CODE- 250/636: Performed by: INTERNAL MEDICINE

## 2018-09-29 PROCEDURE — 71045 X-RAY EXAM CHEST 1 VIEW: CPT

## 2018-09-29 PROCEDURE — 94003 VENT MGMT INPAT SUBQ DAY: CPT

## 2018-09-29 PROCEDURE — 84100 ASSAY OF PHOSPHORUS: CPT | Performed by: INTERNAL MEDICINE

## 2018-09-29 PROCEDURE — 74011250636 HC RX REV CODE- 250/636: Performed by: HOSPITALIST

## 2018-09-29 PROCEDURE — 65620000000 HC RM CCU GENERAL

## 2018-09-29 PROCEDURE — 36430 TRANSFUSION BLD/BLD COMPNT: CPT

## 2018-09-29 PROCEDURE — 74011000250 HC RX REV CODE- 250: Performed by: INTERNAL MEDICINE

## 2018-09-29 PROCEDURE — 74011250637 HC RX REV CODE- 250/637: Performed by: INTERNAL MEDICINE

## 2018-09-29 PROCEDURE — 86901 BLOOD TYPING SEROLOGIC RH(D): CPT | Performed by: INTERNAL MEDICINE

## 2018-09-29 PROCEDURE — 94640 AIRWAY INHALATION TREATMENT: CPT

## 2018-09-29 PROCEDURE — 77030026918 HC ADMN ST IV BLD BD -A

## 2018-09-29 PROCEDURE — 85025 COMPLETE CBC W/AUTO DIFF WBC: CPT | Performed by: INTERNAL MEDICINE

## 2018-09-29 PROCEDURE — 36415 COLL VENOUS BLD VENIPUNCTURE: CPT | Performed by: INTERNAL MEDICINE

## 2018-09-29 PROCEDURE — P9016 RBC LEUKOCYTES REDUCED: HCPCS | Performed by: INTERNAL MEDICINE

## 2018-09-29 PROCEDURE — 80053 COMPREHEN METABOLIC PANEL: CPT | Performed by: INTERNAL MEDICINE

## 2018-09-29 RX ORDER — SODIUM CHLORIDE 9 MG/ML
250 INJECTION, SOLUTION INTRAVENOUS AS NEEDED
Status: DISCONTINUED | OUTPATIENT
Start: 2018-09-29 | End: 2018-09-30

## 2018-09-29 RX ADMIN — CHLORHEXIDINE GLUCONATE 15 ML: 1.2 RINSE ORAL at 08:49

## 2018-09-29 RX ADMIN — Medication 10 ML: at 07:16

## 2018-09-29 RX ADMIN — Medication 10 ML: at 22:39

## 2018-09-29 RX ADMIN — ALBUTEROL SULFATE 2.5 MG: 2.5 SOLUTION RESPIRATORY (INHALATION) at 11:20

## 2018-09-29 RX ADMIN — ALBUTEROL SULFATE 2.5 MG: 2.5 SOLUTION RESPIRATORY (INHALATION) at 00:58

## 2018-09-29 RX ADMIN — CHLORHEXIDINE GLUCONATE 15 ML: 1.2 RINSE ORAL at 20:03

## 2018-09-29 RX ADMIN — LATANOPROST 1 DROP: 50 SOLUTION OPHTHALMIC at 22:39

## 2018-09-29 RX ADMIN — PROPOFOL 10 MCG/KG/MIN: 10 INJECTION, EMULSION INTRAVENOUS at 06:19

## 2018-09-29 RX ADMIN — PROPOFOL 30 MCG/KG/MIN: 10 INJECTION, EMULSION INTRAVENOUS at 09:04

## 2018-09-29 RX ADMIN — ALBUTEROL SULFATE 2.5 MG: 2.5 SOLUTION RESPIRATORY (INHALATION) at 15:49

## 2018-09-29 RX ADMIN — METHYLPHENIDATE HYDROCHLORIDE 5 MG: 5 TABLET ORAL at 16:17

## 2018-09-29 RX ADMIN — ALBUTEROL SULFATE 2.5 MG: 2.5 SOLUTION RESPIRATORY (INHALATION) at 07:56

## 2018-09-29 RX ADMIN — Medication 20 ML: at 10:36

## 2018-09-29 RX ADMIN — ALBUTEROL SULFATE 2.5 MG: 2.5 SOLUTION RESPIRATORY (INHALATION) at 19:02

## 2018-09-29 RX ADMIN — DORZOLAMIDE HYDROCHLORIDE AND TIMOLOL MALEATE 1 DROP: 20; 5 SOLUTION/ DROPS OPHTHALMIC at 17:05

## 2018-09-29 RX ADMIN — FAMOTIDINE 20 MG: 10 INJECTION, SOLUTION INTRAVENOUS at 08:55

## 2018-09-29 RX ADMIN — FAMOTIDINE 20 MG: 10 INJECTION, SOLUTION INTRAVENOUS at 20:07

## 2018-09-29 RX ADMIN — PROPOFOL 30 MCG/KG/MIN: 10 INJECTION, EMULSION INTRAVENOUS at 12:19

## 2018-09-29 RX ADMIN — SODIUM CHLORIDE 250 ML: 900 INJECTION, SOLUTION INTRAVENOUS at 16:35

## 2018-09-29 RX ADMIN — Medication 10 ML: at 15:02

## 2018-09-29 RX ADMIN — PROPOFOL 20 MCG/KG/MIN: 10 INJECTION, EMULSION INTRAVENOUS at 20:02

## 2018-09-29 RX ADMIN — Medication 10 ML: at 10:36

## 2018-09-29 RX ADMIN — FUROSEMIDE 40 MG: 10 INJECTION, SOLUTION INTRAMUSCULAR; INTRAVENOUS at 12:11

## 2018-09-29 RX ADMIN — ALBUTEROL SULFATE 2.5 MG: 2.5 SOLUTION RESPIRATORY (INHALATION) at 05:00

## 2018-09-29 RX ADMIN — METHYLPHENIDATE HYDROCHLORIDE 5 MG: 5 TABLET ORAL at 08:57

## 2018-09-29 RX ADMIN — PROPOFOL 30 MCG/KG/MIN: 10 INJECTION, EMULSION INTRAVENOUS at 17:05

## 2018-09-29 RX ADMIN — GLYCOPYRROLATE 0.1 MG: 0.2 INJECTION, SOLUTION INTRAMUSCULAR; INTRAVENOUS at 00:42

## 2018-09-29 RX ADMIN — SODIUM CHLORIDE 250 ML: 900 INJECTION, SOLUTION INTRAVENOUS at 17:00

## 2018-09-29 RX ADMIN — DORZOLAMIDE HYDROCHLORIDE AND TIMOLOL MALEATE 1 DROP: 20; 5 SOLUTION/ DROPS OPHTHALMIC at 08:48

## 2018-09-29 NOTE — PROGRESS NOTES
[facility] Pulmonary Consult Service NotePulmonary, Critical Care, and Sleep Medicine Name: Mikayla Valdes MRN: [id number] : 1934 Hospital: [facility] Date: 2018   Hospital Day: 23 Admitted with hemoptysis  S/P pulmonary artery embolization  old blood in ETT. Propofol has been stopped as of this AM. Unresponsive. Anasarca. D/w nursing at bedside. No fever. On TF. Overton was clogged, flushed and now patent.  good diuresis. Weak. Intermittently responds. No new hemoptysis on vent. K 3.8. LFTs up. Normal Bili 
 
 BUN/ Cr slightly up  After good diuresis. Weak. Intermittently responds. Thick secretions. No new hemoptysis on vent. K 3.5. LFTs up. Normal Bili. Hgb 7.5 
 
 onvent. Will open eyes, moves fingers and toes. No more hemoptysis.  Creatinine slightly better. dicsussed yesterday's chest and head Ct findings with wife an daughter. Anasraca. Weak despite Na better. No more hemoptysis  bronch yesterday without residual clot or plug. Still has copious oral sectreions despite scopolamine pathc. Opening eyes, sticks out tongue and trying to move more. D/w nursing. passing SAT, SBt. Weak cough. Scant ET secretions  extubated yesterday mid day. Sluggish rest of day. 4 AM ABg showed PCO2 > 100. Reintubated. Small mucous plug. Pt still sluggish. Drooling 2:30 PM spoke to wife, daughters at bedside and discussed management. Slow progress this week regarding responsiveness and awakening but last night demonstrated how pt still too weak to breathe entirely off the vent for long periods. Volume is an issue. Mentation is sluggish and cannot be blamed on COPD. When on vent and CO2 was normal he was still somnolent. He will need trach and PEG for safety, comfort and continued support. Am hopeful he can rally but no guarantees.  Of course he still has a RLL lung mass but for now, happy he has stopped bleeding. They will see how he does and discuss prospects but I endorsed trach, PEG and perhaps even LTACH if recovery remains slow. They will now be better prepared to discuss above with team next week IMPRESSION:  
1. Acute respiratory failure-on vent-  
2. Anemia 3. IR performed embolization of RLL pulmonary artery 9/17 
4. S/p PEA arrest post anigo 9/14 pm. Had 6 min of CPR and then ROSC. 5. Acute hemoptysis- Noted to have bleeding from RLL on Bronch. No endobronchial lesion. has lung mass(CA vs inflammatory pseudotumor) and microscopic hematuria; ANCA negative; JOSS barely positive. Suspect LUNG mass the culprit. Bronch will not help; CYTOLOGY negative malignancy x 2 
6. COPD moderate to severe at baseline 7. Volume overload, anasarca- after couple of days of IV lasix- becomes prerenal 
8. encehalopathy multifactorial - ssomnolent but arousable- has hearingl oss as well 9. Coagulopathy from warfarin per , hold anticoagulation for now. 10. Acute kidney injury likely prerenal and related to medications (Lasix) Baseline creatinine is around 0.9- no worse 11. Anemia 12. Hx of CAD s/p PCI, afib on amio, former smoker, prior cva 13. Multiorgan dysfunction as outlined above: Pt has one or more acute or chronic illnesses with severe exacerbation with progression or side effects of treatment that poses a threat to life or bodily function 14. Additional workup outlined below 15. Pt is requiring Drug therapy requiring intensive monitoring for toxicity 16. Pt is unstable, unpredictable needing PCU monitoring; is critically ill and at high risk of sudden decline and decompensation with life threatening consequenses and continued end organ dysfunction and failure 17. Prognosis guarded with significant risk of sudden decline 18. Critically ill, 30 min cc, EOP. Discussed with nurse this am. High risk of decompensation.    
  
RECOMMENDATIONS/PLAN:  
 1. Blood transfusion 2. Vent support 3. Low dose Adderall 4. Scopolamine and add robinul prn 
5. diuresis - change to everyother day 6. Follow renal function-  
7. Enteral feedings plus free water, follow Na 
8. Follow neuro status, 
9. Hold anticoagulation for now. SCDS. 10. off pressors 11. Empiric abx completed to treat possible lung abscess - not seen on CXR but present on CT scan- 
12. Follow cultures 13. Labs to follow electrolytes, renal function and and blood counts 14. Glucose monitoring and SSI 15. Bronchial hygiene with respiratory therapy techniques, bronchodilators 16. Resume lovenox for DVT, SUP prophylaxis 17. Pt needs IV fluids with additives and Drug therapy requiring intensive monitoring for toxicity 18. Prescription drug management with home med reconciliation reviewed My assessment/management discussed with: Consultants, Nursing, Pharmacy, Case Management, PT, OT, Respiratory Therapy, Hospitalist and Family for coordination of care Pt's condition is acute and unstable requiring inpatient hospitalization. This care involved high complexity decision making which includes independently reviewing the patient's past medical records, current laboratory results, medication profiles that were immediately available to me and actual Xray images at the bedside in order to assess, support vital system function, and to treat this degree of vital organ system failure, and to prevent further deterioration of the patients condition. Risk of deterioration: high  
[x] High complexity decision making was performed [x] See my orders for details Tubes:    
 
Subjective/Initial History: MAR reviewed and pertinent medications noted or modified as needed Current Facility-Administered Medications Medication  0.9% sodium chloride infusion 250 mL  methylphenidate HCl (RITALIN) tablet 5 mg  furosemide (LASIX) injection 40 mg  
  enoxaparin (LOVENOX) injection 40 mg  
 sodium chloride (NS) flush 10-30 mL  sodium chloride (NS) flush 10 mL  sodium chloride (NS) flush 10 mL  sodium chloride (NS) flush 10-40 mL  sodium chloride (NS) flush 20 mL  heparin (porcine) pf 300 Units  propofol (DIPRIVAN) infusion  glycopyrrolate (ROBINUL) injection 0.1 mg  
 scopolamine (TRANSDERM-SCOP) 1 mg over 3 days 1 Patch  
 fentaNYL citrate (PF) injection 25-50 mcg  
 0.45% sodium chloride infusion  famotidine (PF) (PEPCID) 20 mg in sodium chloride 0.9% 10 mL injection  0.9% sodium chloride infusion 250 mL  chlorhexidine (PERIDEX) 0.12 % mouthwash 15 mL  albuterol (PROVENTIL VENTOLIN) nebulizer solution 2.5 mg  
 fentaNYL citrate (PF) injection 25 mcg  albuterol (PROVENTIL HFA, VENTOLIN HFA, PROAIR HFA) inhaler 2 Puff  dorzolamide-timolol (COSOPT) 22.3-6.8 mg/mL ophthalmic solution 1 Drop  latanoprost (XALATAN) 0.005 % ophthalmic solution 1 Drop  sodium chloride (NS) flush 5-10 mL  sodium chloride (NS) flush 5-10 mL  acetaminophen (TYLENOL) tablet 650 mg  
 ondansetron (ZOFRAN) injection 4 mg  docusate sodium (COLACE) capsule 100 mg  
  
 
  
ROS:A comprehensive review of systems was negative except for that written in the HPI. Objective: 
 
Vital Signs: Telemetry:    normal sinus rhythmIntake/Output:  
Visit Vitals  BP 90/53  Pulse 72  Temp 99.2 °F (37.3 °C)  Resp 23  
 Ht 6' 2\" (1.88 m)  Wt 113.8 kg (250 lb 14.1 oz)  SpO2 97%  BMI 32.21 kg/m2 Temp (24hrs), Av.9 °F (37.2 °C), Min:98 °F (36.7 °C), Max:99.3 °F (37.4 °C) O2 Device: Endotracheal tube, Ventilator O2 Flow Rate (L/min): 12 l/min Wt Readings from Last 4 Encounters:  
18 113.8 kg (250 lb 14.1 oz) 16 111.1 kg (245 lb)  
03/25/16 106.6 kg (235 lb) 04/08/15 103.4 kg (228 lb) Intake/Output Summary (Last 24 hours) at 18 6837 Last data filed at 18 8363 Gross per 24 hour Intake          1911.19 ml Output              640 ml Net          1271.19 ml Last shift:      09/29 0701 - 09/29 1900 In: -  
Out: 142 [MQFEF:136] Last 3 shifts: 09/27 1901 - 09/29 0700 In: 3634.9 [I.V.:1129.9] Out: 620 [Urine:570; Drains:50] Physical Exam:  
 General:   male; with Et tube in place. Has right IJ CVC. On sedation and on vent. HEAD: Normocephalic, without obvious abnormality, atraumatic EYES: conjunctivae clear. PERRL,  AN Icteric sclerae NOSE: nares normal, no drainage, no nasal flaring, THROAT: normal; Lips, mucosa drooling;  crowded airway; tongue midline Neck: Supple, symmetrical, trachea midline,  No accessory mm use; No Stridor/ cuff leak, No goiter or thyroid tenderness LYMPH: No abnormally enlarged lymph nodes. in neck or groin Chest: increased AP diameter Lungs: agonal, bilateral rhonchi. Seems to have good air  Movement Bilaterally. Heart: Regular rate and rhythm; NO edema Abdomen: soft, non-tender, without masses or organomegaly, protuberant, distended : no Overton Musculoskeletal: anasarca; no erythema Neuro: was not  Responsive, had a gag with ET tube suctioning. , not able to fully assess. Psych: Not able to assess; no agitation. Skin: Pallor;  
 Pulses:Bilateral, Radial, 2+ Data:  
 
All lab results for the last 24 hours reviewed. Labs: 
 
Recent Labs  
   09/29/18 
 0735  09/29/18 0340 09/28/18 
 0500 09/27/18 
 0425 WBC   --   7.7   --    --   
HGB  6.0*  6.2*   --    --   
PLT   --   162   --    --   
INR   --   1.1  1.1  1.2* APTT   --   27.1  26.8  27.9 Recent Labs  
   09/29/18 0340 09/28/18 
 0500  09/27/18 
 0425 NA  142  142  143  
K  4.1  4.8  3.6 CL  106  104  106 CO2  34*  34*  33* GLU  127*  151*  123* BUN  33*  32*  31* CREA  1.17  1.23  1.07  
CA  8.2*  8.2*  8.3*  
MG   --   2.6*   --   
PHOS  1.9*  4.4  2.9 ALB  1.9*  2.1*  1.9*  
 SGOT  65*  77*  88* ALT  75  98*  102* Recent Labs  
   09/28/18 
 0540  09/28/18 
 0410  09/27/18 
 1015 PH  7.30*  7.17*  7.42  
PCO2  69*  101*  52* PO2  306*  203*  86 HCO3  33*  36*  33* FIO2  100  100  40 No results for input(s): CPK, CKNDX, TROIQ in the last 72 hours. No lab exists for component: CPKMB No results found for: BNPP, BNP Lab Results Component Value Date/Time Culture result: PENDING 09/28/2018 06:51 PM  
 Culture result: NO FUNGUS ISOLATED 8 DAYS 09/16/2018 11:18 AM  
 Culture result: HEAVY NORMAL RESPIRATORY SHELDON 09/16/2018 11:15 AM  
No results found for: TSH, TSHEXT, TSHEXT Imaging: CXR: no acute changes I have personally and independently reviewed the patients interval and diagnostic data, radiographs and have reviewed the reports. Medical Decision Making Today: · Reviewed the flowsheet and previous days notes · Reviewed and summarized records or history from previous days note or discussions with staff, family · Parenteral controlled substances - Reviewed/ Adjusted / Weaned / Started · High Risk Drug therapy requiring intensive monitoring for toxicity: eg steroids, pressors, antibiotics · Reviewed and/or ordered Clinical lab tests · Reviewed and/or ordered Radiology tests · Reviewed and/or ordered of Medicine tests · Independently visualized radiologic Images · I have personally reviewed the patients ECG / Telemetry Latanya Cronin MD

## 2018-09-29 NOTE — PROGRESS NOTES
Hospitalist Progress Note NAME: Robles Parsons  
:  1934 MRN:  [id number] Assessment / Plan: PEA arrest  
- Code blue called . Patient w/ agonal breathing after coming back from radiology after arteriogram completed. No pulse detected. CPR initiated. Epi x 2 given. Pulse obtained. Intubated by Dr. Amrita Mueller. Much bloody secretions obtained. Transferred to ICU. - head CT  showed small age-indeterminate infarct in the left corona radiata. If there is concern for acute ischemia, consider MRI for further evaluation. Acute respiratory w/ hypoxia, s/p cardiac arrest 
Sepsis due to suspected PNA w/ possible pulmonary hemorrhage COPD 
 - CT of chest shows severe emphysema with the right basilar airspace disease in the oval mass with fluid level concerning for internal hemorrhage versus pneumonia. - repeat CT  showed persistent hematoma in the right lower lobe region with surrounding patchy 
atelectasis/infiltrate and small ipsilateral pleural effusion. Left basilar subsegmental atelectasis. Coils in right lower lobe region consistent with recent pulmonary artery branch embolization. 
- Bcx Neg, sputum cytology collected  negative except scant yeast. 
- completed zosyn for possible lung abscess. End date  
- cytology from bronchial washing on  is atypical, favor benign reactive process 
- extubated on  and placed on VM 50% BUT REINTUBATED NIGHT OF  
- continue diuresis with lasix 
- continue bronchodilator - Continue on vent Hemoptysis 
- noted to have bleeding from RLL on bronchoscopy. - work up for possible rheumatologic cause neg thus far: 
ANCA, anti-GBM, MPO ab, FL-3 ab, SSA/SSB, RNP ab, Arredondo/RNP, dsDNA, and Arredondo ab all negative. JOSS +. 
- Arteriogram : Thoracic aortic and intercostal arteriograms as described above demonstrating no 
enlarged bronchial artery supplying the right lower lobe of the lung.  A normal 
 appearing and normal sized right bronchial artery is seen without any 
hyperplasia or dominant right lower lobe supply. No embolization was performed. RLL mass - work up for [employer] neg thus far. - diagnostic bronch done 9/16. No endobronchial lesions seen. - CTA chest done 9/17 due to persistent bloody pulmonary secretions. Right lower lobe masslike abnormality demonstrates increased internal density 
and has increased in size measuring 6.7 x 6.7 cm, compared to 5.7 x 4.4 cm. This 
is compatible with internal hemorrhage. There is new moderate right lower lobe 
partial collapse/atelectasis.   
Normal sized right bronchial artery is visualized without any hyperplasia or or evidence for supply to the right lower lobe lung abnormality. It is unlikely that the hemoptysis related to bronchial arterial injury/supply. Prior bronchial arteriogram was unremarkable. No evidence for pulmonary artery pseudoaneurysm or active hemorrhage. However, it is more likely that the masslike abnormality in the right lower lobe the lung has eroded into an adjacent pulmonary artery then a bronchial artery. Therefore, plan is for pulmonary arteriogram with possible embolization if an abnormality is seen in the right lower lobe. 6 mm and 4 mm right lung nodules. Small bilateral pleural effusions. Minimally enlarged bilateral hilar and mediastinal lymph nodes Hypernatremia: resolved Acute blood loss anemia on admission: stable - s/p another 2 units PRBC today 
- recheck CBC now 
- monitor labs and transfuse as necessary. H/o atrial fibrillation on chronic anticoagulation w/ coumadin 
- amiodarone on hold 
- cardizem on hold due to borderline low bp 
- hold coumadin due to hemoptysis Coumadin-induced coagulopathy Elevated LFT 
- INR 1.2 Holding coumadin. 
- monitor LFT 
- stop statin ALAN: resolved 
- likely prerenal related to Lasix. - Renal function stable. 
-avoid nephrotoxic agents. UTI, acute cystitis w/ hematuria, POA 
- treated HLD 
-statin held due to elevated LFT Hypotension - Give 250 ml NS bolus x1 improved to SBP 90 
- Repeat bolus - Vasopressor if needed - Back off as much as possible on propofol Obesity - BMI 32 Code: Full DVT prophylaxis: SCDs GI prophylaxis: PPI Subjective: Chief Complaint / Reason for Physician Visit 9/29  Remains intubated & propofol sedated. Hypotensive with SBP 78. Improved to 90 after 250 bolus. Will repeat. 9/28  Required re-intubation last night. Now on sedation and not responsive because of this. 9/27  Was extubated today but remains on ventimask 50% and is very somnolent and lethargic. Not answering much other than denying chest pain then fell back to sleep. Review of Systems: 
Symptom Y/N Comments  Symptom Y/N Comments Fever/Chills    Chest Pain Poor Appetite    Edema Cough    Abdominal Pain Sputum    Joint Pain SOB/ROY    Pruritis/Rash Nausea/vomit    Tolerating PT/OT Diarrhea    Tolerating Diet Constipation    Other Could NOT obtain due to: sedation Objective: VITALS:  
Last 24hrs VS reviewed since prior progress note. Most recent are: 
Patient Vitals for the past 24 hrs: 
 Temp Pulse Resp BP SpO2  
09/29/18 1600 99.4 °F (37.4 °C) 70 20 118/48 95 % 09/29/18 1550 - 66 17 - 96 %  
09/29/18 1530 99.2 °F (37.3 °C) 67 26 111/41 96 %  
09/29/18 1515 - 70 21 104/41 96 %  
09/29/18 1500 99.4 °F (37.4 °C) 68 25 115/52 96 %  
09/29/18 1445 - 68 29 98/45 95 % 09/29/18 1430 98.9 °F (37.2 °C) 64 27 96/49 95 % 09/29/18 1415 - 69 29 105/45 94 % 09/29/18 1400 99.3 °F (37.4 °C) 65 18 97/51 95 % 09/29/18 1345 99.2 °F (37.3 °C) 68 18 99/49 95 % 09/29/18 1330 99 °F (37.2 °C) 70 19 98/56 95 % 09/29/18 1315 98.8 °F (37.1 °C) 68 20 105/53 96 %  
09/29/18 1300 99.4 °F (37.4 °C) 65 17 104/56 97 % 09/29/18 1245 99.2 °F (37.3 °C) 67 17 105/52 96 % 09/29/18 1230 98.8 °F (37.1 °C) 68 19 103/49 97 % 09/29/18 1215 98.8 °F (37.1 °C) 68 19 104/51 96 %  
09/29/18 1200 99.4 °F (37.4 °C) 67 18 (!) 89/46 96 %  
09/29/18 1145 99.3 °F (37.4 °C) 66 16 (!) 85/46 94 % 09/29/18 1140 - 65 16 - 94 % 09/29/18 1136 - 67 15 - 94 % 09/29/18 1135 - 65 15 - 95 % 09/29/18 1134 - 67 16 - 94 % 09/29/18 1130 99.3 °F (37.4 °C) 65 15 91/44 96 %  
09/29/18 1120 - 67 16 - 98 %  
09/29/18 1115 99.4 °F (37.4 °C) 67 15 91/45 98 %  
09/29/18 1100 99.3 °F (37.4 °C) 72 16 (!) 89/45 98 %  
09/29/18 1045 99.5 °F (37.5 °C) 69 17 90/43 98 %  
09/29/18 1043 - - - 92/44 -  
09/29/18 1040 - 69 16 92/44 97 % 09/29/18 1030 - 68 18 (!) 85/45 97 % 09/29/18 1029 99.3 °F (37.4 °C) 67 16 (!) 85/45 98 %  
09/29/18 1015 99.9 °F (37.7 °C) 67 17 (!) 88/39 98 %  
09/29/18 1000 99.1 °F (37.3 °C) 69 19 (!) 88/39 97 % 09/29/18 0936 99.2 °F (37.3 °C) 72 23 90/53 97 % 09/29/18 0906 - 74 (!) 34 90/53 94 % 09/29/18 0905 - 72 26 (!) 86/40 94 % 09/29/18 0902 - 75 25 (!) 84/34 93 % 09/29/18 0900 - 73 28 - 93 % 09/29/18 0800 99 °F (37.2 °C) 72 14 94/42 94 % 09/29/18 0757 - (!) 7 15 - 94 % 09/29/18 0700 - 75 19 95/43 97 % 09/29/18 0618 - 82 (!) 34 - 96 %  
09/29/18 0609 - 78 24 - 95 % 09/29/18 0600 - 83 25 127/55 95 % 09/29/18 0455 - 72 21 - 96 %  
09/29/18 0400 99.2 °F (37.3 °C) 78 21 104/45 96 %  
09/29/18 0200 - 74 18 105/47 95 % 09/29/18 0100 - 78 18 104/48 95 % 09/29/18 0048 - 69 18 - 96 %  
09/29/18 0000 99 °F (37.2 °C) 72 22 106/51 100 % 09/28/18 2300 - 70 18 90/47 98 %  
09/28/18 2200 - 70 19 112/46 99 % 09/28/18 2100 - 67 16 110/49 95 % 09/28/18 2000 98 °F (36.7 °C) 69 17 109/45 97 % 09/28/18 1900 - 70 18 94/47 95 % 09/28/18 1800 - 80 18 (!) 101/39 100 % 09/28/18 1700 - 81 18 102/44 99 % Intake/Output Summary (Last 24 hours) at 09/29/18 1622 Last data filed at 09/29/18 1600 Gross per 24 hour Intake          2882.72 ml Output             1250 ml  
 Net          1632.72 ml PHYSICAL EXAM: 
General: sedated Resp:  On vent. Decreased BS bilaterally. rhonchi CV:  Regular  rhythm,  1+ bilateral leg edema GI:  Soft, Non distended, Non tender.  +Bowel sounds Neurologic:  Sedated. No posturing Psych:   Sedation Skin:  No rashes. No jaundice Reviewed most current lab test results and cultures  YES Reviewed most current radiology test results   YES Review and summation of old records today    NO Reviewed patient's current orders and MAR    YES 
PMH/SH reviewed - no change compared to H&P 
________________________________________________________________________ Care Plan discussed with: 
  Comments Patient Family RN x Care Manager Consultant Multidiciplinary team rounds were held today with , nursing, pharmacist and clinical coordinator. Patient's plan of care was discussed; medications were reviewed and discharge planning was addressed. ________________________________________________________________________ Total NON critical care TIME:    Minutes Total CRITICAL CARE TIME Spent:   Minutes non procedure based Comments >50% of visit spent in counseling and coordination of care    
________________________________________________________________________ Lesley Jerry MD  
 
Procedures: see electronic medical records for all procedures/Xrays and details which were not copied into this note but were reviewed prior to creation of Plan. LABS: 
I reviewed today's most current labs and imaging studies. Pertinent labs include: 
Recent Labs  
   09/29/18 
 0735  09/29/18 
 0340 WBC   --   7.7 HGB  6.0*  6.2* HCT  20.3*  21.2*  
PLT   --   162 Recent Labs  
   09/29/18 
 0340  09/28/18 
 0500  09/27/18 
 0425 NA  142  142  143  
K  4.1  4.8  3.6 CL  106  104  106 CO2  34*  34*  33* GLU  127*  151*  123* BUN  33*  32*  31* CREA  1.17  1.23  1.07  
 CA  8.2*  8.2*  8.3*  
MG   --   2.6*   --   
PHOS  1.9*  4.4  2.9 ALB  1.9*  2.1*  1.9* TBILI  0.3  0.4  0.4 SGOT  65*  77*  88* ALT  75  98*  102* INR  1.1  1.1  1.2* Signed: Clay Carter MD

## 2018-09-29 NOTE — PROGRESS NOTES
1900 - Bedside and Verbal shift change report given to Kelley Jacobo (oncoming nurse) by Leslie Lopes (offgoing nurse). Report included the following information SBAR, Kardex, Procedure Summary, Intake/Output, MAR, Recent Results and Cardiac Rhythm Paced. 2000 - Shift assessment complete, eyes closed, decreased commands following. Family at bedside updated on condition. Questions answered, reassurance given. Bath complete, see flow sheet for details. 2200 - Repositioned for comfort, call bell within reach. 0000 - Reassessment complete, changes noted and documented. See flow sheet for details. 1272 - PRN Glycopyrrolate given for secretions. 0200 - Resting in bed, call light within reach. 0400 - Reassessment complete, no changes to previous assessment. See flow sheet for details. 0544 - Propofol on hold for SAT / SBT. 3074 - Resumed propofol for comfort. Repositioned for comfort. 0715 - Increased propofol for comfort. 0730 - Bedside and Verbal shift change report given to Burke Mendez (oncoming nurse) by Kelley Jacobo (offgoing nurse). Report included the following information SBAR, ED Summary, Procedure Summary, Intake/Output, MAR, Recent Results and Cardiac Rhythm Paced.

## 2018-09-29 NOTE — PROGRESS NOTES
0700  Bedside and verbal report from [facility], RN. 
0800  Assessment completed; Hgb 6.2 this morning; repeat sent to lab with type and crossmatch. Copious oral secretions noted. 0900  Dr. Jodee Sharp here; will transfuse 2 units PRBC's today. BP mid-80's/systolic. Propofol rate decreased. 6551  First unit of PRBC's started by Sukh Munoz RN. 
1256  BP 85-90/systolic. 1100  BP 89/45. PRBC's now infusing @ 175 ml/hr. 1134  First unit  PRBC's completed. 1145  Second unit PRBC's started. Reassessment completed. 1200  Urine output poor. RBC's infusing; BP now 89/46. 1215  /51. 
1300  Urine output good. 1430  Second unit PRBC's infused without difficulty. 1500  Resting quietly; continues with copious oral secretions. 0  Family members at bedside; status update given to family members. 1600  Reassessment completed. /48 1615  BP now 93/43 
1630  Dr. Lory Liu here. BP now reading 78/40. 
1637  BP recheck 79/42.  
1639  Propofol decreased to 30 mcg/kg/min. Saline bolus 250 ml started. 1700  BP now 91/46 
1705  CBC sent to lab. #2 saline bolus 250 ml started per Dr. Lory Liu. 1730  Bolus # 2 completed. BP now 95/49 
1739  Repeat H/H 7.1 and 23.5. Dr. Jodee Sharp paged. 200  Dr. Jodee Sharp now on phone and notified re: patient status including episode of hypotension, saline bolus, and latest H and H. Order received to transfuse another unit of PRBC's (#3 for today). 1800  BP borderline; awaiting PRBC's.  
1157  PRBC's started (#3 for today). 1900  PRBC's continue to infuse without difficulty. BP 99/50.  
1930  Bedside and Verbal shift change report given to Freddy Roche RN (oncoming nurse) by Shanthi Escudero RN (offgoing nurse). Report included the following information SBAR, Kardex, Intake/Output, MAR, Accordion, Recent Results, Med Rec Status, Cardiac Rhythm Paced/afib and Alarm Parameters .

## 2018-09-29 NOTE — PROGRESS NOTES
09/29/18 6080 ABCDEF Bundle SBT Trial Passed Yes Weaning Parameters Spontaneous Breathing Trial Complete Yes Resp Rate Observed 34 Ve 13  RSBI 66 SBT trial failed because patient was gettimg tired on SBT trial and his abdomin was hurting.

## 2018-09-30 ENCOUNTER — APPOINTMENT (OUTPATIENT)
Dept: GENERAL RADIOLOGY | Age: 83
DRG: 003 | End: 2018-09-30
Attending: INTERNAL MEDICINE
Payer: MEDICARE

## 2018-09-30 LAB
ABO + RH BLD: NORMAL
ALBUMIN SERPL-MCNC: 1.9 G/DL (ref 3.5–5)
ALBUMIN/GLOB SERPL: 0.4 {RATIO} (ref 1.1–2.2)
ALP SERPL-CCNC: 152 U/L (ref 45–117)
ALT SERPL-CCNC: 95 U/L (ref 12–78)
ANION GAP SERPL CALC-SCNC: 0 MMOL/L (ref 5–15)
APTT PPP: 26.8 SEC (ref 22.1–32)
ARTERIAL PATENCY WRIST A: ABNORMAL
AST SERPL-CCNC: 114 U/L (ref 15–37)
BACTERIA SPEC CULT: ABNORMAL
BACTERIA SPEC CULT: ABNORMAL
BASE EXCESS BLDA CALC-SCNC: 8.2 MMOL/L
BASOPHILS # BLD: 0 K/UL (ref 0–0.1)
BASOPHILS NFR BLD: 0 % (ref 0–1)
BDY SITE: ABNORMAL
BILIRUB SERPL-MCNC: 0.3 MG/DL (ref 0.2–1)
BLD PROD TYP BPU: NORMAL
BLOOD GROUP ANTIBODIES SERPL: NORMAL
BPU ID: NORMAL
BUN SERPL-MCNC: 37 MG/DL (ref 6–20)
BUN/CREAT SERPL: 32 (ref 12–20)
CALCIUM SERPL-MCNC: 8.2 MG/DL (ref 8.5–10.1)
CHLORIDE SERPL-SCNC: 106 MMOL/L (ref 97–108)
CO2 SERPL-SCNC: 35 MMOL/L (ref 21–32)
CREAT SERPL-MCNC: 1.17 MG/DL (ref 0.7–1.3)
CROSSMATCH RESULT,%XM: NORMAL
DIFFERENTIAL METHOD BLD: ABNORMAL
EOSINOPHIL # BLD: 0.5 K/UL (ref 0–0.4)
EOSINOPHIL NFR BLD: 6 % (ref 0–7)
EPAP/CPAP/PEEP, PAPEEP: 6
ERYTHROCYTE [DISTWIDTH] IN BLOOD BY AUTOMATED COUNT: 23.1 % (ref 11.5–14.5)
FIBRINOGEN PPP-MCNC: 724 MG/DL (ref 200–475)
FIO2 ON VENT: 40 %
GAS FLOW.O2 SETTING OXYMISER: 12 L/MIN
GLOBULIN SER CALC-MCNC: 5.3 G/DL (ref 2–4)
GLUCOSE SERPL-MCNC: 132 MG/DL (ref 65–100)
GRAM STN SPEC: ABNORMAL
HCO3 BLDA-SCNC: 34 MMOL/L (ref 22–26)
HCT VFR BLD AUTO: 19 % (ref 36.6–50.3)
HGB BLD-MCNC: 6.1 G/DL (ref 12.1–17)
IMM GRANULOCYTES # BLD: 0 K/UL (ref 0–0.04)
IMM GRANULOCYTES NFR BLD AUTO: 0 % (ref 0–0.5)
INR PPP: 1.1 (ref 0.9–1.1)
LYMPHOCYTES # BLD: 0.3 K/UL (ref 0.8–3.5)
LYMPHOCYTES NFR BLD: 3 % (ref 12–49)
MAGNESIUM SERPL-MCNC: 2.6 MG/DL (ref 1.6–2.4)
MCH RBC QN AUTO: 32.4 PG (ref 26–34)
MCHC RBC AUTO-ENTMCNC: 32.1 G/DL (ref 30–36.5)
MCV RBC AUTO: 101.1 FL (ref 80–99)
MONOCYTES # BLD: 0.1 K/UL (ref 0–1)
MONOCYTES NFR BLD: 1 % (ref 5–13)
NEUTS BAND NFR BLD MANUAL: 1 %
NEUTS SEG # BLD: 7.6 K/UL (ref 1.8–8)
NEUTS SEG NFR BLD: 89 % (ref 32–75)
NRBC # BLD: 0 K/UL (ref 0–0.01)
NRBC BLD-RTO: 0 PER 100 WBC
PCO2 BLDA: 49 MMHG (ref 35–45)
PH BLDA: 7.46 [PH] (ref 7.35–7.45)
PHOSPHATE SERPL-MCNC: 2.6 MG/DL (ref 2.6–4.7)
PLATELET # BLD AUTO: 165 K/UL (ref 150–400)
PMV BLD AUTO: 12.3 FL (ref 8.9–12.9)
PO2 BLDA: 74 MMHG (ref 80–100)
POTASSIUM SERPL-SCNC: 4 MMOL/L (ref 3.5–5.1)
PROT SERPL-MCNC: 7.2 G/DL (ref 6.4–8.2)
PROTHROMBIN TIME: 10.9 SEC (ref 9–11.1)
RBC # BLD AUTO: 1.88 M/UL (ref 4.1–5.7)
RBC MORPH BLD: ABNORMAL
RBC MORPH BLD: ABNORMAL
SAO2 % BLD: 95 % (ref 92–97)
SAO2% DEVICE SAO2% SENSOR NAME: ABNORMAL
SERVICE CMNT-IMP: ABNORMAL
SODIUM SERPL-SCNC: 141 MMOL/L (ref 136–145)
SPECIMEN EXP DATE BLD: NORMAL
SPECIMEN SITE: ABNORMAL
STATUS OF UNIT,%ST: NORMAL
THERAPEUTIC RANGE,PTTT: ABNORMAL SECS (ref 58–77)
UNIT DIVISION, %UDIV: 0
VENTILATION MODE VENT: ABNORMAL
VT SETTING VENT: 500 ML
WBC # BLD AUTO: 8.5 K/UL (ref 4.1–11.1)

## 2018-09-30 PROCEDURE — 74011250636 HC RX REV CODE- 250/636: Performed by: INTERNAL MEDICINE

## 2018-09-30 PROCEDURE — 36415 COLL VENOUS BLD VENIPUNCTURE: CPT | Performed by: INTERNAL MEDICINE

## 2018-09-30 PROCEDURE — 71045 X-RAY EXAM CHEST 1 VIEW: CPT

## 2018-09-30 PROCEDURE — 85610 PROTHROMBIN TIME: CPT | Performed by: INTERNAL MEDICINE

## 2018-09-30 PROCEDURE — 83735 ASSAY OF MAGNESIUM: CPT | Performed by: INTERNAL MEDICINE

## 2018-09-30 PROCEDURE — 51798 US URINE CAPACITY MEASURE: CPT

## 2018-09-30 PROCEDURE — 82803 BLOOD GASES ANY COMBINATION: CPT | Performed by: INTERNAL MEDICINE

## 2018-09-30 PROCEDURE — 85025 COMPLETE CBC W/AUTO DIFF WBC: CPT | Performed by: INTERNAL MEDICINE

## 2018-09-30 PROCEDURE — 74011000250 HC RX REV CODE- 250: Performed by: INTERNAL MEDICINE

## 2018-09-30 PROCEDURE — 84100 ASSAY OF PHOSPHORUS: CPT | Performed by: INTERNAL MEDICINE

## 2018-09-30 PROCEDURE — 36600 WITHDRAWAL OF ARTERIAL BLOOD: CPT | Performed by: INTERNAL MEDICINE

## 2018-09-30 PROCEDURE — 65620000000 HC RM CCU GENERAL

## 2018-09-30 PROCEDURE — 80053 COMPREHEN METABOLIC PANEL: CPT | Performed by: INTERNAL MEDICINE

## 2018-09-30 PROCEDURE — 94640 AIRWAY INHALATION TREATMENT: CPT

## 2018-09-30 PROCEDURE — 74011250637 HC RX REV CODE- 250/637: Performed by: INTERNAL MEDICINE

## 2018-09-30 PROCEDURE — 94003 VENT MGMT INPAT SUBQ DAY: CPT

## 2018-09-30 RX ADMIN — Medication 10 ML: at 06:41

## 2018-09-30 RX ADMIN — PROPOFOL 20 MCG/KG/MIN: 10 INJECTION, EMULSION INTRAVENOUS at 15:12

## 2018-09-30 RX ADMIN — METHYLPHENIDATE HYDROCHLORIDE 5 MG: 5 TABLET ORAL at 08:34

## 2018-09-30 RX ADMIN — FAMOTIDINE 20 MG: 10 INJECTION, SOLUTION INTRAVENOUS at 08:33

## 2018-09-30 RX ADMIN — GLYCOPYRROLATE 0.1 MG: 0.2 INJECTION, SOLUTION INTRAMUSCULAR; INTRAVENOUS at 08:33

## 2018-09-30 RX ADMIN — ALBUTEROL SULFATE 2.5 MG: 2.5 SOLUTION RESPIRATORY (INHALATION) at 23:30

## 2018-09-30 RX ADMIN — ALBUTEROL SULFATE 2.5 MG: 2.5 SOLUTION RESPIRATORY (INHALATION) at 11:57

## 2018-09-30 RX ADMIN — CHLORHEXIDINE GLUCONATE 15 ML: 1.2 RINSE ORAL at 08:33

## 2018-09-30 RX ADMIN — Medication 10 ML: at 21:24

## 2018-09-30 RX ADMIN — Medication 10 ML: at 14:56

## 2018-09-30 RX ADMIN — DORZOLAMIDE HYDROCHLORIDE AND TIMOLOL MALEATE 1 DROP: 20; 5 SOLUTION/ DROPS OPHTHALMIC at 08:34

## 2018-09-30 RX ADMIN — PROPOFOL 20 MCG/KG/MIN: 10 INJECTION, EMULSION INTRAVENOUS at 06:41

## 2018-09-30 RX ADMIN — ALBUTEROL SULFATE 2.5 MG: 2.5 SOLUTION RESPIRATORY (INHALATION) at 19:39

## 2018-09-30 RX ADMIN — Medication 20 ML: at 11:50

## 2018-09-30 RX ADMIN — PROPOFOL 20 MCG/KG/MIN: 10 INJECTION, EMULSION INTRAVENOUS at 20:24

## 2018-09-30 RX ADMIN — ALBUTEROL SULFATE 2.5 MG: 2.5 SOLUTION RESPIRATORY (INHALATION) at 16:59

## 2018-09-30 RX ADMIN — ALBUTEROL SULFATE 2.5 MG: 2.5 SOLUTION RESPIRATORY (INHALATION) at 05:20

## 2018-09-30 RX ADMIN — FAMOTIDINE 20 MG: 10 INJECTION, SOLUTION INTRAVENOUS at 22:13

## 2018-09-30 RX ADMIN — GLYCOPYRROLATE 0.1 MG: 0.2 INJECTION, SOLUTION INTRAMUSCULAR; INTRAVENOUS at 14:56

## 2018-09-30 RX ADMIN — ALBUTEROL SULFATE 2.5 MG: 2.5 SOLUTION RESPIRATORY (INHALATION) at 01:07

## 2018-09-30 RX ADMIN — CHLORHEXIDINE GLUCONATE 15 ML: 1.2 RINSE ORAL at 21:24

## 2018-09-30 RX ADMIN — Medication 10 ML: at 11:50

## 2018-09-30 RX ADMIN — DORZOLAMIDE HYDROCHLORIDE AND TIMOLOL MALEATE 1 DROP: 20; 5 SOLUTION/ DROPS OPHTHALMIC at 17:46

## 2018-09-30 RX ADMIN — Medication 10 ML: at 21:23

## 2018-09-30 RX ADMIN — LATANOPROST 1 DROP: 50 SOLUTION OPHTHALMIC at 21:25

## 2018-09-30 RX ADMIN — METHYLPHENIDATE HYDROCHLORIDE 5 MG: 5 TABLET ORAL at 16:54

## 2018-09-30 RX ADMIN — ALBUTEROL SULFATE 2.5 MG: 2.5 SOLUTION RESPIRATORY (INHALATION) at 07:47

## 2018-09-30 NOTE — PROGRESS NOTES
[facility] Pulmonary Consult Service NotePulmonary, Critical Care, and Sleep Medicine Name: Jaycee Max MRN: [id number] : 1934 Hospital: [facility] Date: 2018   Hospital Day: 21 Admitted with hemoptysis  S/P pulmonary artery embolization  old blood in ETT. Propofol has been stopped as of this AM. Unresponsive. Anasarca. D/w nursing at bedside. No fever. On TF. Overton was clogged, flushed and now patent.  good diuresis. Weak. Intermittently responds. No new hemoptysis on vent. K 3.8. LFTs up. Normal Bili 
 
 BUN/ Cr slightly up  After good diuresis. Weak. Intermittently responds. Thick secretions. No new hemoptysis on vent. K 3.5. LFTs up. Normal Bili. Hgb 7.5 
 
 onvent. Will open eyes, moves fingers and toes. No more hemoptysis.  Creatinine slightly better. dicsussed yesterday's chest and head Ct findings with wife an daughter. Anasraca. Weak despite Na better. No more hemoptysis  bronch yesterday without residual clot or plug. Still has copious oral sectreions despite scopolamine pathc. Opening eyes, sticks out tongue and trying to move more. D/w nursing. passing SAT, SBt. Weak cough. Scant ET secretions  extubated yesterday mid day. Sluggish rest of day. 4 AM ABg showed PCO2 > 100. Reintubated. Small mucous plug. Pt still sluggish. Drooling 2:30 PM spoke to wife, daughters at bedside and discussed management. Slow progress this week regarding responsiveness and awakening but last night demonstrated how pt still too weak to breathe entirely off the vent for long periods. Volume is an issue. Mentation is sluggish and cannot be blamed on COPD. When on vent and CO2 was normal he was still somnolent. He will need trach and PEG for safety, comfort and continued support. Am hopeful he can rally but no guarantees.  Of course he still has a RLL lung mass but for now, happy he has stopped bleeding. They will see how he does and discuss prospects but I endorsed trach, PEG and perhaps even LTACH if recovery remains slow. They will now be better prepared to discuss above with team next week IMPRESSION:  
1. 9/14/18 cardiopulmonary arrest 6 min CPR 2. Acute respiratory failure-on vent-  
3. Acute hemoptysis- Noted to have bleeding from RLL on Bronch. No endobronchial lesion. has lung mass(CA vs inflammatory pseudotumor) and microscopic hematuria; ANCA negative; JOSS barely positive. Suspect LUNG mass the culprit. Bronch will not help; CYTOLOGY negative malignancy x 2 
4. COPD moderate to severe at baseline 5. Acute severe Renal insf 6. Anemia and prior coagulopathy on coumadin 7. Volume overload, anasarca- after couple of days of IV lasix- becomes prerenal 
8. encehalopathy multifactorial - ssomnolent but arousable- has hearingl oss as well 9. Hx of CAD s/p PCI, afib on amio, former smoker, prior cva 10. Multiorgan dysfunction as outlined above: Pt has one or more acute or chronic illnesses with severe exacerbation with progression or side effects of treatment that poses a threat to life or bodily function 11. Additional workup outlined below 12. Pt is requiring Drug therapy requiring intensive monitoring for toxicity 13. Pt is unstable, unpredictable needing PCU monitoring; is critically ill and at high risk of sudden decline and decompensation with life threatening consequenses and continued end organ dysfunction and failure 14. Prognosis guarded with significant risk of sudden decline 15. Critically ill, 30 min cc, EOP. Discussed with nurse this am. High risk of decompensation. RECOMMENDATIONS/PLAN:  
 
1. Vent support 2. abx completed 3. Low dose Adderall 4. Scopolamine and add robinul prn 
5. diuresis - change to everyother day 6. Follow renal function-  
7. Enteral feedings plus free water, follow Na 
8.  Follow neuro status, 
 9. Follow cultures 10. Labs to follow electrolytes, renal function and and blood counts 11. Glucose monitoring and SSI 12. Bronchial hygiene with respiratory therapy techniques, bronchodilators 13. Resume lovenox for DVT, SUP prophylaxis 14. Pt needs IV fluids with additives and Drug therapy requiring intensive monitoring for toxicity 15. Prescription drug management with home med reconciliation reviewed My assessment/management discussed with: Consultants, Nursing, Pharmacy, Case Management, PT, OT, Respiratory Therapy, Hospitalist and Family for coordination of care Pt's condition is acute and unstable requiring inpatient hospitalization. This care involved high complexity decision making which includes independently reviewing the patient's past medical records, current laboratory results, medication profiles that were immediately available to me and actual Xray images at the bedside in order to assess, support vital system function, and to treat this degree of vital organ system failure, and to prevent further deterioration of the patients condition. Risk of deterioration: high  
[x] High complexity decision making was performed [x] See my orders for details Tubes:    
 
Subjective/Initial History: MAR reviewed and pertinent medications noted or modified as needed Current Facility-Administered Medications Medication  0.9% sodium chloride infusion 250 mL  0.9% sodium chloride infusion 250 mL  methylphenidate HCl (RITALIN) tablet 5 mg  furosemide (LASIX) injection 40 mg  
 sodium chloride (NS) flush 10-30 mL  sodium chloride (NS) flush 10 mL  sodium chloride (NS) flush 10 mL  sodium chloride (NS) flush 10-40 mL  sodium chloride (NS) flush 20 mL  heparin (porcine) pf 300 Units  propofol (DIPRIVAN) infusion  glycopyrrolate (ROBINUL) injection 0.1 mg  
 scopolamine (TRANSDERM-SCOP) 1 mg over 3 days 1 Patch  fentaNYL citrate (PF) injection 25-50 mcg  famotidine (PF) (PEPCID) 20 mg in sodium chloride 0.9% 10 mL injection  chlorhexidine (PERIDEX) 0.12 % mouthwash 15 mL  albuterol (PROVENTIL VENTOLIN) nebulizer solution 2.5 mg  
 fentaNYL citrate (PF) injection 25 mcg  albuterol (PROVENTIL HFA, VENTOLIN HFA, PROAIR HFA) inhaler 2 Puff  dorzolamide-timolol (COSOPT) 22.3-6.8 mg/mL ophthalmic solution 1 Drop  latanoprost (XALATAN) 0.005 % ophthalmic solution 1 Drop  sodium chloride (NS) flush 5-10 mL  sodium chloride (NS) flush 5-10 mL  acetaminophen (TYLENOL) tablet 650 mg  
 ondansetron (ZOFRAN) injection 4 mg  docusate sodium (COLACE) capsule 100 mg  
  
 
  
ROS:A comprehensive review of systems was negative except for that written in the HPI. Objective: 
 
Vital Signs: Telemetry:    normal sinus rhythmIntake/Output:  
Visit Vitals  /47 (BP 1 Location: Right arm, BP Patient Position: At rest)  Pulse 79  Temp 98.7 °F (37.1 °C)  Resp 19  
 Ht 6' 2\" (1.88 m)  Wt 115.8 kg (255 lb 4.7 oz)  SpO2 96%  BMI 32.78 kg/m2 Temp (24hrs), Av.3 °F (37.4 °C), Min:98.7 °F (37.1 °C), Max:99.6 °F (37.6 °C) O2 Device: Endotracheal tube, Ventilator O2 Flow Rate (L/min): 12 l/min Wt Readings from Last 4 Encounters:  
18 115.8 kg (255 lb 4.7 oz) 16 111.1 kg (245 lb)  
16 106.6 kg (235 lb) 04/08/15 103.4 kg (228 lb) Intake/Output Summary (Last 24 hours) at 18 1220 Last data filed at 18 0800 Gross per 24 hour Intake          3639.03 ml Output             2105 ml Net          1534.03 ml Last shift:       0701 -  1900 In: 253.7 [I.V.:58.7] Out: 110 [Urine:110] Last 3 shifts: 1901 -  0700 In: 5685.5 [I.V.:1434.2] Out: 7190 [Urine:2145; CRYYVZ:459] Physical Exam:  
 General:   male; with Et tube in place. Has right IJ CVC. On sedation and on vent. HEAD: Normocephalic, without obvious abnormality, atraumatic EYES: conjunctivae clear. PERRL,  AN Icteric sclerae NOSE: nares normal, no drainage, no nasal flaring, THROAT: normal; Lips, mucosa drooling;  crowded airway; tongue midline Neck: Supple, symmetrical, trachea midline,  No accessory mm use; No Stridor/ cuff leak, No goiter or thyroid tenderness LYMPH: No abnormally enlarged lymph nodes. in neck or groin Chest: increased AP diameter Lungs: agonal, bilateral rhonchi. Seems to have good air  Movement Bilaterally. Heart: Regular rate and rhythm; NO edema Abdomen: soft, non-tender, without masses or organomegaly, protuberant, distended : no Overton Musculoskeletal: anasarca; no erythema Neuro: was not  Responsive, had a gag with ET tube suctioning. , not able to fully assess. Psych: Not able to assess; no agitation. Skin: Pallor;  
 Pulses:Bilateral, Radial, 2+ Data:  
 
All lab results for the last 24 hours reviewed. Labs: 
 
Recent Labs  
   09/30/18 0448 09/29/18 
 1655  09/29/18 
 0735  09/29/18 
 0340   09/28/18 
 0500 WBC  8.5  6.6   --   7.7   --    --   
HGB  6.1*  7.1*  6.0*  6.2*   < >   --   
PLT  165  143*   --   162   --    --   
INR  1.1   --    --   1.1   --   1.1 APTT  26.8   --    --   27.1   --   26.8  
 < > = values in this interval not displayed. Recent Labs  
   09/30/18 0448 09/29/18 
 0340  09/28/18 
 0500 NA  141  142  142  
K  4.0  4.1  4.8  
CL  106  106  104 CO2  35*  34*  34* GLU  132*  127*  151* BUN  37*  33*  32* CREA  1.17  1.17  1.23  
CA  8.2*  8.2*  8.2* MG  2.6*   --   2.6* PHOS  2.6  1.9*  4.4 ALB  1.9*  1.9*  2.1*  
SGOT  114*  65*  77* ALT  95*  75  98* Recent Labs  
   09/30/18 
 0606  09/28/18 
 0540  09/28/18 
 0410 PH  7.46*  7.30*  7.17* PCO2  49*  69*  101* PO2  74*  306*  203* HCO3  34*  33*  36* FIO2  40  100  100 No results for input(s): CPK, CKNDX, TROIQ in the last 72 hours. No lab exists for component: CPKMB No results found for: BNPP, BNP Lab Results Component Value Date/Time Culture result: FEW NORMAL RESPIRATORY SHELDON 09/28/2018 06:51 PM  
 Culture result: SCANT YEAST, (APPARENT CANDIDA ALBICANS) (A) 09/28/2018 06:51 PM  
 Culture result: NO FUNGUS ISOLATED 8 DAYS 09/16/2018 11:18 AM  
No results found for: TSH, TSHEXT, TSHEXT Imaging: CXR: no acute changes I have personally and independently reviewed the patients interval and diagnostic data, radiographs and have reviewed the reports. Medical Decision Making Today: · Reviewed the flowsheet and previous days notes · Reviewed and summarized records or history from previous days note or discussions with staff, family · Parenteral controlled substances - Reviewed/ Adjusted / Weaned / Started · High Risk Drug therapy requiring intensive monitoring for toxicity: eg steroids, pressors, antibiotics · Reviewed and/or ordered Clinical lab tests · Reviewed and/or ordered Radiology tests · Reviewed and/or ordered of Medicine tests · Independently visualized radiologic Images · I have personally reviewed the patients ECG / Telemetry Angela Watts MD

## 2018-09-30 NOTE — PROGRESS NOTES
Hospitalist Progress Note NAME: Burke Keating  
:  1934 MRN:  [id number] Assessment / Plan: PEA arrest  Acute encephalpathy: unchanged - Code blue called . Patient w/ agonal breathing after coming back from radiology after arteriogram completed. No pulse detected. CPR initiated. Epi x 2 given. Pulse obtained. Intubated by Dr. Richard Mast. Much bloody secretions obtained. Transferred to ICU. - head CT  showed small age-indeterminate infarct in the left corona radiata. If there is concern for acute ischemia, consider MRI for further evaluation. now that he is more stable. Acute respiratory w/ hypoxia, s/p cardiac arrest 
Sepsis due to suspected PNA w/ possible pulmonary hemorrhage COPD 
 - CT of chest shows severe emphysema with the right basilar airspace disease in the oval mass with fluid level concerning for internal hemorrhage versus pneumonia. - repeat CT  showed persistent hematoma in the right lower lobe region with surrounding patchy 
atelectasis/infiltrate and small ipsilateral pleural effusion. Left basilar subsegmental atelectasis. Coils in right lower lobe region consistent with recent pulmonary artery branch embolization. 
- Bcx Neg, sputum cytology collected  negative except scant yeast. 
- completed zosyn for possible lung abscess. End date  
- cytology from bronchial washing on  is atypical, favor benign reactive process 
- extubated on  and placed on VM 50% BUT REINTUBATED NIGHT OF  
- continue diuresis with lasix 
- continue bronchodilator - Continue on vent Hemoptysis 
- noted to have bleeding from RLL on bronchoscopy. - work up for possible rheumatologic cause neg thus far: 
ANCA, anti-GBM, MPO ab, IA-3 ab, SSA/SSB, RNP ab, Arredondo/RNP, dsDNA, and Arredondo ab all negative. JOSS +. 
- Arteriogram : Thoracic aortic and intercostal arteriograms as described above demonstrating no enlarged bronchial artery supplying the right lower lobe of the lung. A normal 
appearing and normal sized right bronchial artery is seen without any 
hyperplasia or dominant right lower lobe supply. No embolization was performed. RLL mass - work up for [employer] neg thus far. - diagnostic bronch done 9/16. No endobronchial lesions seen. - CTA chest done 9/17 due to persistent bloody pulmonary secretions. Right lower lobe masslike abnormality demonstrates increased internal density 
and has increased in size measuring 6.7 x 6.7 cm, compared to 5.7 x 4.4 cm. This 
is compatible with internal hemorrhage. There is new moderate right lower lobe 
partial collapse/atelectasis.   
Normal sized right bronchial artery is visualized without any hyperplasia or or evidence for supply to the right lower lobe lung abnormality. It is unlikely that the hemoptysis related to bronchial arterial injury/supply. Prior bronchial arteriogram was unremarkable. No evidence for pulmonary artery pseudoaneurysm or active hemorrhage. However, it is more likely that the masslike abnormality in the right lower lobe the lung has eroded into an adjacent pulmonary artery then a bronchial artery. Therefore, plan is for pulmonary arteriogram with possible embolization if an abnormality is seen in the right lower lobe. 6 mm and 4 mm right lung nodules. Small bilateral pleural effusions. Minimally enlarged bilateral hilar and mediastinal lymph nodes Hypernatremia: resolved Acute blood loss anemia on admission: stable - s/p another 2 units PRBC today 
- recheck CBC now 
- monitor labs and transfuse as necessary. H/o atrial fibrillation on chronic anticoagulation w/ coumadin 
- amiodarone on hold 
- cardizem on hold due to borderline low bp 
- hold coumadin due to hemoptysis Coumadin-induced coagulopathy Elevated LFT 
- INR 1.1 Holding coumadin. 
- monitor LFT 
- stopped statin ALAN: resolved - likely prerenal related to Lasix. - Renal function stable. - avoid nephrotoxic agents. UTI, acute cystitis w/ hematuria, POA 
- treated HLD 
-statin held due to elevated LFT Hypotension: stable 
- NS boluses given on 9/29 greatly helped. - No pressors needed Obesity - BMI 32 Code: Full DVT prophylaxis: SCDs GI prophylaxis: PPI Subjective: Chief Complaint / Reason for Physician Visit 9/30  Sedated and on vent so unable to obtain hx and ROS but he is doing better than yesterday. discusssed with RN.   
 
9/29  Remains intubated & propofol sedated. Hypotensive with SBP 78. Improved to 90 after 250 bolus. Will repeat. 9/28  Required re-intubation last night. Now on sedation and not responsive because of this. 9/27  Was extubated today but remains on ventimask 50% and is very somnolent and lethargic. Not answering much other than denying chest pain then fell back to sleep. Review of Systems: 
Symptom Y/N Comments  Symptom Y/N Comments Fever/Chills    Chest Pain Poor Appetite    Edema Cough    Abdominal Pain Sputum    Joint Pain SOB/ROY    Pruritis/Rash Nausea/vomit    Tolerating PT/OT Diarrhea    Tolerating Diet Constipation    Other Could NOT obtain due to: sedation Objective: VITALS:  
Last 24hrs VS reviewed since prior progress note. Most recent are: 
Patient Vitals for the past 24 hrs: 
 Temp Pulse Resp BP SpO2  
09/30/18 1400 - 74 24 108/45 93 % 09/30/18 1300 - 72 25 102/44 95 % 09/30/18 1200 98.6 °F (37 °C) 75 22 109/46 94 % 09/30/18 1157 - 79 19 - 96 % 09/30/18 1100 - 70 26 100/47 93 % 09/30/18 1000 - 75 22 109/46 95 % 09/30/18 0900 - 76 19 114/51 97 % 09/30/18 0800 98.7 °F (37.1 °C) 68 16 105/47 95 % 09/30/18 0749 - 64 18 - 96 % 09/30/18 0709 - 68 18 99/53 95 % 09/30/18 0600 - 65 18 101/51 96 % 09/30/18 0516 - 64 17 - 95 % 09/30/18 0500 - 64 16 102/71 96 % 09/30/18 0400 - 66 17 99/48 97 % 09/30/18 0336 99.4 °F (37.4 °C) - - - -  
09/30/18 0300 - 66 17 99/47 97 % 09/30/18 0200 - 63 15 103/47 98 % 09/30/18 0104 - 60 15 - 100 % 09/30/18 0100 - 64 15 100/50 99 % 09/29/18 2330 - 66 19 93/51 98 %  
09/29/18 2300 99.6 °F (37.6 °C) 81 17 (!) 83/63 100 % 09/29/18 2230 - 62 17 96/49 99 % 09/29/18 2200 99.4 °F (37.4 °C) 66 19 95/41 98 %  
09/29/18 2130 99.5 °F (37.5 °C) 65 21 99/46 97 % 09/29/18 2115 99.5 °F (37.5 °C) 62 19 97/50 97 % 09/29/18 2100 99.6 °F (37.6 °C) 61 20 101/49 97 % 09/29/18 2045 99.6 °F (37.6 °C) 62 20 90/49 97 % 09/29/18 2030 99.6 °F (37.6 °C) 61 20 97/51 98 %  
09/29/18 2015 99.6 °F (37.6 °C) - - - -  
09/29/18 2000 99.6 °F (37.6 °C) 60 20 97/52 98 %  
09/29/18 1930 99.4 °F (37.4 °C) 61 19 97/50 98 %  
09/29/18 1915 99.2 °F (37.3 °C) 61 17 99/50 98 %  
09/29/18 1902 - 62 18 - 98 %  
09/29/18 1900 98.9 °F (37.2 °C) 62 17 99/49 98 %  
09/29/18 1845 99.1 °F (37.3 °C) 63 18 97/54 98 %  
09/29/18 1830 99.1 °F (37.3 °C) 63 18 97/52 98 %  
09/29/18 1815 - 61 16 91/48 97 % 09/29/18 1800 - 61 17 (!) 82/44 96 %  
09/29/18 1758 - 61 18 (!) 81/43 97 % 09/29/18 1730 - 63 16 95/49 97 % 09/29/18 1715 - 64 17 95/46 97 % 09/29/18 1700 - 67 16 91/46 97 % 09/29/18 1648 - 66 18 91/43 96 %  
09/29/18 1645 - 65 16 (!) 87/43 96 %  
09/29/18 1644 - 68 16 - 96 %  
09/29/18 1641 - 69 16 92/44 95 % 09/29/18 1639 - 71 17 92/44 95 % 09/29/18 1637 - 66 15 (!) 79/42 97 % 09/29/18 1635 - 67 19 (!) 81/39 95 % 09/29/18 1630 - 66 24 (!) 78/40 96 % Intake/Output Summary (Last 24 hours) at 09/30/18 1619 Last data filed at 09/30/18 1400 Gross per 24 hour Intake          3358.84 ml Output             1555 ml Net          1803.84 ml PHYSICAL EXAM: 
General: sedated Resp:  On vent. Decreased BS bilaterally. rhonchi CV:  Regular  rhythm,  1+ bilateral leg edema GI:  Soft, Non distended, Non tender.  +Bowel sounds Neurologic:  Sedated. No posturing Psych:   Sedation Skin:  No rashes. No jaundice Reviewed most current lab test results and cultures  YES Reviewed most current radiology test results   YES Review and summation of old records today    NO Reviewed patient's current orders and MAR    YES 
PMH/SH reviewed - no change compared to H&P 
________________________________________________________________________ Care Plan discussed with: 
  Comments Patient Family RN x Care Manager Consultant Multidiciplinary team rounds were held today with , nursing, pharmacist and clinical coordinator. Patient's plan of care was discussed; medications were reviewed and discharge planning was addressed. ________________________________________________________________________ Total NON critical care TIME:    Minutes Total CRITICAL CARE TIME Spent:   Minutes non procedure based Comments >50% of visit spent in counseling and coordination of care    
________________________________________________________________________ Elham Delatorre MD  
 
Procedures: see electronic medical records for all procedures/Xrays and details which were not copied into this note but were reviewed prior to creation of Plan. LABS: 
I reviewed today's most current labs and imaging studies. Pertinent labs include: 
Recent Labs  
   09/30/18 0448 09/29/18 
 1655  09/29/18 
 0735  09/29/18 
 0340 WBC  8.5  6.6   --   7.7 HGB  6.1*  7.1*  6.0*  6.2* HCT  19.0*  23.5*  20.3*  21.2*  
PLT  165  143*   --   162 Recent Labs  
   09/30/18 
 0448  09/29/18 
 0340  09/28/18 
 0500 NA  141  142  142  
K  4.0  4.1  4.8  
CL  106  106  104 CO2  35*  34*  34* GLU  132*  127*  151* BUN  37*  33*  32* CREA  1.17  1.17  1.23  
CA  8.2*  8.2*  8.2* MG  2.6*   --   2.6* PHOS  2.6  1.9*  4.4 ALB  1.9*  1.9*  2.1* TBILI  0.3  0.3  0.4 SGOT  114*  65*  77* ALT  95*  75  98* INR  1.1  1.1  1.1 Signed: Brigid Horton MD

## 2018-09-30 NOTE — PROGRESS NOTES
Overnight Summary:  Pt with profuse oral secretions. No visible signs of active bleeding noted (HGB 6.1 this am) Temp max 99.6. BP remained low 90's overnight but no pressors required.

## 2018-10-01 ENCOUNTER — APPOINTMENT (OUTPATIENT)
Dept: GENERAL RADIOLOGY | Age: 83
DRG: 003 | End: 2018-10-01
Attending: INTERNAL MEDICINE
Payer: MEDICARE

## 2018-10-01 ENCOUNTER — ANESTHESIA EVENT (OUTPATIENT)
Dept: SURGERY | Age: 83
DRG: 003 | End: 2018-10-01
Payer: MEDICARE

## 2018-10-01 LAB
ALBUMIN SERPL-MCNC: 1.8 G/DL (ref 3.5–5)
ALBUMIN/GLOB SERPL: 0.3 {RATIO} (ref 1.1–2.2)
ALP SERPL-CCNC: 161 U/L (ref 45–117)
ALT SERPL-CCNC: 112 U/L (ref 12–78)
ANION GAP SERPL CALC-SCNC: 3 MMOL/L (ref 5–15)
APTT PPP: 26.5 SEC (ref 22.1–32)
AST SERPL-CCNC: 138 U/L (ref 15–37)
BILIRUB SERPL-MCNC: 0.4 MG/DL (ref 0.2–1)
BUN SERPL-MCNC: 29 MG/DL (ref 6–20)
BUN/CREAT SERPL: 27 (ref 12–20)
CALCIUM SERPL-MCNC: 8 MG/DL (ref 8.5–10.1)
CHLORIDE SERPL-SCNC: 105 MMOL/L (ref 97–108)
CO2 SERPL-SCNC: 34 MMOL/L (ref 21–32)
CREAT SERPL-MCNC: 1.08 MG/DL (ref 0.7–1.3)
ERYTHROCYTE [DISTWIDTH] IN BLOOD BY AUTOMATED COUNT: 22.4 % (ref 11.5–14.5)
FIBRINOGEN PPP-MCNC: 757 MG/DL (ref 200–475)
GLOBULIN SER CALC-MCNC: 5.5 G/DL (ref 2–4)
GLUCOSE SERPL-MCNC: 131 MG/DL (ref 65–100)
HCT VFR BLD AUTO: 28 % (ref 36.6–50.3)
HGB BLD-MCNC: 8.5 G/DL (ref 12.1–17)
INR PPP: 1.1 (ref 0.9–1.1)
MCH RBC QN AUTO: 31.3 PG (ref 26–34)
MCHC RBC AUTO-ENTMCNC: 30.4 G/DL (ref 30–36.5)
MCV RBC AUTO: 102.9 FL (ref 80–99)
NRBC # BLD: 0 K/UL (ref 0–0.01)
NRBC BLD-RTO: 0 PER 100 WBC
PHOSPHATE SERPL-MCNC: 2.8 MG/DL (ref 2.6–4.7)
PLATELET # BLD AUTO: 149 K/UL (ref 150–400)
PMV BLD AUTO: 12.4 FL (ref 8.9–12.9)
POTASSIUM SERPL-SCNC: 4.2 MMOL/L (ref 3.5–5.1)
PROT SERPL-MCNC: 7.3 G/DL (ref 6.4–8.2)
PROTHROMBIN TIME: 10.9 SEC (ref 9–11.1)
RBC # BLD AUTO: 2.72 M/UL (ref 4.1–5.7)
SODIUM SERPL-SCNC: 142 MMOL/L (ref 136–145)
THERAPEUTIC RANGE,PTTT: ABNORMAL SECS (ref 58–77)
WBC # BLD AUTO: 6.3 K/UL (ref 4.1–11.1)

## 2018-10-01 PROCEDURE — 74011000250 HC RX REV CODE- 250: Performed by: INTERNAL MEDICINE

## 2018-10-01 PROCEDURE — 74011250636 HC RX REV CODE- 250/636: Performed by: INTERNAL MEDICINE

## 2018-10-01 PROCEDURE — 74011250637 HC RX REV CODE- 250/637: Performed by: INTERNAL MEDICINE

## 2018-10-01 PROCEDURE — 36415 COLL VENOUS BLD VENIPUNCTURE: CPT | Performed by: INTERNAL MEDICINE

## 2018-10-01 PROCEDURE — 80053 COMPREHEN METABOLIC PANEL: CPT | Performed by: INTERNAL MEDICINE

## 2018-10-01 PROCEDURE — 77030037878 HC DRSG MEPILEX >48IN BORD MOLN -B

## 2018-10-01 PROCEDURE — 85027 COMPLETE CBC AUTOMATED: CPT | Performed by: INTERNAL MEDICINE

## 2018-10-01 PROCEDURE — 94640 AIRWAY INHALATION TREATMENT: CPT

## 2018-10-01 PROCEDURE — 85610 PROTHROMBIN TIME: CPT | Performed by: INTERNAL MEDICINE

## 2018-10-01 PROCEDURE — 71045 X-RAY EXAM CHEST 1 VIEW: CPT

## 2018-10-01 PROCEDURE — 99252 IP/OBS CONSLTJ NEW/EST SF 35: CPT | Performed by: SURGERY

## 2018-10-01 PROCEDURE — 84100 ASSAY OF PHOSPHORUS: CPT | Performed by: INTERNAL MEDICINE

## 2018-10-01 PROCEDURE — 65620000000 HC RM CCU GENERAL

## 2018-10-01 PROCEDURE — 94003 VENT MGMT INPAT SUBQ DAY: CPT

## 2018-10-01 RX ORDER — IPRATROPIUM BROMIDE AND ALBUTEROL SULFATE 2.5; .5 MG/3ML; MG/3ML
3 SOLUTION RESPIRATORY (INHALATION)
Status: DISCONTINUED | OUTPATIENT
Start: 2018-10-01 | End: 2018-10-13

## 2018-10-01 RX ADMIN — METHYLPHENIDATE HYDROCHLORIDE 5 MG: 5 TABLET ORAL at 08:39

## 2018-10-01 RX ADMIN — ACETAMINOPHEN 650 MG: 325 TABLET ORAL at 08:39

## 2018-10-01 RX ADMIN — LATANOPROST 1 DROP: 50 SOLUTION OPHTHALMIC at 22:14

## 2018-10-01 RX ADMIN — PROPOFOL 20 MCG/KG/MIN: 10 INJECTION, EMULSION INTRAVENOUS at 21:21

## 2018-10-01 RX ADMIN — ACETAMINOPHEN 650 MG: 325 SOLUTION ORAL at 12:24

## 2018-10-01 RX ADMIN — FAMOTIDINE 20 MG: 10 INJECTION, SOLUTION INTRAVENOUS at 08:40

## 2018-10-01 RX ADMIN — FENTANYL CITRATE 25 MCG: 50 INJECTION, SOLUTION INTRAMUSCULAR; INTRAVENOUS at 08:36

## 2018-10-01 RX ADMIN — FAMOTIDINE 20 MG: 10 INJECTION, SOLUTION INTRAVENOUS at 20:33

## 2018-10-01 RX ADMIN — Medication 10 ML: at 10:02

## 2018-10-01 RX ADMIN — FENTANYL CITRATE 25 MCG: 50 INJECTION, SOLUTION INTRAMUSCULAR; INTRAVENOUS at 14:17

## 2018-10-01 RX ADMIN — CHLORHEXIDINE GLUCONATE 15 ML: 1.2 RINSE ORAL at 08:43

## 2018-10-01 RX ADMIN — Medication 10 ML: at 06:05

## 2018-10-01 RX ADMIN — PROPOFOL 20 MCG/KG/MIN: 10 INJECTION, EMULSION INTRAVENOUS at 05:22

## 2018-10-01 RX ADMIN — GLYCOPYRROLATE 0.1 MG: 0.2 INJECTION, SOLUTION INTRAMUSCULAR; INTRAVENOUS at 19:42

## 2018-10-01 RX ADMIN — Medication 20 ML: at 10:02

## 2018-10-01 RX ADMIN — IPRATROPIUM BROMIDE AND ALBUTEROL SULFATE 3 ML: .5; 3 SOLUTION RESPIRATORY (INHALATION) at 11:36

## 2018-10-01 RX ADMIN — CHLORHEXIDINE GLUCONATE 15 ML: 1.2 RINSE ORAL at 20:35

## 2018-10-01 RX ADMIN — ALBUTEROL SULFATE 2.5 MG: 2.5 SOLUTION RESPIRATORY (INHALATION) at 03:44

## 2018-10-01 RX ADMIN — IPRATROPIUM BROMIDE AND ALBUTEROL SULFATE 3 ML: .5; 3 SOLUTION RESPIRATORY (INHALATION) at 19:00

## 2018-10-01 RX ADMIN — IPRATROPIUM BROMIDE AND ALBUTEROL SULFATE 3 ML: .5; 3 SOLUTION RESPIRATORY (INHALATION) at 16:59

## 2018-10-01 RX ADMIN — DORZOLAMIDE HYDROCHLORIDE AND TIMOLOL MALEATE 1 DROP: 20; 5 SOLUTION/ DROPS OPHTHALMIC at 08:44

## 2018-10-01 RX ADMIN — Medication 10 ML: at 13:32

## 2018-10-01 RX ADMIN — DORZOLAMIDE HYDROCHLORIDE AND TIMOLOL MALEATE 1 DROP: 20; 5 SOLUTION/ DROPS OPHTHALMIC at 17:08

## 2018-10-01 RX ADMIN — FUROSEMIDE 40 MG: 10 INJECTION, SOLUTION INTRAMUSCULAR; INTRAVENOUS at 10:05

## 2018-10-01 RX ADMIN — Medication 10 ML: at 22:15

## 2018-10-01 RX ADMIN — GLYCOPYRROLATE 0.1 MG: 0.2 INJECTION, SOLUTION INTRAMUSCULAR; INTRAVENOUS at 08:36

## 2018-10-01 RX ADMIN — PROPOFOL 25 MCG/KG/MIN: 10 INJECTION, EMULSION INTRAVENOUS at 14:16

## 2018-10-01 RX ADMIN — IPRATROPIUM BROMIDE AND ALBUTEROL SULFATE 3 ML: .5; 3 SOLUTION RESPIRATORY (INHALATION) at 08:38

## 2018-10-01 NOTE — PROGRESS NOTES
10/01/18 7397 10/01/18 1702 Vent Settings FIO2 (%) --  40 % Pressure Support (cm H2O) --  5 cm H2O  
PEEP/VENT (cm H2O) --  6 cm H20 ABCDEF Bundle SBT Safety Screen Passed Yes --   
SBT Trial Passed Yes -- Weaning Parameters Spontaneous Breathing Trial Complete Yes -- Resp Rate Observed 16 22 Ve 9.3 11.9  422 RSBI 25 44 Vent Method/Mode Ventilation Method --  Conventional  
Ventilator Mode --  Spontaneous

## 2018-10-01 NOTE — PROGRESS NOTES
Hospitalist Progress Note NAME: Robles Parsons  
:  1934 MRN:  [id number] Assessment / Plan: PEA cardiac arrest  Acute encephalpathy: unchanged - Code blue called . Patient w/ agonal breathing after coming back from radiology after arteriogram completed. No pulse detected. CPR initiated. Epi x 2 given. Pulse with ROSC. Intubated at bedside and transferred to ICU. - head CT  showed small age-indeterminate infarct in the left corona radiata. If there is concern for acute ischemia, consider MRI 
  
Acute respiratory w/ hypoxia, s/p cardiac arrest; now intubated Sepsis due to suspected PNA w/ possible pulmonary hemorrhage COPD 
 - CT of chest shows severe emphysema with the right basilar airspace disease in the oval mass with fluid level concerning for internal hemorrhage versus pneumonia. - repeat CT  showed persistent hematoma in the right lower lobe region with surrounding patchy atelectasis/infiltrate and small ipsilateral pleural effusion. Left basilar subsegmental atelectasis. Coils in right lower lobe region consistent with recent pulmonary artery branch embolization. 
- Bcx Neg, sputum cytology collected  negative except scant yeast. 
- completed zosyn for possible lung abscess. End date  
- cytology from bronchial washing on  is atypical, favor benign reactive process 
- extubated on  and placed on VM 50% but reintubated  
- wean vent as tolerated 
- on diuresis with lasix 
- respiratory care 
  
Hemoptysis 
- noted to have bleeding from RLL on bronchoscopy. - work up for possible rheumatologic cause neg thus far: 
ANCA, anti-GBM, MPO ab, MO-3 ab, SSA/SSB, RNP ab, Arredondo/RNP, dsDNA, and Arredondo ab all negative. JOSS +. 
- Arteriogram : Thoracic aortic and intercostal arteriograms as described above demonstrating no 
enlarged bronchial artery supplying the right lower lobe of the lung.  A normal 
 appearing and normal sized right bronchial artery is seen without any 
hyperplasia or dominant right lower lobe supply. No embolization was performed. 
  
RLL mass - work up for [employer] neg thus far. - diagnostic bronch on 9/16. No endobronchial lesions seen. - CTA chest done 9/17 due to persistent bloody pulmonary secretions. Right lower lobe masslike abnormality demonstrates increased internal density 
and has increased in size measuring 6.7 x 6.7 cm, compared to 5.7 x 4.4 cm. This 
is compatible with internal hemorrhage. There is new moderate right lower lobe 
partial collapse/atelectasis.   
Normal sized right bronchial artery is visualized without any hyperplasia or or evidence for supply to the right lower lobe lung abnormality. It is unlikely that the hemoptysis related to bronchial arterial injury/supply. Prior bronchial arteriogram was unremarkable. No evidence for pulmonary artery pseudoaneurysm or active hemorrhage. However, it is more likely that the masslike abnormality in the right lower lobe the lung has eroded into an adjacent pulmonary artery then a bronchial artery. Therefore, plan is for pulmonary arteriogram with possible embolization if an abnormality is seen in the right lower lobe. 6 mm and 4 mm right lung nodules. Small bilateral pleural effusions. Minimally enlarged bilateral hilar and mediastinal lymph nodes  
  
Hypernatremia: resolved 
  
Acute blood loss anemia on admission: stable - trend H&H 
- monitor labs and transfuse as necessary. 
  
H/o atrial fibrillation on chronic anticoagulation w/ coumadin 
- amiodarone on hold 
- cardizem on hold due to borderline low bp 
- coumadin on hold due to hemoptysis  
  
Coumadin-induced coagulopathy Elevated LFT 
- Holding coumadin. 
- monitor LFT 
- stopped statin - trend INR 
  
ALAN: resolved 
- likely prerenal related to Lasix. - Renal function stable.  
- avoid nephrotoxic agents. 
  
UTI, acute cystitis w/ hematuria, POA 
 - s/p treatment course 
  
HLD 
-statin held due to elevated LFT 
  
Obesity - BMI 32 
  
Code: Full DVT prophylaxis: SCDs GI prophylaxis: PPI Subjective: Chief Complaint / Reason for Physician Visit: hemoptysis Intubated and sedated at bedside. Low grade fever to 100.4 overnight. Fio2 40%, PEEP 6. Review of Systems: 14 pt ROS unremarkable except as stated above. Objective: VITALS:  
Last 24hrs VS reviewed since prior progress note. Most recent are: 
Patient Vitals for the past 24 hrs: 
 Temp Pulse Resp BP SpO2  
10/01/18 1500 - 69 16 92/44 93 % 10/01/18 1400 - 75 19 98/55 93 % 10/01/18 1300 - 77 21 104/51 93 % 10/01/18 1200 100 °F (37.8 °C) 74 20 101/43 93 % 10/01/18 1137 - 73 17 - 93 % 10/01/18 1100 - 71 23 105/56 96 % 10/01/18 1000 - 73 19 93/47 95 % 10/01/18 0957 - 72 17 - 94 % 10/01/18 0956 - 72 22 - 95 % 10/01/18 0900 - 88 16 95/42 95 % 10/01/18 0840 - 80 18 - 97 % 10/01/18 0800 100.4 °F (38 °C) 83 30 147/72 98 % 10/01/18 0700 - 78 24 138/42 99 % 10/01/18 0600 - 68 20 98/50 96 % 10/01/18 0500 - 70 21 96/48 97 % 10/01/18 0400 99 °F (37.2 °C) 71 23 114/60 97 % 10/01/18 0344 - 69 17 - 97 % 10/01/18 0300 - 73 18 109/55 96 % 10/01/18 0200 - 70 22 103/47 94 % 10/01/18 0100 - 74 25 107/48 94 % 10/01/18 0000 99 °F (37.2 °C) 70 19 106/53 96 % 09/30/18 2330 - 72 19 - 96 % 09/30/18 2300 - 72 20 105/49 96 % 09/30/18 2200 - 68 19 95/45 95 % 09/30/18 2100 - 71 19 97/45 94 % 09/30/18 2000 99.2 °F (37.3 °C) 73 19 114/45 99 % 09/30/18 1939 - 72 17 - 99 % 09/30/18 1900 - 70 18 101/59 95 % 09/30/18 1800 - 71 21 110/58 96 % 09/30/18 1700 - 72 17 101/55 97 % 09/30/18 1659 - 75 19 - 97 % 09/30/18 1600 98.7 °F (37.1 °C) 74 17 102/57 97 % Intake/Output Summary (Last 24 hours) at 10/01/18 1536 Last data filed at 10/01/18 1500 Gross per 24 hour Intake          2556.13 ml Output             2095 ml Net           461.13 ml  
  
 
 PHYSICAL EXAM: 
General:    Intubated and sedated. Neck:  Supple, symmetrical.  ETT in place Lungs:   Coarse breath sounds, no wheezing Heart:   Regular  rhythm,  Normal S1 and S2   Diffuse edema Abdomen:   Soft, non-tender. Bowel sounds normal 
Skin:     Warm, dry Neurologic: sedated Reviewed most current lab test results and cultures  YES Reviewed most current radiology test results   YES Review and summation of old records today    NO Reviewed patient's current orders and MAR    YES 
PMH/SH reviewed - no change compared to H&P 
 
________________________________________________________________________ Jaydon Hurtado MD  
 
Procedures: see electronic medical records for all procedures/Xrays and details which were not copied into this note but were reviewed prior to creation of Plan. LABS: 
I reviewed today's most current labs and imaging studies. Pertinent labs include: 
Recent Labs 10/01/18 
 0419  09/30/18 
 0448  09/29/18 
 1655 WBC  6.3  8.5  6.6 HGB  8.5*  6.1*  7.1*  
HCT  28.0*  19.0*  23.5*  
PLT  149*  165  143* Recent Labs 10/01/18 
 6423  09/30/18 
 0448  09/29/18 
 0340 NA  142  141  142  
K  4.2  4.0  4.1 CL  105  106  106 CO2  34*  35*  34* GLU  131*  132*  127* BUN  29*  37*  33* CREA  1.08  1.17  1.17 CA  8.0*  8.2*  8.2* MG   --   2.6*   --   
PHOS  2.8  2.6  1.9* ALB  1.8*  1.9*  1.9* TBILI  0.4  0.3  0.3 SGOT  138*  114*  65* ALT  112*  95*  75 INR  1.1  1.1  1.1 Signed: Jaydon Hurtado MD

## 2018-10-01 NOTE — PROGRESS NOTES
Nutrition Assessment: 
 
RECOMMENDATIONS:  
Continue TF as ordered ASSESSMENT:  
Chart reviewed, case discussed during CCU rounds. Pt remains intubated and sedated on propofol @ 16.4mL/h, which provides 433 kcals daily. TF at goal rate with minimal residuals. Noted plan for PEG and trach. TF + propofol meets 114% kcal and 108% protein needs. BM noted today. Dietitians Intervention(s)/Plan(s): Continue TF as ordered SUBJECTIVE/OBJECTIVE:  
Pt intubated and sedated Diet Order: NPO, Other (comment) (TF via OGT: TwoCal @ 45mL/h + 150mL H2O flush q 4h (provides 2160kcals/90gPro/1656mL) ) 
% Eaten:  No data found. TwoCal HN  at 45 mL/hr flush with 150 mL  Q4H  via OG Tube   Residuals: 30 mL Pertinent Medications:pepcid, lasix; Drips: propofol. Chemistries: 
Lab Results Component Value Date/Time Sodium 142 10/01/2018 04:19 AM  
 Potassium 4.2 10/01/2018 04:19 AM  
 Chloride 105 10/01/2018 04:19 AM  
 CO2 34 (H) 10/01/2018 04:19 AM  
 Anion gap 3 (L) 10/01/2018 04:19 AM  
 Glucose 131 (H) 10/01/2018 04:19 AM  
 BUN 29 (H) 10/01/2018 04:19 AM  
 Creatinine 1.08 10/01/2018 04:19 AM  
 BUN/Creatinine ratio 27 (H) 10/01/2018 04:19 AM  
 GFR est AA >60 10/01/2018 04:19 AM  
 GFR est non-AA >60 10/01/2018 04:19 AM  
 Calcium 8.0 (L) 10/01/2018 04:19 AM  
 Albumin 1.8 (L) 10/01/2018 04:19 AM  
  
Anthropometrics: Height: 6' 2\" (188 cm) Weight: 115.8 kg (255 lb 4.7 oz)   [x]bed scale (9/30)   []stated   []unknown IBW (%IBW):   ( ) UBW (%UBW):   (  %) BMI: Body mass index is 32.78 kg/(m^2). This BMI is indicative of: 
[]Underweight   []Normal   []Overweight   [x] Obesity   [] Extreme Obesity (BMI>40) Estimated Nutrition Needs (Based on): 6175 Kcals/day (PSU (MSJ 1918)) , 83 g (0.8gPro/kg) Protein Carbohydrate:  At Least 130 g/day  Fluids: 1800 mL/day or per MD  
 
Last BM: 10/1   [x]Active     []Hyperactive  []Hypoactive       [] Absent   BS 
 Skin:    [] Intact   [] Incision  [] Breakdown   [] DTI   [x] Tears/Excoriation/Abrasion  [x]Edema(+2-generalized, all extremities)  [] Other: Wt Readings from Last 30 Encounters:  
09/30/18 115.8 kg (255 lb 4.7 oz) 05/05/16 111.1 kg (245 lb)  
03/25/16 106.6 kg (235 lb) 04/08/15 103.4 kg (228 lb)  
03/25/15 100.7 kg (222 lb)  
01/16/13 105.3 kg (232 lb 3.2 oz) 01/03/13 103.3 kg (227 lb 11.8 oz) NUTRITION DIAGNOSES:  
Problem:  No new nutritional dx at this time. NUTRITION INTERVENTIONS: 
  Enteral/Parenteral Nutrition: Other (Continue TF as ordered) GOAL:  
Pt will tolerate TF @ goal rate with residuals <250mL in 2-4 days. NUTRITION MONITORING AND EVALUATION Previous Goal: Pt will tolerate TF @ goal rate with residuals <250mL in 2-4 days Previous Goal Met: Yes Previous Recommendations Implemented: Yes Cultural, Roman Catholic, or Ethnic Dietary Needs: None LEARNING NEEDS (Diet, Food/Nutrient-Drug Interaction):  
 [x] None Identified 
 [] Identified and Education Provided/Documented 
 [] Identified and Pt declined/was not appropriate [x] Interdisciplinary Care Plan Reviewed/Documented  
 [x] Participated in Discharge Planning: To be determined  
 [x] Interdisciplinary Rounds NUTRITION RISK:  
 [x] High              [] Moderate           []  Low  []  Minimal/Uncompromised Chiqui Layton RD, [facility] Pager [phone] Weekend Pager [phone]

## 2018-10-01 NOTE — CONSULTS
Surgery Consult    Subjective:      Susan Baxter is a 80 y.o. male on the medical service with acute encephalopathy, respiratory failure with pneumonia and RLL lung mass who has been unable to be weaned from the ventilator. Dr. Jonny Cruz has requested surgical placement of tracheostomy tube for assistance in vent weaning or transition to long term care facility.     Past Medical History:   Diagnosis Date    Aneurysm ([address]     AAA    Arrhythmia     atrial fibrillation 2016    Hypertension     Other ill-defined conditions(799.89)     hx of broken arm left/cast    Other ill-defined conditions(799.89)     glaucoma    Other ill-defined conditions(799.89)     elevated cholesterol    Other ill-defined conditions(799.89)     hx bursitis knees     Past Surgical History:   Procedure Laterality Date    ABDOMEN SURGERY PROC UNLISTED  1/16/13    AORTIC ABDOMINAL ANUERYSM REPAIR ENDOVASCULAR     BRONCHOSCOPY DIAGNOSTIC  9/28/2018         HX HEENT      bilat cataract    HX ORTHOPAEDIC      ligament surgery left arm    HX OTHER SURGICAL      broken left foot casted and healed    UPPER GI ENDOSCOPY,BIOPSY  4/10/2015           Family History   Problem Relation Age of Onset    Cancer Mother     Cancer Father      Social History     Social History    Marital status:      Spouse name: N/A    Number of children: N/A    Years of education: N/A     Social History Main Topics    Smoking status: Former Smoker     Packs/day: 0.50     Years: 20.00    Smokeless tobacco: Never Used    Alcohol use Yes      Comment: social rare    Drug use: Yes     Special: Prescription, OTC    Sexual activity: Not on file     Other Topics Concern    Not on file     Social History Narrative      Current Facility-Administered Medications   Medication Dose Route Frequency Provider Last Rate Last Dose    albuterol-ipratropium (DUO-NEB) 2.5 MG-0.5 MG/3 ML  3 mL Nebulization QID RT Bobby Jj MD   3 mL at 10/01/18 7389    acetaminophen (TYLENOL) solution 650 mg  650 mg Oral Q4H PRN Rob Herrera MD   650 mg at 10/01/18 1224    furosemide (LASIX) injection 40 mg  40 mg IntraVENous Q48H Kenny Mercado MD   40 mg at 10/01/18 1005    sodium chloride (NS) flush 10-30 mL  10-30 mL InterCATHeter PRN Kenny Mercado MD        sodium chloride (NS) flush 10 mL  10 mL InterCATHeter Q24H Kenny Mercado MD   10 mL at 10/01/18 1002    sodium chloride (NS) flush 10 mL  10 mL InterCATHeter PRN Kenny Mercado MD        sodium chloride (NS) flush 10-40 mL  10-40 mL InterCATHeter Q8H Kenny Mercado MD   10 mL at 10/01/18 1332    sodium chloride (NS) flush 20 mL  20 mL InterCATHeter Q24H Kenny Mercado MD   20 mL at 10/01/18 1002    heparin (porcine) pf 300 Units  300 Units InterCATHeter PRN Kenny Mercado MD        propofol (DIPRIVAN) infusion  5-50 mcg/kg/min IntraVENous TITRATE Lisbeth Contreras MD 17.2 mL/hr at 10/01/18 1416 25 mcg/kg/min at 10/01/18 1416    glycopyrrolate (ROBINUL) injection 0.1 mg  0.1 mg IntraVENous TID PRN Kenny Mercado MD   0.1 mg at 10/01/18 0836    scopolamine (TRANSDERM-SCOP) 1 mg over 3 days 1 Patch  1 Patch TransDERmal Q72H Kenny Mercado MD   1 Patch at 09/29/18 1616    fentaNYL citrate (PF) injection 25-50 mcg  25-50 mcg IntraVENous Q4H PRN Kenny Mercado MD        famotidine (PF) (PEPCID) 20 mg in sodium chloride 0.9% 10 mL injection  20 mg IntraVENous Q12H Rob Herrera MD   20 mg at 10/01/18 0840    chlorhexidine (PERIDEX) 0.12 % mouthwash 15 mL  15 mL Oral BID Navin Lopez MD   15 mL at 10/01/18 0843    fentaNYL citrate (PF) injection 25 mcg  25 mcg IntraVENous Q4H PRN Lisbeth Contreras MD   25 mcg at 10/01/18 1417    albuterol (PROVENTIL HFA, VENTOLIN HFA, PROAIR HFA) inhaler 2 Puff  2 Puff Inhalation Q4H PRN Adina Mikie, MD        dorzolamide-timolol (COSOPT) 22.3-6.8 mg/mL ophthalmic solution 1 Drop  1 Drop Both Eyes BID Adina Deluna MD   1 Drop at 10/01/18 0869    latanoprost (XALATAN) 0.005 % ophthalmic solution 1 Drop  1 Drop Both Eyes QHS Michelle Wood MD   1 Drop at 18 2125    sodium chloride (NS) flush 5-10 mL  5-10 mL IntraVENous Q8H Michelle Wood MD   10 mL at 10/01/18 1332    sodium chloride (NS) flush 5-10 mL  5-10 mL IntraVENous PRN Michelle Wood MD   10 mL at 18 0744    ondansetron (ZOFRAN) injection 4 mg  4 mg IntraVENous Q6H PRN Michelle Wood MD   4 mg at 18 2145        No Known Allergies    Review of Systems:  Review of systems not obtained due to patient factors. Objective:      Patient Vitals for the past 8 hrs:   BP Temp Pulse Resp SpO2   10/01/18 1500 92/44 - 69 16 93 %   10/01/18 1400 98/55 - 75 19 93 %   10/01/18 1300 104/51 - 77 21 93 %   10/01/18 1200 101/43 100 °F (37.8 °C) 74 20 93 %   10/01/18 1137 - - 73 17 93 %   10/01/18 1100 105/56 - 71 23 96 %   10/01/18 1000 93/47 - 73 19 95 %   10/01/18 0957 - - 72 17 94 %   10/01/18 0956 - - 72 22 95 %   10/01/18 0900 95/42 - 88 16 95 %   10/01/18 0840 - - 80 18 97 %       Temp (24hrs), Av.5 °F (37.5 °C), Min:99 °F (37.2 °C), Max:100.4 °F (38 °C)      Physical Exam:  GENERAL: no distress, appears stated age, THROAT & NECK: neck supple and symmetrical.  some subcutaneous edema but anatomic landmarks easily appreciated., LUNG: clear to auscultation bilaterally, HEART: regular rate and rhythm, ABDOMEN: soft, non-distended. No masses. EXTREMITIES:  extremities normal, atraumatic, no cyanosis or edema    Assessment:     Hospital Problems  Date Reviewed: 2015          Codes Class Noted POA    Pulmonary hemorrhage ICD-10-CM: R04.89  ICD-9-CM: 786.30  2018 Unknown        Acute GI bleeding ICD-10-CM: K92.2  ICD-9-CM: 578.9  2018 Unknown              Plan:     Discussed the risk of surgery including bleeding, infection, loss of airway, possible erosion of tube with delayed bleeding, and the risks of general anesthetic with the patient's immediate family members.   They patient understand the risks; any and all questions were answered and consent obtained.   For Tracheostomy tube placement in am.    Signed By: Jd Vyas MD, [facility]     October 1, 2018

## 2018-10-01 NOTE — PROGRESS NOTES
[facility] [employer] Pulmonary Consult Service NotePulmonary, Critical Care, and Sleep Medicine Name: Maritza Kinney MRN: [id number] : 1934 Hospital: [facility], [facility] Date: 10/1/2018   Hospital Day: 24 Admitted with hemoptysis  S/P pulmonary artery embolization  old blood in ETT. Propofol has been stopped as of this AM. Unresponsive. Anasarca. D/w nursing at bedside. No fever. On TF. [place] was clogged, flushed and now patent.  good diuresis. Weak. Intermittently responds. No new hemoptysis on vent. K 3.8. LFTs up. Normal Bili 
 
 BUN/ Cr slightly up  After good diuresis. Weak. Intermittently responds. Thick secretions. No new hemoptysis on vent. K 3.5. LFTs up. Normal Bili. Hgb 7.5 
 
 onvent. Will open eyes, moves fingers and toes. No more hemoptysis.  Creatinine slightly better. dicsussed yesterday's chest and head Ct findings with wife an daughter. Anasraca. Weak despite Na better. No more hemoptysis  bronch yesterday without residual clot or plug. Still has copious oral sectreions despite scopolamine pathc. Opening eyes, sticks out tongue and trying to move more. D/w nursing. passing SAT, SBt. Weak cough. Scant ET secretions  extubated yesterday mid day. Sluggish rest of day. 4 AM ABg showed PCO2 > 100. Reintubated. Small mucous plug. Pt still sluggish. Drooling 2:30 PM spoke to wife, daughters at bedside and discussed management. Slow progress this week regarding responsiveness and awakening but last night demonstrated how pt still too weak to breathe entirely off the vent for long periods. Volume is an issue. Mentation is sluggish and cannot be blamed on COPD. When on vent and CO2 was normal he was still somnolent. He will need trach and PEG for safety, comfort and continued support. Am hopeful he can rally but no guarantees.  Of course he still has a RLL lung mass but for now, happy he has stopped bleeding. They will see how he does and discuss prospects but I endorsed trach, PEG and perhaps even LTACH if recovery remains slow. They will now be better prepared to discuss above with team next week 10-1-18: remains ventilator dependent, failed SBT this morning IMPRESSION:  
1. 9/14/18 cardiopulmonary arrest 6 min CPR 2. Acute respiratory failure-on vent-  
3. Acute hemoptysis- Noted to have bleeding from RLL on Bronch. No endobronchial lesion. has lung mass(CA vs inflammatory pseudotumor) and microscopic hematuria; ANCA negative; JOSS barely positive. Suspect LUNG mass the culprit. CYTOLOGY from bronchoscopy negative malignancy x 2 
4. S/P pulmonary artery embolization for hemoptysis 5. COPD moderate to severe at baseline 6. Acute renal insf 7. Anemia and prior coagulopathy on coumadin 8. Volume overload, anasarca- after couple of days of IV lasix- becomes prerenal 
9. Encehalopathy multifactorial - has hearing loss as well 10. Hx of CAD s/p PCI, afib on amio, former smoker, prior cva RECOMMENDATIONS/PLAN:  
1. Vent support 2. Trach/PEG this week per Dr. Rick Leong discussion with family 9-28 3. Still has unresolved issue of lung mass 4. Abx completed 5. Low dose Adderall 6. Scopolamine and add robinul prn 
7. Diuresis 8. Follow renal function-  
9. Enteral feedings plus free water, follow Na 
10. Follow neuro status 11. Glucose monitoring and SSI 12. DVT/GI prophylaxis My assessment/management discussed with: Consultants, Nursing, Pharmacy, Case Management, PT, OT, Respiratory Therapy, Hospitalist and Family for coordination of care Pt's condition is acute and unstable requiring inpatient hospitalization.  This care involved high complexity decision making which includes independently reviewing the patient's past medical records, current laboratory results, medication profiles that were immediately available to me and actual Xray images at the bedside in order to assess, support vital system function, and to treat this degree of vital organ system failure, and to prevent further deterioration of the patients condition. Risk of deterioration: high  
[x] High complexity decision making was performed [x] See my orders for details Tubes:    
 
Subjective/Initial History: MAR reviewed and pertinent medications noted or modified as needed Current Facility-Administered Medications Medication  methylphenidate HCl (RITALIN) tablet 5 mg  furosemide (LASIX) injection 40 mg  
 sodium chloride (NS) flush 10-30 mL  sodium chloride (NS) flush 10 mL  sodium chloride (NS) flush 10 mL  sodium chloride (NS) flush 10-40 mL  sodium chloride (NS) flush 20 mL  heparin (porcine) pf 300 Units  propofol (DIPRIVAN) infusion  glycopyrrolate (ROBINUL) injection 0.1 mg  
 scopolamine (TRANSDERM-SCOP) 1 mg over 3 days 1 Patch  
 fentaNYL citrate (PF) injection 25-50 mcg  famotidine (PF) (PEPCID) 20 mg in sodium chloride 0.9% 10 mL injection  chlorhexidine (PERIDEX) 0.12 % mouthwash 15 mL  albuterol (PROVENTIL VENTOLIN) nebulizer solution 2.5 mg  
 fentaNYL citrate (PF) injection 25 mcg  albuterol (PROVENTIL HFA, VENTOLIN HFA, PROAIR HFA) inhaler 2 Puff  dorzolamide-timolol (COSOPT) 22.3-6.8 mg/mL ophthalmic solution 1 Drop  latanoprost (XALATAN) 0.005 % ophthalmic solution 1 Drop  sodium chloride (NS) flush 5-10 mL  sodium chloride (NS) flush 5-10 mL  acetaminophen (TYLENOL) tablet 650 mg  
 ondansetron (ZOFRAN) injection 4 mg  docusate sodium (COLACE) capsule 100 mg  
  
 
  
ROS:A comprehensive review of systems was negative except for that written in the HPI. Objective: 
 
Vital Signs: Telemetry:    normal sinus rhythmIntake/Output:  
Visit Vitals  /42  Pulse 78  Temp 99 °F (37.2 °C)  Resp 24  
 Ht 6' 2\" (1.88 m)  Wt 115.8 kg (255 lb 4.7 oz)  SpO2 99%  BMI 32.78 kg/m2 Temp (24hrs), Av.9 °F (37.2 °C), Min:98.6 °F (37 °C), Max:99.2 °F (37.3 °C) O2 Device: Endotracheal tube, Ventilator O2 Flow Rate (L/min): 12 l/min Wt Readings from Last 4 Encounters:  
18 115.8 kg (255 lb 4.7 oz) 16 111.1 kg (245 lb)  
16 106.6 kg (235 lb) 04/08/15 103.4 kg (228 lb) Intake/Output Summary (Last 24 hours) at 10/01/18 0815 Last data filed at 10/01/18 0700 Gross per 24 hour Intake          2139.98 ml Output             1025 ml Net          1114.98 ml Last shift:        
Last 3 shifts: 1 - 10/01 0700 In: 4205.8 [I.V.:510.8] Out:  [OQVFE:3853; ULGTEW:643] Physical Exam:  
 General:  Intubated/sedated HEAD: Normocephalic, without obvious abnormality, atraumatic EYES: conjunctivae clear. anicteric sclerae NOSE: nares normal, no drainage, no nasal flaring, Neck: Supple, symmetrical, trachea midline Chest: increased AP diameter Lungs: Bilateral rhonchi. Heart: Regular rate and rhythm Abdomen: soft, non-tender, +BS Musculoskeletal: anasarca; no erythema Neuro: not following commands with sedation lifted. Psych: Not able to assess; no agitation. Data:  
 
Current Facility-Administered Medications Medication Dose Route Frequency  methylphenidate HCl (RITALIN) tablet 5 mg  5 mg Oral BID  furosemide (LASIX) injection 40 mg  40 mg IntraVENous Q48H  
 sodium chloride (NS) flush 10 mL  10 mL InterCATHeter Q24H  
 sodium chloride (NS) flush 10-40 mL  10-40 mL InterCATHeter Q8H  
 sodium chloride (NS) flush 20 mL  20 mL InterCATHeter Q24H  propofol (DIPRIVAN) infusion  5-50 mcg/kg/min IntraVENous TITRATE  scopolamine (TRANSDERM-SCOP) 1 mg over 3 days 1 Patch  1 Patch TransDERmal Q72H  famotidine (PF) (PEPCID) 20 mg in sodium chloride 0.9% 10 mL injection  20 mg IntraVENous Q12H  chlorhexidine (PERIDEX) 0.12 % mouthwash 15 mL  15 mL Oral BID  albuterol (PROVENTIL VENTOLIN) nebulizer solution 2.5 mg  2.5 mg Nebulization Q4H RT  
 dorzolamide-timolol (COSOPT) 22.3-6.8 mg/mL ophthalmic solution 1 Drop  1 Drop Both Eyes BID  latanoprost (XALATAN) 0.005 % ophthalmic solution 1 Drop  1 Drop Both Eyes QHS  sodium chloride (NS) flush 5-10 mL  5-10 mL IntraVENous Q8H Labs: 
Recent Labs 10/01/18 
 0419  09/30/18 
 0448  09/29/18 
 1655 WBC  6.3  8.5  6.6 HGB  8.5*  6.1*  7.1*  
HCT  28.0*  19.0*  23.5*  
PLT  149*  165  143* Recent Labs 10/01/18 
 7252  09/30/18 
 0448  09/29/18 
 0340 NA  142  141  142  
K  4.2  4.0  4.1 CL  105  106  106 CO2  34*  35*  34* GLU  131*  132*  127* BUN  29*  37*  33* CREA  1.08  1.17  1.17 CA  8.0*  8.2*  8.2* MG   --   2.6*   --   
PHOS  2.8  2.6  1.9* ALB  1.8*  1.9*  1.9* TBILI  0.4  0.3  0.3 SGOT  138*  114*  65* ALT  112*  95*  75 INR  1.1  1.1  1.1 Recent Labs  
   09/30/18 
 0606 PH  7.46* PCO2  49* PO2  74* HCO3  34* FIO2  40 Imaging: 
I have personally reviewed the patients radiographs and have reviewed the reports: 
No change Total critical care time exclusive of procedures: 25 minutes Suresh Prince MD

## 2018-10-01 NOTE — INTERDISCIPLINARY ROUNDS
Critical care interdisciplinary rounds held on 10/01/18. Following members present, Pharmacy, Diabetes Treatment, Case Management,  Physical Therapy Management, Palliative Care and Nutrition. Led by David Phillips. Marques Mccormick RN and Dr. Douglas Fairchild. Plan of care discussed.   See clinical pathway for plan of care and interventions and desired outcomes.

## 2018-10-01 NOTE — PROGRESS NOTES
0700  Bedside and verbal report from Amparo Real RN. Propofol is currently off; patient starting to reach for ETT and lines; he is very weak.  
0800  Assessment completed; Dr. Merlin Bauman here to see patient. Placed back on vent by MD - previous AC settings. Has copious oral and ETT secretions. ETT suctioned; oral care given. 2626  Propofol restarted @ 20 mcg/kg/min. 7899  Medicated with fentanyl 25 mcg IV for comfort. Robinul 0.1 mg IV given for secretions. 1454  Tylenol 650 mg per NG tube given for temp 100.4 
0900  Now resting quietly; vitals stable. Urine output 20 ml/hr - dark maroon, with occasional clots. 1000  Urine output 60 ml/hr - remains dark maroon with clots. Propofol decreased to 15 mcg/kg/min. 1100  /56. Urine output 350 ml/hr. Patient resting quietly with eyes closed. 1200  Reassessment completed. 1224  Temp 100 po. Medicated with tylenol elixir 350 mg per NG tube. 1300  Now resting quietly. 1400  Becoming restless at intervals. Patient suctioned for moderate amount thick tan-white secretions. 1416  Restless at intervals. Reaching for lines, ETT, NGT. Propofol increased to 25 mcg/kg/min. 1417  Fentanyl 25 mcg IV given for comfort. 1500  Resting quietly; Family members at bedside for status update. 1520  Dr. Alvarez Pulse with family for status update. 1600  Reassessment completed. 1700  BP 94/39. Urine output remains maroon. 1800  Turned and positioned; vitals stable. 1900  Bedside and Verbal shift change report given to Amparo Real RN (oncoming nurse) by Adeline Barahona RN (offgoing nurse). Report included the following information SBAR, Kardex, Procedure Summary, Intake/Output, MAR, Accordion, Recent Results, Med Rec Status, Cardiac Rhythm a-fib with paced rhythm and Alarm Parameters .

## 2018-10-01 NOTE — PROGRESS NOTES
0606 Uneventful night. Remains on TF at 35 ml/hr with 150 ml water flushes every 4 hrs. Propofol stopped for SAT. 9868 SAT passed. Alert and following commands. 0700 Bedside and Verbal shift change report given to [facility] AT [facility]. Report included the following information SBAR, Kardex, Procedure Summary, Intake/Output, MAR, Recent Results and Cardiac Rhythm A-Fib. Carlo Seth

## 2018-10-01 NOTE — PROGRESS NOTES
[facility] [place]: [phone]) [facility]: [phone]) Family meeting scheduled for 10/2/18 at 2 pm to meet with wife, Susan Conti, regarding present medical condition and goals of care.

## 2018-10-02 ENCOUNTER — ANESTHESIA (OUTPATIENT)
Dept: SURGERY | Age: 83
DRG: 003 | End: 2018-10-02
Payer: MEDICARE

## 2018-10-02 ENCOUNTER — APPOINTMENT (OUTPATIENT)
Dept: GENERAL RADIOLOGY | Age: 83
DRG: 003 | End: 2018-10-02
Attending: INTERNAL MEDICINE
Payer: MEDICARE

## 2018-10-02 LAB
ANION GAP SERPL CALC-SCNC: 2 MMOL/L (ref 5–15)
APTT PPP: 27.9 SEC (ref 22.1–32)
ARTERIAL PATENCY WRIST A: YES
BASE EXCESS BLDA CALC-SCNC: 7.6 MMOL/L
BDY SITE: ABNORMAL
BUN SERPL-MCNC: 28 MG/DL (ref 6–20)
BUN/CREAT SERPL: 27 (ref 12–20)
CALCIUM SERPL-MCNC: 8 MG/DL (ref 8.5–10.1)
CHLORIDE SERPL-SCNC: 103 MMOL/L (ref 97–108)
CO2 SERPL-SCNC: 35 MMOL/L (ref 21–32)
CREAT SERPL-MCNC: 1.05 MG/DL (ref 0.7–1.3)
EPAP/CPAP/PEEP, PAPEEP: 6
ERYTHROCYTE [DISTWIDTH] IN BLOOD BY AUTOMATED COUNT: 21.8 % (ref 11.5–14.5)
FIBRINOGEN PPP-MCNC: >800 MG/DL (ref 200–475)
FIO2 ON VENT: 40 %
GAS FLOW.O2 SETTING OXYMISER: 12 L/MIN
GLUCOSE SERPL-MCNC: 109 MG/DL (ref 65–100)
HCO3 BLDA-SCNC: 32 MMOL/L (ref 22–26)
HCT VFR BLD AUTO: 26.9 % (ref 36.6–50.3)
HGB BLD-MCNC: 8.1 G/DL (ref 12.1–17)
INR PPP: 1.1 (ref 0.9–1.1)
MAGNESIUM SERPL-MCNC: 2.6 MG/DL (ref 1.6–2.4)
MCH RBC QN AUTO: 31.5 PG (ref 26–34)
MCHC RBC AUTO-ENTMCNC: 30.1 G/DL (ref 30–36.5)
MCV RBC AUTO: 104.7 FL (ref 80–99)
NRBC # BLD: 0 K/UL (ref 0–0.01)
NRBC BLD-RTO: 0 PER 100 WBC
PCO2 BLDA: 44 MMHG (ref 35–45)
PH BLDA: 7.48 [PH] (ref 7.35–7.45)
PHOSPHATE SERPL-MCNC: 3.7 MG/DL (ref 2.6–4.7)
PLATELET # BLD AUTO: 135 K/UL (ref 150–400)
PMV BLD AUTO: 12.3 FL (ref 8.9–12.9)
PO2 BLDA: 88 MMHG (ref 80–100)
POTASSIUM SERPL-SCNC: 4.3 MMOL/L (ref 3.5–5.1)
PROTHROMBIN TIME: 10.9 SEC (ref 9–11.1)
RBC # BLD AUTO: 2.57 M/UL (ref 4.1–5.7)
SAO2 % BLD: 97 % (ref 92–97)
SAO2% DEVICE SAO2% SENSOR NAME: ABNORMAL
SODIUM SERPL-SCNC: 140 MMOL/L (ref 136–145)
SPECIMEN SITE: ABNORMAL
THERAPEUTIC RANGE,PTTT: ABNORMAL SECS (ref 58–77)
VENTILATION MODE VENT: ABNORMAL
VT SETTING VENT: 500 ML
WBC # BLD AUTO: 5.9 K/UL (ref 4.1–11.1)

## 2018-10-02 PROCEDURE — 77030018846 HC SOL IRR STRL H20 ICUM -A

## 2018-10-02 PROCEDURE — 85027 COMPLETE CBC AUTOMATED: CPT | Performed by: INTERNAL MEDICINE

## 2018-10-02 PROCEDURE — 74011000250 HC RX REV CODE- 250: Performed by: INTERNAL MEDICINE

## 2018-10-02 PROCEDURE — 77030019563 HC DEV ATTCH FEED HOLL -A

## 2018-10-02 PROCEDURE — 0B110F4 BYPASS TRACHEA TO CUTANEOUS WITH TRACHEOSTOMY DEVICE, OPEN APPROACH: ICD-10-PCS | Performed by: SURGERY

## 2018-10-02 PROCEDURE — 36600 WITHDRAWAL OF ARTERIAL BLOOD: CPT | Performed by: INTERNAL MEDICINE

## 2018-10-02 PROCEDURE — 77030018798 HC PMP KT ENTRL FED COVD -A

## 2018-10-02 PROCEDURE — 31600 PLANNED TRACHEOSTOMY: CPT | Performed by: SURGERY

## 2018-10-02 PROCEDURE — 36415 COLL VENOUS BLD VENIPUNCTURE: CPT | Performed by: INTERNAL MEDICINE

## 2018-10-02 PROCEDURE — 74011250636 HC RX REV CODE- 250/636

## 2018-10-02 PROCEDURE — 65610000006 HC RM INTENSIVE CARE

## 2018-10-02 PROCEDURE — 77030002986 HC SUT PROL J&J -A: Performed by: SURGERY

## 2018-10-02 PROCEDURE — 77030002996 HC SUT SLK J&J -A: Performed by: SURGERY

## 2018-10-02 PROCEDURE — 77030002888 HC SUT CHRMC J&J -A: Performed by: SURGERY

## 2018-10-02 PROCEDURE — 77030002916 HC SUT ETHLN J&J -A: Performed by: SURGERY

## 2018-10-02 PROCEDURE — 77030011640 HC PAD GRND REM COVD -A: Performed by: SURGERY

## 2018-10-02 PROCEDURE — 71045 X-RAY EXAM CHEST 1 VIEW: CPT

## 2018-10-02 PROCEDURE — 82803 BLOOD GASES ANY COMBINATION: CPT | Performed by: INTERNAL MEDICINE

## 2018-10-02 PROCEDURE — 94640 AIRWAY INHALATION TREATMENT: CPT

## 2018-10-02 PROCEDURE — 76060000033 HC ANESTHESIA 1 TO 1.5 HR: Performed by: SURGERY

## 2018-10-02 PROCEDURE — 74011250636 HC RX REV CODE- 250/636: Performed by: INTERNAL MEDICINE

## 2018-10-02 PROCEDURE — 77030018836 HC SOL IRR NACL ICUM -A

## 2018-10-02 PROCEDURE — 83735 ASSAY OF MAGNESIUM: CPT | Performed by: INTERNAL MEDICINE

## 2018-10-02 PROCEDURE — 77030018836 HC SOL IRR NACL ICUM -A: Performed by: SURGERY

## 2018-10-02 PROCEDURE — 77030008793 HC TU TRACH CUF COVD -B: Performed by: SURGERY

## 2018-10-02 PROCEDURE — 84100 ASSAY OF PHOSPHORUS: CPT | Performed by: INTERNAL MEDICINE

## 2018-10-02 PROCEDURE — 74011000250 HC RX REV CODE- 250

## 2018-10-02 PROCEDURE — 80048 BASIC METABOLIC PNL TOTAL CA: CPT | Performed by: INTERNAL MEDICINE

## 2018-10-02 PROCEDURE — 76010000149 HC OR TIME 1 TO 1.5 HR: Performed by: SURGERY

## 2018-10-02 PROCEDURE — 85610 PROTHROMBIN TIME: CPT | Performed by: INTERNAL MEDICINE

## 2018-10-02 PROCEDURE — 94003 VENT MGMT INPAT SUBQ DAY: CPT

## 2018-10-02 RX ORDER — MORPHINE SULFATE 10 MG/ML
2 INJECTION, SOLUTION INTRAMUSCULAR; INTRAVENOUS
Status: CANCELLED | OUTPATIENT
Start: 2018-10-02

## 2018-10-02 RX ORDER — SODIUM CHLORIDE, SODIUM LACTATE, POTASSIUM CHLORIDE, CALCIUM CHLORIDE 600; 310; 30; 20 MG/100ML; MG/100ML; MG/100ML; MG/100ML
100 INJECTION, SOLUTION INTRAVENOUS CONTINUOUS
Status: CANCELLED | OUTPATIENT
Start: 2018-10-02 | End: 2018-10-03

## 2018-10-02 RX ORDER — ONDANSETRON 2 MG/ML
4 INJECTION INTRAMUSCULAR; INTRAVENOUS AS NEEDED
Status: CANCELLED | OUTPATIENT
Start: 2018-10-02

## 2018-10-02 RX ORDER — FENTANYL CITRATE 50 UG/ML
25 INJECTION, SOLUTION INTRAMUSCULAR; INTRAVENOUS
Status: CANCELLED | OUTPATIENT
Start: 2018-10-02

## 2018-10-02 RX ORDER — SODIUM CHLORIDE 0.9 % (FLUSH) 0.9 %
5-10 SYRINGE (ML) INJECTION AS NEEDED
Status: CANCELLED | OUTPATIENT
Start: 2018-10-02

## 2018-10-02 RX ORDER — PROPOFOL 10 MG/ML
INJECTION, EMULSION INTRAVENOUS
Status: DISCONTINUED | OUTPATIENT
Start: 2018-10-02 | End: 2018-10-02 | Stop reason: HOSPADM

## 2018-10-02 RX ORDER — SODIUM CHLORIDE 9 MG/ML
25 INJECTION, SOLUTION INTRAVENOUS CONTINUOUS
Status: CANCELLED | OUTPATIENT
Start: 2018-10-02 | End: 2018-10-03

## 2018-10-02 RX ORDER — FENTANYL CITRATE 50 UG/ML
50 INJECTION, SOLUTION INTRAMUSCULAR; INTRAVENOUS AS NEEDED
Status: CANCELLED | OUTPATIENT
Start: 2018-10-02

## 2018-10-02 RX ORDER — DIPHENHYDRAMINE HYDROCHLORIDE 50 MG/ML
12.5 INJECTION, SOLUTION INTRAMUSCULAR; INTRAVENOUS AS NEEDED
Status: CANCELLED | OUTPATIENT
Start: 2018-10-02 | End: 2018-10-02

## 2018-10-02 RX ORDER — ROPIVACAINE HYDROCHLORIDE 5 MG/ML
30 INJECTION, SOLUTION EPIDURAL; INFILTRATION; PERINEURAL AS NEEDED
Status: CANCELLED | OUTPATIENT
Start: 2018-10-02

## 2018-10-02 RX ORDER — SODIUM CHLORIDE 0.9 % (FLUSH) 0.9 %
5-10 SYRINGE (ML) INJECTION EVERY 8 HOURS
Status: CANCELLED | OUTPATIENT
Start: 2018-10-02

## 2018-10-02 RX ORDER — MIDAZOLAM HYDROCHLORIDE 1 MG/ML
1 INJECTION, SOLUTION INTRAMUSCULAR; INTRAVENOUS AS NEEDED
Status: CANCELLED | OUTPATIENT
Start: 2018-10-02

## 2018-10-02 RX ORDER — SODIUM CHLORIDE, SODIUM LACTATE, POTASSIUM CHLORIDE, CALCIUM CHLORIDE 600; 310; 30; 20 MG/100ML; MG/100ML; MG/100ML; MG/100ML
100 INJECTION, SOLUTION INTRAVENOUS CONTINUOUS
Status: CANCELLED | OUTPATIENT
Start: 2018-10-02

## 2018-10-02 RX ORDER — FENTANYL CITRATE 50 UG/ML
INJECTION, SOLUTION INTRAMUSCULAR; INTRAVENOUS AS NEEDED
Status: DISCONTINUED | OUTPATIENT
Start: 2018-10-02 | End: 2018-10-02 | Stop reason: HOSPADM

## 2018-10-02 RX ORDER — LIDOCAINE HYDROCHLORIDE 20 MG/ML
INJECTION, SOLUTION EPIDURAL; INFILTRATION; INTRACAUDAL; PERINEURAL AS NEEDED
Status: DISCONTINUED | OUTPATIENT
Start: 2018-10-02 | End: 2018-10-02 | Stop reason: HOSPADM

## 2018-10-02 RX ORDER — HYDROMORPHONE HYDROCHLORIDE 1 MG/ML
0.5 INJECTION, SOLUTION INTRAMUSCULAR; INTRAVENOUS; SUBCUTANEOUS
Status: CANCELLED | OUTPATIENT
Start: 2018-10-02

## 2018-10-02 RX ORDER — LIDOCAINE HYDROCHLORIDE 10 MG/ML
0.1 INJECTION, SOLUTION EPIDURAL; INFILTRATION; INTRACAUDAL; PERINEURAL AS NEEDED
Status: CANCELLED | OUTPATIENT
Start: 2018-10-02

## 2018-10-02 RX ORDER — ROCURONIUM BROMIDE 10 MG/ML
INJECTION, SOLUTION INTRAVENOUS AS NEEDED
Status: DISCONTINUED | OUTPATIENT
Start: 2018-10-02 | End: 2018-10-02 | Stop reason: HOSPADM

## 2018-10-02 RX ORDER — MIDAZOLAM HYDROCHLORIDE 1 MG/ML
0.5 INJECTION, SOLUTION INTRAMUSCULAR; INTRAVENOUS
Status: CANCELLED | OUTPATIENT
Start: 2018-10-02

## 2018-10-02 RX ORDER — SODIUM CHLORIDE 9 MG/ML
INJECTION, SOLUTION INTRAVENOUS
Status: DISCONTINUED | OUTPATIENT
Start: 2018-10-02 | End: 2018-10-02 | Stop reason: HOSPADM

## 2018-10-02 RX ADMIN — Medication 10 ML: at 15:04

## 2018-10-02 RX ADMIN — FENTANYL CITRATE 50 MCG: 50 INJECTION, SOLUTION INTRAMUSCULAR; INTRAVENOUS at 07:40

## 2018-10-02 RX ADMIN — FAMOTIDINE 20 MG: 10 INJECTION, SOLUTION INTRAVENOUS at 09:15

## 2018-10-02 RX ADMIN — ROCURONIUM BROMIDE 20 MG: 10 INJECTION, SOLUTION INTRAVENOUS at 07:48

## 2018-10-02 RX ADMIN — PROPOFOL 25 MCG/KG/MIN: 10 INJECTION, EMULSION INTRAVENOUS at 07:38

## 2018-10-02 RX ADMIN — PROPOFOL 18 MCG/KG/MIN: 10 INJECTION, EMULSION INTRAVENOUS at 17:05

## 2018-10-02 RX ADMIN — LIDOCAINE HYDROCHLORIDE 20 MG: 20 INJECTION, SOLUTION EPIDURAL; INFILTRATION; INTRACAUDAL; PERINEURAL at 07:44

## 2018-10-02 RX ADMIN — IPRATROPIUM BROMIDE AND ALBUTEROL SULFATE 3 ML: .5; 3 SOLUTION RESPIRATORY (INHALATION) at 20:36

## 2018-10-02 RX ADMIN — IPRATROPIUM BROMIDE AND ALBUTEROL SULFATE 3 ML: .5; 3 SOLUTION RESPIRATORY (INHALATION) at 07:27

## 2018-10-02 RX ADMIN — Medication 10 ML: at 21:58

## 2018-10-02 RX ADMIN — CHLORHEXIDINE GLUCONATE 15 ML: 1.2 RINSE ORAL at 09:12

## 2018-10-02 RX ADMIN — SODIUM CHLORIDE: 9 INJECTION, SOLUTION INTRAVENOUS at 07:38

## 2018-10-02 RX ADMIN — LATANOPROST 1 DROP: 50 SOLUTION OPHTHALMIC at 22:00

## 2018-10-02 RX ADMIN — CHLORHEXIDINE GLUCONATE 15 ML: 1.2 RINSE ORAL at 21:58

## 2018-10-02 RX ADMIN — DORZOLAMIDE HYDROCHLORIDE AND TIMOLOL MALEATE 1 DROP: 20; 5 SOLUTION/ DROPS OPHTHALMIC at 17:06

## 2018-10-02 RX ADMIN — FENTANYL CITRATE 50 MCG: 50 INJECTION, SOLUTION INTRAMUSCULAR; INTRAVENOUS at 07:45

## 2018-10-02 RX ADMIN — PROPOFOL 20 MCG/KG/MIN: 10 INJECTION, EMULSION INTRAVENOUS at 03:36

## 2018-10-02 RX ADMIN — IPRATROPIUM BROMIDE AND ALBUTEROL SULFATE 3 ML: .5; 3 SOLUTION RESPIRATORY (INHALATION) at 15:29

## 2018-10-02 RX ADMIN — Medication 10 ML: at 06:16

## 2018-10-02 RX ADMIN — FAMOTIDINE 20 MG: 10 INJECTION, SOLUTION INTRAVENOUS at 21:57

## 2018-10-02 RX ADMIN — SODIUM CHLORIDE 1000 ML: 900 INJECTION, SOLUTION INTRAVENOUS at 09:23

## 2018-10-02 RX ADMIN — Medication 20 ML: at 10:45

## 2018-10-02 RX ADMIN — IPRATROPIUM BROMIDE AND ALBUTEROL SULFATE 3 ML: .5; 3 SOLUTION RESPIRATORY (INHALATION) at 11:09

## 2018-10-02 RX ADMIN — DORZOLAMIDE HYDROCHLORIDE AND TIMOLOL MALEATE 1 DROP: 20; 5 SOLUTION/ DROPS OPHTHALMIC at 09:12

## 2018-10-02 RX ADMIN — Medication 10 ML: at 10:44

## 2018-10-02 NOTE — PROGRESS NOTES
GI Progress Note [place] NAME:Ulysses Valle CFD:[birth year] AMH:[id number] ATTG: Sienna Meyers MD  PCP: PROVIDER UNKNOWN 
Date/Time:  10/2/2018 11:56 AM  
Assessment: · Dysphagia- prolonged nutritional support required in setting of trach and medical illness. Tolerated foods. No prior gastric surgery noted. Plan: · For PEG at bedside tomorrow at 2pm 
· Hold TF after MN 
· No Lovenox tomorrow AM  
 
Subjective:  
Discussed with RN events overnight. ATSP re: PEG tube placement in setting of anticipated LT nutritional support. Pt is known to me from admission this 9/12 when call for hematemesis , which was in fact related to noted hemoptysis. He is noted to have hx Afib on Coumadin then, CAD with prior PCI, AAA, HTN, and COPD on 2.5L via NC.  ER eval demonstrated patchy airspace disease in the right lung base with an associated hyperdense RLL 5.2 x 4.3 cm oval lesion, but no acute abdominal or pelvic process. WBC 20.6, BUN Cr 33/2.0, INR 2.4. Initial Hgb was 11.1, but when rechecked 8hrs later was noted to be 7.9. He had undergone EGD 2015 confirming gastritis without h.pylori infection. Last colonoscopy 2007 with internal hemorrhoids and sigmoid diverticulosis. His HC has been marked by acute encephalopathy, respiratory failure with pneumonia and RLL lung mass which required embolization due to bleeding and trach placement today because he was unable to be weaned from the ventilator. He had been tolerating TF prior to being made NPO for trach today Complaint Y/N Description Abdominal Pain Hematemesis n Hematochezia n Melena n   
Constipation Diarrhea Dyspepsia Dysphagia Jaundiced n   
Nausea/vomiting Review of Systems: 
Symptom Y/N Comments  Symptom Y/N Comments Fever/Chills    Chest Pain Cough    Headaches Sputum    Joint Pain SOB/ROY    Pruritis/Rash Tolerating Diet    Other Could NOT obtain due to: AMS/sedation/trach Objective: VITALS:  
Last 24hrs VS reviewed since prior progress note. Most recent are: 
Visit Vitals  /57  Pulse 73  Temp 98.5 °F (36.9 °C)  Resp 17  Ht 6' 2\" (1.88 m)  Wt 115.8 kg (255 lb 4.7 oz)  SpO2 91%  BMI 32.78 kg/m2 Intake/Output Summary (Last 24 hours) at 10/02/18 1156 Last data filed at 10/02/18 1100 Gross per 24 hour Intake          2121.36 ml Output             1725 ml Net           396.36 ml PHYSICAL EXAM: 
General: WD, WN. Alert, cooperative, no acute distress   
HEENT: NC, Atraumatic. PERRL. Anicteric sclerae. Neck:  Trach site with minimal blood, ML 
Lungs:  CTA Bilaterally. No Wheezing/Rhonchi/Rales. Heart:  Regular  rhythm,  No murmur/Rub/Gallops Abdomen: Soft, Non distended, Non tender.  +BS Extremities: No c/c/e Neurologic:  CN 2-12 gi,   No acute neurological distress Psych:   Unable to assess insight. Not agitated. Skin:  No rash/petechiae/jaundic Lab and Radiology Data Reviewed: (see below) Medications Reviewed: (see below) PMH/SH reviewed - no change compared to H&P 
________________________________________________________________________ Total time spent with patient: 15 minutes  
________________________________________________________________________ Care Plan discussed with: 
Patient y Family RN y  
           Consultant:    
 
Yamile Beth MD  
 
Procedures: see electronic medical records for all procedures/Xrays and details which were not copied into this note but were reviewed prior to creation of Plan. LABS: 
Recent Labs 10/02/18 
 2343  10/01/18 
 0301 WBC  5.9  6.3 HGB  8.1*  8.5* HCT  26.9*  28.0*  
PLT  135*  149* Recent Labs 10/02/18 
 5024  10/01/18 
 8316  09/30/18 
 0448 NA  140  142  141  
K  4.3  4.2  4.0  
CL  103  105  106 CO2  35*  34*  35* BUN  28*  29*  37* CREA  1.05  1.08  1.17  
GLU  109*  131*  132* CA  8.0*  8.0*  8.2* MG  2.6*   --   2.6* PHOS  3.7  2.8  2.6 Recent Labs 10/01/18 
 9997  09/30/18 
 0448 SGOT  138*  114* AP  161*  152* TP  7.3  7.2 ALB  1.8*  1.9*  
GLOB  5.5*  5.3* Recent Labs 10/02/18 
 0694  10/01/18 
 6431  09/30/18 
 0448 INR  1.1  1.1  1.1 PTP  10.9  10.9  10.9 APTT  27.9  26.5  26.8 No results for input(s): FE, TIBC, PSAT, FERR in the last 72 hours. Lab Results Component Value Date/Time Folate 12.7 04/12/2015 04:50 AM  
 
Recent Labs 10/02/18 
 3919  09/30/18 
 9757 PH  7.48*  7.46* PCO2  44  49* PO2  88  74* No results for input(s): CPK, CKMB in the last 72 hours. No lab exists for component: TROPONINI Lab Results Component Value Date/Time Color DARK YELLOW 09/12/2018 01:33 AM  
 Appearance CLOUDY (A) 09/12/2018 01:33 AM  
 Specific gravity 1.026 09/12/2018 01:33 AM  
 pH (UA) 5.0 09/12/2018 01:33 AM  
 Protein 30 (A) 09/12/2018 01:33 AM  
 Glucose NEGATIVE  09/12/2018 01:33 AM  
 Ketone TRACE (A) 09/12/2018 01:33 AM  
 Urobilinogen 1.0 09/12/2018 01:33 AM  
 Nitrites NEGATIVE  09/12/2018 01:33 AM  
 Leukocyte Esterase MODERATE (A) 09/12/2018 01:33 AM  
 Epithelial cells FEW 09/12/2018 01:33 AM  
 Bacteria 1+ (A) 09/12/2018 01:33 AM  
 WBC 5-10 09/12/2018 01:33 AM  
 RBC 20-50 09/12/2018 01:33 AM  
 
 
MEDICATIONS: 
Current Facility-Administered Medications Medication Dose Route Frequency  albuterol-ipratropium (DUO-NEB) 2.5 MG-0.5 MG/3 ML  3 mL Nebulization QID RT  
 acetaminophen (TYLENOL) solution 650 mg  650 mg Oral Q4H PRN  
 sodium chloride (NS) flush 10-30 mL  10-30 mL InterCATHeter PRN  
 sodium chloride (NS) flush 10 mL  10 mL InterCATHeter Q24H  
 sodium chloride (NS) flush 10 mL  10 mL InterCATHeter PRN  
 sodium chloride (NS) flush 10-40 mL  10-40 mL InterCATHeter Q8H  
 sodium chloride (NS) flush 20 mL  20 mL InterCATHeter Q24H  
 heparin (porcine) pf 300 Units  300 Units InterCATHeter PRN  propofol (DIPRIVAN) infusion  5-50 mcg/kg/min IntraVENous TITRATE  glycopyrrolate (ROBINUL) injection 0.1 mg  0.1 mg IntraVENous TID PRN  
 fentaNYL citrate (PF) injection 25-50 mcg  25-50 mcg IntraVENous Q4H PRN  
 famotidine (PF) (PEPCID) 20 mg in sodium chloride 0.9% 10 mL injection  20 mg IntraVENous Q12H  chlorhexidine (PERIDEX) 0.12 % mouthwash 15 mL  15 mL Oral BID  fentaNYL citrate (PF) injection 25 mcg  25 mcg IntraVENous Q4H PRN  
 albuterol (PROVENTIL HFA, VENTOLIN HFA, PROAIR HFA) inhaler 2 Puff  2 Puff Inhalation Q4H PRN  
 dorzolamide-timolol (COSOPT) 22.3-6.8 mg/mL ophthalmic solution 1 Drop  1 Drop Both Eyes BID  latanoprost (XALATAN) 0.005 % ophthalmic solution 1 Drop  1 Drop Both Eyes QHS  sodium chloride (NS) flush 5-10 mL  5-10 mL IntraVENous Q8H  
 sodium chloride (NS) flush 5-10 mL  5-10 mL IntraVENous PRN  
 ondansetron (ZOFRAN) injection 4 mg  4 mg IntraVENous Q6H PRN

## 2018-10-02 NOTE — PROGRESS NOTES
[facility] Pul[facility] Consult Service NotePulmonary, Critical Care, and Sleep Medicine Name: Sha Hernandez MRN: [id number] : 1934 Hospital: [facility] Date: 10/2/2018   Hospital Day: 25 Remains ventilator dependent, s/p trach this morning IMPRESSION:  
1. 18 cardiopulmonary arrest 6 min CPR 2. Acute respiratory failure-on vent-  
3. Acute hemoptysis- Noted to have bleeding from RLL on Bronch. No endobronchial lesion. has lung mass(CA vs inflammatory pseudotumor) and microscopic hematuria; ANCA negative; JOSS barely positive. Suspect LUNG mass the culprit. CYTOLOGY from bronchoscopy negative malignancy x 2 
4. S/P pulmonary artery embolization for hemoptysis 5. COPD moderate to severe at baseline 6. Acute renal insf 7. Anemia and prior coagulopathy on coumadin 8. Volume overload, anasarca- after couple of days of IV lasix- becomes prerenal 
9. Encehalopathy multifactorial - has hearing loss as well 10. Hx of CAD s/p PCI, afib on amio, former smoker, prior cva RECOMMENDATIONS/PLAN:  
1. Vent support 2. Trach done 3. PEG this week 4. Still has unresolved issue of lung mass 5. Abx completed 6. Scopolamine and add robinul prn 
7. Diuresis as needed 8. Follow renal function-  
9. Enteral feedings plus free water, follow Na 
10. Follow neuro status 11. Glucose monitoring and SSI 12. DVT/GI prophylaxis 13. Sedation as needed My assessment/management discussed with: Consultants, Nursing, Pharmacy, Case Management, PT, OT, Respiratory Therapy, Hospitalist and Family for coordination of care Pt's condition is acute and unstable requiring inpatient hospitalization.  This care involved high complexity decision making which includes independently reviewing the patient's past medical records, current laboratory results, medication profiles that were immediately available to me and actual Xray images at the bedside in order to assess, support vital system function, and to treat this degree of vital organ system failure, and to prevent further deterioration of the patients condition. Risk of deterioration: high  
[x] High complexity decision making was performed [x] See my orders for details Tubes:    
 
Subjective/Initial History: S/p trach this am 
Vent support MAR reviewed and pertinent medications noted or modified as needed Current Facility-Administered Medications Medication  albuterol-ipratropium (DUO-NEB) 2.5 MG-0.5 MG/3 ML  
 acetaminophen (TYLENOL) solution 650 mg  
 furosemide (LASIX) injection 40 mg  
 sodium chloride (NS) flush 10-30 mL  sodium chloride (NS) flush 10 mL  sodium chloride (NS) flush 10 mL  sodium chloride (NS) flush 10-40 mL  sodium chloride (NS) flush 20 mL  heparin (porcine) pf 300 Units  propofol (DIPRIVAN) infusion  glycopyrrolate (ROBINUL) injection 0.1 mg  
 scopolamine (TRANSDERM-SCOP) 1 mg over 3 days 1 Patch  
 fentaNYL citrate (PF) injection 25-50 mcg  famotidine (PF) (PEPCID) 20 mg in sodium chloride 0.9% 10 mL injection  chlorhexidine (PERIDEX) 0.12 % mouthwash 15 mL  fentaNYL citrate (PF) injection 25 mcg  albuterol (PROVENTIL HFA, VENTOLIN HFA, PROAIR HFA) inhaler 2 Puff  dorzolamide-timolol (COSOPT) 22.3-6.8 mg/mL ophthalmic solution 1 Drop  latanoprost (XALATAN) 0.005 % ophthalmic solution 1 Drop  sodium chloride (NS) flush 5-10 mL  sodium chloride (NS) flush 5-10 mL  ondansetron (ZOFRAN) injection 4 mg ROS:A comprehensive review of systems was negative except for that written in the HPI. Objective: 
 
Vital Signs: Telemetry:    normal sinus rhythmIntake/Output:  
Visit Vitals  BP 95/54  Pulse 68  Temp 98.5 °F (36.9 °C)  Resp 10  
 Ht 6' 2\" (1.88 m)  Wt 115.8 kg (255 lb 4.7 oz)  SpO2 90%  BMI 32.78 kg/m2 Temp (24hrs), Av.1 °F (37.3 °C), Min:98.5 °F (36.9 °C), Max:100 °F (37.8 °C) O2 Device: Endotracheal tube O2 Flow Rate (L/min): 12 l/min Wt Readings from Last 4 Encounters:  
18 115.8 kg (255 lb 4.7 oz) 16 111.1 kg (245 lb)  
16 106.6 kg (235 lb) 04/08/15 103.4 kg (228 lb) Intake/Output Summary (Last 24 hours) at 10/02/18 9172 Last data filed at 10/02/18 0700 Gross per 24 hour Intake          1588.85 ml Output             1955 ml Net          -366.15 ml Last shift:        
Last 3 shifts: 1901 - 10/02 0700 In: 3213.3 [I.V.:548.3] Out: 2600 [Urine:2400; Drains:200] Physical Exam:  
 General:  Intubated/sedated HEAD: Normocephalic, without obvious abnormality, atraumatic EYES: conjunctivae clear. anicteric sclerae NOSE: nares normal, no drainage, no nasal flaring, Neck: Supple, symmetrical, trachea midline Chest: increased AP diameter Lungs: Bilateral rhonchi. Heart: Regular rate and rhythm Abdomen: soft, non-tender, +BS Musculoskeletal: anasarca; no erythema Neuro: not following commands with sedation lifted. Psych: Not able to assess; no agitation. Data:  
 
Current Facility-Administered Medications Medication Dose Route Frequency  albuterol-ipratropium (DUO-NEB) 2.5 MG-0.5 MG/3 ML  3 mL Nebulization QID RT  
 furosemide (LASIX) injection 40 mg  40 mg IntraVENous Q48H  
 sodium chloride (NS) flush 10 mL  10 mL InterCATHeter Q24H  
 sodium chloride (NS) flush 10-40 mL  10-40 mL InterCATHeter Q8H  
 sodium chloride (NS) flush 20 mL  20 mL InterCATHeter Q24H  propofol (DIPRIVAN) infusion  5-50 mcg/kg/min IntraVENous TITRATE  scopolamine (TRANSDERM-SCOP) 1 mg over 3 days 1 Patch  1 Patch TransDERmal Q72H  famotidine (PF) (PEPCID) 20 mg in sodium chloride 0.9% 10 mL injection  20 mg IntraVENous Q12H  chlorhexidine (PERIDEX) 0.12 % mouthwash 15 mL  15 mL Oral BID  
  dorzolamide-timolol (COSOPT) 22.3-6.8 mg/mL ophthalmic solution 1 Drop  1 Drop Both Eyes BID  latanoprost (XALATAN) 0.005 % ophthalmic solution 1 Drop  1 Drop Both Eyes QHS  sodium chloride (NS) flush 5-10 mL  5-10 mL IntraVENous Q8H Labs: 
Recent Labs 10/02/18 
 0096  10/01/18 
 2190  09/30/18 
 0448 WBC  5.9  6.3  8.5 HGB  8.1*  8.5*  6.1*  
HCT  26.9*  28.0*  19.0*  
PLT  135*  149*  165 Recent Labs 10/02/18 
 2324  10/01/18 
 0783  09/30/18 
 0448 NA  140  142  141  
K  4.3  4.2  4.0  
CL  103  105  106 CO2  35*  34*  35* GLU  109*  131*  132* BUN  28*  29*  37* CREA  1.05  1.08  1.17 CA  8.0*  8.0*  8.2* MG  2.6*   --   2.6* PHOS  3.7  2.8  2.6 ALB   --   1.8*  1.9* TBILI   --   0.4  0.3 SGOT   --   138*  114* ALT   --   112*  95* INR  1.1  1.1  1.1 Recent Labs 10/02/18 
 2242  09/30/18 
 8124 PH  7.48*  7.46* PCO2  44  49* PO2  88  74* HCO3  32*  34* FIO2  40  40 Imaging: 
I have personally reviewed the patients radiographs and have reviewed the reports: CXR: No acute changes Total critical care time exclusive of procedures:  minutes Tracey Flores MD

## 2018-10-02 NOTE — PROGRESS NOTES
0845: Received  Verbal report from Priyank Ratliff RN. 
 
0900: Patient arrived on unit with OR team, afebrile, BP 81/41, Dr. Washington Farah aware, verbal order received to give 1L bolus of NS over 1 hour. Patient moves all extremities spontaneously, opens eyes to pain, paced/a-fib rate, pulses palpable in all 4 extremities, 2+ pitting edema in all 4 extremities, lungs course and diminished, small clear secretions from trach, trach dressing changed, does not appear to be in pain/discomfort, will leave propofol off for now. 1043: Patient moving all extremities continuously, and coughing against ventilator; propofol restarted at 10mcg/kg/min, will continue to titrate to maintain a RASS of -1 to -2 
 
1200: Assessment complete, no changes noted, afebrile, vitals stable, does not appear to be in pain/discomfort. 1600: Assessment complete, no changes noted; temp 99.1, vitals stable, no complaints of pain/discomfort. 1800: Merari Padilla at bedside, she signed the consent form for patient to have PEG tube placed tomorrow;signed consent on patients chart. 1900: Gave bedside and verbal shift report to Jesi Stallings RN.

## 2018-10-02 NOTE — PROGRESS NOTES
[facility] [phone]) [facility]: [phone] ([phone]) Cancelled Palliative Meeting for today as chart review shows that decision for PEG and Trach already made. (We were to meet to discuss this with wife). Telephoned wife to let her know regarding cancellation and she was fine with that.

## 2018-10-02 NOTE — ANESTHESIA PREPROCEDURE EVALUATION
Anesthetic History No history of anesthetic complications Review of Systems / Medical History Patient summary reviewed, nursing notes reviewed and pertinent labs reviewed Pulmonary Within defined limits Neuro/Psych Within defined limits Cardiovascular Hypertension Dysrhythmias : atrial fibrillation Pacemaker, CAD and PAD Exercise tolerance: <4 METS Comments: AAA  
GI/Hepatic/Renal 
Within defined limits Endo/Other Anemia Other Findings Physical Exam 
 
Airway Intubated Cardiovascular Regular rate and rhythm,  S1 and S2 normal,  no murmur, click, rub, or gallop Dental 
 
 
  
Pulmonary Breath sounds clear to auscultation Abdominal 
GI exam deferred Other Findings Anesthetic Plan ASA: 4 Anesthesia type: general 
 
 
 
 
Induction: Intravenous Anesthetic plan and risks discussed with: Patient

## 2018-10-02 NOTE — OP NOTES
[facility]  OPERATIVE REPORT    Inocente Henry  MR#: [id number]  : 1934  ACCOUNT #: [de-identified]   DATE OF SERVICE: 10/02/2018    PREOPERATIVE DIAGNOSIS:  Respiratory failure. POSTOPERATIVE DIAGNOSIS:  Respiratory failure. PROCEDURE PERFORMED:  Surgical tracheostomy tube placement. SURGEON:  Saba Parada MD    ANESTHESIA:  General endotracheal anesthesia. ASSISTANT:  None. SURGICAL FIRST ASSISTANT:  Rashid Leyva    ESTIMATED BLOOD LOSS:  10 mL. COMPLICATIONS:  There were no operative complications. SPECIMENS REMOVED:  There is no specimen. FINDINGS:  Very low trachea. IMPLANT:  A #6 cuffed Shiley tracheostomy tube. DESCRIPTION OF OPERATION IN DETAIL:  After appropriate consent was obtained, the patient was brought to the operating room and made comfortable in supine position on the operating table already intubated from the ICU. He was administered general endotracheal anesthesia through the existing endotracheal tube in position with his arms tucked at his side and a shoulder roll in place. The neck was hyperextended. The patient was prepped and draped in standard fashion. A transverse incision was made in the neck above the sternal notch and this was extended down through the platysma. Inferior and superior subplatysmal flaps were fashioned. The strap muscles were then mobilized in the midline. The thyroid isthmus was identified directly overlying the trachea and this was divided with electrocautery. The trachea was then exposed. It was found to be very low. Initially, the first tracheal ring was just barely accessible at the level of the sternal notch. This was grasped with a hook and elevated superiorly. With cautious dissection and mobilization, the trachea was able to be elevated superiorly enough to put stay sutures in the second tracheal ring as well as the first tracheal ring.   At this time, the airway circuit endotracheal tube cuff was deflated and a 15 blade was used to incise the trachea between the 1st and 2nd cartilaginous rings. An inferior flap was then created dividing the second tracheal ring laterally on either side of the stay sutures and an inferior trap door flap was elevated. The endotracheal tube was withdrawn under direct visualization and then the tracheostomy tube was advanced through the tracheotomy site. The balloon was inflated and the tracheostomy tube was connected to the airway circuit. Once the inner cannula was introduced the patient had good air exchange with this and the collar of the trach site was sewn in place to the neck with 2-0 nylon sutures. The neck was then protected with a Betadine moistened 4 x 4 gauze placed between the soft tissues and the tracheal collar and the stay sutures were taped to the patient's chest with Mastisol and Steri-Strips. The trach collar was secured to the patient further with the Velcro neck strap. The patient was transferred to an ICU bed and transported back to intensive care unit in stable condition.       MD ERYN Rosario / SHER  D: 10/02/2018 12:26     T: 10/02/2018 12:56  JOB #: [id number]  CC: Lani Carroll MD  CC: Judy Hernandez MD

## 2018-10-02 NOTE — BRIEF OP NOTE
BRIEF OPERATIVE NOTE Date of Procedure: 10/2/2018 Preoperative Diagnosis: Respiratory Failure Postoperative Diagnosis: Respiratory Failure Procedure(s): 
TRACHEOSTOMY Surgeon(s) and Role: Bere Stewart MD - Primary Surgical Assistant: none Surgical Staff: 
Circ-1: Adela Valdes Circ-Intern: Cr Patino Scrub Tech-1: Ross Adames Surg Asst-1: Roark Jeans Event Time In Incision Start 1153 Incision Close Anesthesia: General  
Estimated Blood Loss: 10 cc Specimens: * No specimens in log * Findings: very low lying trachea Complications: none Implants: #6 cuffed Shiley Tracheostomy Tube

## 2018-10-02 NOTE — PROGRESS NOTES
Hospitalist Progress Note NAME: Dayron Risk  
:  1934 MRN:  [id number] Assessment / Plan: PEA cardiac arrest  Acute encephalpathy: unchanged - Code blue called . Patient w/ agonal breathing after coming back from radiology after arteriogram completed. No pulse detected. CPR initiated. Epi x 2 given. Pulse with ROSC. Intubated at bedside and transferred to ICU. - head CT  showed small age-indeterminate infarct in the left corona radiata. If there is concern for acute ischemia, consider MRI 
  
Acute respiratory w/ hypoxia s/p cardiac arrest; now intubated Sepsis due to suspected PNA Pulmonary hemorrhage s/p pulmonary artery embolization Lung mass with concerns for malignancy COPD 
 - CT of chest shows severe emphysema with the right basilar airspace disease in the oval mass with fluid level concerning for internal hemorrhage versus pneumonia. - repeat CT  showed persistent hematoma in the right lower lobe region with surrounding patchy atelectasis/infiltrate and small ipsilateral pleural effusion. Left basilar subsegmental atelectasis. Coils in right lower lobe region consistent with recent pulmonary artery branch embolization. 
- Bcx Neg, sputum cytology collected  negative except scant yeast. 
- completed zosyn for possible lung abscess. End date  
- cytology from bronchial washing on  is atypical, favor benign reactive process 
- extubated on  and placed on VM 50% but reintubated  
- wean vent as tolerated 
- on diuresis with lasix 
- respiratory care - planned for PEG 
  
Hemoptysis 
- noted to have bleeding from RLL on bronchoscopy. - work up for possible rheumatologic cause neg thus far: 
ANCA, anti-GBM, MPO ab, SD-3 ab, SSA/SSB, RNP ab, Arredondo/RNP, dsDNA, and Arredondo ab all negative. JOSS +. 
- Arteriogram : Thoracic aortic and intercostal arteriograms as described above demonstrating no enlarged bronchial artery supplying the right lower lobe of the lung. A normal 
appearing and normal sized right bronchial artery is seen without any 
hyperplasia or dominant right lower lobe supply. No embolization was performed. 
  
RLL mass - work up for [employer] neg thus far. - diagnostic bronch on 9/16. No endobronchial lesions seen. - CTA chest done 9/17 due to persistent bloody pulmonary secretions. Right lower lobe masslike abnormality demonstrates increased internal density 
and has increased in size measuring 6.7 x 6.7 cm, compared to 5.7 x 4.4 cm. This 
is compatible with internal hemorrhage. There is new moderate right lower lobe 
partial collapse/atelectasis.   
Normal sized right bronchial artery is visualized without any hyperplasia or or evidence for supply to the right lower lobe lung abnormality. It is unlikely that the hemoptysis related to bronchial arterial injury/supply. Prior bronchial arteriogram was unremarkable. No evidence for pulmonary artery pseudoaneurysm or active hemorrhage. However, it is more likely that the masslike abnormality in the right lower lobe the lung has eroded into an adjacent pulmonary artery then a bronchial artery. Therefore, plan is for pulmonary arteriogram with possible embolization if an abnormality is seen in the right lower lobe. 6 mm and 4 mm right lung nodules. Small bilateral pleural effusions. Minimally enlarged bilateral hilar and mediastinal lymph nodes  
  
Hypernatremia: resolved 
  
Acute blood loss anemia on admission: stable - trend H&H 
- monitor labs and transfuse as necessary. 
  
H/o atrial fibrillation on chronic anticoagulation w/ coumadin 
- coumadin on hold per above 
  
Coumadin-induced coagulopathy Elevated LFT 
- Holding coumadin. 
- monitor LFT 
- stopped statin - trend INR 
  
ALAN: resolved 
- likely prerenal related to Lasix. - Renal function stable.  
- avoid nephrotoxic agents. 
  
UTI, acute cystitis w/ hematuria, POA 
 - s/p treatment course 
  
HLD 
-statin held due to elevated LFT 
  
Obesity - BMI 32 
  
Code: Full DVT prophylaxis: SCDs GI prophylaxis: PPI Subjective: Chief Complaint / Reason for Physician Visit: hemoptysis Intubated and sedated at bedside. Fio2 40%, PEEP 6. Poor mentation off sedation Review of Systems: 14 pt ROS unremarkable except as stated above. Objective: VITALS:  
Last 24hrs VS reviewed since prior progress note. Most recent are: 
Patient Vitals for the past 24 hrs: 
 Temp Pulse Resp BP SpO2  
10/02/18 1300 - 72 17 103/57 -  
10/02/18 1200 98.1 °F (36.7 °C) 71 17 113/54 96 % 10/02/18 1109 - - - - 91 % 10/02/18 1100 - 73 17 121/57 92 % 10/02/18 1000 - 72 17 116/64 92 % 10/02/18 0930 - 70 13 109/57 96 % 10/02/18 0915 - 66 16 (!) 81/41 94 % 10/02/18 0900 98.5 °F (36.9 °C) 68 10 95/54 90 % 10/02/18 0728 - 71 17 - 97 % 10/02/18 0718 98.8 °F (37.1 °C) 69 18 104/54 97 % 10/02/18 0700 - 68 16 109/55 95 % 10/02/18 0600 - 64 19 103/51 100 % 10/02/18 0500 - 66 22 100/56 100 % 10/02/18 0400 99.4 °F (37.4 °C) 63 23 105/58 100 % 10/02/18 0336 99.4 °F (37.4 °C) 70 18 - 100 % 10/02/18 0300 - 70 19 104/58 100 % 10/02/18 0200 - 69 18 110/55 96 % 10/02/18 0100 - 70 16 100/48 98 % 10/02/18 0000 99.9 °F (37.7 °C) 75 17 106/48 96 % 10/01/18 2326 - 70 16 - 97 % 10/01/18 2300 - 72 18 112/50 97 % 10/01/18 2200 - 73 19 109/48 94 % 10/01/18 2100 - 73 20 97/42 92 % 10/01/18 2000 98.8 °F (37.1 °C) 75 21 98/45 95 % 10/01/18 1930 98.8 °F (37.1 °C) - - - -  
10/01/18 1900 - 72 28 97/44 95 % 10/01/18 1800 - 66 17 97/49 93 % 10/01/18 1700 - 71 18 (!) 94/39 93 % 10/01/18 1659 - 69 19 - 93 % 10/01/18 1600 98.7 °F (37.1 °C) 69 16 97/48 94 % 10/01/18 1500 - 69 16 92/44 93 % Intake/Output Summary (Last 24 hours) at 10/02/18 1445 Last data filed at 10/02/18 1400 Gross per 24 hour Intake          2321.08 ml Output             1145 ml  
 Net          1176.08 ml PHYSICAL EXAM: 
General:    Intubated and sedated. Neck:  Supple, symmetrical.  trach in place Lungs:   Coarse breath sounds, no wheezing Heart:   Regular  rhythm,  Normal S1 and S2   Diffuse edema Abdomen:   Soft, non-tender. Bowel sounds normal 
Skin:     Warm, dry Neurologic: sedated Reviewed most current lab test results and cultures  YES Reviewed most current radiology test results   YES Review and summation of old records today    NO Reviewed patient's current orders and MAR    YES 
PMH/SH reviewed - no change compared to H&P 
 
________________________________________________________________________ Martin Bal MD  
 
Procedures: see electronic medical records for all procedures/Xrays and details which were not copied into this note but were reviewed prior to creation of Plan. LABS: 
I reviewed today's most current labs and imaging studies. Pertinent labs include: 
Recent Labs 10/02/18 
 4640  10/01/18 
 9801  09/30/18 
 0448 WBC  5.9  6.3  8.5 HGB  8.1*  8.5*  6.1*  
HCT  26.9*  28.0*  19.0*  
PLT  135*  149*  165 Recent Labs 10/02/18 
 2123  10/01/18 
 4015  09/30/18 
 0448 NA  140  142  141  
K  4.3  4.2  4.0  
CL  103  105  106 CO2  35*  34*  35* GLU  109*  131*  132* BUN  28*  29*  37* CREA  1.05  1.08  1.17 CA  8.0*  8.0*  8.2* MG  2.6*   --   2.6* PHOS  3.7  2.8  2.6 ALB   --   1.8*  1.9* TBILI   --   0.4  0.3 SGOT   --   138*  114* ALT   --   112*  95* INR  1.1  1.1  1.1 Signed: Martin Bal MD

## 2018-10-02 NOTE — PROGRESS NOTES
Interdisciplinary team rounds were held 10/2/2018 with the following team members: Care Management, Diabetes Treatment Specialist, Nursing, Nutrition, Pharmacy, Physical Therapy, Physician, Respiratory Therapy and Clinical Coordinator. Plan of care discussed. Goal: s/p trach placement today. See MD orders and progress notes for further  interventions and desired outcomes.

## 2018-10-02 NOTE — PROGRESS NOTES
Family has decided to continue with treatment. Patient getting Maryellen Wiggins today. Madai Cali Ext [phone]

## 2018-10-02 NOTE — PROGRESS NOTES
0700  Bedside and verbal report from Saintclair Dolores, RN. 
5962  Bedside and verbal report to OR personnel. Patient to OR via bed. 0830  Patient to be transferred to [address] [place] [address] [phone] after procedure. Verbal report given to Carl Moore RN.

## 2018-10-03 ENCOUNTER — APPOINTMENT (OUTPATIENT)
Dept: GENERAL RADIOLOGY | Age: 83
DRG: 003 | End: 2018-10-03
Attending: INTERNAL MEDICINE
Payer: MEDICARE

## 2018-10-03 LAB
ALBUMIN SERPL-MCNC: 1.7 G/DL (ref 3.5–5)
ALBUMIN/GLOB SERPL: 0.3 {RATIO} (ref 1.1–2.2)
ALP SERPL-CCNC: 171 U/L (ref 45–117)
ALT SERPL-CCNC: 117 U/L (ref 12–78)
ANION GAP SERPL CALC-SCNC: 4 MMOL/L (ref 5–15)
APTT PPP: 29.2 SEC (ref 22.1–32)
ARTERIAL PATENCY WRIST A: YES
AST SERPL-CCNC: 117 U/L (ref 15–37)
BASE EXCESS BLDA CALC-SCNC: 5.8 MMOL/L
BDY SITE: ABNORMAL
BILIRUB SERPL-MCNC: 0.4 MG/DL (ref 0.2–1)
BUN SERPL-MCNC: 26 MG/DL (ref 6–20)
BUN/CREAT SERPL: 27 (ref 12–20)
CALCIUM SERPL-MCNC: 8.2 MG/DL (ref 8.5–10.1)
CHLORIDE SERPL-SCNC: 105 MMOL/L (ref 97–108)
CO2 SERPL-SCNC: 31 MMOL/L (ref 21–32)
CREAT SERPL-MCNC: 0.97 MG/DL (ref 0.7–1.3)
EPAP/CPAP/PEEP, PAPEEP: 6
ERYTHROCYTE [DISTWIDTH] IN BLOOD BY AUTOMATED COUNT: 21.6 % (ref 11.5–14.5)
FIBRINOGEN PPP-MCNC: 763 MG/DL (ref 200–475)
FIO2 ON VENT: 50 %
GAS FLOW.O2 SETTING OXYMISER: 12 L/MIN
GLOBULIN SER CALC-MCNC: 5.8 G/DL (ref 2–4)
GLUCOSE SERPL-MCNC: 137 MG/DL (ref 65–100)
HCO3 BLDA-SCNC: 30 MMOL/L (ref 22–26)
HCT VFR BLD AUTO: 27.6 % (ref 36.6–50.3)
HGB BLD-MCNC: 8.4 G/DL (ref 12.1–17)
INR PPP: 1.1 (ref 0.9–1.1)
MCH RBC QN AUTO: 31.5 PG (ref 26–34)
MCHC RBC AUTO-ENTMCNC: 30.4 G/DL (ref 30–36.5)
MCV RBC AUTO: 103.4 FL (ref 80–99)
NRBC # BLD: 0 K/UL (ref 0–0.01)
NRBC BLD-RTO: 0 PER 100 WBC
PCO2 BLDA: 44 MMHG (ref 35–45)
PH BLDA: 7.46 [PH] (ref 7.35–7.45)
PHOSPHATE SERPL-MCNC: 2.7 MG/DL (ref 2.6–4.7)
PLATELET # BLD AUTO: 155 K/UL (ref 150–400)
PMV BLD AUTO: 12.3 FL (ref 8.9–12.9)
PO2 BLDA: 92 MMHG (ref 80–100)
POTASSIUM SERPL-SCNC: 4.3 MMOL/L (ref 3.5–5.1)
PROT SERPL-MCNC: 7.5 G/DL (ref 6.4–8.2)
PROTHROMBIN TIME: 11.1 SEC (ref 9–11.1)
RBC # BLD AUTO: 2.67 M/UL (ref 4.1–5.7)
SAO2 % BLD: 97 % (ref 92–97)
SAO2% DEVICE SAO2% SENSOR NAME: ABNORMAL
SODIUM SERPL-SCNC: 140 MMOL/L (ref 136–145)
SPECIMEN SITE: ABNORMAL
THERAPEUTIC RANGE,PTTT: ABNORMAL SECS (ref 58–77)
VENTILATION MODE VENT: ABNORMAL
VT SETTING VENT: 500 ML
WBC # BLD AUTO: 7.9 K/UL (ref 4.1–11.1)

## 2018-10-03 PROCEDURE — 82803 BLOOD GASES ANY COMBINATION: CPT | Performed by: INTERNAL MEDICINE

## 2018-10-03 PROCEDURE — 85027 COMPLETE CBC AUTOMATED: CPT | Performed by: INTERNAL MEDICINE

## 2018-10-03 PROCEDURE — 74011250636 HC RX REV CODE- 250/636: Performed by: INTERNAL MEDICINE

## 2018-10-03 PROCEDURE — 84100 ASSAY OF PHOSPHORUS: CPT | Performed by: INTERNAL MEDICINE

## 2018-10-03 PROCEDURE — 76040000019: Performed by: INTERNAL MEDICINE

## 2018-10-03 PROCEDURE — 76060000031 HC ANESTHESIA FIRST 0.5 HR: Performed by: INTERNAL MEDICINE

## 2018-10-03 PROCEDURE — 85610 PROTHROMBIN TIME: CPT | Performed by: INTERNAL MEDICINE

## 2018-10-03 PROCEDURE — 74011000250 HC RX REV CODE- 250: Performed by: INTERNAL MEDICINE

## 2018-10-03 PROCEDURE — 36600 WITHDRAWAL OF ARTERIAL BLOOD: CPT | Performed by: INTERNAL MEDICINE

## 2018-10-03 PROCEDURE — 94003 VENT MGMT INPAT SUBQ DAY: CPT

## 2018-10-03 PROCEDURE — 80053 COMPREHEN METABOLIC PANEL: CPT | Performed by: INTERNAL MEDICINE

## 2018-10-03 PROCEDURE — 71045 X-RAY EXAM CHEST 1 VIEW: CPT

## 2018-10-03 PROCEDURE — 77030005122 HC CATH GASTMY PEG BSC -B: Performed by: INTERNAL MEDICINE

## 2018-10-03 PROCEDURE — 65610000006 HC RM INTENSIVE CARE

## 2018-10-03 PROCEDURE — 94640 AIRWAY INHALATION TREATMENT: CPT

## 2018-10-03 PROCEDURE — 36415 COLL VENOUS BLD VENIPUNCTURE: CPT | Performed by: INTERNAL MEDICINE

## 2018-10-03 PROCEDURE — 0DH68UZ INSERTION OF FEEDING DEVICE INTO STOMACH, VIA NATURAL OR ARTIFICIAL OPENING ENDOSCOPIC: ICD-10-PCS | Performed by: INTERNAL MEDICINE

## 2018-10-03 RX ORDER — NALOXONE HYDROCHLORIDE 0.4 MG/ML
0.4 INJECTION, SOLUTION INTRAMUSCULAR; INTRAVENOUS; SUBCUTANEOUS
Status: DISCONTINUED | OUTPATIENT
Start: 2018-10-03 | End: 2018-10-03

## 2018-10-03 RX ORDER — DEXTROMETHORPHAN/PSEUDOEPHED 2.5-7.5/.8
1.2 DROPS ORAL
Status: DISCONTINUED | OUTPATIENT
Start: 2018-10-03 | End: 2018-10-03

## 2018-10-03 RX ORDER — FENTANYL CITRATE 50 UG/ML
25 INJECTION, SOLUTION INTRAMUSCULAR; INTRAVENOUS
Status: DISCONTINUED | OUTPATIENT
Start: 2018-10-03 | End: 2018-10-03

## 2018-10-03 RX ORDER — SODIUM CHLORIDE 0.9 % (FLUSH) 0.9 %
5-10 SYRINGE (ML) INJECTION EVERY 8 HOURS
Status: COMPLETED | OUTPATIENT
Start: 2018-10-03 | End: 2018-10-03

## 2018-10-03 RX ORDER — ATROPINE SULFATE 0.1 MG/ML
0.5 INJECTION INTRAVENOUS
Status: DISCONTINUED | OUTPATIENT
Start: 2018-10-03 | End: 2018-10-03

## 2018-10-03 RX ORDER — SODIUM CHLORIDE 9 MG/ML
75 INJECTION, SOLUTION INTRAVENOUS CONTINUOUS
Status: DISPENSED | OUTPATIENT
Start: 2018-10-03 | End: 2018-10-03

## 2018-10-03 RX ORDER — MIDAZOLAM HYDROCHLORIDE 1 MG/ML
.25-5 INJECTION, SOLUTION INTRAMUSCULAR; INTRAVENOUS
Status: DISCONTINUED | OUTPATIENT
Start: 2018-10-03 | End: 2018-10-03

## 2018-10-03 RX ORDER — EPINEPHRINE 0.1 MG/ML
1 INJECTION INTRACARDIAC; INTRAVENOUS
Status: DISCONTINUED | OUTPATIENT
Start: 2018-10-03 | End: 2018-10-03

## 2018-10-03 RX ORDER — CEFAZOLIN SODIUM/WATER 2 G/20 ML
2 SYRINGE (ML) INTRAVENOUS ONCE
Status: COMPLETED | OUTPATIENT
Start: 2018-10-03 | End: 2018-10-03

## 2018-10-03 RX ORDER — FLUMAZENIL 0.1 MG/ML
0.2 INJECTION INTRAVENOUS
Status: DISCONTINUED | OUTPATIENT
Start: 2018-10-03 | End: 2018-10-03

## 2018-10-03 RX ORDER — SODIUM CHLORIDE 0.9 % (FLUSH) 0.9 %
5-10 SYRINGE (ML) INJECTION AS NEEDED
Status: ACTIVE | OUTPATIENT
Start: 2018-10-03 | End: 2018-10-03

## 2018-10-03 RX ADMIN — IPRATROPIUM BROMIDE AND ALBUTEROL SULFATE 3 ML: .5; 3 SOLUTION RESPIRATORY (INHALATION) at 11:34

## 2018-10-03 RX ADMIN — DORZOLAMIDE HYDROCHLORIDE AND TIMOLOL MALEATE 1 DROP: 20; 5 SOLUTION/ DROPS OPHTHALMIC at 08:10

## 2018-10-03 RX ADMIN — Medication 10 ML: at 21:52

## 2018-10-03 RX ADMIN — Medication 2 G: at 14:38

## 2018-10-03 RX ADMIN — Medication 10 ML: at 21:51

## 2018-10-03 RX ADMIN — Medication 10 ML: at 06:02

## 2018-10-03 RX ADMIN — PROPOFOL 25 MCG/KG/MIN: 10 INJECTION, EMULSION INTRAVENOUS at 11:45

## 2018-10-03 RX ADMIN — IPRATROPIUM BROMIDE AND ALBUTEROL SULFATE 3 ML: .5; 3 SOLUTION RESPIRATORY (INHALATION) at 20:30

## 2018-10-03 RX ADMIN — Medication 10 ML: at 11:03

## 2018-10-03 RX ADMIN — FAMOTIDINE 20 MG: 10 INJECTION, SOLUTION INTRAVENOUS at 21:50

## 2018-10-03 RX ADMIN — PROPOFOL 20 MCG/KG/MIN: 10 INJECTION, EMULSION INTRAVENOUS at 16:36

## 2018-10-03 RX ADMIN — PROPOFOL 35 MCG/KG/MIN: 10 INJECTION, EMULSION INTRAVENOUS at 13:49

## 2018-10-03 RX ADMIN — PROPOFOL 30 MCG/KG/MIN: 10 INJECTION, EMULSION INTRAVENOUS at 15:23

## 2018-10-03 RX ADMIN — IPRATROPIUM BROMIDE AND ALBUTEROL SULFATE 3 ML: .5; 3 SOLUTION RESPIRATORY (INHALATION) at 15:45

## 2018-10-03 RX ADMIN — CHLORHEXIDINE GLUCONATE 15 ML: 1.2 RINSE ORAL at 08:08

## 2018-10-03 RX ADMIN — LATANOPROST 1 DROP: 50 SOLUTION OPHTHALMIC at 21:53

## 2018-10-03 RX ADMIN — IPRATROPIUM BROMIDE AND ALBUTEROL SULFATE 3 ML: .5; 3 SOLUTION RESPIRATORY (INHALATION) at 08:18

## 2018-10-03 RX ADMIN — Medication 10 ML: at 13:51

## 2018-10-03 RX ADMIN — PROPOFOL 30 MCG/KG/MIN: 10 INJECTION, EMULSION INTRAVENOUS at 11:54

## 2018-10-03 RX ADMIN — Medication 10 ML: at 11:02

## 2018-10-03 RX ADMIN — FENTANYL CITRATE 25 MCG: 50 INJECTION, SOLUTION INTRAMUSCULAR; INTRAVENOUS at 14:50

## 2018-10-03 RX ADMIN — FAMOTIDINE 20 MG: 10 INJECTION, SOLUTION INTRAVENOUS at 08:11

## 2018-10-03 RX ADMIN — Medication 10 ML: at 13:52

## 2018-10-03 RX ADMIN — PROPOFOL 18 MCG/KG/MIN: 10 INJECTION, EMULSION INTRAVENOUS at 08:08

## 2018-10-03 RX ADMIN — DORZOLAMIDE HYDROCHLORIDE AND TIMOLOL MALEATE 1 DROP: 20; 5 SOLUTION/ DROPS OPHTHALMIC at 18:04

## 2018-10-03 RX ADMIN — CHLORHEXIDINE GLUCONATE 15 ML: 1.2 RINSE ORAL at 21:52

## 2018-10-03 NOTE — PROGRESS NOTES
[facility] Pulmonary Consult Service NotePulmonary, Critical Care, and Sleep Medicine Name: Sajan Freitas MRN: [id number] : 1934 Hospital: [facility] Date: 10/3/2018   Hospital Day: 21 IMPRESSION:  
1. 18 cardiopulmonary arrest 6 min CPR 2. Acute respiratory failure-on vent-  
3. Acute hemoptysis- Noted to have bleeding from RLL on Bronch. No endobronchial lesion. has lung mass(CA vs inflammatory pseudotumor) and microscopic hematuria; ANCA negative; JOSS barely positive. Suspect LUNG mass the culprit. CYTOLOGY from bronchoscopy negative malignancy x 2 
4. S/P pulmonary artery embolization for hemoptysis 5. COPD moderate to severe at baseline 6. Acute renal insf 7. Anemia and prior coagulopathy on coumadin 8. Volume overload, anasarca- after couple of days of IV lasix- becomes prerenal 
9. Encehalopathy multifactorial - has hearing loss as well 10. Hx of CAD s/p PCI, afib on amio, former smoker, prior cva RECOMMENDATIONS/PLAN:  
1. Vent support 2. Trach done 3. PEG this week 4. Still has unresolved issue of lung mass 5. Abx completed 6. Diuresis as needed 7. Follow renal function, WNL 8. Enteral feedings plus free water, follow Na 9. Follow neuro status 10. Glucose monitoring and SSI 11. DVT/GI prophylaxis 12. Sedation as needed My assessment/management discussed with: Consultants, Nursing, Pharmacy, Case Management, PT, OT, Respiratory Therapy, Hospitalist and Family for coordination of care Pt's condition is acute and unstable requiring inpatient hospitalization.  This care involved high complexity decision making which includes independently reviewing the patient's past medical records, current laboratory results, medication profiles that were immediately available to me and actual Xray images at the bedside in order to assess, support vital system function, and to treat this degree of vital organ system failure, and to prevent further deterioration of the patients condition. Risk of deterioration: high  
[x] High complexity decision making was performed [x] See my orders for details Tubes:    
 
Subjective/Initial History: S/p trach 10/2 Vent support MAR reviewed and pertinent medications noted or modified as needed Current Facility-Administered Medications Medication  albuterol-ipratropium (DUO-NEB) 2.5 MG-0.5 MG/3 ML  
 acetaminophen (TYLENOL) solution 650 mg  
 sodium chloride (NS) flush 10-30 mL  sodium chloride (NS) flush 10 mL  sodium chloride (NS) flush 10 mL  sodium chloride (NS) flush 10-40 mL  sodium chloride (NS) flush 20 mL  heparin (porcine) pf 300 Units  propofol (DIPRIVAN) infusion  glycopyrrolate (ROBINUL) injection 0.1 mg  
 fentaNYL citrate (PF) injection 25-50 mcg  famotidine (PF) (PEPCID) 20 mg in sodium chloride 0.9% 10 mL injection  chlorhexidine (PERIDEX) 0.12 % mouthwash 15 mL  fentaNYL citrate (PF) injection 25 mcg  albuterol (PROVENTIL HFA, VENTOLIN HFA, PROAIR HFA) inhaler 2 Puff  dorzolamide-timolol (COSOPT) 22.3-6.8 mg/mL ophthalmic solution 1 Drop  latanoprost (XALATAN) 0.005 % ophthalmic solution 1 Drop  sodium chloride (NS) flush 5-10 mL  sodium chloride (NS) flush 5-10 mL  ondansetron (ZOFRAN) injection 4 mg ROS:A comprehensive review of systems was negative except for that written in the HPI. Objective: 
 
Vital Signs: Telemetry:    normal sinus rhythmIntake/Output:  
Visit Vitals  BP 98/52  Pulse 70  Temp 99.5 °F (37.5 °C)  Resp 16  
 Ht 6' 2\" (1.88 m)  Wt 115.8 kg (255 lb 4.7 oz)  SpO2 98%  BMI 32.78 kg/m2 Temp (24hrs), Av.8 °F (37.1 °C), Min:98.1 °F (36.7 °C), Max:99.5 °F (37.5 °C) O2 Device: Tracheostomy, Ventilator O2 Flow Rate (L/min): 12 l/min Wt Readings from Last 4 Encounters:  
09/30/18 115.8 kg (255 lb 4.7 oz) 05/05/16 111.1 kg (245 lb)  
03/25/16 106.6 kg (235 lb) 04/08/15 103.4 kg (228 lb) Intake/Output Summary (Last 24 hours) at 10/03/18 9079 Last data filed at 10/03/18 0800 Gross per 24 hour Intake          3068.61 ml Output             1165 ml Net          1903.61 ml Last shift:      10/03 0701 - 10/03 1900 In: 42.4 [I.V.:12.4] Out: 100 [Urine:100] Last 3 shifts: 10/01 1901 - 10/03 0700 In: 3724.8 [I.V.:1099.8] Out: 1630 [AAVRR:9442; Drains:175] Physical Exam:  
 General:  Intubated/sedated HEAD: Normocephalic, without obvious abnormality, atraumatic EYES: conjunctivae clear. anicteric sclerae NOSE: nares normal, no drainage, no nasal flaring, Neck: Supple, symmetrical, trachea midline Chest: increased AP diameter Lungs: Bilateral rhonchi. Heart: Regular rate and rhythm Abdomen: soft, non-tender, +BS Musculoskeletal: anasarca; no erythema Neuro: not following commands with sedation lifted. Psych: Not able to assess; no agitation. Data:  
 
Current Facility-Administered Medications Medication Dose Route Frequency  albuterol-ipratropium (DUO-NEB) 2.5 MG-0.5 MG/3 ML  3 mL Nebulization QID RT  
 sodium chloride (NS) flush 10 mL  10 mL InterCATHeter Q24H  
 sodium chloride (NS) flush 10-40 mL  10-40 mL InterCATHeter Q8H  
 sodium chloride (NS) flush 20 mL  20 mL InterCATHeter Q24H  propofol (DIPRIVAN) infusion  5-50 mcg/kg/min IntraVENous TITRATE  famotidine (PF) (PEPCID) 20 mg in sodium chloride 0.9% 10 mL injection  20 mg IntraVENous Q12H  chlorhexidine (PERIDEX) 0.12 % mouthwash 15 mL  15 mL Oral BID  dorzolamide-timolol (COSOPT) 22.3-6.8 mg/mL ophthalmic solution 1 Drop  1 Drop Both Eyes BID  latanoprost (XALATAN) 0.005 % ophthalmic solution 1 Drop  1 Drop Both Eyes QHS  sodium chloride (NS) flush 5-10 mL  5-10 mL IntraVENous Q8H Labs: Recent Labs 10/03/18 
 8109  10/02/18 
 5285  10/01/18 
 4749 WBC  7.9  5.9  6.3 HGB  8.4*  8.1*  8.5* HCT  27.6*  26.9*  28.0*  
PLT  155  135*  149* Recent Labs 10/03/18 
 1962  10/02/18 
 6570  10/01/18 
 8121 NA  140  140  142  
K  4.3  4.3  4.2 CL  105  103  105 CO2  31  35*  34* GLU  137*  109*  131* BUN  26*  28*  29* CREA  0.97  1.05  1.08  
CA  8.2*  8.0*  8.0*  
MG   --   2.6*   --   
PHOS  2.7  3.7  2.8 ALB  1.7*   --   1.8* TBILI  0.4   --   0.4 SGOT  117*   --   138* ALT  117*   --   112* INR  1.1  1.1  1.1 Recent Labs 10/03/18 
 1826  10/02/18 
 1890 PH  7.46*  7.48* PCO2  44  44 PO2  92  88 HCO3  30*  32* FIO2  50  40 Imaging: 
I have personally reviewed the patients radiographs and have reviewed the reports: CXR: No acute changes Total critical care time exclusive of procedures:  minutes Milagros Lopez MD

## 2018-10-03 NOTE — PROGRESS NOTES
This nurse is Preceptor to Chely Eubanks, [facility]. I agree with Herlinda's assessments and have reviewed all documentation. I witnessed all medication administrations.

## 2018-10-03 NOTE — PROGRESS NOTES
Interdisciplinary team rounds were held 10/3/2018  with the following team members:Care Management, Diabetes Treatment Specialist, Nursing, Nutrition, Pharmacy, Physical Therapy and Physician Plan of care discussed. Goal: tentative Peg placement this afternoon. See MD orders and progress notes for further  interventions and desired outcomes.

## 2018-10-03 NOTE — PROCEDURES
NAME:  Sha Heranndez   :   1934   MRN:   [id number]     Date/Time:  10/3/2018 3:25 PM    Percutaneous Endoscopically-placed Gastrosotmy (PEG) Procedure Note    Procedure: Esophagogastroduodenoscopy with placement of a percutaneous endoscopic gastrostomy tube    Indication:  Dysphagia/odynophagia  Pre-operative Diagnosis: see indication above  Post-operative Diagnosis: see findings below  :  Del Nissen, MD  Referring Provider:   -Mike Araujo MD  Exam:  Airway: clear, no airway problems anticipated; trach in place  Heart: RRR, without gallops or rubs  Lungs: clear bilaterally without wheezes, crackles, or rhonchi  Abdomen: soft, nontender, nondistended, bowel sounds present  Mental Status: awake, alert and oriented to person, place and time     Anethesia/Sedation:  Fentanyl 25mcg IV mcg IV and MAC anesthesia Propofol  Procedure Details   After informed consent was obtained for the procedure, with all risks and benefits of procedure explained the patient was taken to the endoscopy suite and placed in the left lateral decubitus position. Following sequential administration of sedation as per above, the [id number] gastroscope was inserted into the mouth and advanced under direct vision to second portion of the duodenum. A careful inspection was made as the gastroscope was withdrawn, including a retroflexed view of the proximal stomach; findings and interventions are described below. Findings:   -Normal esophagus, stomach, and duodenum. -20FR PEG placed in epigastrum in ML, with external bolster secured at 5cm    Therapies:  20FR PEG placement  Specimens: None. EBL:  None. Complications:   None; patient tolerated the procedure well. Impression:    -Normal esophagus, stomach, and duodenum.     -20FR PEG placed in epigastrum in ML, with external bolster secured at 5cm    Recommendations:  -The PEG tube may be used for feedings in 2hrs,  The head of the bed should be kept elevated to 30 degrees during tube feeds, and the tube should be flushed with 30 mL of water every 8 hours and after giving meds    Discharge disposition:  Recover in room    Reynold Zapata MD

## 2018-10-03 NOTE — PROGRESS NOTES
Surgery POD #1 trach placement. No issues. Site looks good. Routine trach care. Will follow peripherally. Sarah Wilson. Selean Cao MD, [facility] [address] [facility]

## 2018-10-03 NOTE — PROGRESS NOTES
PEG tube inserted by Dr Karon Mckeon and Shadia August [facility] catalog # [id number] Product # [id number] Lot # [id number] Expiration date 12/31/2018

## 2018-10-03 NOTE — ROUTINE PROCESS
TRANSFER - IN REPORT: 
 
Verbal report received from [address] on Venkat Ag  being received from 2520(unit) for ordered procedure Report consisted of patients Situation, Background, Assessment and  
Recommendations(SBAR). Information from the following report(s) Intake/Output and MAR was reviewed with the receiving nurse.

## 2018-10-03 NOTE — PROGRESS NOTES
Bedside and Verbal shift change report given to Nani Hodgson RN (oncoming nurse) by Jayjay Espinosa RN (offgoing nurse). Report included the following information SBAR, Kardex, ED Summary, Procedure Summary, Intake/Output, Recent Results, Alarm Parameters  and Quality Measures. 0800- Upon assessment, pt is following commands, pupils are sluggish and reactive. Breath sounds are diminished,, pt has copious secretions, heart sounds are S1S2, with underlying a fib on the monitor and pacer is pacing. Pt has a double lumen PICC with propofol running (see MAR). BS present, belly is very distended, pt has a almaraz catheter intact draining d[place] red urine. Flexa seal draining brown stool. Skin is warm and dry, pulses 2+ with 1+ edema. Pillows placed under arms to help edema. Scheduled for PEG placement today at 1400. Nightshift did not turn off tube feeds per ordered NPO at midnight. Turned tube feeds off at 0715. Called endoscopy to get a plan for PEG placement, they are reaching out to anesthesia. 9603- Mouth care performed, almaraz flushed with 30ml sterile water per order to prevent clots. Large red clot like found in distal almaraz tube. 1115- CHG bathed, new gown and linen changed in prep for PEG placement. Almaraz care completed. 1420- Endo team at the bedside setting up for PEG placement. Mouth care performed, pt cleaned and repositioned. 1450- 25mcg fentanyl given for procedure. 12- Dr. Felix Barrera completed PEG placement. NG tube removed. Will give Fentanyl PRN as needed. VSS. 
 
1600- Pt wife at bedside. She inquired about the d[place] red bloody urine, stated that he has had \"a little bit of blood in his urine in the past sometimes\". Will continue to flush with sterile water. 1830- Blood clot in urine occluded almaraz drainage, despite Q4 flushing, almaraz not draining. Almaraz bag changed in order to drain urine appropriately.   
 
1900- Tube feeds started, at 45ml/hr, 150ml flush Q4 per Dr. Angelica Escobar previous goal via NG, now fed through PEG). 1915- Bedside and Verbal shift change report given to Jose Porter RN (oncoming nurse) by Remington Rojas RN (offgoing nurse). Report included the following information SBAR, Kardex, Procedure Summary, Intake/Output and Quality Measures.

## 2018-10-03 NOTE — PROGRESS NOTES
Hospitalist Progress Note NAME: Odilia Gutiérrez  
:  1934 MRN:  [id number] Assessment / Plan: PEA cardiac arrest  Acute encephalpathy; likely multifactorial 
- Code blue called . Patient w/ agonal breathing after coming back from radiology after arteriogram completed. No pulse detected. CPR initiated. Epi x 2 given. Pulse with ROSC. Intubated at bedside and transferred to ICU. - head CT  showed small age-indeterminate infarct in the left corona radiata. If there is concern for acute ischemia consider MRI 
  
Acute respiratory w/ hypoxia s/p cardiac arrest; now intubated. S/p trach placement Sepsis due to suspected PNA Pulmonary hemorrhage s/p pulmonary artery embolization Lung mass with concerns for malignancy COPD 
 - CT of chest shows severe emphysema with the right basilar airspace disease in the oval mass with fluid level concerning for internal hemorrhage versus pneumonia. - repeat CT  showed persistent hematoma in the right lower lobe region with surrounding patchy atelectasis/infiltrate and small ipsilateral pleural effusion. Left basilar subsegmental atelectasis. Coils in right lower lobe region consistent with recent pulmonary artery branch embolization. 
- Bcx Neg, sputum cytology collected  negative except scant yeast. 
- completed zosyn for possible lung abscess. End date  
- cytology from bronchial washing on  is atypical, favor benign reactive process 
- extubated on  and placed on VM 50% but reintubated  
 
- s/p trach placement 
- critical care management 
- wean vent as tolerated 
- on diuresis with lasix 
- respiratory care 
  
Hemoptysis 
- noted to have bleeding from RLL on bronchoscopy. - work up for possible rheumatologic cause neg thus far: 
ANCA, anti-GBM, MPO ab, IL-3 ab, SSA/SSB, RNP ab, Arredondo/RNP, dsDNA, and Arredondo ab all negative.  JOSS +. 
- Arteriogram : 
 Thoracic aortic and intercostal arteriograms as described above demonstrating no 
enlarged bronchial artery supplying the right lower lobe of the lung. A normal 
appearing and normal sized right bronchial artery is seen without any 
hyperplasia or dominant right lower lobe supply. No embolization was performed. 
-Trend H&H 
-monitor for recurrent bleeding 
  
RLL mass - work up for [employer] neg thus far. - diagnostic bronch on 9/16. No endobronchial lesions seen. - CTA chest done 9/17 due to persistent bloody pulmonary secretions. Right lower lobe masslike abnormality demonstrates increased internal density 
and has increased in size measuring 6.7 x 6.7 cm, compared to 5.7 x 4.4 cm. This 
is compatible with internal hemorrhage. There is new moderate right lower lobe 
partial collapse/atelectasis.   
Normal sized right bronchial artery is visualized without any hyperplasia or or evidence for supply to the right lower lobe lung abnormality. It is unlikely that the hemoptysis related to bronchial arterial injury/supply. Prior bronchial arteriogram was unremarkable. No evidence for pulmonary artery pseudoaneurysm or active hemorrhage. However, it is more likely that the masslike abnormality in the right lower lobe the lung has eroded into an adjacent pulmonary artery then a bronchial artery. Therefore, plan is for pulmonary arteriogram with possible embolization if an abnormality is seen in the right lower lobe. 6 mm and 4 mm right lung nodules. Small bilateral pleural effusions. Minimally enlarged bilateral hilar and mediastinal lymph nodes  
  
Hypernatremia: resolved 
  
Acute blood loss anemia on admission: stable - trend H&H 
- monitor labs and transfuse as necessary. 
  
H/o atrial fibrillation on chronic anticoagulation w/ coumadin 
- coumadin on hold per above 
  
Coumadin-induced coagulopathy Elevated LFT 
- Holding coumadin. 
- monitor LFT 
- stopped statin - trend INR 
  
ALAN: resolved - likely prerenal related to Lasix. - Renal function stable. - avoid nephrotoxic agents. 
  
UTI, acute cystitis w/ hematuria, POA 
- s/p treatment course 
  
HLD 
-statin held due to elevated LFT 
  
Obesity - BMI 32 
  
Code: Full DVT prophylaxis: SCDs GI prophylaxis: PPI Subjective: Chief Complaint / Reason for Physician Visit: hemoptysis Intubated and sedated at bedside. Fio2 up to 50%, PEEP 6. Review of Systems: 14 pt ROS unremarkable except as stated above. Objective: VITALS:  
Last 24hrs VS reviewed since prior progress note. Most recent are: 
Patient Vitals for the past 24 hrs: 
 Temp Pulse Resp BP SpO2  
10/03/18 1419 - 76 16 110/60 96 % 10/03/18 1344 - 75 18 - 100 % 10/03/18 1300 - 71 14 97/41 94 % 10/03/18 1200 98.9 °F (37.2 °C) 73 18 104/52 95 % 10/03/18 1135 - - - - 100 % 10/03/18 1100 - 60 19 99/50 97 % 10/03/18 1000 - 67 16 102/59 97 % 10/03/18 0900 - 71 16 113/55 100 % 10/03/18 0818 - - - - 98 % 10/03/18 0800 99.5 °F (37.5 °C) 70 16 98/52 98 % 10/03/18 0700 - 71 18 115/62 96 % 10/03/18 0600 - 74 16 115/58 96 % 10/03/18 0500 - 72 18 118/57 96 % 10/03/18 0438 - 77 18 - 95 % 10/03/18 0434 - 77 18 - 95 % 10/03/18 0400 99.1 °F (37.3 °C) 79 18 113/56 95 % 10/03/18 0300 - 75 15 105/55 95 % 10/03/18 0200 - 76 20 108/58 95 % 10/03/18 0100 - 78 21 112/48 95 % 10/03/18 0000 98.7 °F (37.1 °C) 80 16 111/54 98 % 10/02/18 2300 - 78 18 117/48 98 % 10/02/18 2200 - 76 20 114/59 -  
10/02/18 2100 - 81 22 109/57 99 % 10/02/18 2035 - - - - 100 % 10/02/18 2027 - 77 21 - 100 % 10/02/18 2000 98.9 °F (37.2 °C) 73 19 106/62 100 % 10/02/18 1900 - 77 20 107/60 99 % 10/02/18 1800 - 76 20 100/55 97 % 10/02/18 1730 - 78 21 108/48 97 % 10/02/18 1700 - 80 23 99/48 97 % 10/02/18 1600 99.1 °F (37.3 °C) 80 19 108/57 95 % 10/02/18 1530 - - - - 95 % Intake/Output Summary (Last 24 hours) at 10/03/18 1438 Last data filed at 10/03/18 1400 Gross per 24 hour Intake          2010.61 ml Output             1230 ml Net           780.61 ml PHYSICAL EXAM: 
General:    Intubated and sedated. Neck:  Supple, symmetrical.  trach in place Lungs:   Coarse breath sounds, no wheezing Heart:   Regular  rhythm,  Normal S1 and S2   Diffuse edema Abdomen:   Soft, non-tender. Bowel sounds normal 
Skin:     Warm, dry Neurologic: sedated Reviewed most current lab test results and cultures  YES Reviewed most current radiology test results   YES Review and summation of old records today    NO Reviewed patient's current orders and MAR    YES 
PMH/SH reviewed - no change compared to H&P 
 
________________________________________________________________________ Silas Mcgill MD  
 
Procedures: see electronic medical records for all procedures/Xrays and details which were not copied into this note but were reviewed prior to creation of Plan. LABS: 
I reviewed today's most current labs and imaging studies. Pertinent labs include: 
Recent Labs 10/03/18 
 6967  10/02/18 
 4214  10/01/18 
 8709 WBC  7.9  5.9  6.3 HGB  8.4*  8.1*  8.5* HCT  27.6*  26.9*  28.0*  
PLT  155  135*  149* Recent Labs 10/03/18 
 0728  10/02/18 
 4243  10/01/18 
 3789 NA  140  140  142  
K  4.3  4.3  4.2 CL  105  103  105 CO2  31  35*  34* GLU  137*  109*  131* BUN  26*  28*  29* CREA  0.97  1.05  1.08  
CA  8.2*  8.0*  8.0*  
MG   --   2.6*   --   
PHOS  2.7  3.7  2.8 ALB  1.7*   --   1.8* TBILI  0.4   --   0.4 SGOT  117*   --   138* ALT  117*   --   112* INR  1.1  1.1  1.1 Signed: Silas Mcgill MD

## 2018-10-03 NOTE — ROUTINE PROCESS
TRANSFER - IN REPORT: 
 
Verbal report received from [address] on Jaycee Max  being received from 2520(unit) for routine progression of care Report consisted of patients Situation, Background, Assessment and  
Recommendations(SBAR). Information from the following report(s) Procedure Summary was reviewed with the receiving nurse. Opportunity for questions and clarification was provided. Jose Alberto Aragon

## 2018-10-03 NOTE — ANESTHESIA POSTPROCEDURE EVALUATION
Post-Anesthesia Evaluation and Assessment Patient: Marylen Louder MRN: [id number]  SSN: [id number] YOB: 1934  Age: 80 y.o. Sex: male Cardiovascular Function/Vital Signs Visit Vitals  /60  Pulse 76  Temp 37.2 °C (98.9 °F)  Resp 16  
 Ht 6' 2\" (1.88 m)  Wt 115.8 kg (255 lb 4.7 oz)  SpO2 96%  BMI 32.78 kg/m2 Patient is status post general anesthesia for Procedure(s): 
TRACHEOSTOMY. Nausea/Vomiting: None Postoperative hydration reviewed and adequate. Pain: 
Pain Scale 1: Visual (10/03/18 1419) Pain Intensity 1: 0 (10/03/18 1419) Managed Neurological Status:  
Neuro Neurologic State: Eyes open to voice; Pharmacologically induced (comment) (10/03/18 0800) Orientation Level: Unable to verbalize (10/03/18 0800) Cognition: Unable to assess (comment) (10/03/18 0800) Speech: Intubated (10/03/18 0800) Assessment L Pupil: Round (10/03/18 0800) Size L Pupil (mm): 3 (10/03/18 0800) Assessment R Pupil: Round (10/03/18 0800) Size R Pupil (mm): 3 (10/03/18 0800) LUE Motor Response: Spontaneous  (10/03/18 0800) LLE Motor Response: Spontaneous  (10/03/18 0800) RUE Motor Response: Spontaneous  (10/03/18 0800) RLE Motor Response: Spontaneous  (10/03/18 0800) At baseline Mental Status and Level of Consciousness: Arousable Pulmonary Status:  
O2 Device: Ventilator (10/03/18 1419) Adequate oxygenation and airway patent Complications related to anesthesia: None Post-anesthesia assessment completed. No concerns Signed By: Jonathan Zacarias MD   
 October 3, 2018

## 2018-10-04 ENCOUNTER — APPOINTMENT (OUTPATIENT)
Dept: GENERAL RADIOLOGY | Age: 83
DRG: 003 | End: 2018-10-04
Attending: INTERNAL MEDICINE
Payer: MEDICARE

## 2018-10-04 LAB
ANION GAP SERPL CALC-SCNC: 3 MMOL/L (ref 5–15)
APTT PPP: 30 SEC (ref 22.1–32)
ARTERIAL PATENCY WRIST A: YES
BASE EXCESS BLDA CALC-SCNC: 5.5 MMOL/L
BDY SITE: ABNORMAL
BUN SERPL-MCNC: 26 MG/DL (ref 6–20)
BUN/CREAT SERPL: 28 (ref 12–20)
CALCIUM SERPL-MCNC: 8.3 MG/DL (ref 8.5–10.1)
CHLORIDE SERPL-SCNC: 106 MMOL/L (ref 97–108)
CO2 SERPL-SCNC: 32 MMOL/L (ref 21–32)
CREAT SERPL-MCNC: 0.94 MG/DL (ref 0.7–1.3)
EPAP/CPAP/PEEP, PAPEEP: 6
ERYTHROCYTE [DISTWIDTH] IN BLOOD BY AUTOMATED COUNT: 21.7 % (ref 11.5–14.5)
FIBRINOGEN PPP-MCNC: >800 MG/DL (ref 200–475)
FIO2 ON VENT: 50 %
GAS FLOW.O2 SETTING OXYMISER: 12 L/MIN
GLUCOSE SERPL-MCNC: 116 MG/DL (ref 65–100)
HCO3 BLDA-SCNC: 32 MMOL/L (ref 22–26)
HCT VFR BLD AUTO: 26.5 % (ref 36.6–50.3)
HGB BLD-MCNC: 8.2 G/DL (ref 12.1–17)
INR PPP: 1.1 (ref 0.9–1.1)
MCH RBC QN AUTO: 32 PG (ref 26–34)
MCHC RBC AUTO-ENTMCNC: 30.9 G/DL (ref 30–36.5)
MCV RBC AUTO: 103.5 FL (ref 80–99)
NRBC # BLD: 0 K/UL (ref 0–0.01)
NRBC BLD-RTO: 0 PER 100 WBC
PCO2 BLDA: 52 MMHG (ref 35–45)
PH BLDA: 7.4 [PH] (ref 7.35–7.45)
PHOSPHATE SERPL-MCNC: 3.2 MG/DL (ref 2.6–4.7)
PLATELET # BLD AUTO: 138 K/UL (ref 150–400)
PMV BLD AUTO: 12.1 FL (ref 8.9–12.9)
PO2 BLDA: 96 MMHG (ref 80–100)
POTASSIUM SERPL-SCNC: 4.4 MMOL/L (ref 3.5–5.1)
PROTHROMBIN TIME: 11.2 SEC (ref 9–11.1)
RBC # BLD AUTO: 2.56 M/UL (ref 4.1–5.7)
SAO2 % BLD: 97 % (ref 92–97)
SAO2% DEVICE SAO2% SENSOR NAME: ABNORMAL
SODIUM SERPL-SCNC: 141 MMOL/L (ref 136–145)
SPECIMEN SITE: ABNORMAL
THERAPEUTIC RANGE,PTTT: ABNORMAL SECS (ref 58–77)
VENTILATION MODE VENT: ABNORMAL
VT SETTING VENT: 500 ML
WBC # BLD AUTO: 7.6 K/UL (ref 4.1–11.1)

## 2018-10-04 PROCEDURE — 74011250636 HC RX REV CODE- 250/636: Performed by: INTERNAL MEDICINE

## 2018-10-04 PROCEDURE — 65610000006 HC RM INTENSIVE CARE

## 2018-10-04 PROCEDURE — 82803 BLOOD GASES ANY COMBINATION: CPT | Performed by: INTERNAL MEDICINE

## 2018-10-04 PROCEDURE — 36415 COLL VENOUS BLD VENIPUNCTURE: CPT | Performed by: INTERNAL MEDICINE

## 2018-10-04 PROCEDURE — 85610 PROTHROMBIN TIME: CPT | Performed by: INTERNAL MEDICINE

## 2018-10-04 PROCEDURE — 74011000250 HC RX REV CODE- 250: Performed by: INTERNAL MEDICINE

## 2018-10-04 PROCEDURE — 94003 VENT MGMT INPAT SUBQ DAY: CPT

## 2018-10-04 PROCEDURE — 84100 ASSAY OF PHOSPHORUS: CPT | Performed by: INTERNAL MEDICINE

## 2018-10-04 PROCEDURE — 77030006998

## 2018-10-04 PROCEDURE — 36600 WITHDRAWAL OF ARTERIAL BLOOD: CPT | Performed by: INTERNAL MEDICINE

## 2018-10-04 PROCEDURE — 80048 BASIC METABOLIC PNL TOTAL CA: CPT | Performed by: INTERNAL MEDICINE

## 2018-10-04 PROCEDURE — 85027 COMPLETE CBC AUTOMATED: CPT | Performed by: INTERNAL MEDICINE

## 2018-10-04 PROCEDURE — 94640 AIRWAY INHALATION TREATMENT: CPT

## 2018-10-04 PROCEDURE — 71045 X-RAY EXAM CHEST 1 VIEW: CPT

## 2018-10-04 RX ADMIN — CHLORHEXIDINE GLUCONATE 15 ML: 1.2 RINSE ORAL at 08:13

## 2018-10-04 RX ADMIN — PROPOFOL 20 MCG/KG/MIN: 10 INJECTION, EMULSION INTRAVENOUS at 05:22

## 2018-10-04 RX ADMIN — FAMOTIDINE 20 MG: 10 INJECTION, SOLUTION INTRAVENOUS at 20:54

## 2018-10-04 RX ADMIN — PROPOFOL 10 MCG/KG/MIN: 10 INJECTION, EMULSION INTRAVENOUS at 16:15

## 2018-10-04 RX ADMIN — IPRATROPIUM BROMIDE AND ALBUTEROL SULFATE 3 ML: .5; 3 SOLUTION RESPIRATORY (INHALATION) at 21:21

## 2018-10-04 RX ADMIN — Medication 10 ML: at 06:33

## 2018-10-04 RX ADMIN — Medication 10 ML: at 13:57

## 2018-10-04 RX ADMIN — IPRATROPIUM BROMIDE AND ALBUTEROL SULFATE 3 ML: .5; 3 SOLUTION RESPIRATORY (INHALATION) at 16:00

## 2018-10-04 RX ADMIN — Medication 10 ML: at 21:02

## 2018-10-04 RX ADMIN — IPRATROPIUM BROMIDE AND ALBUTEROL SULFATE 3 ML: .5; 3 SOLUTION RESPIRATORY (INHALATION) at 07:47

## 2018-10-04 RX ADMIN — FAMOTIDINE 20 MG: 10 INJECTION, SOLUTION INTRAVENOUS at 08:13

## 2018-10-04 RX ADMIN — CHLORHEXIDINE GLUCONATE 15 ML: 1.2 RINSE ORAL at 20:55

## 2018-10-04 RX ADMIN — IPRATROPIUM BROMIDE AND ALBUTEROL SULFATE 3 ML: .5; 3 SOLUTION RESPIRATORY (INHALATION) at 11:34

## 2018-10-04 RX ADMIN — LATANOPROST 1 DROP: 50 SOLUTION OPHTHALMIC at 21:02

## 2018-10-04 RX ADMIN — Medication 10 ML: at 14:00

## 2018-10-04 RX ADMIN — Medication 20 ML: at 11:59

## 2018-10-04 RX ADMIN — DORZOLAMIDE HYDROCHLORIDE AND TIMOLOL MALEATE 1 DROP: 20; 5 SOLUTION/ DROPS OPHTHALMIC at 17:10

## 2018-10-04 RX ADMIN — DORZOLAMIDE HYDROCHLORIDE AND TIMOLOL MALEATE 1 DROP: 20; 5 SOLUTION/ DROPS OPHTHALMIC at 08:14

## 2018-10-04 RX ADMIN — Medication 10 ML: at 11:58

## 2018-10-04 NOTE — PROGRESS NOTES
Critical care interdisciplinary rounds held on 10/4/2018. Following members present, Pharmacy, Diabetes Treatment, Case Management, Respiratory Therapy, Physical Therapy, Palliative Care and Nutrition. Led by MIGUEL Uribe RN and Dr. Margaret Garcia. Plan of care discussed. See clinical pathway for plan of care and interventions and desired outcomes.

## 2018-10-04 NOTE — PROGRESS NOTES
[facility] Pulmonary Consult Service NotePulmonary, Critical Care, and Sleep Medicine Name: Sukumar Marshall MRN: [id number] : 1934 Hospital: [facility] Date: 10/4/2018   Hospital Day: 25 IMPRESSION:  
1. 18 cardiopulmonary arrest 6 min CPR 2. Acute respiratory failure-on vent-  
3. Acute hemoptysis- Noted to have bleeding from RLL on Bronch. No endobronchial lesion. has lung mass(CA vs inflammatory pseudotumor) and microscopic hematuria; ANCA negative; JOSS barely positive. Suspect LUNG mass the culprit. CYTOLOGY from bronchoscopy negative malignancy x 2 
4. S/P pulmonary artery embolization for hemoptysis 5. COPD moderate to severe at baseline 6. Acute renal insf 7. Anemia and prior coagulopathy on coumadin 8. Volume overload, anasarca- after couple of days of IV lasix- becomes prerenal 
9. Encehalopathy multifactorial - has hearing loss as well 10. Hx of CAD s/p PCI, afib on amio, former smoker, prior cva RECOMMENDATIONS/PLAN:  
1. Vent support, start TC trials 2. Trach done 3. PEG this week 4. Still has unresolved issue of lung mass 5. Abx completed 6. Diuresis as needed 7. Follow renal function, WNL 8. Enteral feedings plus free water, follow Na 9. Follow neuro status, better 10. Glucose monitoring and SSI 11. DVT/GI prophylaxis 12. Sedation as needed My assessment/management discussed with: Consultants, Nursing, Pharmacy, Case Management, PT, OT, Respiratory Therapy, Hospitalist and Family for coordination of care Pt's condition is acute and unstable requiring inpatient hospitalization.  This care involved high complexity decision making which includes independently reviewing the patient's past medical records, current laboratory results, medication profiles that were immediately available to me and actual Xray images at the bedside in order to assess, support vital system function, and to treat this degree of vital organ system failure, and to prevent further deterioration of the patients condition. Risk of deterioration: high  
[x] High complexity decision making was performed [x] See my orders for details Tubes:    
 
Subjective/Initial History: S/p trach 10/2 Vent support MAR reviewed and pertinent medications noted or modified as needed Current Facility-Administered Medications Medication  albuterol-ipratropium (DUO-NEB) 2.5 MG-0.5 MG/3 ML  
 acetaminophen (TYLENOL) solution 650 mg  
 sodium chloride (NS) flush 10-30 mL  sodium chloride (NS) flush 10 mL  sodium chloride (NS) flush 10 mL  sodium chloride (NS) flush 10-40 mL  sodium chloride (NS) flush 20 mL  heparin (porcine) pf 300 Units  propofol (DIPRIVAN) infusion  glycopyrrolate (ROBINUL) injection 0.1 mg  
 fentaNYL citrate (PF) injection 25-50 mcg  famotidine (PF) (PEPCID) 20 mg in sodium chloride 0.9% 10 mL injection  chlorhexidine (PERIDEX) 0.12 % mouthwash 15 mL  fentaNYL citrate (PF) injection 25 mcg  albuterol (PROVENTIL HFA, VENTOLIN HFA, PROAIR HFA) inhaler 2 Puff  dorzolamide-timolol (COSOPT) 22.3-6.8 mg/mL ophthalmic solution 1 Drop  latanoprost (XALATAN) 0.005 % ophthalmic solution 1 Drop  sodium chloride (NS) flush 5-10 mL  sodium chloride (NS) flush 5-10 mL  ondansetron (ZOFRAN) injection 4 mg ROS:A comprehensive review of systems was negative except for that written in the HPI. Objective: 
 
Vital Signs: Telemetry:    normal sinus rhythmIntake/Output:  
Visit Vitals  /57  Pulse 74  Temp 98.9 °F (37.2 °C)  Resp 20  
 Ht 6' 2\" (1.88 m)  Wt 115.8 kg (255 lb 4.7 oz)  SpO2 98%  BMI 32.78 kg/m2 Temp (24hrs), Av.8 °F (37.1 °C), Min:98.4 °F (36.9 °C), Max:99.1 °F (37.3 °C) O2 Device: Tracheostomy O2 Flow Rate (L/min): 12 l/min Wt Readings from Last 4 Encounters: 09/30/18 115.8 kg (255 lb 4.7 oz) 05/05/16 111.1 kg (245 lb)  
03/25/16 106.6 kg (235 lb) 04/08/15 103.4 kg (228 lb) Intake/Output Summary (Last 24 hours) at 10/04/18 1112 Last data filed at 10/04/18 1034 Gross per 24 hour Intake          1912.16 ml Output             1195 ml Net           717.16 ml Last shift:      10/04 0701 - 10/04 1900 In: 402 [I.V.:107] Out: 150 [Urine:150] Last 3 shifts: 10/02 1901 - 10/04 0700 In: 2826.1 [I.V.:426.1] Out: 2471 [EFISS:8227; Drains:40] Physical Exam:  
 General:  No distress HEAD: Normocephalic, without obvious abnormality, atraumatic EYES: conjunctivae clear. anicteric sclerae NOSE: nares normal, no drainage, no nasal flaring, Neck: Supple, symmetrical, trachea midline Chest: increased AP diameter Lungs: Bilateral rhonchi. Heart: Regular rate and rhythm Abdomen: soft, non-tender, +BS Musculoskeletal: anasarca; no erythema Neuro: more awake Psych: Not able to assess; no agitation. Data:  
 
Current Facility-Administered Medications Medication Dose Route Frequency  albuterol-ipratropium (DUO-NEB) 2.5 MG-0.5 MG/3 ML  3 mL Nebulization QID RT  
 sodium chloride (NS) flush 10 mL  10 mL InterCATHeter Q24H  
 sodium chloride (NS) flush 10-40 mL  10-40 mL InterCATHeter Q8H  
 sodium chloride (NS) flush 20 mL  20 mL InterCATHeter Q24H  propofol (DIPRIVAN) infusion  5-50 mcg/kg/min IntraVENous TITRATE  famotidine (PF) (PEPCID) 20 mg in sodium chloride 0.9% 10 mL injection  20 mg IntraVENous Q12H  chlorhexidine (PERIDEX) 0.12 % mouthwash 15 mL  15 mL Oral BID  dorzolamide-timolol (COSOPT) 22.3-6.8 mg/mL ophthalmic solution 1 Drop  1 Drop Both Eyes BID  latanoprost (XALATAN) 0.005 % ophthalmic solution 1 Drop  1 Drop Both Eyes QHS  sodium chloride (NS) flush 5-10 mL  5-10 mL IntraVENous Q8H Labs: 
Recent Labs 10/04/18 
 0413  10/03/18 
 0515  10/02/18 
 8759 WBC  7.6  7.9  5.9 HGB  8.2*  8.4*  8.1* HCT  26.5*  27.6*  26.9*  
PLT  138*  155  135* Recent Labs 10/04/18 
 0413  10/03/18 
 0515  10/02/18 
 6329 NA  141  140  140  
K  4.4  4.3  4.3 CL  106  105  103 CO2  32  31  35* GLU  116*  137*  109* BUN  26*  26*  28* CREA  0.94  0.97  1.05  
CA  8.3*  8.2*  8.0*  
MG   --    --   2.6* PHOS  3.2  2.7  3.7 ALB   --   1.7*   --   
TBILI   --   0.4   --   
SGOT   --   117*   --   
ALT   --   117*   --   
INR  1.1  1.1  1.1 Recent Labs 10/04/18 
 8334  10/03/18 
 3799  10/02/18 
 7763 PH  7.40  7.46*  7.48* PCO2  52*  44  44 PO2  96  92  88 HCO3  32*  30*  32* FIO2  50  50  40 Imaging: 
I have personally reviewed the patients radiographs and have reviewed the reports: CXR: No acute changes Total critical care time exclusive of procedures:  minutes Darrell Palomares MD

## 2018-10-04 NOTE — PROGRESS NOTES
GI Progress Note Abdi Baker) NAME:Ulysses Valle ZHN:[birth year] HCN:[id number] ATTG: Bandar Alberto MD  PCP: PROVIDER UNKNOWN 
Date/Time:  10/4/2018 11:35 AM  
Assessment: · Dysphagia- s/p PEG yesterday for prolonged nutritional support Plan:  
· Continue TF Will sign off Subjective:  
Discussed with RN events overnight. PEG functioning well. Complaint Y/N Description Abdominal Pain Hematemesis n Hematochezia n Melena n   
Constipation Diarrhea Dyspepsia Dysphagia Jaundiced n   
Nausea/vomiting Review of Systems: 
Symptom Y/N Comments  Symptom Y/N Comments Fever/Chills    Chest Pain Cough    Headaches Sputum    Joint Pain SOB/ROY    Pruritis/Rash Tolerating Diet y TF  Other Could NOT obtain due to: trach Objective: VITALS:  
Last 24hrs VS reviewed since prior progress note. Most recent are: 
Visit Vitals  /64  Pulse 76  Temp 98.9 °F (37.2 °C)  Resp 21  
 Ht 6' 2\" (1.88 m)  Wt 115.8 kg (255 lb 4.7 oz)  SpO2 99%  BMI 32.78 kg/m2 Intake/Output Summary (Last 24 hours) at 10/04/18 1135 Last data filed at 10/04/18 1100 Gross per 24 hour Intake          1957.16 ml Output             1195 ml Net           762.16 ml PHYSICAL EXAM: 
General: WD, WN. Alert, cooperative, no acute distress   
HEENT: NC, Atraumatic. PERRL, Anicteric sclerae. Lungs:  CTA Bilaterally. No Wheezing/Rhonchi/Rales. Heart:  Regular  rhythm,  No murmur/Rub/Gallops Abdomen: Soft, Non distended, Non tender.  +BS. PEG site c/d/i Extremities: No c/c.  1+ MICHELLE b/l Neurologic:  CN 2-12 gi, A/O X name. No acute neurological distress Psych:   Unable insight. Not anxious nor agitated. Lab and Radiology Data Reviewed: (see below) Medications Reviewed: (see below) PMH/SH reviewed - no change compared to H&P 
________________________________________________________________________ Total time spent with patient: 15 minutes ________________________________________________________________________ Care Plan discussed with: 
Patient y Family RN y  
           Consultant:    
 
Korina Whalen MD  
 
Procedures: see electronic medical records for all procedures/Xrays and details which were not copied into this note but were reviewed prior to creation of Plan. LABS: 
Recent Labs 10/04/18 
 0413  10/03/18 
 0515 WBC  7.6  7.9 HGB  8.2*  8.4* HCT  26.5*  27.6*  
PLT  138*  155 Recent Labs 10/04/18 
 0413  10/03/18 
 0515  10/02/18 
 5434 NA  141  140  140  
K  4.4  4.3  4.3 CL  106  105  103 CO2  32  31  35* BUN  26*  26*  28* CREA  0.94  0.97  1.05  
GLU  116*  137*  109* CA  8.3*  8.2*  8.0*  
MG   --    --   2.6* PHOS  3.2  2.7  3.7 Recent Labs 10/03/18 
 0515 SGOT  117* AP  171* TP  7.5 ALB  1.7*  
GLOB  5.8* Recent Labs 10/04/18 
 0413  10/03/18 
 0515  10/02/18 
 1320 INR  1.1  1.1  1.1 PTP  11.2*  11.1  10.9 APTT  30.0  29.2  27.9 No results for input(s): FE, TIBC, PSAT, FERR in the last 72 hours. Lab Results Component Value Date/Time Folate 12.7 04/12/2015 04:50 AM  
 
Recent Labs 10/04/18 
 2348  10/03/18 
 1638 PH  7.40  7.46* PCO2  52*  44  
PO2  96  92 No results for input(s): CPK, CKMB in the last 72 hours. No lab exists for component: TROPONINI Lab Results Component Value Date/Time  Color DARK YELLOW 09/12/2018 01:33 AM  
 Appearance CLOUDY (A) 09/12/2018 01:33 AM  
 Specific gravity 1.026 09/12/2018 01:33 AM  
 pH (UA) 5.0 09/12/2018 01:33 AM  
 Protein 30 (A) 09/12/2018 01:33 AM  
 Glucose NEGATIVE  09/12/2018 01:33 AM  
 Ketone TRACE (A) 09/12/2018 01:33 AM  
 Urobilinogen 1.0 09/12/2018 01:33 AM  
 Nitrites NEGATIVE  09/12/2018 01:33 AM  
 Leukocyte Esterase MODERATE (A) 09/12/2018 01:33 AM  
 Epithelial cells FEW 09/12/2018 01:33 AM  
 Bacteria 1+ (A) 09/12/2018 01:33 AM  
 WBC 5-10 09/12/2018 01:33 AM  
 RBC 20-50 09/12/2018 01:33 AM  
 
 
MEDICATIONS: 
Current Facility-Administered Medications Medication Dose Route Frequency  albuterol-ipratropium (DUO-NEB) 2.5 MG-0.5 MG/3 ML  3 mL Nebulization QID RT  
 acetaminophen (TYLENOL) solution 650 mg  650 mg Oral Q4H PRN  
 sodium chloride (NS) flush 10-30 mL  10-30 mL InterCATHeter PRN  
 sodium chloride (NS) flush 10 mL  10 mL InterCATHeter Q24H  
 sodium chloride (NS) flush 10 mL  10 mL InterCATHeter PRN  
 sodium chloride (NS) flush 10-40 mL  10-40 mL InterCATHeter Q8H  
 sodium chloride (NS) flush 20 mL  20 mL InterCATHeter Q24H  
 heparin (porcine) pf 300 Units  300 Units InterCATHeter PRN  propofol (DIPRIVAN) infusion  5-50 mcg/kg/min IntraVENous TITRATE  glycopyrrolate (ROBINUL) injection 0.1 mg  0.1 mg IntraVENous TID PRN  
 fentaNYL citrate (PF) injection 25-50 mcg  25-50 mcg IntraVENous Q4H PRN  
 famotidine (PF) (PEPCID) 20 mg in sodium chloride 0.9% 10 mL injection  20 mg IntraVENous Q12H  chlorhexidine (PERIDEX) 0.12 % mouthwash 15 mL  15 mL Oral BID  fentaNYL citrate (PF) injection 25 mcg  25 mcg IntraVENous Q4H PRN  
 albuterol (PROVENTIL HFA, VENTOLIN HFA, PROAIR HFA) inhaler 2 Puff  2 Puff Inhalation Q4H PRN  
 dorzolamide-timolol (COSOPT) 22.3-6.8 mg/mL ophthalmic solution 1 Drop  1 Drop Both Eyes BID  latanoprost (XALATAN) 0.005 % ophthalmic solution 1 Drop  1 Drop Both Eyes QHS  sodium chloride (NS) flush 5-10 mL  5-10 mL IntraVENous Q8H  
 sodium chloride (NS) flush 5-10 mL  5-10 mL IntraVENous PRN  
 ondansetron (ZOFRAN) injection 4 mg  4 mg IntraVENous Q6H PRN

## 2018-10-04 NOTE — PROGRESS NOTES
I visited the patient when his spouse, sister, and family friend were with him in the room. The spouse explained, cautiously, how there has been some improvement. The patient showed movement and partially opened his eyes as we talked. The spouse indicated one of the things we can do at this point is to pray. I agreed and suggested that we pray. The four of us gathered around the patient and prayed. The family friend excused herself after the prayer. The spouse invited me outside the room and reflected on the patient's condition and expressed that they had conversations about the end of life. Dr. Vikas Wheatley stopped by as he passed and spoke about the medical condition of the patient to the spouse. In my judgment the spouse is ready to face what the patient had expressed to her about his wishes. The spouse invited her sister-in-law to join us after we had spoken alone for a few minutes. . I would be cautious and develop a plan to discuss EOL realities. She expressed strong concern that the three daughters will not face the patient's medical condition. [address] Francois Espinoza EdD, MDiv For [facility] Page [phone])

## 2018-10-04 NOTE — PROGRESS NOTES
Hospitalist Progress Note NAME: Babar Gray  
:  1934 MRN:  [id number] Assessment / Plan: PEA cardiac arrest  Acute encephalpathy; likely multifactorial 
- Code blue called . Patient w/ agonal breathing after coming back from radiology after arteriogram completed. No pulse detected. CPR initiated. Epi x 2 given. Pulse with ROSC. Intubated at bedside and transferred to ICU. - head CT  showed small age-indeterminate infarct in the left corona radiata. 
  
Acute respiratory w/ hypoxia s/p cardiac arrest; now intubated. S/p trach placement 10/3 Sepsis due to suspected PNA Pulmonary hemorrhage s/p pulmonary artery embolization Lung mass with concerns for malignancy COPD 
 - CT of chest shows severe emphysema with the right basilar airspace disease in the oval mass with fluid level concerning for internal hemorrhage versus pneumonia. - repeat CT  showed persistent hematoma in the right lower lobe region with surrounding patchy atelectasis/infiltrate and small ipsilateral pleural effusion. Left basilar subsegmental atelectasis. Coils in right lower lobe region consistent with recent pulmonary artery branch embolization. 
- Bcx Neg, sputum cytology collected  negative except scant yeast. 
- completed zosyn for possible lung abscess. End date  
- cytology from bronchial washing on  is atypical, favor benign reactive process 
- extubated on  and placed on VM 50% but reintubated  
 
- s/p trach placement 
- critical care management 
- wean vent as tolerated 
- on diuresis with lasix 
- respiratory care 
  
Hemoptysis 
- noted to have bleeding from RLL on bronchoscopy. - work up for possible rheumatologic cause neg thus far: 
ANCA, anti-GBM, MPO ab, KS-3 ab, SSA/SSB, RNP ab, Arredondo/RNP, dsDNA, and Arredondo ab all negative. JOSS +. 
- Arteriogram : Thoracic aortic and intercostal arteriograms as described above demonstrating no enlarged bronchial artery supplying the right lower lobe of the lung. A normal 
appearing and normal sized right bronchial artery is seen without any 
hyperplasia or dominant right lower lobe supply. No embolization was performed. 
-Trend H&H 
-monitor for recurrent bleeding 
  
RLL mass - work up for [employer] neg thus far. - diagnostic bronch on 9/16. No endobronchial lesions seen. - CTA chest done 9/17 due to persistent bloody pulmonary secretions. Right lower lobe masslike abnormality demonstrates increased internal density 
and has increased in size measuring 6.7 x 6.7 cm, compared to 5.7 x 4.4 cm. This 
is compatible with internal hemorrhage. There is new moderate right lower lobe 
partial collapse/atelectasis.   
Normal sized right bronchial artery is visualized without any hyperplasia or or evidence for supply to the right lower lobe lung abnormality. It is unlikely that the hemoptysis related to bronchial arterial injury/supply. Prior bronchial arteriogram was unremarkable. No evidence for pulmonary artery pseudoaneurysm or active hemorrhage. However, it is more likely that the masslike abnormality in the right lower lobe the lung has eroded into an adjacent pulmonary artery then a bronchial artery. Therefore, plan is for pulmonary arteriogram with possible embolization if an abnormality is seen in the right lower lobe. 6 mm and 4 mm right lung nodules. Small bilateral pleural effusions. Minimally enlarged bilateral hilar and mediastinal lymph nodes  
  
Hypernatremia: resolved 
  
Acute blood loss anemia on admission: stable - trend H&H 
- monitor labs and transfuse for hgb <7 
  
H/o atrial fibrillation on chronic anticoagulation w/ coumadin 
- coumadin on hold per above 
  
Coumadin-induced coagulopathy Elevated LFT 
- Holding coumadin. 
- monitor LFT 
- stopped statin - trend INR 
  
ALAN: resolved 
- likely prerenal related to Lasix. - Renal function stable. - avoid nephrotoxic agents.   
UTI, acute cystitis w/ hematuria, POA 
- s/p treatment course 
  
HLD 
-statin held due to elevated LFT 
  
Obesity - BMI 32 
  
Code: Full DVT prophylaxis: SCDs GI prophylaxis: PPI Subjective: Chief Complaint / Reason for Physician Visit: hemoptysis Intubated at bedside. More alert today off sedation On SIMV Review of Systems: 14 pt ROS unremarkable except as stated above. Objective: VITALS:  
Last 24hrs VS reviewed since prior progress note. Most recent are: 
Patient Vitals for the past 24 hrs: 
 Temp Pulse Resp BP SpO2  
10/04/18 1300 - 80 15 112/55 97 % 10/04/18 1200 99 °F (37.2 °C) 84 20 104/57 99 % 10/04/18 1134 - - - - 99 % 10/04/18 1120 - - - - (!) 88 % 10/04/18 1100 - 76 21 122/64 98 % 10/04/18 1000 - 74 20 101/57 98 % 10/04/18 0900 - 73 22 115/61 97 % 10/04/18 0800 98.9 °F (37.2 °C) 65 18 122/60 98 % 10/04/18 0747 - - - - 98 % 10/04/18 0700 - 66 21 100/52 97 % 10/04/18 0500 - 70 19 100/51 99 % 10/04/18 0400 98.4 °F (36.9 °C) 77 21 114/63 98 % 10/04/18 0328 - - 15 - -  
10/04/18 0300 - 71 19 119/63 100 % 10/04/18 0200 - 72 18 113/56 99 % 10/04/18 0100 - 73 19 109/57 99 % 10/04/18 0000 98.7 °F (37.1 °C) 74 19 117/55 98 % 10/03/18 2359 - - 17 - -  
10/03/18 2300 - 77 16 111/64 98 % 10/03/18 2200 - 77 18 (!) 111/91 98 % 10/03/18 2100 - 75 14 111/57 98 % 10/03/18 2031 - - 16 - -  
10/03/18 2030 - - - - 99 % 10/03/18 2000 98.8 °F (37.1 °C) 74 18 118/60 98 % 10/03/18 1900 - 75 19 119/63 99 % 10/03/18 1800 - 76 16 104/60 99 % 10/03/18 1700 - 75 17 104/57 97 % 10/03/18 1600 99.1 °F (37.3 °C) 73 17 102/55 98 % 10/03/18 1545 - - - - 96 % 10/03/18 1515 - 74 14 123/62 97 % 10/03/18 1512 - 86 12 119/66 -  
10/03/18 1510 - 75 13 124/59 96 % 10/03/18 1505 - 73 16 99/59 95 % 10/03/18 1500 - 71 15 100/45 96 % 10/03/18 1455 - 66 16 90/48 96 % 10/03/18 1450 - 68 16 98/55 96 % 10/03/18 1419 - 76 16 110/60 96 % Intake/Output Summary (Last 24 hours) at 10/04/18 1416 Last data filed at 10/04/18 1403 Gross per 24 hour Intake          2107.59 ml Output             1155 ml Net           952.59 ml PHYSICAL EXAM: 
General:    Intubated and sedated. Neck:  Supple, symmetrical.  trach in place Lungs:   Coarse breath sounds, no wheezing Heart:   Regular  rhythm,  Normal S1 and S2   Diffuse edema Abdomen:   Soft, non-tender. Bowel sounds normal.  PEG Skin:     Warm, dry Neurologic: sedated Reviewed most current lab test results and cultures  YES Reviewed most current radiology test results   YES Review and summation of old records today    NO Reviewed patient's current orders and MAR    YES 
PMH/SH reviewed - no change compared to H&P 
 
________________________________________________________________________ Jaydon Hurtado MD  
 
Procedures: see electronic medical records for all procedures/Xrays and details which were not copied into this note but were reviewed prior to creation of Plan. LABS: 
I reviewed today's most current labs and imaging studies. Pertinent labs include: 
Recent Labs 10/04/18 
 0413  10/03/18 
 0515  10/02/18 
 5897 WBC  7.6  7.9  5.9 HGB  8.2*  8.4*  8.1* HCT  26.5*  27.6*  26.9*  
PLT  138*  155  135* Recent Labs 10/04/18 
 0413  10/03/18 
 0515  10/02/18 
 9384 NA  141  140  140  
K  4.4  4.3  4.3 CL  106  105  103 CO2  32  31  35* GLU  116*  137*  109* BUN  26*  26*  28* CREA  0.94  0.97  1.05  
CA  8.3*  8.2*  8.0*  
MG   --    --   2.6* PHOS  3.2  2.7  3.7 ALB   --   1.7*   --   
TBILI   --   0.4   --   
SGOT   --   117*   --   
ALT   --   117*   --   
INR  1.1  1.1  1.1 Signed: Jaydon Hurtado MD

## 2018-10-04 NOTE — PROGRESS NOTES
0730: Report received from [address] [place] [address], AIDAN [place] 20: Propofol stopped for SAT. 0830: Pt. Placed on CPAP mode on vent. Breathing well. 0845: Pt. Is now following commands. Pt. Denies pain at this time. VSS. Pt. Is Paced/Afib on the monitor. Pupils irregularly shaped, but reactive. Pt. Has glaucoma. Pt. moves all extremities. Slight swelling to extremities. Pt. Has a tracheostomy. Trach care completed. Will suction as needed. Pt has a PEG tube, Dressing CDI. TF's infusing at goal, no residuals. Bowel sounds active. Pt. Has a PICC, Overton, and flexiseal. Will monitor all. All patent. Breath sounds coarse. Diminished in the bases. 1100: Pt. Now on Trach collar Per Dr. Tammi Caba. Will monitor closely. 1200: Pt. Reassessed. Pt. Following commands. Pt. Slightly drowsy but opens eyes and responds to pain. Pt. Placed back on the ventilator 20 minutes after being on trach collar due to a lower oxygen saturation in the 70's.  
 
1400: Family at the bedside. Pt. Responses to voice. Pt. Was able to shake his heads \"No', when asked if he was in any pain or having any nausea. 1600: Trach care completed. Inner canula changed, Pt. Tolerated it well. Pt. Reassessed. No changes from morning assessment. Pt. Following commands. Moves all extremities. Pt. Coughing on vent. Will restart on propofol. 1615: Propofol restarted for slight restlessness. See MAR for titrations. 1900: Report given to Apurva Truong RN.

## 2018-10-04 NOTE — PROGRESS NOTES
Nutrition Assessment: 
 
RECOMMENDATIONS:  
Continue TF as ordered ASSESSMENT:  
Chart reviewed, case discussed during CCU rounds. Pt remains on vent via trach, needs re-estimated. He is s/p PEG yesterday. TF at goal rate this morning with no residuals. TF meets 101% kcal and 108% protein needs. BM noted today. Labs reviewed, WNL. Dietitians Intervention(s)/Plan(s): Continue TF as ordered SUBJECTIVE/OBJECTIVE:  
Pt intubated Diet Order: Other (comment) (TF via PEG: TwoCal HN @ 45mL/h + 150mL H2O flush q 4h (provides 2160kcals/90gPro/1656mL) ) 
% Eaten:  No data found. TwoCal HN  at 45 mL/hr flush with 150 mL  Q4H  via PEG Tube   Residuals: 0 mL Pertinent Medications:pepcid. Chemistries: 
Lab Results Component Value Date/Time Sodium 141 10/04/2018 04:13 AM  
 Potassium 4.4 10/04/2018 04:13 AM  
 Chloride 106 10/04/2018 04:13 AM  
 CO2 32 10/04/2018 04:13 AM  
 Anion gap 3 (L) 10/04/2018 04:13 AM  
 Glucose 116 (H) 10/04/2018 04:13 AM  
 BUN 26 (H) 10/04/2018 04:13 AM  
 Creatinine 0.94 10/04/2018 04:13 AM  
 BUN/Creatinine ratio 28 (H) 10/04/2018 04:13 AM  
 GFR est AA >60 10/04/2018 04:13 AM  
 GFR est non-AA >60 10/04/2018 04:13 AM  
 Calcium 8.3 (L) 10/04/2018 04:13 AM  
 Albumin 1.7 (L) 10/03/2018 05:15 AM  
  
Anthropometrics: Height: 6' 2\" (188 cm) Weight: 115.8 kg (255 lb 4.7 oz)   []bed scale    []stated   []unknown IBW (%IBW):   ( ) UBW (%UBW):   (  %) BMI: Body mass index is 32.78 kg/(m^2). This BMI is indicative of: 
[]Underweight   []Normal   []Overweight   [x] Obesity   [] Extreme Obesity (BMI>40) Estimated Nutrition Needs (Based on): 2132 Kcals/day (PSU (MSJ 1918)) , 83 g (0.8gPro/kg) Protein Carbohydrate: At Least 130 g/day  Fluids: 1700 mL/day or per MD  
 
Last BM: 10/4   [x]Active     []Hyperactive  []Hypoactive       [] Absent   BS Skin:    [x] Intact   [] Incision  [] Breakdown   [] DTI   [x] Tears/Excoriation/Abrasion  [x]Edema(+2-generalized, BLE; +1-BUE)  [] Other: Wt Readings from Last 30 Encounters:  
09/30/18 115.8 kg (255 lb 4.7 oz) 05/05/16 111.1 kg (245 lb)  
03/25/16 106.6 kg (235 lb) 04/08/15 103.4 kg (228 lb)  
03/25/15 100.7 kg (222 lb)  
01/16/13 105.3 kg (232 lb 3.2 oz) 01/03/13 103.3 kg (227 lb 11.8 oz) NUTRITION DIAGNOSES:  
Problem:  No new nutritional dx at this time. NUTRITION INTERVENTIONS: 
  Enteral/Parenteral Nutrition: Other (Continue TF as ordered) GOAL:  
Pt will tolerate TF @ goal rate with residuals <250mL in 3-5 days. NUTRITION MONITORING AND EVALUATION Previous Goal: Pt will tolerate TF @ goal rate with residuals <250mL in 2-4 days Previous Goal Met: Yes Previous Recommendations Implemented: Yes Cultural, Hinduism, or Ethnic Dietary Needs: None LEARNING NEEDS (Diet, Food/Nutrient-Drug Interaction):  
 [x] None Identified 
 [] Identified and Education Provided/Documented 
 [] Identified and Pt declined/was not appropriate [x] Interdisciplinary Care Plan Reviewed/Documented  
 [x] Participated in Discharge Planning: See TF orders above [x] Interdisciplinary Rounds NUTRITION RISK:  
 [] High              [x] Moderate           []  Low  []  Minimal/Uncompromised Dinora Gomez RD, [facility] Pager [phone] Weekend Pager [phone]

## 2018-10-05 ENCOUNTER — APPOINTMENT (OUTPATIENT)
Dept: GENERAL RADIOLOGY | Age: 83
DRG: 003 | End: 2018-10-05
Attending: INTERNAL MEDICINE
Payer: MEDICARE

## 2018-10-05 LAB
ALBUMIN SERPL-MCNC: 1.6 G/DL (ref 3.5–5)
ALBUMIN/GLOB SERPL: 0.3 {RATIO} (ref 1.1–2.2)
ALP SERPL-CCNC: 196 U/L (ref 45–117)
ALT SERPL-CCNC: 123 U/L (ref 12–78)
ANION GAP SERPL CALC-SCNC: 5 MMOL/L (ref 5–15)
APTT PPP: 26.8 SEC (ref 22.1–32)
ARTERIAL PATENCY WRIST A: ABNORMAL
AST SERPL-CCNC: 148 U/L (ref 15–37)
BASE EXCESS BLDA CALC-SCNC: 5.5 MMOL/L
BDY SITE: ABNORMAL
BILIRUB SERPL-MCNC: 0.4 MG/DL (ref 0.2–1)
BUN SERPL-MCNC: 25 MG/DL (ref 6–20)
BUN/CREAT SERPL: 27 (ref 12–20)
CALCIUM SERPL-MCNC: 8.3 MG/DL (ref 8.5–10.1)
CHLORIDE SERPL-SCNC: 104 MMOL/L (ref 97–108)
CO2 SERPL-SCNC: 31 MMOL/L (ref 21–32)
CREAT SERPL-MCNC: 0.91 MG/DL (ref 0.7–1.3)
EPAP/CPAP/PEEP, PAPEEP: 6
ERYTHROCYTE [DISTWIDTH] IN BLOOD BY AUTOMATED COUNT: 21.4 % (ref 11.5–14.5)
FIBRINOGEN PPP-MCNC: >800 MG/DL (ref 200–475)
FIO2 ON VENT: 50 %
GAS FLOW.O2 SETTING OXYMISER: 12 L/MIN
GLOBULIN SER CALC-MCNC: 5.8 G/DL (ref 2–4)
GLUCOSE SERPL-MCNC: 114 MG/DL (ref 65–100)
HCO3 BLDA-SCNC: 30 MMOL/L (ref 22–26)
HCT VFR BLD AUTO: 25.1 % (ref 36.6–50.3)
HGB BLD-MCNC: 7.5 G/DL (ref 12.1–17)
INR PPP: 1.1 (ref 0.9–1.1)
MCH RBC QN AUTO: 31.5 PG (ref 26–34)
MCHC RBC AUTO-ENTMCNC: 29.9 G/DL (ref 30–36.5)
MCV RBC AUTO: 105.5 FL (ref 80–99)
NRBC # BLD: 0 K/UL (ref 0–0.01)
NRBC BLD-RTO: 0 PER 100 WBC
PCO2 BLDA: 45 MMHG (ref 35–45)
PH BLDA: 7.45 [PH] (ref 7.35–7.45)
PHOSPHATE SERPL-MCNC: 2.6 MG/DL (ref 2.6–4.7)
PLATELET # BLD AUTO: 140 K/UL (ref 150–400)
PMV BLD AUTO: 11.9 FL (ref 8.9–12.9)
PO2 BLDA: 101 MMHG (ref 80–100)
POTASSIUM SERPL-SCNC: 4.2 MMOL/L (ref 3.5–5.1)
PROT SERPL-MCNC: 7.4 G/DL (ref 6.4–8.2)
PROTHROMBIN TIME: 11.3 SEC (ref 9–11.1)
RBC # BLD AUTO: 2.38 M/UL (ref 4.1–5.7)
SAO2 % BLD: 98 % (ref 92–97)
SAO2% DEVICE SAO2% SENSOR NAME: ABNORMAL
SODIUM SERPL-SCNC: 140 MMOL/L (ref 136–145)
SPECIMEN SITE: ABNORMAL
THERAPEUTIC RANGE,PTTT: ABNORMAL SECS (ref 58–77)
VENTILATION MODE VENT: ABNORMAL
VT SETTING VENT: 500 ML
WBC # BLD AUTO: 7.2 K/UL (ref 4.1–11.1)

## 2018-10-05 PROCEDURE — 94003 VENT MGMT INPAT SUBQ DAY: CPT

## 2018-10-05 PROCEDURE — 74011250636 HC RX REV CODE- 250/636: Performed by: INTERNAL MEDICINE

## 2018-10-05 PROCEDURE — 85610 PROTHROMBIN TIME: CPT | Performed by: INTERNAL MEDICINE

## 2018-10-05 PROCEDURE — C1751 CATH, INF, PER/CENT/MIDLINE: HCPCS

## 2018-10-05 PROCEDURE — 77030018861

## 2018-10-05 PROCEDURE — 94640 AIRWAY INHALATION TREATMENT: CPT

## 2018-10-05 PROCEDURE — 71045 X-RAY EXAM CHEST 1 VIEW: CPT

## 2018-10-05 PROCEDURE — 36592 COLLECT BLOOD FROM PICC: CPT

## 2018-10-05 PROCEDURE — 65610000006 HC RM INTENSIVE CARE

## 2018-10-05 PROCEDURE — 80053 COMPREHEN METABOLIC PANEL: CPT | Performed by: INTERNAL MEDICINE

## 2018-10-05 PROCEDURE — 74011000250 HC RX REV CODE- 250: Performed by: INTERNAL MEDICINE

## 2018-10-05 PROCEDURE — 77030021668 HC NEB PREFIL KT VYRM -A

## 2018-10-05 PROCEDURE — 36600 WITHDRAWAL OF ARTERIAL BLOOD: CPT | Performed by: INTERNAL MEDICINE

## 2018-10-05 PROCEDURE — 94660 CPAP INITIATION&MGMT: CPT

## 2018-10-05 PROCEDURE — 36415 COLL VENOUS BLD VENIPUNCTURE: CPT | Performed by: INTERNAL MEDICINE

## 2018-10-05 PROCEDURE — 77030018798 HC PMP KT ENTRL FED COVD -A

## 2018-10-05 PROCEDURE — 85027 COMPLETE CBC AUTOMATED: CPT | Performed by: INTERNAL MEDICINE

## 2018-10-05 PROCEDURE — 84100 ASSAY OF PHOSPHORUS: CPT | Performed by: INTERNAL MEDICINE

## 2018-10-05 PROCEDURE — 82803 BLOOD GASES ANY COMBINATION: CPT | Performed by: INTERNAL MEDICINE

## 2018-10-05 RX ADMIN — Medication 10 ML: at 12:38

## 2018-10-05 RX ADMIN — Medication 10 ML: at 13:31

## 2018-10-05 RX ADMIN — Medication 10 ML: at 11:54

## 2018-10-05 RX ADMIN — Medication 10 ML: at 05:55

## 2018-10-05 RX ADMIN — IPRATROPIUM BROMIDE AND ALBUTEROL SULFATE 3 ML: .5; 3 SOLUTION RESPIRATORY (INHALATION) at 20:05

## 2018-10-05 RX ADMIN — DORZOLAMIDE HYDROCHLORIDE AND TIMOLOL MALEATE 1 DROP: 20; 5 SOLUTION/ DROPS OPHTHALMIC at 09:16

## 2018-10-05 RX ADMIN — LATANOPROST 1 DROP: 50 SOLUTION OPHTHALMIC at 21:29

## 2018-10-05 RX ADMIN — DORZOLAMIDE HYDROCHLORIDE AND TIMOLOL MALEATE 1 DROP: 20; 5 SOLUTION/ DROPS OPHTHALMIC at 17:17

## 2018-10-05 RX ADMIN — FAMOTIDINE 20 MG: 10 INJECTION, SOLUTION INTRAVENOUS at 09:14

## 2018-10-05 RX ADMIN — Medication 20 ML: at 11:54

## 2018-10-05 RX ADMIN — IPRATROPIUM BROMIDE AND ALBUTEROL SULFATE 3 ML: .5; 3 SOLUTION RESPIRATORY (INHALATION) at 12:04

## 2018-10-05 RX ADMIN — IPRATROPIUM BROMIDE AND ALBUTEROL SULFATE 3 ML: .5; 3 SOLUTION RESPIRATORY (INHALATION) at 08:24

## 2018-10-05 RX ADMIN — CHLORHEXIDINE GLUCONATE 15 ML: 1.2 RINSE ORAL at 09:15

## 2018-10-05 RX ADMIN — FAMOTIDINE 20 MG: 10 INJECTION, SOLUTION INTRAVENOUS at 21:29

## 2018-10-05 RX ADMIN — CHLORHEXIDINE GLUCONATE 15 ML: 1.2 RINSE ORAL at 21:29

## 2018-10-05 RX ADMIN — IPRATROPIUM BROMIDE AND ALBUTEROL SULFATE 3 ML: .5; 3 SOLUTION RESPIRATORY (INHALATION) at 15:47

## 2018-10-05 RX ADMIN — PROPOFOL 10 MCG/KG/MIN: 10 INJECTION, EMULSION INTRAVENOUS at 01:04

## 2018-10-05 RX ADMIN — Medication 10 ML: at 21:30

## 2018-10-05 RX ADMIN — PROPOFOL 10 MCG/KG/MIN: 10 INJECTION, EMULSION INTRAVENOUS at 14:12

## 2018-10-05 RX ADMIN — Medication 10 ML: at 13:30

## 2018-10-05 NOTE — PROGRESS NOTES
1930 - Bedside and Verbal shift change report given to [facility] (oncoming nurse) by Juno Andujar (offgoing nurse). Report included the following information SBAR, Kardex, ED Summary, OR Summary, Procedure Summary, Intake/Output, MAR, Recent Results and Cardiac Rhythm NS. Paced. 2000 - Shift assessment complete. See summary for details. Opens eyes spontaneously, decreased commands following, VSS . Paced / A fib. 
 
2200 - Resting in bed , propofol gtt ongoing for comfort. 0000 - Reassessment complete, no changes noted from previous assessment. 0200 - Resting quietly with eyes closed. 0400 - Reassessment complete, no changes noted from previous assessment. See flow sheet for details. Bath with CHG complete, gown with linen changed. Trach care with inner canula changed. 4375 - Propofol gtt on hold for SAT / SBT. 5882 - Resumed propofol gtt for comfort. 0730 - Bedside and Verbal shift change report given to [place] (oncoming nurse) by [facility] (offgoing nurse). Report included the following information SBAR, Kardex, ED Summary, OR Summary, Procedure Summary, Intake/Output, MAR, Recent Results and Cardiac Rhythm Paced / Afib.

## 2018-10-05 NOTE — PROGRESS NOTES
[facility] Pulmonary Consult Service NotePulmonary, Critical Care, and Sleep Medicine Name: Elana Alexandre MRN: [id number] : 1934 Hospital: [facility] [address] Date: 10/5/2018   Hospital Day: 22 IMPRESSION:  
1. 18 cardiopulmonary arrest 6 min CPR 2. Acute respiratory failure-on vent-  
3. Acute hemoptysis- Noted to have bleeding from RLL on Bronch. No endobronchial lesion. has lung mass(CA vs inflammatory pseudotumor) and microscopic hematuria; ANCA negative; JOSS barely positive. Suspect LUNG mass the culprit. CYTOLOGY from bronchoscopy negative malignancy x 2 
4. S/P pulmonary artery embolization for hemoptysis 5. COPD moderate to severe at baseline 6. Acute renal insf 7. Anemia and prior coagulopathy on coumadin 8. Volume overload, anasarca- after couple of days of IV lasix- becomes prerenal 
9. Encehalopathy multifactorial - has hearing loss as well 10. Hx of CAD s/p PCI, afib on amio, former smoker, prior cva RECOMMENDATIONS/PLAN:  
1. Vent support, TC trials 2. Trach done 3. PEG done 4. Still has unresolved issue of lung mass 5. Abx completed 6. Diuresis as needed 7. Follow renal function, WNL 8. Enteral feedings 9. No need for transfusion 10. Glucose monitoring and SSI 11. DVT/GI prophylaxis 12. Sedation as needed 13. Will need LTAC My assessment/management discussed with: Consultants, Nursing, Pharmacy, Case Management, PT, OT, Respiratory Therapy, Hospitalist and Family for coordination of care Pt's condition is acute and unstable requiring inpatient hospitalization.  This care involved high complexity decision making which includes independently reviewing the patient's past medical records, current laboratory results, medication profiles that were immediately available to me and actual Xray images at the bedside in order to assess, support vital system function, and to treat this degree of vital organ system failure, and to prevent further deterioration of the patients condition. Risk of deterioration: high  
[x] High complexity decision making was performed [x] See my orders for details Tubes:    
 
Subjective/Initial History: S/p trach 10/2 Vent support MAR reviewed and pertinent medications noted or modified as needed Current Facility-Administered Medications Medication  albuterol-ipratropium (DUO-NEB) 2.5 MG-0.5 MG/3 ML  
 acetaminophen (TYLENOL) solution 650 mg  
 sodium chloride (NS) flush 10-30 mL  sodium chloride (NS) flush 10 mL  sodium chloride (NS) flush 10 mL  sodium chloride (NS) flush 10-40 mL  sodium chloride (NS) flush 20 mL  heparin (porcine) pf 300 Units  propofol (DIPRIVAN) infusion  glycopyrrolate (ROBINUL) injection 0.1 mg  
 fentaNYL citrate (PF) injection 25-50 mcg  famotidine (PF) (PEPCID) 20 mg in sodium chloride 0.9% 10 mL injection  chlorhexidine (PERIDEX) 0.12 % mouthwash 15 mL  fentaNYL citrate (PF) injection 25 mcg  albuterol (PROVENTIL HFA, VENTOLIN HFA, PROAIR HFA) inhaler 2 Puff  dorzolamide-timolol (COSOPT) 22.3-6.8 mg/mL ophthalmic solution 1 Drop  latanoprost (XALATAN) 0.005 % ophthalmic solution 1 Drop  sodium chloride (NS) flush 5-10 mL  sodium chloride (NS) flush 5-10 mL  ondansetron (ZOFRAN) injection 4 mg ROS:A comprehensive review of systems was negative except for that written in the HPI. Objective: 
 
Vital Signs: Telemetry:    normal sinus rhythmIntake/Output:  
Visit Vitals  /47  Pulse 77  Temp 99 °F (37.2 °C)  Resp 20  
 Ht 6' 2\" (1.88 m)  Wt 119.4 kg (263 lb 3.7 oz)  SpO2 98%  BMI 33.8 kg/m2 Temp (24hrs), Av.6 °F (37 °C), Min:97.8 °F (36.6 °C), Max:99 °F (37.2 °C) O2 Device: Tracheostomy O2 Flow Rate (L/min): 12 l/min Wt Readings from Last 4 Encounters: 10/05/18 119.4 kg (263 lb 3.7 oz) 05/05/16 111.1 kg (245 lb)  
03/25/16 106.6 kg (235 lb) 04/08/15 103.4 kg (228 lb) Intake/Output Summary (Last 24 hours) at 10/05/18 7605 Last data filed at 10/05/18 0600 Gross per 24 hour Intake          1684.08 ml Output             1445 ml Net           239.08 ml Last shift:        
Last 3 shifts: 10/03 1901 - 10/05 0700 In: 0220 [I.V.:270] Out: 2020 [Urine:1620; XEUUQB:345] Physical Exam:  
 General:  No distress HEAD: Normocephalic, without obvious abnormality, atraumatic EYES: conjunctivae clear. anicteric sclerae NOSE: nares normal, no drainage, no nasal flaring, Neck: Supple, symmetrical, trachea midline Chest: increased AP diameter Lungs: Bilateral rhonchi. Heart: Regular rate and rhythm Abdomen: soft, non-tender, +BS Musculoskeletal: anasarca; no erythema Neuro: more awake Psych: Not able to assess; no agitation. Data:  
 
Current Facility-Administered Medications Medication Dose Route Frequency  albuterol-ipratropium (DUO-NEB) 2.5 MG-0.5 MG/3 ML  3 mL Nebulization QID RT  
 sodium chloride (NS) flush 10 mL  10 mL InterCATHeter Q24H  
 sodium chloride (NS) flush 10-40 mL  10-40 mL InterCATHeter Q8H  
 sodium chloride (NS) flush 20 mL  20 mL InterCATHeter Q24H  propofol (DIPRIVAN) infusion  5-50 mcg/kg/min IntraVENous TITRATE  famotidine (PF) (PEPCID) 20 mg in sodium chloride 0.9% 10 mL injection  20 mg IntraVENous Q12H  chlorhexidine (PERIDEX) 0.12 % mouthwash 15 mL  15 mL Oral BID  dorzolamide-timolol (COSOPT) 22.3-6.8 mg/mL ophthalmic solution 1 Drop  1 Drop Both Eyes BID  latanoprost (XALATAN) 0.005 % ophthalmic solution 1 Drop  1 Drop Both Eyes QHS  sodium chloride (NS) flush 5-10 mL  5-10 mL IntraVENous Q8H Labs: 
Recent Labs 10/05/18 
 8802  10/04/18 
 0413  10/03/18 
 0515 WBC  7.2  7.6  7.9 HGB  7.5*  8.2*  8.4* HCT  25.1*  26.5*  27.6*  
 PLT  140*  138*  155 Recent Labs 10/05/18 
 4070  10/04/18 
 0413  10/03/18 
 0515 NA  140  141  140  
K  4.2  4.4  4.3 CL  104  106  105 CO2  31  32  31 GLU  114*  116*  137* BUN  25*  26*  26* CREA  0.91  0.94  0.97 CA  8.3*  8.3*  8.2* PHOS  2.6  3.2  2.7 ALB  1.6*   --   1.7* TBILI  0.4   --   0.4 SGOT  148*   --   117* ALT  123*   --   117* INR   --   1.1  1.1 Recent Labs 10/05/18 
 9628  10/04/18 
 9680  10/03/18 
 1274 PH  7.45  7.40  7.46* PCO2  45  52*  44 PO2  101*  96  92 HCO3  30*  32*  30* FIO2  50  50  50 Imaging: 
I have personally reviewed the patients radiographs and have reviewed the reports: CXR: No acute changes Total critical care time exclusive of procedures:  minutes Joanne Soares MD

## 2018-10-05 NOTE — ROUTINE PROCESS
0730: Bedside and Verbal shift change report received from PADDY Collazo (offgoing nurse). Report included the following information SBAR, Kardex, ED Summary, OR Summary, Procedure Summary, Intake/Output, MAR, Accordion, Recent Results, Med Rec Status, Cardiac Rhythm Atrial Fibrillation is unchanged. , Alarm Parameters , Procedure Verification and Quality Measures. He has Propofol infusing at 10 mcq/kg/min, and tolerating the spontaneous mechanical ventilator settings well without respiratory distress. 0800:His tube feedings remains at goal and tolerating it well with minimal residual.  Skin care completed. Almaraz catheter irrigated per order, no clots at this time. The flexiseal continues to have liquid brown stool in the tubing, the excoriation around the anus is unchanged, emollient applied to the area. 1000: Eyes open, however he is not following commands 1200:Assessment is unchanged, continue the current plan of care. Reposition frequently for discomfort. 1400:No facial grimaces noted. No clotting material noted in the almaraz catheter. 1530: His wife, sister, and son are at the bedside. 1600: spoke with the family re: his plan of care. 1800:Tracheostomy care completed. 1930:Bedside and Verbal shift change report given to ASHLEY Vera RN (oncoming nurse) by myself (offgoing nurse). Report included the following information SBAR, Kardex, ED Summary, Procedure Summary, Intake/Output, MAR, Accordion, Recent Results, Med Rec Status, Cardiac Rhythm atrial fibrillation, Alarm Parameters  and Quality Measures.

## 2018-10-05 NOTE — PROGRESS NOTES
Hospitalist Progress Note NAME: Viral Rudolph  
:  1934 MRN:  [id number] Assessment / Plan: PEA cardiac arrest  Acute encephalpathy; likely multifactorial 
- Code blue called . Patient w/ agonal breathing after coming back from radiology after arteriogram completed. No pulse detected. CPR initiated. Epi x 2 given. Pulse with ROSC. Intubated at bedside and transferred to ICU. - head CT  showed small age-indeterminate infarct in the left corona radiata. 
  
Acute respiratory w/ hypoxia s/p cardiac arrest; now intubated. S/p trach placement 10/3 Sepsis due to suspected PNA Pulmonary hemorrhage s/p pulmonary artery embolization Lung mass with concerns for malignancy COPD 
 - CT of chest shows severe emphysema with the right basilar airspace disease in the oval mass with fluid level concerning for internal hemorrhage versus pneumonia. - repeat CT  showed persistent hematoma in the right lower lobe region with surrounding patchy atelectasis/infiltrate and small ipsilateral pleural effusion. Left basilar subsegmental atelectasis. Coils in right lower lobe region consistent with recent pulmonary artery branch embolization. 
- Bcx Neg, sputum cytology collected  negative except scant yeast. 
- completed zosyn for possible lung abscess. End date  
- cytology from bronchial washing on  is atypical, favor benign reactive process 
- extubated on  and placed on VM 50% but reintubated  
 
- s/p trach placement 
- critical care management 
- wean vent as tolerated 
- on diuresis with lasix 
- respiratory care 
  
Hemoptysis 
- noted to have bleeding from RLL on bronchoscopy. - work up for possible rheumatologic cause neg thus far: 
ANCA, anti-GBM, MPO ab, TN-3 ab, SSA/SSB, RNP ab, Arredondo/RNP, dsDNA, and Arredondo ab all negative. JOSS +. 
- Arteriogram : Thoracic aortic and intercostal arteriograms as described above demonstrating no enlarged bronchial artery supplying the right lower lobe of the lung. A normal 
appearing and normal sized right bronchial artery is seen without any 
hyperplasia or dominant right lower lobe supply. No embolization was performed. 
 
-Trend H&H 
-monitor for recurrent bleeding 
  
RLL mass - work up for [employer] neg thus far. - diagnostic bronch on 9/16. No endobronchial lesions seen. - CTA chest done 9/17 due to persistent bloody pulmonary secretions. Right lower lobe masslike abnormality demonstrates increased internal density 
and has increased in size measuring 6.7 x 6.7 cm, compared to 5.7 x 4.4 cm. This 
is compatible with internal hemorrhage. There is new moderate right lower lobe 
partial collapse/atelectasis.   
Normal sized right bronchial artery is visualized without any hyperplasia or or evidence for supply to the right lower lobe lung abnormality. It is unlikely that the hemoptysis related to bronchial arterial injury/supply. Prior bronchial arteriogram was unremarkable. No evidence for pulmonary artery pseudoaneurysm or active hemorrhage. However, it is more likely that the masslike abnormality in the right lower lobe the lung has eroded into an adjacent pulmonary artery then a bronchial artery. Therefore, plan is for pulmonary arteriogram with possible embolization if an abnormality is seen in the right lower lobe. 6 mm and 4 mm right lung nodules. Small bilateral pleural effusions. Minimally enlarged bilateral hilar and mediastinal lymph nodes  
  
Hypernatremia: resolved 
  
Acute blood loss anemia on admission: stable - trend H&H 
- monitor labs and transfuse for hgb <7 
  
H/o atrial fibrillation on chronic anticoagulation w/ coumadin 
- coumadin on hold per above 
  
Coumadin-induced coagulopathy Elevated LFT; hepatic congestion? 
- Holding coumadin. - stopped statin - trend INR and LFTs 
  
ALAN: resolved 
- likely prerenal related to Lasix. - Renal function stable. - avoid nephrotoxic agents. 
  
UTI, acute cystitis w/ hematuria, POA 
- s/p treatment course 
  
HLD 
-statin held due to elevated LFT 
  
Obesity - BMI 32 
  
Code: Full DVT prophylaxis: SCDs GI prophylaxis: PPI Subjective: Chief Complaint / Reason for Physician Visit: hemoptysis Intubated and sedated at bedside. No significant events overnight. On SIMV. Fio2 40%, PEEP 5 Review of Systems: 14 pt ROS unremarkable except as stated above. Objective: VITALS:  
Last 24hrs VS reviewed since prior progress note. Most recent are: 
Patient Vitals for the past 24 hrs: 
 Temp Pulse Resp BP SpO2  
10/05/18 1400 - 80 21 113/55 94 % 10/05/18 1300 - 80 24 123/54 95 % 10/05/18 1218 - 79 22 112/43 94 % 10/05/18 1204 - - - - 93 % 10/05/18 1200 - 80 21 (!) 84/62 94 % 10/05/18 1152 98.8 °F (37.1 °C) 78 18 110/50 94 % 10/05/18 1100 - 80 20 (!) 115/96 95 % 10/05/18 1000 - 77 21 118/65 94 % 10/05/18 0900 - 80 24 107/65 93 % 10/05/18 0824 - - - - 98 % 10/05/18 0800 - 77 23 111/48 98 % 10/05/18 0729 99.5 °F (37.5 °C) 77 20 104/47 98 % 10/05/18 0712 - 75 25 - 98 % 10/05/18 0648 - 76 18 - 98 % 10/05/18 0600 - 75 15 (!) 96/39 98 % 10/05/18 0500 - 72 19 119/52 99 % 10/05/18 0430 - 74 16 - 99 % 10/05/18 0400 99 °F (37.2 °C) 69 18 113/64 98 % 10/05/18 0300 - 70 18 105/46 98 % 10/05/18 0200 - 71 19 101/50 98 % 10/05/18 0118 - 74 20 - 98 % 10/05/18 0100 - 73 23 109/55 98 % 10/05/18 0000 98.3 °F (36.8 °C) 74 19 104/51 97 % 10/04/18 2300 - 77 22 107/49 97 % 10/04/18 2200 - 75 24 (!) 82/42 97 % 10/04/18 2117 - 76 17 - 98 % 10/04/18 2100 - 73 21 111/64 98 % 10/04/18 2000 97.8 °F (36.6 °C) 74 20 115/63 98 % 10/04/18 1900 - 75 19 109/58 98 % 10/04/18 1800 - 70 18 110/64 98 % 10/04/18 1738 - 78 21 - 98 % 10/04/18 1700 - 77 21 121/60 98 % 10/04/18 1600 99 °F (37.2 °C) 80 13 113/52 98 % Intake/Output Summary (Last 24 hours) at 10/05/18 1508 Last data filed at 10/05/18 9362 Gross per 24 hour Intake          1284.08 ml Output             1165 ml Net           119.08 ml PHYSICAL EXAM: 
General:    Intubated and sedated. Neck:  Supple, symmetrical.  trach in place Lungs:   Coarse breath sounds, diminished, no wheezing Heart:   Regular  rhythm,  Normal S1 and S2   Diffuse edema Abdomen:   Soft, non-tender. Bowel sounds normal.  PEG Skin:     Warm, dry Neurologic: sedated Reviewed most current lab test results and cultures  YES Reviewed most current radiology test results   YES Review and summation of old records today    NO Reviewed patient's current orders and MAR    YES 
PMH/SH reviewed - no change compared to H&P 
 
________________________________________________________________________ Angel Antoine MD  
 
Procedures: see electronic medical records for all procedures/Xrays and details which were not copied into this note but were reviewed prior to creation of Plan. LABS: 
I reviewed today's most current labs and imaging studies. Pertinent labs include: 
Recent Labs 10/05/18 
 1638  10/04/18 
 0413  10/03/18 
 0515 WBC  7.2  7.6  7.9 HGB  7.5*  8.2*  8.4* HCT  25.1*  26.5*  27.6*  
PLT  140*  138*  155 Recent Labs 10/05/18 
 4060  10/05/18 
 9715  10/04/18 
 0413  10/03/18 
 0515 NA   --   140  141  140 K   --   4.2  4.4  4.3 CL   --   104  106  105 CO2   --   31  32  31 GLU   --   114*  116*  137* BUN   --   25*  26*  26* CREA   --   0.91  0.94  0.97 CA   --   8.3*  8.3*  8.2* PHOS   --   2.6  3.2  2.7 ALB   --   1.6*   --   1.7* TBILI   --   0.4   --   0.4 SGOT   --   148*   --   117* ALT   --   123*   --   117* INR  1.1   --   1.1  1.1 Signed: Angel Antoine MD

## 2018-10-05 NOTE — PROGRESS NOTES
10/05/18 9974 ABCDEF Bundle SBT Trial Passed Yes Weaning Parameters Spontaneous Breathing Trial Complete Yes Resp Rate Observed 25 Ve 11.5  RSBI 55 SBT trial Passed

## 2018-10-06 ENCOUNTER — APPOINTMENT (OUTPATIENT)
Dept: GENERAL RADIOLOGY | Age: 83
DRG: 003 | End: 2018-10-06
Attending: INTERNAL MEDICINE
Payer: MEDICARE

## 2018-10-06 LAB
ALBUMIN SERPL-MCNC: 1.6 G/DL (ref 3.5–5)
ALBUMIN/GLOB SERPL: 0.3 {RATIO} (ref 1.1–2.2)
ALP SERPL-CCNC: 257 U/L (ref 45–117)
ALT SERPL-CCNC: 195 U/L (ref 12–78)
ANION GAP SERPL CALC-SCNC: 4 MMOL/L (ref 5–15)
APTT PPP: 29.3 SEC (ref 22.1–32)
ARTERIAL PATENCY WRIST A: YES
AST SERPL-CCNC: 249 U/L (ref 15–37)
BASE EXCESS BLDA CALC-SCNC: 6.3 MMOL/L
BDY SITE: ABNORMAL
BILIRUB SERPL-MCNC: 0.7 MG/DL (ref 0.2–1)
BUN SERPL-MCNC: 23 MG/DL (ref 6–20)
BUN/CREAT SERPL: 27 (ref 12–20)
CALCIUM SERPL-MCNC: 8.4 MG/DL (ref 8.5–10.1)
CHLORIDE SERPL-SCNC: 104 MMOL/L (ref 97–108)
CO2 SERPL-SCNC: 32 MMOL/L (ref 21–32)
CREAT SERPL-MCNC: 0.86 MG/DL (ref 0.7–1.3)
EPAP/CPAP/PEEP, PAPEEP: 6
ERYTHROCYTE [DISTWIDTH] IN BLOOD BY AUTOMATED COUNT: 21.2 % (ref 11.5–14.5)
FIBRINOGEN PPP-MCNC: >800 MG/DL (ref 200–475)
FIO2 ON VENT: 40 %
GAS FLOW.O2 SETTING OXYMISER: 12 L/MIN
GLOBULIN SER CALC-MCNC: 5.8 G/DL (ref 2–4)
GLUCOSE SERPL-MCNC: 118 MG/DL (ref 65–100)
HCO3 BLDA-SCNC: 32 MMOL/L (ref 22–26)
HCT VFR BLD AUTO: 24.8 % (ref 36.6–50.3)
HGB BLD-MCNC: 7.6 G/DL (ref 12.1–17)
INR PPP: 1.1 (ref 0.9–1.1)
MCH RBC QN AUTO: 32.1 PG (ref 26–34)
MCHC RBC AUTO-ENTMCNC: 30.6 G/DL (ref 30–36.5)
MCV RBC AUTO: 104.6 FL (ref 80–99)
NRBC # BLD: 0 K/UL (ref 0–0.01)
NRBC BLD-RTO: 0 PER 100 WBC
PCO2 BLDA: 48 MMHG (ref 35–45)
PH BLDA: 7.44 [PH] (ref 7.35–7.45)
PHOSPHATE SERPL-MCNC: 2.7 MG/DL (ref 2.6–4.7)
PLATELET # BLD AUTO: 134 K/UL (ref 150–400)
PMV BLD AUTO: 12.1 FL (ref 8.9–12.9)
PO2 BLDA: 83 MMHG (ref 80–100)
POTASSIUM SERPL-SCNC: 4.5 MMOL/L (ref 3.5–5.1)
PROT SERPL-MCNC: 7.4 G/DL (ref 6.4–8.2)
PROTHROMBIN TIME: 11.4 SEC (ref 9–11.1)
RBC # BLD AUTO: 2.37 M/UL (ref 4.1–5.7)
SAO2 % BLD: 96 % (ref 92–97)
SAO2% DEVICE SAO2% SENSOR NAME: ABNORMAL
SODIUM SERPL-SCNC: 140 MMOL/L (ref 136–145)
SPECIMEN SITE: ABNORMAL
THERAPEUTIC RANGE,PTTT: ABNORMAL SECS (ref 58–77)
VENTILATION MODE VENT: ABNORMAL
VT SETTING VENT: 500 ML
WBC # BLD AUTO: 7.9 K/UL (ref 4.1–11.1)

## 2018-10-06 PROCEDURE — 36415 COLL VENOUS BLD VENIPUNCTURE: CPT | Performed by: INTERNAL MEDICINE

## 2018-10-06 PROCEDURE — 74011250637 HC RX REV CODE- 250/637: Performed by: INTERNAL MEDICINE

## 2018-10-06 PROCEDURE — 94003 VENT MGMT INPAT SUBQ DAY: CPT

## 2018-10-06 PROCEDURE — 80053 COMPREHEN METABOLIC PANEL: CPT | Performed by: INTERNAL MEDICINE

## 2018-10-06 PROCEDURE — 85610 PROTHROMBIN TIME: CPT | Performed by: INTERNAL MEDICINE

## 2018-10-06 PROCEDURE — 65610000006 HC RM INTENSIVE CARE

## 2018-10-06 PROCEDURE — 74011250636 HC RX REV CODE- 250/636: Performed by: INTERNAL MEDICINE

## 2018-10-06 PROCEDURE — 84100 ASSAY OF PHOSPHORUS: CPT | Performed by: INTERNAL MEDICINE

## 2018-10-06 PROCEDURE — 71045 X-RAY EXAM CHEST 1 VIEW: CPT

## 2018-10-06 PROCEDURE — 77030006998

## 2018-10-06 PROCEDURE — 36592 COLLECT BLOOD FROM PICC: CPT

## 2018-10-06 PROCEDURE — 74011000250 HC RX REV CODE- 250: Performed by: INTERNAL MEDICINE

## 2018-10-06 PROCEDURE — 82803 BLOOD GASES ANY COMBINATION: CPT | Performed by: INTERNAL MEDICINE

## 2018-10-06 PROCEDURE — 85027 COMPLETE CBC AUTOMATED: CPT | Performed by: INTERNAL MEDICINE

## 2018-10-06 PROCEDURE — 36600 WITHDRAWAL OF ARTERIAL BLOOD: CPT | Performed by: INTERNAL MEDICINE

## 2018-10-06 PROCEDURE — 94640 AIRWAY INHALATION TREATMENT: CPT

## 2018-10-06 RX ADMIN — Medication 10 ML: at 05:06

## 2018-10-06 RX ADMIN — Medication 10 ML: at 16:48

## 2018-10-06 RX ADMIN — PROPOFOL 12 MCG/KG/MIN: 10 INJECTION, EMULSION INTRAVENOUS at 06:41

## 2018-10-06 RX ADMIN — FAMOTIDINE 20 MG: 10 INJECTION, SOLUTION INTRAVENOUS at 21:06

## 2018-10-06 RX ADMIN — IPRATROPIUM BROMIDE AND ALBUTEROL SULFATE 3 ML: .5; 3 SOLUTION RESPIRATORY (INHALATION) at 07:56

## 2018-10-06 RX ADMIN — FENTANYL CITRATE 50 MCG: 50 INJECTION, SOLUTION INTRAMUSCULAR; INTRAVENOUS at 21:50

## 2018-10-06 RX ADMIN — IPRATROPIUM BROMIDE AND ALBUTEROL SULFATE 3 ML: .5; 3 SOLUTION RESPIRATORY (INHALATION) at 16:02

## 2018-10-06 RX ADMIN — CHLORHEXIDINE GLUCONATE 15 ML: 1.2 RINSE ORAL at 21:06

## 2018-10-06 RX ADMIN — DORZOLAMIDE HYDROCHLORIDE AND TIMOLOL MALEATE 1 DROP: 20; 5 SOLUTION/ DROPS OPHTHALMIC at 07:53

## 2018-10-06 RX ADMIN — FAMOTIDINE 20 MG: 10 INJECTION, SOLUTION INTRAVENOUS at 07:52

## 2018-10-06 RX ADMIN — Medication 20 ML: at 10:44

## 2018-10-06 RX ADMIN — LATANOPROST 1 DROP: 50 SOLUTION OPHTHALMIC at 23:40

## 2018-10-06 RX ADMIN — IPRATROPIUM BROMIDE AND ALBUTEROL SULFATE 3 ML: .5; 3 SOLUTION RESPIRATORY (INHALATION) at 12:01

## 2018-10-06 RX ADMIN — Medication 10 ML: at 10:44

## 2018-10-06 RX ADMIN — ACETAMINOPHEN 650 MG: 325 SOLUTION ORAL at 07:52

## 2018-10-06 RX ADMIN — IPRATROPIUM BROMIDE AND ALBUTEROL SULFATE 3 ML: .5; 3 SOLUTION RESPIRATORY (INHALATION) at 19:49

## 2018-10-06 RX ADMIN — Medication 10 ML: at 21:06

## 2018-10-06 RX ADMIN — DORZOLAMIDE HYDROCHLORIDE AND TIMOLOL MALEATE 1 DROP: 20; 5 SOLUTION/ DROPS OPHTHALMIC at 18:13

## 2018-10-06 RX ADMIN — CHLORHEXIDINE GLUCONATE 15 ML: 1.2 RINSE ORAL at 07:53

## 2018-10-06 RX ADMIN — Medication 10 ML: at 23:40

## 2018-10-06 RX ADMIN — GLYCOPYRROLATE 0.1 MG: 0.2 INJECTION, SOLUTION INTRAMUSCULAR; INTRAVENOUS at 16:52

## 2018-10-06 NOTE — PROGRESS NOTES
Hospitalist Progress Note NAME: Osmar Ruiz  
:  1934 MRN:  [id number] Assessment / Plan: PEA cardiac arrest  Acute encephalpathy; likely multifactorial 
- Code blue called . Patient w/ agonal breathing after coming back from radiology after arteriogram completed. No pulse detected. CPR initiated. Epi x 2 given. Pulse with ROSC. Intubated at bedside and transferred to ICU. - head CT  showed small age-indeterminate infarct in the left corona radiata. 
  
Acute respiratory w/ hypoxia s/p cardiac arrest; now intubated. S/p trach placement 10/3 Sepsis due to suspected PNA Pulmonary hemorrhage s/p pulmonary artery embolization Lung mass with concerns for malignancy COPD 
 - CT of chest shows severe emphysema with the right basilar airspace disease in the oval mass with fluid level concerning for internal hemorrhage versus pneumonia. - repeat CT  showed persistent hematoma in the right lower lobe region with surrounding patchy atelectasis/infiltrate and small ipsilateral pleural effusion. Left basilar subsegmental atelectasis. Coils in right lower lobe region consistent with recent pulmonary artery branch embolization. 
- Bcx Neg, sputum cytology collected  negative except scant yeast. 
- completed zosyn for possible lung abscess. End date  
- cytology from bronchial washing on  is atypical, favor benign reactive process 
- extubated on  and placed on VM 50% but reintubated  
 
- s/p trach placement, TC trials as tolered 
- on diuresis with lasix as tolerated 
 
  
Hemoptysis 
- noted to have bleeding from RLL on bronchoscopy. - work up for possible rheumatologic cause neg thus far: 
ANCA, anti-GBM, MPO ab, CO-3 ab, SSA/SSB, RNP ab, Arredondo/RNP, dsDNA, and Arredondo ab all negative. JOSS +. 
- Arteriogram : Thoracic aortic and intercostal arteriograms as described above demonstrating no enlarged bronchial artery supplying the right lower lobe of the lung. A normal 
appearing and normal sized right bronchial artery is seen without any 
hyperplasia or dominant right lower lobe supply. No embolization was performed. 
 
-Trend H&H 
-monitor for recurrent bleeding 
  
RLL mass - work up for [employer] neg thus far. - diagnostic bronch on 9/16. No endobronchial lesions seen. - CTA chest done 9/17 due to persistent bloody pulmonary secretions. Right lower lobe masslike abnormality demonstrates increased internal density 
and has increased in size measuring 6.7 x 6.7 cm, compared to 5.7 x 4.4 cm. This 
is compatible with internal hemorrhage. There is new moderate right lower lobe 
partial collapse/atelectasis.   
Normal sized right bronchial artery is visualized without any hyperplasia or or evidence for supply to the right lower lobe lung abnormality. It is unlikely that the hemoptysis related to bronchial arterial injury/supply. Prior bronchial arteriogram was unremarkable. No evidence for pulmonary artery pseudoaneurysm or active hemorrhage. However, it is more likely that the masslike abnormality in the right lower lobe the lung has eroded into an adjacent pulmonary artery then a bronchial artery. Therefore, plan is for pulmonary arteriogram with possible embolization if an abnormality is seen in the right lower lobe. 6 mm and 4 mm right lung nodules. Small bilateral pleural effusions. Minimally enlarged bilateral hilar and mediastinal lymph nodes  
  
Hypernatremia: resolved 
  
Acute blood loss anemia on admission: stable - trend H&H 
- monitor labs and transfuse for hgb <7 
  
H/o atrial fibrillation on chronic anticoagulation w/ coumadin 
- coumadin on hold per above 
  
Coumadin-induced coagulopathy Elevated LFT; hepatic congestion? 
- Holding coumadin. - stopped statin - trend INR and LFTs 
  
ALAN: resolved 
- likely prerenal related to Lasix. - Renal function stable. - avoid nephrotoxic agents. 
  
UTI, acute cystitis w/ hematuria, POA 
- s/p treatment course 
  
HLD 
-statin held due to elevated LFT 
  
Obesity - BMI 32 
  
Code: Full DVT prophylaxis: SCDs GI prophylaxis: PPI Subjective: Chief Complaint / Reason for Physician Visit: hemoptysis On trach Review of Systems: 14 pt ROS unremarkable except as stated above. Objective: VITALS:  
Last 24hrs VS reviewed since prior progress note. Most recent are: 
Patient Vitals for the past 24 hrs: 
 Temp Pulse Resp BP SpO2  
10/06/18 1602 - - - - 94 % 10/06/18 1400 - 81 - 130/73 94 % 10/06/18 1300 - 78 21 126/60 95 % 10/06/18 1201 - - - - 95 % 10/06/18 1200 - 79 21 125/65 95 % 10/06/18 1100 98.6 °F (37 °C) 78 25 140/72 94 % 10/06/18 1000 - 76 20 104/56 95 % 10/06/18 0900 - 74 20 101/54 95 % 10/06/18 0800 - 78 24 123/59 93 % 10/06/18 0756 - - - - 93 % 10/06/18 0743 100.4 °F (38 °C) 79 21 107/48 95 % 10/06/18 0700 - 89 25 122/62 94 % 10/06/18 0625 - - - - 95 % 10/06/18 0600 - 82 20 117/58 95 % 10/06/18 0500 - 74 20 114/51 98 % 10/06/18 0400 98.6 °F (37 °C) 75 22 113/56 97 % 10/06/18 0333 - 78 22 - 97 % 10/06/18 0300 - 70 22 114/67 96 % 10/06/18 0200 - 75 22 118/58 97 % 10/06/18 0100 - 74 20 124/54 94 % 10/06/18 0000 98.4 °F (36.9 °C) 76 22 107/56 96 % 10/05/18 2331 - 69 18 - 96 % 10/05/18 2300 - 78 22 110/56 95 % 10/05/18 2200 - 81 21 120/54 94 % 10/05/18 2100 - 79 24 109/54 95 % 10/05/18 2005 - - - - 95 % 10/05/18 2000 98.4 °F (36.9 °C) 80 26 116/59 95 % 10/05/18 1900 - 81 28 123/56 94 % 10/05/18 1800 - 80 25 118/55 95 % 10/05/18 1700 - 80 20 111/46 95 % Intake/Output Summary (Last 24 hours) at 10/06/18 1620 Last data filed at 10/06/18 1100 Gross per 24 hour Intake          2226.18 ml Output             1035 ml Net          1191.18 ml PHYSICAL EXAM: 
General:    trach  , vent support on propofol Neck:  Supple, symmetrical.  trach in place Lungs:   Coarse breath sounds, diminished, no wheezing Heart:   Regular  rhythm,  Normal S1 and S2   Diffuse edema Abdomen:   Soft, non-tender. Bowel sounds normal.  PEG Neurologic: sedated Reviewed most current lab test results and cultures  YES Reviewed most current radiology test results   YES Review and summation of old records today    NO Reviewed patient's current orders and MAR    YES 
PMH/SH reviewed - no change compared to H&P 
 
________________________________________________________________________ Kash Pérez MD  
 
Procedures: see electronic medical records for all procedures/Xrays and details which were not copied into this note but were reviewed prior to creation of Plan. LABS: 
I reviewed today's most current labs and imaging studies. Pertinent labs include: 
Recent Labs 10/06/18 
 3993  10/05/18 
 0444  10/04/18 
 0413 WBC  7.9  7.2  7.6 HGB  7.6*  7.5*  8.2* HCT  24.8*  25.1*  26.5*  
PLT  134*  140*  138* Recent Labs 10/06/18 
 0616  10/05/18 
 0750  10/05/18 
 0444  10/04/18 
 0413 NA  140   --   140  141  
K  4.5   --   4.2  4.4  
CL  104   --   104  106 CO2  32   --   31  32 GLU  118*   --   114*  116* BUN  23*   --   25*  26* CREA  0.86   --   0.91  0.94  
CA  8.4*   --   8.3*  8.3* PHOS  2.7   --   2.6  3.2 ALB  1.6*   --   1.6*   --   
TBILI  0.7   --   0.4   --   
SGOT  249*   --   148*   --   
ALT  195*   --   123*   --   
INR  1.1  1.1   --   1.1 Total time spent 30 min Signed: Kash Pérez MD

## 2018-10-06 NOTE — PROGRESS NOTES
1930 - Bedside and Verbal shift change report given to Gilbert Jose (oncoming nurse) by [address] [place] [address] (offgoing nurse). Report included the following information SBAR, Kardex, ED Summary, Procedure Summary, Intake/Output, MAR, Recent Results and Cardiac Rhythm NS.  
 
2000 - Shift assessment complete, see summary for details. Opens eyes, decreased commands following. VSS A fib noted on monitor. Propofol infusing for comfort. 2200 - Repositioned for comfort, call bell within reach, no complaints. 0000 - Reassessment complete, no changes noted from previous assessment. See summary for details. 0200 - Repositioned for comfort, call bell within reach. 0400 - Reassessment complete, no changes noted from previous . See summary for details. Bath with soap / water / CHG complete. Madeleine Orta / Montez Fuentes changed. Trach care provided. 0500 - Propofol gtt paused for SAT / SBT. 0600 - Repositioned for comfort, call bell whithin reach. 0730 - Bedside and Verbal shift change report given to Billie Rossi (oncoming nurse) by Gilbert Jose (offgoing nurse). Report included the following information SBAR, Kardex, ED Summary, OR Summary, Procedure Summary, Intake/Output, MAR, Recent Results and Cardiac Rhythm A fib / paced.

## 2018-10-06 NOTE — PROGRESS NOTES
[facility] Pulmonary Consult Service NotePulmonary, Critical Care, and Sleep Medicine Name: Portia Gutierrez MRN: [id number] : 1934 Hospital: [facility] Date: 10/6/2018   Hospital Day: 26  
 
10-6-18: no events overnight. Only tolerated trach collar for a short time yesterday. IMPRESSION:  
1. 18 cardiopulmonary arrest 6 min CPR 2. Acute respiratory failure-on vent-  
3. Acute hemoptysis- Noted to have bleeding from RLL on Bronch. No endobronchial lesion. has lung mass(CA vs inflammatory pseudotumor) and microscopic hematuria; ANCA negative; JOSS barely positive. Suspect LUNG mass the culprit. CYTOLOGY from bronchoscopy negative malignancy x 2 
4. S/P pulmonary artery embolization for hemoptysis 5. COPD moderate to severe at baseline 6. Acute renal insf 7. Anemia and prior coagulopathy on coumadin 8. Volume overload, anasarca- after couple of days of IV lasix- becomes prerenal 
9. Encehalopathy multifactorial - has hearing loss as well 10. Hx of CAD s/p PCI, afib on amio, former smoker, prior cva RECOMMENDATIONS/PLAN:  
1. Vent support, TC trials as tolerated 2. Trach care 3. Still has unresolved issue of lung mass 4. Abx completed 5. Diuresis as needed 6. Follow renal function, WNL 7. Enteral feedings 8. No need for transfusion 9. Glucose monitoring and SSI 10. DVT/GI prophylaxis 11. Sedation as needed 12. Will need LTAC Subjective/Initial History: S/p trach 10/2 Vent support MAR reviewed and pertinent medications noted or modified as needed Current Facility-Administered Medications Medication  albuterol-ipratropium (DUO-NEB) 2.5 MG-0.5 MG/3 ML  
 acetaminophen (TYLENOL) solution 650 mg  
 sodium chloride (NS) flush 10-30 mL  sodium chloride (NS) flush 10 mL  sodium chloride (NS) flush 10 mL  sodium chloride (NS) flush 10-40 mL  sodium chloride (NS) flush 20 mL  heparin (porcine) pf 300 Units  propofol (DIPRIVAN) infusion  glycopyrrolate (ROBINUL) injection 0.1 mg  
 fentaNYL citrate (PF) injection 25-50 mcg  famotidine (PF) (PEPCID) 20 mg in sodium chloride 0.9% 10 mL injection  chlorhexidine (PERIDEX) 0.12 % mouthwash 15 mL  fentaNYL citrate (PF) injection 25 mcg  albuterol (PROVENTIL HFA, VENTOLIN HFA, PROAIR HFA) inhaler 2 Puff  dorzolamide-timolol (COSOPT) 22.3-6.8 mg/mL ophthalmic solution 1 Drop  latanoprost (XALATAN) 0.005 % ophthalmic solution 1 Drop  sodium chloride (NS) flush 5-10 mL  sodium chloride (NS) flush 5-10 mL  ondansetron (ZOFRAN) injection 4 mg ROS:A comprehensive review of systems was negative except for that written in the HPI. Objective: 
 
Vital Signs: Telemetry:    normal sinus rhythmIntake/Output:  
Visit Vitals  /48  Pulse 79  Temp 100.4 °F (38 °C)  Resp 21  
 Ht 6' 2\" (1.88 m)  Wt 116.3 kg (256 lb 6.3 oz)  SpO2 93%  BMI 32.92 kg/m2 Temp (24hrs), Av.1 °F (37.3 °C), Min:98.4 °F (36.9 °C), Max:100.4 °F (38 °C) O2 Device: Tracheostomy O2 Flow Rate (L/min): 10 l/min Wt Readings from Last 4 Encounters:  
10/06/18 116.3 kg (256 lb 6.3 oz) 16 111.1 kg (245 lb)  
16 106.6 kg (235 lb) 04/08/15 103.4 kg (228 lb) Intake/Output Summary (Last 24 hours) at 10/06/18 5650 Last data filed at 10/06/18 8968 Gross per 24 hour Intake          2005.02 ml Output             1435 ml Net           570.02 ml Last shift:      10/06 0701 - 10/06 1900 In: -  
Out: 205 [Urine:205] Last 3 shifts: 10/04 1901 - 10/06 07 In: 5677 [I.V.:222] Out: 2335 [QTKPL:4555; Drains:500] Physical Exam:  
 General:  No distress HEAD: Normocephalic, without obvious abnormality, atraumatic EYES: conjunctivae clear. anicteric sclerae NOSE: nares normal, no drainage, no nasal flaring, Neck: Supple, symmetrical, trachea midline Chest: increased AP diameter Lungs: Bilateral rhonchi. Heart: Regular rate and rhythm Abdomen: soft, non-tender, +BS Musculoskeletal: anasarca; no erythema Neuro: more awake Psych: Not able to assess; no agitation. Data:  
 
Current Facility-Administered Medications Medication Dose Route Frequency  albuterol-ipratropium (DUO-NEB) 2.5 MG-0.5 MG/3 ML  3 mL Nebulization QID RT  
 sodium chloride (NS) flush 10 mL  10 mL InterCATHeter Q24H  
 sodium chloride (NS) flush 10-40 mL  10-40 mL InterCATHeter Q8H  
 sodium chloride (NS) flush 20 mL  20 mL InterCATHeter Q24H  propofol (DIPRIVAN) infusion  5-50 mcg/kg/min IntraVENous TITRATE  famotidine (PF) (PEPCID) 20 mg in sodium chloride 0.9% 10 mL injection  20 mg IntraVENous Q12H  chlorhexidine (PERIDEX) 0.12 % mouthwash 15 mL  15 mL Oral BID  dorzolamide-timolol (COSOPT) 22.3-6.8 mg/mL ophthalmic solution 1 Drop  1 Drop Both Eyes BID  latanoprost (XALATAN) 0.005 % ophthalmic solution 1 Drop  1 Drop Both Eyes QHS  sodium chloride (NS) flush 5-10 mL  5-10 mL IntraVENous Q8H Labs: 
Recent Labs 10/06/18 
 4946  10/05/18 
 0444  10/04/18 
 0413 WBC  7.9  7.2  7.6 HGB  7.6*  7.5*  8.2* HCT  24.8*  25.1*  26.5*  
PLT  134*  140*  138* Recent Labs 10/06/18 
 8636  10/05/18 
 0750  10/05/18 
 0444  10/04/18 
 0413 NA  140   --   140  141  
K  4.5   --   4.2  4.4  
CL  104   --   104  106 CO2  32   --   31  32 GLU  118*   --   114*  116* BUN  23*   --   25*  26* CREA  0.86   --   0.91  0.94  
CA  8.4*   --   8.3*  8.3* PHOS  2.7   --   2.6  3.2 ALB  1.6*   --   1.6*   --   
TBILI  0.7   --   0.4   --   
SGOT  249*   --   148*   --   
ALT  195*   --   123*   --   
INR  1.1  1.1   --   1.1 Recent Labs 10/06/18 
 0503  10/05/18 
 7850  10/04/18 
 9828 PH  7.44  7.45  7.40 PCO2  48*  45  52* PO2  83  101*  96 HCO3  32*  30*  32* FIO2  40  50  50 Imaging: I have personally reviewed the patients radiographs and have reviewed the reports: 
No change Total critical care time exclusive of procedures:  minutes Hattie Sorto MD

## 2018-10-06 NOTE — PROGRESS NOTES
After 1 HR of AM CPAP, pt placed on Trach collar @ 50%. Within 5 min, , RR 26-34, O2 sat's 85-88%. Pt placed back on previous settings to rest. Will attempt trach collar trial later in the AM if tolerated

## 2018-10-07 ENCOUNTER — APPOINTMENT (OUTPATIENT)
Dept: GENERAL RADIOLOGY | Age: 83
DRG: 003 | End: 2018-10-07
Attending: INTERNAL MEDICINE
Payer: MEDICARE

## 2018-10-07 LAB
ANION GAP SERPL CALC-SCNC: 5 MMOL/L (ref 5–15)
APTT PPP: 28 SEC (ref 22.1–32)
ARTERIAL PATENCY WRIST A: YES
BASE EXCESS BLDA CALC-SCNC: 8.4 MMOL/L
BDY SITE: ABNORMAL
BREATHS.SPONTANEOUS ON VENT: 24
BUN SERPL-MCNC: 22 MG/DL (ref 6–20)
BUN/CREAT SERPL: 30 (ref 12–20)
CALCIUM SERPL-MCNC: 8 MG/DL (ref 8.5–10.1)
CHLORIDE SERPL-SCNC: 105 MMOL/L (ref 97–108)
CO2 SERPL-SCNC: 32 MMOL/L (ref 21–32)
CREAT SERPL-MCNC: 0.74 MG/DL (ref 0.7–1.3)
EPAP/CPAP/PEEP, PAPEEP: 6
ERYTHROCYTE [DISTWIDTH] IN BLOOD BY AUTOMATED COUNT: 21.2 % (ref 11.5–14.5)
FIBRINOGEN PPP-MCNC: >800 MG/DL (ref 200–475)
FIO2 ON VENT: 40 %
GLUCOSE SERPL-MCNC: 118 MG/DL (ref 65–100)
HCO3 BLDA-SCNC: 34 MMOL/L (ref 22–26)
HCT VFR BLD AUTO: 23.4 % (ref 36.6–50.3)
HGB BLD-MCNC: 7.1 G/DL (ref 12.1–17)
INR PPP: 1.1 (ref 0.9–1.1)
MAGNESIUM SERPL-MCNC: 2.3 MG/DL (ref 1.6–2.4)
MCH RBC QN AUTO: 31.8 PG (ref 26–34)
MCHC RBC AUTO-ENTMCNC: 30.3 G/DL (ref 30–36.5)
MCV RBC AUTO: 104.9 FL (ref 80–99)
NRBC # BLD: 0 K/UL (ref 0–0.01)
NRBC BLD-RTO: 0 PER 100 WBC
PCO2 BLDA: 51 MMHG (ref 35–45)
PH BLDA: 7.45 [PH] (ref 7.35–7.45)
PHOSPHATE SERPL-MCNC: 2.4 MG/DL (ref 2.6–4.7)
PLATELET # BLD AUTO: 149 K/UL (ref 150–400)
PMV BLD AUTO: 12.4 FL (ref 8.9–12.9)
PO2 BLDA: 79 MMHG (ref 80–100)
POTASSIUM SERPL-SCNC: 4.5 MMOL/L (ref 3.5–5.1)
PROTHROMBIN TIME: 11.7 SEC (ref 9–11.1)
RBC # BLD AUTO: 2.23 M/UL (ref 4.1–5.7)
SAO2 % BLD: 96 % (ref 92–97)
SAO2% DEVICE SAO2% SENSOR NAME: ABNORMAL
SODIUM SERPL-SCNC: 142 MMOL/L (ref 136–145)
SPECIMEN SITE: ABNORMAL
THERAPEUTIC RANGE,PTTT: ABNORMAL SECS (ref 58–77)
VENTILATION MODE VENT: ABNORMAL
WBC # BLD AUTO: 8.1 K/UL (ref 4.1–11.1)

## 2018-10-07 PROCEDURE — 85027 COMPLETE CBC AUTOMATED: CPT | Performed by: INTERNAL MEDICINE

## 2018-10-07 PROCEDURE — 74011000250 HC RX REV CODE- 250: Performed by: INTERNAL MEDICINE

## 2018-10-07 PROCEDURE — 80048 BASIC METABOLIC PNL TOTAL CA: CPT | Performed by: INTERNAL MEDICINE

## 2018-10-07 PROCEDURE — 71045 X-RAY EXAM CHEST 1 VIEW: CPT

## 2018-10-07 PROCEDURE — 94640 AIRWAY INHALATION TREATMENT: CPT

## 2018-10-07 PROCEDURE — 82803 BLOOD GASES ANY COMBINATION: CPT | Performed by: HOSPITALIST

## 2018-10-07 PROCEDURE — 74011250636 HC RX REV CODE- 250/636: Performed by: INTERNAL MEDICINE

## 2018-10-07 PROCEDURE — 65610000006 HC RM INTENSIVE CARE

## 2018-10-07 PROCEDURE — 85610 PROTHROMBIN TIME: CPT | Performed by: INTERNAL MEDICINE

## 2018-10-07 PROCEDURE — 77030018846 HC SOL IRR STRL H20 ICUM -A

## 2018-10-07 PROCEDURE — 84100 ASSAY OF PHOSPHORUS: CPT | Performed by: INTERNAL MEDICINE

## 2018-10-07 PROCEDURE — 83735 ASSAY OF MAGNESIUM: CPT | Performed by: INTERNAL MEDICINE

## 2018-10-07 PROCEDURE — 36592 COLLECT BLOOD FROM PICC: CPT

## 2018-10-07 PROCEDURE — 36600 WITHDRAWAL OF ARTERIAL BLOOD: CPT | Performed by: HOSPITALIST

## 2018-10-07 PROCEDURE — 94003 VENT MGMT INPAT SUBQ DAY: CPT

## 2018-10-07 PROCEDURE — 36415 COLL VENOUS BLD VENIPUNCTURE: CPT | Performed by: INTERNAL MEDICINE

## 2018-10-07 RX ADMIN — DORZOLAMIDE HYDROCHLORIDE AND TIMOLOL MALEATE 1 DROP: 20; 5 SOLUTION/ DROPS OPHTHALMIC at 08:11

## 2018-10-07 RX ADMIN — Medication 20 ML: at 13:34

## 2018-10-07 RX ADMIN — GLYCOPYRROLATE 0.1 MG: 0.2 INJECTION, SOLUTION INTRAMUSCULAR; INTRAVENOUS at 04:25

## 2018-10-07 RX ADMIN — FAMOTIDINE 20 MG: 10 INJECTION, SOLUTION INTRAVENOUS at 08:28

## 2018-10-07 RX ADMIN — IPRATROPIUM BROMIDE AND ALBUTEROL SULFATE 3 ML: .5; 3 SOLUTION RESPIRATORY (INHALATION) at 08:07

## 2018-10-07 RX ADMIN — CHLORHEXIDINE GLUCONATE 15 ML: 1.2 RINSE ORAL at 20:49

## 2018-10-07 RX ADMIN — Medication 10 ML: at 05:51

## 2018-10-07 RX ADMIN — Medication 10 ML: at 13:34

## 2018-10-07 RX ADMIN — IPRATROPIUM BROMIDE AND ALBUTEROL SULFATE 3 ML: .5; 3 SOLUTION RESPIRATORY (INHALATION) at 12:20

## 2018-10-07 RX ADMIN — IPRATROPIUM BROMIDE AND ALBUTEROL SULFATE 3 ML: .5; 3 SOLUTION RESPIRATORY (INHALATION) at 19:22

## 2018-10-07 RX ADMIN — Medication 10 ML: at 22:03

## 2018-10-07 RX ADMIN — DORZOLAMIDE HYDROCHLORIDE AND TIMOLOL MALEATE 1 DROP: 20; 5 SOLUTION/ DROPS OPHTHALMIC at 19:47

## 2018-10-07 RX ADMIN — FAMOTIDINE 20 MG: 10 INJECTION, SOLUTION INTRAVENOUS at 20:50

## 2018-10-07 RX ADMIN — IPRATROPIUM BROMIDE AND ALBUTEROL SULFATE 3 ML: .5; 3 SOLUTION RESPIRATORY (INHALATION) at 16:17

## 2018-10-07 RX ADMIN — LATANOPROST 1 DROP: 50 SOLUTION OPHTHALMIC at 22:03

## 2018-10-07 RX ADMIN — CHLORHEXIDINE GLUCONATE 15 ML: 1.2 RINSE ORAL at 08:10

## 2018-10-07 NOTE — PROGRESS NOTES
Pt placed on Trach collar via 50% for daily trial. HR 85, RR 24-26, O2 sat's 96-97%. RN aware of change

## 2018-10-07 NOTE — PROGRESS NOTES
0730 Report received. 0800 Assessed the patient. 0900 Placed on CPAP tolerated well  Continues with non purposeful movement. 1200 No changes tolerating CPAP continues with thick secretions. [address] [facility] [address] changed. 1400 Updated wife on care plan 
1600 Placed back on rate by respiratory 1700 CHG bath complete

## 2018-10-07 NOTE — PROGRESS NOTES
Shift summary. Patient received on vent assist control . No sedation. Does not follow command. VSS. Un eventfull night. Large amount of oral and trach secretions. White thin. Provided am bath and tolerated. 0700 patient POX 59 on trach collar, , Suctioned large amount of secretions and called RT to put back on previous setting. POX back to 96 after.  
Shift report given to [facility]

## 2018-10-07 NOTE — PROGRESS NOTES
Hospitalist Progress Note NAME: Portia Gutierrez  
:  1934 MRN:  [id number] Assessment / Plan: PEA cardiac arrest  Acute encephalopathy; likely multifactorial 
- Code blue called . Patient w/ agonal breathing after coming back from radiology after arteriogram completed. No pulse detected. CPR initiated. Epi x 2 given. Pulse with ROSC. Intubated at bedside and transferred to ICU. - head CT  showed small age-indeterminate infarct in the left corona radiata. 
  
Acute respiratory w/ hypoxia s/p cardiac arrest; now intubated. S/p trach placement 10/3 Sepsis due to suspected PNA Pulmonary hemorrhage s/p pulmonary artery embolization Lung mass with concerns for malignancy COPD 
 - CT of chest shows severe emphysema with the right basilar airspace disease in the oval mass with fluid level concerning for internal hemorrhage versus pneumonia. - repeat CT  showed persistent hematoma in the right lower lobe region with surrounding patchy atelectasis/infiltrate and small ipsilateral pleural effusion. Left basilar subsegmental atelectasis. Coils in right lower lobe region consistent with recent pulmonary artery branch embolization. 
- Bcx Neg, sputum cytology collected  negative except scant yeast. 
- completed zosyn for possible lung abscess. End date  
- cytology from bronchial washing on  is atypical, favor benign reactive process 
- extubated on  and placed on VM 50% but reintubated  
 
- s/p trach placement, TC trials as tolered 
- on diuresis with lasix as tolerated 
 
  
Hemoptysis 
- noted to have bleeding from RLL on bronchoscopy. - work up for possible rheumatologic cause neg thus far: 
ANCA, anti-GBM, MPO ab, ND-3 ab, SSA/SSB, RNP ab, Arredondo/RNP, dsDNA, and Arredondo ab all negative. JOSS +. 
- Arteriogram : Thoracic aortic and intercostal arteriograms as described above demonstrating no enlarged bronchial artery supplying the right lower lobe of the lung. A normal 
appearing and normal sized right bronchial artery is seen without any 
hyperplasia or dominant right lower lobe supply. No embolization was performed. 
 
-Trend H&H 
-monitor for recurrent bleeding 
  
RLL mass - work up for [employer] neg thus far. - diagnostic bronch on 9/16. No endobronchial lesions seen. - CTA chest done 9/17 due to persistent bloody pulmonary secretions. Right lower lobe masslike abnormality demonstrates increased internal density 
and has increased in size measuring 6.7 x 6.7 cm, compared to 5.7 x 4.4 cm. This 
is compatible with internal hemorrhage. There is new moderate right lower lobe 
partial collapse/atelectasis.   
Normal sized right bronchial artery is visualized without any hyperplasia or or evidence for supply to the right lower lobe lung abnormality. It is unlikely that the hemoptysis related to bronchial arterial injury/supply. Prior bronchial arteriogram was unremarkable. No evidence for pulmonary artery pseudoaneurysm or active hemorrhage. However, it is more likely that the masslike abnormality in the right lower lobe the lung has eroded into an adjacent pulmonary artery then a bronchial artery. Therefore, plan is for pulmonary arteriogram with possible embolization if an abnormality is seen in the right lower lobe. 6 mm and 4 mm right lung nodules. Small bilateral pleural effusions. Minimally enlarged bilateral hilar and mediastinal lymph nodes  
  
Hypernatremia: resolved 
  
Acute blood loss anemia on admission: stable - trend H&H 
- monitor labs and transfuse for hgb <7 
  
H/o atrial fibrillation on chronic anticoagulation w/ coumadin 
- coumadin on hold per above 
  
Coumadin-induced coagulopathy Elevated LFT; hepatic congestion? 
- Holding coumadin. - stopped statin - trend INR and LFTs 
  
ALAN: resolved 
- likely prerenal related to Lasix. - Renal function stable. - avoid nephrotoxic agents. 
  
UTI, acute cystitis w/ hematuria, POA 
- s/p treatment course 
  
HLD 
-statin held due to elevated LFT 
  
Obesity - BMI 32 
  
Code: Full,  
DVT prophylaxis: SCDs GI prophylaxis: PPI Needs LTAC Subjective: Chief Complaint / Reason for Physician Visit: hemoptysis On trach, Review of Systems: 14 pt ROS unremarkable except as stated above. Objective: VITALS:  
Last 24hrs VS reviewed since prior progress note. Most recent are: 
Patient Vitals for the past 24 hrs: 
 Temp Pulse Resp BP SpO2  
10/07/18 1220 - - - - 97 % 10/07/18 0823 - 96 22 - 95 % 10/07/18 0808 - - - - 96 % 10/07/18 0700 - (!) 111 23 156/75 100 % 10/07/18 0636 - - - - 97 % 10/07/18 0600 - 86 17 108/60 97 % 10/07/18 0500 - 82 19 129/64 96 % 10/07/18 0400 - 82 20 - 96 % 10/07/18 0341 - 86 20 - 96 % 10/07/18 0300 99.8 °F (37.7 °C) 82 15 135/64 96 % 10/07/18 0200 - 83 21 131/66 96 % 10/07/18 0100 - 82 23 123/67 97 % 10/07/18 0001 99.6 °F (37.6 °C) 80 23 123/61 95 % 10/07/18 0000 - 78 23 - 95 % 10/06/18 2325 - 76 18 - 96 % 10/06/18 2300 - 76 19 106/47 94 % 10/06/18 2221 - 69 15 104/62 93 % 10/06/18 2200 - 82 20 (!) 86/46 96 % 10/06/18 2100 - 83 14 127/59 98 % 10/06/18 2000 99.4 °F (37.4 °C) 81 20 150/81 98 % 10/06/18 1949 - - - - 97 % 10/06/18 1900 - 79 21 134/80 96 % 10/06/18 1800 - 84 18 131/63 94 % 10/06/18 1717 - 98 17 - 98 % 10/06/18 1702 - 90 25 111/67 95 % Intake/Output Summary (Last 24 hours) at 10/07/18 1615 Last data filed at 10/07/18 3827 Gross per 24 hour Intake             1145 ml Output              799 ml Net              346 ml PHYSICAL EXAM: 
General:    trach   vent support on propofol Neck:  Supple, symmetrical.  trach in place Lungs:   Coarse breath sounds, no  Wheezing Heart:   Regular  rhythm,  Normal S1 and S2   Diffuse edema Abdomen:   Soft, non-tender. Bowel sounds normal.  PEG Neurologic: sedated Reviewed most current lab test results and cultures  YES Reviewed most current radiology test results   YES Review and summation of old records today    NO Reviewed patient's current orders and MAR    YES 
PMH/SH reviewed - no change compared to H&P 
 
________________________________________________________________________ Liz Bright MD  
 
Procedures: see electronic medical records for all procedures/Xrays and details which were not copied into this note but were reviewed prior to creation of Plan. LABS: 
I reviewed today's most current labs and imaging studies. Pertinent labs include: 
Recent Labs 10/07/18 
 4877  10/06/18 
 2076  10/05/18 
 2200 WBC  8.1  7.9  7.2 HGB  7.1*  7.6*  7.5* HCT  23.4*  24.8*  25.1*  
PLT  149*  134*  140* Recent Labs 10/07/18 
 0811  10/06/18 
 6255  10/05/18 
 0750  10/05/18 
 0773 NA  142  140   --   140  
K  4.5  4.5   --   4.2 CL  105  104   --   104 CO2  32  32   --   31  
GLU  118*  118*   --   114* BUN  22*  23*   --   25* CREA  0.74  0.86   --   0.91  
CA  8.0*  8.4*   --   8.3*  
MG  2.3   --    --    --   
PHOS  2.4*  2.7   --   2.6 ALB   --   1.6*   --   1.6* TBILI   --   0.7   --   0.4 SGOT   --   249*   --   148* ALT   --   195*   --   123* INR  1.1  1.1  1.1   -- Total time spent 28 min Signed: Liz Bright MD

## 2018-10-07 NOTE — PROGRESS NOTES
[facility] Pulmonary Consult Service NotePulmonary, Critical Care, and Sleep Medicine Name: Osmar Ruiz MRN: [id number] : 1934 Hospital: [facility] Date: 10/7/2018   Hospital Day: 27  
 
10-7-18: no events overnight. Only tolerated trach collar for a short time again today. IMPRESSION:  
1. 18 cardiopulmonary arrest 6 min CPR 2. Acute respiratory failure-on vent-  
3. Acute hemoptysis- Noted to have bleeding from RLL on Bronch. No endobronchial lesion. has lung mass(CA vs inflammatory pseudotumor) and microscopic hematuria; ANCA negative; JOSS barely positive. Suspect LUNG mass the culprit. CYTOLOGY from bronchoscopy negative malignancy x 2 
4. S/P pulmonary artery embolization for hemoptysis 5. COPD moderate to severe at baseline 6. Acute renal insf 7. Anemia and prior coagulopathy on coumadin 8. Volume overload, anasarca- after couple of days of IV lasix- becomes prerenal 
9. Encehalopathy multifactorial - has hearing loss as well 10. Hx of CAD s/p PCI, afib on amio, former smoker, prior cva RECOMMENDATIONS/PLAN:  
1. Vent support, TC trials as tolerated 2. Trach care 3. Still has unresolved issue of lung mass 4. Abx completed 5. Diuresis as needed 6. Follow renal function, WNL 7. Enteral feedings 8. No need for transfusion 9. Glucose monitoring and SSI 10. DVT/GI prophylaxis 11. Sedation as needed 12. Will need LTAC Subjective/Initial History: S/p trach 10/2 Vent support MAR reviewed and pertinent medications noted or modified as needed Current Facility-Administered Medications Medication  albuterol-ipratropium (DUO-NEB) 2.5 MG-0.5 MG/3 ML  
 acetaminophen (TYLENOL) solution 650 mg  
 sodium chloride (NS) flush 10-30 mL  sodium chloride (NS) flush 10 mL  sodium chloride (NS) flush 10 mL  sodium chloride (NS) flush 10-40 mL  sodium chloride (NS) flush 20 mL  heparin (porcine) pf 300 Units  propofol (DIPRIVAN) infusion  glycopyrrolate (ROBINUL) injection 0.1 mg  
 fentaNYL citrate (PF) injection 25-50 mcg  famotidine (PF) (PEPCID) 20 mg in sodium chloride 0.9% 10 mL injection  chlorhexidine (PERIDEX) 0.12 % mouthwash 15 mL  fentaNYL citrate (PF) injection 25 mcg  albuterol (PROVENTIL HFA, VENTOLIN HFA, PROAIR HFA) inhaler 2 Puff  dorzolamide-timolol (COSOPT) 22.3-6.8 mg/mL ophthalmic solution 1 Drop  latanoprost (XALATAN) 0.005 % ophthalmic solution 1 Drop  sodium chloride (NS) flush 5-10 mL  sodium chloride (NS) flush 5-10 mL  ondansetron (ZOFRAN) injection 4 mg ROS:A comprehensive review of systems was negative except for that written in the HPI. Objective: 
 
Vital Signs: Telemetry:    normal sinus rhythmIntake/Output:  
Visit Vitals  /75  Pulse 96  Temp 99.8 °F (37.7 °C)  Resp 22  
 Ht 6' 2\" (1.88 m)  Wt 116.1 kg (255 lb 15.3 oz)  SpO2 95%  BMI 32.86 kg/m2 Temp (24hrs), Av.4 °F (37.4 °C), Min:98.6 °F (37 °C), Max:99.8 °F (37.7 °C) O2 Device: Tracheostomy O2 Flow Rate (L/min): 10 l/min Wt Readings from Last 4 Encounters:  
10/07/18 116.1 kg (255 lb 15.3 oz) 16 111.1 kg (245 lb)  
16 106.6 kg (235 lb) 04/08/15 103.4 kg (228 lb) Intake/Output Summary (Last 24 hours) at 10/07/18 3854 Last data filed at 10/07/18 1656 Gross per 24 hour Intake             2050 ml Output             1129 ml Net              921 ml Last shift:      10/07 0701 - 10/07 1900 In: -  
Out: 65 [Urine:65] Last 3 shifts: 10/05 1901 - 10/07 0700 In: 3682.2 [I.V.:72.2] Out: 1969 [RNEWA:7728; Drains:180] Physical Exam:  
 General:  No distress HEAD: Normocephalic, without obvious abnormality, atraumatic EYES: conjunctivae clear. anicteric sclerae NOSE: nares normal, no drainage, no nasal flaring, Neck: Supple, symmetrical, trachea midline Chest: increased AP diameter Lungs: Bilateral rhonchi. Heart: Regular rate and rhythm Abdomen: soft, non-tender, +BS Musculoskeletal: anasarca; no erythema Neuro: more awake Psych: Not able to assess; no agitation. Data:  
 
Current Facility-Administered Medications Medication Dose Route Frequency  albuterol-ipratropium (DUO-NEB) 2.5 MG-0.5 MG/3 ML  3 mL Nebulization QID RT  
 sodium chloride (NS) flush 10 mL  10 mL InterCATHeter Q24H  
 sodium chloride (NS) flush 10-40 mL  10-40 mL InterCATHeter Q8H  
 sodium chloride (NS) flush 20 mL  20 mL InterCATHeter Q24H  propofol (DIPRIVAN) infusion  5-50 mcg/kg/min IntraVENous TITRATE  famotidine (PF) (PEPCID) 20 mg in sodium chloride 0.9% 10 mL injection  20 mg IntraVENous Q12H  chlorhexidine (PERIDEX) 0.12 % mouthwash 15 mL  15 mL Oral BID  dorzolamide-timolol (COSOPT) 22.3-6.8 mg/mL ophthalmic solution 1 Drop  1 Drop Both Eyes BID  latanoprost (XALATAN) 0.005 % ophthalmic solution 1 Drop  1 Drop Both Eyes QHS  sodium chloride (NS) flush 5-10 mL  5-10 mL IntraVENous Q8H Labs: 
Recent Labs 10/07/18 
 4990  10/06/18 
 0559  10/05/18 
 4753 WBC  8.1  7.9  7.2 HGB  7.1*  7.6*  7.5* HCT  23.4*  24.8*  25.1*  
PLT  149*  134*  140* Recent Labs 10/07/18 
 6755  10/06/18 
 0836  10/05/18 
 0750  10/05/18 
 5125 NA  142  140   --   140  
K  4.5  4.5   --   4.2 CL  105  104   --   104 CO2  32  32   --   31  
GLU  118*  118*   --   114* BUN  22*  23*   --   25* CREA  0.74  0.86   --   0.91  
CA  8.0*  8.4*   --   8.3*  
MG  2.3   --    --    --   
PHOS  2.4*  2.7   --   2.6 ALB   --   1.6*   --   1.6* TBILI   --   0.7   --   0.4 SGOT   --   249*   --   148* ALT   --   195*   --   123* INR  1.1  1.1  1.1   --   
 
Recent Labs 10/07/18 
 0510  10/06/18 
 0503  10/05/18 
 8933 PH  7.45  7.44  7.45  
PCO2  51*  48*  45  
PO2  79*  83  101* HCO3  34*  32*  30* FIO2  40  40  50 Imaging: 
I have personally reviewed the patients radiographs and have reviewed the reports: 
No change Total critical care time exclusive of procedures:  minutes Nestor Anne MD

## 2018-10-08 ENCOUNTER — APPOINTMENT (OUTPATIENT)
Dept: GENERAL RADIOLOGY | Age: 83
DRG: 003 | End: 2018-10-08
Attending: INTERNAL MEDICINE
Payer: MEDICARE

## 2018-10-08 LAB
ANION GAP SERPL CALC-SCNC: 5 MMOL/L (ref 5–15)
APTT PPP: 28.6 SEC (ref 22.1–32)
ARTERIAL PATENCY WRIST A: YES
BASE EXCESS BLDA CALC-SCNC: 6.8 MMOL/L
BDY SITE: ABNORMAL
BUN SERPL-MCNC: 22 MG/DL (ref 6–20)
BUN/CREAT SERPL: 26 (ref 12–20)
CALCIUM SERPL-MCNC: 8 MG/DL (ref 8.5–10.1)
CHLORIDE SERPL-SCNC: 106 MMOL/L (ref 97–108)
CO2 SERPL-SCNC: 31 MMOL/L (ref 21–32)
CREAT SERPL-MCNC: 0.84 MG/DL (ref 0.7–1.3)
EPAP/CPAP/PEEP, PAPEEP: 6
ERYTHROCYTE [DISTWIDTH] IN BLOOD BY AUTOMATED COUNT: 21.3 % (ref 11.5–14.5)
FIBRINOGEN PPP-MCNC: 774 MG/DL (ref 200–475)
FIO2 ON VENT: 40 %
GAS FLOW.O2 SETTING OXYMISER: 12 L/MIN
GLUCOSE SERPL-MCNC: 116 MG/DL (ref 65–100)
HCO3 BLDA-SCNC: 32 MMOL/L (ref 22–26)
HCT VFR BLD AUTO: 25.9 % (ref 36.6–50.3)
HGB BLD-MCNC: 7.7 G/DL (ref 12.1–17)
INR PPP: 1.1 (ref 0.9–1.1)
MAGNESIUM SERPL-MCNC: 2.3 MG/DL (ref 1.6–2.4)
MCH RBC QN AUTO: 31.6 PG (ref 26–34)
MCHC RBC AUTO-ENTMCNC: 29.7 G/DL (ref 30–36.5)
MCV RBC AUTO: 106.1 FL (ref 80–99)
NRBC # BLD: 0 K/UL (ref 0–0.01)
NRBC BLD-RTO: 0 PER 100 WBC
PCO2 BLDA: 49 MMHG (ref 35–45)
PH BLDA: 7.44 [PH] (ref 7.35–7.45)
PHOSPHATE SERPL-MCNC: 2.5 MG/DL (ref 2.6–4.7)
PLATELET # BLD AUTO: 165 K/UL (ref 150–400)
PMV BLD AUTO: 11.7 FL (ref 8.9–12.9)
PO2 BLDA: 79 MMHG (ref 80–100)
POTASSIUM SERPL-SCNC: 4.4 MMOL/L (ref 3.5–5.1)
PROTHROMBIN TIME: 11.4 SEC (ref 9–11.1)
RBC # BLD AUTO: 2.44 M/UL (ref 4.1–5.7)
SAO2 % BLD: 96 % (ref 92–97)
SAO2% DEVICE SAO2% SENSOR NAME: ABNORMAL
SODIUM SERPL-SCNC: 142 MMOL/L (ref 136–145)
SPECIMEN SITE: ABNORMAL
THERAPEUTIC RANGE,PTTT: ABNORMAL SECS (ref 58–77)
VENTILATION MODE VENT: ABNORMAL
VT SETTING VENT: 500 ML
WBC # BLD AUTO: 8.6 K/UL (ref 4.1–11.1)

## 2018-10-08 PROCEDURE — 77030018846 HC SOL IRR STRL H20 ICUM -A

## 2018-10-08 PROCEDURE — 94640 AIRWAY INHALATION TREATMENT: CPT

## 2018-10-08 PROCEDURE — 74011000250 HC RX REV CODE- 250: Performed by: INTERNAL MEDICINE

## 2018-10-08 PROCEDURE — 65610000006 HC RM INTENSIVE CARE

## 2018-10-08 PROCEDURE — 84100 ASSAY OF PHOSPHORUS: CPT | Performed by: INTERNAL MEDICINE

## 2018-10-08 PROCEDURE — 3E1K78Z IRRIGATION OF GENITOURINARY TRACT USING IRRIGATING SUBSTANCE, VIA NATURAL OR ARTIFICIAL OPENING: ICD-10-PCS | Performed by: UROLOGY

## 2018-10-08 PROCEDURE — 80048 BASIC METABOLIC PNL TOTAL CA: CPT | Performed by: INTERNAL MEDICINE

## 2018-10-08 PROCEDURE — 85027 COMPLETE CBC AUTOMATED: CPT | Performed by: INTERNAL MEDICINE

## 2018-10-08 PROCEDURE — 0T9B70Z DRAINAGE OF BLADDER WITH DRAINAGE DEVICE, VIA NATURAL OR ARTIFICIAL OPENING: ICD-10-PCS | Performed by: UROLOGY

## 2018-10-08 PROCEDURE — 94003 VENT MGMT INPAT SUBQ DAY: CPT

## 2018-10-08 PROCEDURE — 74011250636 HC RX REV CODE- 250/636: Performed by: INTERNAL MEDICINE

## 2018-10-08 PROCEDURE — 85610 PROTHROMBIN TIME: CPT | Performed by: INTERNAL MEDICINE

## 2018-10-08 PROCEDURE — 82803 BLOOD GASES ANY COMBINATION: CPT | Performed by: HOSPITALIST

## 2018-10-08 PROCEDURE — 77030018798 HC PMP KT ENTRL FED COVD -A

## 2018-10-08 PROCEDURE — 36415 COLL VENOUS BLD VENIPUNCTURE: CPT | Performed by: INTERNAL MEDICINE

## 2018-10-08 PROCEDURE — 77030020291 HC FLEXSEAL FMS BMS -C

## 2018-10-08 PROCEDURE — 83735 ASSAY OF MAGNESIUM: CPT | Performed by: INTERNAL MEDICINE

## 2018-10-08 PROCEDURE — 71045 X-RAY EXAM CHEST 1 VIEW: CPT

## 2018-10-08 PROCEDURE — 36600 WITHDRAWAL OF ARTERIAL BLOOD: CPT | Performed by: HOSPITALIST

## 2018-10-08 RX ORDER — WATER FOR INJECTION,STERILE
VIAL (ML) INJECTION
Status: DISPENSED
Start: 2018-10-08 | End: 2018-10-09

## 2018-10-08 RX ADMIN — GLYCOPYRROLATE 0.1 MG: 0.2 INJECTION, SOLUTION INTRAMUSCULAR; INTRAVENOUS at 07:20

## 2018-10-08 RX ADMIN — DORZOLAMIDE HYDROCHLORIDE AND TIMOLOL MALEATE 1 DROP: 20; 5 SOLUTION/ DROPS OPHTHALMIC at 09:00

## 2018-10-08 RX ADMIN — DORZOLAMIDE HYDROCHLORIDE AND TIMOLOL MALEATE 1 DROP: 20; 5 SOLUTION/ DROPS OPHTHALMIC at 18:00

## 2018-10-08 RX ADMIN — Medication 10 ML: at 05:08

## 2018-10-08 RX ADMIN — Medication 20 ML: at 11:00

## 2018-10-08 RX ADMIN — Medication 10 ML: at 15:01

## 2018-10-08 RX ADMIN — IPRATROPIUM BROMIDE AND ALBUTEROL SULFATE 3 ML: .5; 3 SOLUTION RESPIRATORY (INHALATION) at 07:45

## 2018-10-08 RX ADMIN — IPRATROPIUM BROMIDE AND ALBUTEROL SULFATE 3 ML: .5; 3 SOLUTION RESPIRATORY (INHALATION) at 15:00

## 2018-10-08 RX ADMIN — FAMOTIDINE 20 MG: 10 INJECTION, SOLUTION INTRAVENOUS at 07:58

## 2018-10-08 RX ADMIN — FAMOTIDINE 20 MG: 10 INJECTION, SOLUTION INTRAVENOUS at 21:32

## 2018-10-08 RX ADMIN — CHLORHEXIDINE GLUCONATE 15 ML: 1.2 RINSE ORAL at 21:30

## 2018-10-08 RX ADMIN — LATANOPROST 1 DROP: 50 SOLUTION OPHTHALMIC at 21:33

## 2018-10-08 RX ADMIN — IPRATROPIUM BROMIDE AND ALBUTEROL SULFATE 3 ML: .5; 3 SOLUTION RESPIRATORY (INHALATION) at 19:19

## 2018-10-08 RX ADMIN — FENTANYL CITRATE 50 MCG: 50 INJECTION, SOLUTION INTRAMUSCULAR; INTRAVENOUS at 16:57

## 2018-10-08 RX ADMIN — CHLORHEXIDINE GLUCONATE 15 ML: 1.2 RINSE ORAL at 08:00

## 2018-10-08 RX ADMIN — IPRATROPIUM BROMIDE AND ALBUTEROL SULFATE 3 ML: .5; 3 SOLUTION RESPIRATORY (INHALATION) at 11:34

## 2018-10-08 RX ADMIN — Medication 10 ML: at 15:00

## 2018-10-08 RX ADMIN — Medication 10 ML: at 21:33

## 2018-10-08 RX ADMIN — FENTANYL CITRATE 50 MCG: 50 INJECTION, SOLUTION INTRAMUSCULAR; INTRAVENOUS at 16:50

## 2018-10-08 NOTE — PROGRESS NOTES
Critical care interdisciplinary rounds held on 10/08/2018. Following members present, Pharmacy, Diabetes Treatment, Case Management, Respiratory Therapy, Physical Therapy  and Nutrition. Led by Garett Manjarrez RN and Dr. Becki Soliman and Dr. Olimpia Ugalde. Plan of care discussed. See clinical pathway for plan of care and interventions and desired outcomes.

## 2018-10-08 NOTE — PROGRESS NOTES
Urology Consult:  Gross hematuria, frequent irrigation required Pt intubated with 18 Fr almaraz placed 9/23/18. Periods of gross hematuria requiring frequent irrigation. Urology called and insertion of 22 Fr coude with 20 ml balloon requested and irrigaiton. Using aseptic technique, a 22 Fr coude inserted and balloon inflated with 20 ml sterile water. Catheter irrigated with 210 ml of sterile water, several large clots removed and pink urine seen in tubing. Pt tolerated procedure. Dr. Geoff Glover at bedside, further irrigation performed. Pink clear urine in tubing. Elder Payor RN, Urology RN Coordinator,  [phone] 
 
_____________________________________________________________________________________________ Urology Consult Active Problems: 
  Pulmonary hemorrhage (9/12/2018) Acute GI bleeding (9/12/2018) Admitting MD (and procedure) = Alex Matson MD - Procedure(s): ESOPHAGOGASTRODUODENOSCOPY (EGD) PERCUTANEOUS ENDOSCOPIC GASTROSTOMY TUBE INSERTION Established Urologist: Laurita Bermudez Primary care MD: PROVIDER UNKNOWN  - None Code state: Full Code 
 
________________________________________________________________________________________________________________________________________________________ASSESSMENT/PLAN:  
Hematuria - very large prostate as known from prior workup. 22 Almaraz coude placed with 20mL in balloon. Irrigates well. Need time for things to settle. DC when medical issues improving/resolving. 
____________________________________________________________________________ 
____________________________________________________________________________ SUBJECTIVE: 
Date of Consultation:  October 9, 2018 Referring Physician: Alex Matson MD 
Reason for Consultation:   
 
Established Urologist: Laurita Bermudez Primary care MD: PROVIDER UNKNOWN  - None Code state: Full Code History of Present Illness: 80y.o. year old male - He was admitted to the hospital for Acute GI bleeding Pulmonary hemorrhage Acute GI Bleeding 
dysphasia. PROLONGED HOSPITALIZATION 2ND TO CARDIAC ARREST. ICU NOW WITH MULTIPLE MED ISSUES. Past Medical History:  
Diagnosis Date  Aneurysm ([address] AAA  Arrhythmia   
 atrial fibrillation 2016  Hypertension  Other ill-defined conditions(799.89)   
 hx of broken arm left/cast  
 Other ill-defined conditions(799.89)   
 glaucoma  Other ill-defined conditions(799.89)   
 elevated cholesterol  Other ill-defined conditions(799.89)   
 hx bursitis knees Past Surgical History:  
Procedure Laterality Date  ABDOMEN SURGERY PROC UNLISTED  1/16/13 AORTIC ABDOMINAL ANUERYSM REPAIR ENDOVASCULAR   
 BRONCHOSCOPY DIAGNOSTIC  9/28/2018  HX HEENT    
 bilat cataract  HX ORTHOPAEDIC    
 ligament surgery left arm  HX OTHER SURGICAL    
 broken left foot casted and healed  PLACE PERCUT GASTROSTOMY TUBE  10/3/2018  UPPER GI ENDOSCOPY,BIOPSY  4/10/2015 Family History Problem Relation Age of Onset  Cancer Mother  Cancer Father Social History Substance Use Topics  Smoking status: Former Smoker Packs/day: 0.50 Years: 20.00  Smokeless tobacco: Never Used  Alcohol use Yes Comment: social rare No Known Allergies Prior to Admission medications Medication Sig Start Date End Date Taking? Authorizing Provider  
furosemide (LASIX) 80 mg tablet Take 80 mg by mouth daily. Yes Jessica Calderón MD  
ferrous sulfate (IRON) 325 mg (65 mg iron) cpER Take 1 Tab by mouth daily. Yes Jessica Calderón MD  
dilTIAZem (TIAZAC) 360 mg ER capsule Take 360 mg by mouth daily. Yes Jessica Calderón MD  
cholecalciferol, VITAMIN D3, (VITAMIN D3) 5,000 unit tab tablet Take 5,000 Units by mouth daily. Yes Jessica Calderón MD  
amiodarone (CORDARONE) 200 mg tablet Take 200 mg by mouth every other day.  9/12/18  Yes Jessica Calderón MD  
 tiotropium bromide 1.25 mcg/actuation mist Take  by inhalation. Yes Historical Provider  
latanoprost (XALATAN) 0.005 % ophthalmic solution Administer 1 Drop to both eyes nightly. Yes Historical Provider  
albuterol (PROAIR HFA) 90 mcg/actuation inhaler Take 2 Puffs by inhalation every four (4) hours as needed for Wheezing. Yes Historical Provider  
dorzolamide-timolol (COSOPT) 2-0.5 % ophthalmic solution Administer 1 Drop to both eyes two (2) times a day. Yes Historical Provider  
simvastatin (ZOCOR) 40 mg tablet Take 40 mg by mouth nightly. Yes Historical Provider  
nitroglycerin (NITROSTAT) 0.4 mg SL tablet by SubLINGual route every five (5) minutes as needed for Chest Pain. Historical Provider Review of Systems: INTUBATED - UNABLE TO PERFORM OBJECTIVE: 
Patient Vitals for the past 8 hrs: 
 BP Temp Pulse Resp SpO2  
10/09/18 1500 135/68 - 97 26 96 % 10/09/18 1400 109/68 - 90 18 98 % 10/09/18 1300 111/58 - 95 21 97 % 10/09/18 1237 108/50 98.8 °F (37.1 °C) 90 23 97 % 10/09/18 1200 139/66 - 85 25 98 % 10/09/18 1150 - - 91 24 97 % 10/09/18 1100 120/61 - 77 22 98 % 10/09/18 1000 110/42 - 82 24 97 % 10/09/18 0900 103/51 - 79 22 96 % 10/09/18 0812 - - 85 16 100 % 10/09/18 0800 119/69 98.7 °F (37.1 °C) 82 22 100 % Temp (24hrs), Av.5 °F (37.5 °C), Min:98.7 °F (37.1 °C), Max:100.2 °F (37.9 °C) Intake and Output:  
10/07 1901 - 10/09 0700 In: 1261 [I.V.:240] Out: 2200 [GWPBC:4808; Drains:225] Physical Exam: 
Appearance:  ACUTELY ILL, INTUBATED Conjunctiva: WNL Pupil/iris: WNL External ears/nose: Normal no lesions or deformities Hearing: [place] Asper Neck: Supple without masses Respiratory effort: [place] Asper Chest palpation: No tactile fremitus Aortic: No enlargement or bruits Lower extremity: EDEMA Abdomen/flank: Soft nontender without masses no CVA tenderness Liver/spleen: No organomegaly Hernia: No ing/ventral hernia Lymph: No adenopathy Digits/nails: No clubbing cyanosis or petechiae Skin inspection: Warm and dry Skin palpation: No subcutaneous nodularity Sensation: CANNOT ASSESS Judgment/Insight: CANNOT ASSESS Mood/Affect: CANNOT ASSESS Recent Results (from the past 24 hour(s)) COAGULATION SCREEN Collection Time: 10/09/18  3:20 AM  
Result Value Ref Range INR 1.1 0.9 - 1.1 Prothrombin time 11.6 (H) 9.0 - 11.1 sec  
 aPTT 28.8 22.1 - 32.0 sec  
 aPTT, therapeutic range     58.0 - 77.0 SECS Fibrinogen >800 (H) 200 - 475 mg/dL CBC WITH AUTOMATED DIFF Collection Time: 10/09/18  3:20 AM  
Result Value Ref Range WBC 12.4 (H) 4.1 - 11.1 K/uL  
 RBC 2.14 (L) 4.10 - 5.70 M/uL HGB 6.8 (L) 12.1 - 17.0 g/dL HCT 22.9 (L) 36.6 - 50.3 % .0 (H) 80.0 - 99.0 FL  
 MCH 31.8 26.0 - 34.0 PG  
 MCHC 29.7 (L) 30.0 - 36.5 g/dL RDW 21.5 (H) 11.5 - 14.5 % PLATELET 042 116 - 320 K/uL MPV 12.0 8.9 - 12.9 FL  
 NRBC 0.0 0  WBC ABSOLUTE NRBC 0.00 0.00 - 0.01 K/uL NEUTROPHILS 85 (H) 32 - 75 % LYMPHOCYTES 6 (L) 12 - 49 % MONOCYTES 7 5 - 13 % EOSINOPHILS 1 0 - 7 % BASOPHILS 0 0 - 1 % IMMATURE GRANULOCYTES 1 (H) 0.0 - 0.5 % ABS. NEUTROPHILS 10.6 (H) 1.8 - 8.0 K/UL  
 ABS. LYMPHOCYTES 0.7 (L) 0.8 - 3.5 K/UL  
 ABS. MONOCYTES 0.9 0.0 - 1.0 K/UL  
 ABS. EOSINOPHILS 0.1 0.0 - 0.4 K/UL  
 ABS. BASOPHILS 0.0 0.0 - 0.1 K/UL  
 ABS. IMM. GRANS. 0.1 (H) 0.00 - 0.04 K/UL  
 DF AUTOMATED    
 RBC COMMENTS ANISOCYTOSIS 
2+ METABOLIC PANEL, COMPREHENSIVE Collection Time: 10/09/18  3:20 AM  
Result Value Ref Range Sodium 140 136 - 145 mmol/L Potassium 4.3 3.5 - 5.1 mmol/L Chloride 106 97 - 108 mmol/L  
 CO2 30 21 - 32 mmol/L Anion gap 4 (L) 5 - 15 mmol/L Glucose 132 (H) 65 - 100 mg/dL BUN 25 (H) 6 - 20 MG/DL Creatinine 0.93 0.70 - 1.30 MG/DL  
 BUN/Creatinine ratio 27 (H) 12 - 20 GFR est AA >60 >60 ml/min/1.73m2 GFR est non-AA >60 >60 ml/min/1.73m2 Calcium 8.4 (L) 8.5 - 10.1 MG/DL Bilirubin, total 0.6 0.2 - 1.0 MG/DL  
 ALT (SGPT) 163 (H) 12 - 78 U/L  
 AST (SGOT) 131 (H) 15 - 37 U/L Alk. phosphatase 263 (H) 45 - 117 U/L Protein, total 7.1 6.4 - 8.2 g/dL Albumin 1.7 (L) 3.5 - 5.0 g/dL Globulin 5.4 (H) 2.0 - 4.0 g/dL A-G Ratio 0.3 (L) 1.1 - 2.2 MAGNESIUM Collection Time: 10/09/18  3:20 AM  
Result Value Ref Range Magnesium 2.2 1.6 - 2.4 mg/dL PHOSPHORUS Collection Time: 10/09/18  3:20 AM  
Result Value Ref Range Phosphorus 2.3 (L) 2.6 - 4.7 MG/DL  
TYPE + CROSSMATCH Collection Time: 10/09/18  6:20 AM  
Result Value Ref Range Crossmatch Expiration 10/12/2018 ABO/Rh(D) B NEGATIVE Antibody screen NEG Unit number [id number] Blood component type RC LR Unit division 00 Status of unit ALLOCATED Crossmatch result Compatible Current Antimicrobial Therapy None Cultures: All Micro Results Procedure Component Value Units Date/Time Shaheen Person [[id number]] Collected:  09/16/18 1118 Order Status:  Completed Specimen:  Wound from Bronchial Washing Updated:  10/08/18 6282 Special Requests: NO SPECIAL REQUESTS Culture result:      
  CULTURE IN PROGRESS,FURTHER UPDATES TO FOLLOW CULTURE, RESPIRATORY/SPUTUM/BRONCH Liana Kasal STAIN [[id number]]  (Abnormal) Collected:  09/28/18 1851 Order Status:  Completed Specimen:  Sputum from Tracheal Aspirate Updated:  09/30/18 1008 Special Requests: NO SPECIAL REQUESTS     
  GRAM STAIN RARE WBCS SEEN     
   FEW EPITHELIAL CELLS SEEN     
   NO ORGANISMS SEEN Culture result: FEW NORMAL RESPIRATORY SHELDON  
        
  SCANT YEAST, (APPARENT CANDIDA ALBICANS) (A) AFB CULTURE + SMEAR W/RFLX ID FROM CULTURE [[id number]] Collected:  09/16/18 1115 Order Status:  Completed Specimen:  Miscellaneous sample Updated:  09/18/18 1935 Source BRONCHIAL WASHING     
  AFB Specimen processing Concentration Acid Fast Smear NEGATIVE      
   (NOTE) Performed At: [address] [id number] Estrella Maria MD CR:[id number] Acid Fast Culture PENDING  
 CULTURE, RESPIRATORY/SPUTUM/BRONCH Irish Goes [[id number]] Collected:  09/16/18 1115 Order Status:  Completed Specimen:  Sputum from Bronchial Washing Updated:  09/18/18 1055 Special Requests: NO SPECIAL REQUESTS     
  GRAM STAIN OCCASIONAL WBCS SEEN     
        
  RARE EPITHELIAL CELLS SEEN  
   NO ORGANISMS SEEN Culture result:      
  HEAVY NORMAL RESPIRATORY SHELDON  
 CULTURE, RESPIRATORY/SPUTUM/BRONCH Irish Goes [[id number]] Collected:  09/15/18 1320 Order Status:  Completed Specimen:  Sputum from Endotracheal aspirate Updated:  09/17/18 5122 Special Requests: NO SPECIAL REQUESTS     
  GRAM STAIN RARE WBCS SEEN     
   NO EPITHELIAL CELLS SEEN  
   NO ORGANISMS SEEN Culture result:      
  LIGHT NORMAL RESPIRATORY SHELDON  
 GRAM STAIN [[id number]] Collected:  09/16/18 1118 Order Status:  Canceled Specimen:  Bronchial Washing Updated:  09/16/18 1209 CULTURE, BLOOD, PAIRED [[id number]] Collected:  09/11/18 2315 Order Status:  Completed Specimen:  Blood Updated:  09/16/18 0940 Special Requests: NO SPECIAL REQUESTS Culture result: NO GROWTH 5 DAYS     
 CULTURE, RESPIRATORY/SPUTUM/BRONCH Irish Goes [[id number]] Collected:  09/12/18 1550 Order Status:  Completed Specimen:  Sputum from Sputum Updated:  09/14/18 1148 Special Requests: NO SPECIAL REQUESTS     
  GRAM STAIN FEW WBCS SEEN     
        
  RARE EPITHELIAL CELLS SEEN  
   FEW GRAM POSITIVE RODS     
        
  RARE GRAM POSITIVE COCCI IN CLUSTERS Culture result:      
  LIGHT NORMAL RESPIRATORY SHELDON  
 CULTURE, RESPIRATORY/SPUTUM/BRONCH Murriel [place] STAIN [[id number]]  (Abnormal) Collected:  09/12/18 1121 Order Status:  Completed Specimen:  Sputum from Sputum Updated:  09/14/18 1136 Special Requests: NO SPECIAL REQUESTS     
  GRAM STAIN 2+ WBCS SEEN     
        
  RARE EPITHELIAL CELLS SEEN  
        
  FEW GRAM POSITIVE COCCI IN PAIRS  
        
  FEW GRAM POSITIVE COCCI IN CLUSTERS Culture result: MODERATE NORMAL RESPIRATORY SHELDON  
        
  SCANT YEAST, (APPARENT CANDIDA ALBICANS) (A) CULTURE, URINE [[id number]] Collected:  09/12/18 0133 Order Status:  Completed Specimen:  Urine Updated:  09/13/18 0945 Special Requests: --     
  NO SPECIAL REQUESTS Reflexed from [id number] [place] Count <1,000 CFU/ML Culture result: NO GROWTH 1 DAY Signed By: Chela Max MD  
                      October 9, 2018

## 2018-10-08 NOTE — PROGRESS NOTES
Hospitalist Progress Note NAME: Sukumar Marshall  
:  1934 MRN:  [id number] Assessment / Plan: PEA cardiac arrest  Acute encephalopathy; likely multifactorial 
- Code blue called . Patient w/ agonal breathing after coming back from radiology after arteriogram completed. No pulse detected. CPR initiated. Epi x 2 given. Pulse with ROSC. Intubated at bedside and transferred to ICU. - head CT  showed small age-indeterminate infarct in the left corona radiata. 
  
Acute respiratory w/ hypoxia s/p cardiac arrest; now intubated. S/p trach placement 10/3 Sepsis due to suspected PNA Pulmonary hemorrhage s/p pulmonary artery embolization Lung mass with concerns for malignancy COPD 
 - CT of chest shows severe emphysema with the right basilar airspace disease in the oval mass with fluid level concerning for internal hemorrhage versus pneumonia. - repeat CT  showed persistent hematoma in the right lower lobe region with surrounding patchy atelectasis/infiltrate and small ipsilateral pleural effusion. Left basilar subsegmental atelectasis. Coils in right lower lobe region consistent with recent pulmonary artery branch embolization. 
- Bcx Neg, sputum cytology collected  negative except scant yeast. 
- completed zosyn for possible lung abscess. End date  
- cytology from bronchial washing on  is atypical, favor benign reactive process 
- extubated on  and placed on VM 50% but reintubated  
 
- s/p trach placement, TC trials as tolered 
- on diuresis with lasix as tolerated 
 
  
Hemoptysis 
- noted to have bleeding from RLL on bronchoscopy. - work up for possible rheumatologic cause neg thus far: 
ANCA, anti-GBM, MPO ab, OR-3 ab, SSA/SSB, RNP ab, Arredondo/RNP, dsDNA, and Arredondo ab all negative. JOSS +. 
- Arteriogram : Thoracic aortic and intercostal arteriograms as described above demonstrating no enlarged bronchial artery supplying the right lower lobe of the lung. A normal 
appearing and normal sized right bronchial artery is seen without any 
hyperplasia or dominant right lower lobe supply. No embolization was performed. 
 
-Trend H&H 
-monitor for recurrent bleeding 
  
RLL mass - work up for [employer] neg thus far. - diagnostic bronch on 9/16. No endobronchial lesions seen. - CTA chest done 9/17 due to persistent bloody pulmonary secretions. Right lower lobe masslike abnormality demonstrates increased internal density 
and has increased in size measuring 6.7 x 6.7 cm, compared to 5.7 x 4.4 cm. This 
is compatible with internal hemorrhage. There is new moderate right lower lobe 
partial collapse/atelectasis.   
Normal sized right bronchial artery is visualized without any hyperplasia or or evidence for supply to the right lower lobe lung abnormality. It is unlikely that the hemoptysis related to bronchial arterial injury/supply. Prior bronchial arteriogram was unremarkable. No evidence for pulmonary artery pseudoaneurysm or active hemorrhage. However, it is more likely that the masslike abnormality in the right lower lobe the lung has eroded into an adjacent pulmonary artery then a bronchial artery. Therefore, plan is for pulmonary arteriogram with possible embolization if an abnormality is seen in the right lower lobe. 6 mm and 4 mm right lung nodules. Small bilateral pleural effusions. Minimally enlarged bilateral hilar and mediastinal lymph nodes  
  
Hypernatremia: resolved 
  
Acute blood loss anemia on admission: stable - trend H&H 
- monitor labs and transfuse for hgb <7 
  
H/o atrial fibrillation on chronic anticoagulation w/ coumadin 
- coumadin on hold per above 
  
Coumadin-induced coagulopathy Elevated LFT; hepatic congestion? 
- Holding coumadin. - stopped statin - trend INR and LFTs 
  
ALAN: resolved 
- likely prerenal related to Lasix. - Renal function stable. - avoid nephrotoxic agents. 
  
UTI, acute cystitis w/ hematuria, POA 
- s/p treatment course 
  
HLD 
-statin held due to elevated LFT 
  
Obesity - BMI 32 
  
Code: Full,  
DVT prophylaxis: SCDs GI prophylaxis: PPI Needs LTAC Subjective: Chief Complaint / Reason for Physician Visit: hemoptysis Sedated and trach'd. Unable to provide history. Review of Systems: 14 pt ROS unremarkable except as stated above. Objective: VITALS:  
Last 24hrs VS reviewed since prior progress note. Most recent are: 
Patient Vitals for the past 24 hrs: 
 Temp Pulse Resp BP SpO2  
10/08/18 1134 - 80 22 - 97 % 10/08/18 1000 - 67 21 109/53 97 % 10/08/18 0900 - 79 20 102/44 96 % 10/08/18 0800 99 °F (37.2 °C) 91 23 102/54 96 % 10/08/18 0746 - 96 22 - 97 % 10/08/18 0700 - 100 25 138/72 94 % 10/08/18 0600 - 92 26 116/56 95 % 10/08/18 0500 - 89 25 119/51 98 % 10/08/18 0408 - 90 21 - 97 % 10/08/18 0400 99.8 °F (37.7 °C) 90 24 123/64 97 % 10/08/18 0300 - 92 25 137/71 99 % 10/08/18 0200 - 82 20 126/62 97 % 10/08/18 0100 - 80 23 129/58 97 % 10/08/18 0000 - 82 24 - 99 % 10/07/18 2327 - 88 24 - 99 % 10/07/18 2300 99.3 °F (37.4 °C) 80 22 128/60 98 % 10/07/18 2200 - 79 15 115/66 99 % 10/07/18 2100 - 80 22 122/56 99 % 10/07/18 2000 99.1 °F (37.3 °C) 87 23 109/55 99 % 10/07/18 1922 - - - - 96 % 10/07/18 1901 - 83 22 - 97 % 10/07/18 1900 - 82 24 131/61 98 % 10/07/18 1800 - 88 22 (!) 112/94 97 % 10/07/18 1700 - 90 21 145/70 99 % 10/07/18 1617 - - - - 98 % 10/07/18 1600 98.1 °F (36.7 °C) 84 16 130/41 97 % 10/07/18 1500 - 79 21 123/60 97 % 10/07/18 1400 - 82 22 102/42 99 % 10/07/18 1300 - 92 21 (!) 136/115 98 % 10/07/18 1220 - - - - 97 % 10/07/18 1200 98.5 °F (36.9 °C) 84 20 144/70 96 % Intake/Output Summary (Last 24 hours) at 10/08/18 1158 Last data filed at 10/08/18 0900 Gross per 24 hour Intake             2005 ml Output             1375 ml Net              630 ml PHYSICAL EXAM: 
General:    trach   vent support on propofol Neck:  Supple, symmetrical.  trach in place Lungs:   Coarse breath sounds, no  Wheezing Heart:   Regular  rhythm,  Normal S1 and S2   Diffuse edema Abdomen:   Soft, non-tender. Bowel sounds normal.  PEG Neurologic: sedated Reviewed most current lab test results and cultures  YES Reviewed most current radiology test results   YES Review and summation of old records today    NO Reviewed patient's current orders and MAR    YES 
PMH/SH reviewed - no change compared to H&P 
 
________________________________________________________________________ Marcy Rodriguez MD  
 
Procedures: see electronic medical records for all procedures/Xrays and details which were not copied into this note but were reviewed prior to creation of Plan. LABS: 
I reviewed today's most current labs and imaging studies. Pertinent labs include: 
Recent Labs 10/08/18 
 0510  10/07/18 
 2462  10/06/18 
 0559 WBC  8.6  8.1  7.9 HGB  7.7*  7.1*  7.6* HCT  25.9*  23.4*  24.8*  
PLT  165  149*  134* Recent Labs 10/08/18 
 0510  10/07/18 
 3110  10/06/18 
 5449 NA  142  142  140  
K  4.4  4.5  4.5  
CL  106  105  104 CO2  31  32  32 GLU  116*  118*  118* BUN  22*  22*  23* CREA  0.84  0.74  0.86 CA  8.0*  8.0*  8.4* MG  2.3  2.3   --   
PHOS  2.5*  2.4*  2.7 ALB   --    --   1.6* TBILI   --    --   0.7 SGOT   --    --   249* ALT   --    --   195* INR  1.1  1.1  1.1 Total time spent 28 min Signed: Marcy Rodriguez MD

## 2018-10-08 NOTE — PROGRESS NOTES
1900 shift report received from Bell Delatorre. Back on rate. Pox 96.  
2000  RASS -1. BPS 3.  
12 am no changes with assessment. No fever. 4 am  Chlorhexidine bath rendered. Tolerated. Placed wound consult for sacrum excoriated. Applied [employer] lotion. 6 am am labs noted. No critical. 
630 RR 35. POX 86 on CPAP. Suctioned large oral secretions and moderate thick tan tracheal secretions. RT placed patient back on rate.  POX 98. RR 19 
7 am shift report given to Guaman [employer]

## 2018-10-08 NOTE — WOUND CARE
Wound Care nurse consult from staff nurse stating \" sacrum excoriated\". Patient is an 79 y/o CM admitted 9/12/18 for pulmonary hemorrhage and POD#6 from surgical tracheostomy placement and still on vent. Patient also suffered PEA cardiac arrest 9/14 and found to have a lung mass with concerns for malignancy. Past Medical History:  
Diagnosis Date  Aneurysm ([address] AAA  Arrhythmia   
 atrial fibrillation 2016  Hypertension  Other ill-defined conditions(799.89)   
 hx of broken arm left/cast  
 Other ill-defined conditions(799.89)   
 glaucoma  Other ill-defined conditions(799.89)   
 elevated cholesterol  Other ill-defined conditions(799.89)   
 hx bursitis knees Patient currently has TF's infusing, on ventilator w/trach, almaraz catheter and Flexiseal fecal manager. Patient non-responsive to voice or turning. Patient has some excoriation from fecal leakage around [facility] and body perspiration. Recommend: Es Desitin as needed for healing and skin protection.   Raquel Herrera RN, [employer]

## 2018-10-08 NOTE — PROGRESS NOTES
[facility] Pulmonary Consult Service NotePulmonary, Critical Care, and Sleep Medicine Name: Aissatou Schneider MRN: [id number] : 1934 Hospital: [facility] Date: 10/8/2018   Hospital Day: 29 IMPRESSION:  
1. 18 cardiopulmonary arrest 6 min CPR 2. Acute respiratory failure-on vent- has not been able to do SBT. 3. Acute hemoptysis- Noted to have bleeding from RLL on Bronch. No endobronchial lesion. has lung mass(CA vs inflammatory pseudotumor) and microscopic hematuria; ANCA negative; JOSS barely positive. Suspect LUNG mass the culprit. CYTOLOGY from bronchoscopy negative malignancy x 2 
4. S/P pulmonary artery embolization for hemoptysis 5. COPD moderate to severe at baseline 6. Acute renal insf 7. Anemia and prior coagulopathy on coumadin 8. Volume overload, anasarca- after couple of days of IV lasix- becomes prerenal 
9. Encehalopathy multifactorial - has hearing loss as well, has not woke up as expected. 10. Has ongoing issue with Blood clots in urine, nurses have been having to flush frequently  To get Urine output per their report. 11. Hx of CAD s/p PCI, afib on amio, former smoker, prior cva 12. Discussed with nurse on rounds this am.   
  
RECOMMENDATIONS/PLAN:  
1. Will ask Urology to evaluate. Pt may need Continuous bladder irrigation. Not on any blood thinners. 2. Vent support, TC trials as tolerated 3. Trach care 4. Still has unresolved issue of lung mass 5. Abx completed 6. Diuresis as needed 7. Follow renal function, WNL 8. Enteral feedings 9. No need for transfusion 10. Glucose monitoring and SSI 11. DVT/GI prophylaxis 12. Sedation as needed 13. Will need LTAC when becomes medically stable. Subjective/Initial History: S/p trach 10/ Vent support 10-8-18: Pt still not waking up. Has ongoing issues with blood clots in urine.  Per nurses has been with recurrent issues with clotting and require frequent flushing of the almaraz. Pt has not been able to tolerate trach collar. Unable to get ROS or HPI from pt. MAR reviewed and pertinent medications noted or modified as needed Current Facility-Administered Medications Medication  zinc oxide-cod liver oil (DESITIN) 40 % paste  albuterol-ipratropium (DUO-NEB) 2.5 MG-0.5 MG/3 ML  
 acetaminophen (TYLENOL) solution 650 mg  
 sodium chloride (NS) flush 10-30 mL  sodium chloride (NS) flush 10 mL  sodium chloride (NS) flush 10 mL  sodium chloride (NS) flush 10-40 mL  sodium chloride (NS) flush 20 mL  heparin (porcine) pf 300 Units  glycopyrrolate (ROBINUL) injection 0.1 mg  
 fentaNYL citrate (PF) injection 25-50 mcg  famotidine (PF) (PEPCID) 20 mg in sodium chloride 0.9% 10 mL injection  chlorhexidine (PERIDEX) 0.12 % mouthwash 15 mL  fentaNYL citrate (PF) injection 25 mcg  albuterol (PROVENTIL HFA, VENTOLIN HFA, PROAIR HFA) inhaler 2 Puff  dorzolamide-timolol (COSOPT) 22.3-6.8 mg/mL ophthalmic solution 1 Drop  latanoprost (XALATAN) 0.005 % ophthalmic solution 1 Drop  sodium chloride (NS) flush 5-10 mL  sodium chloride (NS) flush 5-10 mL  ondansetron (ZOFRAN) injection 4 mg ROS:A comprehensive review of systems was negative except for that written in the HPI. Objective: 
 
Vital Signs: Telemetry:    normal sinus rhythmIntake/Output:  
Visit Vitals  /49  Pulse 81  Temp 99 °F (37.2 °C)  Resp 20  
 Ht 6' 2\" (1.88 m)  Wt 117.1 kg (258 lb 2.5 oz)  SpO2 97%  BMI 33.15 kg/m2 Temp (24hrs), Av.1 °F (37.3 °C), Min:98.1 °F (36.7 °C), Max:99.8 °F (37.7 °C) O2 Device: Ventilator O2 Flow Rate (L/min): 10 l/min Wt Readings from Last 4 Encounters:  
10/08/18 117.1 kg (258 lb 2.5 oz) 16 111.1 kg (245 lb)  
16 106.6 kg (235 lb) 04/08/15 103.4 kg (228 lb) Intake/Output Summary (Last 24 hours) at 10/08/18 1518 Last data filed at 10/08/18 0900 Gross per 24 hour Intake             1615 ml Output             1315 ml Net              300 ml Last shift:      10/08 0701 - 10/08 1900 In: 375 Out: 95 [Urine:45; Drains:50] Last 3 shifts: 10/06 1901 - 10/08 0700 In: 3120 Out: 2174 [RIQCB:3564; Drains:210] Physical Exam:  
 General:  No distress HEAD: Normocephalic, without obvious abnormality, atraumatic EYES: conjunctivae clear. anicteric sclerae NOSE: nares normal, no drainage, no nasal flaring, Neck: Supple, symmetrical, trachea midline, has trach in place, on Mechanical vent support. Chest: increased AP diameter Lungs: Bilateral rhonchi. Trached on vent. Heart: Regular rate and rhythm nl s1,2. Abdomen: soft, non-tender, +BS, pt has almaraz in place. Blood urine present. Obese. Musculoskeletal: anasarca; no erythema Neuro: I can not get pt to follow any commands. Psych: Not able to assess; no agitation. Data:  
 
Current Facility-Administered Medications Medication Dose Route Frequency  albuterol-ipratropium (DUO-NEB) 2.5 MG-0.5 MG/3 ML  3 mL Nebulization QID RT  
 sodium chloride (NS) flush 10 mL  10 mL InterCATHeter Q24H  
 sodium chloride (NS) flush 10-40 mL  10-40 mL InterCATHeter Q8H  
 sodium chloride (NS) flush 20 mL  20 mL InterCATHeter Q24H  
 famotidine (PF) (PEPCID) 20 mg in sodium chloride 0.9% 10 mL injection  20 mg IntraVENous Q12H  chlorhexidine (PERIDEX) 0.12 % mouthwash 15 mL  15 mL Oral BID  dorzolamide-timolol (COSOPT) 22.3-6.8 mg/mL ophthalmic solution 1 Drop  1 Drop Both Eyes BID  latanoprost (XALATAN) 0.005 % ophthalmic solution 1 Drop  1 Drop Both Eyes QHS  sodium chloride (NS) flush 5-10 mL  5-10 mL IntraVENous Q8H Labs: 
Recent Labs 10/08/18 
 0510  10/07/18 
 9688  10/06/18 
 3305 WBC  8.6  8.1  7.9 HGB  7.7*  7.1*  7.6* HCT  25.9*  23.4*  24.8*  
PLT  165  149*  134* Recent Labs 10/08/18 8451  10/07/18 
 0746  10/06/18 
 0202 NA  142  142  140  
K  4.4  4.5  4.5  
CL  106  105  104 CO2  31  32  32 GLU  116*  118*  118* BUN  22*  22*  23* CREA  0.84  0.74  0.86 CA  8.0*  8.0*  8.4* MG  2.3  2.3   --   
PHOS  2.5*  2.4*  2.7 ALB   --    --   1.6* TBILI   --    --   0.7 SGOT   --    --   249* ALT   --    --   195* INR  1.1  1.1  1.1 Recent Labs 10/08/18 
 2730  10/07/18 
 0510  10/06/18 
 0503 PH  7.44  7.45  7.44  
PCO2  49*  51*  48* PO2  79*  79*  83 HCO3  32*  34*  32* FIO2  40  40  40 Imaging: 
I have personally reviewed the patients radiographs and have reviewed the reports: 
No change Total critical care time exclusive of procedures:  minutes Mark Torres MD

## 2018-10-09 ENCOUNTER — APPOINTMENT (OUTPATIENT)
Dept: GENERAL RADIOLOGY | Age: 83
DRG: 003 | End: 2018-10-09
Attending: INTERNAL MEDICINE
Payer: MEDICARE

## 2018-10-09 LAB
ALBUMIN SERPL-MCNC: 1.7 G/DL (ref 3.5–5)
ALBUMIN/GLOB SERPL: 0.3 {RATIO} (ref 1.1–2.2)
ALP SERPL-CCNC: 263 U/L (ref 45–117)
ALT SERPL-CCNC: 163 U/L (ref 12–78)
ANION GAP SERPL CALC-SCNC: 4 MMOL/L (ref 5–15)
APTT PPP: 28.8 SEC (ref 22.1–32)
AST SERPL-CCNC: 131 U/L (ref 15–37)
BASOPHILS # BLD: 0 K/UL (ref 0–0.1)
BASOPHILS NFR BLD: 0 % (ref 0–1)
BILIRUB SERPL-MCNC: 0.6 MG/DL (ref 0.2–1)
BUN SERPL-MCNC: 25 MG/DL (ref 6–20)
BUN/CREAT SERPL: 27 (ref 12–20)
CALCIUM SERPL-MCNC: 8.4 MG/DL (ref 8.5–10.1)
CHLORIDE SERPL-SCNC: 106 MMOL/L (ref 97–108)
CO2 SERPL-SCNC: 30 MMOL/L (ref 21–32)
CREAT SERPL-MCNC: 0.93 MG/DL (ref 0.7–1.3)
DIFFERENTIAL METHOD BLD: ABNORMAL
EOSINOPHIL # BLD: 0.1 K/UL (ref 0–0.4)
EOSINOPHIL NFR BLD: 1 % (ref 0–7)
ERYTHROCYTE [DISTWIDTH] IN BLOOD BY AUTOMATED COUNT: 21.5 % (ref 11.5–14.5)
FIBRINOGEN PPP-MCNC: >800 MG/DL (ref 200–475)
GLOBULIN SER CALC-MCNC: 5.4 G/DL (ref 2–4)
GLUCOSE SERPL-MCNC: 132 MG/DL (ref 65–100)
HCT VFR BLD AUTO: 22.9 % (ref 36.6–50.3)
HGB BLD-MCNC: 6.8 G/DL (ref 12.1–17)
IMM GRANULOCYTES # BLD: 0.1 K/UL (ref 0–0.04)
IMM GRANULOCYTES NFR BLD AUTO: 1 % (ref 0–0.5)
INR PPP: 1.1 (ref 0.9–1.1)
LYMPHOCYTES # BLD: 0.7 K/UL (ref 0.8–3.5)
LYMPHOCYTES NFR BLD: 6 % (ref 12–49)
MAGNESIUM SERPL-MCNC: 2.2 MG/DL (ref 1.6–2.4)
MCH RBC QN AUTO: 31.8 PG (ref 26–34)
MCHC RBC AUTO-ENTMCNC: 29.7 G/DL (ref 30–36.5)
MCV RBC AUTO: 107 FL (ref 80–99)
MONOCYTES # BLD: 0.9 K/UL (ref 0–1)
MONOCYTES NFR BLD: 7 % (ref 5–13)
NEUTS SEG # BLD: 10.6 K/UL (ref 1.8–8)
NEUTS SEG NFR BLD: 85 % (ref 32–75)
NRBC # BLD: 0 K/UL (ref 0–0.01)
NRBC BLD-RTO: 0 PER 100 WBC
PHOSPHATE SERPL-MCNC: 2.3 MG/DL (ref 2.6–4.7)
PLATELET # BLD AUTO: 153 K/UL (ref 150–400)
PMV BLD AUTO: 12 FL (ref 8.9–12.9)
POTASSIUM SERPL-SCNC: 4.3 MMOL/L (ref 3.5–5.1)
PROT SERPL-MCNC: 7.1 G/DL (ref 6.4–8.2)
PROTHROMBIN TIME: 11.6 SEC (ref 9–11.1)
RBC # BLD AUTO: 2.14 M/UL (ref 4.1–5.7)
RBC MORPH BLD: ABNORMAL
SODIUM SERPL-SCNC: 140 MMOL/L (ref 136–145)
THERAPEUTIC RANGE,PTTT: ABNORMAL SECS (ref 58–77)
WBC # BLD AUTO: 12.4 K/UL (ref 4.1–11.1)

## 2018-10-09 PROCEDURE — 85025 COMPLETE CBC W/AUTO DIFF WBC: CPT | Performed by: INTERNAL MEDICINE

## 2018-10-09 PROCEDURE — 85610 PROTHROMBIN TIME: CPT | Performed by: INTERNAL MEDICINE

## 2018-10-09 PROCEDURE — 51798 US URINE CAPACITY MEASURE: CPT

## 2018-10-09 PROCEDURE — 74011250636 HC RX REV CODE- 250/636: Performed by: INTERNAL MEDICINE

## 2018-10-09 PROCEDURE — 65610000006 HC RM INTENSIVE CARE

## 2018-10-09 PROCEDURE — 94003 VENT MGMT INPAT SUBQ DAY: CPT

## 2018-10-09 PROCEDURE — 36430 TRANSFUSION BLD/BLD COMPNT: CPT

## 2018-10-09 PROCEDURE — 74011000250 HC RX REV CODE- 250: Performed by: INTERNAL MEDICINE

## 2018-10-09 PROCEDURE — P9016 RBC LEUKOCYTES REDUCED: HCPCS | Performed by: INTERNAL MEDICINE

## 2018-10-09 PROCEDURE — 83735 ASSAY OF MAGNESIUM: CPT | Performed by: INTERNAL MEDICINE

## 2018-10-09 PROCEDURE — 86900 BLOOD TYPING SEROLOGIC ABO: CPT | Performed by: INTERNAL MEDICINE

## 2018-10-09 PROCEDURE — 36415 COLL VENOUS BLD VENIPUNCTURE: CPT | Performed by: INTERNAL MEDICINE

## 2018-10-09 PROCEDURE — 71045 X-RAY EXAM CHEST 1 VIEW: CPT

## 2018-10-09 PROCEDURE — 84100 ASSAY OF PHOSPHORUS: CPT | Performed by: INTERNAL MEDICINE

## 2018-10-09 PROCEDURE — 86923 COMPATIBILITY TEST ELECTRIC: CPT | Performed by: INTERNAL MEDICINE

## 2018-10-09 PROCEDURE — 77030018798 HC PMP KT ENTRL FED COVD -A

## 2018-10-09 PROCEDURE — 80053 COMPREHEN METABOLIC PANEL: CPT | Performed by: INTERNAL MEDICINE

## 2018-10-09 PROCEDURE — 77030018836 HC SOL IRR NACL ICUM -A

## 2018-10-09 PROCEDURE — 94640 AIRWAY INHALATION TREATMENT: CPT

## 2018-10-09 RX ORDER — SODIUM CHLORIDE 9 MG/ML
250 INJECTION, SOLUTION INTRAVENOUS AS NEEDED
Status: DISCONTINUED | OUTPATIENT
Start: 2018-10-09 | End: 2018-10-10 | Stop reason: ALTCHOICE

## 2018-10-09 RX ADMIN — FAMOTIDINE 20 MG: 10 INJECTION, SOLUTION INTRAVENOUS at 21:03

## 2018-10-09 RX ADMIN — LATANOPROST 1 DROP: 50 SOLUTION OPHTHALMIC at 21:01

## 2018-10-09 RX ADMIN — FAMOTIDINE 20 MG: 10 INJECTION, SOLUTION INTRAVENOUS at 08:19

## 2018-10-09 RX ADMIN — Medication 10 ML: at 06:52

## 2018-10-09 RX ADMIN — IPRATROPIUM BROMIDE AND ALBUTEROL SULFATE 3 ML: .5; 3 SOLUTION RESPIRATORY (INHALATION) at 19:50

## 2018-10-09 RX ADMIN — CHLORHEXIDINE GLUCONATE 15 ML: 1.2 RINSE ORAL at 08:14

## 2018-10-09 RX ADMIN — IPRATROPIUM BROMIDE AND ALBUTEROL SULFATE 3 ML: .5; 3 SOLUTION RESPIRATORY (INHALATION) at 11:50

## 2018-10-09 RX ADMIN — Medication 10 ML: at 13:09

## 2018-10-09 RX ADMIN — IPRATROPIUM BROMIDE AND ALBUTEROL SULFATE 3 ML: .5; 3 SOLUTION RESPIRATORY (INHALATION) at 08:12

## 2018-10-09 RX ADMIN — DORZOLAMIDE HYDROCHLORIDE AND TIMOLOL MALEATE 1 DROP: 20; 5 SOLUTION/ DROPS OPHTHALMIC at 18:13

## 2018-10-09 RX ADMIN — IPRATROPIUM BROMIDE AND ALBUTEROL SULFATE 3 ML: .5; 3 SOLUTION RESPIRATORY (INHALATION) at 15:12

## 2018-10-09 RX ADMIN — DORZOLAMIDE HYDROCHLORIDE AND TIMOLOL MALEATE 1 DROP: 20; 5 SOLUTION/ DROPS OPHTHALMIC at 08:15

## 2018-10-09 RX ADMIN — Medication 10 ML: at 21:00

## 2018-10-09 RX ADMIN — CHLORHEXIDINE GLUCONATE 15 ML: 1.2 RINSE ORAL at 21:01

## 2018-10-09 RX ADMIN — Medication 10 ML: at 21:01

## 2018-10-09 RX ADMIN — Medication 10 ML: at 13:08

## 2018-10-09 RX ADMIN — Medication 20 ML: at 13:08

## 2018-10-09 NOTE — PROGRESS NOTES
1900  
Bedside report received from Jen Valentino of care discussed  
0000 Patient resting quietly with eyes closed. No ss of pain or distress observed [id number]ever Patient bathed with CHG wipes. Linens and gown changed 6472 Patient SBT trial.  
7221 Patient placed back on vent settings. Elevated heart rate, respirations 0700 Bedside report given to 5122 Jen Boyd of care discussed

## 2018-10-09 NOTE — PROGRESS NOTES
[facility] [place] Pulmonary Consult Service NotePulmonary, Critical Care, and Sleep Medicine Name: Kvng Zamorano MRN: [id number] : 1934 Hospital: [facility] Date: 10/9/2018   Hospital Day: 34 IMPRESSION:  
1. 18 cardiopulmonary arrest 6 min CPR 2. Acute respiratory failure-on vent- has not been able to do SBT. 3. Acute hemoptysis- Noted to have bleeding from RLL on Bronch. No endobronchial lesion. has lung mass(CA vs inflammatory pseudotumor) and microscopic hematuria; ANCA negative; JOSS barely positive. Suspect LUNG mass the culprit. CYTOLOGY from bronchoscopy negative malignancy x 2 
4. S/P pulmonary artery embolization for hemoptysis 5. COPD moderate to severe at baseline 6. Acute renal insf 7. Anemia and prior coagulopathy on coumadin 8. Volume overload, anasarca- after couple of days of IV lasix- becomes prerenal 
9. Encehalopathy multifactorial - has hearing loss as well, has not woke up as expected. 10. Has ongoing issue with Blood clots in urine, nurses have been having to flush frequently  To get Urine output per their report. 11. Hx of CAD s/p PCI, afib on amio, former smoker, prior cva 12. Discussed with nurse on rounds this am.  
13. Dr. Dina Monae discussed with pts family this am.   
  
RECOMMENDATIONS/PLAN:  
1. Had almaraz placed by Urology. 2. Vent support, TC trials as tolerated. Seemed to tolerate CPAP briefly this am.  
3. Trach care 4. Still has unresolved issue of lung mass, will having ongoing family discussions. Palliative care is following. 5. Abx completed 6. Diuresis as needed 7. Follow renal function, WNL 8. Enteral feedings 9. No need for transfusion 10. Glucose monitoring and SSI 11. DVT/GI prophylaxis 12. Will need LTAC when becomes medically stable. Subjective/Initial History: S/p trach 10/2 Vent support 10-9-18: Not able to get hx  Or ROS from pt. No acute changes overnight. Had some moderate secretions this am.  
 
 
10-8-18: Pt still not waking up. Has ongoing issues with blood clots in urine. Per nurses has been with recurrent issues with clotting and require frequent flushing of the almaraz. Pt has not been able to tolerate trach collar. Unable to get ROS or HPI from pt. MAR reviewed and pertinent medications noted or modified as needed Current Facility-Administered Medications Medication  0.9% sodium chloride infusion 250 mL  zinc oxide-cod liver oil (DESITIN) 40 % paste  albuterol-ipratropium (DUO-NEB) 2.5 MG-0.5 MG/3 ML  
 acetaminophen (TYLENOL) solution 650 mg  
 sodium chloride (NS) flush 10-30 mL  sodium chloride (NS) flush 10 mL  sodium chloride (NS) flush 10 mL  sodium chloride (NS) flush 10-40 mL  sodium chloride (NS) flush 20 mL  heparin (porcine) pf 300 Units  glycopyrrolate (ROBINUL) injection 0.1 mg  
 fentaNYL citrate (PF) injection 25-50 mcg  famotidine (PF) (PEPCID) 20 mg in sodium chloride 0.9% 10 mL injection  chlorhexidine (PERIDEX) 0.12 % mouthwash 15 mL  fentaNYL citrate (PF) injection 25 mcg  albuterol (PROVENTIL HFA, VENTOLIN HFA, PROAIR HFA) inhaler 2 Puff  dorzolamide-timolol (COSOPT) 22.3-6.8 mg/mL ophthalmic solution 1 Drop  latanoprost (XALATAN) 0.005 % ophthalmic solution 1 Drop  sodium chloride (NS) flush 5-10 mL  sodium chloride (NS) flush 5-10 mL  ondansetron (ZOFRAN) injection 4 mg ROS:A comprehensive review of systems was negative except for that written in the HPI. Objective: 
 
Vital Signs: Telemetry:    normal sinus rhythmIntake/Output:  
Visit Vitals  /59  Pulse 94  Temp 99.9 °F (37.7 °C)  Resp 23  
 Ht 6' 2\" (1.88 m)  Wt 117.9 kg (259 lb 14.8 oz)  SpO2 97%  BMI 33.37 kg/m2 Temp (24hrs), Av.9 °F (37.7 °C), Min:99.5 °F (37.5 °C), Max:100.2 °F (37.9 °C) O2 Device: Tracheostomy, Ventilator O2 Flow Rate (L/min): 10 l/min Wt Readings from Last 4 Encounters:  
10/09/18 117.9 kg (259 lb 14.8 oz) 05/05/16 111.1 kg (245 lb)  
03/25/16 106.6 kg (235 lb) 04/08/15 103.4 kg (228 lb) Intake/Output Summary (Last 24 hours) at 10/09/18 0801 Last data filed at 10/09/18 0400 Gross per 24 hour Intake             2540 ml Output             1155 ml Net             1385 ml Last shift:        
Last 3 shifts: 10/07 1901 - 10/09 0700 In: 1823 [I.V.:240] Out: 2200 [MPTJZ:4125; Drains:225] Physical Exam:  
 General:  No distress HEAD: Normocephalic, without obvious abnormality, atraumatic EYES: conjunctivae clear. anicteric sclerae NOSE: nares normal, no drainage, no nasal flaring, Neck: Supple, symmetrical, trachea midline, has trach in place, on Mechanical vent support. Chest: increased AP diameter Lungs: Bilateral rhonchi. Trached on vent. Heart: Regular rate and rhythm nl s1,2. Abdomen: soft, non-tender, +BS, pt has almaraz in place. Blood urine present. Obese. Musculoskeletal: anasarca; no erythema Neuro: seems at least to intermittently to follow commands. Tries to at least intermittently stick out tongue. Psych: Not able to assess; no agitation. NO anxiety or depression when seen. Data:  
 
Current Facility-Administered Medications Medication Dose Route Frequency  albuterol-ipratropium (DUO-NEB) 2.5 MG-0.5 MG/3 ML  3 mL Nebulization QID RT  
 sodium chloride (NS) flush 10 mL  10 mL InterCATHeter Q24H  
 sodium chloride (NS) flush 10-40 mL  10-40 mL InterCATHeter Q8H  
 sodium chloride (NS) flush 20 mL  20 mL InterCATHeter Q24H  
 famotidine (PF) (PEPCID) 20 mg in sodium chloride 0.9% 10 mL injection  20 mg IntraVENous Q12H  chlorhexidine (PERIDEX) 0.12 % mouthwash 15 mL  15 mL Oral BID  dorzolamide-timolol (COSOPT) 22.3-6.8 mg/mL ophthalmic solution 1 Drop  1 Drop Both Eyes BID  
  latanoprost (XALATAN) 0.005 % ophthalmic solution 1 Drop  1 Drop Both Eyes QHS  sodium chloride (NS) flush 5-10 mL  5-10 mL IntraVENous Q8H Labs: 
Recent Labs 10/09/18 
 0320  10/08/18 
 0510  10/07/18 
 2231 WBC  12.4*  8.6  8.1 HGB  6.8*  7.7*  7.1*  
HCT  22.9*  25.9*  23.4*  
PLT  153  165  149* Recent Labs 10/09/18 
 0320  10/08/18 
 0510  10/07/18 
 1395 NA  140  142  142  
K  4.3  4.4  4.5  
CL  106  106  105 CO2  30  31  32 GLU  132*  116*  118* BUN  25*  22*  22* CREA  0.93  0.84  0.74 CA  8.4*  8.0*  8.0*  
MG  2.2  2.3  2.3 PHOS  2.3*  2.5*  2.4* ALB  1.7*   --    --   
TBILI  0.6   --    --   
SGOT  131*   --    --   
ALT  163*   --    --   
INR  1.1  1.1  1.1 Recent Labs 10/08/18 
 0525  10/07/18 
 0510 PH  7.44  7.45  
PCO2  49*  51* PO2  79*  79* HCO3  32*  34* FIO2  40  40 Imaging: 
I have personally reviewed the patients radiographs and have reviewed the reports: 
10-9-18: CXR: 
 
Right pleural effusion, trach intact. Mild IE. Has PPM in place. Total critical care time exclusive of procedures:  minutes Slick Rossi MD

## 2018-10-09 NOTE — PROGRESS NOTES
10/09/18 0603 10/09/18 1870 ABCDEF Bundle SBT Safety Screen Passed Yes --   
SBT Trial Passed --  Yes Weaning Parameters Spontaneous Breathing Trial Complete --  Yes Resp Rate Observed 30 26 Ve 10.1 12.1  410 RSBI 89 57 Patient remains on CPAP+6, PS 8, 40% 3019: Patient returned to previous settings due respiratory distress. RR 38 RSBI 121

## 2018-10-09 NOTE — PROGRESS NOTES
0710 - Bedside and Verbal shift change report given to Jamison Cortez RN (oncoming nurse) by Jakub Aquino RN (offgoing nurse). Report included the following information SBAR, Kardex, Procedure Summary, Intake/Output, MAR, Recent Results and Cardiac Rhythm Afib.  
 
0800 - Patient opens eyes spontaneously. HURST spontaneously, but no purposeful response to commands. Noted that patient failed SBT this morning. AC 12 500 40 6. TF at 45 150cc H20 Q 4. VS stable. Will continue to monitor. 1415 - #6 Shiley inner canula changed. Moderate amount of tan mucous noted surrounding site. Suction provided to clear mucous. Patient tolerated fairly. [id number] - Family at bedside with patient. Patient lifts LUE independently, appearing to attempt to grasp the hand of a visitor. 1915 -Bedside and Verbal shift change report given to Jakub Aquino RN (oncoming nurse) by Jamison Cortez RN (offgoing nurse). Report included the following information SBAR, Kardex, Intake/Output, MAR, Recent Results and Cardiac Rhythm Afib.

## 2018-10-09 NOTE — PROGRESS NOTES
1900  
Bedside report received from 4652 Jen Boyd of care discussed [address] Blood transfusing. VSS  
2100 Blood transfused. Tolerated well  
2230 Total bath given, linens changed. Trach care done 5860 Patient on SBT. 4370 [facility] Bedside report given to 320 Jen Poon of care discussed

## 2018-10-09 NOTE — PROGRESS NOTES
Nutrition Assessment: 
 
RECOMMENDATIONS:  
Continue TF as ordered ASSESSMENT:  
Chart reviewed, case discussed during CCU rounds. Pt remains on vent via trach, needs re-estimated. He is tolerating TF at goal rate with no residuals. TF meets 92% kcal and 108% protein needs. Pt is 25L + and has gained wt since last week. He should be getting <1700mL free water from TF and flushes, will monitor need to decrease these daily. Na currently 140. Dietitians Intervention(s)/Plan(s): Continue TF as ordered SUBJECTIVE/OBJECTIVE:  
Pt on vent via trach Diet Order: Other (comment) (PEG: TwoCal HN @ 45mL/h + 150mL H2O flush q 4h (provides 2160kcals/90gPro/1656mL) ) 
% Eaten:  No data found. TwoCal HN  at 45 mL/hr flush with 150 mL  Q4H  via PEG Tube   Residuals: 0 mL Pertinent Medications:pepcid. Chemistries: 
Lab Results Component Value Date/Time Sodium 140 10/09/2018 03:20 AM  
 Potassium 4.3 10/09/2018 03:20 AM  
 Chloride 106 10/09/2018 03:20 AM  
 CO2 30 10/09/2018 03:20 AM  
 Anion gap 4 (L) 10/09/2018 03:20 AM  
 Glucose 132 (H) 10/09/2018 03:20 AM  
 BUN 25 (H) 10/09/2018 03:20 AM  
 Creatinine 0.93 10/09/2018 03:20 AM  
 BUN/Creatinine ratio 27 (H) 10/09/2018 03:20 AM  
 GFR est AA >60 10/09/2018 03:20 AM  
 GFR est non-AA >60 10/09/2018 03:20 AM  
 Calcium 8.4 (L) 10/09/2018 03:20 AM  
 Albumin 1.7 (L) 10/09/2018 03:20 AM  
  
Anthropometrics: Height: 6' 2\" (188 cm) Weight: 117.9 kg (259 lb 14.8 oz)   [x]bed scale (10/9)   []stated   []unknown IBW (%IBW):   ( ) UBW (%UBW):   (  %) BMI: Body mass index is 33.37 kg/(m^2). This BMI is indicative of: 
[]Underweight   []Normal   []Overweight   [x] Obesity   [] Extreme Obesity (BMI>40) Estimated Nutrition Needs (Based on): 7049 Kcals/day ([facility] 1939)) , 83 g (0.8gPro/kg) Protein Carbohydrate:  At Least 130 g/day  Fluids: 1600 mL/day or per MD  
 
Last BM: flexiseal-daily OP   [x]Active     []Hyperactive  []Hypoactive [] Absent   BS Skin:    [] Intact   [] Incision  [] Breakdown   [] DTI   [x] Tears/Excoriation/Abrasion  [x]Edema(anasarca; +1 pitting-BUE; +2 pitting-BLE)  [] Other: Wt Readings from Last 30 Encounters:  
10/09/18 117.9 kg (259 lb 14.8 oz) 05/05/16 111.1 kg (245 lb)  
03/25/16 106.6 kg (235 lb) 04/08/15 103.4 kg (228 lb)  
03/25/15 100.7 kg (222 lb)  
01/16/13 105.3 kg (232 lb 3.2 oz) 01/03/13 103.3 kg (227 lb 11.8 oz) NUTRITION DIAGNOSES:  
Problem:  No new nutritional dx at this time. NUTRITION INTERVENTIONS: 
  Enteral/Parenteral Nutrition: Other (Continue TF as ordered) GOAL:  
Pt will tolerate TF @ goal rate with residuals <250mL in 3-5 days. NUTRITION MONITORING AND EVALUATION Previous Goal: Pt will tolerate TF @ goal rate with residuals <250mL in 2-4 days Previous Goal Met: Yes Previous Recommendations Implemented: Yes Cultural, Nondenominational, or Ethnic Dietary Needs: None LEARNING NEEDS (Diet, Food/Nutrient-Drug Interaction):  
 [x] None Identified 
 [] Identified and Education Provided/Documented 
 [] Identified and Pt declined/was not appropriate [x] Interdisciplinary Care Plan Reviewed/Documented  
 [x] Participated in Discharge Planning: See TF orders above [x] Interdisciplinary Rounds NUTRITION RISK:  
 [] High              [x] Moderate           [x]  Low  []  Minimal/Uncompromised Fartun Shaffer RD, [facility] Pager [phone] Weekend Pager [phone]

## 2018-10-09 NOTE — PROGRESS NOTES
Hospitalist Progress Note NAME: Elana Alexandre  
:  1934 MRN:  [id number] Assessment / Plan: 
Acute encephalopathy in setting of acute hypoxic respiratory failure and resultant PEA cardiac arrest :  agonal breathing after coming back from radiology after arteriogram completed, 6 min CPR and epi with ROSC 
- CT head  showed small age-indeterminate infarct in the left corona radiata 
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 This may contain a fluid/fluid level indicating 
abscess/hemorrhage, with neoplasm less likely. No acute abdominal or pelvic process. - s/p arteriogram : Thoracic aortic and intercostal arteriograms demonstrating no 
enlarged bronchial artery supplying the right lower lobe of the lung. Normal appearing and normal sized right bronchial artery is seen without any hyperplasia or dominant right lower lobe supply. No embolization was performed. - s/p diagnostic bronch on . No endobronchial lesions seen. - CTA chest  due to persistent bloody pulmonary secretions. Right lower lobe masslike abnormality demonstrates increased internal density and has increased in size measuring 6.7 x 6.7 cm, compared to 5.7 x 4.4 cm. This is compatible with internal hemorrhage. There is new moderate right lower lobe partial collapse/atelectasis. - CT chest  with persistent hematoma in the right lower lobe region with surrounding patchy atelectasis/infiltrate and small ipsilateral pleural effusion. Left basilar subsegmental atelectasis.  Coils in right lower lobe region consistent with recent pulmonary artery branch embolization. 
- blood cultures negative. Sputum cytology 9/12 negative except scant yeast.  Cytology from bronchial washing on 9/16 is atypical, favor benign reactive process. - work up for rheumatologic causes of hemoptysis negative: ANCA, anti-GBM, MPO Ab, ME-3 Ab, SSA/SSB, RNP Ab, Arredondo/RNP, dsDNA, and Arredondo Ab all negative. JOSS+. - completed zosyn for possible lung abscess (9/20) 
- on diuresis with lasix as tolerated   
CAD s/p PCI, atrial fibrillation on chronic anticoagulation w/coumadin, and benign HTN: 
- holding amiodarone and dlitiazem 
- holding anticoagulation (coumadin) and pravachol 
- on lasix at home - con't to monitor weight and dose prn   
Acute blood loss anemia and anemia of chronic disease Elevated LFTs of unclear etiology: 
- stopped statin for now - trend INR and LFTs, will con't to treat multiple underlying issues   
ALAN: resolved UTI, acute cystitis w/ hematuria, POA:  Required almaraz placed by urology Hyperlipidemia: statin held due to elevated LFTs as above Hypernatremia, resolved Obesity (Body mass index is 33.37 kg/(m^2) 
   
Code: Full DVT prophylaxis: SCDs Subjective: Chief Complaint / Reason for Physician Visit Not responsive. Discussed with RN events overnight. Review of Systems: 
Symptom Y/N Comments  Symptom Y/N Comments Fever/Chills    Chest Pain Poor Appetite    Edema Cough    Abdominal Pain Sputum    Joint Pain SOB/ROY    Pruritis/Rash Nausea/vomit    Tolerating PT/OT Diarrhea    Tolerating Diet Constipation    Other Could NOT obtain due to: Not responsive Objective: VITALS:  
Last 24hrs VS reviewed since prior progress note. Most recent are: 
Patient Vitals for the past 24 hrs: 
 Temp Pulse Resp BP SpO2  
10/09/18 0900 - 79 22 103/51 96 % 10/09/18 0812 - 85 16 - 100 % 10/09/18 0800 98.7 °F (37.1 °C) 82 22 119/69 100 % 10/09/18 0700 - 94 23 123/59 97 % 10/09/18 0600 - 100 17 99/63 96 % 10/09/18 0500 - 87 30 130/67 98 % 10/09/18 0400 99.9 °F (37.7 °C) 86 22 127/46 97 % 10/09/18 0334 - - 21 - -  
10/09/18 0300 - 86 22 101/54 98 % 10/09/18 0200 - 100 24 139/76 94 % 10/09/18 0100 - 86 25 135/57 96 % 10/09/18 0000 99.8 °F (37.7 °C) 92 25 123/66 95 % 10/08/18 2310 - - 28 - -  
10/08/18 2300 - (!) 107 25 153/75 91 % 10/08/18 2200 - 90 24 (!) 156/110 95 % 10/08/18 2100 - 87 26 114/52 97 % 10/08/18 2000 100.2 °F (37.9 °C) 80 24 117/62 97 % 10/08/18 1919 - - 23 - 96 % 10/08/18 1700 - 82 22 112/52 97 % 10/08/18 1600 99.5 °F (37.5 °C) 86 23 125/66 97 % 10/08/18 1500 - 82 21 115/59 98 % 10/08/18 1458 - - - - 98 % 10/08/18 1439 - 86 25 - 96 % 10/08/18 1400 - 80 25 104/64 98 % 10/08/18 1300 - 81 20 109/49 97 % 10/08/18 1200 - 85 24 117/60 98 % 10/08/18 1134 - 80 22 - 97 % 10/08/18 1130 - 79 23 - -  
10/08/18 1115 - 80 27 - -  
10/08/18 1100 - 77 23 121/55 -  
10/08/18 1030 - 74 23 - 97 % Intake/Output Summary (Last 24 hours) at 10/09/18 1029 Last data filed at 10/09/18 7172 Gross per 24 hour Intake             2195 ml Output             1250 ml Net              945 ml PHYSICAL EXAM: 
General: WD, WN. No alert, not cooperative, no acute distress   
EENT:  EOMI. Anicteric sclerae. MMM with midline trach with small amount of mucus surrounding. Resp:  CTA bilaterally, no wheezing or rales. No accessory muscle use CV:  Regular rhythm,  No edema GI:  Soft, moderately distended, Non tender.  +Bowel sounds Neurologic:  Not alert and oriented X 0-1, no speech, Psych:   No insight. Not anxious nor agitated Skin:  No rashes. No jaundice Reviewed most current lab test results and cultures  YES Reviewed most current radiology test results   YES Review and summation of old records today    NO Reviewed patient's current orders and MAR    YES 
 PMH/ reviewed - no change compared to H&P 
________________________________________________________________________ Care Plan discussed with: 
  Comments Patient x Family RN x Care Manager Consultant Multidiciplinary team rounds were held today with , nursing, pharmacist and clinical coordinator. Patient's plan of care was discussed; medications were reviewed and discharge planning was addressed. ________________________________________________________________________ Total NON critical care TIME:  35 Minutes Total CRITICAL CARE TIME Spent:   Minutes non procedure based Comments >50% of visit spent in counseling and coordination of care x   
________________________________________________________________________ Meseret Dial MD  
 
Procedures: see electronic medical records for all procedures/Xrays and details which were not copied into this note but were reviewed prior to creation of Plan. LABS: 
I reviewed today's most current labs and imaging studies. Pertinent labs include: 
Recent Labs 10/09/18 
 0320  10/08/18 
 0510  10/07/18 
 2347 WBC  12.4*  8.6  8.1 HGB  6.8*  7.7*  7.1*  
HCT  22.9*  25.9*  23.4*  
PLT  153  165  149* Recent Labs 10/09/18 
 0320  10/08/18 
 0510  10/07/18 
 0760 NA  140  142  142  
K  4.3  4.4  4.5  
CL  106  106  105 CO2  30  31  32 GLU  132*  116*  118* BUN  25*  22*  22* CREA  0.93  0.84  0.74 CA  8.4*  8.0*  8.0*  
MG  2.2  2.3  2.3 PHOS  2.3*  2.5*  2.4* ALB  1.7*   --    --   
TBILI  0.6   --    --   
SGOT  131*   --    --   
ALT  163*   --    --   
INR  1.1  1.1  1.1 Signed: Meseret Dial MD

## 2018-10-10 LAB
ABO + RH BLD: NORMAL
ALBUMIN SERPL-MCNC: 1.9 G/DL (ref 3.5–5)
ALBUMIN/GLOB SERPL: 0.3 {RATIO} (ref 1.1–2.2)
ALP SERPL-CCNC: 267 U/L (ref 45–117)
ALT SERPL-CCNC: 152 U/L (ref 12–78)
ANION GAP SERPL CALC-SCNC: 3 MMOL/L (ref 5–15)
APTT PPP: 28.3 SEC (ref 22.1–32)
AST SERPL-CCNC: 105 U/L (ref 15–37)
BACTERIA SPEC CULT: NORMAL
BACTERIA SPEC CULT: NORMAL
BASOPHILS # BLD: 0 K/UL (ref 0–0.1)
BASOPHILS NFR BLD: 0 % (ref 0–1)
BILIRUB SERPL-MCNC: 0.7 MG/DL (ref 0.2–1)
BLD PROD TYP BPU: NORMAL
BLOOD GROUP ANTIBODIES SERPL: NORMAL
BPU ID: NORMAL
BUN SERPL-MCNC: 23 MG/DL (ref 6–20)
BUN/CREAT SERPL: 28 (ref 12–20)
CALCIUM SERPL-MCNC: 8.6 MG/DL (ref 8.5–10.1)
CHLORIDE SERPL-SCNC: 106 MMOL/L (ref 97–108)
CO2 SERPL-SCNC: 32 MMOL/L (ref 21–32)
CREAT SERPL-MCNC: 0.83 MG/DL (ref 0.7–1.3)
CROSSMATCH RESULT,%XM: NORMAL
DIFFERENTIAL METHOD BLD: ABNORMAL
EOSINOPHIL # BLD: 0.3 K/UL (ref 0–0.4)
EOSINOPHIL NFR BLD: 2 % (ref 0–7)
ERYTHROCYTE [DISTWIDTH] IN BLOOD BY AUTOMATED COUNT: 21.9 % (ref 11.5–14.5)
FIBRINOGEN PPP-MCNC: 774 MG/DL (ref 200–475)
GLOBULIN SER CALC-MCNC: 6 G/DL (ref 2–4)
GLUCOSE SERPL-MCNC: 131 MG/DL (ref 65–100)
HCT VFR BLD AUTO: 26.8 % (ref 36.6–50.3)
HGB BLD-MCNC: 8 G/DL (ref 12.1–17)
IMM GRANULOCYTES # BLD: 0 K/UL (ref 0–0.04)
IMM GRANULOCYTES NFR BLD AUTO: 0 % (ref 0–0.5)
INR PPP: 1.1 (ref 0.9–1.1)
LYMPHOCYTES # BLD: 0.9 K/UL (ref 0.8–3.5)
LYMPHOCYTES NFR BLD: 7 % (ref 12–49)
MAGNESIUM SERPL-MCNC: 2.3 MG/DL (ref 1.6–2.4)
MCH RBC QN AUTO: 31.3 PG (ref 26–34)
MCHC RBC AUTO-ENTMCNC: 29.9 G/DL (ref 30–36.5)
MCV RBC AUTO: 104.7 FL (ref 80–99)
METAMYELOCYTES NFR BLD MANUAL: 1 %
MONOCYTES # BLD: 0.9 K/UL (ref 0–1)
MONOCYTES NFR BLD: 7 % (ref 5–13)
NEUTS BAND NFR BLD MANUAL: 1 %
NEUTS SEG # BLD: 10.9 K/UL (ref 1.8–8)
NEUTS SEG NFR BLD: 82 % (ref 32–75)
NRBC # BLD: 0.02 K/UL (ref 0–0.01)
NRBC BLD-RTO: 0.2 PER 100 WBC
PHOSPHATE SERPL-MCNC: 2.5 MG/DL (ref 2.6–4.7)
PLATELET # BLD AUTO: 164 K/UL (ref 150–400)
PMV BLD AUTO: 12 FL (ref 8.9–12.9)
POTASSIUM SERPL-SCNC: 4.3 MMOL/L (ref 3.5–5.1)
PROT SERPL-MCNC: 7.9 G/DL (ref 6.4–8.2)
PROTHROMBIN TIME: 11.5 SEC (ref 9–11.1)
RBC # BLD AUTO: 2.56 M/UL (ref 4.1–5.7)
RBC MORPH BLD: ABNORMAL
RBC MORPH BLD: ABNORMAL
SERVICE CMNT-IMP: NORMAL
SODIUM SERPL-SCNC: 141 MMOL/L (ref 136–145)
SPECIMEN EXP DATE BLD: NORMAL
STATUS OF UNIT,%ST: NORMAL
THERAPEUTIC RANGE,PTTT: ABNORMAL SECS (ref 58–77)
UNIT DIVISION, %UDIV: 0
WBC # BLD AUTO: 13.1 K/UL (ref 4.1–11.1)

## 2018-10-10 PROCEDURE — 65610000006 HC RM INTENSIVE CARE

## 2018-10-10 PROCEDURE — 36415 COLL VENOUS BLD VENIPUNCTURE: CPT | Performed by: INTERNAL MEDICINE

## 2018-10-10 PROCEDURE — 74011000250 HC RX REV CODE- 250: Performed by: INTERNAL MEDICINE

## 2018-10-10 PROCEDURE — 80053 COMPREHEN METABOLIC PANEL: CPT | Performed by: INTERNAL MEDICINE

## 2018-10-10 PROCEDURE — 74011250636 HC RX REV CODE- 250/636: Performed by: INTERNAL MEDICINE

## 2018-10-10 PROCEDURE — 94640 AIRWAY INHALATION TREATMENT: CPT

## 2018-10-10 PROCEDURE — 85610 PROTHROMBIN TIME: CPT | Performed by: INTERNAL MEDICINE

## 2018-10-10 PROCEDURE — 74011250637 HC RX REV CODE- 250/637: Performed by: INTERNAL MEDICINE

## 2018-10-10 PROCEDURE — 77030018836 HC SOL IRR NACL ICUM -A

## 2018-10-10 PROCEDURE — 84100 ASSAY OF PHOSPHORUS: CPT | Performed by: INTERNAL MEDICINE

## 2018-10-10 PROCEDURE — 94003 VENT MGMT INPAT SUBQ DAY: CPT

## 2018-10-10 PROCEDURE — 83735 ASSAY OF MAGNESIUM: CPT | Performed by: INTERNAL MEDICINE

## 2018-10-10 PROCEDURE — 85025 COMPLETE CBC W/AUTO DIFF WBC: CPT | Performed by: INTERNAL MEDICINE

## 2018-10-10 PROCEDURE — 51798 US URINE CAPACITY MEASURE: CPT

## 2018-10-10 RX ORDER — FUROSEMIDE 10 MG/ML
20 INJECTION INTRAMUSCULAR; INTRAVENOUS DAILY
Status: DISCONTINUED | OUTPATIENT
Start: 2018-10-10 | End: 2018-10-14

## 2018-10-10 RX ADMIN — Medication 10 ML: at 22:26

## 2018-10-10 RX ADMIN — IPRATROPIUM BROMIDE AND ALBUTEROL SULFATE 3 ML: .5; 3 SOLUTION RESPIRATORY (INHALATION) at 15:30

## 2018-10-10 RX ADMIN — IPRATROPIUM BROMIDE AND ALBUTEROL SULFATE 3 ML: .5; 3 SOLUTION RESPIRATORY (INHALATION) at 08:08

## 2018-10-10 RX ADMIN — IPRATROPIUM BROMIDE AND ALBUTEROL SULFATE 3 ML: .5; 3 SOLUTION RESPIRATORY (INHALATION) at 11:24

## 2018-10-10 RX ADMIN — ACETAMINOPHEN 650 MG: 325 SOLUTION ORAL at 20:51

## 2018-10-10 RX ADMIN — FUROSEMIDE 20 MG: 10 INJECTION, SOLUTION INTRAMUSCULAR; INTRAVENOUS at 20:51

## 2018-10-10 RX ADMIN — SODIUM CHLORIDE, PRESERVATIVE FREE 300 UNITS: 5 INJECTION INTRAVENOUS at 11:28

## 2018-10-10 RX ADMIN — LATANOPROST 1 DROP: 50 SOLUTION OPHTHALMIC at 21:02

## 2018-10-10 RX ADMIN — SODIUM CHLORIDE, PRESERVATIVE FREE 300 UNITS: 5 INJECTION INTRAVENOUS at 11:27

## 2018-10-10 RX ADMIN — FAMOTIDINE 20 MG: 10 INJECTION, SOLUTION INTRAVENOUS at 10:59

## 2018-10-10 RX ADMIN — CHLORHEXIDINE GLUCONATE 15 ML: 1.2 RINSE ORAL at 20:38

## 2018-10-10 RX ADMIN — Medication 10 ML: at 05:45

## 2018-10-10 RX ADMIN — DORZOLAMIDE HYDROCHLORIDE AND TIMOLOL MALEATE 1 DROP: 20; 5 SOLUTION/ DROPS OPHTHALMIC at 18:30

## 2018-10-10 RX ADMIN — CHLORHEXIDINE GLUCONATE 15 ML: 1.2 RINSE ORAL at 10:15

## 2018-10-10 RX ADMIN — IPRATROPIUM BROMIDE AND ALBUTEROL SULFATE 3 ML: .5; 3 SOLUTION RESPIRATORY (INHALATION) at 19:57

## 2018-10-10 RX ADMIN — Medication 10 ML: at 05:44

## 2018-10-10 RX ADMIN — FAMOTIDINE 20 MG: 10 INJECTION, SOLUTION INTRAVENOUS at 20:51

## 2018-10-10 RX ADMIN — Medication 40 ML: at 14:30

## 2018-10-10 RX ADMIN — Medication 20 ML: at 11:06

## 2018-10-10 RX ADMIN — Medication 10 ML: at 11:05

## 2018-10-10 RX ADMIN — Medication 10 ML: at 14:30

## 2018-10-10 RX ADMIN — DORZOLAMIDE HYDROCHLORIDE AND TIMOLOL MALEATE 1 DROP: 20; 5 SOLUTION/ DROPS OPHTHALMIC at 11:00

## 2018-10-10 NOTE — PROGRESS NOTES
10/10/18 9934 Vent Settings FIO2 (%) 40 % Pressure Support (cm H2O) 5 cm H2O  
PEEP/VENT (cm H2O) 6 cm H20 ABCDEF Bundle SBT Safety Screen Passed Yes SBT Trial Passed Yes Weaning Parameters Spontaneous Breathing Trial Complete Yes Resp Rate Observed 27 Ve 11.2  RSBI 72 Vent Method/Mode Ventilation Method Conventional  
Ventilator Mode Spontaneous

## 2018-10-10 NOTE — PROGRESS NOTES
[facility] Pulmonary Consult Service NotePulmonary, Critical Care, and Sleep Medicine Name: Bravo Cool MRN: [id number] : 1934 Hospital: [facility] Date: 10/10/2018   Hospital Day: 27 IMPRESSION:  
1. 18 cardiopulmonary arrest with  6 min of CPR then ROSC. 2. Acute respiratory failure-on vent- has not been able to do SBT. Can do ongoing evals. May need LTAC, Will introduce to family.  to provide info to family. 3. Acute hemoptysis- Noted to have bleeding from RLL on Bronch. No endobronchial lesion. has lung mass(CA vs inflammatory pseudotumor) and microscopic hematuria; ANCA negative; JOSS barely positive. Suspect LUNG mass the culprit. CYTOLOGY from bronchoscopy negative malignancy x 2, at this point not pursuing additional interventions. 4. S/P pulmonary artery embolization for hemoptysis Per IR. 5. COPD moderate to severe at baseline 6. Acute renal insf now resolved. 7. Anemia and prior coagulopathy on coumadin-had blood transfusion. 8. Volume overload, anasarca- after couple of days of IV lasix- becomes prerenal 
9. Encehalopathy/Debility multifactorial - has hearing loss as well, has not woke up as expected. 10. Hematuria, seems to be improving had almaraz in place. Has ongoing issue with Blood clots in urine, nurses have been having to flush frequently  To get Urine output per their report. 11. Hx of CAD s/p PCI, afib on amio, former smoker, prior cva 12. Discussed with nurse on rounds this am.  
13. Dr. Sha Lion discussed with pts family. CM has reached out to family. Will start process of LTAC eval.   
  
RECOMMENDATIONS/PLAN:  
1. Vent support, TC trials as tolerated. Seemed to tolerate CPAP briefly this am. Ongoing trials 2. Trach care 3. Still has unresolved issue of lung mass, will having ongoing family discussions. Palliative care is following. No further Bx at this point. 4. Abx completed 5. Diuresis as needed 6. Follow renal function, WNL 7. Enteral feedings tolerating. 8.  No need for transfusion 9. Glucose monitoring and SSI 10. DVT/GI prophylaxis 11. Will need LTAC when becomes medically stable. Subjective/Initial History: S/p trach 10/2 Vent support 10-10-18: No major changes or events over last 24 hrs. Not able to obtain ROS or HPI from pt. Some thick yellow secretions present around this trach. 10-9-18: Not able to get hx  Or ROS from pt. No acute changes overnight. Had some moderate secretions this am.  
 
 
10-8-18: Pt still not waking up. Has ongoing issues with blood clots in urine. Per nurses has been with recurrent issues with clotting and require frequent flushing of the almaraz. Pt has not been able to tolerate trach collar. Unable to get ROS or HPI from pt. MAR reviewed and pertinent medications noted or modified as needed Current Facility-Administered Medications Medication  zinc oxide-cod liver oil (DESITIN) 40 % paste  albuterol-ipratropium (DUO-NEB) 2.5 MG-0.5 MG/3 ML  
 acetaminophen (TYLENOL) solution 650 mg  
 sodium chloride (NS) flush 10-30 mL  sodium chloride (NS) flush 10 mL  sodium chloride (NS) flush 10 mL  sodium chloride (NS) flush 10-40 mL  sodium chloride (NS) flush 20 mL  heparin (porcine) pf 300 Units  glycopyrrolate (ROBINUL) injection 0.1 mg  
 fentaNYL citrate (PF) injection 25-50 mcg  famotidine (PF) (PEPCID) 20 mg in sodium chloride 0.9% 10 mL injection  chlorhexidine (PERIDEX) 0.12 % mouthwash 15 mL  fentaNYL citrate (PF) injection 25 mcg  albuterol (PROVENTIL HFA, VENTOLIN HFA, PROAIR HFA) inhaler 2 Puff  dorzolamide-timolol (COSOPT) 22.3-6.8 mg/mL ophthalmic solution 1 Drop  latanoprost (XALATAN) 0.005 % ophthalmic solution 1 Drop  sodium chloride (NS) flush 5-10 mL  sodium chloride (NS) flush 5-10 mL  ondansetron (ZOFRAN) injection 4 mg ROS:A comprehensive review of systems was negative except for that written in the HPI. Objective: 
 
Vital Signs: Telemetry:    normal sinus rhythmIntake/Output:  
Visit Vitals  /71  Pulse 89  Temp 98.9 °F (37.2 °C)  Resp 23  
 Ht 6' 2\" (1.88 m)  Wt 119.1 kg (262 lb 9.1 oz)  SpO2 97%  BMI 33.71 kg/m2 Temp (24hrs), Av.6 °F (37 °C), Min:97.3 °F (36.3 °C), Max:99.1 °F (37.3 °C) O2 Device: Tracheostomy, Ventilator O2 Flow Rate (L/min): 10 l/min Wt Readings from Last 4 Encounters:  
10/10/18 119.1 kg (262 lb 9.1 oz) 16 111.1 kg (245 lb)  
16 106.6 kg (235 lb) 04/08/15 103.4 kg (228 lb) Intake/Output Summary (Last 24 hours) at 10/10/18 1702 Last data filed at 10/10/18 6187 Gross per 24 hour Intake           2090.8 ml Output             1365 ml Net            725.8 ml Last shift:        
Last 3 shifts: 10/08 1901 - 10/10 0700 In: 2930.8 Out: 2165 [Urine:1750; Drains:415] Physical Exam:  
 General:  No distress HEAD: Normocephalic, without obvious abnormality, atraumatic EYES: conjunctivae clear. anicteric sclerae NOSE: nares normal, no drainage, no nasal flaring, Neck: Supple, symmetrical, trachea midline, has trach in place, on Mechanical vent support. Chest: increased AP diameter Lungs: pretty clear anterioly. Trached on vent. Heart: Regular rate and rhythm nl s1,2. Abdomen: soft, non-tender, +BS, pt has almaraz in place. Blood urine present. Obese. Musculoskeletal: anasarca; no erythema, minimal peripheral edema, has SCDs in place. Neuro: seems at least to intermittently to follow commands. Psych: Not able to assess; no agitation. NO anxiety or depression when seen. Data:  
 
Current Facility-Administered Medications Medication Dose Route Frequency  albuterol-ipratropium (DUO-NEB) 2.5 MG-0.5 MG/3 ML  3 mL Nebulization QID RT  
  sodium chloride (NS) flush 10 mL  10 mL InterCATHeter Q24H  
 sodium chloride (NS) flush 10-40 mL  10-40 mL InterCATHeter Q8H  
 sodium chloride (NS) flush 20 mL  20 mL InterCATHeter Q24H  
 famotidine (PF) (PEPCID) 20 mg in sodium chloride 0.9% 10 mL injection  20 mg IntraVENous Q12H  chlorhexidine (PERIDEX) 0.12 % mouthwash 15 mL  15 mL Oral BID  dorzolamide-timolol (COSOPT) 22.3-6.8 mg/mL ophthalmic solution 1 Drop  1 Drop Both Eyes BID  latanoprost (XALATAN) 0.005 % ophthalmic solution 1 Drop  1 Drop Both Eyes QHS  sodium chloride (NS) flush 5-10 mL  5-10 mL IntraVENous Q8H Labs: 
Recent Labs 10/10/18 
 0247  10/09/18 
 0320  10/08/18 
 0510 WBC  13.1*  12.4*  8.6 HGB  8.0*  6.8*  7.7* HCT  26.8*  22.9*  25.9*  
PLT  164  153  165 Recent Labs 10/10/18 
 0247  10/09/18 
 0320  10/08/18 
 0510 NA  141  140  142  
K  4.3  4.3  4.4  
CL  106  106  106 CO2  32  30  31 GLU  131*  132*  116* BUN  23*  25*  22* CREA  0.83  0.93  0.84 CA  8.6  8.4*  8.0*  
MG  2.3  2.2  2.3 PHOS  2.5*  2.3*  2.5* ALB  1.9*  1.7*   --   
TBILI  0.7  0.6   --   
SGOT  105*  131*   --   
ALT  152*  163*   --   
INR  1.1  1.1  1.1 Recent Labs 10/08/18 
 7452 PH  7.44  
PCO2  49* PO2  79* HCO3  32* FIO2  40 Imaging: 
I have personally reviewed the patients radiographs and have reviewed the reports: 
10-9-18: CXR: 
 
Right pleural effusion, trach intact. Mild IE. Has PPM in place. Total critical care time exclusive of procedures:  minutes Pastor Lacey MD

## 2018-10-10 NOTE — PROGRESS NOTES
Nutrition:  Chart reviewed, case discussed during CCU rounds. Pulmonology concerned about low albumin, requested TF be increased. Will increase TF goal rate to 50mL/h and decrease H2O flushes to 100mL q 4h which will provide 2400kcals/100gPro/1440mL. TF will meet 100% kcal and 120% protein needs. Pt is +25.97L today, on Monday he was +23.8L. Will monitor fluid status closely. Will continue to monitor pt's case. Thank you! Kiko Kent RD, [facility] Pager [phone]

## 2018-10-10 NOTE — PROGRESS NOTES
Care Management: 
 
Patient remains in the ICU with vent needs. He has a trach and peg . Anticipate rehab needs post ICU and I have talked with MD and wife Ligia Villasenor about possible Christelle Magana. Mrs Edgar Joshua would like to talk with her children about Abe Clark. I have left a hand out about Abe Clark in room for Mrs Edgar Joshua.  
 
I spoke with Dr Gee Natarajan and we will give family time to discuss Abe Clark and talk with MD before consulting Abe Clark. I have also updated emergency contacts per Mrs Edgar Joshua request.  
 
Nadya Santos crm acm [id number]

## 2018-10-10 NOTE — PROGRESS NOTES
Bedside shift change report given to Elina Mcintosh (oncoming nurse) by Liam Frost, RN (offgoing nurse). Report included the following information SBAR.  
 
0800: AM assessment complete. See flow sheet. 1130: PICC line difficult to flush and does not have any blood return. PICC line heparinized. 1200: Reassessment complete. No changes at this time. 1600: Reassessment complete. No changes at this time. 1645: Dr. Danielle Child aware of blood in almaraz bag. Orders received to bladder scan pt and call urology. 1700: Spoke with Dr. Charlotte Joyce on the phone. Orders received to irrigate the pt's almaraz until clear. 1735: Dr. Charlotte Joyce at bedside to check on almaraz. 1900: Bedside shift change report given to Jaclyn Washington RN (oncoming nurse) by Davis Cole RN (offgoing nurse). Report included the following information SBAR.

## 2018-10-10 NOTE — PROGRESS NOTES
Asked by nurse to reevaluate hematuria. Irrigated by her for few old clots. Light red now. REC:  Given clinical situation, prn as above is sufficient. Orders placed. Call as needed

## 2018-10-10 NOTE — PROGRESS NOTES
Hospitalist Progress Note NAME: Robles Parsons  
:  1934 MRN:  [id number] Assessment / Plan: 
Acute encephalopathy in setting of acute hypoxic respiratory failure and resultant PEA cardiac arrest :  agonal breathing after coming back from radiology after arteriogram completed, 6 min CPR and epi with ROSC. Mental status continues to be below baseline, limiting treatment. 
- CT head  showed small age-indeterminate infarct in the left corona radiata 
- con't to treat multiple complex issues as below 
- family meeting held today with pulmonology to discuss goals of care. Pt will need LTAC minimally if they want to continue to pursue aggressive care; they are discussing. Pulmonary hemorrhage s/p pulmonary artery embolization with RLL lung mass (ca vs inflammatory pseudotumor) and COPD: extubated on  but reintubated , now s/p trach placement 10/3 as above - still on vent via trach, FiO2 40% but mental status is poor. - CT chest  with patchy airspace disease in the right lung base with an associated hyperdense 5.2 x 4.3 cm oval lesion. This may contain a fluid/fluid level indicating 
abscess/hemorrhage, with neoplasm less likely. No acute abdominal or pelvic process. - CTA chest  due to persistent bloody pulmonary secretions. Right lower lobe masslike abnormality demonstrates increased internal density and has increased in size measuring 6.7 x 6.7 cm, compared to 5.7 x 4.4 cm. This is compatible with internal hemorrhage. There is new moderate right lower lobe partial collapse/atelectasis. - CT chest  with persistent hematoma in the right lower lobe region with surrounding patchy atelectasis/infiltrate and small ipsilateral pleural effusion. Left basilar subsegmental atelectasis. Coils in right lower lobe region consistent with recent pulmonary artery branch embolization. 
- CXR 10/9 with congestive changes and right pleural effusion unchanged - con't lasix IV, dose reduced; monitor I/Os - s/p arteriogram 9/14: Thoracic aortic and intercostal arteriograms demonstrating no 
enlarged bronchial artery supplying the right lower lobe of the lung. Normal appearing and normal sized right bronchial artery is seen without any hyperplasia or dominant right lower lobe supply. No embolization was performed. - s/p diagnostic bronch on 9/16. No endobronchial lesions seen. - blood cultures negative. Sputum cytology 9/12 negative except scant yeast. Cytology from bronchial washing on 9/16 is atypical, favor benign reactive process. - work up for rheumatologic causes of hemoptysis negative: ANCA, anti-GBM, MPO Ab, [place]-3 Ab, SSA/SSB, RNP Ab, Arredondo/RNP, dsDNA, and Arredondo Ab all negative. JOSS+. - completed zosyn for possible lung abscess (9/20)  
CAD s/p PCI, atrial fibrillation on chronic anticoagulation w/coumadin, and benign HTN: 
- holding amiodarone and dlitiazem 
- holding anticoagulation (coumadin) and pravachol 
- on lasix at home as well   
Acute blood loss anemia and anemia of chronic disease Elevated LFTs of unclear etiology: 
- stopped statin for now - trend INR and LFTs, will con't to treat multiple underlying issues. INR 1.1 today. ALAN: resolved UTI, acute cystitis w/ hematuria, POA:  Required almaraz placed by urology Hyperlipidemia: statin held due to elevated LFTs as above Hypernatremia, resolved Obesity (Body mass index is 33.71 kg/(m^2) 
   
Code: Full (wife and children are decision makers) DVT prophylaxis: SCDs Subjective: Chief Complaint / Reason for Physician Visit Minimally responsive. Discussed with RN events overnight. Review of Systems: 
Symptom Y/N Comments  Symptom Y/N Comments Fever/Chills    Chest Pain Poor Appetite    Edema Cough    Abdominal Pain Sputum    Joint Pain SOB/ROY    Pruritis/Rash Nausea/vomit    Tolerating PT/OT Diarrhea    Tolerating Diet Constipation    Other Could NOT obtain due to: encephalopathy Objective: VITALS:  
Last 24hrs VS reviewed since prior progress note. Most recent are: 
Patient Vitals for the past 24 hrs: 
 Temp Pulse Resp BP SpO2  
10/10/18 1125 - 97 25 - 97 % 10/10/18 1100 - 97 25 121/53 96 % 10/10/18 1000 - 98 25 123/63 96 % 10/10/18 0900 - 89 24 111/47 97 % 10/10/18 0809 - 86 21 - 96 % 10/10/18 0800 100.2 °F (37.9 °C) 92 23 139/77 100 % 10/10/18 0600 - 89 23 150/71 97 % 10/10/18 0500 - 83 15 134/69 98 % 10/10/18 0407 - 88 18 - 97 % 10/10/18 0400 98.9 °F (37.2 °C) 79 22 126/67 98 % 10/10/18 0300 - 93 22 155/80 97 % 10/10/18 0200 - 93 23 136/75 97 % 10/10/18 0100 - 91 21 153/69 99 % 10/10/18 0014 - 73 20 - 97 % 10/10/18 0000 98.9 °F (37.2 °C) 83 24 118/86 97 % 10/09/18 2300 - 87 21 133/70 98 % 10/09/18 2200 - 85 22 125/70 98 % 10/09/18 2100 98.9 °F (37.2 °C) 83 26 119/67 99 % 10/09/18 2030 98.9 °F (37.2 °C) 95 26 144/79 97 % 10/09/18 2000 98.9 °F (37.2 °C) 80 23 103/73 100 % 10/09/18 1950 - 85 19 - 97 % 10/09/18 1945 98.9 °F (37.2 °C) 79 19 123/59 98 % 10/09/18 1915 97.7 °F (36.5 °C) 85 23 131/58 98 % 10/09/18 1910 97.8 °F (36.6 °C) 87 17 136/68 97 % 10/09/18 1908 99.1 °F (37.3 °C) 91 19 136/68 97 % 10/09/18 1800 - 93 21 120/88 97 % 10/09/18 1700 - 96 23 113/44 97 % 10/09/18 1619 97.3 °F (36.3 °C) 93 21 122/49 98 % 10/09/18 1600 - 99 22 114/59 97 % 10/09/18 1512 - (!) 102 24 - 96 % 10/09/18 1500 - 97 26 135/68 96 % 10/09/18 1400 - 90 18 109/68 98 % 10/09/18 1300 - 95 21 111/58 97 % 10/09/18 1237 98.8 °F (37.1 °C) 90 23 108/50 97 % 10/09/18 1200 - 85 25 139/66 98 % 10/09/18 1150 - 91 24 - 97 % Intake/Output Summary (Last 24 hours) at 10/10/18 1143 Last data filed at 10/10/18 0900 Gross per 24 hour Intake           2090.8 ml Output             1090 ml Net           1000.8 ml PHYSICAL EXAM: 
 General: WD, WN. Eyes open, but remains unresponsive and not cooperative, no acute distress   
EENT:  EOM intact? Anicteric sclerae. MMM. Midline trach with small amount of surrounding yellow sputum. Resp:  CTA bilaterally, no wheezing or rales. No accessory muscle use CV:  Regular rhythm,  No edema GI:  Soft, Non distended, Non tender.  +Bowel sounds Neurologic:  Oriented X 0-1, no speech, Psych:   No insight. Not anxious nor agitated Skin:  No rashes. No jaundice Reviewed most current lab test results and cultures  YES Reviewed most current radiology test results   YES Review and summation of old records today    NO Reviewed patient's current orders and MAR    YES 
PMH/SH reviewed - no change compared to H&P 
________________________________________________________________________ Care Plan discussed with: 
  Comments Patient x Family RN x Care Manager Consultant Multidiciplinary team rounds were held today with , nursing, pharmacist and clinical coordinator. Patient's plan of care was discussed; medications were reviewed and discharge planning was addressed. ________________________________________________________________________ Total NON critical care TIME:  20 Minutes Total CRITICAL CARE TIME Spent:   Minutes non procedure based Comments >50% of visit spent in counseling and coordination of care x   
________________________________________________________________________ Sandra Garza MD  
 
Procedures: see electronic medical records for all procedures/Xrays and details which were not copied into this note but were reviewed prior to creation of Plan. LABS: 
I reviewed today's most current labs and imaging studies. Pertinent labs include: 
Recent Labs 10/10/18 
 0247  10/09/18 
 0320  10/08/18 
 0510 WBC  13.1*  12.4*  8.6 HGB  8.0*  6.8*  7.7* HCT  26.8*  22.9*  25.9*  
PLT  164  153  165 Recent Labs 10/10/18 
 0247  10/09/18 
 0320  10/08/18 
 0510 NA  141  140  142  
K  4.3  4.3  4.4  
CL  106  106  106 CO2  32  30  31 GLU  131*  132*  116* BUN  23*  25*  22* CREA  0.83  0.93  0.84 CA  8.6  8.4*  8.0*  
MG  2.3  2.2  2.3 PHOS  2.5*  2.3*  2.5* ALB  1.9*  1.7*   --   
TBILI  0.7  0.6   --   
SGOT  105*  131*   --   
ALT  152*  163*   --   
INR  1.1  1.1  1.1 Signed: Niall Nicole MD

## 2018-10-10 NOTE — PROGRESS NOTES
The family met with Dr. Fritzi Aase who provided an update to the patient's spouse, sister, son (one of four), and daughter (one of two, via phone). The status included a statement expressing the limited progress that the patient is making. The patient is receiving oxygen support after a trac procedure and a peg for nutrition. Dr. Fritzi Aase led the discussion by posing that the family decide what measures would be acceptable if the patient has another cardiac issue? The patient may or may not be accepted by a rehab facility to wean the patient  off of breathing support and taught to swallow. Whether the patient is accepted, the spouse (family) needs to determine what the patient would want in the event the patient's baseline remains unchanged. The family appreciated the time and information that was provided by Dr. Fritzi Aase. After dr. Fritzi Aase left, the family was offered, and accepted, prayer on behalf of the patient and family. [address] Francois Espinoza EdD, MDiv [facility] Page [phone])

## 2018-10-11 ENCOUNTER — APPOINTMENT (OUTPATIENT)
Dept: INTERVENTIONAL RADIOLOGY/VASCULAR | Age: 83
DRG: 003 | End: 2018-10-11
Attending: INTERNAL MEDICINE
Payer: MEDICARE

## 2018-10-11 ENCOUNTER — APPOINTMENT (OUTPATIENT)
Dept: CT IMAGING | Age: 83
DRG: 003 | End: 2018-10-11
Attending: INTERNAL MEDICINE
Payer: MEDICARE

## 2018-10-11 ENCOUNTER — APPOINTMENT (OUTPATIENT)
Dept: GENERAL RADIOLOGY | Age: 83
DRG: 003 | End: 2018-10-11
Attending: INTERNAL MEDICINE
Payer: MEDICARE

## 2018-10-11 LAB
ALBUMIN SERPL-MCNC: 1.7 G/DL (ref 3.5–5)
ALBUMIN/GLOB SERPL: 0.3 {RATIO} (ref 1.1–2.2)
ALP SERPL-CCNC: 255 U/L (ref 45–117)
ALT SERPL-CCNC: 126 U/L (ref 12–78)
ANION GAP SERPL CALC-SCNC: 4 MMOL/L (ref 5–15)
APPEARANCE UR: ABNORMAL
APTT PPP: 28.7 SEC (ref 22.1–32)
AST SERPL-CCNC: 97 U/L (ref 15–37)
BACTERIA URNS QL MICRO: ABNORMAL /HPF
BASOPHILS # BLD: 0 K/UL (ref 0–0.1)
BASOPHILS NFR BLD: 0 % (ref 0–1)
BILIRUB SERPL-MCNC: 0.7 MG/DL (ref 0.2–1)
BILIRUB UR QL: NEGATIVE
BUN SERPL-MCNC: 25 MG/DL (ref 6–20)
BUN/CREAT SERPL: 27 (ref 12–20)
CALCIUM SERPL-MCNC: 8.2 MG/DL (ref 8.5–10.1)
CHLORIDE SERPL-SCNC: 106 MMOL/L (ref 97–108)
CO2 SERPL-SCNC: 31 MMOL/L (ref 21–32)
COLOR UR: ABNORMAL
CREAT SERPL-MCNC: 0.94 MG/DL (ref 0.7–1.3)
DIFFERENTIAL METHOD BLD: ABNORMAL
EOSINOPHIL # BLD: 0 K/UL (ref 0–0.4)
EOSINOPHIL NFR BLD: 0 % (ref 0–7)
EPITH CASTS URNS QL MICRO: ABNORMAL /LPF
ERYTHROCYTE [DISTWIDTH] IN BLOOD BY AUTOMATED COUNT: 21.7 % (ref 11.5–14.5)
FIBRINOGEN PPP-MCNC: 774 MG/DL (ref 200–475)
GLOBULIN SER CALC-MCNC: 5.6 G/DL (ref 2–4)
GLUCOSE SERPL-MCNC: 132 MG/DL (ref 65–100)
GLUCOSE UR STRIP.AUTO-MCNC: NEGATIVE MG/DL
HCT VFR BLD AUTO: 26.5 % (ref 36.6–50.3)
HGB BLD-MCNC: 8 G/DL (ref 12.1–17)
HGB UR QL STRIP: ABNORMAL
HYALINE CASTS URNS QL MICRO: ABNORMAL /LPF (ref 0–5)
IMM GRANULOCYTES # BLD: 0.3 K/UL (ref 0–0.04)
IMM GRANULOCYTES NFR BLD AUTO: 2 % (ref 0–0.5)
INR PPP: 1.2 (ref 0.9–1.1)
KETONES UR QL STRIP.AUTO: NEGATIVE MG/DL
LEUKOCYTE ESTERASE UR QL STRIP.AUTO: ABNORMAL
LYMPHOCYTES # BLD: 0.6 K/UL (ref 0.8–3.5)
LYMPHOCYTES NFR BLD: 4 % (ref 12–49)
MAGNESIUM SERPL-MCNC: 2.2 MG/DL (ref 1.6–2.4)
MCH RBC QN AUTO: 32 PG (ref 26–34)
MCHC RBC AUTO-ENTMCNC: 30.2 G/DL (ref 30–36.5)
MCV RBC AUTO: 106 FL (ref 80–99)
MONOCYTES # BLD: 1.1 K/UL (ref 0–1)
MONOCYTES NFR BLD: 7 % (ref 5–13)
MUCOUS THREADS URNS QL MICRO: ABNORMAL /LPF
NEUTS SEG # BLD: 13.2 K/UL (ref 1.8–8)
NEUTS SEG NFR BLD: 87 % (ref 32–75)
NITRITE UR QL STRIP.AUTO: POSITIVE
NRBC # BLD: 0.02 K/UL (ref 0–0.01)
NRBC BLD-RTO: 0.1 PER 100 WBC
PH UR STRIP: 6.5 [PH] (ref 5–8)
PHOSPHATE SERPL-MCNC: 2.6 MG/DL (ref 2.6–4.7)
PLATELET # BLD AUTO: 126 K/UL (ref 150–400)
PMV BLD AUTO: 11.9 FL (ref 8.9–12.9)
POTASSIUM SERPL-SCNC: 4.5 MMOL/L (ref 3.5–5.1)
PROT SERPL-MCNC: 7.3 G/DL (ref 6.4–8.2)
PROT UR STRIP-MCNC: 300 MG/DL
PROTHROMBIN TIME: 11.9 SEC (ref 9–11.1)
RBC # BLD AUTO: 2.5 M/UL (ref 4.1–5.7)
RBC #/AREA URNS HPF: >100 /HPF (ref 0–5)
RBC MORPH BLD: ABNORMAL
SODIUM SERPL-SCNC: 141 MMOL/L (ref 136–145)
SP GR UR REFRACTOMETRY: 1.01 (ref 1–1.03)
THERAPEUTIC RANGE,PTTT: ABNORMAL SECS (ref 58–77)
UROBILINOGEN UR QL STRIP.AUTO: >8 EU/DL (ref 0.2–1)
WBC # BLD AUTO: 15.2 K/UL (ref 4.1–11.1)
WBC URNS QL MICRO: ABNORMAL /HPF (ref 0–4)

## 2018-10-11 PROCEDURE — 32557 INSERT CATH PLEURA W/ IMAGE: CPT

## 2018-10-11 PROCEDURE — C1750 CATH, HEMODIALYSIS,LONG-TERM: HCPCS

## 2018-10-11 PROCEDURE — 74011250636 HC RX REV CODE- 250/636: Performed by: INTERNAL MEDICINE

## 2018-10-11 PROCEDURE — 77030012390 HC DRN CHST BTL GTNG -B

## 2018-10-11 PROCEDURE — 87040 BLOOD CULTURE FOR BACTERIA: CPT | Performed by: INTERNAL MEDICINE

## 2018-10-11 PROCEDURE — 94003 VENT MGMT INPAT SUBQ DAY: CPT

## 2018-10-11 PROCEDURE — C1769 GUIDE WIRE: HCPCS

## 2018-10-11 PROCEDURE — 77030018861

## 2018-10-11 PROCEDURE — 71250 CT THORAX DX C-: CPT

## 2018-10-11 PROCEDURE — 36415 COLL VENOUS BLD VENIPUNCTURE: CPT | Performed by: INTERNAL MEDICINE

## 2018-10-11 PROCEDURE — 80053 COMPREHEN METABOLIC PANEL: CPT | Performed by: INTERNAL MEDICINE

## 2018-10-11 PROCEDURE — 71045 X-RAY EXAM CHEST 1 VIEW: CPT

## 2018-10-11 PROCEDURE — 74011000250 HC RX REV CODE- 250: Performed by: INTERNAL MEDICINE

## 2018-10-11 PROCEDURE — 87070 CULTURE OTHR SPECIMN AEROBIC: CPT | Performed by: INTERNAL MEDICINE

## 2018-10-11 PROCEDURE — 94640 AIRWAY INHALATION TREATMENT: CPT

## 2018-10-11 PROCEDURE — C1729 CATH, DRAINAGE: HCPCS

## 2018-10-11 PROCEDURE — 85610 PROTHROMBIN TIME: CPT | Performed by: INTERNAL MEDICINE

## 2018-10-11 PROCEDURE — 81001 URINALYSIS AUTO W/SCOPE: CPT | Performed by: INTERNAL MEDICINE

## 2018-10-11 PROCEDURE — 88112 CYTOPATH CELL ENHANCE TECH: CPT | Performed by: INTERNAL MEDICINE

## 2018-10-11 PROCEDURE — 77030002996 HC SUT SLK J&J -A

## 2018-10-11 PROCEDURE — 85025 COMPLETE CBC W/AUTO DIFF WBC: CPT | Performed by: INTERNAL MEDICINE

## 2018-10-11 PROCEDURE — 70450 CT HEAD/BRAIN W/O DYE: CPT

## 2018-10-11 PROCEDURE — 88305 TISSUE EXAM BY PATHOLOGIST: CPT | Performed by: INTERNAL MEDICINE

## 2018-10-11 PROCEDURE — 87205 SMEAR GRAM STAIN: CPT | Performed by: INTERNAL MEDICINE

## 2018-10-11 PROCEDURE — 84100 ASSAY OF PHOSPHORUS: CPT | Performed by: INTERNAL MEDICINE

## 2018-10-11 PROCEDURE — 77030011229 HC DIL VESL COON COOK -A

## 2018-10-11 PROCEDURE — 87186 SC STD MICRODIL/AGAR DIL: CPT | Performed by: INTERNAL MEDICINE

## 2018-10-11 PROCEDURE — 77030006998

## 2018-10-11 PROCEDURE — 0W9930Z DRAINAGE OF RIGHT PLEURAL CAVITY WITH DRAINAGE DEVICE, PERCUTANEOUS APPROACH: ICD-10-PCS | Performed by: RADIOLOGY

## 2018-10-11 PROCEDURE — 87086 URINE CULTURE/COLONY COUNT: CPT | Performed by: INTERNAL MEDICINE

## 2018-10-11 PROCEDURE — 65610000006 HC RM INTENSIVE CARE

## 2018-10-11 PROCEDURE — 87077 CULTURE AEROBIC IDENTIFY: CPT | Performed by: INTERNAL MEDICINE

## 2018-10-11 PROCEDURE — C1892 INTRO/SHEATH,FIXED,PEEL-AWAY: HCPCS

## 2018-10-11 PROCEDURE — 74011250637 HC RX REV CODE- 250/637: Performed by: INTERNAL MEDICINE

## 2018-10-11 PROCEDURE — 74011250636 HC RX REV CODE- 250/636: Performed by: RADIOLOGY

## 2018-10-11 PROCEDURE — 83735 ASSAY OF MAGNESIUM: CPT | Performed by: INTERNAL MEDICINE

## 2018-10-11 PROCEDURE — 74011000258 HC RX REV CODE- 258: Performed by: INTERNAL MEDICINE

## 2018-10-11 RX ORDER — LIDOCAINE HYDROCHLORIDE 20 MG/ML
20 INJECTION, SOLUTION INFILTRATION; PERINEURAL ONCE
Status: COMPLETED | OUTPATIENT
Start: 2018-10-11 | End: 2018-10-11

## 2018-10-11 RX ORDER — VANCOMYCIN 2 GRAM/500 ML IN 0.9 % SODIUM CHLORIDE INTRAVENOUS
2000 ONCE
Status: COMPLETED | OUTPATIENT
Start: 2018-10-11 | End: 2018-10-11

## 2018-10-11 RX ORDER — VANCOMYCIN 2 GRAM/500 ML IN 0.9 % SODIUM CHLORIDE INTRAVENOUS
2000 EVERY 24 HOURS
Status: DISCONTINUED | OUTPATIENT
Start: 2018-10-11 | End: 2018-10-11 | Stop reason: DRUGHIGH

## 2018-10-11 RX ORDER — FENTANYL CITRATE 50 UG/ML
50 INJECTION, SOLUTION INTRAMUSCULAR; INTRAVENOUS
Status: COMPLETED | OUTPATIENT
Start: 2018-10-11 | End: 2018-10-11

## 2018-10-11 RX ORDER — FENTANYL CITRATE 50 UG/ML
25 INJECTION, SOLUTION INTRAMUSCULAR; INTRAVENOUS
Status: DISCONTINUED | OUTPATIENT
Start: 2018-10-11 | End: 2018-10-27 | Stop reason: HOSPADM

## 2018-10-11 RX ORDER — HEPARIN SODIUM 200 [USP'U]/100ML
200 INJECTION, SOLUTION INTRAVENOUS ONCE
Status: COMPLETED | OUTPATIENT
Start: 2018-10-11 | End: 2018-10-11

## 2018-10-11 RX ORDER — VANCOMYCIN HYDROCHLORIDE
1250 EVERY 12 HOURS
Status: DISCONTINUED | OUTPATIENT
Start: 2018-10-12 | End: 2018-10-13

## 2018-10-11 RX ADMIN — WATER 2 MG: 1 INJECTION INTRAMUSCULAR; INTRAVENOUS; SUBCUTANEOUS at 11:25

## 2018-10-11 RX ADMIN — DORZOLAMIDE HYDROCHLORIDE AND TIMOLOL MALEATE 1 DROP: 20; 5 SOLUTION/ DROPS OPHTHALMIC at 18:42

## 2018-10-11 RX ADMIN — Medication 10 ML: at 21:26

## 2018-10-11 RX ADMIN — CHLORHEXIDINE GLUCONATE 15 ML: 1.2 RINSE ORAL at 08:12

## 2018-10-11 RX ADMIN — IPRATROPIUM BROMIDE AND ALBUTEROL SULFATE 3 ML: .5; 3 SOLUTION RESPIRATORY (INHALATION) at 21:16

## 2018-10-11 RX ADMIN — ACETAMINOPHEN 650 MG: 325 SOLUTION ORAL at 02:11

## 2018-10-11 RX ADMIN — HEPARIN SODIUM IN SODIUM CHLORIDE: 200 INJECTION INTRAVENOUS at 15:23

## 2018-10-11 RX ADMIN — Medication 30 ML: at 14:23

## 2018-10-11 RX ADMIN — IPRATROPIUM BROMIDE AND ALBUTEROL SULFATE 3 ML: .5; 3 SOLUTION RESPIRATORY (INHALATION) at 11:40

## 2018-10-11 RX ADMIN — LIDOCAINE HYDROCHLORIDE 10 ML: 20 INJECTION, SOLUTION INFILTRATION; PERINEURAL at 15:22

## 2018-10-11 RX ADMIN — IPRATROPIUM BROMIDE AND ALBUTEROL SULFATE 3 ML: .5; 3 SOLUTION RESPIRATORY (INHALATION) at 07:48

## 2018-10-11 RX ADMIN — Medication 10 ML: at 08:07

## 2018-10-11 RX ADMIN — Medication 10 ML: at 15:57

## 2018-10-11 RX ADMIN — LATANOPROST 1 DROP: 50 SOLUTION OPHTHALMIC at 21:27

## 2018-10-11 RX ADMIN — FENTANYL CITRATE 50 MCG: 50 INJECTION, SOLUTION INTRAMUSCULAR; INTRAVENOUS at 10:31

## 2018-10-11 RX ADMIN — PIPERACILLIN SODIUM,TAZOBACTAM SODIUM 4.5 G: 4; .5 INJECTION, POWDER, FOR SOLUTION INTRAVENOUS at 15:57

## 2018-10-11 RX ADMIN — VANCOMYCIN HYDROCHLORIDE 2000 MG: 10 INJECTION, POWDER, LYOPHILIZED, FOR SOLUTION INTRAVENOUS at 16:38

## 2018-10-11 RX ADMIN — PIPERACILLIN SODIUM,TAZOBACTAM SODIUM 4.5 G: 4; .5 INJECTION, POWDER, FOR SOLUTION INTRAVENOUS at 21:25

## 2018-10-11 RX ADMIN — ACETAMINOPHEN 650 MG: 325 SOLUTION ORAL at 08:00

## 2018-10-11 RX ADMIN — SODIUM CHLORIDE, PRESERVATIVE FREE 300 UNITS: 5 INJECTION INTRAVENOUS at 02:11

## 2018-10-11 RX ADMIN — FAMOTIDINE 20 MG: 10 INJECTION, SOLUTION INTRAVENOUS at 08:09

## 2018-10-11 RX ADMIN — DORZOLAMIDE HYDROCHLORIDE AND TIMOLOL MALEATE 1 DROP: 20; 5 SOLUTION/ DROPS OPHTHALMIC at 08:11

## 2018-10-11 RX ADMIN — FUROSEMIDE 20 MG: 10 INJECTION, SOLUTION INTRAMUSCULAR; INTRAVENOUS at 08:06

## 2018-10-11 RX ADMIN — FAMOTIDINE 20 MG: 10 INJECTION, SOLUTION INTRAVENOUS at 21:25

## 2018-10-11 RX ADMIN — CHLORHEXIDINE GLUCONATE 15 ML: 1.2 RINSE ORAL at 21:24

## 2018-10-11 RX ADMIN — ACETAMINOPHEN 650 MG: 325 SOLUTION ORAL at 15:58

## 2018-10-11 NOTE — PROGRESS NOTES
Bedside shift change report given to COLLINS Valle RN (oncoming nurse) by Jaime Dawn RN (offgoing nurse). Report included the following information SBAR, Kardex, ED Summary, OR Summary, Procedure Summary, Intake/Output, MAR, Recent Results, Med Rec Status and Cardiac Rhythm aflutter. 2051-Pt running low grade temp. 100.2ax tylenol given Recheck temp 99.8ax- 
 
Pt noted to have penial discharge creamy tan in color- 
Also, around trach foul smelling thick tan secretions 0211- temp 100.4ax= tylenol given-will continue to monitor 
 
0400 pt reassessed temp 98.7 ax- trach care completed with #6 shiley innner cannula exchanged- will continue to monitor 
 
0700- bedside report given to Jono Torrez

## 2018-10-11 NOTE — PROGRESS NOTES
Hospitalist Progress Note NAME: Odilia Gutiérrez  
:  1934 MRN:  [id number] Assessment / Plan: 
Sepsis (fever, leukocytosis, tachycardia), not present on admission: 
- CT head today with no evidence of acute intracranial abnormality. 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 Bilateral tympanomastoid effusions. 
- CT chest today with worsening of the appearance of the right hemithorax with increased pleural effusion and consolidation. 
- blood, respiratory, urine and pleural fluid cultures sent, will follow 
- emperic vancomycin and zosyn added Worsening R pleural effusion:  Worse today despite IV lasix - R chest tube today with >2 liters out 
- follow fluid cultures Acute encephalopathy in setting of acute hypoxic respiratory failure and resultant PEA cardiac arrest :  agonal breathing after coming back from radiology after arteriogram, 6 min CPR and epi with ROSC. Mental status continues to be below baseline, limiting treatment. 
- CT head  showed small age-indeterminate infarct in the left corona radiata 
- con't to treat multiple complex issues as below 
- family meeting held today with pulmonology to discuss goals of care. Pt will need LTAC minimally if they want to continue to pursue aggressive care; they are discussing. Pulmonary hemorrhage s/p pulmonary artery embolization with RLL lung mass (ca vs inflammatory pseudotumor) and COPD: extubated on  but reintubated , now s/p trach placement 10/3 as above - still on vent via trach 
- CT chest  with patchy airspace disease in the right lung base with an associated hyperdense 5.2 x 4.3 cm oval lesion. This may contain a fluid/fluid level indicating 
abscess/hemorrhage, with neoplasm less likely. No acute abdominal or pelvic process. - CTA chest  due to persistent bloody pulmonary secretions.  Right lower lobe masslike abnormality demonstrates increased internal density and has increased in size measuring 6.7 x 6.7 cm, compared to 5.7 x 4.4 cm. This is compatible with internal hemorrhage. There is new moderate right lower lobe partial collapse/atelectasis. - CT chest 9/25 with persistent hematoma in the right lower lobe region with surrounding patchy atelectasis/infiltrate and small ipsilateral pleural effusion. Left basilar subsegmental atelectasis. Coils in right lower lobe region consistent with recent pulmonary artery branch embolization. 
- repeat CT chest today as above 
- con't lasix IV, dose reduced; monitor I/Os - s/p arteriogram 9/14: Thoracic aortic and intercostal arteriograms demonstrating no 
enlarged bronchial artery supplying the right lower lobe of the lung. Normal appearing and normal sized right bronchial artery is seen without any hyperplasia or dominant right lower lobe supply. No embolization was performed. - s/p diagnostic bronch on 9/16. No endobronchial lesions seen. - blood cultures negative.  Sputum cytology 9/12 negative except scant yeast. Cytology from bronchial washing on 9/16 is atypical, favor benign reactive process. - work up for rheumatologic causes of hemoptysis negative: ANCA, anti-GBM, MPO Ab, [place]-3 Ab, SSA/SSB, RNP Ab, Arredondo/RNP, dsDNA, and Arredondo Ab all negative. JOSS+. - completed zosyn for possible lung abscess (9/20), Abx restarted today due to sepsis as above CAD s/p PCI, atrial fibrillation on chronic anticoagulation w/coumadin, and benign HTN: 
- holding amiodarone and dlitiazem 
- holding anticoagulation (coumadin) and pravachol 
- on lasix at home as well   
Acute blood loss anemia and anemia of chronic disease Elevated LFTs of unclear etiology: 
- stopped statin for now - trend INR and LFTs, will con't to treat multiple underlying issues. INR 1.1 today. ALAN: resolved UTI, acute cystitis w/ hematuria, POA:  Required almaraz placed by urology Hyperlipidemia: statin held due to elevated LFTs as above Hypernatremia, resolved Obesity (Body mass index is 33.68 kg/(m^2) 
   
Code: Full (wife and children are decision makers) DVT prophylaxis: SCDs Subjective: Chief Complaint / Reason for Physician Visit Minimally responsive (moves L foot spontaneously). Discussed with RN events overnight. Review of Systems: 
Symptom Y/N Comments  Symptom Y/N Comments Fever/Chills    Chest Pain Poor Appetite    Edema Cough    Abdominal Pain Sputum    Joint Pain SOB/ROY    Pruritis/Rash Nausea/vomit    Tolerating PT/OT Diarrhea    Tolerating Diet Constipation    Other Could NOT obtain due to: Not responsive Objective: VITALS:  
Last 24hrs VS reviewed since prior progress note. Most recent are: 
Patient Vitals for the past 24 hrs: 
 Temp Pulse Resp BP SpO2  
10/11/18 1140 - 88 24 - 96 % 10/11/18 0900 99.7 °F (37.6 °C) 93 23 100/46 95 % 10/11/18 0800 (!) 101 °F (38.3 °C) 96 28 115/43 98 % 10/11/18 0748 - 96 24 - 98 % 10/11/18 0700 - (!) 106 29 155/76 98 % 10/11/18 0600 - 82 23 92/43 97 % 10/11/18 0500 - 83 21 111/47 97 % 10/11/18 0435 - 90 20 - 98 % 10/11/18 0400 97.8 °F (36.6 °C) 98 25 157/72 96 % 10/11/18 0300 - 88 24 100/48 97 % 10/11/18 0200 100.4 °F (38 °C) 95 26 136/66 98 % 10/11/18 0100 - 88 25 131/76 99 % 10/11/18 0001 - 81 22 118/84 97 % 10/10/18 2340 - 89 27 - 96 % 10/10/18 2300 99.8 °F (37.7 °C) 85 23 114/44 96 % 10/10/18 2223 - 99 18 118/48 100 % 10/10/18 2100 - (!) 104 (!) 32 143/69 97 % 10/10/18 2000 100.3 °F (37.9 °C) 99 21 153/77 98 % 10/10/18 1957 - 95 28 - 98 % 10/10/18 1900 - 91 26 136/66 99 % 10/10/18 1810 - 92 24 133/79 96 % 10/10/18 1700 - 91 23 106/64 97 % 10/10/18 1600 99.7 °F (37.6 °C) (!) 103 28 153/86 96 % 10/10/18 1531 - 97 22 - 98 % 10/10/18 1500 - 88 23 121/74 97 % 10/10/18 1400 - 83 23 137/68 98 % 10/10/18 1300 - 87 25 122/69 97 % Intake/Output Summary (Last 24 hours) at 10/11/18 1028 Last data filed at 10/11/18 1113 Gross per 24 hour Intake             1575 ml Output             2135 ml Net             -560 ml PHYSICAL EXAM: 
General: WD, WN. Not alert, not cooperative, no acute distress   
EENT:  EOMI. Anicteric sclerae. MMM with midline trach in place Resp:  CTA bilaterally, no wheezing or rales. No accessory muscle use CV:  Regular  rhythm,  No edema GI:  Soft, mildly distended, Non tender.  +Bowel sounds Neurologic:  Oriented X 0-1, no speech, Psych:   No insight. Not anxious nor agitated Skin:  No rashes. No jaundice Reviewed most current lab test results and cultures  YES Reviewed most current radiology test results   YES Review and summation of old records today    NO Reviewed patient's current orders and MAR    YES 
PMH/SH reviewed - no change compared to H&P 
________________________________________________________________________ Care Plan discussed with: 
  Comments Patient x Family RN x Care Manager Consultant Multidiciplinary team rounds were held today with , nursing, pharmacist and clinical coordinator. Patient's plan of care was discussed; medications were reviewed and discharge planning was addressed. ________________________________________________________________________ Total NON critical care TIME:  25 Minutes Total CRITICAL CARE TIME Spent:   Minutes non procedure based Comments >50% of visit spent in counseling and coordination of care x   
________________________________________________________________________ Che Smith MD  
 
Procedures: see electronic medical records for all procedures/Xrays and details which were not copied into this note but were reviewed prior to creation of Plan. LABS: 
I reviewed today's most current labs and imaging studies. Pertinent labs include: 
Recent Labs 10/11/18 
 667 3122  10/10/18 
 0247  10/09/18 
 4765 WBC  15.2*  13.1*  12.4* HGB  8.0*  8.0*  6.8* HCT  26.5*  26.8*  22.9*  
PLT  126*  164  153 Recent Labs 10/11/18 
 667 3122  10/10/18 
 0247  10/09/18 
 0320 NA  141  141  140  
K  4.5  4.3  4.3 CL  106  106  106 CO2  31  32  30 GLU  132*  131*  132* BUN  25*  23*  25* CREA  0.94  0.83  0.93  
CA  8.2*  8.6  8.4* MG  2.2  2.3  2.2 PHOS  2.6  2.5*  2.3* ALB  1.7*  1.9*  1.7* TBILI  0.7  0.7  0.6 SGOT  97*  105*  131* ALT  126*  152*  163* INR  1.2*  1.1  1.1 Signed: Alex Matson MD

## 2018-10-11 NOTE — PROGRESS NOTES
10/11/18 0429 ABCDEF Bundle SBT Safety Screen Passed Yes SBT Trial Passed No  
SBT Trial Reason for Failure Respiratory rate > 35;RSBI>105 Weaning Parameters Spontaneous Breathing Trial Complete Yes Resp Rate Observed 42 Ve 11.4  RSBI 120

## 2018-10-11 NOTE — PROGRESS NOTES
0730: Report received from [facility], RN. 
 
0800: Pt. Assessed. Pupils are sluggish and reactive Right is a size 3, left is a size 2. Pt. Moves all extremities and with draws from pain. Pt. Does not follow commands. Breath sounds are diminished,, pt has copious secretions out of his and around his trach heart sounds are S1S2, with underlying a fib on the monitor and pacer is pacing. Pt has a double lumen PICC, hard to flush and blood return positive in one port. . BS present, belly is distended, pt has a almaraz catheter intact draining blood tinged urine. Flexiseal draining brown stool. Skin is warm and dry, pulses 2+ with 1+ edema. Pillows placed under arms to help edema. Edema present in all 4 extremities. Pt. Is running a fever of [address] Dr. Atilio Llanes made aware. We may culture patient at some point today. PRN tylenol given for this temp. Dr. Atilio Llanes also made aware that patient's pupils are unequal, No new orders at this time. This may be an old finding. Pt. Has glaucoma. Received orders from Dr. Atilio Llanes this morning after updating him on patient: CT scan of the Head as well as CT of the Chest. One time Fentanyl 50 mcgs IV for CT.  
 
0900: Temp now 99.7 Orally. 1030: During IDR's, Discussed patient current condition and Dr. Atilio Llanes would like Blood cultures, UA, and Sputum culture obtained since patient is running a fever and WBC's are climbing, Will do so when back from CT.  
 
1100: On the way the way to CT with nurse and respiratory therapist.  
 
1130: Pt. Now back from CT. VSS. Pt. On vent. 1200: Blood cultures, UA, and Sputum culture obtained now. Pt. Reassessed. No changes at this time. Almaraz irrigated, clots visible. Flexiseal irrigated. 1400: Spoke to Dr. Atilio Llanes concerning Pt.'s CT results. He has called Aditi Montiel (Spouse) and explained CT results to her and need for a Chest tube insertion. Telephone consent received from Yulisa Root for IR to insert a Chest tube at the bedside. 1449: IR at the bedside to set up for Chest tube placement. Dr. Becki Soliman has ordered more cultures (Penis drainage culture and an official Urine culture off almaraz that was placed by urology on 10/9/18). Will retrieve these cultures before starting antibiotics that were ordered and After Chest tube is placed. 1500: PICC team at the bedside. They Inserted Cath jolie Earlier into PICC line. Now taking Cath jolie back out. PICC now functioning. 1530: Chest tube placed by IR. STAT CXR ordered for placement. Restarting Tube feedings per Dr. Becki Soliman. Cultures now obtained and sent to lab. 1558: PRN Tylenol given for temp of 100.4 orally. 1600: Pt.'s Chest Tube output is 1800 ml in the first hour. Low wall suction applied at this time, Dr. Becki Soliman made aware. Was told to restart tube feedings now. 1615: Pt. Reassessed. CT, Almaraz, Flexiseal draining well. Almaraz and Flexiseal irrigated. Trach care completed. Inner Canula changed. 1850: Pt.'s urine output has dramatically decreased since 1600 (Right after patient drained 2 L's out of his chest tube). 30 ml's of urine had drained out of his almaraz in the last three hours. Patient's UOP up until now had been adequate since 7 AM. Spoke to Dr. Dino Samuel concerning this. She would like to monitor it for now. No new orders or interventions. Blood pressure is stable. CR is normal. Almaraz flushing and draining well, no clots. 1900: Report given to Marina Nam RN.

## 2018-10-11 NOTE — PROGRESS NOTES
Pharmacy Automatic Renal Dosing Protocol - Antimicrobials Indication for Antimicrobials: HAP, possible UTI, Purulent drainage from penis Current Regimen of Each Antimicrobial: 
Vancomycin IV pharmacy to dose (Start Date 10/11; Day # 1) Zosyn 4.5g IV q8h (start 10/11 Day 1) Previous Antimicrobial Therapy: 
 
 
Goal Level: VANCOMYCIN TROUGH GOAL RANGE Vancomycin Trough: 15 - 20 mcg/mL Date Dose & Interval Measured (mcg/mL) Extrapolated (mcg/mL) Significant Cultures:  
 Trach aspirate: scant yeast (apparent candida albicans) FINAL 
10/11 Sputum trach aspirate: pending 10/11 Blood: pending 10/11 Pleural Fluid: pending 10/11 Penis drainage: pending 10/11 Urine: pending 10/11 UA: Bacteria +1, Large blood Radiology / Imaging results: (X-ray, CT scan or MRI):  
10/11 Chest XR: Unchanged appearance of the chest including a moderate to large 
right pleural effusion and diffuse interstitial opacities, likely edema. Paralysis, amputations, malnutrition:  
 
Labs: 
Recent Labs 10/11/18 
 667 3122  10/10/18 
 0247  10/09/18 
 0320 CREA  0.94  0.83  0.93  
BUN  25*  23*  25* WBC  15.2*  13.1*  12.4* Temp (24hrs), Av.7 °F (37.6 °C), Min:97.8 °F (36.6 °C), Max:101 °F (38.3 °C) Creatinine Clearance (mL/min) or Dialysis: ~ 68 ml/min Impression/Plan: · Vancomycin dosed at 2g x 1 load then 1250mg IV q12h. · Zosyn is dosed appropriately for renal fxn and indication. · Antimicrobial stop date: 7 days Pharmacy will follow daily and adjust medications as appropriate for renal function and/or serum levels. Thank you, 
Yessica Rose, RAYMUNDOD 
 
Recommended duration of therapy 
http://[facility]/Pharmacy/Clinical%20Companion/Duration%[email or web address]. docx Renal Dosing 
[email or web address]/[place]/[facility]/Pharmacy/Clinical%20Companion/Renal%[email or web address]. pdf

## 2018-10-11 NOTE — PROGRESS NOTES
[facility] Consult Service NotePulmonary, Critical Care, and Sleep Medicine Name: Dayron Villa MRN: [id number] : 1934 Hospital: [facility] Date: 10/11/2018   Hospital Day: 32 IMPRESSION:  
1. 18 cardiopulmonary arrest with  6 min of CPR then ROSC. 2. Acute respiratory failure-on vent- has not been able to do SBT. Can do ongoing evals. May need LTAC, Will introduce to family.  to provide info to family. 3. Acute hemoptysis- Noted to have bleeding from RLL on Bronch. No endobronchial lesion. has lung mass(CA vs inflammatory pseudotumor) and microscopic hematuria; ANCA negative; JOSS barely positive. Suspect LUNG mass the culprit. CYTOLOGY from bronchoscopy negative malignancy x 2, at this point not pursuing additional interventions. 4. Has moderate right pleural effusion, Will discuss with pts wife and  
5. S/P pulmonary artery embolization for hemoptysis Per IR. 6. COPD moderate to severe at baseline 7. Acute renal insf now resolved. 8. Anemia and prior coagulopathy on coumadin-had blood transfusion. 9. Volume overload, anasarca- after couple of days of IV lasix- becomes prerenal 
10. Encehalopathy/Debility multifactorial - has hearing loss as well, has not woke up as expected. 11. Hematuria, seems to be improving had almaraz in place. Has ongoing issue with Blood clots in urine, nurses have been having to flush frequently  To get Urine output per their report. 12. Hx of CAD s/p PCI, afib on amio, former smoker, prior cva 13. Discussed with nurse on rounds this am.  
14. Dr. Richard Arshad discussed with pts family. CM has reached out to family. Will start process of LTAC eval.  
15. Called and discussed with pts wife about chest tube placement. RECOMMENDATIONS/PLAN:  
1. Due to difficult almaraz placed 2 days ago will not remove. Will obtain UA and Ucx. 2. Pt has purulent drainage from penis. 3. Pt noted to have thick purulent secretions from trach. Will send for Cx. 
4. Will placed on empiric Zosyn and Vanc. Discussed and reviewed with pharmacy. 5. Vent support, TC trials as tolerated. 6. Pt has large right pleural effusion, may be malignant pleural effusion. Will get chest tube place per IR. Called IR to let them know. 7. Trach care, Noted to have moderate secretions. 8. Still has unresolved issue of lung mass, will having ongoing family discussions. Palliative care is following. Will get pleural fluid cytology. 9. May need additional Cultures. 10. Diuresis as needed 11. Follow renal function, WNL 12. Enteral feedings tolerating. NPO for possible procedure. 13. No need for transfusion 14. Glucose monitoring and SSI 15. DVT/GI prophylaxis 16. Will need LTAC when becomes medically stable. Subjective/Initial History: S/p trach 10/2 Vent support 10-11-18: Pt has been slow to respond. Still not waking up. Had increased secretion from trach and from his penis. Not follow any commands. Not able to get ROS or HPI from pt.  
 
 
10-10-18: No major changes or events over last 24 hrs. Not able to obtain ROS or HPI from pt. Some thick yellow secretions present around this trach. 10-9-18: Not able to get hx  Or ROS from pt. No acute changes overnight. Had some moderate secretions this am.  
 
 
10-8-18: Pt still not waking up. Has ongoing issues with blood clots in urine. Per nurses has been with recurrent issues with clotting and require frequent flushing of the almaraz. Pt has not been able to tolerate trach collar. Unable to get ROS or HPI from pt. MAR reviewed and pertinent medications noted or modified as needed Current Facility-Administered Medications Medication  furosemide (LASIX) injection 20 mg  
 zinc oxide-cod liver oil (DESITIN) 40 % paste  albuterol-ipratropium (DUO-NEB) 2.5 MG-0.5 MG/3 ML  
 acetaminophen (TYLENOL) solution 650 mg  
  sodium chloride (NS) flush 10-30 mL  sodium chloride (NS) flush 10 mL  sodium chloride (NS) flush 10 mL  sodium chloride (NS) flush 10-40 mL  sodium chloride (NS) flush 20 mL  heparin (porcine) pf 300 Units  glycopyrrolate (ROBINUL) injection 0.1 mg  
 famotidine (PF) (PEPCID) 20 mg in sodium chloride 0.9% 10 mL injection  chlorhexidine (PERIDEX) 0.12 % mouthwash 15 mL  albuterol (PROVENTIL HFA, VENTOLIN HFA, PROAIR HFA) inhaler 2 Puff  dorzolamide-timolol (COSOPT) 22.3-6.8 mg/mL ophthalmic solution 1 Drop  latanoprost (XALATAN) 0.005 % ophthalmic solution 1 Drop  sodium chloride (NS) flush 5-10 mL  sodium chloride (NS) flush 5-10 mL  ondansetron (ZOFRAN) injection 4 mg ROS:A comprehensive review of systems was negative except for that written in the HPI. Objective: 
 
Vital Signs: Telemetry:    normal sinus rhythmIntake/Output:  
Visit Vitals  /65  Pulse (!) 101  Temp 98.6 °F (37 °C)  Resp 22  
 Ht 6' 2\" (1.88 m)  Wt 119 kg (262 lb 5.6 oz)  SpO2 97%  BMI 33.68 kg/m2 Temp (24hrs), Av.7 °F (37.6 °C), Min:97.8 °F (36.6 °C), Max:101 °F (38.3 °C) O2 Device: Ventilator O2 Flow Rate (L/min): 10 l/min Wt Readings from Last 4 Encounters:  
10/11/18 119 kg (262 lb 5.6 oz) 16 111.1 kg (245 lb)  
16 106.6 kg (235 lb) 04/08/15 103.4 kg (228 lb) Intake/Output Summary (Last 24 hours) at 10/11/18 1346 Last data filed at 10/11/18 1301 Gross per 24 hour Intake             1730 ml Output             2225 ml Net             -495 ml Last shift:      10/11 0701 - 10/11 1900 In: 440 Out: 650 [Urine:650] Last 3 shifts: 10/09 1901 - 10/11 0700 In: 3305.8 Out: 1319 [Urine:2440; Drains:290] Physical Exam:  
 General:  No distress HEAD: Normocephalic, without obvious abnormality, atraumatic EYES: conjunctivae clear. anicteric sclerae NOSE: nares normal, no drainage, no nasal flaring, Neck: Supple, symmetrical, trachea midline, has trach in place, on Mechanical vent support. Chest: increased AP diameter Lungs: pretty clear anterioly. Trached on vent. Heart: Regular rate and rhythm nl s1,2. Abdomen: soft, non-tender, +BS, pt has almaraz in place. Blood urine present. Obese. Musculoskeletal: anasarca; no erythema, minimal peripheral edema, has SCDs in place. Neuro: seems at least to intermittently to follow commands. Psych: Not able to assess; no agitation. NO anxiety or depression when seen. Data:  
 
Current Facility-Administered Medications Medication Dose Route Frequency  furosemide (LASIX) injection 20 mg  20 mg IntraVENous DAILY  albuterol-ipratropium (DUO-NEB) 2.5 MG-0.5 MG/3 ML  3 mL Nebulization QID RT  
 sodium chloride (NS) flush 10 mL  10 mL InterCATHeter Q24H  
 sodium chloride (NS) flush 10-40 mL  10-40 mL InterCATHeter Q8H  
 sodium chloride (NS) flush 20 mL  20 mL InterCATHeter Q24H  
 famotidine (PF) (PEPCID) 20 mg in sodium chloride 0.9% 10 mL injection  20 mg IntraVENous Q12H  chlorhexidine (PERIDEX) 0.12 % mouthwash 15 mL  15 mL Oral BID  dorzolamide-timolol (COSOPT) 22.3-6.8 mg/mL ophthalmic solution 1 Drop  1 Drop Both Eyes BID  latanoprost (XALATAN) 0.005 % ophthalmic solution 1 Drop  1 Drop Both Eyes QHS  sodium chloride (NS) flush 5-10 mL  5-10 mL IntraVENous Q8H Labs: 
Recent Labs 10/11/18 
 667 3122  10/10/18 
 0247  10/09/18 
 0320 WBC  15.2*  13.1*  12.4* HGB  8.0*  8.0*  6.8* HCT  26.5*  26.8*  22.9*  
PLT  126*  164  153 Recent Labs 10/11/18 
 667 3122  10/10/18 
 0247  10/09/18 
 0320 NA  141  141  140  
K  4.5  4.3  4.3 CL  106  106  106 CO2  31  32  30 GLU  132*  131*  132* BUN  25*  23*  25* CREA  0.94  0.83  0.93  
CA  8.2*  8.6  8.4* MG  2.2  2.3  2.2 PHOS  2.6  2.5*  2.3* ALB  1.7*  1.9*  1.7*  
 TBILI  0.7  0.7  0.6 SGOT  97*  105*  131* ALT  126*  152*  163* INR  1.2*  1.1  1.1 No results for input(s): PH, PCO2, PO2, HCO3, FIO2 in the last 72 hours. Imaging: 
I have personally reviewed the patients radiographs and have reviewed the reports: 
10-9-18: CXR: 
 
Right pleural effusion, trach intact. Mild IE. Has PPM in place. CT of chest:10-11-18: IMPRESSION: 
Worsening of the appearance of the right hemithorax with increased pleural 
effusion and consolidation. CT of head: 
10-11-18: IMPRESSION: 
1. No evidence of acute intracranial abnormality. Stable small chronic infarct 
in the left corona radiata. 2. Bilateral tympanomastoid effusions Total critical care time exclusive of procedures:  minutes Tammi Mukherjee MD

## 2018-10-12 ENCOUNTER — APPOINTMENT (OUTPATIENT)
Dept: GENERAL RADIOLOGY | Age: 83
DRG: 003 | End: 2018-10-12
Attending: INTERNAL MEDICINE
Payer: MEDICARE

## 2018-10-12 LAB
ALBUMIN SERPL-MCNC: 1.7 G/DL (ref 3.5–5)
ALBUMIN/GLOB SERPL: 0.3 {RATIO} (ref 1.1–2.2)
ALP SERPL-CCNC: 248 U/L (ref 45–117)
ALT SERPL-CCNC: 123 U/L (ref 12–78)
ANION GAP SERPL CALC-SCNC: 6 MMOL/L (ref 5–15)
APTT PPP: 28.8 SEC (ref 22.1–32)
AST SERPL-CCNC: 95 U/L (ref 15–37)
BASOPHILS # BLD: 0 K/UL (ref 0–0.1)
BASOPHILS NFR BLD: 0 % (ref 0–1)
BILIRUB SERPL-MCNC: 0.8 MG/DL (ref 0.2–1)
BUN SERPL-MCNC: 22 MG/DL (ref 6–20)
BUN/CREAT SERPL: 25 (ref 12–20)
CALCIUM SERPL-MCNC: 8.4 MG/DL (ref 8.5–10.1)
CHLORIDE SERPL-SCNC: 106 MMOL/L (ref 97–108)
CO2 SERPL-SCNC: 30 MMOL/L (ref 21–32)
CREAT SERPL-MCNC: 0.88 MG/DL (ref 0.7–1.3)
DIFFERENTIAL METHOD BLD: ABNORMAL
EOSINOPHIL # BLD: 0 K/UL (ref 0–0.4)
EOSINOPHIL NFR BLD: 0 % (ref 0–7)
ERYTHROCYTE [DISTWIDTH] IN BLOOD BY AUTOMATED COUNT: 21.9 % (ref 11.5–14.5)
FIBRINOGEN PPP-MCNC: >800 MG/DL (ref 200–475)
GLOBULIN SER CALC-MCNC: 5.8 G/DL (ref 2–4)
GLUCOSE SERPL-MCNC: 100 MG/DL (ref 65–100)
HCT VFR BLD AUTO: 24.6 % (ref 36.6–50.3)
HGB BLD-MCNC: 7.4 G/DL (ref 12.1–17)
IMM GRANULOCYTES # BLD: 0 K/UL (ref 0–0.04)
IMM GRANULOCYTES NFR BLD AUTO: 0 % (ref 0–0.5)
INR PPP: 1.1 (ref 0.9–1.1)
LYMPHOCYTES # BLD: 0.1 K/UL (ref 0.8–3.5)
LYMPHOCYTES NFR BLD: 1 % (ref 12–49)
MAGNESIUM SERPL-MCNC: 2.2 MG/DL (ref 1.6–2.4)
MCH RBC QN AUTO: 31.6 PG (ref 26–34)
MCHC RBC AUTO-ENTMCNC: 30.1 G/DL (ref 30–36.5)
MCV RBC AUTO: 105.1 FL (ref 80–99)
MONOCYTES # BLD: 1.1 K/UL (ref 0–1)
MONOCYTES NFR BLD: 8 % (ref 5–13)
MYELOCYTES NFR BLD MANUAL: 1 %
NEUTS SEG # BLD: 12.4 K/UL (ref 1.8–8)
NEUTS SEG NFR BLD: 90 % (ref 32–75)
NRBC # BLD: 0.02 K/UL (ref 0–0.01)
NRBC BLD-RTO: 0.1 PER 100 WBC
PHOSPHATE SERPL-MCNC: 2.7 MG/DL (ref 2.6–4.7)
PLATELET # BLD AUTO: 127 K/UL (ref 150–400)
PMV BLD AUTO: 12.3 FL (ref 8.9–12.9)
POTASSIUM SERPL-SCNC: 4.2 MMOL/L (ref 3.5–5.1)
PROT SERPL-MCNC: 7.5 G/DL (ref 6.4–8.2)
PROTHROMBIN TIME: 11.7 SEC (ref 9–11.1)
RBC # BLD AUTO: 2.34 M/UL (ref 4.1–5.7)
RBC MORPH BLD: ABNORMAL
RBC MORPH BLD: ABNORMAL
SODIUM SERPL-SCNC: 142 MMOL/L (ref 136–145)
THERAPEUTIC RANGE,PTTT: ABNORMAL SECS (ref 58–77)
WBC # BLD AUTO: 13.8 K/UL (ref 4.1–11.1)

## 2018-10-12 PROCEDURE — 83735 ASSAY OF MAGNESIUM: CPT | Performed by: INTERNAL MEDICINE

## 2018-10-12 PROCEDURE — 74011250636 HC RX REV CODE- 250/636: Performed by: INTERNAL MEDICINE

## 2018-10-12 PROCEDURE — 71045 X-RAY EXAM CHEST 1 VIEW: CPT

## 2018-10-12 PROCEDURE — 85025 COMPLETE CBC W/AUTO DIFF WBC: CPT | Performed by: INTERNAL MEDICINE

## 2018-10-12 PROCEDURE — 51798 US URINE CAPACITY MEASURE: CPT

## 2018-10-12 PROCEDURE — 80053 COMPREHEN METABOLIC PANEL: CPT | Performed by: INTERNAL MEDICINE

## 2018-10-12 PROCEDURE — 74011000250 HC RX REV CODE- 250: Performed by: INTERNAL MEDICINE

## 2018-10-12 PROCEDURE — 65610000006 HC RM INTENSIVE CARE

## 2018-10-12 PROCEDURE — 36415 COLL VENOUS BLD VENIPUNCTURE: CPT | Performed by: INTERNAL MEDICINE

## 2018-10-12 PROCEDURE — 85610 PROTHROMBIN TIME: CPT | Performed by: INTERNAL MEDICINE

## 2018-10-12 PROCEDURE — C1751 CATH, INF, PER/CENT/MIDLINE: HCPCS

## 2018-10-12 PROCEDURE — 74011000258 HC RX REV CODE- 258: Performed by: INTERNAL MEDICINE

## 2018-10-12 PROCEDURE — 84100 ASSAY OF PHOSPHORUS: CPT | Performed by: INTERNAL MEDICINE

## 2018-10-12 PROCEDURE — 94640 AIRWAY INHALATION TREATMENT: CPT

## 2018-10-12 PROCEDURE — 94003 VENT MGMT INPAT SUBQ DAY: CPT

## 2018-10-12 RX ORDER — HEPARIN SODIUM 5000 [USP'U]/ML
5000 INJECTION, SOLUTION INTRAVENOUS; SUBCUTANEOUS EVERY 8 HOURS
Status: DISCONTINUED | OUTPATIENT
Start: 2018-10-12 | End: 2018-10-20

## 2018-10-12 RX ADMIN — HEPARIN SODIUM 5000 UNITS: 5000 INJECTION INTRAVENOUS; SUBCUTANEOUS at 21:08

## 2018-10-12 RX ADMIN — LATANOPROST 1 DROP: 50 SOLUTION OPHTHALMIC at 22:00

## 2018-10-12 RX ADMIN — Medication 10 ML: at 21:09

## 2018-10-12 RX ADMIN — IPRATROPIUM BROMIDE AND ALBUTEROL SULFATE 3 ML: .5; 3 SOLUTION RESPIRATORY (INHALATION) at 15:21

## 2018-10-12 RX ADMIN — VANCOMYCIN HYDROCHLORIDE 1250 MG: 10 INJECTION, POWDER, LYOPHILIZED, FOR SOLUTION INTRAVENOUS at 05:32

## 2018-10-12 RX ADMIN — Medication 10 ML: at 05:33

## 2018-10-12 RX ADMIN — Medication 20 ML: at 13:22

## 2018-10-12 RX ADMIN — IPRATROPIUM BROMIDE AND ALBUTEROL SULFATE 3 ML: .5; 3 SOLUTION RESPIRATORY (INHALATION) at 08:05

## 2018-10-12 RX ADMIN — FAMOTIDINE 20 MG: 10 INJECTION, SOLUTION INTRAVENOUS at 08:35

## 2018-10-12 RX ADMIN — FUROSEMIDE 20 MG: 10 INJECTION, SOLUTION INTRAMUSCULAR; INTRAVENOUS at 08:33

## 2018-10-12 RX ADMIN — PIPERACILLIN SODIUM,TAZOBACTAM SODIUM 4.5 G: 4; .5 INJECTION, POWDER, FOR SOLUTION INTRAVENOUS at 05:32

## 2018-10-12 RX ADMIN — Medication 10 ML: at 17:30

## 2018-10-12 RX ADMIN — DORZOLAMIDE HYDROCHLORIDE AND TIMOLOL MALEATE 1 DROP: 20; 5 SOLUTION/ DROPS OPHTHALMIC at 17:30

## 2018-10-12 RX ADMIN — IPRATROPIUM BROMIDE AND ALBUTEROL SULFATE 3 ML: .5; 3 SOLUTION RESPIRATORY (INHALATION) at 12:10

## 2018-10-12 RX ADMIN — Medication 10 ML: at 17:31

## 2018-10-12 RX ADMIN — CHLORHEXIDINE GLUCONATE 15 ML: 1.2 RINSE ORAL at 21:08

## 2018-10-12 RX ADMIN — CHLORHEXIDINE GLUCONATE 15 ML: 1.2 RINSE ORAL at 08:30

## 2018-10-12 RX ADMIN — IPRATROPIUM BROMIDE AND ALBUTEROL SULFATE 3 ML: .5; 3 SOLUTION RESPIRATORY (INHALATION) at 19:33

## 2018-10-12 RX ADMIN — Medication 10 ML: at 21:08

## 2018-10-12 RX ADMIN — Medication 20 ML: at 19:12

## 2018-10-12 RX ADMIN — PIPERACILLIN SODIUM,TAZOBACTAM SODIUM 4.5 G: 4; .5 INJECTION, POWDER, FOR SOLUTION INTRAVENOUS at 13:22

## 2018-10-12 RX ADMIN — VANCOMYCIN HYDROCHLORIDE 1250 MG: 10 INJECTION, POWDER, LYOPHILIZED, FOR SOLUTION INTRAVENOUS at 17:30

## 2018-10-12 RX ADMIN — HEPARIN SODIUM 5000 UNITS: 5000 INJECTION INTRAVENOUS; SUBCUTANEOUS at 13:21

## 2018-10-12 RX ADMIN — FAMOTIDINE 20 MG: 10 INJECTION, SOLUTION INTRAVENOUS at 21:07

## 2018-10-12 RX ADMIN — Medication 10 ML: at 13:22

## 2018-10-12 RX ADMIN — DORZOLAMIDE HYDROCHLORIDE AND TIMOLOL MALEATE 1 DROP: 20; 5 SOLUTION/ DROPS OPHTHALMIC at 08:30

## 2018-10-12 RX ADMIN — PIPERACILLIN SODIUM,TAZOBACTAM SODIUM 4.5 G: 4; .5 INJECTION, POWDER, FOR SOLUTION INTRAVENOUS at 21:07

## 2018-10-12 NOTE — PROGRESS NOTES
Nutrition Assessment: 
 
RECOMMENDATIONS:  
Continue TF as ordered ASSESSMENT:  
Chart reviewed, case discussed during CCU rounds. Pt to resume TF at goal rate, it was on hold for chest tube placement yesterday. Thus far he has had no residuals. TF meets 104% kcal and 120% protein needs. He is being diuresed. Edema continues. Wt loss noted, likely 2; diuresis, is less fluid overloaded than he was on Wednesday. Electrolytes WNL. Dietitians Intervention(s)/Plan(s): Continue TF as ordered SUBJECTIVE/OBJECTIVE:  
Pt on vent via trach Diet Order: NPO, Other (comment) (TF via PEG: TwoCal @ 50mL/h + 100mL H2O flush q 4h (provides 2400kcals/100gPro/1440mL) ) 
% Eaten:  No data found. TwoCal HN  at 50 mL/hr flush with 100 mL  Q4H  via PEG Tube   Residuals: 0 mL Pertinent Medications:pepcid, lasix, zosyn, vancomycin. Chemistries: 
Lab Results Component Value Date/Time Sodium 142 10/12/2018 04:24 AM  
 Potassium 4.2 10/12/2018 04:24 AM  
 Chloride 106 10/12/2018 04:24 AM  
 CO2 30 10/12/2018 04:24 AM  
 Anion gap 6 10/12/2018 04:24 AM  
 Glucose 100 10/12/2018 04:24 AM  
 BUN 22 (H) 10/12/2018 04:24 AM  
 Creatinine 0.88 10/12/2018 04:24 AM  
 BUN/Creatinine ratio 25 (H) 10/12/2018 04:24 AM  
 GFR est AA >60 10/12/2018 04:24 AM  
 GFR est non-AA >60 10/12/2018 04:24 AM  
 Calcium 8.4 (L) 10/12/2018 04:24 AM  
 Albumin 1.7 (L) 10/12/2018 04:24 AM  
  
Anthropometrics: Height: 6' 2\" (188 cm) Weight: 114 kg (251 lb 5.2 oz)   [x]bed scale (10/12)   []stated   []unknown IBW (%IBW):   ( ) UBW (%UBW):   (  %) BMI: Body mass index is 32.27 kg/(m^2). This BMI is indicative of: 
[]Underweight   []Normal   []Overweight   [x] Obesity   [] Extreme Obesity (BMI>40) Estimated Nutrition Needs (Based on): 2275 Kcals/day (PSU (MSJ 1900)) , 83 g (0.8gPro/kg) Protein Carbohydrate:  At Least 130 g/day  Fluids: 1500 mL/day or per MD  
 
 Last BM: 10/11-flexiseal    [x]Active     []Hyperactive  []Hypoactive       [] Absent   BS Skin:    [] Intact   [] Incision  [] Breakdown   [] DTI   [x] Tears/Excoriation/Abrasion  [x]Edema(+2 generalized; +2 pitting-all extremities)  [] Other: Wt Readings from Last 30 Encounters:  
10/12/18 114 kg (251 lb 5.2 oz) 05/05/16 111.1 kg (245 lb)  
03/25/16 106.6 kg (235 lb) 04/08/15 103.4 kg (228 lb)  
03/25/15 100.7 kg (222 lb)  
01/16/13 105.3 kg (232 lb 3.2 oz) 01/03/13 103.3 kg (227 lb 11.8 oz) NUTRITION DIAGNOSES:  
Problem:  No new nutritional dx at this time. NUTRITION INTERVENTIONS: 
  Enteral/Parenteral Nutrition: Other (Continue TF as ordered) GOAL:  
Pt will tolerate TF @ goal rate with residuals <250mL in 3-5 days. NUTRITION MONITORING AND EVALUATION Previous Goal: Pt will tolerate TF @ goal rate with residuals <250mL in 3-5 days Previous Goal Met: Yes Previous Recommendations Implemented: Yes Cultural, Scientologist, or Ethnic Dietary Needs: None LEARNING NEEDS (Diet, Food/Nutrient-Drug Interaction):  
 [x] None Identified 
 [] Identified and Education Provided/Documented 
 [] Identified and Pt declined/was not appropriate [x] Interdisciplinary Care Plan Reviewed/Documented  
 [x] Participated in Discharge Planning: See TF orders above [x] Interdisciplinary Rounds NUTRITION RISK:  
 [] High              [x] Moderate           []  Low  []  Minimal/Uncompromised Leny Calhoun RD, [facility] Pager [phone] Weekend Pager [phone]

## 2018-10-12 NOTE — PROGRESS NOTES
0725 - Bedside and Verbal shift change report given to Rosa Ocampo RN (oncoming nurse) by Brent Traore RN (offgoing nurse). Report included the following information SBAR, Kardex, Procedure Summary, Intake/Output, MAR, Recent Results and Cardiac Rhythm Afib w/ frequent PVCs. 8837 - At bedside with patient. VS stable. TF currently on hold. Noted patient was febrile yesterday. Currently Afebrile. VS stable. Awaiting results of cultures from several sources. See results review flow sheet for details. Currently Vancomycin and Zosyn infusing. Patient is drowsy but eyes open to voice. Able to follow some commands currently. Will continue to monitor. 0642 - At bedside with patient. Noted gurgling sound from patient's mouth. ETT and oral suction provided with large amounts of clear thin secretions noted.

## 2018-10-12 NOTE — PROGRESS NOTES
[facility] Pulmonary Consult Service NotePulmonary, Critical Care, and Sleep Medicine Name: Mikayla Valdes MRN: [id number] : 1934 Hospital: [facility] Date: 10/12/2018   Hospital Day: 28 IMPRESSION:  
1. 18 cardiopulmonary arrest with  6 min of CPR then ROSC. 2. Gram negative in urine and penile discharge sample. 3. Acute respiratory failure-on vent- has not been able to do SBT. Can do ongoing evals. May need LTAC, Will introduce to family.  to provide info to family. Ongoing weaning trials as tolerated. 4. Acute hemoptysis- Noted to have bleeding from RLL on Bronch. No endobronchial lesion. has lung mass(CA vs inflammatory pseudotumor) and microscopic hematuria; ANCA negative; JOSS barely positive. Suspect LUNG mass the culprit. CYTOLOGY from bronchoscopy negative malignancy x 2, at this point not pursuing additional interventions. 5. Has moderate right pleural effusion, appreciate IR placement of RIght sided Chest tube. Almost 4 liters off since yesterday. 6. S/P pulmonary artery embolization for hemoptysis Per IR of suspect lung mass. 7. COPD moderate to severe at baseline 8. Acute renal insf now resolved. 9. Anemia and prior coagulopathy on coumadin-had blood transfusion. 10. Volume overload, anasarca- after couple of days of IV lasix- becomes prerenal 
11. Encehalopathy/Debility multifactorial - has hearing loss as well: He does seem to be making slow improvement, More awake now. .  
12. Hematuria, seems to be improving had almaraz in place. 15. Hx of CAD s/p PCI, afib on amio, former smoker, prior cva 14. Discussed with nurse on rounds this am.  
15. Process of LTAC eval started per CM. [place] 16. Called and discussed with pts wife about chest tube placement. RECOMMENDATIONS/PLAN:  
1. Will placed on empiric Zosyn and Vanc.  Discussed and reviewed with pharmacy. For suspected HCAP, Purulent penile discharge, UTI. 2. Restarted heparin for DVT prophylaxis q8hrs. 3. Vent support, TC trials as tolerated. Suspect will need LTAC. 4. Pt has large right pleural effusion, may be malignant pleural effusion. Will get chest tube place per IR. Called IR to let them know. Will need to follow cytology. 5. Trach care, Noted to have moderate secretions. 6. Still has unresolved issue of lung mass, will having ongoing family discussions. Palliative care is following. Will get pleural fluid cytology, results are pending. 7. Pleural fluid Cx is pending. 8. May need additional Cultures. 9. Diuresis as needed 10. Follow renal function, WNL 11. Enteral feedings tolerating. NPO for possible procedure. 12.  No need for transfusion 13. Glucose monitoring and SSI 14. DVT/GI prophylaxis 15. Will need LTAC when becomes medically stable. Subjective/Initial History: S/p trach 10/2 Vent support 10-12-18: Pt seems to be much more alert when seen this am. Had near 3.5-4L of right pleural fluid output. NO ROS or HPI are obtainable from pt.  
 
 
10-11-18: Pt has been slow to respond. Still not waking up. Had increased secretion from trach and from his penis. Not follow any commands. Not able to get ROS or HPI from pt.  
 
 
10-10-18: No major changes or events over last 24 hrs. Not able to obtain ROS or HPI from pt. Some thick yellow secretions present around this trach. 10-9-18: Not able to get hx  Or ROS from pt. No acute changes overnight. Had some moderate secretions this am.  
 
 
10-8-18: Pt still not waking up. Has ongoing issues with blood clots in urine. Per nurses has been with recurrent issues with clotting and require frequent flushing of the almaraz. Pt has not been able to tolerate trach collar. Unable to get ROS or HPI from pt. MAR reviewed and pertinent medications noted or modified as needed Current Facility-Administered Medications Medication  zinc oxide-cod liver oil (DESITIN) 40 % paste  piperacillin-tazobactam (ZOSYN) 4.5 g in 0.9% sodium chloride (MBP/ADV) 100 mL  vancomycin (VANCOCIN) 1250 mg in  ml infusion  fentaNYL citrate (PF) injection 25 mcg  furosemide (LASIX) injection 20 mg  
 zinc oxide-cod liver oil (DESITIN) 40 % paste  albuterol-ipratropium (DUO-NEB) 2.5 MG-0.5 MG/3 ML  
 acetaminophen (TYLENOL) solution 650 mg  
 sodium chloride (NS) flush 10-30 mL  sodium chloride (NS) flush 10 mL  sodium chloride (NS) flush 10 mL  sodium chloride (NS) flush 10-40 mL  sodium chloride (NS) flush 20 mL  heparin (porcine) pf 300 Units  glycopyrrolate (ROBINUL) injection 0.1 mg  
 famotidine (PF) (PEPCID) 20 mg in sodium chloride 0.9% 10 mL injection  chlorhexidine (PERIDEX) 0.12 % mouthwash 15 mL  albuterol (PROVENTIL HFA, VENTOLIN HFA, PROAIR HFA) inhaler 2 Puff  dorzolamide-timolol (COSOPT) 22.3-6.8 mg/mL ophthalmic solution 1 Drop  latanoprost (XALATAN) 0.005 % ophthalmic solution 1 Drop  sodium chloride (NS) flush 5-10 mL  sodium chloride (NS) flush 5-10 mL  ondansetron (ZOFRAN) injection 4 mg ROS:A comprehensive review of systems was negative except for that written in the HPI. Objective: 
 
Vital Signs: Telemetry:    normal sinus rhythmIntake/Output:  
Visit Vitals  /64  Pulse 76  Temp 99.4 °F (37.4 °C)  Resp 17  Ht 6' 2\" (1.88 m)  Wt 114 kg (251 lb 5.2 oz)  SpO2 99%  BMI 32.27 kg/m2 Temp (24hrs), Av.8 °F (37.7 °C), Min:98.6 °F (37 °C), Max:101 °F (38.3 °C) O2 Device: Tracheostomy, Ventilator O2 Flow Rate (L/min): 10 l/min Wt Readings from Last 4 Encounters:  
10/12/18 114 kg (251 lb 5.2 oz) 16 111.1 kg (245 lb)  
16 106.6 kg (235 lb) 04/08/15 103.4 kg (228 lb) Intake/Output Summary (Last 24 hours) at 10/12/18 0788 Last data filed at 10/12/18 0400 Gross per 24 hour Intake             1850 ml Output             3380 ml Net            -1530 ml Last shift:        
Last 3 shifts: 10/10 1901 - 10/12 0700 In: 2650 [I.V.:700] Out: 5119 [[facility]; JOQRAZ:[id number]] Physical Exam:  
 General:  No distress, trach and vent support. HEAD: Normocephalic, without obvious abnormality, atraumatic EYES: conjunctivae clear. anicteric sclerae NOSE: nares normal, no drainage, no nasal flaring, Neck: Supple, symmetrical, trachea midline, has trach in place, on Mechanical vent support. Chest: increased AP diameter Lungs: pretty clear anterioly. Trached on vent. Heart: Regular rate and rhythm nl s1,2. Abdomen: soft, non-tender, +BS, pt has almaraz in place. Blood urine present. Obese. Musculoskeletal: anasarca; no erythema, minimal peripheral edema, has SCDs in place. Neuro: seems at least to intermittently to follow commands. Psych: Not able to assess; no agitation. NO anxiety or depression when seen. Data:  
 
Current Facility-Administered Medications Medication Dose Route Frequency  piperacillin-tazobactam (ZOSYN) 4.5 g in 0.9% sodium chloride (MBP/ADV) 100 mL  4.5 g IntraVENous Q8H  
 vancomycin (VANCOCIN) 1250 mg in  ml infusion  1,250 mg IntraVENous Q12H  furosemide (LASIX) injection 20 mg  20 mg IntraVENous DAILY  albuterol-ipratropium (DUO-NEB) 2.5 MG-0.5 MG/3 ML  3 mL Nebulization QID RT  
 sodium chloride (NS) flush 10 mL  10 mL InterCATHeter Q24H  
 sodium chloride (NS) flush 10-40 mL  10-40 mL InterCATHeter Q8H  
 sodium chloride (NS) flush 20 mL  20 mL InterCATHeter Q24H  
 famotidine (PF) (PEPCID) 20 mg in sodium chloride 0.9% 10 mL injection  20 mg IntraVENous Q12H  chlorhexidine (PERIDEX) 0.12 % mouthwash 15 mL  15 mL Oral BID  dorzolamide-timolol (COSOPT) 22.3-6.8 mg/mL ophthalmic solution 1 Drop  1 Drop Both Eyes BID  
  latanoprost (XALATAN) 0.005 % ophthalmic solution 1 Drop  1 Drop Both Eyes QHS  sodium chloride (NS) flush 5-10 mL  5-10 mL IntraVENous Q8H Labs: 
Recent Labs 10/12/18 
 0424  10/11/18 
 0455  10/10/18 
 8188 WBC  13.8*  15.2*  13.1* HGB  7.4*  8.0*  8.0*  
HCT  24.6*  26.5*  26.8*  
PLT  127*  126*  164 Recent Labs 10/12/18 
 0424  10/11/18 
 0455  10/10/18 
 3055 NA  142  141  141  
K  4.2  4.5  4.3 CL  106  106  106 CO2  30  31  32 GLU  100  132*  131* BUN  22*  25*  23* CREA  0.88  0.94  0.83 CA  8.4*  8.2*  8.6 MG  2.2  2.2  2.3 PHOS  2.7  2.6  2.5* ALB  1.7*  1.7*  1.9* TBILI  0.8  0.7  0.7 SGOT  95*  97*  105* ALT  123*  126*  152* INR  1.1  1.2*  1.1 No results for input(s): PH, PCO2, PO2, HCO3, FIO2 in the last 72 hours. Imaging: 
I have personally reviewed the patients radiographs and have reviewed the reports: 
10-9-18: CXR: 
 
Right pleural effusion, trach intact. Mild IE. Has PPM in place. 10-12-18: CXR: has decreased right pleural effusion. Has ongoing Interstitial edema. CT of chest:10-11-18: IMPRESSION: 
Worsening of the appearance of the right hemithorax with increased pleural 
effusion and consolidation. CT of head: 
10-11-18: IMPRESSION: 
1. No evidence of acute intracranial abnormality. Stable small chronic infarct 
in the left corona radiata. 2. Bilateral tympanomastoid effusions Total critical care time exclusive of procedures:  minutes Cade Fitch MD

## 2018-10-12 NOTE — PROGRESS NOTES
Hospitalist Progress Note NAME: Sha Hernandez  
:  1934 MRN:  [id number] Assessment / Plan: 
Sepsis (fever, leukocytosis, tachycardia), not present on admission: afebrile since reinitiation of Abx, source still unclear - CT head 10/11 with no evidence of acute intracranial abnormality. 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 Bilateral tympanomastoid effusions. 
- CT chest 10/11 with worsening of the appearance of the right hemithorax with increased pleural effusion and consolidation. 
- blood, urine and pleural fluid cultures from 10/11 no growth thus far. Sputum culture with gram positive cocci and rods thus far 
- con't emperic vancomycin and zosyn for now, possibly stop vancomycin tomorrow if cultures remain negative R pleural effusion: - R chest tube placed 10/11 with 2 L pleural fluid drainage so far 
- follow fluid cultures, negative to date as discussed above Acute encephalopathy in setting of acute hypoxic respiratory failure and resultant PEA cardiac arrest :  agonal breathing after coming back from radiology after arteriogram, 6 min CPR and epi with ROSC. Pt continues to be essentially nonresponsive, though has eyes open this morning. 
- CT head  showed small age-indeterminate infarct in the left corona radiata - CT head 10/11 with no evidence of acute intracranial abnormality. 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 Bilateral tympanomastoid effusions. - family meeting 10/11 with pulmonology to discuss goals of care.  Pt will need LTAC minimally if they want to continue to pursue aggressive care; they are discussing.  
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 
- CT chest  with patchy airspace disease in the right lung base with an associated hyperdense 5.2 x 4.3 cm oval lesion. This may contain a fluid/fluid level indicating 
abscess/hemorrhage, with neoplasm less likely. No acute abdominal or pelvic process. - CTA chest 9/17 due to persistent bloody pulmonary secretions. Right lower lobe masslike abnormality demonstrates increased internal density and has increased in size measuring 6.7 x 6.7 cm, compared to 5.7 x 4.4 cm. This is compatible with internal hemorrhage. There is new moderate right lower lobe partial collapse/atelectasis. - most recent CT chest 10/11 with worsening of the appearance of the right hemithorax with increased pleural effusion and consolidation. - con't lasix; monitor I/Os - s/p arteriogram 9/14: Thoracic aortic and intercostal arteriograms demonstrating no 
enlarged bronchial artery supplying the right lower lobe of the lung. Normal appearing and normal sized right bronchial artery is seen without any hyperplasia or dominant right lower lobe supply. No embolization was performed. - s/p diagnostic bronch on 9/16. No endobronchial lesions seen. - blood cultures negative.  Sputum cytology 9/12 negative except scant yeast. Cytology from bronchial washing on 9/16 is atypical, favor benign reactive process. - work up for rheumatologic causes of hemoptysis negative: ANCA, anti-GBM, MPO Ab, NJ-3 Ab, SSA/SSB, RNP Ab, Arredondo/RNP, dsDNA, and Arredondo Ab all negative. JOSS+. - completed zosyn for possible lung abscess (9/20), Abx restarted today due to sepsis as above CAD s/p PCI, atrial fibrillation on chronic anticoagulation w/coumadin, and benign HTN: 
- holding amiodarone and dlitiazem; holding anticoagulation (coumadin) and pravachol 
- on lasix at home as well   
Acute blood loss anemia and anemia of chronic disease Elevated LFTs of unclear etiology: 
- stopped statin for now - trend INR and LFTs, will con't to treat multiple underlying issues.  INR 1.1 today. ALAN: resolved UTI, acute cystitis w/ hematuria, POA:  Required almaraz placed by urology Hyperlipidemia: statin held due to elevated LFTs as above Hypernatremia, resolved Obesity (Body mass index is 32.27 kg/(m^2) 
   
Code: Full (wife and children are decision makers) DVT prophylaxis: SCDs Subjective: Chief Complaint / Reason for Physician Visit Eyes open this morning but remains nonresponsive and follows no commands. Discussed with RN events overnight. Review of Systems: 
Symptom Y/N Comments  Symptom Y/N Comments Fever/Chills    Chest Pain Poor Appetite    Edema Cough    Abdominal Pain Sputum    Joint Pain SOB/ROY    Pruritis/Rash Nausea/vomit    Tolerating PT/OT Diarrhea    Tolerating Diet Constipation    Other Could NOT obtain due to: Not responsive Objective: VITALS:  
Last 24hrs VS reviewed since prior progress note. Most recent are: 
Patient Vitals for the past 24 hrs: 
 Temp Pulse Resp BP SpO2  
10/12/18 0806 - 90 20 - 98 % 10/12/18 0805 - - - - 98 % 10/12/18 0748 99.8 °F (37.7 °C) 88 18 119/65 100 % 10/12/18 0600 - 76 17 116/64 99 % 10/12/18 0522 - 85 23 - 98 % 10/12/18 0500 - 82 19 92/49 98 % 10/12/18 0400 99.4 °F (37.4 °C) 86 20 135/84 98 % 10/12/18 0300 - 87 23 117/76 -  
10/12/18 0200 - 88 26 107/57 97 % 10/12/18 0100 - 90 25 130/68 98 % 10/12/18 0046 - (!) 104 25 - 100 % 10/12/18 0000 99.3 °F (37.4 °C) 84 23 165/89 96 % 10/11/18 2300 - 94 21 129/69 95 % 10/11/18 2200 99.4 °F (37.4 °C) 92 23 131/56 97 % 10/11/18 2116 99 °F (37.2 °C) - - - 98 % 10/11/18 2109 - 81 24 - 98 % 10/11/18 2100 100 °F (37.8 °C) 89 22 129/59 97 % 10/11/18 2000 100.4 °F (38 °C) 78 20 109/65 98 % 10/11/18 1900 - 80 19 116/66 98 % 10/11/18 1800 - 72 21 106/59 99 % 10/11/18 1700 99 °F (37.2 °C) 82 20 107/66 96 % 10/11/18 1600 100.4 °F (38 °C) 100 22 136/65 97 % 10/11/18 1500 - 97 22 98/50 94 % 10/11/18 1430 - 98 25 - 98 % 10/11/18 1400 - (!) 103 22 110/58 98 % 10/11/18 1300 - (!) 101 22 121/65 97 % 10/11/18 1200 98.6 °F (37 °C) 90 24 110/80 97 % 10/11/18 1140 - 88 24 - 96 % 10/11/18 1113 - 86 15 108/60 100 % 10/11/18 1000 - 93 20 106/50 96 % 10/11/18 0900 99.7 °F (37.6 °C) 93 23 100/46 95 % Intake/Output Summary (Last 24 hours) at 10/12/18 0820 Last data filed at 10/12/18 6742 Gross per 24 hour Intake             1820 ml Output             3415 ml Net            -1595 ml PHYSICAL EXAM: 
General: WD, WN. Alert, not cooperative, no acute distress   
EENT:  EOM not intact. Anicteric sclerae. MMM Resp:  Central wheezing, no rhonchi. No accessory muscle use CV:  Regular rhythm,  No edema GI:  Soft, Non distended, Non tender.  +Bowel sounds Neurologic:  Alert and oriented X 0-1, no speech, Psych:   No insight. Not anxious nor agitated Skin:  No rashes. No jaundice Reviewed most current lab test results and cultures  YES Reviewed most current radiology test results   YES Review and summation of old records today    NO Reviewed patient's current orders and MAR    YES 
PMH/SH reviewed - no change compared to H&P 
________________________________________________________________________ Care Plan discussed with: 
  Comments Patient x Family RN x Care Manager Consultant Multidiciplinary team rounds were held today with , nursing, pharmacist and clinical coordinator. Patient's plan of care was discussed; medications were reviewed and discharge planning was addressed. ________________________________________________________________________ Total NON critical care TIME:  25 Minutes Total CRITICAL CARE TIME Spent:   Minutes non procedure based Comments >50% of visit spent in counseling and coordination of care x   
________________________________________________________________________ Dori Hidalgo MD  
 
 Procedures: see electronic medical records for all procedures/Xrays and details which were not copied into this note but were reviewed prior to creation of Plan. LABS: 
I reviewed today's most current labs and imaging studies. Pertinent labs include: 
Recent Labs 10/12/18 
 0424  10/11/18 
 0455  10/10/18 
 2984 WBC  13.8*  15.2*  13.1* HGB  7.4*  8.0*  8.0*  
HCT  24.6*  26.5*  26.8*  
PLT  127*  126*  164 Recent Labs 10/12/18 
 0424  10/11/18 
 0455  10/10/18 
 0303 NA  142  141  141  
K  4.2  4.5  4.3 CL  106  106  106 CO2  30  31  32 GLU  100  132*  131* BUN  22*  25*  23* CREA  0.88  0.94  0.83 CA  8.4*  8.2*  8.6 MG  2.2  2.2  2.3 PHOS  2.7  2.6  2.5* ALB  1.7*  1.7*  1.9* TBILI  0.8  0.7  0.7 SGOT  95*  97*  105* ALT  123*  126*  152* INR  1.1  1.2*  1.1 Signed: Hilda Chacon MD

## 2018-10-12 NOTE — PROGRESS NOTES
1930 - Bedside and Verbal shift change report given to Sherwin Fregoso (oncoming nurse) by Reyes Figueroa (offgoing nurse). Report included the following information SBAR, Kardex, ED Summary, OR Summary, Procedure Summary, Intake/Output, MAR, Recent Results and Cardiac Rhythm A fib.  
 
2000 - Shift assessment complete, decreased commands following. Thick yellow secretion around tracheostomy. BP wnl Temp 100.4 axillary . application of Ice packs to decrease temperature. Tea colored urinary output with clots. Flexiseal and chest tube in place. FC and flexiseal irrigated. 2200 - Repositioned for comfort, eyes open spontaneously. Decreased Temp from 100.4  To 99 F noted . Decreased commands following but tried to squeeze hands, crossing arms on chest when getting pulled up in bed. Nods appropriately sometimes. 0000 - Reassessment complete, no changes noted from previous assessment. See flow sheet for details. TF stopped and PEG tube clamped as ordered for possible  procedure. 0200 - Decreased in urinary output noted, bladder scan was performed, 37 ml of urine residual found. 0400 - Reassessment complete, no changes noted from previous assessment. See flow sheet for details. Daily bath Complete with soap / water / chg. Linen / Denis Limerick / bed pad . .. Changed. PRN Desitin paste applied  nimesh anal and buttocks. 0600 - Reposition for comfort. Restlessness noted. VSS  Stable. 0730 - Bedside and Verbal shift change report given to Jaleesa Hathaway (oncoming nurse) by Sherwin Fregoso (offgoing nurse). Report included the following information SBAR, Kardex, ED Summary, OR Summary, Procedure Summary, Intake/Output, MAR, Recent Results and Med Rec Status.

## 2018-10-13 ENCOUNTER — APPOINTMENT (OUTPATIENT)
Dept: GENERAL RADIOLOGY | Age: 83
DRG: 003 | End: 2018-10-13
Attending: INTERNAL MEDICINE
Payer: MEDICARE

## 2018-10-13 LAB
ALBUMIN SERPL-MCNC: 1.6 G/DL (ref 3.5–5)
ALBUMIN/GLOB SERPL: 0.3 {RATIO} (ref 1.1–2.2)
ALP SERPL-CCNC: 201 U/L (ref 45–117)
ALT SERPL-CCNC: 91 U/L (ref 12–78)
ANION GAP SERPL CALC-SCNC: 6 MMOL/L (ref 5–15)
APTT PPP: 30.5 SEC (ref 22.1–32)
AST SERPL-CCNC: 59 U/L (ref 15–37)
BACTERIA SPEC CULT: ABNORMAL
BASOPHILS # BLD: 0 K/UL (ref 0–0.1)
BASOPHILS NFR BLD: 0 % (ref 0–1)
BILIRUB DIRECT SERPL-MCNC: 0.2 MG/DL (ref 0–0.2)
BILIRUB SERPL-MCNC: 0.5 MG/DL (ref 0.2–1)
BUN SERPL-MCNC: 24 MG/DL (ref 6–20)
BUN/CREAT SERPL: 28 (ref 12–20)
CALCIUM SERPL-MCNC: 8.2 MG/DL (ref 8.5–10.1)
CC UR VC: ABNORMAL
CHLORIDE SERPL-SCNC: 107 MMOL/L (ref 97–108)
CO2 SERPL-SCNC: 29 MMOL/L (ref 21–32)
CREAT SERPL-MCNC: 0.87 MG/DL (ref 0.7–1.3)
DATE LAST DOSE: ABNORMAL
DIFFERENTIAL METHOD BLD: ABNORMAL
EOSINOPHIL # BLD: 0.1 K/UL (ref 0–0.4)
EOSINOPHIL NFR BLD: 1 % (ref 0–7)
ERYTHROCYTE [DISTWIDTH] IN BLOOD BY AUTOMATED COUNT: 21.7 % (ref 11.5–14.5)
FIBRINOGEN PPP-MCNC: 745 MG/DL (ref 200–475)
GLOBULIN SER CALC-MCNC: 5.5 G/DL (ref 2–4)
GLUCOSE SERPL-MCNC: 91 MG/DL (ref 65–100)
GRAM STN SPEC: ABNORMAL
HCT VFR BLD AUTO: 23 % (ref 36.6–50.3)
HGB BLD-MCNC: 6.7 G/DL (ref 12.1–17)
IMM GRANULOCYTES # BLD: 0 K/UL (ref 0–0.04)
IMM GRANULOCYTES NFR BLD AUTO: 0 % (ref 0–0.5)
INR PPP: 1.1 (ref 0.9–1.1)
LYMPHOCYTES # BLD: 0.7 K/UL (ref 0.8–3.5)
LYMPHOCYTES NFR BLD: 9 % (ref 12–49)
MAGNESIUM SERPL-MCNC: 2.3 MG/DL (ref 1.6–2.4)
MCH RBC QN AUTO: 31 PG (ref 26–34)
MCHC RBC AUTO-ENTMCNC: 29.1 G/DL (ref 30–36.5)
MCV RBC AUTO: 106.5 FL (ref 80–99)
MONOCYTES # BLD: 0.3 K/UL (ref 0–1)
MONOCYTES NFR BLD: 4 % (ref 5–13)
NEUTS SEG # BLD: 7 K/UL (ref 1.8–8)
NEUTS SEG NFR BLD: 86 % (ref 32–75)
NRBC # BLD: 0 K/UL (ref 0–0.01)
NRBC BLD-RTO: 0 PER 100 WBC
PHOSPHATE SERPL-MCNC: 2.5 MG/DL (ref 2.6–4.7)
PLATELET # BLD AUTO: 132 K/UL (ref 150–400)
PMV BLD AUTO: 11.9 FL (ref 8.9–12.9)
POTASSIUM SERPL-SCNC: 3.8 MMOL/L (ref 3.5–5.1)
PROT SERPL-MCNC: 7.1 G/DL (ref 6.4–8.2)
PROTHROMBIN TIME: 11.4 SEC (ref 9–11.1)
RBC # BLD AUTO: 2.16 M/UL (ref 4.1–5.7)
RBC MORPH BLD: ABNORMAL
REPORTED DOSE,DOSE: ABNORMAL UNITS
REPORTED DOSE/TIME,TMG: ABNORMAL
SERVICE CMNT-IMP: ABNORMAL
SODIUM SERPL-SCNC: 142 MMOL/L (ref 136–145)
THERAPEUTIC RANGE,PTTT: ABNORMAL SECS (ref 58–77)
VANCOMYCIN TROUGH SERPL-MCNC: 19.7 UG/ML (ref 5–10)
WBC # BLD AUTO: 8.1 K/UL (ref 4.1–11.1)

## 2018-10-13 PROCEDURE — 94003 VENT MGMT INPAT SUBQ DAY: CPT

## 2018-10-13 PROCEDURE — 74011250636 HC RX REV CODE- 250/636: Performed by: INTERNAL MEDICINE

## 2018-10-13 PROCEDURE — 94640 AIRWAY INHALATION TREATMENT: CPT

## 2018-10-13 PROCEDURE — 84100 ASSAY OF PHOSPHORUS: CPT | Performed by: INTERNAL MEDICINE

## 2018-10-13 PROCEDURE — 86923 COMPATIBILITY TEST ELECTRIC: CPT | Performed by: INTERNAL MEDICINE

## 2018-10-13 PROCEDURE — 36415 COLL VENOUS BLD VENIPUNCTURE: CPT | Performed by: INTERNAL MEDICINE

## 2018-10-13 PROCEDURE — 65610000006 HC RM INTENSIVE CARE

## 2018-10-13 PROCEDURE — 74011000250 HC RX REV CODE- 250: Performed by: INTERNAL MEDICINE

## 2018-10-13 PROCEDURE — 83735 ASSAY OF MAGNESIUM: CPT | Performed by: INTERNAL MEDICINE

## 2018-10-13 PROCEDURE — 80048 BASIC METABOLIC PNL TOTAL CA: CPT | Performed by: INTERNAL MEDICINE

## 2018-10-13 PROCEDURE — 80202 ASSAY OF VANCOMYCIN: CPT | Performed by: INTERNAL MEDICINE

## 2018-10-13 PROCEDURE — 71045 X-RAY EXAM CHEST 1 VIEW: CPT

## 2018-10-13 PROCEDURE — 86900 BLOOD TYPING SEROLOGIC ABO: CPT | Performed by: INTERNAL MEDICINE

## 2018-10-13 PROCEDURE — 85610 PROTHROMBIN TIME: CPT | Performed by: INTERNAL MEDICINE

## 2018-10-13 PROCEDURE — 36430 TRANSFUSION BLD/BLD COMPNT: CPT

## 2018-10-13 PROCEDURE — 80076 HEPATIC FUNCTION PANEL: CPT | Performed by: INTERNAL MEDICINE

## 2018-10-13 PROCEDURE — P9016 RBC LEUKOCYTES REDUCED: HCPCS | Performed by: INTERNAL MEDICINE

## 2018-10-13 PROCEDURE — 74011000258 HC RX REV CODE- 258: Performed by: INTERNAL MEDICINE

## 2018-10-13 PROCEDURE — 85025 COMPLETE CBC W/AUTO DIFF WBC: CPT | Performed by: INTERNAL MEDICINE

## 2018-10-13 RX ORDER — IPRATROPIUM BROMIDE AND ALBUTEROL SULFATE 2.5; .5 MG/3ML; MG/3ML
3 SOLUTION RESPIRATORY (INHALATION)
Status: DISCONTINUED | OUTPATIENT
Start: 2018-10-13 | End: 2018-10-27 | Stop reason: HOSPADM

## 2018-10-13 RX ORDER — SODIUM CHLORIDE 9 MG/ML
250 INJECTION, SOLUTION INTRAVENOUS AS NEEDED
Status: DISCONTINUED | OUTPATIENT
Start: 2018-10-13 | End: 2018-10-15 | Stop reason: ALTCHOICE

## 2018-10-13 RX ORDER — IPRATROPIUM BROMIDE AND ALBUTEROL SULFATE 2.5; .5 MG/3ML; MG/3ML
SOLUTION RESPIRATORY (INHALATION)
Status: DISPENSED
Start: 2018-10-13 | End: 2018-10-14

## 2018-10-13 RX ORDER — LEVOFLOXACIN 5 MG/ML
750 INJECTION, SOLUTION INTRAVENOUS
Status: DISCONTINUED | OUTPATIENT
Start: 2018-10-13 | End: 2018-10-19

## 2018-10-13 RX ADMIN — Medication 30 ML: at 21:47

## 2018-10-13 RX ADMIN — DORZOLAMIDE HYDROCHLORIDE AND TIMOLOL MALEATE 1 DROP: 20; 5 SOLUTION/ DROPS OPHTHALMIC at 09:11

## 2018-10-13 RX ADMIN — IPRATROPIUM BROMIDE AND ALBUTEROL SULFATE 3 ML: .5; 3 SOLUTION RESPIRATORY (INHALATION) at 19:43

## 2018-10-13 RX ADMIN — FAMOTIDINE 20 MG: 10 INJECTION, SOLUTION INTRAVENOUS at 09:11

## 2018-10-13 RX ADMIN — IPRATROPIUM BROMIDE AND ALBUTEROL SULFATE 3 ML: .5; 3 SOLUTION RESPIRATORY (INHALATION) at 08:28

## 2018-10-13 RX ADMIN — Medication 20 ML: at 13:55

## 2018-10-13 RX ADMIN — FUROSEMIDE 20 MG: 10 INJECTION, SOLUTION INTRAMUSCULAR; INTRAVENOUS at 08:05

## 2018-10-13 RX ADMIN — HEPARIN SODIUM 5000 UNITS: 5000 INJECTION INTRAVENOUS; SUBCUTANEOUS at 06:00

## 2018-10-13 RX ADMIN — HEPARIN SODIUM 5000 UNITS: 5000 INJECTION INTRAVENOUS; SUBCUTANEOUS at 21:46

## 2018-10-13 RX ADMIN — IPRATROPIUM BROMIDE AND ALBUTEROL SULFATE 3 ML: .5; 3 SOLUTION RESPIRATORY (INHALATION) at 15:25

## 2018-10-13 RX ADMIN — CHLORHEXIDINE GLUCONATE 15 ML: 1.2 RINSE ORAL at 08:05

## 2018-10-13 RX ADMIN — HEPARIN SODIUM 5000 UNITS: 5000 INJECTION INTRAVENOUS; SUBCUTANEOUS at 13:54

## 2018-10-13 RX ADMIN — Medication 10 ML: at 05:10

## 2018-10-13 RX ADMIN — Medication 10 ML: at 13:55

## 2018-10-13 RX ADMIN — DORZOLAMIDE HYDROCHLORIDE AND TIMOLOL MALEATE 1 DROP: 20; 5 SOLUTION/ DROPS OPHTHALMIC at 18:28

## 2018-10-13 RX ADMIN — FAMOTIDINE 20 MG: 10 INJECTION, SOLUTION INTRAVENOUS at 21:45

## 2018-10-13 RX ADMIN — LEVOFLOXACIN 750 MG: 5 INJECTION, SOLUTION INTRAVENOUS at 21:46

## 2018-10-13 RX ADMIN — VANCOMYCIN HYDROCHLORIDE 1250 MG: 10 INJECTION, POWDER, LYOPHILIZED, FOR SOLUTION INTRAVENOUS at 10:28

## 2018-10-13 RX ADMIN — PIPERACILLIN SODIUM,TAZOBACTAM SODIUM 4.5 G: 4; .5 INJECTION, POWDER, FOR SOLUTION INTRAVENOUS at 13:54

## 2018-10-13 RX ADMIN — PIPERACILLIN SODIUM,TAZOBACTAM SODIUM 4.5 G: 4; .5 INJECTION, POWDER, FOR SOLUTION INTRAVENOUS at 05:09

## 2018-10-13 RX ADMIN — Medication 10 ML: at 17:54

## 2018-10-13 RX ADMIN — CHLORHEXIDINE GLUCONATE 15 ML: 1.2 RINSE ORAL at 21:45

## 2018-10-13 RX ADMIN — LATANOPROST 1 DROP: 50 SOLUTION OPHTHALMIC at 21:46

## 2018-10-13 RX ADMIN — Medication 10 ML: at 05:09

## 2018-10-13 RX ADMIN — IPRATROPIUM BROMIDE AND ALBUTEROL SULFATE 3 ML: .5; 3 SOLUTION RESPIRATORY (INHALATION) at 11:41

## 2018-10-13 NOTE — PROGRESS NOTES
Progress Note Patient: Robles Parsons MRN: [id number]  SSN: [id number] YOB: 1934 Age: 80 y.o. Sex: male Height: Height: 6' 2\" (188 cm) Weight: Weight: 114 kg (251 lb 5.2 oz) BMI: Body mass index is 32.27 kg/(m^2). Emergency  Contact:  Primary Emergency Contact: LUDA, Home Phone: [phone] PCP:   PROVIDER UNKNOWN None None Hospital Day: 35 - Admitted 2018  9:26 PM by Mateo Alexandre MD - Full Code Active Hospital Problems Diagnosis Date Noted  Pulmonary hemorrhage 2018  Acute GI bleeding 2018  
 10 Days Post-Op Procedure(s): ESOPHAGOGASTRODUODENOSCOPY (EGD) PERCUTANEOUS ENDOSCOPIC GASTROSTOMY TUBE INSERTION Surgeon(s): 
Desire Stratton MD 
    
 
Assessment/Plan: · Gross hematuria - from BPH/prostatomegaly, no anticoags/antiplatelets on board. Now Clear. I don't expect this to be a problem. Hasn't required hand flushing, etc. [place] out at discretion of primary service. · UTI - Sensitivities pending on Zosyn/Vanco. 
 
 
Will follow at a distance, and be available for issues. Subjective: Urine clearing, now clear Imaging: N/A Exam:   
ROS:  Denies Chest Pain, SOB Labs: Recent Labs 10/13/18 
 5052  10/12/18 
 0424  10/11/18 
 2110 WBC  8.1  13.8*  15.2* HGB  6.7*  7.4*  8.0*  
HCT  23.0*  24.6*  26.5*  
PLT  132*  127*  126* Recent Labs 10/13/18 
 6400  10/12/18 
 0424  10/11/18 
 5110 NA  142  142  141  
K  3.8  4.2  4.5  
CL  107  106  106 CO2  29  30  31 GLU  91  100  132* BUN  24*  22*  25* CREA  0.87  0.88  0.94  
CA  8.2*  8.4*  8.2* MG  2.3  2.2  2.2 PHOS  2.5*  2.7  2.6 INR  1.1  1.1  1.2* ID: Temp (24hrs), Av.5 °F (36.9 °C), Min:97.6 °F (36.4 °C), Max:99.3 °F (37.4 °C) 
 
2018: WBC 8.9 K/uL (Ref range: 4.1 - 11.1 K/uL) 2018: WBC 12.3 K/uL* (Ref range: 4.1 - 11.1 K/uL) 9/15/2018: WBC 12.1 K/uL* (Ref range: 4.1 - 11.1 K/uL) 9/16/2018: WBC 9.8 K/uL (Ref range: 4.1 - 11.1 K/uL) 9/17/2018: WBC 11.1 K/uL (Ref range: 4.1 - 11.1 K/uL) 9/18/2018: WBC 10.2 K/uL (Ref range: 4.1 - 11.1 K/uL) 9/19/2018: WBC 11.6 K/uL* (Ref range: 4.1 - 11.1 K/uL) 9/20/2018: WBC 10.8 K/uL (Ref range: 4.1 - 11.1 K/uL) 
9/21/2018: WBC 10.1 K/uL (Ref range: 4.1 - 11.1 K/uL) 
9/22/2018: WBC 11.7 K/uL* (Ref range: 4.1 - 11.1 K/uL) 
9/23/2018: WBC 11.5 K/uL* (Ref range: 4.1 - 11.1 K/uL) 9/25/2018: WBC 9.4 K/uL (Ref range: 4.1 - 11.1 K/uL) 
9/29/2018: WBC 7.7 K/uL (Ref range: 4.1 - 11.1 K/uL); WBC 6.6 K/uL (Ref range: 4.1 - 11.1 K/uL) 
9/30/2018: WBC 8.5 K/uL (Ref range: 4.1 - 11.1 K/uL) 
10/1/2018: WBC 6.3 K/uL (Ref range: 4.1 - 11.1 K/uL) 
10/2/2018: WBC 5.9 K/uL (Ref range: 4.1 - 11.1 K/uL) 
10/3/2018: WBC 7.9 K/uL (Ref range: 4.1 - 11.1 K/uL) 
10/4/2018: WBC 7.6 K/uL (Ref range: 4.1 - 11.1 K/uL) 
10/5/2018: WBC 7.2 K/uL (Ref range: 4.1 - 11.1 K/uL) 
10/6/2018: WBC 7.9 K/uL (Ref range: 4.1 - 11.1 K/uL) 
10/7/2018: WBC 8.1 K/uL (Ref range: 4.1 - 11.1 K/uL) 
10/8/2018: WBC 8.6 K/uL (Ref range: 4.1 - 11.1 K/uL) 
10/9/2018: WBC 12.4 K/uL* (Ref range: 4.1 - 11.1 K/uL) 10/10/2018: WBC 13.1 K/uL* (Ref range: 4.1 - 11.1 K/uL) 10/11/2018: WBC 15.2 K/uL* (Ref range: 4.1 - 11.1 K/uL) 10/12/2018: WBC 13.8 K/uL* (Ref range: 4.1 - 11.1 K/uL) 10/13/2018: WBC 8.1 K/uL (Ref range: 4.1 - 11.1 K/uL) Current Antimicrobial Therapy (168h ago through future) Ordered     Start Stop 10/11/18 1453  vancomycin (VANCOCIN) 1250 mg in  ml infusion  1,250 mg,   IntraVENous,   EVERY 12 HOURS    
 10/12/18 0500 10/19/18 0459  
 10/11/18 1436  piperacillin-tazobactam (ZOSYN) 4.5 g in 0.9% sodium chloride (MBP/ADV) 100 mL  4.5 g,   IntraVENous,   EVERY 8 HOURS    
 10/11/18 2033 10/18/18 1259 Cultures: All Micro Results Procedure Component Value Units Date/Time CULTURE, BLOOD, PAIRED [[id number]] Collected:  10/11/18 1213 Order Status:  Completed Specimen:  Blood Updated:  10/13/18 0549 Special Requests: NO SPECIAL REQUESTS Culture result: NO GROWTH 2 DAYS     
 CULTURE, URINE [[id number]]  (Abnormal) Collected:  10/11/18 1520 Order Status:  Completed Specimen:  Urine from [place] Specimen Updated:  10/12/18 1402 Special Requests: NO SPECIAL REQUESTS [place] Count --     
  >100,000 COLONIES/mL Culture result:      
  GRAM NEGATIVE RODS (OXIDASE POSITIVE) (A) CULTURE, RESPIRATORY/SPUTUM/BRONCH Melquiades Reddish [[id number]] Collected:  10/11/18 1213 Order Status:  Completed Specimen:  Sputum from Tracheal Aspirate Updated:  10/12/18 1331 Special Requests: NO SPECIAL REQUESTS     
  GRAM STAIN FEW WBCS SEEN     
        
  RARE EPITHELIAL CELLS SEEN  
        
  FEW GRAM POSITIVE COCCI GRAM POSITIVE RODS  
        
  RARE GRAM POSITIVE COCCI IN PAIRS Culture result:      
  LIGHT NORMAL RESPIRATORY [place] SO FAR  
 CULTURE, BODY FLUID [place] STAIN [[id number]]  (Abnormal) Collected:  10/11/18 1520 Order Status:  Completed Specimen:  [place] Updated:  10/12/18 1056 Special Requests: NO SPECIAL REQUESTS     
  GRAM STAIN RARE WBCS SEEN     
   NO ORGANISMS SEEN Culture result:      
  Culture performed on Fluid swab specimen MODERATE GRAM NEGATIVE RODS (A) CULTURE, BODY FLUID Melquiades Reddish [[id number]] Collected:  10/11/18 1507 Order Status:  Completed Specimen:  Pleural Fluid Updated:  10/12/18 1039 Special Requests: NO SPECIAL REQUESTS     
  GRAM STAIN RARE WBCS SEEN     
   NO ORGANISMS SEEN Culture result: NO GROWTH AFTER 16 HOURS     
 CULTURE, RESPIRATORY/SPUTUM/BRONCH [place] STAIN [[id number]] Order Status:  Sent Specimen:  Sputum from Sputum CULTURE, BLOOD, PAIRED [[id number]] Order Status:  Sent Specimen:  Blood Shawn Heard [[id number]] Collected:  09/16/18 1118 Order Status:  Completed Specimen:  Wound from Bronchial Washing Updated:  10/10/18 1203 Special Requests: NO SPECIAL REQUESTS Culture result: FEW 
COLONIES OF A POWDERY FUNGUS ISOLATED 
  
   AN ISOLATE WILL BE SENT TO THE STATE [facility] FOR IDENTIFICATION. PLEASE REFER TO [id number], FOR SUSEQUENT RESULTS 
  
 CULTURE, RESPIRATORY/SPUTUM/BRONCH Lonza Barrack STAIN [[id number]]  (Abnormal) Collected:  09/28/18 1851 Order Status:  Completed Specimen:  Sputum from Tracheal Aspirate Updated:  09/30/18 1008 Special Requests: NO SPECIAL REQUESTS     
  GRAM STAIN RARE WBCS SEEN     
   FEW EPITHELIAL CELLS SEEN     
   NO ORGANISMS SEEN Culture result: FEW NORMAL RESPIRATORY [place]  
        
  SCANT YEAST, (APPARENT CANDIDA ALBICANS) (A) AFB CULTURE + SMEAR W/RFLX ID FROM CULTURE [[id number]] Collected:  09/16/18 1115 Order Status:  Completed Specimen:  Miscellaneous sample Updated:  09/18/18 1935 Source BRONCHIAL WASHING     
  AFB Specimen processing Concentration Acid Fast Smear NEGATIVE      
   (NOTE) Performed At: [address] [id number] Billie Gates MD MJ:[id number] Acid Fast Culture PENDING  
 CULTURE, RESPIRATORY/SPUTUM/BRONCH Benito Bouillon [[id number]] Collected:  09/16/18 1115 Order Status:  Completed Specimen:  Sputum from Bronchial Washing Updated:  09/18/18 1055 Special Requests: NO SPECIAL REQUESTS     
  GRAM STAIN OCCASIONAL WBCS SEEN     
        
  RARE EPITHELIAL CELLS SEEN  
   NO ORGANISMS SEEN Culture result:      
  HEAVY NORMAL RESPIRATORY [place]  
 CULTURE, RESPIRATORY/SPUTUM/BRONCH Benito Bouillon [[id number]] Collected:  09/15/18 1320 Order Status:  Completed Specimen:  Sputum from Endotracheal aspirate Updated:  09/17/18 2209 Special Requests: NO SPECIAL REQUESTS     
  GRAM STAIN RARE WBCS SEEN     
   NO EPITHELIAL CELLS SEEN  
   NO ORGANISMS SEEN Culture result: LIGHT NORMAL RESPIRATORY [place]  
 GRAM STAIN [[id number]] Collected:  09/16/18 1118 Order Status:  Canceled Specimen:  Bronchial Washing Updated:  09/16/18 1209 CULTURE, BLOOD, PAIRED [[id number]] Collected:  09/11/18 2315 Order Status:  Completed Specimen:  Blood Updated:  09/16/18 0940 Special Requests: NO SPECIAL REQUESTS Culture result: NO GROWTH 5 DAYS     
 CULTURE, RESPIRATORY/SPUTUM/BRONCH Linde Hamman [[id number]] Collected:  09/12/18 1550 Order Status:  Completed Specimen:  Sputum from Sputum Updated:  09/14/18 1148 Special Requests: NO SPECIAL REQUESTS     
  GRAM STAIN FEW WBCS SEEN     
        
  RARE EPITHELIAL CELLS SEEN  
   FEW GRAM POSITIVE RODS     
        
  RARE GRAM POSITIVE COCCI IN CLUSTERS Culture result:      
  LIGHT NORMAL RESPIRATORY [place]  
 CULTURE, RESPIRATORY/SPUTUM/BRONCH Beauty Mink STAIN [[id number]]  (Abnormal) Collected:  09/12/18 1121 Order Status:  Completed Specimen:  Sputum from Sputum Updated:  09/14/18 1136 Special Requests: NO SPECIAL REQUESTS     
  GRAM STAIN 2+ WBCS SEEN     
        
  RARE EPITHELIAL CELLS SEEN  
        
  FEW GRAM POSITIVE COCCI IN PAIRS  
        
  FEW GRAM POSITIVE COCCI IN CLUSTERS Culture result: MODERATE NORMAL RESPIRATORY [place]  
        
  SCANT YEAST, (APPARENT CANDIDA ALBICANS) (A) CULTURE, URINE [[id number]] Collected:  09/12/18 0133 Order Status:  Completed Specimen:  Urine Updated:  09/13/18 0945 Special Requests: --     
  NO SPECIAL REQUESTS Reflexed from [id number] [place] Count <1,000 CFU/ML Culture result: NO GROWTH 1 DAY     
  
  
GI: Intake: DIET TUBE FEEDING   Appetite: NPO % Diet Eaten: 100 %   P.O.: 0 mL I.V.: 240 mL Abdominal Assessment: Obese Bowel Sounds: Active  Last Bowel Movement Date: 10/11/18 Patient Vitals for the past 168 hrs: 
 Stool Occurrence(s)  
10/10/18 2000 1 Pain: 3/10 Other (comment) - Abdomen -     
 
 Current Analgesic Therapy (168h ago through future) Ordered     Start Stop 10/11/18 1555  fentaNYL citrate (PF) injection 25 mcg  25 mcg,   IntraVENous,   EVERY 4 HOURS AS NEEDED    
 10/11/18 1555 --  
 10/01/18 0944  acetaminophen (TYLENOL) solution 650 mg  650 mg,   Oral,   EVERY 4 HOURS AS NEEDED    
 10/01/18 0943 --  
  
  
: Fecal Management-Output (ml): 100 ml 
  - Urinary Catheter 10/08/18 2- way;Coude-Status: Draining;Patent 9/14/2018: Creatinine 1.41 MG/DL* (Ref range: 0.70 - 1.30 MG/DL) 
9/15/2018: Creatinine 1.28 MG/DL (Ref range: 0.70 - 1.30 MG/DL) 
9/16/2018: Creatinine 1.16 MG/DL (Ref range: 0.70 - 1.30 MG/DL) 
9/17/2018: Creatinine 1.02 MG/DL (Ref range: 0.70 - 1.30 MG/DL) 
9/18/2018: Creatinine 0.87 MG/DL (Ref range: 0.70 - 1.30 MG/DL) 
9/19/2018: Creatinine 1.14 MG/DL (Ref range: 0.70 - 1.30 MG/DL) 
9/20/2018: Creatinine 1.13 MG/DL (Ref range: 0.70 - 1.30 MG/DL) 
9/21/2018: Creatinine 1.01 MG/DL (Ref range: 0.70 - 1.30 MG/DL) 
9/22/2018: Creatinine 0.99 MG/DL (Ref range: 0.70 - 1.30 MG/DL) 
9/23/2018: Creatinine 1.10 MG/DL (Ref range: 0.70 - 1.30 MG/DL) 
9/24/2018: Creatinine 1.14 MG/DL (Ref range: 0.70 - 1.30 MG/DL) 
9/25/2018: Creatinine 0.95 MG/DL (Ref range: 0.70 - 1.30 MG/DL) 
9/26/2018: Creatinine 0.95 MG/DL (Ref range: 0.70 - 1.30 MG/DL) 
9/27/2018: Creatinine 1.07 MG/DL (Ref range: 0.70 - 1.30 MG/DL) 
9/28/2018: Creatinine 1.23 MG/DL (Ref range: 0.70 - 1.30 MG/DL) 
9/29/2018: Creatinine 1.17 MG/DL (Ref range: 0.70 - 1.30 MG/DL) 
9/30/2018: Creatinine 1.17 MG/DL (Ref range: 0.70 - 1.30 MG/DL) 
10/1/2018: Creatinine 1.08 MG/DL (Ref range: 0.70 - 1.30 MG/DL) 
10/2/2018: Creatinine 1.05 MG/DL (Ref range: 0.70 - 1.30 MG/DL) 
10/3/2018: Creatinine 0.97 MG/DL (Ref range: 0.70 - 1.30 MG/DL) 
10/4/2018: Creatinine 0.94 MG/DL (Ref range: 0.70 - 1.30 MG/DL) 
10/5/2018: Creatinine 0.91 MG/DL (Ref range: 0.70 - 1.30 MG/DL) 
10/6/2018: Creatinine 0.86 MG/DL (Ref range: 0.70 - 1.30 MG/DL) 10/7/2018: Creatinine 0.74 MG/DL (Ref range: 0.70 - 1.30 MG/DL) 
10/8/2018: Creatinine 0.84 MG/DL (Ref range: 0.70 - 1.30 MG/DL) 
10/9/2018: Creatinine 0.93 MG/DL (Ref range: 0.70 - 1.30 MG/DL) 
10/10/2018: Creatinine 0.83 MG/DL (Ref range: 0.70 - 1.30 MG/DL) 
10/11/2018: Creatinine 0.94 MG/DL (Ref range: 0.70 - 1.30 MG/DL) 
10/12/2018: Creatinine 0.88 MG/DL (Ref range: 0.70 - 1.30 MG/DL) 
10/13/2018: Creatinine 0.87 MG/DL (Ref range: 0.70 - 1.30 MG/DL) Vitals: O2 Device: Tracheostomy, Ventilator @ O2 Flow Rate (L/min): 10 l/min Patient Vitals for the past 24 hrs: 
 BP Temp Pulse Resp SpO2  
10/13/18 0828 - - 91 22 95 % 10/13/18 0800 139/66 99.3 °F (37.4 °C) 96 22 96 % 10/13/18 0700 141/71 - (!) 104 21 97 % 10/13/18 0600 113/53 - 87 19 98 % 10/13/18 0500 127/62 - 87 21 100 % 10/13/18 0400 107/61 98.4 °F (36.9 °C) 86 20 100 % 10/13/18 0322 - - - 22 -  
10/13/18 0300 131/63 - 89 22 99 % 10/13/18 0200 121/60 - 91 19 98 % 10/13/18 0100 116/63 - 83 22 97 % 10/13/18 0000 98/56 98.1 °F (36.7 °C) 86 21 98 % 10/12/18 2327 - - - 23 -  
10/12/18 2300 106/43 - 87 20 98 % 10/12/18 2200 104/49 - 81 21 97 % 10/12/18 2100 104/55 - 91 20 96 % 10/12/18 2000 112/64 - 86 12 98 % 10/1934 - - - 25 99 % 10/12/18 1800 101/64 - 92 21 97 % 10/12/18 1700 101/49 98.9 °F (37.2 °C) 92 23 96 % 10/12/18 1600 95/40 - 99 28 97 % 10/12/18 1523 - - 94 25 98 % 10/12/18 1522 - - - - 98 % 10/12/18 1500 102/55 - 88 21 98 % 10/12/18 1400 91/56 - 88 23 96 % 10/12/18 1300 92/49 - 96 23 98 % 10/12/18 1212 - - 87 23 100 % 10/12/18 1210 - - - - 98 % 10/12/18 1200 103/54 97.6 °F (36.4 °C) 85 19 98 % 10/12/18 1100 103/48 - 84 21 99 % 10/12/18 1011 101/64 - 84 18 98 % 10/12/18 1005 (!) 85/41 - 82 22 97 % 10/12/18 1000 (!) 81/45 - 84 21 97 % I&O's:   
Date 10/12/18 0700 - 10/13/18 8096 10/13/18 0700 - 10/14/18 2571 Shift 1370-2564 3087-6753 24 Hour Total 7388-9395 5857-8915 24 Hour Total I 
N 
T 
A 
K 
E 
 I.V. 
(mL/kg/hr)  800 
(0.6) 800 
(0.3) Volume (piperacillin-tazobactam (ZOSYN) 4.5 g in 0.9% sodium chloride (MBP/ADV) 100 mL)  300 300 Volume (vancomycin (VANCOCIN) 1250 mg in  ml infusion)  500 500 NG/ 1200 1350 Water Flush Volume (mL) (PEG/Gastrostomy Tube 10/03/18) 100 300 400 Medication Volume (PEG/Gastrostomy Tube 10/03/18)  0 0 Intake (ml) (PEG/Gastrostomy Tube 10/03/18) 50 900 950 Shift Total 
(mL/kg) 150 
(1.3) 2000 
(17.5) 2150 
(18.9) O 
U T 
P 
U [facility] Urine (mL/kg/hr) 985 
(0.7) 425 
(0.3) 1410 
(0.5) 400  400 Urine Output (mL) (Urinary Catheter 10/08/18 2- way;Coude)  400  400 Drains 200 150 350 Output (ml) (Fecal Management) 200 150 350 Chest Tube 185 50 235 0  0 Output (ml) (Chest Tube #1 10/11/18 Right;Pleural) 185 50 235 0  0 Shift Total 
(mL/kg) 1370 
(12) 625 
(5.5) 1995 
(17.5) 400 
(3.5)  400 
(3.5) NET -5285 6133 155 -400  -400 Weight (kg) 114 114 114 114 114 114 Meds:   
Current Facility-Administered Medications:  
  0.9% sodium chloride infusion 250 mL, 250 mL, IntraVENous, PRN, Opal Burdick MD 
  heparin (porcine) injection 5,000 Units, 5,000 Units, SubCUTAneous, Q8H, Brandon Manley MD, 5,000 Units at 10/13/18 0600   Vancomycin Trough Reminder prior to 0500 dose on 10/13, 1 Each, Other, ONCE, Brandon Manley MD 
  [COMPLETED] piperacillin-tazobactam (ZOSYN) 4.5 g in 0.9% sodium chloride (MBP/ADV) 100 mL, 4.5 g, IntraVENous, NOW, Last Rate: 200 mL/hr at 10/11/18 1557, 4.5 g at 10/11/18 1557 **FOLLOWED BY** piperacillin-tazobactam (ZOSYN) 4.5 g in 0.9% sodium chloride (MBP/ADV) 100 mL, 4.5 g, IntraVENous, Q8H, Brandon Manley MD, Last Rate: 25 mL/hr at 10/13/18 0509, 4.5 g at 10/13/18 8831   vancomycin (VANCOCIN) 1250 mg in  ml infusion, 1,250 mg, IntraVENous, Q12H, Brandon Manley MD, Last Rate: 125 mL/hr at 10/13/18 0515, 1,250 mg at 10/13/18 0515 
  fentaNYL citrate (PF) injection 25 mcg, 25 mcg, IntraVENous, Q4H PRN, Melisa Booker MD 
  furosemide (LASIX) injection 20 mg, 20 mg, IntraVENous, DAILY, Mackenzie Ramírez MD, 20 mg at 10/13/18 8928   zinc oxide-cod liver oil (DESITIN) 40 % paste, , Topical, PRN, Hector Kamara MD 
  albuterol-ipratropium (DUO-NEB) 2.5 MG-0.5 MG/3 ML, 3 mL, Nebulization, QID RT, Suresh Prince MD, 3 mL at 10/13/18 7982   acetaminophen (TYLENOL) solution 650 mg, 650 mg, Oral, Q4H PRN, Greer Dudley MD, 650 mg at 10/11/18 1558   sodium chloride (NS) flush 10-30 mL, 10-30 mL, InterCATHeter, PRN, Mirlande Alvarado MD, 20 mL at 10/12/18 1912   sodium chloride (NS) flush 10 mL, 10 mL, InterCATHeter, Q24H, Mirlande Alvarado MD, 10 mL at 10/12/18 1322   sodium chloride (NS) flush 10 mL, 10 mL, InterCATHeter, PRN, Mirlande Alvarado MD, 10 mL at 10/11/18 1653   sodium chloride (NS) flush 10-40 mL, 10-40 mL, InterCATHeter, Q8H, Mirlande Alvarado MD, 10 mL at 10/13/18 1448   sodium chloride (NS) flush 20 mL, 20 mL, InterCATHeter, Q24H, Mirlande Alvarado MD, 20 mL at 10/12/18 1322   heparin (porcine) pf 300 Units, 300 Units, InterCATHeter, PRN, Mirlande Alvarado MD, 300 Units at 10/11/18 0211 
  glycopyrrolate (ROBINUL) injection 0.1 mg, 0.1 mg, IntraVENous, TID PRN, Mirlande Alvarado MD, 0.1 mg at 10/08/18 0720   famotidine (PF) (PEPCID) 20 mg in sodium chloride 0.9% 10 mL injection, 20 mg, IntraVENous, Q12H, Greer Dudley MD, 20 mg at 10/13/18 4211   chlorhexidine (PERIDEX) 0.12 % mouthwash 15 mL, 15 mL, Oral, BID, Caio Rodriguez MD, 15 mL at 10/13/18 0932   albuterol (PROVENTIL HFA, VENTOLIN HFA, PROAIR HFA) inhaler 2 Puff, 2 Puff, Inhalation, Q4H PRN, Kemar King MD 
  dorzolamide-timolol (COSOPT) 22.3-6.8 mg/mL ophthalmic solution 1 Drop, 1 Drop, Both Eyes, BID, eKmar King MD, 1 Drop at 10/13/18 3817   latanoprost (XALATAN) 0.005 % ophthalmic solution 1 Drop, 1 Drop, Both Eyes, QHS, Eugenio Gee MD, 1 Drop at 10/12/18 2200 
  sodium chloride (NS) flush 5-10 mL, 5-10 mL, IntraVENous, Q8H, Eugenio Gee MD, 10 mL at 10/13/18 0510 
  sodium chloride (NS) flush 5-10 mL, 5-10 mL, IntraVENous, PRN, Eugenio Gee MD, 10 mL at 09/23/18 5222   ondansetron (ZOFRAN) injection 4 mg, 4 mg, IntraVENous, Q6H PRN, Eugenio Gee MD, 4 mg at 09/13/18 2149 Signed By: Keanu Montoya MD - October 13, 2018

## 2018-10-13 NOTE — PROGRESS NOTES
Problem: Falls - Risk of 
Goal: *Absence of Falls Document [place] Fall Risk and appropriate interventions in the flowsheet. Outcome: Progressing Towards Goal 
Fall Risk Interventions: 
Mobility Interventions: Bed/chair exit alarm Mentation Interventions: Adequate sleep, hydration, pain control, Bed/chair exit alarm, Door open when patient unattended, Evaluate medications/consider consulting pharmacy Medication Interventions: Bed/chair exit alarm Elimination Interventions: Bed/chair exit alarm History of Falls Interventions: Bed/chair exit alarm Problem: Pressure Injury - Risk of 
Goal: *Prevention of pressure injury Document Ayaz Scale and appropriate interventions in the flowsheet. Outcome: Progressing Towards Goal 
Pressure Injury Interventions: 
Sensory Interventions: Assess changes in LOC, Assess need for specialty bed, Avoid rigorous massage over bony prominences, Check visual cues for pain, Float heels, Keep linens dry and wrinkle-free, Minimize linen layers, Monitor skin under medical devices, Pressure redistribution bed/mattress (bed type), Turn and reposition approx. every two hours (pillows and wedges if needed) Moisture Interventions: Absorbent underpads, Assess need for specialty bed, Check for incontinence Q2 hours and as needed, Internal/External urinary devices, Internal/External fecal devices, Maintain skin hydration (lotion/cream), Minimize layers, Moisture barrier Activity Interventions: Assess need for specialty bed, Pressure redistribution bed/mattress(bed type) Mobility Interventions: Assess need for specialty bed, Float heels, HOB 30 degrees or less, Pressure redistribution bed/mattress (bed type), Turn and reposition approx. every two hours(pillow and wedges) Nutrition Interventions: Document food/fluid/supplement intake Friction and Shear Interventions: Apply protective barrier, creams and emollients, HOB 30 degrees or less, Lift sheet, Lift team/patient mobility team, Minimize layers

## 2018-10-13 NOTE — PROGRESS NOTES
10/13/18 0600 10/13/18 0615 ABCDEF Bundle SBT Safety Screen Passed Yes --   
SBT Trial Passed --  Yes Weaning Parameters Spontaneous Breathing Trial Complete --  Yes Resp Rate Observed 24 21 Ve 11 11.6  547 RSBI 52 36 Patient remains on CPAP +6, PS 6, 40% 0645: Patient's oxygen saturations dropped, returned to previous settings

## 2018-10-13 NOTE — PROGRESS NOTES
Hospitalist Progress Note NAME: Jyane Jones  
:  1934 MRN:  [id number] Assessment / Plan: 
Sepsis (fever, leukocytosis, tachycardia) due to catheter-associated UTI and pleural fluid infection?, not present on admission: Tm 99.3 overnight 
- CT head 10/11 with no evidence of acute intracranial abnormality. 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 Bilateral tympanomastoid effusions. 
- CT chest 10/11 with worsening of the appearance of the right hemithorax with increased pleural effusion and consolidation. 
- blood cultures no growth, urine and pleural fluid cultures (1 swab) both pansensitive pseudomonas. Sputum culture with normal respiratory ernestina and rare yeast. 
- d/c vancomycin and zosyn, change to levaquin based on culture data R pleural effusion: - R chest tube placed 10/11 with and additional 350mL L pleural fluid drainage in the last 24 hours 
- follow fluid cultures, negative to date as discussed above Acute encephalopathy in setting of acute hypoxic respiratory failure and resultant PEA cardiac arrest :  agonal breathing after coming back from radiology after arteriogram, 6 min CPR and epi with ROSC. More alert today, still not near baseline and very deconditioned. - CT head  showed small age-indeterminate infarct in the left corona radiata - CT head 10/11 with no evidence of acute intracranial abnormality. 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 Bilateral tympanomastoid effusions. - con't PT/OT 
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 
- CT chest  with patchy airspace disease in the right lung base with an associated hyperdense 5.2 x 4.3 cm oval lesion. This may contain a fluid/fluid level indicating 
abscess/hemorrhage, with neoplasm less likely. No acute abdominal or pelvic process. - CTA chest 9/17 due to persistent bloody pulmonary secretions. Right lower lobe masslike abnormality demonstrates increased internal density and has increased in size measuring 6.7 x 6.7 cm, compared to 5.7 x 4.4 cm. This is compatible with internal hemorrhage. There is new moderate right lower lobe partial collapse/atelectasis. - most recent CT chest 10/11 with worsening of the appearance of the right hemithorax with increased pleural effusion and consolidation. 
- CXR today with consolidation at the right lung base which persists. Cardiomegaly unchanged. - con't lasix; monitor I/Os - s/p arteriogram 9/14: Thoracic aortic and intercostal arteriograms demonstrating no 
enlarged bronchial artery supplying the right lower lobe of the lung. Normal appearing and normal sized right bronchial artery is seen without any hyperplasia or dominant right lower lobe supply. No embolization was performed. - s/p diagnostic bronch on 9/16. No endobronchial lesions seen. - blood cultures negative.  Sputum cytology 9/12 negative except scant yeast. Cytology from bronchial washing on 9/16 is atypical, favor benign reactive process. - work-up for rheumatologic causes of hemoptysis negative: ANCA, anti-GBM, MPO Ab, NH-3 Ab, SSA/SSB, RNP Ab, Arredondo/RNP, dsDNA, and Arredondo Ab all negative. JOSS+. - completed zosyn for possible lung abscess (9/20), currently on additional Abx for UTI as above CAD s/p PCI, atrial fibrillation on chronic anticoagulation w/coumadin, and benign HTN: 
- holding amiodarone and dlitiazem; holding anticoagulation (coumadin) and pravachol 
- con't IV lasix (on oral lasix outpatient) Acute blood loss anemia and anemia of chronic disease Elevated LFTs of unclear etiology: 
- stopped statin for now - trend INR and LFTs, will con't to treat multiple underlying issues.  INR 1.1 today. ALAN: resolved UTI, acute cystitis w/ hematuria, POA:  Required almaraz placed by urology Hyperlipidemia: statin held due to elevated LFTs as above Hypernatremia, resolved Obesity (Body mass index is 32.27 kg/(m^2) 
   
Code: Full (wife and children are decision makers) DVT prophylaxis: SCDs Subjective: Chief Complaint / Reason for Physician Visit More awake this afternoon, move extremities spontaneously. Nods to some questions. Discussed with RN events overnight. Review of Systems: 
Symptom Y/N Comments  Symptom Y/N Comments Fever/Chills    Chest Pain Poor Appetite    Edema Cough    Abdominal Pain Sputum    Joint Pain SOB/ROY    Pruritis/Rash Nausea/vomit    Tolerating PT/OT Diarrhea    Tolerating Diet Constipation    Other Could NOT obtain due to: trach Objective: VITALS:  
Last 24hrs VS reviewed since prior progress note. Most recent are: 
Patient Vitals for the past 24 hrs: 
 Temp Pulse Resp BP SpO2  
10/13/18 1141 - 71 22 - 97 % 10/13/18 1102 98.7 °F (37.1 °C) 79 20 93/46 98 % 10/13/18 1000 - 87 27 105/60 96 % 10/13/18 0900 - 86 22 107/66 97 % 10/13/18 0828 - 91 22 - 95 % 10/13/18 0800 99.3 °F (37.4 °C) 96 22 139/66 96 % 10/13/18 0700 - (!) 104 21 141/71 97 % 10/13/18 0600 - 87 19 113/53 98 % 10/13/18 0500 - 87 21 127/62 100 % 10/13/18 0400 98.4 °F (36.9 °C) 86 20 107/61 100 % 10/13/18 0322 - - 22 - -  
10/13/18 0300 - 89 22 131/63 99 % 10/13/18 0200 - 91 19 121/60 98 % 10/13/18 0100 - 83 22 116/63 97 % 10/13/18 0000 98.1 °F (36.7 °C) 86 21 98/56 98 % 10/12/18 2327 - - 23 - -  
10/12/18 2300 - 87 20 106/43 98 % 10/12/18 2200 - 81 21 104/49 97 % 10/12/18 2100 - 91 20 104/55 96 % 10/12/18 2000 - 86 12 112/64 98 % 10/1934 - - 25 - 99 % 10/12/18 1800 - 92 21 101/64 97 % 10/12/18 1700 98.9 °F (37.2 °C) 92 23 101/49 96 % 10/12/18 1600 - 99 28 95/40 97 % 10/12/18 1523 - 94 25 - 98 % 10/12/18 1522 - - - - 98 % 10/12/18 1500 - 88 21 102/55 98 % 10/12/18 1400 - 88 23 91/56 96 % Intake/Output Summary (Last 24 hours) at 10/13/18 1344 Last data filed at 10/13/18 1117 Gross per 24 hour Intake             2000 ml Output             1800 ml Net              200 ml PHYSICAL EXAM: 
General: WD, WN. Alert, not able to be cooperative but appears to somewhat be listening to wife, no acute distress   
EENT:  EOMI. Anicteric sclerae. MMM. Stable midline trach with sputum around opening. No erythema. Resp:  CTA bilaterally, no wheezing or rales. No accessory muscle use CV:  Regular rhythm,  No edema GI:  Soft, moderately distended, Non tender.  +Bowel sounds Neurologic:  Alert and oriented X 1, no speech, Psych:   Poor insight. Not anxious nor agitated Skin:  No rashes. No jaundice Reviewed most current lab test results and cultures  YES Reviewed most current radiology test results   YES Review and summation of old records today    NO Reviewed patient's current orders and MAR    YES 
PMH/ reviewed - no change compared to H&P 
________________________________________________________________________ Care Plan discussed with: 
  Comments Patient x Family  x Wife and sister at bedside RN x Care Manager Consultant Multidiciplinary team rounds were held today with , nursing, pharmacist and clinical coordinator. Patient's plan of care was discussed; medications were reviewed and discharge planning was addressed. ________________________________________________________________________ Total NON critical care TIME:  35 Minutes Total CRITICAL CARE TIME Spent:   Minutes non procedure based Comments >50% of visit spent in counseling and coordination of care x   
________________________________________________________________________ Hany Durham MD  
 
Procedures: see electronic medical records for all procedures/Xrays and details which were not copied into this note but were reviewed prior to creation of Plan. LABS: 
I reviewed today's most current labs and imaging studies. Pertinent labs include: 
Recent Labs 10/13/18 
 0307  10/12/18 
 0424  10/11/18 
 1586 WBC  8.1  13.8*  15.2* HGB  6.7*  7.4*  8.0*  
HCT  23.0*  24.6*  26.5*  
PLT  132*  127*  126* Recent Labs 10/13/18 
 1203  10/12/18 
 0424  10/11/18 
 3999 NA  142  142  141  
K  3.8  4.2  4.5  
CL  107  106  106 CO2  29  30  31 GLU  91  100  132* BUN  24*  22*  25* CREA  0.87  0.88  0.94  
CA  8.2*  8.4*  8.2* MG  2.3  2.2  2.2 PHOS  2.5*  2.7  2.6 ALB  1.6*  1.7*  1.7* TBILI  0.5  0.8  0.7 SGOT  59*  95*  97* ALT  91*  123*  126* INR  1.1  1.1  1.2* Signed: Hany Durham MD

## 2018-10-13 NOTE — PROGRESS NOTES
Pharmacy Automatic Renal Dosing Protocol - Antimicrobials Indication for Antimicrobials: HAP, possible UTI, Purulent drainage from penis/trach Current Regimen of Each Antimicrobial: 
Vancomycin 1250 mg IV every 12 hours (Start Date 10/11; Day # 3) Zosyn 4.5 g IV every 8 hours (Start Date 10/11, Day # 3) Previous Antimicrobial Therapy: 
None Goal Level: VANCOMYCIN TROUGH GOAL RANGE Vancomycin Trough: 15 - 20 mcg/mL Date Dose & Interval Measured (mcg/mL) Extrapolated (mcg/mL) 10/13/18 @ 04:52 1250 mg every 12 hours 19.7 19 Significant Cultures:  
 AFB: negative- pending  Trach aspirate: scant yeast (apparent candida albicans) FINAL 
10/11 Sputum trach aspirate: Light normal ernestina- pending 10/11 Blood: NGTD x 2 days- pending 10/11 Pleural Fluid: NGTD x 16 hours- pending 10/11 Penis drainage: Moderate GNR- pending 10/11 Urine: GNR (oxidase positive)- pending Radiology / Imaging results: (X-ray, CT scan or MRI):  
10/11 Chest XR: Unchanged appearance of the chest including a moderate to large right pleural effusion and diffuse interstitial opacities, likely edema. Paralysis, amputations, malnutrition: None documented Labs: 
Recent Labs 10/13/18 
 7825  10/12/18 
 0424  10/11/18 
 5886 CREA  0.87  0.88  0.94 BUN  24*  22*  25* WBC  8.1  13.8*  15.2* Temp (24hrs), Av.5 °F (36.9 °C), Min:97.6 °F (36.4 °C), Max:99.3 °F (37.4 °C) Creatinine Clearance (mL/min) or Dialysis: ~ 74 mL/min Impression/Plan: · Vancomycin trough resulted as therapeutic but the trough was at the top of the therapeutic range. Will adjust to 1000 mg IV every 12 hours for an estimated trough of 15.2 mcg/mL. · Zosyn is dosed appropriately for renal fxn and indication. · Culture are pending - will follow · Antimicrobial stop date: 7 days Pharmacy will follow daily and adjust medications as appropriate for renal function and/or serum levels. Thank you, Mayela Gregory, PHARMD 
 
Recommended duration of therapy 
http://[facility]/Pharmacy/Clinical%20Companion/Duration%20of%20ABX%20therapy. docx Renal Dosing 
http://[facility]/[place]/[facility]/Pharmacy/Clinical%20Companion/Renal%[id number]. pdf

## 2018-10-13 NOTE — PROGRESS NOTES
9709 - Bedside and Verbal shift change report given to Jacklyn Leigh RN (oncoming nurse) by Bela Barrera RN (offgoing nurse). Report included the following information SBAR, Kardex, Procedure Summary, Intake/Output, MAR, Recent Results and Cardiac Rhythm Afib. VS stable. Patient appears to be resting comfortably in bed. TF at 48. Noted patient's respiratory rate increased and SPO2 dropped on CPAP this morning. Vent now set to A/C. 
 
1025 - Noted bag of Vancomycin 1250mg/250mL hanging on IV pole with full medication volume present in bag. Consulted pharmacy for recommendations. Pharmacy recommended to go ahead and infuse the dose at this time. See MAR for details. 1920 - Bedside and Verbal shift change report given to [address], RN (oncoming nurse) by Jacklyn Leigh RN (offgoing nurse). Report included the following information SBAR, Kardex, Procedure Summary, Intake/Output, MAR and Cardiac Rhythm Afib.

## 2018-10-13 NOTE — PROGRESS NOTES
[facility] Pulmonary Consult Service NotePulmonary, Critical Care, and Sleep Medicine Name: Surendra Moscoso MRN: [id number] : 1934 Hospital: [facility] Date: 10/13/2018   Hospital Day: 35 IMPRESSION:  
1. 18 cardiopulmonary arrest with  6 min of CPR then ROSC. 2. Gram negative in urine and penile discharge sample. 3. Acute respiratory failure-on vent- has not been able to do SBT. Can do ongoing evals. May need LTAC, Will introduce to family.  to provide info to family. Ongoing weaning trials as tolerated. 4. Acute hemoptysis- Noted to have bleeding from RLL on Bronch. No endobronchial lesion. has lung mass(CA vs inflammatory pseudotumor) and microscopic hematuria; ANCA negative; JOSS barely positive. Suspect LUNG mass the culprit. CYTOLOGY from bronchoscopy negative malignancy x 2, at this point not pursuing additional interventions. 5. Has moderate right pleural effusion, appreciate IR placement of RIght sided Chest tube. Almost 4 liters off since yesterday. 6. S/P pulmonary artery embolization for hemoptysis Per IR of suspect lung mass. 7. COPD moderate to severe at baseline 8. Acute renal insf now resolved. 9. Anemia and prior coagulopathy on coumadin-had blood transfusion. 10. Volume overload, anasarca- after couple of days of IV lasix- becomes prerenal 
11. Encehalopathy/Debility multifactorial - has hearing loss as well: He does seem to be making slow improvement, More awake now. .  
12. Hematuria, seems to be improving had almaraz in place. 15. Hx of CAD s/p PCI, afib on amio, former smoker, prior cva 14. Discussed with nurse on rounds this am.  
15. Process of LTAC eval started per YOUSUF Morris 16. Called and discussed with pts wife about chest tube placement. RECOMMENDATIONS/PLAN:  
1. Will placed on empiric Zosyn and Vanc.  Discussed and reviewed with pharmacy. For suspected HCAP, Purulent penile discharge, UTI. 2. Restarted heparin for DVT prophylaxis q8hrs. 3. Transfuse 1 unit. Likely from icu blood draws 4. Vent support, TC trials as tolerated. Suspect will need LTAC. 5. Pt has large right pleural effusion, may be malignant pleural effusion. Will get chest tube place per IR. Called IR to let them know. Will need to follow cytology. 6. Trach care, Noted to have moderate secretions. 7. Still has unresolved issue of lung mass, will having ongoing family discussions. Palliative care is following. Will get pleural fluid cytology, results are pending. 8. Pleural fluid Cx is pending. 9. May need additional Cultures. 10. Diuresis as needed 11. Follow renal function, WNL 12. Enteral feedings tolerating. NPO for possible procedure. 13. No need for transfusion 14. Glucose monitoring and SSI 15. DVT/GI prophylaxis 16. Will need LTAC when becomes medically stable. Subjective/Initial History: S/p trach 10/2 Vent support 10-13:  No changes. Spoke to wife who agrees to transfusion. Cytology still pending 10-12-18: Pt seems to be much more alert when seen this am. Had near 3.5-4L of right pleural fluid output. NO ROS or HPI are obtainable from pt.  
 
 
10-11-18: Pt has been slow to respond. Still not waking up. Had increased secretion from trach and from his penis. Not follow any commands. Not able to get ROS or HPI from pt.  
 
 
10-10-18: No major changes or events over last 24 hrs. Not able to obtain ROS or HPI from pt. Some thick yellow secretions present around this trach. 10-9-18: Not able to get hx  Or ROS from pt. No acute changes overnight. Had some moderate secretions this am.  
 
 
10-8-18: Pt still not waking up. Has ongoing issues with blood clots in urine. Per nurses has been with recurrent issues with clotting and require frequent flushing of the almaraz. Pt has not been able to tolerate trach collar. Unable to get ROS or HPI from pt. MAR reviewed and pertinent medications noted or modified as needed Current Facility-Administered Medications Medication  0.9% sodium chloride infusion 250 mL  heparin (porcine) injection 5,000 Units  Vancomycin Trough Reminder prior to 0500 dose on 10/13  piperacillin-tazobactam (ZOSYN) 4.5 g in 0.9% sodium chloride (MBP/ADV) 100 mL  vancomycin (VANCOCIN) 1250 mg in  ml infusion  fentaNYL citrate (PF) injection 25 mcg  furosemide (LASIX) injection 20 mg  
 zinc oxide-cod liver oil (DESITIN) 40 % paste  albuterol-ipratropium (DUO-NEB) 2.5 MG-0.5 MG/3 ML  
 acetaminophen (TYLENOL) solution 650 mg  
 sodium chloride (NS) flush 10-30 mL  sodium chloride (NS) flush 10 mL  sodium chloride (NS) flush 10 mL  sodium chloride (NS) flush 10-40 mL  sodium chloride (NS) flush 20 mL  heparin (porcine) pf 300 Units  glycopyrrolate (ROBINUL) injection 0.1 mg  
 famotidine (PF) (PEPCID) 20 mg in sodium chloride 0.9% 10 mL injection  chlorhexidine (PERIDEX) 0.12 % mouthwash 15 mL  albuterol (PROVENTIL HFA, VENTOLIN HFA, PROAIR HFA) inhaler 2 Puff  dorzolamide-timolol (COSOPT) 22.3-6.8 mg/mL ophthalmic solution 1 Drop  latanoprost (XALATAN) 0.005 % ophthalmic solution 1 Drop  sodium chloride (NS) flush 5-10 mL  sodium chloride (NS) flush 5-10 mL  ondansetron (ZOFRAN) injection 4 mg ROS:A comprehensive review of systems was negative except for that written in the HPI. Objective: 
 
Vital Signs: Telemetry:    normal sinus rhythmIntake/Output:  
Visit Vitals  /60  Pulse 87  Temp 99.3 °F (37.4 °C)  Resp 27  
 Ht 6' 2\" (1.88 m)  Wt 114 kg (251 lb 5.2 oz)  SpO2 96%  BMI 32.27 kg/m2 Temp (24hrs), Av.5 °F (36.9 °C), Min:97.6 °F (36.4 °C), Max:99.3 °F (37.4 °C) O2 Device: Tracheostomy, Ventilator O2 Flow Rate (L/min): 10 l/min Wt Readings from Last 4 Encounters:  
10/12/18 114 kg (251 lb 5.2 oz) 05/05/16 111.1 kg (245 lb)  
03/25/16 106.6 kg (235 lb) 04/08/15 103.4 kg (228 lb) Intake/Output Summary (Last 24 hours) at 10/13/18 1032 Last data filed at 10/13/18 5409 Gross per 24 hour Intake             2000 ml Output             1775 ml Net              225 ml Last shift:      10/13 0701 - 10/13 1900 In: -  
Out: 400 [Urine:400] Last 3 shifts: 10/11 1901 - 10/13 0700 In: 8974 [I.V.:900] Out: 2470 [Urine:1685; Drains:550] Physical Exam:  
 General:  No distress, trach and vent support. HEAD: Normocephalic, without obvious abnormality, atraumatic EYES: conjunctivae clear. anicteric sclerae NOSE: nares normal, no drainage, no nasal flaring, Neck: Supple, symmetrical, trachea midline, has trach in place, on Mechanical vent support. Chest: increased AP diameter Lungs: pretty clear anterioly. Trached on vent. Heart: Regular rate and rhythm nl s1,2. Abdomen: soft, non-tender, +BS, pt has almaraz in place. Blood urine present. Obese. Musculoskeletal: anasarca; no erythema, minimal peripheral edema, has SCDs in place. Neuro: seems at least to intermittently to follow commands. Psych: Not able to assess; no agitation. NO anxiety or depression when seen. Data:  
 
Current Facility-Administered Medications Medication Dose Route Frequency  heparin (porcine) injection 5,000 Units  5,000 Units SubCUTAneous Q8H  Vancomycin Trough Reminder prior to 0500 dose on 10/13  1 Each Other ONCE  piperacillin-tazobactam (ZOSYN) 4.5 g in 0.9% sodium chloride (MBP/ADV) 100 mL  4.5 g IntraVENous Q8H  
 vancomycin (VANCOCIN) 1250 mg in  ml infusion  1,250 mg IntraVENous Q12H  furosemide (LASIX) injection 20 mg  20 mg IntraVENous DAILY  albuterol-ipratropium (DUO-NEB) 2.5 MG-0.5 MG/3 ML  3 mL Nebulization QID RT  
 sodium chloride (NS) flush 10 mL  10 mL InterCATHeter Q24H  
 sodium chloride (NS) flush 10-40 mL  10-40 mL InterCATHeter Q8H  
 sodium chloride (NS) flush 20 mL  20 mL InterCATHeter Q24H  
 famotidine (PF) (PEPCID) 20 mg in sodium chloride 0.9% 10 mL injection  20 mg IntraVENous Q12H  chlorhexidine (PERIDEX) 0.12 % mouthwash 15 mL  15 mL Oral BID  dorzolamide-timolol (COSOPT) 22.3-6.8 mg/mL ophthalmic solution 1 Drop  1 Drop Both Eyes BID  latanoprost (XALATAN) 0.005 % ophthalmic solution 1 Drop  1 Drop Both Eyes QHS  sodium chloride (NS) flush 5-10 mL  5-10 mL IntraVENous Q8H Labs: 
Recent Labs 10/13/18 
 5382  10/12/18 
 0424  10/11/18 
 0792 WBC  8.1  13.8*  15.2* HGB  6.7*  7.4*  8.0*  
HCT  23.0*  24.6*  26.5*  
PLT  132*  127*  126* Recent Labs 10/13/18 
 3283  10/12/18 
 0424  10/11/18 
 0054 NA  142  142  141  
K  3.8  4.2  4.5  
CL  107  106  106 CO2  29  30  31 GLU  91  100  132* BUN  24*  22*  25* CREA  0.87  0.88  0.94  
CA  8.2*  8.4*  8.2* MG  2.3  2.2  2.2 PHOS  2.5*  2.7  2.6 ALB  1.6*  1.7*  1.7* TBILI  0.5  0.8  0.7 SGOT  59*  95*  97* ALT  91*  123*  126* INR  1.1  1.1  1.2* No results for input(s): PH, PCO2, PO2, HCO3, FIO2 in the last 72 hours. Imaging: 
I have personally reviewed the patients radiographs and have reviewed the reports: 
10-9-18: CXR: 
 
Right pleural effusion, trach intact. Mild IE. Has PPM in place. 10-12-18: CXR: has decreased right pleural effusion. Has ongoing Interstitial edema. CT of chest:10-11-18: IMPRESSION: 
Worsening of the appearance of the right hemithorax with increased pleural 
effusion and consolidation. CT of head: 
10-11-18: IMPRESSION: 
1. No evidence of acute intracranial abnormality. Stable small chronic infarct 
in the left corona radiata. 2. Bilateral tympanomastoid effusions Total critical care time exclusive of procedures:  minutes Ernst Clancy MD

## 2018-10-14 ENCOUNTER — APPOINTMENT (OUTPATIENT)
Dept: ULTRASOUND IMAGING | Age: 83
DRG: 003 | End: 2018-10-14
Attending: INTERNAL MEDICINE
Payer: MEDICARE

## 2018-10-14 LAB
ABO + RH BLD: NORMAL
ANION GAP SERPL CALC-SCNC: 4 MMOL/L (ref 5–15)
APTT PPP: 29.1 SEC (ref 22.1–32)
BLD PROD TYP BPU: NORMAL
BLD PROD TYP BPU: NORMAL
BLOOD GROUP ANTIBODIES SERPL: NORMAL
BPU ID: NORMAL
BPU ID: NORMAL
BUN SERPL-MCNC: 22 MG/DL (ref 6–20)
BUN/CREAT SERPL: 27 (ref 12–20)
CALCIUM SERPL-MCNC: 8.1 MG/DL (ref 8.5–10.1)
CHLORIDE SERPL-SCNC: 107 MMOL/L (ref 97–108)
CO2 SERPL-SCNC: 31 MMOL/L (ref 21–32)
CREAT SERPL-MCNC: 0.82 MG/DL (ref 0.7–1.3)
CROSSMATCH RESULT,%XM: NORMAL
CROSSMATCH RESULT,%XM: NORMAL
FIBRINOGEN PPP-MCNC: 708 MG/DL (ref 200–475)
GLUCOSE SERPL-MCNC: 120 MG/DL (ref 65–100)
HCT VFR BLD AUTO: 25.6 % (ref 36.6–50.3)
HGB BLD-MCNC: 7.9 G/DL (ref 12.1–17)
INR PPP: 1.1 (ref 0.9–1.1)
PHOSPHATE SERPL-MCNC: 2.2 MG/DL (ref 2.6–4.7)
POTASSIUM SERPL-SCNC: 4 MMOL/L (ref 3.5–5.1)
PROTHROMBIN TIME: 11.4 SEC (ref 9–11.1)
SODIUM SERPL-SCNC: 142 MMOL/L (ref 136–145)
SPECIMEN EXP DATE BLD: NORMAL
STATUS OF UNIT,%ST: NORMAL
STATUS OF UNIT,%ST: NORMAL
THERAPEUTIC RANGE,PTTT: ABNORMAL SECS (ref 58–77)
UNIT DIVISION, %UDIV: 0
UNIT DIVISION, %UDIV: 0

## 2018-10-14 PROCEDURE — 74011000250 HC RX REV CODE- 250: Performed by: INTERNAL MEDICINE

## 2018-10-14 PROCEDURE — 77030012390 HC DRN CHST BTL GTNG -B

## 2018-10-14 PROCEDURE — 84100 ASSAY OF PHOSPHORUS: CPT | Performed by: INTERNAL MEDICINE

## 2018-10-14 PROCEDURE — 65610000006 HC RM INTENSIVE CARE

## 2018-10-14 PROCEDURE — 74011250636 HC RX REV CODE- 250/636: Performed by: INTERNAL MEDICINE

## 2018-10-14 PROCEDURE — 85610 PROTHROMBIN TIME: CPT | Performed by: INTERNAL MEDICINE

## 2018-10-14 PROCEDURE — 36415 COLL VENOUS BLD VENIPUNCTURE: CPT | Performed by: INTERNAL MEDICINE

## 2018-10-14 PROCEDURE — 80048 BASIC METABOLIC PNL TOTAL CA: CPT | Performed by: INTERNAL MEDICINE

## 2018-10-14 PROCEDURE — 93971 EXTREMITY STUDY: CPT

## 2018-10-14 PROCEDURE — 94003 VENT MGMT INPAT SUBQ DAY: CPT

## 2018-10-14 PROCEDURE — 94640 AIRWAY INHALATION TREATMENT: CPT

## 2018-10-14 PROCEDURE — 85018 HEMOGLOBIN: CPT | Performed by: INTERNAL MEDICINE

## 2018-10-14 RX ORDER — FUROSEMIDE 10 MG/ML
20 INJECTION INTRAMUSCULAR; INTRAVENOUS
Status: DISCONTINUED | OUTPATIENT
Start: 2018-10-14 | End: 2018-10-24

## 2018-10-14 RX ADMIN — CHLORHEXIDINE GLUCONATE 15 ML: 1.2 RINSE ORAL at 08:42

## 2018-10-14 RX ADMIN — IPRATROPIUM BROMIDE AND ALBUTEROL SULFATE 3 ML: .5; 3 SOLUTION RESPIRATORY (INHALATION) at 08:14

## 2018-10-14 RX ADMIN — Medication 10 ML: at 13:19

## 2018-10-14 RX ADMIN — HEPARIN SODIUM 5000 UNITS: 5000 INJECTION INTRAVENOUS; SUBCUTANEOUS at 23:13

## 2018-10-14 RX ADMIN — FAMOTIDINE 20 MG: 10 INJECTION, SOLUTION INTRAVENOUS at 20:51

## 2018-10-14 RX ADMIN — IPRATROPIUM BROMIDE AND ALBUTEROL SULFATE 3 ML: .5; 3 SOLUTION RESPIRATORY (INHALATION) at 19:19

## 2018-10-14 RX ADMIN — FUROSEMIDE 20 MG: 10 INJECTION, SOLUTION INTRAMUSCULAR; INTRAVENOUS at 18:09

## 2018-10-14 RX ADMIN — FUROSEMIDE 20 MG: 10 INJECTION, SOLUTION INTRAMUSCULAR; INTRAVENOUS at 08:41

## 2018-10-14 RX ADMIN — Medication 10 ML: at 13:20

## 2018-10-14 RX ADMIN — FAMOTIDINE 20 MG: 10 INJECTION, SOLUTION INTRAVENOUS at 08:42

## 2018-10-14 RX ADMIN — DORZOLAMIDE HYDROCHLORIDE AND TIMOLOL MALEATE 1 DROP: 20; 5 SOLUTION/ DROPS OPHTHALMIC at 08:42

## 2018-10-14 RX ADMIN — HEPARIN SODIUM 5000 UNITS: 5000 INJECTION INTRAVENOUS; SUBCUTANEOUS at 06:00

## 2018-10-14 RX ADMIN — Medication 10 ML: at 23:14

## 2018-10-14 RX ADMIN — CHLORHEXIDINE GLUCONATE 15 ML: 1.2 RINSE ORAL at 20:51

## 2018-10-14 RX ADMIN — LATANOPROST 1 DROP: 50 SOLUTION OPHTHALMIC at 23:14

## 2018-10-14 RX ADMIN — GLYCOPYRROLATE 0.1 MG: 0.2 INJECTION, SOLUTION INTRAMUSCULAR; INTRAVENOUS at 09:05

## 2018-10-14 RX ADMIN — Medication 20 ML: at 13:19

## 2018-10-14 RX ADMIN — DORZOLAMIDE HYDROCHLORIDE AND TIMOLOL MALEATE 1 DROP: 20; 5 SOLUTION/ DROPS OPHTHALMIC at 18:11

## 2018-10-14 RX ADMIN — LEVOFLOXACIN 750 MG: 5 INJECTION, SOLUTION INTRAVENOUS at 20:52

## 2018-10-14 RX ADMIN — Medication 10 ML: at 06:00

## 2018-10-14 RX ADMIN — HEPARIN SODIUM 5000 UNITS: 5000 INJECTION INTRAVENOUS; SUBCUTANEOUS at 14:08

## 2018-10-14 NOTE — PROGRESS NOTES
Hospitalist Progress Note NAME: Diya Wheatley  
:  1934 MRN:  [id number] Assessment / Plan: 
RLE edema: worsening edema, concern for DVT as he has not been able to be on anticoagulation with his hematuria - check RLE doppler 
- start DVT prophylaxis heparin - needs to monitor closely for additional bleeding. Note that he needed transfusion of pRBCs for anemia yesterday. Sepsis due to catheter-associated UTI and pleural fluid infection?, not present on admission: improving 
- CT chest 10/11 with worsening of the appearance of the right hemithorax with increased pleural effusion and consolidation. 
- Urine and pleural fluid cultures (1 swab) both pansensitive pseudomonas.  Sputum culture with normal respiratory ernestina and rare yeast.  Blood cultures no growth. - off emperic vancomycin and zosyn. Con't levaquin (day 2)  based on culture data. R pleural effusion: - R chest tube placed 10/11 with and additional 230mL L pleural fluid drainage in the last 24 hours 
- increase lasix to BID - 1 pleural fluid culture positive for pseudomonas as above Acute encephalopathy in setting of acute hypoxic respiratory failure and resultant PEA cardiac arrest :  agonal breathing after coming back from radiology after arteriogram, s/p 6min CPR and epi with ROSC. More alert the last few days. - CT head  showed small age-indeterminate infarct in the left corona radiata - CT head 10/11 with no evidence of acute intracranial abnormality. 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 Bilateral tympanomastoid effusions. - con't PT/OT 
- will need LTAC on discharge, discussed with wife and sister yesterday. 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 Still with lots of secretions. - CT chest 9/12 with patchy airspace disease in the right lung base with an associated hyperdense 5.2 x 4.3 cm oval lesion. This may contain a fluid/fluid level indicating 
abscess/hemorrhage, with neoplasm less likely. No acute abdominal or pelvic process. - CTA chest 9/17 due to persistent bloody pulmonary secretions. Right lower lobe masslike abnormality demonstrates increased internal density and has increased in size measuring 6.7 x 6.7 cm, compared to 5.7 x 4.4 cm. This is compatible with internal hemorrhage. There is new moderate right lower lobe partial collapse/atelectasis. - most recent CT chest 10/11 with worsening of the appearance of the right hemithorax with increased pleural effusion and consolidation. 
- CXR 10/13 with consolidation at the right lung base which persists. Cardiomegaly unchanged. - con't lasix at increased dose as above; monitor I/Os - s/p arteriogram 9/14: Thoracic aortic and intercostal arteriograms demonstrating no 
enlarged bronchial artery supplying the right lower lobe of the lung. Normal appearing and normal sized right bronchial artery is seen without any hyperplasia or dominant right lower lobe supply. No embolization was performed. - s/p diagnostic bronch on 9/16. No endobronchial lesions seen. - blood cultures negative. Cytology from bronchial washing on 9/16 is atypical, favor benign reactive process. - work-up for rheumatologic causes of hemoptysis negative: ANCA, anti-GBM, MPO Ab, [place]-3 Ab, SSA/SSB, RNP Ab, Arredondo/RNP, dsDNA, and Arredondo Ab all negative. JOSS+. - completed zosyn for possible lung abscess (9/20), currently on additional Abx for UTI as above CAD s/p PCI, atrial fibrillation on chronic anticoagulation, and benign HTN: 
- holding amiodarone and dlitiazem; holding anticoagulation (home coumadin) and pravachol - con't IV lasix (on oral lasix outpatient) Acute blood loss anemia and anemia of chronic disease Elevated LFTs of unclear etiology: 
- stopped statin for now - trend INR and LFTs, will con't to treat multiple underlying issues.  INR 1.1 today. ALAN: resolved UTI, acute cystitis w/ hematuria, POA:  Required almaraz placed by urology Hyperlipidemia: statin held due to elevated LFTs as above Hypernatremia, resolved Obesity (Body mass index is 32.27 kg/(m^2) 
   
Code: Full (wife and children are decision makers) DVT prophylaxis: heparin Subjective: Chief Complaint / Reason for Physician Visit Opens eyes and squeezes on the L hand. Discussed with RN events overnight. Review of Systems: 
Symptom Y/N Comments  Symptom Y/N Comments Fever/Chills    Chest Pain Poor Appetite    Edema Cough    Abdominal Pain Sputum    Joint Pain SOB/ROY    Pruritis/Rash Nausea/vomit    Tolerating PT/OT Diarrhea    Tolerating Diet Constipation    Other Could NOT obtain due to: intubated Objective: VITALS:  
Last 24hrs VS reviewed since prior progress note. Most recent are: 
Patient Vitals for the past 24 hrs: 
 Temp Pulse Resp BP SpO2  
10/14/18 0815 - 90 21 - 95 % 10/14/18 0700 - 85 24 127/62 95 % 10/14/18 0630 - 90 20 - 99 % 10/14/18 0600 - 90 22 133/50 99 % 10/14/18 0514 - - (!) 32 - (!) 83 % 10/14/18 0500 - 88 17 135/77 94 % 10/14/18 0435 - 79 20 - 97 % 10/14/18 0400 99.7 °F (37.6 °C) 84 16 131/61 100 % 10/14/18 0300 - 83 21 114/53 99 % 10/14/18 0200 - 82 19 136/57 96 % 10/14/18 0100 - 88 16 139/74 99 % 10/14/18 0055 - 87 19 - 100 % 10/14/18 0000 99.1 °F (37.3 °C) 84 22 (!) 131/104 97 % 10/13/18 2300 - 91 19 135/70 92 % 10/13/18 2200 - 83 24 102/49 97 % 10/13/18 2100 - 81 19 109/54 95 % 10/13/18 2000 98.8 °F (37.1 °C) 81 22 93/59 97 % 10/13/18 1938 - 70 22 - 98 % 10/13/18 1900 - 87 19 - 100 % 10/13/18 1715 - 89 27 112/55 97 % 10/13/18 1700 - 90 25 117/63 97 % 10/13/18 1615 98.3 °F (36.8 °C) 88 (!) 32 106/77 98 % 10/13/18 1600 - 90 20 123/63 98 % 10/13/18 1545 - 87 20 123/72 97 % 10/13/18 1530 - 86 20 91/43 98 % 10/13/18 1525 - 90 22 - 99 % 10/13/18 1515 - 83 20 118/61 96 % 10/13/18 1500 - 84 26 120/69 99 % 10/13/18 1445 98.7 °F (37.1 °C) 85 26 117/55 97 % 10/13/18 1435 - 85 23 124/69 97 % 10/13/18 1430 98.8 °F (37.1 °C) 83 23 118/70 97 % 10/13/18 1427 - 86 22 116/61 97 % 10/13/18 1422 - 83 20 110/72 98 % 10/13/18 1419 98.7 °F (37.1 °C) - - - -  
10/13/18 1400 - 85 22 122/57 99 % 10/13/18 1300 - 87 20 113/62 98 % 10/13/18 1200 - 83 19 109/49 98 % 10/13/18 1141 - 71 22 - 97 % 10/13/18 1102 98.7 °F (37.1 °C) 79 20 93/46 98 % 10/13/18 1000 - 87 27 105/60 96 % 10/13/18 0900 - 86 22 107/66 97 % Intake/Output Summary (Last 24 hours) at 10/14/18 5993 Last data filed at 10/14/18 0600 Gross per 24 hour Intake             1910 ml Output             2170 ml Net             -260 ml PHYSICAL EXAM: 
General: Overweight. Alert, cooperative, no acute distress   
EENT:  EOMI. Anicteric sclerae. MMM; trach in place Resp:  CTA bilaterally, no wheezing or rales. No accessory muscle use CV:  Regular rhythm,  RLE>LLE edema GI:  Soft, moderately distended, Non tender.  +Bowel sounds Neurologic:  Alert and oriented X 1-2, no speech, Psych:   Unclear insight. Not anxious nor agitated Skin:  No rashes. No jaundice Reviewed most current lab test results and cultures  YES Reviewed most current radiology test results   YES Review and summation of old records today    NO Reviewed patient's current orders and MAR    YES 
PMH/SH reviewed - no change compared to H&P 
________________________________________________________________________ Care Plan discussed with: 
  Comments Patient x Family RN x Care Manager Consultant  x pulmonology Multidiciplinary team rounds were held today with , nursing, pharmacist and clinical coordinator. Patient's plan of care was discussed; medications were reviewed and discharge planning was addressed. ________________________________________________________________________ Total NON critical care TIME:  25 Minutes Total CRITICAL CARE TIME Spent:   Minutes non procedure based Comments >50% of visit spent in counseling and coordination of care x   
________________________________________________________________________ Yumi Lee MD  
 
Procedures: see electronic medical records for all procedures/Xrays and details which were not copied into this note but were reviewed prior to creation of Plan. LABS: 
I reviewed today's most current labs and imaging studies. Pertinent labs include: 
Recent Labs 10/13/18 
 4643  10/12/18 
 0424 WBC  8.1  13.8* HGB  6.7*  7.4* HCT  23.0*  24.6*  
PLT  132*  127* Recent Labs 10/14/18 
 0411  10/13/18 
 5120  10/12/18 
 0424 NA  142  142  142  
K  4.0  3.8  4.2 CL  107  107  106 CO2  31  29  30 GLU  120*  91  100 BUN  22*  24*  22* CREA  0.82  0.87  0.88 CA  8.1*  8.2*  8.4* MG   --   2.3  2.2 PHOS  2.2*  2.5*  2.7 ALB   --   1.6*  1.7* TBILI   --   0.5  0.8 SGOT   --   59*  95* ALT   --   91*  123* INR  1.1  1.1  1.1 Signed: Yumi Lee MD

## 2018-10-14 NOTE — PROGRESS NOTES
1900: Bedside and Verbal shift change report given to [id number] (oncoming nurse) by Samuel Powell RN (offgoing nurse). Report included the following information SBAR, Kardex, Procedure Summary, Intake/Output, MAR, Recent Results, Med Rec Status and Cardiac Rhythm A fib.  
 
2000: Assessment completed, trach/vented (AC12/500/40%/P6), afib, afebrile. Neuro; Patient opens eyes to voice, decreased command following, focuses with eyes. Pupils unequal, but react to light. Withdraws in all four extremities. Patient restless in bed. Respiratory; Patient vent via trach, thick tan secretions via inline suction and around the trach. Upper and lower airway course. Cardiac; a fib, BP with in normal limits. GI; Abdomen semi-soft, PEG tube present infusing TwoCal HN at goal, no residuals. Bowel sounds active. Flexiseal present, bag changed by prior RN, no drainage in bag at this time. Renal; Overton present, draining paige urine with sediment. Peripheral Vascular: pulses palpable, +1 pitting edema in BLE, trace in BUE. Skin; fragile skin with scattered ecchymosis. Lines; LAM PICC infusing Jurline Burris with ordered antibiotic.   
 
0000: Reassessment completed, no changes noted. Will continue to monitor. 0200: Trach care provided. Patient tolerated well. Will continue to monitor. 0400: Reassessment completed, no changes noted. Will continue to monitor. 1294: SAT not applicable as patient is not on sedation, SBT to be initiated by [facility] RT. Will continue to monitor.

## 2018-10-14 NOTE — PROGRESS NOTES
0700 Report received 0800 Assessed the patient 0830 Dr Clifton Mccullough updated on failed SBT due to secretions 1200 Assessed the patient coarse lung sounds. 1600 No changes wife in to visit 1730 Continues to drain clear yellow urine awake following commands 1930 Bedside shift change report given to Shira Peters  (oncoming nurse) by Larri Dubin  (offgoing nurse). Report included the following information SBAR, Procedure Summary, Intake/Output and Accordion. Chest tube out put 90ml in 12 hours

## 2018-10-14 NOTE — PROGRESS NOTES
10/14/18 4615 ABCDEF Bundle SBT Trial Passed No  
SBT Trial Reason for Failure Oxygen saturation < 88%;RSBI>105 Weaning Parameters Spontaneous Breathing Trial Complete No (Comments) 
(SPO2 dropped and RSBI 130) Resp Rate Observed 32 RSBI 130 SBTn trial failed. Patient placed back on previous setting.

## 2018-10-14 NOTE — PROGRESS NOTES
Urine clear Overton out per primary service when doing better. Will sign off be be available for questions.

## 2018-10-14 NOTE — PROGRESS NOTES
[facility] Pulmonary Consult Service NotePulmonary, Critical Care, and Sleep Medicine Name: Dwain Walker MRN: [id number] : 1934 Hospital: [facility] Date: 10/14/2018   Hospital Day: 29 IMPRESSION:  
1. 18 cardiopulmonary arrest with  6 min of CPR then ROSC. 2. Gram negative in urine and penile discharge sample. 3. Acute respiratory failure-on vent- has not been able to do SBT. Can do ongoing evals. May need LTAC, Will introduce to family.  to provide info to family. Ongoing weaning trials as tolerated. 4. Acute hemoptysis- Noted to have bleeding from RLL on Bronch. No endobronchial lesion. has lung mass(CA vs inflammatory pseudotumor) and microscopic hematuria; ANCA negative; JOSS barely positive. Suspect LUNG mass the culprit. CYTOLOGY from bronchoscopy negative malignancy x 2, at this point not pursuing additional interventions. 5. Has moderate right pleural effusion, appreciate IR placement of RIght sided Chest tube. Almost 4 liters off since yesterday. 6. S/P pulmonary artery embolization for hemoptysis Per IR of suspect lung mass. 7. COPD moderate to severe at baseline 8. Acute renal insf now resolved. 9. Anemia and prior coagulopathy on coumadin-had blood transfusion. 10. Volume overload, anasarca- after couple of days of IV lasix- becomes prerenal 
11. Encehalopathy/Debility multifactorial - has hearing loss as well: He does seem to be making slow improvement, More awake now. .  
12. Hematuria, seems to be improving had almaraz in place. 15. Hx of CAD s/p PCI, afib on amio, former smoker, prior cva 14. Discussed with nurse on rounds this am.  
15. Process of LTAC eval started per YOUSUF Dudley 16. Called and discussed with pts wife about chest tube placement. RECOMMENDATIONS/PLAN:  
1. Will placed on empiric Zosyn and Vanc.  Discussed and reviewed with pharmacy. For suspected HCAP, Purulent penile discharge, UTI. 2. Restarted heparin for DVT prophylaxis q8hrs. 3. Recheck cbc 4. Vent support, TC trials as tolerated. Suspect will need LTAC. 5. Pt has large right pleural effusion, may be malignant pleural effusion. Will get chest tube place per IR. Called IR to let them know. Will need to follow cytology. 6. Trach care, Noted to have moderate secretions. 7. Still has unresolved issue of lung mass, will having ongoing family discussions. Palliative care is following. Will get pleural fluid cytology, results are pending. 8. Pleural fluid Cx is pending. 9. May need additional Cultures. 10. Diuresis as needed 11. Follow renal function, WNL 12. Enteral feedings tolerating. NPO for possible procedure. 13. No need for transfusion 14. Glucose monitoring and SSI 15. DVT/GI prophylaxis 16. Will need LTAC when becomes medically stable. Subjective/Initial History: S/p trach 10/2 Vent support 10-14:  No major events. Received transfusion yesterday 10-13:  No changes. Spoke to wife who agrees to transfusion. Cytology still pending 10-12-18: Pt seems to be much more alert when seen this am. Had near 3.5-4L of right pleural fluid output. NO ROS or HPI are obtainable from pt.  
 
 
10-11-18: Pt has been slow to respond. Still not waking up. Had increased secretion from trach and from his penis. Not follow any commands. Not able to get ROS or HPI from pt.  
 
 
10-10-18: No major changes or events over last 24 hrs. Not able to obtain ROS or HPI from pt. Some thick yellow secretions present around this trach. 10-9-18: Not able to get hx  Or ROS from pt. No acute changes overnight. Had some moderate secretions this am.  
 
 
10-8-18: Pt still not waking up. Has ongoing issues with blood clots in urine. Per nurses has been with recurrent issues with clotting and require frequent flushing of the almaraz. Pt has not been able to tolerate trach collar. Unable to get ROS or HPI from pt. MAR reviewed and pertinent medications noted or modified as needed Current Facility-Administered Medications Medication  furosemide (LASIX) injection 20 mg  
 0.9% sodium chloride infusion 250 mL  levoFLOXacin (LEVAQUIN) 750 mg in D5W IVPB  albuterol-ipratropium (DUO-NEB) 2.5 MG-0.5 MG/3 ML  
 heparin (porcine) injection 5,000 Units  fentaNYL citrate (PF) injection 25 mcg  zinc oxide-cod liver oil (DESITIN) 40 % paste  acetaminophen (TYLENOL) solution 650 mg  
 sodium chloride (NS) flush 10-30 mL  sodium chloride (NS) flush 10 mL  sodium chloride (NS) flush 10 mL  sodium chloride (NS) flush 10-40 mL  sodium chloride (NS) flush 20 mL  heparin (porcine) pf 300 Units  glycopyrrolate (ROBINUL) injection 0.1 mg  
 famotidine (PF) (PEPCID) 20 mg in sodium chloride 0.9% 10 mL injection  chlorhexidine (PERIDEX) 0.12 % mouthwash 15 mL  albuterol (PROVENTIL HFA, VENTOLIN HFA, PROAIR HFA) inhaler 2 Puff  dorzolamide-timolol (COSOPT) 22.3-6.8 mg/mL ophthalmic solution 1 Drop  latanoprost (XALATAN) 0.005 % ophthalmic solution 1 Drop  sodium chloride (NS) flush 5-10 mL  sodium chloride (NS) flush 5-10 mL  ondansetron (ZOFRAN) injection 4 mg ROS:A comprehensive review of systems was negative except for that written in the HPI. Objective: 
 
Vital Signs: Telemetry:    normal sinus rhythmIntake/Output:  
Visit Vitals  /56  Pulse 86  Temp 99.1 °F (37.3 °C)  Resp 18  Ht 6' 2\" (1.88 m)  Wt 114 kg (251 lb 5.2 oz)  SpO2 98%  BMI 32.27 kg/m2 Temp (24hrs), Av.9 °F (37.2 °C), Min:98.3 °F (36.8 °C), Max:99.7 °F (37.6 °C) O2 Device: Ventilator, Tracheostomy O2 Flow Rate (L/min): 10 l/min Wt Readings from Last 4 Encounters:  
10/12/18 114 kg (251 lb 5.2 oz) 16 111.1 kg (245 lb)  
16 106.6 kg (235 lb) 04/08/15 103.4 kg (228 lb) Intake/Output Summary (Last 24 hours) at 10/14/18 1055 Last data filed at 10/14/18 0600 Gross per 24 hour Intake             1810 ml Output             1895 ml Net              -85 ml Last shift:        
Last 3 shifts: 10/12 1901 - 10/14 0700 In: 9345 [I.V.:950] Out: 5248 [Urine:2215; Drains:425] Physical Exam:  
 General:  No distress, trach and vent support. HEAD: Normocephalic, without obvious abnormality, atraumatic EYES: conjunctivae clear. anicteric sclerae NOSE: nares normal, no drainage, no nasal flaring, Neck: Supple, symmetrical, trachea midline, has trach in place, on Mechanical vent support. Chest: increased AP diameter Lungs: pretty clear anterioly. Trached on vent. Heart: Regular rate and rhythm nl s1,2. Abdomen: soft, non-tender, +BS, pt has almaraz in place. Blood urine present. Obese. Musculoskeletal: anasarca; no erythema, minimal peripheral edema, has SCDs in place. Neuro: seems at least to intermittently to follow commands. Psych: Not able to assess; no agitation. NO anxiety or depression when seen. Data:  
 
Current Facility-Administered Medications Medication Dose Route Frequency  furosemide (LASIX) injection 20 mg  20 mg IntraVENous ACB&D  
 levoFLOXacin (LEVAQUIN) 750 mg in D5W IVPB  750 mg IntraVENous QHS  albuterol-ipratropium (DUO-NEB) 2.5 MG-0.5 MG/3 ML  3 mL Nebulization BID RT  
 heparin (porcine) injection 5,000 Units  5,000 Units SubCUTAneous Q8H  
 sodium chloride (NS) flush 10 mL  10 mL InterCATHeter Q24H  
 sodium chloride (NS) flush 10-40 mL  10-40 mL InterCATHeter Q8H  
 sodium chloride (NS) flush 20 mL  20 mL InterCATHeter Q24H  
 famotidine (PF) (PEPCID) 20 mg in sodium chloride 0.9% 10 mL injection  20 mg IntraVENous Q12H  chlorhexidine (PERIDEX) 0.12 % mouthwash 15 mL  15 mL Oral BID  dorzolamide-timolol (COSOPT) 22.3-6.8 mg/mL ophthalmic solution 1 Drop 1 Drop Both Eyes BID  latanoprost (XALATAN) 0.005 % ophthalmic solution 1 Drop  1 Drop Both Eyes QHS  sodium chloride (NS) flush 5-10 mL  5-10 mL IntraVENous Q8H Labs: 
Recent Labs 10/14/18 
 1216  10/13/18 
 9493  10/12/18 
 0424 WBC   --   8.1  13.8* HGB  7.9*  6.7*  7.4* HCT  25.6*  23.0*  24.6* PLT   --   132*  127* Recent Labs 10/14/18 
 0411  10/13/18 
 6000  10/12/18 
 0424 NA  142  142  142  
K  4.0  3.8  4.2 CL  107  107  106 CO2  31  29  30 GLU  120*  91  100 BUN  22*  24*  22* CREA  0.82  0.87  0.88 CA  8.1*  8.2*  8.4* MG   --   2.3  2.2 PHOS  2.2*  2.5*  2.7 ALB   --   1.6*  1.7* TBILI   --   0.5  0.8 SGOT   --   59*  95* ALT   --   91*  123* INR  1.1  1.1  1.1 No results for input(s): PH, PCO2, PO2, HCO3, FIO2 in the last 72 hours. Imaging: 
I have personally reviewed the patients radiographs and have reviewed the reports: 
10-9-18: CXR: 
 
Right pleural effusion, trach intact. Mild IE. Has PPM in place. 10-12-18: CXR: has decreased right pleural effusion. Has ongoing Interstitial edema. CT of chest:10-11-18: IMPRESSION: 
Worsening of the appearance of the right hemithorax with increased pleural 
effusion and consolidation. CT of head: 
10-11-18: IMPRESSION: 
1. No evidence of acute intracranial abnormality. Stable small chronic infarct 
in the left corona radiata. 2. Bilateral tympanomastoid effusions Total critical care time exclusive of procedures:  minutes Oralia Chi MD

## 2018-10-15 LAB
ANION GAP SERPL CALC-SCNC: 3 MMOL/L (ref 5–15)
ANION GAP SERPL CALC-SCNC: 5 MMOL/L (ref 5–15)
APTT PPP: 28.8 SEC (ref 22.1–32)
BACTERIA SPEC CULT: NORMAL
BUN SERPL-MCNC: 22 MG/DL (ref 6–20)
BUN SERPL-MCNC: 23 MG/DL (ref 6–20)
BUN/CREAT SERPL: 25 (ref 12–20)
BUN/CREAT SERPL: 26 (ref 12–20)
CALCIUM SERPL-MCNC: 8.3 MG/DL (ref 8.5–10.1)
CALCIUM SERPL-MCNC: 8.4 MG/DL (ref 8.5–10.1)
CHLORIDE SERPL-SCNC: 105 MMOL/L (ref 97–108)
CHLORIDE SERPL-SCNC: 105 MMOL/L (ref 97–108)
CO2 SERPL-SCNC: 32 MMOL/L (ref 21–32)
CO2 SERPL-SCNC: 34 MMOL/L (ref 21–32)
CREAT SERPL-MCNC: 0.85 MG/DL (ref 0.7–1.3)
CREAT SERPL-MCNC: 0.91 MG/DL (ref 0.7–1.3)
ERYTHROCYTE [DISTWIDTH] IN BLOOD BY AUTOMATED COUNT: 21.1 % (ref 11.5–14.5)
FIBRINOGEN PPP-MCNC: 724 MG/DL (ref 200–475)
GLUCOSE SERPL-MCNC: 112 MG/DL (ref 65–100)
GLUCOSE SERPL-MCNC: 120 MG/DL (ref 65–100)
GRAM STN SPEC: NORMAL
GRAM STN SPEC: NORMAL
HCT VFR BLD AUTO: 30.7 % (ref 36.6–50.3)
HGB BLD-MCNC: 9.3 G/DL (ref 12.1–17)
INR PPP: 1.1 (ref 0.9–1.1)
MAGNESIUM SERPL-MCNC: 2 MG/DL (ref 1.6–2.4)
MCH RBC QN AUTO: 31.6 PG (ref 26–34)
MCHC RBC AUTO-ENTMCNC: 30.3 G/DL (ref 30–36.5)
MCV RBC AUTO: 104.4 FL (ref 80–99)
NRBC # BLD: 0 K/UL (ref 0–0.01)
NRBC BLD-RTO: 0 PER 100 WBC
PHOSPHATE SERPL-MCNC: 2.1 MG/DL (ref 2.6–4.7)
PLATELET # BLD AUTO: 138 K/UL (ref 150–400)
PMV BLD AUTO: 12.1 FL (ref 8.9–12.9)
POTASSIUM SERPL-SCNC: 3.9 MMOL/L (ref 3.5–5.1)
POTASSIUM SERPL-SCNC: 4 MMOL/L (ref 3.5–5.1)
PROTHROMBIN TIME: 11.6 SEC (ref 9–11.1)
RBC # BLD AUTO: 2.94 M/UL (ref 4.1–5.7)
SERVICE CMNT-IMP: NORMAL
SODIUM SERPL-SCNC: 142 MMOL/L (ref 136–145)
SODIUM SERPL-SCNC: 142 MMOL/L (ref 136–145)
THERAPEUTIC RANGE,PTTT: ABNORMAL SECS (ref 58–77)
WBC # BLD AUTO: 6.4 K/UL (ref 4.1–11.1)

## 2018-10-15 PROCEDURE — 77030006998

## 2018-10-15 PROCEDURE — 74011250636 HC RX REV CODE- 250/636: Performed by: INTERNAL MEDICINE

## 2018-10-15 PROCEDURE — 94640 AIRWAY INHALATION TREATMENT: CPT

## 2018-10-15 PROCEDURE — 80048 BASIC METABOLIC PNL TOTAL CA: CPT | Performed by: INTERNAL MEDICINE

## 2018-10-15 PROCEDURE — 85610 PROTHROMBIN TIME: CPT | Performed by: INTERNAL MEDICINE

## 2018-10-15 PROCEDURE — 74011000250 HC RX REV CODE- 250: Performed by: INTERNAL MEDICINE

## 2018-10-15 PROCEDURE — 83735 ASSAY OF MAGNESIUM: CPT | Performed by: INTERNAL MEDICINE

## 2018-10-15 PROCEDURE — 65610000006 HC RM INTENSIVE CARE

## 2018-10-15 PROCEDURE — 84100 ASSAY OF PHOSPHORUS: CPT | Performed by: INTERNAL MEDICINE

## 2018-10-15 PROCEDURE — 36415 COLL VENOUS BLD VENIPUNCTURE: CPT | Performed by: INTERNAL MEDICINE

## 2018-10-15 PROCEDURE — 94003 VENT MGMT INPAT SUBQ DAY: CPT

## 2018-10-15 PROCEDURE — 85027 COMPLETE CBC AUTOMATED: CPT | Performed by: INTERNAL MEDICINE

## 2018-10-15 RX ADMIN — Medication 10 ML: at 22:44

## 2018-10-15 RX ADMIN — DORZOLAMIDE HYDROCHLORIDE AND TIMOLOL MALEATE 1 DROP: 20; 5 SOLUTION/ DROPS OPHTHALMIC at 17:23

## 2018-10-15 RX ADMIN — Medication 20 ML: at 12:46

## 2018-10-15 RX ADMIN — GLYCOPYRROLATE 0.1 MG: 0.2 INJECTION, SOLUTION INTRAMUSCULAR; INTRAVENOUS at 07:16

## 2018-10-15 RX ADMIN — Medication 10 ML: at 15:20

## 2018-10-15 RX ADMIN — LATANOPROST 1 DROP: 50 SOLUTION OPHTHALMIC at 22:00

## 2018-10-15 RX ADMIN — IPRATROPIUM BROMIDE AND ALBUTEROL SULFATE 3 ML: .5; 3 SOLUTION RESPIRATORY (INHALATION) at 07:29

## 2018-10-15 RX ADMIN — CHLORHEXIDINE GLUCONATE 15 ML: 1.2 RINSE ORAL at 22:43

## 2018-10-15 RX ADMIN — FUROSEMIDE 20 MG: 10 INJECTION, SOLUTION INTRAMUSCULAR; INTRAVENOUS at 15:35

## 2018-10-15 RX ADMIN — CHLORHEXIDINE GLUCONATE 15 ML: 1.2 RINSE ORAL at 09:11

## 2018-10-15 RX ADMIN — DORZOLAMIDE HYDROCHLORIDE AND TIMOLOL MALEATE 1 DROP: 20; 5 SOLUTION/ DROPS OPHTHALMIC at 09:11

## 2018-10-15 RX ADMIN — HEPARIN SODIUM 5000 UNITS: 5000 INJECTION INTRAVENOUS; SUBCUTANEOUS at 22:43

## 2018-10-15 RX ADMIN — FAMOTIDINE 20 MG: 10 INJECTION, SOLUTION INTRAVENOUS at 09:11

## 2018-10-15 RX ADMIN — FAMOTIDINE 20 MG: 10 INJECTION, SOLUTION INTRAVENOUS at 22:42

## 2018-10-15 RX ADMIN — Medication 10 ML: at 12:46

## 2018-10-15 RX ADMIN — Medication 30 ML: at 05:17

## 2018-10-15 RX ADMIN — Medication 10 ML: at 15:19

## 2018-10-15 RX ADMIN — IPRATROPIUM BROMIDE AND ALBUTEROL SULFATE 3 ML: .5; 3 SOLUTION RESPIRATORY (INHALATION) at 19:13

## 2018-10-15 RX ADMIN — FUROSEMIDE 20 MG: 10 INJECTION, SOLUTION INTRAMUSCULAR; INTRAVENOUS at 07:16

## 2018-10-15 RX ADMIN — LEVOFLOXACIN 750 MG: 5 INJECTION, SOLUTION INTRAVENOUS at 22:42

## 2018-10-15 RX ADMIN — HEPARIN SODIUM 5000 UNITS: 5000 INJECTION INTRAVENOUS; SUBCUTANEOUS at 05:17

## 2018-10-15 RX ADMIN — HEPARIN SODIUM 5000 UNITS: 5000 INJECTION INTRAVENOUS; SUBCUTANEOUS at 15:21

## 2018-10-15 NOTE — PROGRESS NOTES
Placed pt on a 40% Trach Collar. Pt O2 sats in the 80's. Placed back on mechanical Ventilation. CPAP of 6/PS6 40%, PT is tolerating that well.

## 2018-10-15 NOTE — PROGRESS NOTES
10/15/18 6705 ABCDEF Bundle SBT Safety Screen Passed Yes SBT Trial Passed Yes Weaning Parameters Spontaneous Breathing Trial Complete Yes Resp Rate Observed 22 Ve 12.1  RSBI 40 SBT passed, porfirio well. Patient back on A\C mode.

## 2018-10-15 NOTE — PROGRESS NOTES
1900: Bedside and Verbal shift change report given to [address] (oncoming nurse) by  Diana Peters RN (offgoing nurse). Report included the following information SBAR, Kardex, Procedure Summary, Intake/Output, MAR, Recent Results, Med Rec Status and Cardiac Rhythm A fib.  
  
2000: Assessment completed, trach/vented (AC12/500/40%/P6), afib, afebrile. Neuro; Patient opens eyes to voice, follows commands, node appropriately,  focuses with eyes. Pupils unequal, but react to light. Withdraws in all four extremities. Patient restless in bed. Respiratory; Patient vent via trach, thick tan secretions via inline suction and around the trach. Upper and lower airway course. Cardiac; a fib, BP with in normal limits. GI; Abdomen semi-soft, PEG tube present infusing TwoCal HN at goal, no residuals. Bowel sounds active. Flexiseal present, watery brown stool in bag. Renal; Overton present, draining clear yellow urine with sediment. Peripheral Vascular: pulses palpable, +2 pitting edema in RLE, trace in BUE. Skin; fragile skin with scattered ecchymosis. Lines; LAM PICC infusing Melanie Short with ordered antibiotic.   
 
0000: Reassessment completed, no changes noted. Will continue to monitor. 0330: Trach care provided. Patient tolerated well.  
 
0400: Reassessment completed, no changes noted. Will continue to monitor. 5187:  SAT not applicable as patient is not on sedation, RT notified of readiness for SBT. 9030: SBT initiated by RT.

## 2018-10-15 NOTE — PROGRESS NOTES
0700 Report received 07:15 Assessed the patient passed SBT and  SAT for 20 minutes, donal given. 3794 Patient did poorly on TC de-sats not taking breaths poor breathing  effort  
1000 No changes 1300 Wife in to visit 1400 CHG BATH given 1600 Request to place patient back on rate as the patient resp rate is high. 1845 BMP MG pt with frequent PVC receiving lasix 1930 Bedside shift change report given to Obdulia To  (oncoming nurse) by Ambar Verma  (offgoing nurse). Report included the following information SBAR, Kardex and ED Summary.

## 2018-10-15 NOTE — PROGRESS NOTES
Hospitalist Progress Note NAME: Sha Hernandez  
:  1934 MRN:  [id number] Assessment / Plan: 
Sepsis due to catheter-associated UTI and pleural fluid infection?, not present on admission:  
- CT chest 10/11 with worsening of the appearance of the right hemithorax with increased pleural effusion and consolidation. 
- Urine and pleural fluid cultures (1 swab) both pansensitive pseudomonas.  Sputum culture with normal respiratory ernestina and rare yeast.  Blood cultures no growth. - off emperic vancomycin and zosyn. Con't levaquin (day 2)  based on culture data. - will remove almaraz for voiding trial - originally placed for hematuria/clots but per urology can be removed when felt no longer needed R pleural effusion:  
- most recent CXR 10/13 with focal area of parenchymal consolidation at the right lung base which persists. Cardiomegaly unchanged. - R chest tube placed 10/11 with and additional 170mL blood-tinged L pleural fluid drainage in the last 24 hours 
- con't lasix to BID - 1 pleural fluid culture positive for pseudomonas as above, on Abx Acute encephalopathy in setting of acute hypoxic respiratory failure and resultant PEA cardiac arrest :  agonal breathing after coming back from radiology after arteriogram, s/p 6min CPR and epi with ROSC. More alert the last few days. - CT head  showed small age-indeterminate infarct in the left corona radiata - CT head 10/11 with no evidence of acute intracranial abnormality. 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 Bilateral tympanomastoid effusions. - will need LTAC on discharge, discussed with wife and sister yesterday. 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 Still with lots of secretions. - CT chest 9/12 with patchy airspace disease in the right lung base with an associated hyperdense 5.2 x 4.3 cm oval lesion. This may contain a fluid/fluid level indicating 
abscess/hemorrhage, with neoplasm less likely. No acute abdominal or pelvic process. - CTA chest 9/17 due to persistent bloody pulmonary secretions. Right lower lobe masslike abnormality demonstrates increased internal density and has increased in size measuring 6.7 x 6.7 cm, compared to 5.7 x 4.4 cm. This is compatible with internal hemorrhage. There is new moderate right lower lobe partial collapse/atelectasis. - most recent CT chest 10/11 with worsening of the appearance of the right hemithorax with increased pleural effusion and consolidation. - con't lasix as above; monitor I/Os - s/p arteriogram 9/14: Thoracic aortic and intercostal arteriograms demonstrating no 
enlarged bronchial artery supplying the right lower lobe of the lung. Normal appearing and normal sized right bronchial artery is seen without any hyperplasia or dominant right lower lobe supply. No embolization was performed. - s/p diagnostic bronch on 9/16. No endobronchial lesions seen. - blood cultures negative. Cytology from bronchial washing on 9/16 is atypical, favor benign reactive process. - work-up for rheumatologic causes of hemoptysis negative: ANCA, anti-GBM, MPO Ab, PA-3 Ab, SSA/SSB, RNP Ab, Arredondo/RNP, dsDNA, and Arredondo Ab all negative. JOSS+. - completed zosyn for possible lung abscess (9/20), currently on additional Abx for UTI as above CAD s/p PCI, atrial fibrillation on chronic anticoagulation, and benign HTN: 
- holding amiodarone and dlitiazem; holding anticoagulation (home coumadin) and pravachol 
- con't IV lasix (on oral lasix outpatient) Acute blood loss anemia and anemia of chronic disease Elevated LFTs of unclear etiology: - stopped statin for now - trend INR and LFTs, will con't to treat multiple underlying issues.  INR 1.1 today. ALAN: resolved UTI, acute cystitis w/ hematuria, POA:  Required almaraz placed by urology Hyperlipidemia: statin held due to elevated LFTs as above Hypernatremia, resolved Obesity (Body mass index is 32.27 kg/(m^2) 
   
Code: Full (wife and children are decision makers) DVT prophylaxis: heparin Subjective: Chief Complaint / Reason for Physician Visit Alert today, ? attempting to mouth words. Discussed with RN events overnight. Review of Systems: 
Symptom Y/N Comments  Symptom Y/N Comments Fever/Chills    Chest Pain Poor Appetite    Edema Cough    Abdominal Pain Sputum    Joint Pain SOB/ROY    Pruritis/Rash Nausea/vomit    Tolerating PT/OT Diarrhea    Tolerating Diet Constipation    Other Could NOT obtain due to: tracheostomy Objective: VITALS:  
Last 24hrs VS reviewed since prior progress note. Most recent are: 
Patient Vitals for the past 24 hrs: 
 Temp Pulse Resp BP SpO2  
10/15/18 1016 - - - - 99 % 10/15/18 0900 - 89 22 108/60 97 % 10/15/18 0830 - 90 19 - -  
10/15/18 0810 - 91 14 107/59 98 % 10/15/18 0800 98.1 °F (36.7 °C) 88 22 (!) 88/62 96 % 10/15/18 0731 - 93 20 - 99 % 10/15/18 0729 - - - - 99 % 10/15/18 0613 - 79 16 126/67 93 % 10/15/18 0605 - 86 22 - 95 % 10/15/18 0600 - 79 17 (!) 85/69 97 % 10/15/18 0500 - 80 22 119/60 97 % 10/15/18 0435 - 79 20 - 97 % 10/15/18 0400 97.4 °F (36.3 °C) 81 13 116/64 99 % 10/15/18 0300 - 77 17 126/70 99 % 10/15/18 0200 - 79 19 126/55 98 % 10/15/18 0100 - 78 18 123/70 96 % 10/15/18 0000 98.3 °F (36.8 °C) 77 19 121/56 98 % 10/14/18 2320 - 87 27 - 98 % 10/14/18 2300 - 78 21 112/65 98 % 10/14/18 2200 - 82 18 129/57 100 % 10/14/18 2100 - 78 16 130/60 95 % 10/14/18 2000 97.4 °F (36.3 °C) 74 17 98/64 100 % 10/14/18 1920 - 76 22 - 100 % 10/14/18 1917 - - - - 100 % 10/14/18 1900 - 71 18 118/58 100 % 10/14/18 1830 - 72 19 - 98 % 10/14/18 1800 - 74 15 120/59 99 % 10/14/18 1730 - 73 15 - 98 % 10/14/18 1726 - 70 18 - 97 % 10/14/18 1700 - 82 20 108/55 100 % 10/14/18 1630 - 72 17 - 98 % 10/14/18 1600 97.9 °F (36.6 °C) 75 18 112/56 98 % 10/14/18 1530 - 75 19 - 99 % 10/14/18 1500 - 78 18 107/51 97 % 10/14/18 1400 - 81 19 106/66 100 % 10/14/18 1300 - 85 19 108/51 100 % 10/14/18 1230 - 87 14 - 99 % 10/14/18 1200 97.9 °F (36.6 °C) 85 20 115/71 100 % 10/14/18 1128 - 81 18 - 99 % 10/14/18 1100 - 83 20 112/56 100 % Intake/Output Summary (Last 24 hours) at 10/15/18 1028 Last data filed at 10/15/18 0900 Gross per 24 hour Intake              900 ml Output             4330 ml Net            -3430 ml PHYSICAL EXAM: 
General: Overweight. Alert, cooperative, no acute distress   
EENT:  EOMI. Anicteric sclerae. MMM, mildline trach with small amount of mucus surrounding Resp:  CTA bilaterally, no wheezing or rales. No accessory muscle use CV:  Regular rhythm,  No edema GI:  Soft, moderately distended, Non tender.  +Bowel sounds Neurologic:  Alert and oriented X 1-2, no speech, Psych:   Unclear insight. Not anxious nor agitated Skin:  No rashes. No jaundice Reviewed most current lab test results and cultures  YES Reviewed most current radiology test results   YES Review and summation of old records today    NO Reviewed patient's current orders and MAR    YES 
PMH/SH reviewed - no change compared to H&P 
________________________________________________________________________ Care Plan discussed with: 
  Comments Patient x Family RN x Care Manager Consultant Multidiciplinary team rounds were held today with , nursing, pharmacist and clinical coordinator. Patient's plan of care was discussed; medications were reviewed and discharge planning was addressed. ________________________________________________________________________ Total NON critical care TIME:  25 Minutes Total CRITICAL CARE TIME Spent:   Minutes non procedure based Comments >50% of visit spent in counseling and coordination of care x   
________________________________________________________________________ Che Smith MD  
 
Procedures: see electronic medical records for all procedures/Xrays and details which were not copied into this note but were reviewed prior to creation of Plan. LABS: 
I reviewed today's most current labs and imaging studies. Pertinent labs include: 
Recent Labs 10/15/18 
 0405  10/14/18 
 3052  10/13/18 
 3451 WBC  6.4   --   8.1 HGB  9.3*  7.9*  6.7* HCT  30.7*  25.6*  23.0*  
PLT  138*   --   132* Recent Labs 10/15/18 
 0405  10/14/18 
 0411  10/13/18 
 4339 NA  142  142  142  
K  4.0  4.0  3.8 CL  105  107  107 CO2  32  31  29 GLU  112*  120*  91 BUN  22*  22*  24* CREA  0.85  0.82  0.87 CA  8.4*  8.1*  8.2* MG   --    --   2.3 PHOS  2.1*  2.2*  2.5* ALB   --    --   1.6* TBILI   --    --   0.5 SGOT   --    --   59* ALT   --    --   91* INR  1.1  1.1  1.1 Signed: Che Smith MD

## 2018-10-15 NOTE — PROGRESS NOTES
[facility] Consult Service NotePulmonary, Critical Care, and Sleep Medicine Name: Tien Oseguera MRN: [id number] : 1934 Hospital: [facility] Date: 10/15/2018   Hospital Day: 28 IMPRESSION:  
1. 18 cardiopulmonary arrest with  6 min of CPR then ROSC. 2. Acute respiratory failure-on vent 3. Acute hemoptysis- Noted to have bleeding from RLL on Bronch. No endobronchial lesion. has lung mass(CA vs inflammatory pseudotumor) and microscopic hematuria; ANCA negative; JOSS barely positive. Suspect LUNG mass the culprit. CYTOLOGY from bronchoscopy negative malignancy x 2, at this point not pursuing additional interventions. 4. Moderate right pleural effusion, s/p IR placement of right sided chest tube. 5. S/P pulmonary artery embolization for hemoptysis per IR of suspected lung mass. 6. COPD moderate to severe at baseline 7. Acute renal insf now resolved. 8. Anemia and prior coagulopathy on coumadin 9. Encehalopathy/Debility multifactorial - has hearing loss as well 10. Hematuria, seems to be improving had almaraz in place. 11. Hx of CAD s/p PCI, afib on amio, former smoker, prior cva RECOMMENDATIONS/PLAN:  
1. Vent support, TC trials as tolerated 2. Chest tube drainage. Follow cytology. 3. Trach care 4. Still hPleural fluid cytology results are pending. 5. Diuresis as needed 6. Enteral feedings 7. Glucose monitoring and SSI 8. DVT/GI prophylaxis 9. Will need LTAC Subjective/Initial History:  
 
10-15: no events. No new reported issues 10-14:  No major events. Received transfusion yesterday 10-13:  No changes. Spoke to wife who agrees to transfusion. Cytology still pending 10-12-18: Pt seems to be much more alert when seen this am. Had near 3.5-4L of right pleural fluid output. NO ROS or HPI are obtainable from pt. 10-11-18: Pt has been slow to respond. Still not waking up. Had increased secretion from trach and from his penis. Not follow any commands. Not able to get ROS or HPI from pt.  
 
 
10-10-18: No major changes or events over last 24 hrs. Not able to obtain ROS or HPI from pt. Some thick yellow secretions present around this trach. 10-9-18: Not able to get hx  Or ROS from pt. No acute changes overnight. Had some moderate secretions this am.  
 
 
10-8-18: Pt still not waking up. Has ongoing issues with blood clots in urine. Per nurses has been with recurrent issues with clotting and require frequent flushing of the almaraz. Pt has not been able to tolerate trach collar. Unable to get ROS or HPI from pt. MAR reviewed and pertinent medications noted or modified as needed Current Facility-Administered Medications Medication  furosemide (LASIX) injection 20 mg  
 levoFLOXacin (LEVAQUIN) 750 mg in D5W IVPB  albuterol-ipratropium (DUO-NEB) 2.5 MG-0.5 MG/3 ML  
 heparin (porcine) injection 5,000 Units  fentaNYL citrate (PF) injection 25 mcg  zinc oxide-cod liver oil (DESITIN) 40 % paste  acetaminophen (TYLENOL) solution 650 mg  
 sodium chloride (NS) flush 10-30 mL  sodium chloride (NS) flush 10 mL  sodium chloride (NS) flush 10 mL  sodium chloride (NS) flush 10-40 mL  sodium chloride (NS) flush 20 mL  heparin (porcine) pf 300 Units  glycopyrrolate (ROBINUL) injection 0.1 mg  
 famotidine (PF) (PEPCID) 20 mg in sodium chloride 0.9% 10 mL injection  chlorhexidine (PERIDEX) 0.12 % mouthwash 15 mL  albuterol (PROVENTIL HFA, VENTOLIN HFA, PROAIR HFA) inhaler 2 Puff  dorzolamide-timolol (COSOPT) 22.3-6.8 mg/mL ophthalmic solution 1 Drop  latanoprost (XALATAN) 0.005 % ophthalmic solution 1 Drop  sodium chloride (NS) flush 5-10 mL  sodium chloride (NS) flush 5-10 mL  ondansetron (ZOFRAN) injection 4 mg ROS:A comprehensive review of systems was negative except for that written in the HPI. Objective: 
 
Vital Signs: Telemetry:    normal sinus rhythmIntake/Output:  
Visit Vitals  /67  Pulse 93  Temp 97.4 °F (36.3 °C)  Resp 20  
 Ht 6' 2\" (1.88 m)  Wt 114 kg (251 lb 5.2 oz)  SpO2 99%  BMI 32.27 kg/m2 Temp (24hrs), Av.8 °F (36.6 °C), Min:97.4 °F (36.3 °C), Max:98.3 °F (36.8 °C) O2 Device: Ventilator O2 Flow Rate (L/min): 10 l/min Wt Readings from Last 4 Encounters:  
10/12/18 114 kg (251 lb 5.2 oz) 16 111.1 kg (245 lb)  
16 106.6 kg (235 lb) 04/08/15 103.4 kg (228 lb) Intake/Output Summary (Last 24 hours) at 10/15/18 7427 Last data filed at 10/15/18 0600 Gross per 24 hour Intake              900 ml Output             3980 ml Net            -3080 ml Last shift:        
Last 3 shifts: 10/13 1901 - 10/15 0700 In: 4861 [I.V.:300] Out: 0624 [Urine:4235; Drains:350] Physical Exam:  
 General:  No distress, trach and vent support. HEAD: Normocephalic, without obvious abnormality, atraumatic EYES: conjunctivae clear. anicteric sclerae NOSE: nares normal, no drainage, no nasal flaring, Neck: Supple, symmetrical, trachea midline, has trach in place, on Mechanical vent support. Chest: increased AP diameter Lungs:   clear anterioly. Trached on vent. Heart: Regular rate and rhythm nl s1,2. Abdomen: soft, non-tender, +BS, pt has almaraz in place. Obese. Musculoskeletal: no gross deformities Neuro:  Intermittently follows commands. Psych: Not able to assess; no agitation. NO anxiety or depression when seen. Data:  
 
Current Facility-Administered Medications Medication Dose Route Frequency  furosemide (LASIX) injection 20 mg  20 mg IntraVENous ACB&D  
 levoFLOXacin (LEVAQUIN) 750 mg in D5W IVPB  750 mg IntraVENous QHS  albuterol-ipratropium (DUO-NEB) 2.5 MG-0.5 MG/3 ML  3 mL Nebulization BID RT  
 heparin (porcine) injection 5,000 Units  5,000 Units SubCUTAneous Q8H  
 sodium chloride (NS) flush 10 mL  10 mL InterCATHeter Q24H  
 sodium chloride (NS) flush 10-40 mL  10-40 mL InterCATHeter Q8H  
 sodium chloride (NS) flush 20 mL  20 mL InterCATHeter Q24H  
 famotidine (PF) (PEPCID) 20 mg in sodium chloride 0.9% 10 mL injection  20 mg IntraVENous Q12H  chlorhexidine (PERIDEX) 0.12 % mouthwash 15 mL  15 mL Oral BID  dorzolamide-timolol (COSOPT) 22.3-6.8 mg/mL ophthalmic solution 1 Drop  1 Drop Both Eyes BID  latanoprost (XALATAN) 0.005 % ophthalmic solution 1 Drop  1 Drop Both Eyes QHS  sodium chloride (NS) flush 5-10 mL  5-10 mL IntraVENous Q8H Labs: 
Recent Labs 10/15/18 
 0405  10/14/18 
 8005  10/13/18 
 4523 WBC  6.4   --   8.1 HGB  9.3*  7.9*  6.7* HCT  30.7*  25.6*  23.0*  
PLT  138*   --   132* Recent Labs 10/15/18 
 0405  10/14/18 
 0411  10/13/18 
 1336 NA  142  142  142  
K  4.0  4.0  3.8 CL  105  107  107 CO2  32  31  29 GLU  112*  120*  91 BUN  22*  22*  24* CREA  0.85  0.82  0.87 CA  8.4*  8.1*  8.2* MG   --    --   2.3 PHOS  2.1*  2.2*  2.5* ALB   --    --   1.6* TBILI   --    --   0.5 SGOT   --    --   59* ALT   --    --   91* INR  1.1  1.1  1.1 No results for input(s): PH, PCO2, PO2, HCO3, FIO2 in the last 72 hours. Imaging: 
I have personally reviewed the patients radiographs and have reviewed the reports: 
None today Total critical care time exclusive of procedures: 25 minutes Bo Russell MD

## 2018-10-15 NOTE — INTERDISCIPLINARY ROUNDS
Interdisciplinary rounds were held today to discuss patient plan of care and outcomes. The following members were present: Physician, Nurse, Clinical Care Leader, Pharmacy, Physical Therapy, Respiratory, and Case Management. Plan: See notes for updated plan of care.

## 2018-10-16 ENCOUNTER — APPOINTMENT (OUTPATIENT)
Dept: GENERAL RADIOLOGY | Age: 83
DRG: 003 | End: 2018-10-16
Attending: INTERNAL MEDICINE
Payer: MEDICARE

## 2018-10-16 LAB
ANION GAP SERPL CALC-SCNC: 4 MMOL/L (ref 5–15)
APTT PPP: 30.2 SEC (ref 22.1–32)
ARTERIAL PATENCY WRIST A: YES
BACTERIA SPEC CULT: NORMAL
BASE EXCESS BLDA CALC-SCNC: 4.1 MMOL/L
BDY SITE: ABNORMAL
BREATHS.SPONTANEOUS ON VENT: 22
BUN SERPL-MCNC: 22 MG/DL (ref 6–20)
BUN/CREAT SERPL: 26 (ref 12–20)
CALCIUM SERPL-MCNC: 8.2 MG/DL (ref 8.5–10.1)
CHLORIDE SERPL-SCNC: 106 MMOL/L (ref 97–108)
CO2 SERPL-SCNC: 32 MMOL/L (ref 21–32)
CREAT SERPL-MCNC: 0.85 MG/DL (ref 0.7–1.3)
EPAP/CPAP/PEEP, PAPEEP: 6
ERYTHROCYTE [DISTWIDTH] IN BLOOD BY AUTOMATED COUNT: 21.1 % (ref 11.5–14.5)
FIBRINOGEN PPP-MCNC: 627 MG/DL (ref 200–475)
FIO2 ON VENT: 40 %
GAS FLOW.O2 SETTING OXYMISER: 12 L/MIN
GLUCOSE SERPL-MCNC: 87 MG/DL (ref 65–100)
HCO3 BLDA-SCNC: 27 MMOL/L (ref 22–26)
HCT VFR BLD AUTO: 25.2 % (ref 36.6–50.3)
HGB BLD-MCNC: 7.7 G/DL (ref 12.1–17)
INR PPP: 1.2 (ref 0.9–1.1)
MAGNESIUM SERPL-MCNC: 2.1 MG/DL (ref 1.6–2.4)
MCH RBC QN AUTO: 32 PG (ref 26–34)
MCHC RBC AUTO-ENTMCNC: 30.6 G/DL (ref 30–36.5)
MCV RBC AUTO: 104.6 FL (ref 80–99)
NRBC # BLD: 0 K/UL (ref 0–0.01)
NRBC BLD-RTO: 0 PER 100 WBC
PCO2 BLDA: 35 MMHG (ref 35–45)
PH BLDA: 7.51 [PH] (ref 7.35–7.45)
PHOSPHATE SERPL-MCNC: 2.4 MG/DL (ref 2.6–4.7)
PLATELET # BLD AUTO: 153 K/UL (ref 150–400)
PMV BLD AUTO: 11.7 FL (ref 8.9–12.9)
PO2 BLDA: 79 MMHG (ref 80–100)
POTASSIUM SERPL-SCNC: 4 MMOL/L (ref 3.5–5.1)
PROTHROMBIN TIME: 11.9 SEC (ref 9–11.1)
RBC # BLD AUTO: 2.41 M/UL (ref 4.1–5.7)
SAO2 % BLD: 97 % (ref 92–97)
SAO2% DEVICE SAO2% SENSOR NAME: ABNORMAL
SERVICE CMNT-IMP: NORMAL
SODIUM SERPL-SCNC: 142 MMOL/L (ref 136–145)
SPECIMEN SITE: ABNORMAL
THERAPEUTIC RANGE,PTTT: ABNORMAL SECS (ref 58–77)
VT SETTING VENT: 500 ML
WBC # BLD AUTO: 9.8 K/UL (ref 4.1–11.1)

## 2018-10-16 PROCEDURE — 83735 ASSAY OF MAGNESIUM: CPT | Performed by: INTERNAL MEDICINE

## 2018-10-16 PROCEDURE — 85027 COMPLETE CBC AUTOMATED: CPT | Performed by: INTERNAL MEDICINE

## 2018-10-16 PROCEDURE — 36415 COLL VENOUS BLD VENIPUNCTURE: CPT | Performed by: INTERNAL MEDICINE

## 2018-10-16 PROCEDURE — 74011000250 HC RX REV CODE- 250: Performed by: INTERNAL MEDICINE

## 2018-10-16 PROCEDURE — 30243N1 TRANSFUSION OF NONAUTOLOGOUS RED BLOOD CELLS INTO CENTRAL VEIN, PERCUTANEOUS APPROACH: ICD-10-PCS | Performed by: INTERNAL MEDICINE

## 2018-10-16 PROCEDURE — 74011250636 HC RX REV CODE- 250/636: Performed by: INTERNAL MEDICINE

## 2018-10-16 PROCEDURE — 82803 BLOOD GASES ANY COMBINATION: CPT | Performed by: INTERNAL MEDICINE

## 2018-10-16 PROCEDURE — 36600 WITHDRAWAL OF ARTERIAL BLOOD: CPT | Performed by: INTERNAL MEDICINE

## 2018-10-16 PROCEDURE — 84100 ASSAY OF PHOSPHORUS: CPT | Performed by: INTERNAL MEDICINE

## 2018-10-16 PROCEDURE — 94003 VENT MGMT INPAT SUBQ DAY: CPT

## 2018-10-16 PROCEDURE — 85610 PROTHROMBIN TIME: CPT | Performed by: INTERNAL MEDICINE

## 2018-10-16 PROCEDURE — 74011250637 HC RX REV CODE- 250/637: Performed by: INTERNAL MEDICINE

## 2018-10-16 PROCEDURE — 65610000006 HC RM INTENSIVE CARE

## 2018-10-16 PROCEDURE — 80048 BASIC METABOLIC PNL TOTAL CA: CPT | Performed by: INTERNAL MEDICINE

## 2018-10-16 PROCEDURE — 94640 AIRWAY INHALATION TREATMENT: CPT

## 2018-10-16 PROCEDURE — 71045 X-RAY EXAM CHEST 1 VIEW: CPT

## 2018-10-16 RX ADMIN — HEPARIN SODIUM 5000 UNITS: 5000 INJECTION INTRAVENOUS; SUBCUTANEOUS at 21:17

## 2018-10-16 RX ADMIN — Medication 10 ML: at 21:18

## 2018-10-16 RX ADMIN — Medication 10 ML: at 05:16

## 2018-10-16 RX ADMIN — Medication 10 ML: at 13:24

## 2018-10-16 RX ADMIN — HEPARIN SODIUM 5000 UNITS: 5000 INJECTION INTRAVENOUS; SUBCUTANEOUS at 13:59

## 2018-10-16 RX ADMIN — HEPARIN SODIUM 5000 UNITS: 5000 INJECTION INTRAVENOUS; SUBCUTANEOUS at 05:16

## 2018-10-16 RX ADMIN — Medication: at 08:25

## 2018-10-16 RX ADMIN — FUROSEMIDE 20 MG: 10 INJECTION, SOLUTION INTRAMUSCULAR; INTRAVENOUS at 08:25

## 2018-10-16 RX ADMIN — FUROSEMIDE 20 MG: 10 INJECTION, SOLUTION INTRAMUSCULAR; INTRAVENOUS at 16:52

## 2018-10-16 RX ADMIN — DORZOLAMIDE HYDROCHLORIDE AND TIMOLOL MALEATE 1 DROP: 20; 5 SOLUTION/ DROPS OPHTHALMIC at 17:52

## 2018-10-16 RX ADMIN — FAMOTIDINE 20 MG: 10 INJECTION, SOLUTION INTRAVENOUS at 08:25

## 2018-10-16 RX ADMIN — Medication 10 ML: at 13:28

## 2018-10-16 RX ADMIN — Medication 10 ML: at 05:17

## 2018-10-16 RX ADMIN — FAMOTIDINE 20 MG: 10 INJECTION, SOLUTION INTRAVENOUS at 20:21

## 2018-10-16 RX ADMIN — CHLORHEXIDINE GLUCONATE 15 ML: 1.2 RINSE ORAL at 08:26

## 2018-10-16 RX ADMIN — Medication 10 ML: at 11:27

## 2018-10-16 RX ADMIN — IPRATROPIUM BROMIDE AND ALBUTEROL SULFATE 3 ML: .5; 3 SOLUTION RESPIRATORY (INHALATION) at 19:41

## 2018-10-16 RX ADMIN — CHLORHEXIDINE GLUCONATE 15 ML: 1.2 RINSE ORAL at 20:21

## 2018-10-16 RX ADMIN — LEVOFLOXACIN 750 MG: 5 INJECTION, SOLUTION INTRAVENOUS at 20:21

## 2018-10-16 RX ADMIN — Medication 20 ML: at 11:27

## 2018-10-16 RX ADMIN — LATANOPROST 1 DROP: 50 SOLUTION OPHTHALMIC at 22:00

## 2018-10-16 RX ADMIN — IPRATROPIUM BROMIDE AND ALBUTEROL SULFATE 3 ML: .5; 3 SOLUTION RESPIRATORY (INHALATION) at 07:46

## 2018-10-16 RX ADMIN — DORZOLAMIDE HYDROCHLORIDE AND TIMOLOL MALEATE 1 DROP: 20; 5 SOLUTION/ DROPS OPHTHALMIC at 08:27

## 2018-10-16 NOTE — PROGRESS NOTES
[facility] Pulmonary Consult Service NotePulmonary, Critical Care, and Sleep Medicine Name: Maritza Kinney MRN: [id number] : 1934 Hospital: [facility] Date: 10/16/2018   Hospital Day: 39 IMPRESSION:  
1. 18 cardiopulmonary arrest with  6 min of CPR then ROSC. 2. Acute respiratory failure-on vent 3. Acute hemoptysis- Noted to have bleeding from RLL on Bronch. No endobronchial lesion. has lung mass(CA vs inflammatory pseudotumor) and microscopic hematuria; ANCA negative; JOSS barely positive. Suspect LUNG mass the culprit. CYTOLOGY from bronchoscopy negative malignancy x 2, at this point not pursuing additional interventions. 4. Moderate right pleural effusion, s/p IR placement of right sided chest tube. Cytology negative 5. S/P pulmonary artery embolization for hemoptysis per IR of suspected lung mass. 6. COPD moderate to severe at baseline 7. Acute renal insf now resolved. 8. Anemia and prior coagulopathy on coumadin 9. Encehalopathy/Debility multifactorial - has hearing loss as well 10. Hematuria, seems to be improving had almaraz in place. 11. Hx of CAD s/p PCI, afib on amio, former smoker, prior cva RECOMMENDATIONS/PLAN:  
1. Vent support, TC trials as tolerated 2. Chest tube drainage. 3. Trach care 4. Diuresis as needed 5. Enteral feedings 6. Glucose monitoring and SSI 7. DVT/GI prophylaxis 8. Will need LTAC Subjective/Initial History:  
 
10-16: no events. Desaturated with TC trials yesterday. 10-15: no events. No new reported issues 10-14:  No major events. Received transfusion yesterday 10-13:  No changes. Spoke to wife who agrees to transfusion. Cytology still pending 10-12-18: Pt seems to be much more alert when seen this am. Had near 3.5-4L of right pleural fluid output. NO ROS or HPI are obtainable from pt. 10-11-18: Pt has been slow to respond. Still not waking up. Had increased secretion from trach and from his penis. Not follow any commands. Not able to get ROS or HPI from pt.  
 
 
10-10-18: No major changes or events over last 24 hrs. Not able to obtain ROS or HPI from pt. Some thick yellow secretions present around this trach. 10-9-18: Not able to get hx  Or ROS from pt. No acute changes overnight. Had some moderate secretions this am.  
 
 
10-8-18: Pt still not waking up. Has ongoing issues with blood clots in urine. Per nurses has been with recurrent issues with clotting and require frequent flushing of the almaraz. Pt has not been able to tolerate trach collar. Unable to get ROS or HPI from pt. MAR reviewed and pertinent medications noted or modified as needed Current Facility-Administered Medications Medication  influenza vaccine 2018-19 (6 mos+)(PF) (FLUARIX QUAD/FLULAVAL QUAD) injection 0.5 mL  furosemide (LASIX) injection 20 mg  
 levoFLOXacin (LEVAQUIN) 750 mg in D5W IVPB  albuterol-ipratropium (DUO-NEB) 2.5 MG-0.5 MG/3 ML  
 heparin (porcine) injection 5,000 Units  fentaNYL citrate (PF) injection 25 mcg  zinc oxide-cod liver oil (DESITIN) 40 % paste  acetaminophen (TYLENOL) solution 650 mg  
 sodium chloride (NS) flush 10-30 mL  sodium chloride (NS) flush 10 mL  sodium chloride (NS) flush 10 mL  sodium chloride (NS) flush 10-40 mL  sodium chloride (NS) flush 20 mL  heparin (porcine) pf 300 Units  glycopyrrolate (ROBINUL) injection 0.1 mg  
 famotidine (PF) (PEPCID) 20 mg in sodium chloride 0.9% 10 mL injection  chlorhexidine (PERIDEX) 0.12 % mouthwash 15 mL  albuterol (PROVENTIL HFA, VENTOLIN HFA, PROAIR HFA) inhaler 2 Puff  dorzolamide-timolol (COSOPT) 22.3-6.8 mg/mL ophthalmic solution 1 Drop  latanoprost (XALATAN) 0.005 % ophthalmic solution 1 Drop  sodium chloride (NS) flush 5-10 mL  sodium chloride (NS) flush 5-10 mL  ondansetron (ZOFRAN) injection 4 mg ROS:A comprehensive review of systems was negative except for that written in the HPI. Objective: 
 
Vital Signs: Telemetry:    normal sinus rhythmIntake/Output:  
Visit Vitals  /43  Pulse 82  Temp 97.7 °F (36.5 °C)  Resp 20  
 Ht 6' 2\" (1.88 m)  Wt 114 kg (251 lb 5.2 oz)  SpO2 96%  BMI 32.27 kg/m2 Temp (24hrs), Av °F (36.7 °C), Min:97.7 °F (36.5 °C), Max:98.1 °F (36.7 °C) O2 Device: Endotracheal tube, Ventilator O2 Flow Rate (L/min): 10 l/min Wt Readings from Last 4 Encounters:  
10/12/18 114 kg (251 lb 5.2 oz) 16 111.1 kg (245 lb)  
16 106.6 kg (235 lb) 04/08/15 103.4 kg (228 lb) Intake/Output Summary (Last 24 hours) at 10/16/18 0755 Last data filed at 10/16/18 0600 Gross per 24 hour Intake             2350 ml Output             2365 ml Net              -15 ml Last shift:        
Last 3 shifts: 10/14 1901 - 10/16 0700 In: 3200 [I.V.:300] Out: 3410 [Urine:2815; Drains:500] Physical Exam:  
 General:  No distress, trach and vent support. HEAD: Normocephalic, without obvious abnormality, atraumatic EYES: conjunctivae clear. anicteric sclerae NOSE: nares normal, no drainage, no nasal flaring, Neck: Supple, symmetrical, trachea midline, has trach in place, on Mechanical vent support. Chest: increased AP diameter Lungs:   clear anterioly. Trached on vent. Heart: Regular rate and rhythm nl s1,2. Abdomen: soft, non-tender, +BS, pt has almaraz in place. Obese. Musculoskeletal: no gross deformities Neuro:  Intermittently follows commands. Psych: Not able to assess; no agitation. NO anxiety or depression when seen. Data:  
 
Current Facility-Administered Medications Medication Dose Route Frequency  influenza vaccine 2018- (6 mos+)(PF) (FLUARIX QUAD/FLULAVAL QUAD) injection 0.5 mL  0.5 mL IntraMUSCular PRIOR TO DISCHARGE  
  furosemide (LASIX) injection 20 mg  20 mg IntraVENous ACB&D  
 levoFLOXacin (LEVAQUIN) 750 mg in D5W IVPB  750 mg IntraVENous QHS  albuterol-ipratropium (DUO-NEB) 2.5 MG-0.5 MG/3 ML  3 mL Nebulization BID RT  
 heparin (porcine) injection 5,000 Units  5,000 Units SubCUTAneous Q8H  
 sodium chloride (NS) flush 10 mL  10 mL InterCATHeter Q24H  
 sodium chloride (NS) flush 10-40 mL  10-40 mL InterCATHeter Q8H  
 sodium chloride (NS) flush 20 mL  20 mL InterCATHeter Q24H  
 famotidine (PF) (PEPCID) 20 mg in sodium chloride 0.9% 10 mL injection  20 mg IntraVENous Q12H  chlorhexidine (PERIDEX) 0.12 % mouthwash 15 mL  15 mL Oral BID  dorzolamide-timolol (COSOPT) 22.3-6.8 mg/mL ophthalmic solution 1 Drop  1 Drop Both Eyes BID  latanoprost (XALATAN) 0.005 % ophthalmic solution 1 Drop  1 Drop Both Eyes QHS  sodium chloride (NS) flush 5-10 mL  5-10 mL IntraVENous Q8H Labs: 
Recent Labs 10/16/18 
 8899  10/15/18 
 0405  10/14/18 
 3282 WBC  9.8  6.4   --   
HGB  7.7*  9.3*  7.9*  
HCT  25.2*  30.7*  25.6*  
PLT  153  138*   --   
 
Recent Labs 10/16/18 
 5315  10/15/18 
 1859  10/15/18 
 0405  10/14/18 
 0411 NA  142  142  142  142  
K  4.0  3.9  4.0  4.0  
CL  106  105  105  107 CO2  32  34*  32  31 GLU  87  120*  112*  120* BUN  22*  23*  22*  22* CREA  0.85  0.91  0.85  0.82 CA  8.2*  8.3*  8.4*  8.1*  
MG  2.1  2.0   --    --   
PHOS  2.4*   --   2.1*  2.2* INR  1.2*   --   1.1  1.1 Recent Labs 10/16/18 
 0541 PH  7.51* PCO2  35  
PO2  79* HCO3  27* FIO2  40 Imaging: 
I have personally reviewed the patients radiographs and have reviewed the reports: 
None today Total critical care time exclusive of procedures: 25 minutes Damon Wen MD

## 2018-10-16 NOTE — PROGRESS NOTES
0700-Bedside shift change report given to Pat Weiss (oncoming nurse) by Joseluis Bullock (offgoing nurse). Report included the following information SBAR, Kardex and MAR.  
0745- Patient placed on CPAP with a pressure support of 6.  RR 16; 's; Spo2 98%; HR 84. 
0746- Patient assessed. Neuro: Patient opens eyes spontaneously; focuses/tracks with eyes; follows commands; delayed responses noted; moves all extremities spontaneously;  pupils unequal (but irregularly shaped) and reactive to light. Resp: Lung sounds clear, but diminished; patient is on CPAP with a pressure support of 6 and FiO2 40%. 's; RR 17; SpO2 98%; HR 84. Patient has a #6 Shiley with sutures intact. Spare trach and Ambu bag at the bedside. Right sided chest tube draining serous fluid and hooked to low wall suction. Patient has a copious amount of thin white secretions from tracheal tube. Cardiac: Monitor displays a fib; patient has a right-sided upper chest permanent pacemaker; pulses palpable in all extremities; trace edema in upper  extremities; 1+ edema in lower extremities. GI: Abdomen soft and obese. Two deidre infusing at 50 cc/hr through a PEG tube with a 100 cc water flush q 4 hours. : Overton draining hazy yellow urine with sediment. Skin: Right chest tube dry and intact. IV: KELSEY PICC line dry and intact. [facility] care completed. 1055- Patient placed on trach collar. Was placed back on the vent after 20 minutes due to hypoxia with sats 78%. 1105- Reassessed. No changes. 1320- Chest tube dressing changed. 1430- Reassessed. No changes. 36- Dr. Joss Aragon at bedside talking with family. [phone]  at bedside talking with family regarding discharge planning and long term care. 1650- Overton removed. 1800- Patient was incontinent of a large amount of urine. PRV 69 cc. 
1900- Report given to oncoming shift.

## 2018-10-16 NOTE — PROGRESS NOTES
10/16/18 0630 ABCDEF Bundle SBT Safety Screen Passed Yes SBT Trial Passed Yes Weaning Parameters Spontaneous Breathing Trial Complete Yes Resp Rate Observed 10 Ve 9.9  RSBI 11 SBT passed. Patient back on [facility] [place] by [facility] mode.

## 2018-10-16 NOTE — PROGRESS NOTES
Hospitalist Progress Note NAME: Surendra Moscoso  
:  1934 MRN:  [id number] Assessment / Plan: 
 
44-year-old male with past medical history of coronary artery disease, atrial fibrillation on anticoagulation with warfarin, HTN and HLD who presented to ED on  with vomiting blood since the last 2 days. Acute encephalopathy in setting of acute hypoxic respiratory failure and resultant PEA cardiac arrest : s/p 6 min CPR and epi with ROSC after IR arteriogram. More alert the last few days. - CT head  showed small age-indeterminate infarct in the left corona radiata - CT head 10/11 with no evidence of acute intracranial abnormality. 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 Bilateral tympanomastoid effusions. - plan for LTAC on discharge. Pulmonary hemorrhage s/p pulmonary artery embolization with RLL lung mass (ca vs inflammatory pseudotumor) and COPD: extubated on  but reintubated , now s/p trach placement 10/3, remains on vent via trach. Still with lots of secretions. - CT chest  with patchy airspace disease in the right lung base with an associated hyperdense 5.2 x 4.3 cm oval lesion.  
- CTA chest  due to persistent bloody pulmonary secretions. Right lower lobe masslike abnormality demonstrates increased internal density and has increased in size measuring 6.7 x 6.7 cm, compared to 5.7 x 4.4 cm. This is compatible with internal hemorrhage. There is new moderate right lower lobe partial collapse/atelectasis. - con't lasix as above; monitor I/Os - s/p arteriogram : Thoracic aortic and intercostal arteriograms demonstrating no 
enlarged bronchial artery supplying the right lower lobe of the lung. Normal appearing and normal sized right bronchial artery is seen without any hyperplasia or dominant right lower lobe supply. No embolization was performed. - s/p diagnostic bronch on . No endobronchial lesions seen. - blood cultures negative. Cytology from bronchial washing on 9/16 is atypical, favor benign reactive process. - work-up for rheumatologic causes of hemoptysis negative: ANCA, anti-GBM, MPO Ab, TX-3 Ab, SSA/SSB, RNP Ab, Arredondo/RNP, dsDNA, and Arredondo Ab all negative. JOSS+. - completed zosyn for possible lung abscess (9/20). R pleural effusion:  
CT chest 10/11 with worsening of the appearance of the right hemithorax with increased pleural effusion and consolidation. 
- most recent CXR 10/13 with focal area of parenchymal consolidation at the right lung base which persists. Cardiomegaly unchanged. - R chest tube placed 10/11 with blood-tinged L pleural fluid drainage 
- con't lasix to BID Sepsis due to catheter-associated UTI, not present on admission: - Urine and pleural fluid cultures (1 swab) both pansensitive pseudomonas.  Sputum culture with normal respiratory ernestina and rare yeast.  Blood cultures no growth. - off emperic vancomycin and zosyn. Con't levaquin (day 3)  based on culture data. CAD s/p PCI, atrial fibrillation on chronic anticoagulation, and benign HTN: 
- holding amiodarone and dlitiazem; holding anticoagulation (home coumadin) and pravachol 
- con't IV lasix (on oral lasix outpatient) Acute blood loss anemia and anemia of chronic disease Elevated LFTs of unclear etiology: 
- stopped statin for now - trend INR and LFTs, will con't to treat multiple underlying issues.  INR 1.1 today. ALAN: resolved UTI, acute cystitis w/ hematuria, POA:  Required almaraz placed by urology Hyperlipidemia: statin held due to elevated LFTs as above Hypernatremia, resolved Obesity (Body mass index is 32.27 kg/(m^2) 
   
Code: Full (wife and children are decision makers) DVT prophylaxis: heparin Subjective: Chief Complaint / Reason for Physician Visit Awake. Open eyes. Discussed with RN events overnight. No hemoptysis. Review of Systems: 
 
Could NOT obtain due to: tracheostomy Objective: VITALS:  
Last 24hrs VS reviewed since prior progress note. Most recent are: 
Patient Vitals for the past 24 hrs: 
 Temp Pulse Resp BP SpO2  
10/16/18 1700 - 88 22 108/70 96 % 10/16/18 1600 - 90 22 108/83 92 % 10/16/18 1500 - 85 26 116/60 91 % 10/16/18 1415 - 85 26 - 92 % 10/16/18 1400 99.5 °F (37.5 °C) 82 21 100/74 96 % 10/16/18 1300 99 °F (37.2 °C) 91 22 104/48 96 % 10/16/18 1200 - 99 23 112/64 95 % 10/16/18 1141 - (!) 102 18 - 96 % 10/16/18 1100 99.8 °F (37.7 °C) 86 18 114/61 97 % 10/16/18 1055 - - - - 96 % 10/16/18 1000 - 85 16 104/65 98 % 10/16/18 0900 - 94 19 113/49 97 % 10/16/18 0800 - 86 12 106/58 97 % 10/16/18 0746 - - - - 96 % 10/16/18 0732 - 82 20 - 97 % 10/16/18 0700 99.3 °F (37.4 °C) 82 18 101/43 96 % 10/16/18 0600 - 85 17 - 100 % 10/16/18 0530 - 87 17 - 100 % 10/16/18 0500 - 83 16 90/54 97 % 10/16/18 0400 - 82 19 106/58 98 % 10/16/18 0300 - 78 19 96/45 97 % 10/16/18 0200 - 79 17 103/51 96 % 10/16/18 0100 - 80 21 119/53 97 % 10/16/18 0010 - 78 21 - 97 % 10/16/18 0000 97.7 °F (36.5 °C) 81 17 113/55 98 % 10/15/18 2300 - 79 19 (!) 94/39 95 % 10/15/18 2200 - 74 17 (!) 84/56 97 % 10/15/18 2100 - 80 19 108/59 96 % 10/15/18 2000 - 75 19 113/65 98 % 10/15/18 1915 - 74 21 - 97 % 10/15/18 1911 - - - - 96 % 10/15/18 1900 - 68 19 107/56 97 % Intake/Output Summary (Last 24 hours) at 10/16/18 1805 Last data filed at 10/16/18 1700 Gross per 24 hour Intake             2400 ml Output             2165 ml Net              235 ml PHYSICAL EXAM: 
General: Overweight. Alert, cooperative, no acute distress   
EENT:  Anicteric sclerae. mildline trach Resp:  Decreased air entry at right lower lung field. no wheezing or rales. No accessory muscle use CV:  Regular rate,  No LE edema GI:  Soft, moderately distended, Non tender.  +Bowel sounds Neurologic:  Alert and oriented X 1-2, no speech,  
 Psych:   Unclear insight. Not anxious nor agitated Skin:  No rashes. No jaundice Reviewed most current lab test results and cultures  YES Reviewed most current radiology test results   YES Review and summation of old records today    NO Reviewed patient's current orders and MAR    YES 
PMH/SH reviewed - no change compared to H&P 
________________________________________________________________________ 
________________________________________________________________________ Alexandria Wolf MD  
 
Procedures: see electronic medical records for all procedures/Xrays and details which were not copied into this note but were reviewed prior to creation of Plan. LABS: 
I reviewed today's most current labs and imaging studies. Pertinent labs include: 
Recent Labs 10/16/18 
 4242  10/15/18 
 0405  10/14/18 
 4700 WBC  9.8  6.4   --   
HGB  7.7*  9.3*  7.9*  
HCT  25.2*  30.7*  25.6*  
PLT  153  138*   --   
 
Recent Labs 10/16/18 
 6367  10/15/18 
 1859  10/15/18 
 0405  10/14/18 
 0411 NA  142  142  142  142  
K  4.0  3.9  4.0  4.0  
CL  106  105  105  107 CO2  32  34*  32  31 GLU  87  120*  112*  120* BUN  22*  23*  22*  22* CREA  0.85  0.91  0.85  0.82 CA  8.2*  8.3*  8.4*  8.1*  
MG  2.1  2.0   --    --   
PHOS  2.4*   --   2.1*  2.2* INR  1.2*   --   1.1  1.1 Signed: Alexandria Wolf MD

## 2018-10-16 NOTE — PROGRESS NOTES
Problem: Falls - Risk of 
Goal: *Absence of Falls Document Cornelious Booty Fall Risk and appropriate interventions in the flowsheet. Outcome: Progressing Towards Goal 
Fall Risk Interventions: 
Mobility Interventions: Bed/chair exit alarm Mentation Interventions: Adequate sleep, hydration, pain control, Bed/chair exit alarm Medication Interventions: Bed/chair exit alarm Elimination Interventions: Bed/chair exit alarm, Call light in reach History of Falls Interventions: Bed/chair exit alarm Problem: Pressure Injury - Risk of 
Goal: *Prevention of pressure injury Document Ayaz Scale and appropriate interventions in the flowsheet. Outcome: Progressing Towards Goal 
Pressure Injury Interventions: 
Sensory Interventions: Assess changes in LOC, Assess need for specialty bed Moisture Interventions: Absorbent underpads Activity Interventions: Assess need for specialty bed Mobility Interventions: Assess need for specialty bed Nutrition Interventions: Document food/fluid/supplement intake Friction and Shear Interventions: Apply protective barrier, creams and emollients, Feet elevated on foot rest, Foam dressings/transparent film/skin sealants, HOB 30 degrees or less

## 2018-10-16 NOTE — PROGRESS NOTES
6412 Addendum:  Pt's wife was visiting with son Ashley Song and pt's sister. Family appreciated opportunity to have extended conversation with Dr. Ceci Asif and RN Nabil Carvajal about pt's condition/prognosis. Wife wanted to know if there were other facilities besides [facility] and CM offered that there were other LTAC's in [place], [place] and [address] [place] [address]. Family was encouraged to tour [facility] and to let CM know if they would like refs sent to [facility] outside of [address] [facility] Following pt's progress. Preliminary ref sent to [facility] for LTAC evaluation via [address] on 10/15. CM will speak w/ wife about LTAC options in [place] after hearing staff report that wife has not read favorable reviews about [facility] online. Mily Evans, MSW

## 2018-10-16 NOTE — PROGRESS NOTES
Interdisciplinary team rounds were held 10/16/2018  with the following team members:Care Management, Diabetes Treatment Specialist, Nursing, Nutrition, Pharmacy, Physical Therapy, Physician, Respiratory Therapy and Clinical Coordinator. Plan of care discussed. Goal: See MD orders and progress notes for further  interventions and desired outcomes.

## 2018-10-17 LAB
ANION GAP SERPL CALC-SCNC: 4 MMOL/L (ref 5–15)
APTT PPP: 29.2 SEC (ref 22.1–32)
BUN SERPL-MCNC: 25 MG/DL (ref 6–20)
BUN/CREAT SERPL: 25 (ref 12–20)
CALCIUM SERPL-MCNC: 8.4 MG/DL (ref 8.5–10.1)
CHLORIDE SERPL-SCNC: 104 MMOL/L (ref 97–108)
CO2 SERPL-SCNC: 32 MMOL/L (ref 21–32)
CREAT SERPL-MCNC: 0.99 MG/DL (ref 0.7–1.3)
FIBRINOGEN PPP-MCNC: 694 MG/DL (ref 200–475)
GLUCOSE SERPL-MCNC: 96 MG/DL (ref 65–100)
INR PPP: 1.2 (ref 0.9–1.1)
PHOSPHATE SERPL-MCNC: 2.8 MG/DL (ref 2.6–4.7)
POTASSIUM SERPL-SCNC: 4 MMOL/L (ref 3.5–5.1)
PROTHROMBIN TIME: 11.9 SEC (ref 9–11.1)
SODIUM SERPL-SCNC: 140 MMOL/L (ref 136–145)
THERAPEUTIC RANGE,PTTT: ABNORMAL SECS (ref 58–77)

## 2018-10-17 PROCEDURE — 74011250636 HC RX REV CODE- 250/636: Performed by: INTERNAL MEDICINE

## 2018-10-17 PROCEDURE — 74011000250 HC RX REV CODE- 250: Performed by: INTERNAL MEDICINE

## 2018-10-17 PROCEDURE — 80048 BASIC METABOLIC PNL TOTAL CA: CPT | Performed by: INTERNAL MEDICINE

## 2018-10-17 PROCEDURE — 74011250637 HC RX REV CODE- 250/637: Performed by: INTERNAL MEDICINE

## 2018-10-17 PROCEDURE — 85610 PROTHROMBIN TIME: CPT | Performed by: INTERNAL MEDICINE

## 2018-10-17 PROCEDURE — 36415 COLL VENOUS BLD VENIPUNCTURE: CPT | Performed by: INTERNAL MEDICINE

## 2018-10-17 PROCEDURE — 84100 ASSAY OF PHOSPHORUS: CPT | Performed by: INTERNAL MEDICINE

## 2018-10-17 PROCEDURE — 94003 VENT MGMT INPAT SUBQ DAY: CPT

## 2018-10-17 PROCEDURE — 94640 AIRWAY INHALATION TREATMENT: CPT

## 2018-10-17 PROCEDURE — 65610000006 HC RM INTENSIVE CARE

## 2018-10-17 RX ADMIN — HEPARIN SODIUM 5000 UNITS: 5000 INJECTION INTRAVENOUS; SUBCUTANEOUS at 05:11

## 2018-10-17 RX ADMIN — Medication 10 ML: at 05:09

## 2018-10-17 RX ADMIN — Medication 10 ML: at 21:42

## 2018-10-17 RX ADMIN — FAMOTIDINE 20 MG: 10 INJECTION, SOLUTION INTRAVENOUS at 09:34

## 2018-10-17 RX ADMIN — CHLORHEXIDINE GLUCONATE 15 ML: 1.2 RINSE ORAL at 21:28

## 2018-10-17 RX ADMIN — IPRATROPIUM BROMIDE AND ALBUTEROL SULFATE 3 ML: .5; 3 SOLUTION RESPIRATORY (INHALATION) at 07:34

## 2018-10-17 RX ADMIN — Medication 20 ML: at 16:43

## 2018-10-17 RX ADMIN — FUROSEMIDE 20 MG: 10 INJECTION, SOLUTION INTRAMUSCULAR; INTRAVENOUS at 07:49

## 2018-10-17 RX ADMIN — IPRATROPIUM BROMIDE AND ALBUTEROL SULFATE 3 ML: .5; 3 SOLUTION RESPIRATORY (INHALATION) at 20:32

## 2018-10-17 RX ADMIN — Medication 10 ML: at 16:43

## 2018-10-17 RX ADMIN — DORZOLAMIDE HYDROCHLORIDE AND TIMOLOL MALEATE 1 DROP: 20; 5 SOLUTION/ DROPS OPHTHALMIC at 09:35

## 2018-10-17 RX ADMIN — HEPARIN SODIUM 5000 UNITS: 5000 INJECTION INTRAVENOUS; SUBCUTANEOUS at 16:43

## 2018-10-17 RX ADMIN — HEPARIN SODIUM 5000 UNITS: 5000 INJECTION INTRAVENOUS; SUBCUTANEOUS at 21:42

## 2018-10-17 RX ADMIN — FAMOTIDINE 20 MG: 10 INJECTION, SOLUTION INTRAVENOUS at 21:41

## 2018-10-17 RX ADMIN — ACETAMINOPHEN 650 MG: 325 SOLUTION ORAL at 16:53

## 2018-10-17 RX ADMIN — CHLORHEXIDINE GLUCONATE 15 ML: 1.2 RINSE ORAL at 09:34

## 2018-10-17 RX ADMIN — DORZOLAMIDE HYDROCHLORIDE AND TIMOLOL MALEATE 1 DROP: 20; 5 SOLUTION/ DROPS OPHTHALMIC at 18:08

## 2018-10-17 RX ADMIN — LATANOPROST 1 DROP: 50 SOLUTION OPHTHALMIC at 21:46

## 2018-10-17 RX ADMIN — FENTANYL CITRATE 25 MCG: 50 INJECTION, SOLUTION INTRAMUSCULAR; INTRAVENOUS at 13:02

## 2018-10-17 RX ADMIN — Medication 10 ML: at 05:10

## 2018-10-17 RX ADMIN — FUROSEMIDE 20 MG: 10 INJECTION, SOLUTION INTRAMUSCULAR; INTRAVENOUS at 16:51

## 2018-10-17 RX ADMIN — LEVOFLOXACIN 750 MG: 5 INJECTION, SOLUTION INTRAVENOUS at 21:28

## 2018-10-17 NOTE — PROGRESS NOTES
0730 report received from Micki Card RN via [employer] and "[employer]. Turned noting small open crack at base of rectum,Flexiseal intact draining dark brown colored fecal material.  Eyes closed open spontaneous. Nods occasionally appropriately to questions. attempts to assist with turning grimace with movement of LUE. 1-2+ edema. Elevated. Wound care [place]) consulted re: small tear. Advise as rectal fissure. Instructed to coat with zinc ointment. 0900 Complete hygiene care given attempted multiple (3 or >)condom catheters unable to maintain security. Pt. Aware of need to void motions with LUE and nods appropriately when asked. Good urine stream starting yellow/hazy mixed with red. Able to measure 250 *1 other wise voiding incontinent or leakage from ill-fitting condom catheters. No measurable Peg tube residual.  Peg dressing changed small amount of scab formation around site. Trach care provided including inner cannula changed yellow tan drainage. 1200 Wife and sister at bedside. Reports grimacing with movement of LUE which family reports new. Pt. Yonatan Ledesma indicating yes when asked if wanted medications. 1300 Medicated with Fentanyl family staying only briefly. Eyes closed at this time. 1600 Incontinent of urine with nimesh and mouth care. Trach care provided. Spontaneous movement of bilateral upper extremities at times. Noted temp. Dr. Jordyn Becerra notified. Medicated 1800 repositioned and suctioned. 1900 Incontinent of urine nimesh-care and pad changed. Repositioned. Report given to [address] RN via SBAR and bedside format.

## 2018-10-17 NOTE — PROGRESS NOTES
[facility] Consult Service NotePulmonary, Critical Care, and Sleep Medicine Name: Clifton Galindo MRN: [id number] : 1934 Hospital: [facility] Date: 10/17/2018   Hospital Day: 40 IMPRESSION:  
1. 18 cardiopulmonary arrest with  6 min of CPR then ROSC. 2. Acute respiratory failure-on vent 3. Acute hemoptysis- Noted to have bleeding from RLL on Bronch. No endobronchial lesion. has lung mass(CA vs inflammatory pseudotumor) and microscopic hematuria; ANCA negative; JOSS barely positive. Suspect LUNG mass the culprit. CYTOLOGY from bronchoscopy negative malignancy x 2, at this point not pursuing additional interventions. 4. Moderate right pleural effusion, s/p IR placement of right sided chest tube. Cytology negative 5. S/P pulmonary artery embolization for hemoptysis per IR of suspected lung mass. 6. COPD moderate to severe at baseline 7. Acute renal insf now resolved. 8. Anemia and prior coagulopathy on coumadin 9. Encehalopathy/Debility multifactorial - has hearing loss as well 10. Hematuria, seems to be improving had almaraz in place. 11. Hx of CAD s/p PCI, afib on amio, former smoker, prior cva RECOMMENDATIONS/PLAN:  
1. Vent support, TC trials as tolerated 2. Chest tube drainage has reduced. If remains low, will discontinue tomorrow 3. Trach care 4. Diuresis 5. Enteral feedings 6. Glucose monitoring and SSI 7. DVT/GI prophylaxis 8. Will need LTAC Subjective/Initial History:  
10-17: still desaturating with TC trials 10-16: no events. Desaturated with TC trials yesterday. 10-15: no events. No new reported issues 10-14:  No major events. Received transfusion yesterday 10-13:  No changes. Spoke to wife who agrees to transfusion. Cytology still pending 10-12-18: Pt seems to be much more alert when seen this am. Had near 3.5-4L of right pleural fluid output. NO ROS or HPI are obtainable from pt.  
 
 
10-11-18: Pt has been slow to respond. Still not waking up. Had increased secretion from trach and from his penis. Not follow any commands. Not able to get ROS or HPI from pt.  
 
 
10-10-18: No major changes or events over last 24 hrs. Not able to obtain ROS or HPI from pt. Some thick yellow secretions present around this trach. 10-9-18: Not able to get hx  Or ROS from pt. No acute changes overnight. Had some moderate secretions this am.  
 
 
10-8-18: Pt still not waking up. Has ongoing issues with blood clots in urine. Per nurses has been with recurrent issues with clotting and require frequent flushing of the almaraz. Pt has not been able to tolerate trach collar. Unable to get ROS or HPI from pt. MAR reviewed and pertinent medications noted or modified as needed Current Facility-Administered Medications Medication  influenza vaccine 2018-19 (6 mos+)(PF) (FLUARIX QUAD/FLULAVAL QUAD) injection 0.5 mL  furosemide (LASIX) injection 20 mg  
 levoFLOXacin (LEVAQUIN) 750 mg in D5W IVPB  albuterol-ipratropium (DUO-NEB) 2.5 MG-0.5 MG/3 ML  
 heparin (porcine) injection 5,000 Units  fentaNYL citrate (PF) injection 25 mcg  zinc oxide-cod liver oil (DESITIN) 40 % paste  acetaminophen (TYLENOL) solution 650 mg  
 sodium chloride (NS) flush 10-30 mL  sodium chloride (NS) flush 10 mL  sodium chloride (NS) flush 10 mL  sodium chloride (NS) flush 10-40 mL  sodium chloride (NS) flush 20 mL  heparin (porcine) pf 300 Units  glycopyrrolate (ROBINUL) injection 0.1 mg  
 famotidine (PF) (PEPCID) 20 mg in sodium chloride 0.9% 10 mL injection  chlorhexidine (PERIDEX) 0.12 % mouthwash 15 mL  albuterol (PROVENTIL HFA, VENTOLIN HFA, PROAIR HFA) inhaler 2 Puff  dorzolamide-timolol (COSOPT) 22.3-6.8 mg/mL ophthalmic solution 1 Drop  latanoprost (XALATAN) 0.005 % ophthalmic solution 1 Drop  sodium chloride (NS) flush 5-10 mL  sodium chloride (NS) flush 5-10 mL  ondansetron (ZOFRAN) injection 4 mg ROS:A comprehensive review of systems was negative except for that written in the HPI. Objective: 
 
Vital Signs: Telemetry:    normal sinus rhythmIntake/Output:  
Visit Vitals /55 Pulse 89 Temp 99.5 °F (37.5 °C) Resp 21 Ht 6' 2\" (1.88 m) Wt 114 kg (251 lb 5.2 oz) SpO2 92% BMI 32.27 kg/m² Temp (24hrs), Av °F (37.2 °C), Min:98.1 °F (36.7 °C), Max:99.5 °F (37.5 °C) O2 Device: Tracheostomy O2 Flow Rate (L/min): 10 l/min Wt Readings from Last 4 Encounters:  
10/12/18 114 kg (251 lb 5.2 oz) 16 111.1 kg (245 lb)  
16 106.6 kg (235 lb) 04/08/15 103.4 kg (228 lb) Intake/Output Summary (Last 24 hours) at 10/17/2018 1117 Last data filed at 10/17/2018 1000 Gross per 24 hour Intake 1950 ml Output 715 ml Net 1235 ml Last shift:      10/17 07 - 10/17 190 In: 100 Out: 250 [Urine:250] Last 3 shifts: 10/15 190 - 10/17 0700 In: 3750 [I.V.:300] Out: 1158 [Urine:1780; Drains:450] Physical Exam:  
 General:  No distress, trach and vent support. HEAD: Normocephalic, without obvious abnormality, atraumatic EYES: conjunctivae clear. anicteric sclerae NOSE: nares normal, no drainage, no nasal flaring, Neck: Supple, symmetrical, trachea midline, has trach in place, on Mechanical vent support. Chest: increased AP diameter Lungs:   clear anterioly. Trached on vent. Heart: Regular rate and rhythm nl s1,2. Abdomen: soft, non-tender, +BS, pt has almaraz in place. Obese. Musculoskeletal: no gross deformities Neuro:  Intermittently follows commands. Psych: Not able to assess; no agitation. NO anxiety or depression when seen. Data:  
 
Current Facility-Administered Medications Medication Dose Route Frequency  influenza vaccine 2018- (6 mos+)(PF) (FLUARIX QUAD/FLULAVAL QUAD) injection 0.5 mL  0.5 mL IntraMUSCular PRIOR TO DISCHARGE  
 furosemide (LASIX) injection 20 mg  20 mg IntraVENous ACB&D  
 levoFLOXacin (LEVAQUIN) 750 mg in D5W IVPB  750 mg IntraVENous QHS  albuterol-ipratropium (DUO-NEB) 2.5 MG-0.5 MG/3 ML  3 mL Nebulization BID RT  
 heparin (porcine) injection 5,000 Units  5,000 Units SubCUTAneous Q8H  
 sodium chloride (NS) flush 10 mL  10 mL InterCATHeter Q24H  
 sodium chloride (NS) flush 10-40 mL  10-40 mL InterCATHeter Q8H  
 sodium chloride (NS) flush 20 mL  20 mL InterCATHeter Q24H  
 famotidine (PF) (PEPCID) 20 mg in sodium chloride 0.9% 10 mL injection  20 mg IntraVENous Q12H  chlorhexidine (PERIDEX) 0.12 % mouthwash 15 mL  15 mL Oral BID  dorzolamide-timolol (COSOPT) 22.3-6.8 mg/mL ophthalmic solution 1 Drop  1 Drop Both Eyes BID  latanoprost (XALATAN) 0.005 % ophthalmic solution 1 Drop  1 Drop Both Eyes QHS  sodium chloride (NS) flush 5-10 mL  5-10 mL IntraVENous Q8H Labs: 
Recent Labs 10/16/18 
0336 10/15/18 
0405 WBC 9.8 6.4 HGB 7.7* 9.3* HCT 25.2* 30.7*  138* Recent Labs 10/17/18 
0356 10/16/18 
0336 10/15/18 
1859 10/15/18 
0405  142 142 142  
K 4.0 4.0 3.9 4.0  
 106 105 105 CO2 32 32 34* 32  
GLU 96 87 120* 112* BUN 25* 22* 23* 22* CREA 0.99 0.85 0.91 0.85 CA 8.4* 8.2* 8.3* 8.4* MG  --  2.1 2.0  --   
PHOS 2.8 2.4*  --  2.1* INR 1.2* 1.2*  --  1.1 Recent Labs 10/16/18 
0541 PH 7.51* PCO2 35  
PO2 79* HCO3 27* FIO2 40 Imaging: 
I have personally reviewed the patients radiographs and have reviewed the reports: 
None today Total critical care time exclusive of procedures: 25 minutes Santi Mccullough MD

## 2018-10-17 NOTE — PROGRESS NOTES
Problem: Falls - Risk of 
Goal: *Absence of Falls Document [place] Fall Risk and appropriate interventions in the flowsheet. Outcome: Progressing Towards Goal 
Fall Risk Interventions: 
Mobility Interventions: Bed/chair exit alarm Mentation Interventions: Door open when patient unattended Medication Interventions: Evaluate medications/consider consulting pharmacy Elimination Interventions: Call light in reach History of Falls Interventions: Door open when patient unattended Problem: Pressure Injury - Risk of 
Goal: *Prevention of pressure injury Document Ayaz Scale and appropriate interventions in the flowsheet. Outcome: Progressing Towards Goal 
Pressure Injury Interventions: 
Sensory Interventions: Assess changes in LOC Moisture Interventions: Absorbent underpads Activity Interventions: Pressure redistribution bed/mattress(bed type) Mobility Interventions: Assess need for specialty bed Nutrition Interventions: Document food/fluid/supplement intake Friction and Shear Interventions: Apply protective barrier, creams and emollients

## 2018-10-17 NOTE — PROGRESS NOTES
Nutrition Assessment: 
 
RECOMMENDATIONS:  
Continue TF as ordered ASSESSMENT:  
Chart reviewed, case discussed during CCU rounds. Pt remains on vent via trach, needs re-estimated. TF at goal rate with no residuals. TF meets 106% kcal and 100% protein needs. Labs reviewed, WNL. Diarrhea continues, this could be related to TF or antibiotics (pt was recently on zosyn and vanc). Dietitians Intervention(s)/Plan(s): Continue TF as ordered SUBJECTIVE/OBJECTIVE:  
Pt on vent via trach, minimally responsive Diet Order: Other (comment)(TF via PEG: TwoCal @ 50mL/h + 100mL H2O flush q 4h (provides 2400kcals/100gPro/1440mL) ) 
% Eaten:  No data found. TwoCal HN at 50 mL/hr flush with 100 mL  Q4H  via PEG Tube   Residuals: 0 mL Pertinent Medications:levaquin, pepcid, lasix. Chemistries: 
Lab Results Component Value Date/Time Sodium 140 10/17/2018 03:56 AM  
 Potassium 4.0 10/17/2018 03:56 AM  
 Chloride 104 10/17/2018 03:56 AM  
 CO2 32 10/17/2018 03:56 AM  
 Anion gap 4 (L) 10/17/2018 03:56 AM  
 Glucose 96 10/17/2018 03:56 AM  
 BUN 25 (H) 10/17/2018 03:56 AM  
 Creatinine 0.99 10/17/2018 03:56 AM  
 BUN/Creatinine ratio 25 (H) 10/17/2018 03:56 AM  
 GFR est AA >60 10/17/2018 03:56 AM  
 GFR est non-AA >60 10/17/2018 03:56 AM  
 Calcium 8.4 (L) 10/17/2018 03:56 AM  
 Albumin 1.6 (L) 10/13/2018 04:52 AM  
  
Anthropometrics: Height: 6' 2\" (188 cm) Weight: 114 kg (251 lb 5.2 oz)   []bed scale    []stated   []unknown IBW (%IBW):   ( ) UBW (%UBW):   (  %) BMI: Body mass index is 32.27 kg/m². This BMI is indicative of: 
[]Underweight   []Normal   []Overweight   [x] Obesity   [] Extreme Obesity (BMI>40) Estimated Nutrition Needs (Based on): 4850 Kcals/day(PSU (MSJ 1900)) , 91 g(-114 (0.8-1gPro/kg)) Protein Carbohydrate: At Least 130 g/day  Fluids: 1500 mL/day Last BM: flexiseal-10/16   [x]Active     []Hyperactive  []Hypoactive       [] Absent   BS 
 Skin:    [] Intact   [] Incision  [] Breakdown   [] DTI   [x] Tears/Excoriation/Abrasion  [x]Edema(+2-generalized; +1-BLE; trace-BUE)  [] Other: Wt Readings from Last 30 Encounters:  
10/12/18 114 kg (251 lb 5.2 oz) 05/05/16 111.1 kg (245 lb)  
03/25/16 106.6 kg (235 lb) 04/08/15 103.4 kg (228 lb)  
03/25/15 100.7 kg (222 lb)  
01/16/13 105.3 kg (232 lb 3.2 oz) 01/03/13 103.3 kg (227 lb 11.8 oz) NUTRITION DIAGNOSES:  
Problem:  No new nutritional dx at this time. NUTRITION INTERVENTIONS: 
  Enteral/Parenteral Nutrition: Other(Continue TF as ordered) GOAL:  
Pt will tolerate TF @ goal rate with residuals <250mL in 3-5 days. NUTRITION MONITORING AND EVALUATION Previous Goal: Pt will tolerate TF @ goal rate with residuals <250mL in 3-5 days Previous Goal Met: Yes Previous Recommendations Implemented: Yes Cultural, Congregation, or Ethnic Dietary Needs: None LEARNING NEEDS (Diet, Food/Nutrient-Drug Interaction):  
 [x] None Identified 
 [] Identified and Education Provided/Documented 
 [] Identified and Pt declined/was not appropriate [x] Interdisciplinary Care Plan Reviewed/Documented  
 [x] Participated in Discharge Planning: See TF orders above [x] Interdisciplinary Rounds NUTRITION RISK:  
 [] High              [x] Moderate           [x]  Low  []  Minimal/Uncompromised Michelle Mike RD, [facility] Pager [phone] Weekend Pager [phone]

## 2018-10-17 NOTE — ROUTINE PROCESS
Change of shift report received at bedside from day shift Milla THOMASON. Initial physical assessment complete. See flow sheet for complete details

## 2018-10-18 ENCOUNTER — APPOINTMENT (OUTPATIENT)
Dept: GENERAL RADIOLOGY | Age: 83
DRG: 003 | End: 2018-10-18
Attending: INTERNAL MEDICINE
Payer: MEDICARE

## 2018-10-18 LAB
ANION GAP SERPL CALC-SCNC: 5 MMOL/L (ref 5–15)
APTT PPP: 28.6 SEC (ref 22.1–32)
BUN SERPL-MCNC: 28 MG/DL (ref 6–20)
BUN/CREAT SERPL: 27 (ref 12–20)
CALCIUM SERPL-MCNC: 8.2 MG/DL (ref 8.5–10.1)
CHLORIDE SERPL-SCNC: 102 MMOL/L (ref 97–108)
CO2 SERPL-SCNC: 33 MMOL/L (ref 21–32)
CREAT SERPL-MCNC: 1.02 MG/DL (ref 0.7–1.3)
ERYTHROCYTE [DISTWIDTH] IN BLOOD BY AUTOMATED COUNT: 21.2 % (ref 11.5–14.5)
FIBRINOGEN PPP-MCNC: 695 MG/DL (ref 200–475)
GLUCOSE SERPL-MCNC: 117 MG/DL (ref 65–100)
HCT VFR BLD AUTO: 24.2 % (ref 36.6–50.3)
HGB BLD-MCNC: 7.3 G/DL (ref 12.1–17)
INR PPP: 1.2 (ref 0.9–1.1)
MAGNESIUM SERPL-MCNC: 2.1 MG/DL (ref 1.6–2.4)
MCH RBC QN AUTO: 31.6 PG (ref 26–34)
MCHC RBC AUTO-ENTMCNC: 30.2 G/DL (ref 30–36.5)
MCV RBC AUTO: 104.8 FL (ref 80–99)
NRBC # BLD: 0 K/UL (ref 0–0.01)
NRBC BLD-RTO: 0 PER 100 WBC
PHOSPHATE SERPL-MCNC: 2.2 MG/DL (ref 2.6–4.7)
PLATELET # BLD AUTO: 159 K/UL (ref 150–400)
PMV BLD AUTO: 11.8 FL (ref 8.9–12.9)
POTASSIUM SERPL-SCNC: 3.6 MMOL/L (ref 3.5–5.1)
PROTHROMBIN TIME: 11.9 SEC (ref 9–11.1)
RBC # BLD AUTO: 2.31 M/UL (ref 4.1–5.7)
SODIUM SERPL-SCNC: 140 MMOL/L (ref 136–145)
THERAPEUTIC RANGE,PTTT: ABNORMAL SECS (ref 58–77)
WBC # BLD AUTO: 9.4 K/UL (ref 4.1–11.1)

## 2018-10-18 PROCEDURE — 71045 X-RAY EXAM CHEST 1 VIEW: CPT

## 2018-10-18 PROCEDURE — 36415 COLL VENOUS BLD VENIPUNCTURE: CPT | Performed by: INTERNAL MEDICINE

## 2018-10-18 PROCEDURE — 77030010547 HC BG URIN W/UMETER -A

## 2018-10-18 PROCEDURE — 80048 BASIC METABOLIC PNL TOTAL CA: CPT | Performed by: INTERNAL MEDICINE

## 2018-10-18 PROCEDURE — 83735 ASSAY OF MAGNESIUM: CPT | Performed by: INTERNAL MEDICINE

## 2018-10-18 PROCEDURE — 74011250636 HC RX REV CODE- 250/636: Performed by: INTERNAL MEDICINE

## 2018-10-18 PROCEDURE — 77030018798 HC PMP KT ENTRL FED COVD -A

## 2018-10-18 PROCEDURE — 74011250637 HC RX REV CODE- 250/637: Performed by: INTERNAL MEDICINE

## 2018-10-18 PROCEDURE — 74011000250 HC RX REV CODE- 250: Performed by: INTERNAL MEDICINE

## 2018-10-18 PROCEDURE — 77030028907 HC WRP KNEE WO BGS SOLM -B

## 2018-10-18 PROCEDURE — 94640 AIRWAY INHALATION TREATMENT: CPT

## 2018-10-18 PROCEDURE — 84100 ASSAY OF PHOSPHORUS: CPT | Performed by: INTERNAL MEDICINE

## 2018-10-18 PROCEDURE — 85610 PROTHROMBIN TIME: CPT | Performed by: INTERNAL MEDICINE

## 2018-10-18 PROCEDURE — 77030018861

## 2018-10-18 PROCEDURE — 85027 COMPLETE CBC AUTOMATED: CPT | Performed by: INTERNAL MEDICINE

## 2018-10-18 PROCEDURE — 94003 VENT MGMT INPAT SUBQ DAY: CPT

## 2018-10-18 PROCEDURE — 65610000006 HC RM INTENSIVE CARE

## 2018-10-18 RX ORDER — FAMOTIDINE 20 MG/1
20 TABLET, FILM COATED ORAL 2 TIMES DAILY
Status: DISCONTINUED | OUTPATIENT
Start: 2018-10-18 | End: 2018-10-27 | Stop reason: HOSPADM

## 2018-10-18 RX ORDER — FAMOTIDINE 20 MG/1
20 TABLET, FILM COATED ORAL 2 TIMES DAILY
Status: DISCONTINUED | OUTPATIENT
Start: 2018-10-18 | End: 2018-10-18

## 2018-10-18 RX ADMIN — IPRATROPIUM BROMIDE AND ALBUTEROL SULFATE 3 ML: .5; 3 SOLUTION RESPIRATORY (INHALATION) at 08:01

## 2018-10-18 RX ADMIN — Medication 10 ML: at 22:15

## 2018-10-18 RX ADMIN — ACETAMINOPHEN 650 MG: 325 SOLUTION ORAL at 14:49

## 2018-10-18 RX ADMIN — IPRATROPIUM BROMIDE AND ALBUTEROL SULFATE 3 ML: .5; 3 SOLUTION RESPIRATORY (INHALATION) at 20:14

## 2018-10-18 RX ADMIN — Medication 10 ML: at 14:53

## 2018-10-18 RX ADMIN — DORZOLAMIDE HYDROCHLORIDE AND TIMOLOL MALEATE 1 DROP: 20; 5 SOLUTION/ DROPS OPHTHALMIC at 17:58

## 2018-10-18 RX ADMIN — Medication 10 ML: at 11:52

## 2018-10-18 RX ADMIN — CHLORHEXIDINE GLUCONATE 15 ML: 1.2 RINSE ORAL at 08:48

## 2018-10-18 RX ADMIN — LEVOFLOXACIN 750 MG: 5 INJECTION, SOLUTION INTRAVENOUS at 22:14

## 2018-10-18 RX ADMIN — FENTANYL CITRATE 25 MCG: 50 INJECTION, SOLUTION INTRAMUSCULAR; INTRAVENOUS at 11:47

## 2018-10-18 RX ADMIN — CHLORHEXIDINE GLUCONATE 15 ML: 1.2 RINSE ORAL at 20:30

## 2018-10-18 RX ADMIN — FAMOTIDINE 20 MG: 10 INJECTION, SOLUTION INTRAVENOUS at 09:24

## 2018-10-18 RX ADMIN — HEPARIN SODIUM 5000 UNITS: 5000 INJECTION INTRAVENOUS; SUBCUTANEOUS at 06:41

## 2018-10-18 RX ADMIN — FUROSEMIDE 20 MG: 10 INJECTION, SOLUTION INTRAMUSCULAR; INTRAVENOUS at 08:32

## 2018-10-18 RX ADMIN — HEPARIN SODIUM 5000 UNITS: 5000 INJECTION INTRAVENOUS; SUBCUTANEOUS at 22:14

## 2018-10-18 RX ADMIN — Medication 20 ML: at 06:41

## 2018-10-18 RX ADMIN — Medication 20 ML: at 11:52

## 2018-10-18 RX ADMIN — DORZOLAMIDE HYDROCHLORIDE AND TIMOLOL MALEATE 1 DROP: 20; 5 SOLUTION/ DROPS OPHTHALMIC at 09:31

## 2018-10-18 RX ADMIN — HEPARIN SODIUM 5000 UNITS: 5000 INJECTION INTRAVENOUS; SUBCUTANEOUS at 14:48

## 2018-10-18 RX ADMIN — ACETAMINOPHEN 650 MG: 325 SOLUTION ORAL at 17:56

## 2018-10-18 RX ADMIN — LATANOPROST 1 DROP: 50 SOLUTION OPHTHALMIC at 22:16

## 2018-10-18 RX ADMIN — FAMOTIDINE 20 MG: 20 TABLET ORAL at 17:57

## 2018-10-18 NOTE — PROGRESS NOTES
Per Dr. Cheko Loyd request right chest tube was removed at bedside by Dr. Emily Ellsworth. Petroleum gauze, 4x4 gauze , and tegaderm dressing was applied to site.

## 2018-10-18 NOTE — PROGRESS NOTES
Problem: Falls - Risk of 
Goal: *Absence of Falls Document [place] Fall Risk and appropriate interventions in the flowsheet. Outcome: Progressing Towards Goal 
Fall Risk Interventions: 
Mobility Interventions: Bed/chair exit alarm Mentation Interventions: Adequate sleep, hydration, pain control Medication Interventions: Bed/chair exit alarm Elimination Interventions: Bed/chair exit alarm History of Falls Interventions: Door open when patient unattended

## 2018-10-18 NOTE — PROGRESS NOTES
10/18/18 2123 10/18/18 0601 10/18/18 3181 Vent Settings FIO2 (%) --  40 % --   
Pressure Support (cm H2O) --  6 cm H2O --   
PEEP/VENT (cm H2O) --  6 cm H20 --   
ABCDEF Bundle SBT Safety Screen Passed Yes --  --   
SBT Trial Passed Yes --  -- Weaning Parameters Spontaneous Breathing Trial Complete Yes --  -- Resp Rate Observed 13 15 12 Ve 8.6 9.1 9.3  599 755 RSBI 32 32 11 Vent Method/Mode Ventilation Method --  Conventional --   
Ventilator Mode --  Spontaneous --   
  
 10/18/18 0645 Vent Settings FIO2 (%) --   
Pressure Support (cm H2O) --   
PEEP/VENT (cm H2O) --   
ABCDEF Bundle SBT Safety Screen Passed --   
SBT Trial Passed -- Weaning Parameters Spontaneous Breathing Trial Complete -- Resp Rate Observed 20 Ve 10.2  RSBI 47 Vent Method/Mode Ventilation Method --   
Ventilator Mode --

## 2018-10-18 NOTE — PROGRESS NOTES
Hospitalist Progress Note NAME: Edgardo Mace  
:  1934 MRN:  [id number] Assessment / Plan: 
 
80-year-old male with past medical history of coronary artery disease, atrial fibrillation on anticoagulation with warfarin, HTN and HLD who presented to ED on  with vomiting blood since the last 2 days. Acute encephalopathy in setting of acute hypoxic respiratory failure and resultant PEA cardiac arrest : s/p 6 min CPR and epi with ROSC after IR arteriogram. More alert the last few days. - CT head  showed small age-indeterminate infarct in the left corona radiata - CT head 10/11 with no evidence of acute intracranial abnormality. 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 Bilateral tympanomastoid effusions. - s/p tracheostomy. continue trach collar trials. plan for LTAC on discharge. Pulmonary hemorrhage s/p pulmonary artery embolization with RLL lung mass (ca vs inflammatory pseudotumor) and COPD: extubated on  but reintubated , now s/p trach placement 10/3, remains on vent via trach. Still with lots of secretions. - CT chest  with patchy airspace disease in the right lung base with an associated hyperdense 5.2 x 4.3 cm oval lesion.  
- CTA chest  due to persistent bloody pulmonary secretions. Right lower lobe masslike abnormality demonstrates increased internal density and has increased in size measuring 6.7 x 6.7 cm, compared to 5.7 x 4.4 cm. This is compatible with internal hemorrhage. There is new moderate right lower lobe partial collapse/atelectasis. - con't lasix as above; monitor I/Os - s/p arteriogram : Thoracic aortic and intercostal arteriograms demonstrating no 
enlarged bronchial artery supplying the right lower lobe of the lung. Normal appearing and normal sized right bronchial artery is seen without any hyperplasia or dominant right lower lobe supply. No embolization was performed. - s/p diagnostic bronch on 9/16. No endobronchial lesions seen. - blood cultures negative. Cytology from bronchial washing on 9/16 is atypical, favor benign reactive process. - work-up for rheumatologic causes of hemoptysis negative: ANCA, anti-GBM, MPO Ab, ME-3 Ab, SSA/SSB, RNP Ab, Arredondo/RNP, dsDNA, and Arredondo Ab all negative. JOSS+. - completed zosyn for possible lung abscess (9/20). R pleural effusion:  
CT chest 10/11 with worsening of the appearance of the right hemithorax with increased pleural effusion and consolidation. 
- most recent CXR 10/13 with focal area of parenchymal consolidation at the right lung base which persists. Cardiomegaly unchanged. - R chest tube placed 10/11 with blood-tinged L pleural fluid drainage. S/p chest tube removal today. On lasix to BID Sepsis due to Pseudomonas catheter-associated UTI and right pleural fluid, not present on admission: - Urine and pleural fluid cultures (1 swab) both pansensitive pseudomonas.  Sputum culture with normal respiratory ernestina and rare yeast.  Blood cultures no growth. - off emperic vancomycin and zosyn. Con't levaquin (day 5)  based on culture data. Acute blood loss anemia and anemia of chronic disease 
- Hgb trending down to 7.3 today. No external bleeding. Close monitor. INR at 1.2. High fibrinogen. Platelets WNL. Elevated LFTs of unclear etiology: 
- stopped statin for now - LFT trending down. CAD s/p PCI, atrial fibrillation on chronic anticoagulation, and benign HTN: 
- holding amiodarone and dlitiazem; holding anticoagulation (home coumadin) and pravachol 
- con't IV lasix (on oral lasix outpatient) ALAN: resolved UTI, acute cystitis w/ hematuria, POA:  Required almaraz placed by urology Hyperlipidemia: statin held due to elevated LFTs as above Hypernatremia, resolved Obesity (Body mass index is 32.27 kg/(m^2) 
   
Code: Full (wife and children are decision makers) DVT prophylaxis: heparin Subjective: Chief Complaint / Reason for Physician Visit Awake. Open eyes and nods to answer questions. Discussed with RN events overnight. No hemoptysis. Review of Systems: 
 
Could NOT obtain due to: tracheostomy Objective: VITALS:  
Last 24hrs VS reviewed since prior progress note. Most recent are: 
Patient Vitals for the past 24 hrs: 
 Temp Pulse Resp BP SpO2  
10/18/18 1400  82 19 106/48 98 % 10/18/18 1330  85 22  (!) 88 % 10/18/18 1300  86 21 112/58   
10/18/18 1200  81 20 98/49 92 % 10/18/18 1100  87 19 107/64 96 % 10/18/18 1051 99.7 °F (37.6 °C) 88 25 100/56 91 % 10/18/18 1000  91 19 98/49 95 % 10/18/18 0930     94 % 10/18/18 0900  88 18 100/45 96 % 10/18/18 0801     98 % 10/18/18 0800  91 11 101/44 97 % 10/18/18 0751  85 16  98 % 10/18/18 0719 98.6 °F (37 °C) 83 18 124/57 97 % 10/18/18 0700  82 22 123/67 95 % 10/18/18 0600  80 18 119/59 97 % 10/18/18 0500  82 13 110/59 95 % 10/18/18 0411  79 19  97 % 10/18/18 0400 99.1 °F (37.3 °C) 80 18 106/48 94 % 10/18/18 0300  82 19 105/47 97 % 10/18/18 0200  83 19 93/54 97 % 10/18/18 0100  79 18 109/63 97 % 10/18/18 0022  82 18  100 % 10/18/18 0000 99.5 °F (37.5 °C) 83 23 102/52 97 % 10/17/18 2300  79 20 107/48 96 % 10/17/18 2200  78 18 102/43 96 % 10/17/18 2100  76 22 110/63 97 % 10/17/18 2032  82 20  96 % 10/17/18 2000 99.4 °F (37.4 °C) 77 21 116/56 94 % 10/17/18 1900  75 18 90/45 96 % 10/17/18 1800 99.1 °F (37.3 °C) 89 20 132/65 93 % 10/17/18 1649  92 23  93 % 10/17/18 1600 100.4 °F (38 °C) 93 16 105/82 91 % 10/17/18 1500  86 23 100/59 95 % Intake/Output Summary (Last 24 hours) at 10/18/2018 1432 Last data filed at 10/18/2018 1400 Gross per 24 hour Intake 1450 ml Output 155 ml Net 1295 ml PHYSICAL EXAM: 
General: Overweight. Alert, cooperative, no acute distress   
EENT:  Anicteric sclerae. mildline trach tube with greenish drainage. Resp:  Decreased air entry at right lower lung field. no wheezing or rales. No accessory muscle use. CV:  Regular rate,  No LE edema GI:  Soft, moderately distended, Non tender.  +Bowel sounds Neurologic:  Alert and oriented X 1-2, no speech, nods to answers yes and nod questions. Psych:   Unclear insight. Not anxious nor agitated Skin:  No rashes. No jaundice Reviewed most current lab test results and cultures  YES Reviewed most current radiology test results   YES Review and summation of old records today    NO Reviewed patient's current orders and MAR    YES 
PMH/SH reviewed - no change compared to H&P 
________________________________________________________________________ 
________________________________________________________________________ Braulio Walker MD  
 
Procedures: see electronic medical records for all procedures/Xrays and details which were not copied into this note but were reviewed prior to creation of Plan. LABS: 
I reviewed today's most current labs and imaging studies. Pertinent labs include: 
Recent Labs 10/18/18 
0295 10/16/18 
3947 WBC 9.4 9.8 HGB 7.3* 7.7* HCT 24.2* 25.2*  
 153 Recent Labs 10/18/18 
8875 10/17/18 
0356 10/16/18 
0336 10/15/18 
1859  140 142 142  
K 3.6 4.0 4.0 3.9  104 106 105 CO2 33* 32 32 34* * 96 87 120* BUN 28* 25* 22* 23* CREA 1.02 0.99 0.85 0.91  
CA 8.2* 8.4* 8.2* 8.3*  
MG 2.1  --  2.1 2.0 PHOS 2.2* 2.8 2.4*  --   
INR 1.2* 1.2* 1.2*  --   
 
 
Signed: Braulio Walker MD

## 2018-10-18 NOTE — PROGRESS NOTES
Bedside and Verbal shift change report given to Tracey Pastor RN (oncoming nurse) by eClestina Cohen RN (offgoing nurse). Report included the following information SBAR, Kardex, ED Summary, Intake/Output, Procedure Verification and Quality Measures. 1600- Pt turned and repositioned. 1700- Pt turned and repositioned, TAPS system placed under patient. Trach inline suctioned, mouth care performed, nimesh care performed, HOB up. Pt resting conformably in bed.

## 2018-10-18 NOTE — PROGRESS NOTES
Problem: Pressure Injury - Risk of 
Goal: *Prevention of pressure injury Document Ayaz Scale and appropriate interventions in the flowsheet. Outcome: Progressing Towards Goal 
Pressure Injury Interventions: 
Sensory Interventions: Assess changes in LOC Moisture Interventions: Absorbent underpads, Check for incontinence Q2 hours and as needed Activity Interventions: Pressure redistribution bed/mattress(bed type) Mobility Interventions: Pressure redistribution bed/mattress (bed type) Nutrition Interventions: Document food/fluid/supplement intake Friction and Shear Interventions: Apply protective barrier, creams and emollients

## 2018-10-18 NOTE — PROGRESS NOTES
0700:  Bedside handoff report received from Neri Keller RN (offgoing nurse). 0930:  Patient transitioned to trach collar at 50% FiO2. 
 
1154:  Patient's chest tube removed at this time by Dr. Ry Beckwith. 
 
1320:  Patient placed back on CPAP; FiO2 40%, PEEP 6.0. 
 
1340:  Condom catheter placed on patient. 1540:  Bedside handoff report given to Una Hand RN (oncoming nurse).  
 
Barrera Ruelas RN

## 2018-10-18 NOTE — PROGRESS NOTES
[facility] Pulmonary Consult Service NotePulmonary, Critical Care, and Sleep Medicine Name: Clifton Galindo MRN: [id number] : 1934 Hospital: [facility] Date: 10/18/2018   Hospital Day: 45 IMPRESSION:  
1. 18 cardiopulmonary arrest with  6 min of CPR then ROSC. 2. Acute respiratory failure-on vent 3. Acute hemoptysis- Noted to have bleeding from RLL on Bronch. No endobronchial lesion. has lung mass(CA vs inflammatory pseudotumor) and microscopic hematuria; ANCA negative; JOSS barely positive. Suspect LUNG mass the culprit. CYTOLOGY from bronchoscopy negative malignancy x 2, at this point not pursuing additional interventions. 4. Moderate right pleural effusion, s/p IR placement of right sided chest tube. Cytology negative 5. S/P pulmonary artery embolization for hemoptysis per IR of suspected lung mass. 6. COPD moderate to severe at baseline 7. Acute renal insf now resolved. 8. Anemia and prior coagulopathy on coumadin 9. Encehalopathy/Debility multifactorial - has hearing loss as well 10. Hematuria, seems to be improving had almaraz in place. 11. Hx of CAD s/p PCI, afib on amio, former smoker, prior cva RECOMMENDATIONS/PLAN:  
1. Vent support, TC trials as tolerated 2. Chest tube drainage has almost stopped, will remove today 3. Trach care 4. Diuresis 5. Enteral feedings 6. Glucose monitoring and SSI 7. DVT/GI prophylaxis 8. Will need LTAC Subjective/Initial History:  
10-17: still desaturating with TC trials 10-16: no events. Desaturated with TC trials yesterday. 10-15: no events. No new reported issues 10-14:  No major events. Received transfusion yesterday 10-13:  No changes. Spoke to wife who agrees to transfusion. Cytology still pending 10-12-18: Pt seems to be much more alert when seen this am. Had near 3.5-4L of right pleural fluid output. NO ROS or HPI are obtainable from pt. 10-11-18: Pt has been slow to respond. Still not waking up. Had increased secretion from trach and from his penis. Not follow any commands. Not able to get ROS or HPI from pt.  
 
 
10-10-18: No major changes or events over last 24 hrs. Not able to obtain ROS or HPI from pt. Some thick yellow secretions present around this trach. 10-9-18: Not able to get hx  Or ROS from pt. No acute changes overnight. Had some moderate secretions this am.  
 
 
10-8-18: Pt still not waking up. Has ongoing issues with blood clots in urine. Per nurses has been with recurrent issues with clotting and require frequent flushing of the almaraz. Pt has not been able to tolerate trach collar. Unable to get ROS or HPI from pt. MAR reviewed and pertinent medications noted or modified as needed Current Facility-Administered Medications Medication  influenza vaccine 2018-19 (6 mos+)(PF) (FLUARIX QUAD/FLULAVAL QUAD) injection 0.5 mL  furosemide (LASIX) injection 20 mg  
 levoFLOXacin (LEVAQUIN) 750 mg in D5W IVPB  albuterol-ipratropium (DUO-NEB) 2.5 MG-0.5 MG/3 ML  
 heparin (porcine) injection 5,000 Units  fentaNYL citrate (PF) injection 25 mcg  zinc oxide-cod liver oil (DESITIN) 40 % paste  acetaminophen (TYLENOL) solution 650 mg  
 sodium chloride (NS) flush 10-30 mL  sodium chloride (NS) flush 10 mL  sodium chloride (NS) flush 10 mL  sodium chloride (NS) flush 10-40 mL  sodium chloride (NS) flush 20 mL  heparin (porcine) pf 300 Units  glycopyrrolate (ROBINUL) injection 0.1 mg  
 famotidine (PF) (PEPCID) 20 mg in sodium chloride 0.9% 10 mL injection  chlorhexidine (PERIDEX) 0.12 % mouthwash 15 mL  albuterol (PROVENTIL HFA, VENTOLIN HFA, PROAIR HFA) inhaler 2 Puff  dorzolamide-timolol (COSOPT) 22.3-6.8 mg/mL ophthalmic solution 1 Drop  latanoprost (XALATAN) 0.005 % ophthalmic solution 1 Drop  sodium chloride (NS) flush 5-10 mL  sodium chloride (NS) flush 5-10 mL  ondansetron (ZOFRAN) injection 4 mg ROS:A comprehensive review of systems was negative except for that written in the HPI. Objective: 
 
Vital Signs: Telemetry:    normal sinus rhythmIntake/Output:  
Visit Vitals /44 Pulse 91 Temp 98.6 °F (37 °C) Resp 11 Ht 6' 2\" (1.88 m) Wt 114 kg (251 lb 5.2 oz) SpO2 98% BMI 32.27 kg/m² Temp (24hrs), Av.4 °F (37.4 °C), Min:98.6 °F (37 °C), Max:100.4 °F (38 °C) O2 Device: Tracheostomy, Ventilator O2 Flow Rate (L/min): 10 l/min Wt Readings from Last 4 Encounters:  
10/12/18 114 kg (251 lb 5.2 oz) 16 111.1 kg (245 lb)  
16 106.6 kg (235 lb) 04/08/15 103.4 kg (228 lb) Intake/Output Summary (Last 24 hours) at 10/18/2018 6217 Last data filed at 10/18/2018 0730 Gross per 24 hour Intake 1150 ml Output 355 ml Net 795 ml Last shift:      10/18 0701 - 10/18 190 In: 0 Out: 20 Last 3 shifts: 10/16 1901 - 10/18 0700 In: 2600 [I.V.:300] Out: 525 [Urine:250; Drains:215] Physical Exam:  
 General:  No distress, trach and vent support. HEAD: Normocephalic, without obvious abnormality, atraumatic EYES: conjunctivae clear. anicteric sclerae NOSE: nares normal, no drainage, no nasal flaring, Neck: Supple, symmetrical, trachea midline, has trach in place, on Mechanical vent support. Chest: increased AP diameter Lungs:   clear anterioly. Trached on vent. Heart: Regular rate and rhythm nl s1,2. Abdomen: soft, non-tender, +BS, pt has almaraz in place. Obese. Musculoskeletal: no gross deformities Neuro:  Intermittently follows commands. Psych: Not able to assess; no agitation. NO anxiety or depression when seen. Data:  
 
Current Facility-Administered Medications Medication Dose Route Frequency  influenza vaccine - (6 mos+)(PF) (FLUARIX QUAD/FLULAVAL QUAD) injection 0.5 mL  0.5 mL IntraMUSCular PRIOR TO DISCHARGE  
  furosemide (LASIX) injection 20 mg  20 mg IntraVENous ACB&D  
 levoFLOXacin (LEVAQUIN) 750 mg in D5W IVPB  750 mg IntraVENous QHS  albuterol-ipratropium (DUO-NEB) 2.5 MG-0.5 MG/3 ML  3 mL Nebulization BID RT  
 heparin (porcine) injection 5,000 Units  5,000 Units SubCUTAneous Q8H  
 sodium chloride (NS) flush 10 mL  10 mL InterCATHeter Q24H  
 sodium chloride (NS) flush 10-40 mL  10-40 mL InterCATHeter Q8H  
 sodium chloride (NS) flush 20 mL  20 mL InterCATHeter Q24H  
 famotidine (PF) (PEPCID) 20 mg in sodium chloride 0.9% 10 mL injection  20 mg IntraVENous Q12H  chlorhexidine (PERIDEX) 0.12 % mouthwash 15 mL  15 mL Oral BID  dorzolamide-timolol (COSOPT) 22.3-6.8 mg/mL ophthalmic solution 1 Drop  1 Drop Both Eyes BID  latanoprost (XALATAN) 0.005 % ophthalmic solution 1 Drop  1 Drop Both Eyes QHS  sodium chloride (NS) flush 5-10 mL  5-10 mL IntraVENous Q8H Labs: 
Recent Labs 10/18/18 
8542 10/16/18 
8249 WBC 9.4 9.8 HGB 7.3* 7.7* HCT 24.2* 25.2*  
 153 Recent Labs 10/18/18 
6101 10/17/18 
0356 10/16/18 
0336 10/15/18 
1859  140 142 142  
K 3.6 4.0 4.0 3.9  104 106 105 CO2 33* 32 32 34* * 96 87 120* BUN 28* 25* 22* 23* CREA 1.02 0.99 0.85 0.91  
CA 8.2* 8.4* 8.2* 8.3*  
MG 2.1  --  2.1 2.0 PHOS 2.2* 2.8 2.4*  --   
INR 1.2* 1.2* 1.2*  --   
 
Recent Labs 10/16/18 
0541 PH 7.51* PCO2 35  
PO2 79* HCO3 27* FIO2 40 Imaging: 
I have personally reviewed the patients radiographs and have reviewed the reports: 
None today Total critical care time exclusive of procedures: 25 minutes Lucho Pérez MD

## 2018-10-18 NOTE — PROGRESS NOTES
Bedside and Verbal shift change report given to Margaux Ca RN (oncoming nurse) by Syd Quinn (offgoing nurse). Report included the following information SBAR, Kardex, Procedure Summary, Intake/Output, MAR, Recent Results, Med Rec Status and Cardiac Rhythm Afib.

## 2018-10-19 ENCOUNTER — APPOINTMENT (OUTPATIENT)
Dept: GENERAL RADIOLOGY | Age: 83
DRG: 003 | End: 2018-10-19
Attending: INTERNAL MEDICINE
Payer: MEDICARE

## 2018-10-19 LAB
ANION GAP SERPL CALC-SCNC: 3 MMOL/L (ref 5–15)
APTT PPP: 29.1 SEC (ref 22.1–32)
BUN SERPL-MCNC: 27 MG/DL (ref 6–20)
BUN/CREAT SERPL: 26 (ref 12–20)
CALCIUM SERPL-MCNC: 8.6 MG/DL (ref 8.5–10.1)
CHLORIDE SERPL-SCNC: 104 MMOL/L (ref 97–108)
CO2 SERPL-SCNC: 35 MMOL/L (ref 21–32)
CREAT SERPL-MCNC: 1.03 MG/DL (ref 0.7–1.3)
ERYTHROCYTE [DISTWIDTH] IN BLOOD BY AUTOMATED COUNT: 21.3 % (ref 11.5–14.5)
FIBRINOGEN PPP-MCNC: 745 MG/DL (ref 200–475)
GLUCOSE SERPL-MCNC: 128 MG/DL (ref 65–100)
HCT VFR BLD AUTO: 26.1 % (ref 36.6–50.3)
HGB BLD-MCNC: 7.8 G/DL (ref 12.1–17)
INR PPP: 1.1 (ref 0.9–1.1)
MAGNESIUM SERPL-MCNC: 2.4 MG/DL (ref 1.6–2.4)
MCH RBC QN AUTO: 31.5 PG (ref 26–34)
MCHC RBC AUTO-ENTMCNC: 29.9 G/DL (ref 30–36.5)
MCV RBC AUTO: 105.2 FL (ref 80–99)
NRBC # BLD: 0 K/UL (ref 0–0.01)
NRBC BLD-RTO: 0 PER 100 WBC
PHOSPHATE SERPL-MCNC: 2.4 MG/DL (ref 2.6–4.7)
PLATELET # BLD AUTO: 165 K/UL (ref 150–400)
PMV BLD AUTO: 11.7 FL (ref 8.9–12.9)
POTASSIUM SERPL-SCNC: 3.7 MMOL/L (ref 3.5–5.1)
PROTHROMBIN TIME: 11.4 SEC (ref 9–11.1)
RBC # BLD AUTO: 2.48 M/UL (ref 4.1–5.7)
SODIUM SERPL-SCNC: 142 MMOL/L (ref 136–145)
THERAPEUTIC RANGE,PTTT: ABNORMAL SECS (ref 58–77)
WBC # BLD AUTO: 7.5 K/UL (ref 4.1–11.1)

## 2018-10-19 PROCEDURE — 85027 COMPLETE CBC AUTOMATED: CPT | Performed by: INTERNAL MEDICINE

## 2018-10-19 PROCEDURE — 36415 COLL VENOUS BLD VENIPUNCTURE: CPT | Performed by: INTERNAL MEDICINE

## 2018-10-19 PROCEDURE — 83735 ASSAY OF MAGNESIUM: CPT | Performed by: INTERNAL MEDICINE

## 2018-10-19 PROCEDURE — 80048 BASIC METABOLIC PNL TOTAL CA: CPT | Performed by: INTERNAL MEDICINE

## 2018-10-19 PROCEDURE — 94640 AIRWAY INHALATION TREATMENT: CPT

## 2018-10-19 PROCEDURE — 84100 ASSAY OF PHOSPHORUS: CPT | Performed by: INTERNAL MEDICINE

## 2018-10-19 PROCEDURE — 65610000006 HC RM INTENSIVE CARE

## 2018-10-19 PROCEDURE — 74011250637 HC RX REV CODE- 250/637: Performed by: INTERNAL MEDICINE

## 2018-10-19 PROCEDURE — 94760 N-INVAS EAR/PLS OXIMETRY 1: CPT

## 2018-10-19 PROCEDURE — 74011000250 HC RX REV CODE- 250: Performed by: INTERNAL MEDICINE

## 2018-10-19 PROCEDURE — 77010033678 HC OXYGEN DAILY

## 2018-10-19 PROCEDURE — 74011250636 HC RX REV CODE- 250/636: Performed by: INTERNAL MEDICINE

## 2018-10-19 PROCEDURE — 85610 PROTHROMBIN TIME: CPT | Performed by: INTERNAL MEDICINE

## 2018-10-19 PROCEDURE — 94003 VENT MGMT INPAT SUBQ DAY: CPT

## 2018-10-19 PROCEDURE — 71045 X-RAY EXAM CHEST 1 VIEW: CPT

## 2018-10-19 RX ORDER — LEVOFLOXACIN 750 MG/1
750 TABLET ORAL EVERY 24 HOURS
Status: COMPLETED | OUTPATIENT
Start: 2018-10-19 | End: 2018-10-26

## 2018-10-19 RX ADMIN — DORZOLAMIDE HYDROCHLORIDE AND TIMOLOL MALEATE 1 DROP: 20; 5 SOLUTION/ DROPS OPHTHALMIC at 09:10

## 2018-10-19 RX ADMIN — IPRATROPIUM BROMIDE AND ALBUTEROL SULFATE 3 ML: .5; 3 SOLUTION RESPIRATORY (INHALATION) at 19:53

## 2018-10-19 RX ADMIN — Medication 10 ML: at 11:21

## 2018-10-19 RX ADMIN — FAMOTIDINE 20 MG: 20 TABLET ORAL at 18:18

## 2018-10-19 RX ADMIN — IPRATROPIUM BROMIDE AND ALBUTEROL SULFATE 3 ML: .5; 3 SOLUTION RESPIRATORY (INHALATION) at 08:06

## 2018-10-19 RX ADMIN — FUROSEMIDE 20 MG: 10 INJECTION, SOLUTION INTRAMUSCULAR; INTRAVENOUS at 17:06

## 2018-10-19 RX ADMIN — FENTANYL CITRATE 25 MCG: 50 INJECTION, SOLUTION INTRAMUSCULAR; INTRAVENOUS at 07:16

## 2018-10-19 RX ADMIN — FENTANYL CITRATE 25 MCG: 50 INJECTION, SOLUTION INTRAMUSCULAR; INTRAVENOUS at 12:21

## 2018-10-19 RX ADMIN — Medication 10 ML: at 21:39

## 2018-10-19 RX ADMIN — DORZOLAMIDE HYDROCHLORIDE AND TIMOLOL MALEATE 1 DROP: 20; 5 SOLUTION/ DROPS OPHTHALMIC at 18:22

## 2018-10-19 RX ADMIN — HEPARIN SODIUM 5000 UNITS: 5000 INJECTION INTRAVENOUS; SUBCUTANEOUS at 13:47

## 2018-10-19 RX ADMIN — Medication 10 ML: at 13:44

## 2018-10-19 RX ADMIN — FAMOTIDINE 20 MG: 20 TABLET ORAL at 08:55

## 2018-10-19 RX ADMIN — FUROSEMIDE 20 MG: 10 INJECTION, SOLUTION INTRAMUSCULAR; INTRAVENOUS at 08:00

## 2018-10-19 RX ADMIN — HEPARIN SODIUM 5000 UNITS: 5000 INJECTION INTRAVENOUS; SUBCUTANEOUS at 05:50

## 2018-10-19 RX ADMIN — LATANOPROST 1 DROP: 50 SOLUTION OPHTHALMIC at 21:44

## 2018-10-19 RX ADMIN — SODIUM CHLORIDE, PRESERVATIVE FREE 300 UNITS: 5 INJECTION INTRAVENOUS at 13:42

## 2018-10-19 RX ADMIN — SODIUM CHLORIDE, PRESERVATIVE FREE 300 UNITS: 5 INJECTION INTRAVENOUS at 13:43

## 2018-10-19 RX ADMIN — Medication 10 ML: at 05:48

## 2018-10-19 RX ADMIN — CHLORHEXIDINE GLUCONATE 15 ML: 1.2 RINSE ORAL at 09:08

## 2018-10-19 RX ADMIN — ACETAMINOPHEN 650 MG: 325 SOLUTION ORAL at 08:58

## 2018-10-19 RX ADMIN — CHLORHEXIDINE GLUCONATE 15 ML: 1.2 RINSE ORAL at 21:41

## 2018-10-19 RX ADMIN — HEPARIN SODIUM 5000 UNITS: 5000 INJECTION INTRAVENOUS; SUBCUTANEOUS at 21:40

## 2018-10-19 RX ADMIN — LEVOFLOXACIN 750 MG: 750 TABLET, FILM COATED ORAL at 21:41

## 2018-10-19 NOTE — INTERDISCIPLINARY ROUNDS
Interdisciplinary team rounds were held 10/19/18 with the following team members:Care Management, Diabetes Treatment Specialist, Nursing, Nutrition, Pharmacy, Physician, Respiratory Therapy. Plan of care discussed. Goal: See MD orders and progress notes for further  interventions and desired outcomes.

## 2018-10-19 NOTE — PROGRESS NOTES
10/19/18 5954 Vent Settings FIO2 (%) 40 % Pressure Support (cm H2O) 6 cm H2O  
PEEP/VENT (cm H2O) 6 cm H20 ABCDEF Bundle SBT Safety Screen Passed Yes SBT Trial Passed Yes Weaning Parameters Spontaneous Breathing Trial Complete Yes Resp Rate Observed 18 Ve 14.1  RSBI 22 Vent Method/Mode Ventilation Method Conventional  
Ventilator Mode Spontaneous

## 2018-10-19 NOTE — PROGRESS NOTES
Hospitalist Progress Note NAME: Odliia Gutiérrez  
:  1934 MRN:  [id number] Assessment / Plan: 
40-year-old male with past medical history of coronary artery disease, atrial fibrillation on anticoagulation with warfarin, HTN and HLD who presented to ED on  with vomiting blood since the last 2 days. Acute encephalopathy in setting of acute hypoxic respiratory failure and resultant PEA cardiac arrest : s/p 6 min CPR and epi with ROSC after IR arteriogram. More alert the last few days. - CT head  showed small age-indeterminate infarct in the left corona radiata - CT head 10/11 with no evidence of acute intracranial abnormality. 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 Bilateral tympanomastoid effusions. - s/p tracheostomy. continue trach collar trials. Awaiting bed in [place] [id number]. Pulmonary hemorrhage s/p pulmonary artery embolization with RLL lung mass (ca vs inflammatory pseudotumor) and COPD: extubated on  but reintubated , now s/p trach placement 10/3, remains on vent via trach. Still with lots of secretions. - CT chest  with patchy airspace disease in the right lung base with an associated hyperdense 5.2 x 4.3 cm oval lesion.  
- CTA chest  due to persistent bloody pulmonary secretions. Right lower lobe masslike abnormality demonstrates increased internal density and has increased in size measuring 6.7 x 6.7 cm, compared to 5.7 x 4.4 cm. This is compatible with internal hemorrhage. There is new moderate right lower lobe partial collapse/atelectasis. - con't lasix as above; monitor I/Os - s/p arteriogram : Thoracic aortic and intercostal arteriograms demonstrating no 
enlarged bronchial artery supplying the right lower lobe of the lung. Normal appearing and normal sized right bronchial artery is seen without any hyperplasia or dominant right lower lobe supply. No embolization was performed. - s/p diagnostic bronch on 9/16. No endobronchial lesions seen. - blood cultures negative. Cytology from bronchial washing on 9/16 is atypical, favor benign reactive process. - work-up for rheumatologic causes of hemoptysis negative: ANCA, anti-GBM, MPO Ab, MO-3 Ab, SSA/SSB, RNP Ab, Arredondo/RNP, dsDNA, and Arredondo Ab all negative. JOSS+. - completed zosyn for possible lung abscess (9/20). R pleural effusion:  
CT chest 10/11 with worsening of the appearance of the right hemithorax with increased pleural effusion and consolidation. 
- most recent CXR 10/13 with focal area of parenchymal consolidation at the right lung base which persists. Cardiomegaly unchanged. - R chest tube placed 10/11 with blood-tinged L pleural fluid drainage. S/p chest tube removal 10/18. On lasix BID Sepsis due to Pseudomonas catheter-associated UTI and right pleural fluid, not present on admission: - Urine and pleural fluid cultures (1 swab) both pansensitive pseudomonas.  Sputum culture with normal respiratory ernestina and rare yeast.  Blood cultures no growth. - off emperic vancomycin and zosyn. On Levaquin until 10/26. Acute blood loss anemia and anemia of chronic disease Hgb trending down to 7.3 today. No external bleeding. Close monitor. INR at 1.2. High fibrinogen. Platelets WNL. Elevated LFTs of unclear etiology: 
stopped statin for now LFT trending down. CAD s/p PCI, atrial fibrillation on chronic anticoagulation, and benign HTN: 
- holding amiodarone and diltiazem; holding anticoagulation (home coumadin) and pravachol 
- con't IV lasix (on oral lasix outpatient) ALAN: resolved UTI, acute cystitis w/ hematuria, POA:  Required almaraz placed by urology Hyperlipidemia: statin held due to elevated LFTs as above Hypernatremia, resolved Obesity (Body mass index is 32.27 kg/(m^2) 
   
Code: Full (wife and children are decision makers) DVT prophylaxis: heparin Subjective: Chief Complaint / Reason for Physician Visit Awake. Open eyes. Discussed with RN events overnight. No hemoptysis. Review of Systems: 
 
Could NOT obtain due to: tracheostomy Objective: VITALS:  
Last 24hrs VS reviewed since prior progress note. Most recent are: 
Patient Vitals for the past 24 hrs: 
 Temp Pulse Resp BP SpO2  
10/19/18 1613  84 18  98 % 10/19/18 1434 97.9 °F (36.6 °C) 76 17 103/41 98 % 10/19/18 1400  84 21 111/56 99 % 10/19/18 1324  (!) 107 25  98 % 10/19/18 1300  84 26 (!) 145/102   
10/19/18 1200  80 21 104/47 100 % 10/19/18 1100 97.3 °F (36.3 °C) 77 17 120/56 100 % 10/19/18 1000  76 17 103/47 100 % 10/19/18 0900  89 18 129/65 99 % 10/19/18 0820  84 20  98 % 10/19/18 0816     100 % 10/19/18 0809  80 20  99 % 10/19/18 0806     99 % 10/19/18 0800  87 12 126/57 99 % 10/19/18 0710 98.4 °F (36.9 °C) 88 18 115/53 100 % 10/19/18 0700  86 15 131/66 100 % 10/19/18 0600  83 22 109/68 99 % 10/19/18 0500  83 16 101/45 99 % 10/19/18 0400 99.7 °F (37.6 °C) 77 21 103/51 99 % 10/19/18 0348  84 19  98 % 10/19/18 0300  76 20 (!) 86/66 98 % 10/19/18 0200  82 16 117/58 100 % 10/19/18 0100  79 16 116/70 99 % 10/19/18 0000  84 14 126/58 100 % 10/18/18 2340  72 19  100 % 10/18/18 2300 98.5 °F (36.9 °C) 75 16 107/57 99 % 10/18/18 2200  79 19 94/46 97 % 10/18/18 2100  74 17 (!) 94/38 97 % 10/18/18 2014  72 18  98 % 10/18/18 2000  72 18 90/50 99 % 10/18/18 1933     98 % 10/18/18 1900 99.3 °F (37.4 °C) 85 21 107/60 98 % 10/18/18 1800  84 20 118/56 96 % Intake/Output Summary (Last 24 hours) at 10/19/2018 1718 Last data filed at 10/19/2018 1434 Gross per 24 hour Intake 1830 ml Output 1803 ml Net 27 ml PHYSICAL EXAM: 
General: Overweight. Alert, no acute distress   
EENT:  Anicteric sclerae. mildline trach tube with greenish drainage. Resp:  Decreased air entry at right lower lung field. no wheezing or rales. CV:  Regular rate,  No LE edema GI:  Soft, moderately distended, Non tender.  +Bowel sounds Neurologic:  Alert and oriented X 1, no speech. Reviewed most current lab test results and cultures  YES Reviewed most current radiology test results   YES Review and summation of old records today    NO Reviewed patient's current orders and MAR    YES 
PMH/SH reviewed - no change compared to H&P 
________________________________________________________________________ 
________________________________________________________________________ Douglas Bravo MD  
 
Procedures: see electronic medical records for all procedures/Xrays and details which were not copied into this note but were reviewed prior to creation of Plan. LABS: 
I reviewed today's most current labs and imaging studies. Pertinent labs include: 
Recent Labs 10/19/18 
5338 10/18/18 
9117 WBC 7.5 9.4 HGB 7.8* 7.3* HCT 26.1* 24.2*  
 159 Recent Labs 10/19/18 
9861 10/18/18 
0529 10/17/18 
3504  140 140  
K 3.7 3.6 4.0  
 102 104 CO2 35* 33* 32 * 117* 96 BUN 27* 28* 25* CREA 1.03 1.02 0.99 CA 8.6 8.2* 8.4* MG 2.4 2.1  --   
PHOS 2.4* 2.2* 2.8 INR 1.1 1.2* 1.2* Signed: Douglas Bravo MD

## 2018-10-19 NOTE — PROGRESS NOTES
Problem: Falls - Risk of 
Goal: *Absence of Falls Document Rojas Dye Fall Risk and appropriate interventions in the flowsheet. Outcome: Progressing Towards Goal 
Fall Risk Interventions: 
Mobility Interventions: Assess mobility with egress test 
 
Mentation Interventions: Adequate sleep, hydration, pain control Medication Interventions: Assess postural VS orthostatic hypotension Elimination Interventions: Bed/chair exit alarm History of Falls Interventions: Bed/chair exit alarm Problem: Pressure Injury - Risk of 
Goal: *Prevention of pressure injury Document Ayaz Scale and appropriate interventions in the flowsheet. Outcome: Progressing Towards Goal 
Pressure Injury Interventions: 
Sensory Interventions: Assess changes in LOC, Assess need for specialty bed, Check visual cues for pain, Float heels, Keep linens dry and wrinkle-free, Minimize linen layers, Monitor skin under medical devices, Pad between skin to skin, Pressure redistribution bed/mattress (bed type), Turn and reposition approx. every two hours (pillows and wedges if needed) Moisture Interventions: Absorbent underpads, Offer toileting Q_hr, Minimize layers, Maintain skin hydration (lotion/cream), Limit adult briefs, Internal/External urinary devices, Internal/External fecal devices, Assess need for specialty bed Activity Interventions: Assess need for specialty bed, Increase time out of bed, Pressure redistribution bed/mattress(bed type), PT/OT evaluation Mobility Interventions: Assess need for specialty bed, Float heels, HOB 30 degrees or less, PT/OT evaluation, Pressure redistribution bed/mattress (bed type) Nutrition Interventions: Document food/fluid/supplement intake, Discuss nutritional consult with provider Friction and Shear Interventions: HOB 30 degrees or less, Lift sheet, Lift team/patient mobility team, Minimize layers, Transfer aides:transfer board/Vahid lift/ceiling lift, Transferring/repositioning devices

## 2018-10-19 NOTE — PROGRESS NOTES
[facility] Pulmonary Consult Service NotePulmonary, Critical Care, and Sleep Medicine Name: Surendra Moscoso MRN: [id number] : 1934 Hospital: [facility] Date: 10/19/2018   Hospital Day: 44 IMPRESSION:  
1. 18 cardiopulmonary arrest with  6 min of CPR then ROSC. 2. Acute respiratory failure-on vent 3. Acute hemoptysis- Noted to have bleeding from RLL on Bronch. No endobronchial lesion. has lung mass(CA vs inflammatory pseudotumor) and microscopic hematuria; ANCA negative; JOSS barely positive. Suspect LUNG mass the culprit. CYTOLOGY from bronchoscopy negative malignancy x 2, at this point not pursuing additional interventions. 4. Moderate right pleural effusion, s/p IR placement of right sided chest tube. Cytology negative 5. S/P pulmonary artery embolization for hemoptysis per IR of suspected lung mass. 6. COPD moderate to severe at baseline 7. Acute renal insf now resolved. 8. Anemia and prior coagulopathy on coumadin 9. Encehalopathy/Debility multifactorial - has hearing loss as well 10. Hematuria, seems to be improving had almaraz in place. 11. Hx of CAD s/p PCI, afib on amio, former smoker, prior cva RECOMMENDATIONS/PLAN:  
1. Vent support, TC trials as tolerated 2. Trach care 3. Diuresis 4. Enteral feedings 5. Glucose monitoring and SSI 6. DVT/GI prophylaxis 7. Will need LTAC Subjective/Initial History:  
10-18: no overnight events. More alert today 10-17: still desaturating with TC trials 10-16: no events. Desaturated with TC trials yesterday. 10-15: no events. No new reported issues 10-14:  No major events. Received transfusion yesterday 10-13:  No changes. Spoke to wife who agrees to transfusion. Cytology still pending 10-12-18: Pt seems to be much more alert when seen this am. Had near 3.5-4L of right pleural fluid output. NO ROS or HPI are obtainable from pt. 10-11-18: Pt has been slow to respond. Still not waking up. Had increased secretion from trach and from his penis. Not follow any commands. Not able to get ROS or HPI from pt.  
 
 
10-10-18: No major changes or events over last 24 hrs. Not able to obtain ROS or HPI from pt. Some thick yellow secretions present around this trach. 10-9-18: Not able to get hx  Or ROS from pt. No acute changes overnight. Had some moderate secretions this am.  
 
 
10-8-18: Pt still not waking up. Has ongoing issues with blood clots in urine. Per nurses has been with recurrent issues with clotting and require frequent flushing of the almaraz. Pt has not been able to tolerate trach collar. Unable to get ROS or HPI from pt. MAR reviewed and pertinent medications noted or modified as needed Current Facility-Administered Medications Medication  famotidine (PEPCID) tablet 20 mg  
 influenza vaccine 2018-19 (6 mos+)(PF) (FLUARIX QUAD/FLULAVAL QUAD) injection 0.5 mL  furosemide (LASIX) injection 20 mg  
 levoFLOXacin (LEVAQUIN) 750 mg in D5W IVPB  albuterol-ipratropium (DUO-NEB) 2.5 MG-0.5 MG/3 ML  
 heparin (porcine) injection 5,000 Units  fentaNYL citrate (PF) injection 25 mcg  zinc oxide-cod liver oil (DESITIN) 40 % paste  acetaminophen (TYLENOL) solution 650 mg  
 sodium chloride (NS) flush 10-30 mL  sodium chloride (NS) flush 10 mL  sodium chloride (NS) flush 10 mL  sodium chloride (NS) flush 10-40 mL  sodium chloride (NS) flush 20 mL  heparin (porcine) pf 300 Units  glycopyrrolate (ROBINUL) injection 0.1 mg  
 chlorhexidine (PERIDEX) 0.12 % mouthwash 15 mL  albuterol (PROVENTIL HFA, VENTOLIN HFA, PROAIR HFA) inhaler 2 Puff  dorzolamide-timolol (COSOPT) 22.3-6.8 mg/mL ophthalmic solution 1 Drop  latanoprost (XALATAN) 0.005 % ophthalmic solution 1 Drop  sodium chloride (NS) flush 5-10 mL  sodium chloride (NS) flush 5-10 mL  ondansetron (ZOFRAN) injection 4 mg ROS:A comprehensive review of systems was negative except for that written in the HPI. Objective: 
 
Vital Signs: Telemetry:    normal sinus rhythmIntake/Output:  
Visit Vitals /57 Pulse 80 Temp 98.4 °F (36.9 °C) Resp 20 Ht 6' 2\" (1.88 m) Wt 106.2 kg (234 lb 2.1 oz) SpO2 100% BMI 30.06 kg/m² Temp (24hrs), Av.1 °F (37.3 °C), Min:98.4 °F (36.9 °C), Max:99.7 °F (37.6 °C) O2 Device: Tracheostomy O2 Flow Rate (L/min): 10 l/min Wt Readings from Last 4 Encounters:  
10/18/18 106.2 kg (234 lb 2.1 oz) 16 111.1 kg (245 lb)  
16 106.6 kg (235 lb) 04/08/15 103.4 kg (228 lb) Intake/Output Summary (Last 24 hours) at 10/19/2018 4971 Last data filed at 10/19/2018 0800 Gross per 24 hour Intake 1970 ml Output 1090 ml Net 880 ml Last shift:      10/19 0701 - 10/19 1900 In: 48 Out: 115 [Urine:115] Last 3 shifts: 10/17 1901 - 10/19 0700 In: 0957 [I.V.:320] Out: 1045 [Urine:475; GBGEVC:701] Physical Exam:  
 General:  No distress, trach and vent support. HEAD: Normocephalic, without obvious abnormality, atraumatic EYES: conjunctivae clear. anicteric sclerae NOSE: nares normal, no drainage, no nasal flaring, Neck: Supple, symmetrical, trachea midline, has trach in place, on Mechanical vent support. Chest: increased AP diameter Lungs:   clear anterioly. Trached on vent. Heart: Regular rate and rhythm nl s1,2. Abdomen: soft, non-tender, +BS, pt has almaraz in place. Obese. Musculoskeletal: no gross deformities Neuro:  Intermittently follows commands. Psych: Not able to assess; no agitation. NO anxiety or depression when seen. Data:  
 
Current Facility-Administered Medications Medication Dose Route Frequency  famotidine (PEPCID) tablet 20 mg  20 mg Per G Tube BID  influenza vaccine - (6 mos+)(PF) (FLUARIX QUAD/FLULAVAL QUAD) injection 0.5 mL  0.5 mL IntraMUSCular PRIOR TO DISCHARGE  
  furosemide (LASIX) injection 20 mg  20 mg IntraVENous ACB&D  
 levoFLOXacin (LEVAQUIN) 750 mg in D5W IVPB  750 mg IntraVENous QHS  albuterol-ipratropium (DUO-NEB) 2.5 MG-0.5 MG/3 ML  3 mL Nebulization BID RT  
 heparin (porcine) injection 5,000 Units  5,000 Units SubCUTAneous Q8H  
 sodium chloride (NS) flush 10 mL  10 mL InterCATHeter Q24H  
 sodium chloride (NS) flush 10-40 mL  10-40 mL InterCATHeter Q8H  
 sodium chloride (NS) flush 20 mL  20 mL InterCATHeter Q24H  chlorhexidine (PERIDEX) 0.12 % mouthwash 15 mL  15 mL Oral BID  dorzolamide-timolol (COSOPT) 22.3-6.8 mg/mL ophthalmic solution 1 Drop  1 Drop Both Eyes BID  latanoprost (XALATAN) 0.005 % ophthalmic solution 1 Drop  1 Drop Both Eyes QHS  sodium chloride (NS) flush 5-10 mL  5-10 mL IntraVENous Q8H Labs: 
Recent Labs 10/19/18 
3512 10/18/18 
0653 WBC 7.5 9.4 HGB 7.8* 7.3* HCT 26.1* 24.2*  
 159 Recent Labs 10/19/18 
8834 10/18/18 
0529 10/17/18 
9800  140 140  
K 3.7 3.6 4.0  
 102 104 CO2 35* 33* 32 * 117* 96 BUN 27* 28* 25* CREA 1.03 1.02 0.99 CA 8.6 8.2* 8.4* MG 2.4 2.1  --   
PHOS 2.4* 2.2* 2.8 INR 1.1 1.2* 1.2* No results for input(s): PH, PCO2, PO2, HCO3, FIO2 in the last 72 hours. Imaging: 
I have personally reviewed the patients radiographs and have reviewed the reports: 
None today Total critical care time exclusive of procedures: 25 minutes Mariaa Griffin MD

## 2018-10-19 NOTE — PROGRESS NOTES
Care Management: I called patients wife Sumeet Pantoja this am to ask about [facility]. She has family coming to town and they would like to go visit [facility] together. She did give me permission to send information to [facility] for them to evaluate . FOC placed on chart and referral sent via [employer] to [facility]. [facility] [address] Addendum: [facility] is able to accept if patient agrees and insurance [employer].

## 2018-10-19 NOTE — PROGRESS NOTES
Placed patient on 40% Trach collar at 0820 with stable vitals and no complications noted. RN notified. RT will continue monitor.

## 2018-10-19 NOTE — PROGRESS NOTES
1900  Bedside report received from Yolanda, Michael N Robbie Finley.  Shift assessment completed,  SEE ASSESSMENT. Pt turned on left side. 2230  CHG bath completed. Pt turned to right side. 2300  Reassessment completed, SEE ASSESSMENT. Left PICC dressing changed. Haroon Moose Care complete. Inner canula changed. 0000  I&O's Done. 0149  Pt turned to left side. 0423  Reassessment complete, SEE ASSESSMENT. Pt turned to right side. Labs drawn and Sent. 0615  Pt turned to back. 0700  Bedside report given to Darryn Johnson RN.

## 2018-10-19 NOTE — PROGRESS NOTES
0700:  Bedside handoff report received from [address] [place] [address] Place, RN (offgoing nurse). 0820:  Patient placed on trach collar at this time. FiO2 40%. 1225:  Patient remains on trach collar, FiO2 40%. SpO2 %, RR 18-22. No signs and symptoms of distress, increased WOB. 1310:  Patient's RR noted to be 25-28, increased WOB; SpO2 89-90% on 40% FiO2. Patient suctioned; RT Anca paged to place patient back on CPAP. 1330:  Patient back on CPAP, FiO2 40%. RR improved; WOB improved. 1345: Both patient's PICC line ports without blood return; heparin flush solution (300 units) placed in each port to dwell. 1910:  Bedside handoff report received from Traci Franco, [facility] (oncoming nurse).  
 
Keisha Dutton RN

## 2018-10-20 ENCOUNTER — APPOINTMENT (OUTPATIENT)
Dept: GENERAL RADIOLOGY | Age: 83
DRG: 003 | End: 2018-10-20
Attending: INTERNAL MEDICINE
Payer: MEDICARE

## 2018-10-20 LAB
ANION GAP SERPL CALC-SCNC: 5 MMOL/L (ref 5–15)
APTT PPP: 31 SEC (ref 22.1–32)
ARTERIAL PATENCY WRIST A: YES
BASE EXCESS BLDA CALC-SCNC: 7.2 MMOL/L
BDY SITE: ABNORMAL
BUN SERPL-MCNC: 28 MG/DL (ref 6–20)
BUN/CREAT SERPL: 28 (ref 12–20)
CALCIUM SERPL-MCNC: 8.3 MG/DL (ref 8.5–10.1)
CHLORIDE SERPL-SCNC: 104 MMOL/L (ref 97–108)
CO2 SERPL-SCNC: 33 MMOL/L (ref 21–32)
CREAT SERPL-MCNC: 1 MG/DL (ref 0.7–1.3)
EPAP/CPAP/PEEP, PAPEEP: 6
ERYTHROCYTE [DISTWIDTH] IN BLOOD BY AUTOMATED COUNT: 21.2 % (ref 11.5–14.5)
FIBRINOGEN PPP-MCNC: 665 MG/DL (ref 200–475)
FIO2 ON VENT: 40 %
GAS FLOW.O2 SETTING OXYMISER: 12 L/MIN
GLUCOSE SERPL-MCNC: 108 MG/DL (ref 65–100)
HCO3 BLDA-SCNC: 31 MMOL/L (ref 22–26)
HCT VFR BLD AUTO: 23.1 % (ref 36.6–50.3)
HGB BLD-MCNC: 7 G/DL (ref 12.1–17)
INR PPP: 1.1 (ref 0.9–1.1)
MAGNESIUM SERPL-MCNC: 2.3 MG/DL (ref 1.6–2.4)
MCH RBC QN AUTO: 32 PG (ref 26–34)
MCHC RBC AUTO-ENTMCNC: 30.3 G/DL (ref 30–36.5)
MCV RBC AUTO: 105.5 FL (ref 80–99)
NRBC # BLD: 0 K/UL (ref 0–0.01)
NRBC BLD-RTO: 0 PER 100 WBC
PCO2 BLDA: 39 MMHG (ref 35–45)
PH BLDA: 7.52 [PH] (ref 7.35–7.45)
PHOSPHATE SERPL-MCNC: 2.2 MG/DL (ref 2.6–4.7)
PLATELET # BLD AUTO: 176 K/UL (ref 150–400)
PMV BLD AUTO: 11.8 FL (ref 8.9–12.9)
PO2 BLDA: 110 MMHG (ref 80–100)
POTASSIUM SERPL-SCNC: 3.8 MMOL/L (ref 3.5–5.1)
PROTHROMBIN TIME: 11.5 SEC (ref 9–11.1)
RBC # BLD AUTO: 2.19 M/UL (ref 4.1–5.7)
SAO2 % BLD: 98 % (ref 92–97)
SAO2% DEVICE SAO2% SENSOR NAME: ABNORMAL
SODIUM SERPL-SCNC: 142 MMOL/L (ref 136–145)
SPECIMEN SITE: ABNORMAL
THERAPEUTIC RANGE,PTTT: ABNORMAL SECS (ref 58–77)
VENTILATION MODE VENT: ABNORMAL
VT SETTING VENT: 500 ML
WBC # BLD AUTO: 7.2 K/UL (ref 4.1–11.1)

## 2018-10-20 PROCEDURE — 65610000006 HC RM INTENSIVE CARE

## 2018-10-20 PROCEDURE — 74011000250 HC RX REV CODE- 250: Performed by: INTERNAL MEDICINE

## 2018-10-20 PROCEDURE — 84100 ASSAY OF PHOSPHORUS: CPT | Performed by: INTERNAL MEDICINE

## 2018-10-20 PROCEDURE — 85027 COMPLETE CBC AUTOMATED: CPT | Performed by: INTERNAL MEDICINE

## 2018-10-20 PROCEDURE — 97165 OT EVAL LOW COMPLEX 30 MIN: CPT | Performed by: OCCUPATIONAL THERAPIST

## 2018-10-20 PROCEDURE — 94640 AIRWAY INHALATION TREATMENT: CPT

## 2018-10-20 PROCEDURE — 74011250636 HC RX REV CODE- 250/636: Performed by: INTERNAL MEDICINE

## 2018-10-20 PROCEDURE — 97162 PT EVAL MOD COMPLEX 30 MIN: CPT

## 2018-10-20 PROCEDURE — 85610 PROTHROMBIN TIME: CPT | Performed by: INTERNAL MEDICINE

## 2018-10-20 PROCEDURE — G8978 MOBILITY CURRENT STATUS: HCPCS

## 2018-10-20 PROCEDURE — 97530 THERAPEUTIC ACTIVITIES: CPT

## 2018-10-20 PROCEDURE — 36600 WITHDRAWAL OF ARTERIAL BLOOD: CPT | Performed by: INTERNAL MEDICINE

## 2018-10-20 PROCEDURE — 80048 BASIC METABOLIC PNL TOTAL CA: CPT | Performed by: INTERNAL MEDICINE

## 2018-10-20 PROCEDURE — G8987 SELF CARE CURRENT STATUS: HCPCS | Performed by: OCCUPATIONAL THERAPIST

## 2018-10-20 PROCEDURE — 74011250637 HC RX REV CODE- 250/637: Performed by: INTERNAL MEDICINE

## 2018-10-20 PROCEDURE — 83735 ASSAY OF MAGNESIUM: CPT | Performed by: INTERNAL MEDICINE

## 2018-10-20 PROCEDURE — 97530 THERAPEUTIC ACTIVITIES: CPT | Performed by: OCCUPATIONAL THERAPIST

## 2018-10-20 PROCEDURE — 82803 BLOOD GASES ANY COMBINATION: CPT | Performed by: INTERNAL MEDICINE

## 2018-10-20 PROCEDURE — G8988 SELF CARE GOAL STATUS: HCPCS | Performed by: OCCUPATIONAL THERAPIST

## 2018-10-20 PROCEDURE — 71045 X-RAY EXAM CHEST 1 VIEW: CPT

## 2018-10-20 PROCEDURE — G8979 MOBILITY GOAL STATUS: HCPCS

## 2018-10-20 PROCEDURE — 77030018798 HC PMP KT ENTRL FED COVD -A

## 2018-10-20 PROCEDURE — 94003 VENT MGMT INPAT SUBQ DAY: CPT

## 2018-10-20 PROCEDURE — 77030006998

## 2018-10-20 PROCEDURE — 36415 COLL VENOUS BLD VENIPUNCTURE: CPT | Performed by: INTERNAL MEDICINE

## 2018-10-20 RX ORDER — ENOXAPARIN SODIUM 100 MG/ML
40 INJECTION SUBCUTANEOUS EVERY 24 HOURS
Status: DISCONTINUED | OUTPATIENT
Start: 2018-10-20 | End: 2018-10-27 | Stop reason: HOSPADM

## 2018-10-20 RX ADMIN — ENOXAPARIN SODIUM 40 MG: 40 INJECTION, SOLUTION INTRAVENOUS; SUBCUTANEOUS at 15:13

## 2018-10-20 RX ADMIN — Medication 10 ML: at 13:31

## 2018-10-20 RX ADMIN — Medication 10 ML: at 05:57

## 2018-10-20 RX ADMIN — FUROSEMIDE 20 MG: 10 INJECTION, SOLUTION INTRAMUSCULAR; INTRAVENOUS at 09:07

## 2018-10-20 RX ADMIN — HEPARIN SODIUM 5000 UNITS: 5000 INJECTION INTRAVENOUS; SUBCUTANEOUS at 05:56

## 2018-10-20 RX ADMIN — LATANOPROST 1 DROP: 50 SOLUTION OPHTHALMIC at 21:00

## 2018-10-20 RX ADMIN — CHLORHEXIDINE GLUCONATE 15 ML: 1.2 RINSE ORAL at 09:17

## 2018-10-20 RX ADMIN — FAMOTIDINE 20 MG: 20 TABLET ORAL at 09:10

## 2018-10-20 RX ADMIN — DORZOLAMIDE HYDROCHLORIDE AND TIMOLOL MALEATE 1 DROP: 20; 5 SOLUTION/ DROPS OPHTHALMIC at 09:22

## 2018-10-20 RX ADMIN — CHLORHEXIDINE GLUCONATE 15 ML: 1.2 RINSE ORAL at 20:50

## 2018-10-20 RX ADMIN — IPRATROPIUM BROMIDE AND ALBUTEROL SULFATE 3 ML: .5; 3 SOLUTION RESPIRATORY (INHALATION) at 08:01

## 2018-10-20 RX ADMIN — Medication 10 ML: at 21:01

## 2018-10-20 RX ADMIN — LEVOFLOXACIN 750 MG: 750 TABLET, FILM COATED ORAL at 20:55

## 2018-10-20 RX ADMIN — DORZOLAMIDE HYDROCHLORIDE AND TIMOLOL MALEATE 1 DROP: 20; 5 SOLUTION/ DROPS OPHTHALMIC at 18:04

## 2018-10-20 RX ADMIN — IPRATROPIUM BROMIDE AND ALBUTEROL SULFATE 3 ML: .5; 3 SOLUTION RESPIRATORY (INHALATION) at 19:45

## 2018-10-20 RX ADMIN — Medication 10 ML: at 05:56

## 2018-10-20 RX ADMIN — FAMOTIDINE 20 MG: 20 TABLET ORAL at 17:49

## 2018-10-20 RX ADMIN — FUROSEMIDE 20 MG: 10 INJECTION, SOLUTION INTRAMUSCULAR; INTRAVENOUS at 17:40

## 2018-10-20 NOTE — PROGRESS NOTES
Problem: Mobility Impaired (Adult and Pediatric) Goal: *Acute Goals and Plan of Care (Insert Text) Physical Therapy Goals Initiated 10/20/2018 1. Patient will move from supine to sit and sit to supine , scoot up and down and roll side to side in bed with moderate assistance x 1 within 7 day(s). 2.  Patient will transfer from bed to chair and chair to bed with maximal assistance using the least restrictive device within 7 day(s). 3.  Patient will perform sit to stand with maximal assistance within 7 day(s). 4.  Patient will sit on EOB x 20 minutes with intact sitting balance within 7 days to prepare for standing and increased activity tolerance. Will address ambulation goals once appropriate. physical Therapy EVALUATION Patient: Clifton Galindo (23 y.o. male) Date: 10/20/2018 Primary Diagnosis: Acute GI bleeding Pulmonary hemorrhage Acute GI Bleeding 
dysphasia Procedure(s) (LRB): ESOPHAGOGASTRODUODENOSCOPY (EGD) (N/A) PERCUTANEOUS ENDOSCOPIC GASTROSTOMY TUBE INSERTION (N/A) 17 Days Post-Op Precautions:  Fall ASSESSMENT : 
Based on the objective data described below, the patient presents with impaired functional mobility secondary to impaired sitting balance, gross ROM and strength deficits, increased edema, increased drowsiness, and decreased activity tolerance following admission for acute GI bleed. Patient with extensive hospitalization, including PEA cardiac arrest on 9/14 and prolonged intubation. Pt currently on 50% trach collar. Pt received supine in bed and agreeable to PT evaluation. Pt cleared by nursing for mobility. VSS at rest. Pt with decreased command following, decreased attention/concention, and slow processing throughout session. Pt required max-total A x 2 for all functional mobility this date, including rolling, supine<>sit transfers, and scooting. Pt sat on EOB x 10 minutes with fair sitting balance, needing min-CGA for support.  Pt able to perform LAQ sitting EOB with CGA for trunk support and max cueing to complete. SaO2 89-93% on 50% trach collar during activity. Pt with increased fatigue, therefore pt was assisted back into supine, however required total A x 2 as pt resistive. Pt was left supine in bed with all needs met, RN aware, and VSS following therapy session. Recommend pt discharge to LTAC. PT will continue to follow to address mobility impairments as noted above. Patient will benefit from skilled intervention to address the above impairments. Patients rehabilitation potential is considered to be Fair Factors which may influence rehabilitation potential include:  
[]         None noted 
[]         Mental ability/status [x]         Medical condition 
[]         Home/family situation and support systems 
[]         Safety awareness 
[]         Pain tolerance/management 
[]         Other: PLAN : 
Recommendations and Planned Interventions: 
[x]           Bed Mobility Training             []    Neuromuscular Re-Education 
[x]           Transfer Training                   []    Orthotic/Prosthetic Training 
[]           Gait Training                         []    Modalities [x]           Therapeutic Exercises           []    Edema Management/Control 
[x]           Therapeutic Activities            [x]    Patient and Family Training/Education 
[]           Other (comment): Frequency/Duration: Patient will be followed by physical therapy  4 times a week to address goals. Discharge Recommendations: LTAC Further Equipment Recommendations for Discharge: TBD by facility SUBJECTIVE:  
Patient attempting to verbalize with trach collar, however unable to understand patient. Encouraged nodding yes/no to respond to questions. OBJECTIVE DATA SUMMARY:  
HISTORY:   
Past Medical History:  
Diagnosis Date  Aneurysm ([address] AAA  Arrhythmia   
 atrial fibrillation 2016  Hypertension  Other ill-defined conditions(419.89) hx of broken arm left/cast  
 Other ill-defined conditions(799.89)   
 glaucoma  Other ill-defined conditions(799.89)   
 elevated cholesterol  Other ill-defined conditions(799.89)   
 hx bursitis knees Past Surgical History:  
Procedure Laterality Date  ABDOMEN SURGERY PROC UNLISTED  1/16/13 AORTIC ABDOMINAL ANUERYSM REPAIR ENDOVASCULAR   
 BRONCHOSCOPY DIAGNOSTIC  9/28/2018  HX HEENT    
 bilat cataract  HX ORTHOPAEDIC    
 ligament surgery left arm  HX OTHER SURGICAL    
 broken left foot casted and healed  PLACE PERCUT GASTROSTOMY TUBE  10/3/2018  UPPER GI ENDOSCOPY,BIOPSY  4/10/2015 Prior Level of Function/Home Situation: Pt unable to provide PLOF, however per chart, pt is independent for mobility and ADLs at baseline. Lives with wife. Personal factors and/or comorbidities impacting plan of care: HTN; AAA; a-fib Home Situation Home Environment: Private residence # Steps to Enter: 14 One/Two Story Residence: Two story Living Alone: No 
Support Systems: (unable to clarify further staus due to trach) Patient Expects to be Discharged to[de-identified] Patient room Current DME Used/Available at Home: Oxygen, portable EXAMINATION/PRESENTATION/DECISION MAKING: Critical Behavior: 
Neurologic State: Alert Orientation Level: Appropriate for age(via yes/no head nodding) Cognition: Follows commands(increased cues to nod head yes/no and follow commands) Safety/Judgement: Fall prevention, Awareness of environment Hearing: Auditory Auditory Impairment: NoneSkin:  Intact Edema: BUE/BLE edema Range Of Motion: 
AROM: Generally decreased, functional 
  
  
  
PROM: Generally decreased, functional 
  
  
  
Strength:   
Strength: Generally decreased, functional 
  
  
  
  
  
  
Tone & Sensation:  
Tone: Normal 
  
  
  
  
Sensation: Intact Coordination: 
Coordination: Generally decreased, functional 
Vision:  
Acuity: Within Defined Limits Functional Mobility: 
Bed Mobility: 
Rolling: Total assistance Supine to Sit: Maximum assistance;Assist x2 Sit to Supine: Total assistance;Assist x2(resistive at times to transfer) Scooting: Maximum assistance Transfers: 
  
  
     
  
     
  
  
Balance:  
Sitting: Impaired Sitting - Static: Fair (occasional) Sitting - Dynamic: Fair (occasional)Ambulation/Gait Training: 
 
Functional Measure: 
Barthel Index: 
 
Bathin Bladder: 0 Bowels: 0 Groomin Dressin Feedin Mobility: 0 Stairs: 0 Toilet Use: 0 Transfer (Bed to Chair and Back): 5 Total: 5 Barthel and G-code impairment scale: 
Percentage of impairment CH 
0% CI 
1-19% CJ 
20-39% CK 
40-59% CL 
60-79% CM 
80-99% CN 
100% Barthel Score 0-100 100 99-80 79-60 59-40 20-39 1-19 
 0 Barthel Score 0-20 20 17-19 13-16 9-12 5-8 1-4 0 The Barthel ADL Index: Guidelines 1. The index should be used as a record of what a patient does, not as a record of what a patient could do. 2. The main aim is to establish degree of independence from any help, physical or verbal, however minor and for whatever reason. 3. The need for supervision renders the patient not independent. 4. A patient's performance should be established using the best available evidence. Asking the patient, friends/relatives and nurses are the usual sources, but direct observation and common sense are also important. However direct testing is not needed. 5. Usually the patient's performance over the preceding 24-48 hours is important, but occasionally longer periods will be relevant. 6. Middle categories imply that the patient supplies over 50 per cent of the effort. 7. Use of aids to be independent is allowed. Yoandy Haney., Barthel, D.W. ([id number]). Functional evaluation: the Barthel Index. [address] [facility] (14)2. TARAN Banda, Radha Olson., [place] Jada.Td, [address] [place] [address] Ave ().  Measuring the change indisability after inpatient rehabilitation; comparison of the responsiveness of the Barthel Index and Functional [place] Cross Measure. Journal of Neurology, Neurosurgery, and Psychiatry, 66(4), 971-840. ROGER Menjivar, YESSY Tate, & Mariela Aguirre M.A. (2004.) Assessment of post-stroke quality of life in cost-effectiveness studies: The usefulness of the Barthel Index and the EuroQoL-5D. [facility], 13, 318-32 G codes: In compliance with CMSs Claims Based Outcome Reporting, the following G-code set was chosen for this patient based on their primary functional limitation being treated: The outcome measure chosen to determine the severity of the functional limitation was the Barthel Index with a score of 5/100 which was correlated with the impairment scale. ? Mobility - Walking and Moving Around:  
  - CURRENT STATUS: CM - 80%-99% impaired, limited or restricted  - GOAL STATUS: CK - 40%-59% impaired, limited or restricted  - D/C STATUS:  ---------------To be determined--------------- Physical Therapy Evaluation Charge Determination History Examination Presentation Decision-Making MEDIUM  Complexity : 1-2 comorbidities / personal factors will impact the outcome/ POC  HIGH Complexity : 4+ Standardized tests and measures addressing body structure, function, activity limitation and / or participation in recreation  MEDIUM Complexity : Evolving with changing characteristics  Other outcome measures Barthel Index  HIGH Based on the above components, the patient evaluation is determined to be of the following complexity level: MEDIUM Pain: 
Pain Scale 1: Numeric (0 - 10) Pain Intensity 1: 0 Activity Tolerance:  
Fair - increased fatigue with activity; VSS on 50% trach collar; SaO2 89-93% with activity; no pain complaints Please refer to the flowsheet for vital signs taken during this treatment. After treatment: []         Patient left in no apparent distress sitting up in chair 
[x]         Patient left in no apparent distress in bed 
[x]         Call bell left within reach [x]         Nursing notified 
[]         Caregiver present 
[]         Bed alarm activated COMMUNICATION/EDUCATION:  
The patients plan of care was discussed with: Physical Therapist, Occupational Therapist and Registered Nurse. [x]         Fall prevention education was provided and the patient/caregiver indicated understanding. [x]         Patient/family have participated as able in goal setting and plan of care. [x]         Patient/family agree to work toward stated goals and plan of care. []         Patient understands intent and goals of therapy, but is neutral about his/her participation. []         Patient is unable to participate in goal setting and plan of care. Thank you for this referral. 
Linda Varner, PT, DPT Time Calculation: 23 mins

## 2018-10-20 NOTE — PROGRESS NOTES
Problem: Self Care Deficits Care Plan (Adult) Goal: *Acute Goals and Plan of Care (Insert Text) Occupational Therapy Goals Initiated 10/20/2018 1. Patient will perform upper body dressing with supervision/set-up within 7 day(s). 2.  Patient will perform lower body dressing with minimal assistance/contact guard assist within 7 day(s). 3.  Patient will perform grooming with modified independence within 7 day(s). 4.  Patient will perform toilet transfers with moderate assistance  within 7 day(s). 5.  Patient will perform all aspects of toileting with minimal assistance/contact guard assist within 7 day(s). 6.  Patient will participate in upper extremity therapeutic exercise/activities with supervision/set-up for 5 minutes within 7 day(s). 7.  Patient will utilize energy conservation techniques during functional activities with verbal cues within 7 day(s). Occupational Therapy EVALUATION Patient: Susan Baxter (44 y.o. male) Date: 10/20/2018 Primary Diagnosis: Acute GI bleeding Pulmonary hemorrhage Acute GI Bleeding 
dysphasia Procedure(s) (LRB): ESOPHAGOGASTRODUODENOSCOPY (EGD) (N/A) PERCUTANEOUS ENDOSCOPIC GASTROSTOMY TUBE INSERTION (N/A) 17 Days Post-Op Precautions: trach  Fall ASSESSMENT : 
Based on the objective data described below, the patient presents at overall max A to total A for self care tasks. Pt functional performance is hindered by decreased endurance, decreased ROM, decreased strength, and decreased sitting balance. Pt able to come to sitting with max A X 2 and CGA, to close SBA for static sitting balance. Pt able to sit at EOB for 10 minutes while addressing sitting balance with VSS. Pt sat at EOB with CGA without UE support, CGA while completing reaching activities in planes. Pt able to reach with R UE to 90*, however unable to lift L UE without A due to ?command following and fatigue.  ( Pt later observed moving L UE to nose to scratch ). Pt needing frequent cues throughout session to nod head yes/no as his attempts to mouth communication were difficult to determine. Feel pt will continue to benefit from skilled OT to maximize functional return. Pt will need further OT upon discharge at SNF vs rehab pending progress in hospital.  Noted chart mentioned [facility] as possible disposition. Patient will benefit from skilled intervention to address the above impairments. Patients rehabilitation potential is considered to be Good Factors which may influence rehabilitation potential include:  
[]             None noted []             Mental ability/status [x]             Medical condition []             Home/family situation and support systems []             Safety awareness []             Pain tolerance/management 
[]             Other: PLAN : 
Recommendations and Planned Interventions: 
[x]               Self Care Training                  [x]        Therapeutic Activities []               Functional Mobility Training    []        Cognitive Retraining 
[x]               Therapeutic Exercises           [x]        Endurance Activities []               Balance Training                   [x]        Neuromuscular Re-Education []               Visual/Perceptual Training     [x]   Home Safety Training 
[x]               Patient Education                 []        Family Training/Education []               Other (comment): Frequency/Duration: Patient will be followed by occupational therapy 4 times a week to address goals. Discharge Recommendations: Inpatient Rehab and [facility] Further Equipment Recommendations for Discharge: TBD SUBJECTIVE:  
Patient nodding head yes/no appropriately OBJECTIVE DATA SUMMARY:  
HISTORY:  
Past Medical History:  
Diagnosis Date  Aneurysm ([address] AAA  Arrhythmia   
 atrial fibrillation 2016  Hypertension  Other ill-defined conditions(799.89)   
 hx of broken arm left/cast  
 Other ill-defined conditions(799.89)   
 glaucoma  Other ill-defined conditions(799.89)   
 elevated cholesterol  Other ill-defined conditions(799.89)   
 hx bursitis knees Past Surgical History:  
Procedure Laterality Date  ABDOMEN SURGERY PROC UNLISTED  1/16/13 AORTIC ABDOMINAL ANUERYSM REPAIR ENDOVASCULAR   
 BRONCHOSCOPY DIAGNOSTIC  9/28/2018  HX HEENT    
 bilat cataract  HX ORTHOPAEDIC    
 ligament surgery left arm  HX OTHER SURGICAL    
 broken left foot casted and healed  PLACE PERCUT GASTROSTOMY TUBE  10/3/2018  UPPER GI ENDOSCOPY,BIOPSY  4/10/2015 Prior Level of Function/Environment/Context: Per chart, pt I with ADLs, IADLS and amb without AD. Pt unable to confirm or elaborate due to trach Occupations in which the patient is/was successful, what are the barriers preventing that success:  
Performance Patterns (routines, roles, habits, and rituals):  
Personal Interests and/or values:  
Expanded or extensive additional review of patient history:  
 
Home Situation Home Environment: Private residence # Steps to Enter: 14 One/Two Story Residence: Two story Living Alone: No 
Support Systems: (unable to clarify further staus due to trach) Patient Expects to be Discharged to[de-identified] Patient room Current DME Used/Available at Home: Oxygen, portable Hand dominance: RightEXAMINATION OF PERFORMANCE DEFICITS: 
Cognitive/Behavioral Status: 
Neurologic State: Alert Orientation Level: Appropriate for age(via yes/no head nodding) Cognition: Follows commands(increased cues to nod head yes/no and follow commands) Perception: Appears intact Perseveration: No perseveration noted Safety/Judgement: Fall prevention; Awareness of environment Skin: UE intact Edema: UE intact Hearing: Auditory Auditory Impairment: None Vision/Perceptual: Acuity: Within Defined Limits Range of Motion: 
 
AROM: Generally decreased, functional(able to raise R UE to 90*, L UE AAROM to 90*) PROM: Generally decreased, functional(see above for ROM) Strength: 
 
Strength: Generally decreased, functional(R UE 3-/5, TOMY E 2+/5) Coordination: 
Coordination: Generally decreased, functional 
Fine Motor Skills-Upper: Left Intact; Right Intact Gross Motor Skills-Upper: Left Intact; Right Intact Tone & Sensation: 
 
Tone: Normal 
Sensation: Intact Balance: 
Sitting: Impaired Sitting - Static: Fair (occasional) Sitting - Dynamic: Fair (occasional) Functional Mobility and Transfers for ADLs:Bed Mobility: 
Supine to Sit: Maximum assistance;Assist x2 Sit to Supine: Total assistance;Assist x2(resistive at times to transfer) Scooting: Maximum assistance Transfers: Toilet Transfer : Total assistance ADL Assessment: 
Feeding: (NT, pt on trach collar and TPN) Oral Facial Hygiene/Grooming: Moderate assistance Bathing: Total assistance Upper Body Dressing: Maximum assistance Lower Body Dressing: Total assistance Toileting: Total assistance Cognitive Retraining Safety/Judgement: Fall prevention; Awareness of environment Therapeutic Activity: 
Pt sat at EOB for 10 minutes. Pt initially needing B UE support for sitting balance, progress to unilateral and then no UE support with CGA to close SBA. Pt then able to complete reaching in planes activity with R UE to 90- and L UE AAROM to 90* with CGA for dynamic sitting balance. Pt completed the above activity in preparation for ADLs at EOB. Functional Measure: 
Barthel Index: 
 
Bathin Bladder: 0 Bowels: 0 Groomin Dressin Feedin Mobility: 0 Stairs: 0 Toilet Use: 0 Transfer (Bed to Chair and Back): 5 Total: 5 Barthel and G-code impairment scale: 
Percentage of impairment [facility] 
 0% CI 
1-19% CJ 
20-39% CK 
40-59% CL 
60-79% CM 
80-99% CN 
100% Barthel Score 0-100 100 99-80 79-60 59-40 20-39 1-19 
 0 Barthel Score 0-20 20 17-19 13-16 9-12 5-8 1-4 0 The Barthel ADL Index: Guidelines 1. The index should be used as a record of what a patient does, not as a record of what a patient could do. 2. The main aim is to establish degree of independence from any help, physical or verbal, however minor and for whatever reason. 3. The need for supervision renders the patient not independent. 4. A patient's performance should be established using the best available evidence. Asking the patient, friends/relatives and nurses are the usual sources, but direct observation and common sense are also important. However direct testing is not needed. 5. Usually the patient's performance over the preceding 24-48 hours is important, but occasionally longer periods will be relevant. 6. Middle categories imply that the patient supplies over 50 per cent of the effort. 7. Use of aids to be independent is allowed. Laura Fatima., Barthel, DKeatonW. ([id number]). Functional evaluation: the Barthel Index. [address] [facility] (14)2. [facility] [place] [employer] robin TARAN Harding, Odell Horvath., Denis Pennington., [facility], [address] [facility] (1999). Measuring the change indisability after inpatient rehabilitation; comparison of the responsiveness of the Barthel Index and Functional [place] Measure. Journal of Neurology, Neurosurgery, and Psychiatry, 66(4), 952-120. Judah Wu, [place], YESSY Tate, & Jaime Machuca, M.A. (2004.) Assessment of post-stroke quality of life in cost-effectiveness studies: The usefulness of the Barthel Index and the EuroQoL-5D. [facility], 13, 501-31 G codes: In compliance with CMSs Claims Based Outcome Reporting, the following G-code set was chosen for this patient based on their primary functional limitation being treated:  
 
The outcome measure chosen to determine the severity of the functional limitation was the Barthel with a score of 5/100 which was correlated with the impairment scale. ? Self Care:  
  - CURRENT STATUS: CM - 80%-99% impaired, limited or restricted  - GOAL STATUS: CJ - 20%-39% impaired, limited or restricted  - D/C STATUS:  ---------------To be determined--------------- Occupational Therapy Evaluation Charge Determination History Examination Decision-Making LOW Complexity : Brief history review  MEDIUM Complexity : 3-5 performance deficits relating to physical, cognitive , or psychosocial skils that result in activity limitations and / or participation restrictions MEDIUM Complexity : Patient may present with comorbidities that affect occupational performnce. Miniml to moderate modification of tasks or assistance (eg, physical or verbal ) with assesment(s) is necessary to enable patient to complete evaluation Based on the above components, the patient evaluation is determined to be of the following complexity level: LOW Pain: 
Pain Scale 1: Numeric (0 - 10) Pain Intensity 1: 0 Activity Tolerance:  
NAD, VSS throughout session, O2 steady 90's on trach collar at EOB Please refer to the flowsheet for vital signs taken during this treatment. After treatment:  
[] Patient left in no apparent distress sitting up in chair 
[x] Patient left in no apparent distress in bed 
[] Call bell left within reach [x] Nursing notified 
[] Caregiver present 
[] Bed alarm activated COMMUNICATION/EDUCATION:  
The patients plan of care was discussed with: Physical Therapist and Registered Nurse. [x] Home safety education was provided and the patient/caregiver indicated understanding. [x] Patient/family have participated as able in goal setting and plan of care. [] Patient/family agree to work toward stated goals and plan of care. [] Patient understands intent and goals of therapy, but is neutral about his/her participation. [] Patient is unable to participate in goal setting and plan of care. This patients plan of care is appropriate for delegation to HOLLIE. Thank you for this referral. 
Ace Miramontes, OTR/L Time Calculation: 23 mins

## 2018-10-20 NOTE — PROGRESS NOTES
Hospitalist Progress Note NAME: Diya Wheatley  
:  1934 MRN:  [id number] Assessment / Plan: 
80-year-old male with past medical history of coronary artery disease, atrial fibrillation on anticoagulation with warfarin, HTN and HLD who presented to ED on  with vomiting blood 2 days PTA. Acute encephalopathy in setting of acute hypoxic respiratory failure and resultant PEA cardiac arrest : s/p 6 min CPR and epi with ROSC after IR arteriogram. More alert the last few days. - CT head  showed small age-indeterminate infarct in the left corona radiata - CT head 10/11 with no evidence of acute intracranial abnormality. 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 Bilateral tympanomastoid effusions. - s/p tracheostomy. continue trach collar trials. Awaiting bed in [place] [id number]. Pulmonary hemorrhage s/p pulmonary artery embolization with RLL lung mass (ca vs inflammatory pseudotumor) and COPD: extubated on  but reintubated , now s/p trach placement 10/3, remains on vent via trach. Still with lots of secretions. - CT chest  with patchy airspace disease in the right lung base with an associated hyperdense 5.2 x 4.3 cm oval lesion.  
- CTA chest  due to persistent bloody pulmonary secretions. Right lower lobe masslike abnormality demonstrates increased internal density and has increased in size measuring 6.7 x 6.7 cm, compared to 5.7 x 4.4 cm. This is compatible with internal hemorrhage. There is new moderate right lower lobe partial collapse/atelectasis. - con't lasix as above; monitor I/Os - s/p arteriogram : Thoracic aortic and intercostal arteriograms demonstrating no 
enlarged bronchial artery supplying the right lower lobe of the lung. Normal appearing and normal sized right bronchial artery is seen without any hyperplasia or dominant right lower lobe supply. No embolization was performed. - s/p diagnostic bronch on 9/16. No endobronchial lesions seen. - blood cultures negative. Cytology from bronchial washing on 9/16 is atypical, favor benign reactive process. - work-up for rheumatologic causes of hemoptysis negative: ANCA, anti-GBM, MPO Ab, CT-3 Ab, SSA/SSB, RNP Ab, Arredondo/RNP, dsDNA, and Arredondo Ab all negative. JOSS+. - completed zosyn for possible lung abscess (9/20). R pleural effusion:  
CT chest 10/11 with worsening of the appearance of the right hemithorax with increased pleural effusion and consolidation. 
- most recent CXR 10/13 with focal area of parenchymal consolidation at the right lung base which persists. Cardiomegaly unchanged. - R chest tube placed 10/11 with blood-tinged L pleural fluid drainage. S/p chest tube removal 10/18. On lasix BID Sepsis due to Pseudomonas catheter-associated UTI and right pleural fluid, not present on admission: - Urine and pleural fluid cultures (1 swab) both pansensitive pseudomonas.  Sputum culture with normal respiratory ernestina and rare yeast.  Blood cultures no growth. - off emperic vancomycin and zosyn. On Levaquin until 10/26. Acute blood loss anemia and anemia of chronic disease Hgb trending down to 7.3 today. No external bleeding. Close monitor. INR at 1.2. High fibrinogen. Platelets WNL. Elevated LFTs of unclear etiology: 
stopped statin for now LFT trending down. CAD s/p PCI, atrial fibrillation on chronic anticoagulation, and benign HTN: 
- holding amiodarone and diltiazem; holding anticoagulation (home coumadin) and pravachol 
- con't IV lasix (on oral lasix outpatient) ALAN: resolved UTI, acute cystitis w/ hematuria, POA:  Required almaraz placed by urology Hyperlipidemia: statin held due to elevated LFTs as above Hypernatremia, resolved Obesity (Body mass index is 32.27 kg/(m^2) 
   
Code: Full (wife and children are decision makers) DVT prophylaxis: heparin Subjective: Chief Complaint / Reason for Physician Visit Awake. Open eyes. Clear secretions at trach site. Discussed with RN events overnight. No hemoptysis. Review of Systems: 
 
Could NOT obtain due to: tracheostomy Objective: VITALS:  
Last 24hrs VS reviewed since prior progress note. Most recent are: 
Patient Vitals for the past 24 hrs: 
 Temp Pulse Resp BP SpO2  
10/20/18 1100  87 27 102/71 90 % 10/20/18 1019 98.2 °F (36.8 °C) 98 27 114/66 91 % 10/20/18 0900  92 24 120/56 93 % 10/20/18 0830  94 21 111/62 90 % 10/20/18 0802     93 % 10/20/18 0800  90 22 103/62 97 % 10/20/18 0759     96 % 10/20/18 0700 98.2 °F (36.8 °C) 94 20 118/56 91 % 10/20/18 0637     100 % 10/20/18 0600  76 15 110/54 99 % 10/20/18 0500  83 13 92/53 100 % 10/20/18 0400 98.4 °F (36.9 °C) 77 19 (!) 81/44 98 % 10/20/18 0327  82 22  98 % 10/20/18 0300  79 20 (!) 86/47   
10/20/18 0200  81 15 114/57 99 % 10/19/18 2332  84 20  97 % 10/19/18 2300 98.1 °F (36.7 °C) 81 17 110/52 99 % 10/19/18 2200  74 20 104/56 99 % 10/19/18 2100  78 18 103/60 98 % 10/19/18 2000 97.7 °F (36.5 °C) 74 23 101/61 97 % 10/19/18 1953     98 % 10/19/18 1939  78 20  98 % 10/19/18 1900  79 17 112/59 99 % 10/19/18 1800  80 17 108/54 99 % 10/19/18 1700  78 17 105/48 98 % 10/19/18 1613  84 18  98 % 10/19/18 1600  73 18 104/55 97 % 10/19/18 1500  77 20 95/74 97 % 10/19/18 1434 97.9 °F (36.6 °C) 76 17 103/41 98 % 10/19/18 1400  84 21 111/56 99 % 10/19/18 1324  (!) 107 25  98 % 10/19/18 1300  84 26 (!) 145/102  Intake/Output Summary (Last 24 hours) at 10/20/2018 1208 Last data filed at 10/20/2018 1100 Gross per 24 hour Intake 1860 ml Output 2065 ml Net -205 ml PHYSICAL EXAM: 
General: Overweight. Alert, no acute distress   
EENT:  Anicteric sclerae. mildline trach tube with clear secretion. Resp:  Decreased air entry at right lower lung field. no wheezing or rales. CV:  Regular rate,  No LE edema GI:  Soft, moderately distended, Non tender.  +BS Neurologic:  Alert and oriented X 1, no speech. Reviewed most current lab test results and cultures  YES Reviewed most current radiology test results   YES Review and summation of old records today    NO Reviewed patient's current orders and MAR    YES 
PMH/SH reviewed - no change compared to H&P 
________________________________________________________________________ 
________________________________________________________________________ Sheeba Corbin MD  
 
Procedures: see electronic medical records for all procedures/Xrays and details which were not copied into this note but were reviewed prior to creation of Plan. LABS: 
I reviewed today's most current labs and imaging studies. Pertinent labs include: 
Recent Labs 10/20/18 
3707 10/19/18 
6541 10/18/18 
8396 WBC 7.2 7.5 9.4 HGB 7.0* 7.8* 7.3* HCT 23.1* 26.1* 24.2*  
 165 159 Recent Labs 10/20/18 
9723 10/19/18 
6515 10/18/18 
5800  142 140  
K 3.8 3.7 3.6  104 102 CO2 33* 35* 33* * 128* 117* BUN 28* 27* 28* CREA 1.00 1.03 1.02  
CA 8.3* 8.6 8.2* MG 2.3 2.4 2.1 PHOS 2.2* 2.4* 2.2* INR 1.1 1.1 1.2* Signed: Sheeba Corbin MD

## 2018-10-20 NOTE — PROGRESS NOTES
[facility] Pulmonary Consult Service NotePulmonary, Critical Care, and Sleep Medicine Name: Osmar Ruiz MRN: [id number] : 1934 Hospital: [facility] Date: 10/20/2018   Hospital Day: 36 IMPRESSION:  
1. 18 cardiopulmonary arrest with  6 min of CPR then ROSC. 2. Acute severe  respiratory failure-on vent 3. Acute hemoptysis- with suspected lung ca 4. New worsening anemia 5. Moderate right pleural effusion, s/p prior drain 6. S/P pulmonary artery embolization for hemoptysis per IR of suspected lung mass. 7. COPD moderate to severe at baseline 8. Anemia and prior coagulopathy on coumadin 9. Encehalopathy/Debility multifactorial - has hearing loss as well 10. Hx of CAD s/p PCI, afib on amio, former smoker, prior cva RECOMMENDATIONS/PLAN:  
1. TC trials as tolerated 2. nebs 3. Gentle diuresis 4. Enteral feedings 5. Glucose monitoring and SSI 6. PT/OT 7. DVT/GI prophylaxis 8. Will need LTAC I personally reviewed meds, labs and images Discussed with ICU RN He remains critically ill req vent support and ICU care Subjective/Initial History:  
10-18: no overnight events. More alert today 10-17: still desaturating with TC trials 10-16: no events. Desaturated with TC trials yesterday. 10-15: no events. No new reported issues 10-14:  No major events. Received transfusion yesterday 10-13:  No changes. Spoke to wife who agrees to transfusion. Cytology still pending 10-12-18: Pt seems to be much more alert when seen this am. Had near 3.5-4L of right pleural fluid output. NO ROS or HPI are obtainable from pt.  
 
 
10-11-18: Pt has been slow to respond. Still not waking up. Had increased secretion from trach and from his penis. Not follow any commands. Not able to get ROS or HPI from pt.  
 
 
10-10-18: No major changes or events over last 24 hrs. Not able to obtain ROS or HPI from pt. Some thick yellow secretions present around this trach. 10-9-18: Not able to get hx  Or ROS from pt. No acute changes overnight. Had some moderate secretions this am.  
 
 
10-8-18: Pt still not waking up. Has ongoing issues with blood clots in urine. Per nurses has been with recurrent issues with clotting and require frequent flushing of the almaraz. Pt has not been able to tolerate trach collar. Unable to get ROS or HPI from pt. MAR reviewed and pertinent medications noted or modified as needed Current Facility-Administered Medications Medication  enoxaparin (LOVENOX) injection 40 mg  
 levoFLOXacin (LEVAQUIN) tablet 750 mg  
 famotidine (PEPCID) tablet 20 mg  
 influenza vaccine 2018-19 (6 mos+)(PF) (FLUARIX QUAD/FLULAVAL QUAD) injection 0.5 mL  furosemide (LASIX) injection 20 mg  
 albuterol-ipratropium (DUO-NEB) 2.5 MG-0.5 MG/3 ML  
 fentaNYL citrate (PF) injection 25 mcg  zinc oxide-cod liver oil (DESITIN) 40 % paste  acetaminophen (TYLENOL) solution 650 mg  
 sodium chloride (NS) flush 10-30 mL  sodium chloride (NS) flush 10 mL  sodium chloride (NS) flush 10-40 mL  heparin (porcine) pf 300 Units  glycopyrrolate (ROBINUL) injection 0.1 mg  
 chlorhexidine (PERIDEX) 0.12 % mouthwash 15 mL  albuterol (PROVENTIL HFA, VENTOLIN HFA, PROAIR HFA) inhaler 2 Puff  dorzolamide-timolol (COSOPT) 22.3-6.8 mg/mL ophthalmic solution 1 Drop  latanoprost (XALATAN) 0.005 % ophthalmic solution 1 Drop  sodium chloride (NS) flush 5-10 mL  sodium chloride (NS) flush 5-10 mL  ondansetron (ZOFRAN) injection 4 mg ROS:A comprehensive review of systems was negative except for that written in the HPI. Objective: 
 
Vital Signs: Telemetry:    normal sinus rhythmIntake/Output:  
Visit Vitals /57 Pulse 86 Temp 98.2 °F (36.8 °C) Resp 20 Ht 6' 2\" (1.88 m) Wt 106.4 kg (234 lb 9.1 oz) SpO2 91% BMI 30.12 kg/m² Temp (24hrs), Av.1 °F (36.7 °C), Min:97.7 °F (36.5 °C), Max:98.4 °F (36.9 °C) O2 Device: Tracheostomy, Ventilator O2 Flow Rate (L/min): 10 l/min Wt Readings from Last 4 Encounters:  
10/20/18 106.4 kg (234 lb 9.1 oz) 16 111.1 kg (245 lb)  
16 106.6 kg (235 lb) 04/08/15 103.4 kg (228 lb) Intake/Output Summary (Last 24 hours) at 10/20/2018 1236 Last data filed at 10/20/2018 1232 Gross per 24 hour Intake 1910 ml Output 2285 ml Net -375 ml Last shift:      10/20 0701 - 10/20 1900 In: 310 Out: 520 [Urine:520] Last 3 shifts: 10/18 190 - 10/20 07 In: 0851 [I.V.:150] Out: 2549 [Urine:2530; Drains:800] Physical Exam:  
 General:  No distress, trach and vent support. HEAD: Normocephalic, without obvious abnormality, atraumatic EYES: conjunctivae clear. anicteric sclerae NOSE: nares normal, no drainage, no nasal flaring, Neck: Supple, symmetrical, trachea midline, has trach in place, on Mechanical vent support. Chest: increased AP diameter Lungs:   clear anterioly. Trached on vent. Heart: Regular rate and rhythm nl s1,2. Abdomen: soft, non-tender, +BS, pt has almaraz in place. Obese. Musculoskeletal: no gross deformities Neuro:  Intermittently follows commands. Psych: Not able to assess; no agitation. NO anxiety or depression when seen. Data:  
 
Current Facility-Administered Medications Medication Dose Route Frequency  enoxaparin (LOVENOX) injection 40 mg  40 mg SubCUTAneous Q24H  
 levoFLOXacin (LEVAQUIN) tablet 750 mg  750 mg Oral Q24H  
 famotidine (PEPCID) tablet 20 mg  20 mg Per G Tube BID  influenza vaccine - (6 mos+)(PF) (FLUARIX QUAD/FLULAVAL QUAD) injection 0.5 mL  0.5 mL IntraMUSCular PRIOR TO DISCHARGE  
 furosemide (LASIX) injection 20 mg  20 mg IntraVENous ACB&D  
 albuterol-ipratropium (DUO-NEB) 2.5 MG-0.5 MG/3 ML  3 mL Nebulization BID RT  
  sodium chloride (NS) flush 10-40 mL  10-40 mL InterCATHeter Q8H  
 chlorhexidine (PERIDEX) 0.12 % mouthwash 15 mL  15 mL Oral BID  dorzolamide-timolol (COSOPT) 22.3-6.8 mg/mL ophthalmic solution 1 Drop  1 Drop Both Eyes BID  latanoprost (XALATAN) 0.005 % ophthalmic solution 1 Drop  1 Drop Both Eyes QHS  sodium chloride (NS) flush 5-10 mL  5-10 mL IntraVENous Q8H Labs: 
Recent Labs 10/20/18 
2981 10/19/18 
9055 10/18/18 
3204 WBC 7.2 7.5 9.4 HGB 7.0* 7.8* 7.3* HCT 23.1* 26.1* 24.2*  
 165 159 Recent Labs 10/20/18 
1003 10/19/18 
1936 10/18/18 
0482  142 140  
K 3.8 3.7 3.6  104 102 CO2 33* 35* 33* * 128* 117* BUN 28* 27* 28* CREA 1.00 1.03 1.02  
CA 8.3* 8.6 8.2* MG 2.3 2.4 2.1 PHOS 2.2* 2.4* 2.2* INR 1.1 1.1 1.2* Recent Labs 10/20/18 
5138 PH 7.52* PCO2 39 PO2 110* HCO3 31* FIO2 40 Imaging: 
I have personally reviewed the patients radiographs and have reviewed the reports: 
None today Total critical care time exclusive of procedures: 25 minutes Mario Bingham MD

## 2018-10-20 NOTE — PROGRESS NOTES
Bedside and Verbal shift change report given to Shimon Machado (oncoming nurse) by Cameron Henao (offgoing nurse). Report included the following information SBAR, Intake/Output, MAR, Recent Results, Med Rec Status and Cardiac Rhythm Aflutter/Afib.

## 2018-10-20 NOTE — PROGRESS NOTES
Anticoagulation Dosing Indication:  DVT ppx Estimated Creatinine Clearance: 71.5 mL/min (based on SCr of 1 mg/dL). Estimated Creatinine Clearance (using IBW):63.9 mL/min Recent Labs 10/20/18 
3223 10/19/18 
0217 10/18/18 
9578 CREA 1.00 1.03 1.02  
HGB 7.0* 7.8* 7.3* HCT 23.1* 26.1* 24.2*  
 165 159 INR 1.1 1.1 1.2* Wt Readings from Last 1 Encounters:  
10/20/18 106.4 kg (234 lb 9.1 oz) Ht Readings from Last 1 Encounters:  
10/12/18 188 cm (74\") Body mass index is 30.12 kg/m². Plan:  Heparin 5000 units SQ q8h adjusted to Lovenox 40mg SQ q24h to minimize SQ anticoagulation injections per 24 hours for patient satisfaction.   
 
Thank you, 
Bonilla Brown, [facility]

## 2018-10-20 NOTE — PROGRESS NOTES
0700:  Bedside handoff received from Myesha Carlin RN (offgoing nurse). Patient currently on trach collar, 40% FiO2; toleraing well thus far:  RR, SpO2 WNL. VSS, afebrile. 1215:  Patient placed back on ventilator to rest.  Patient's oxygen saturations 89-90% with slightly increased WOB. 1900:  Bedside handoff report given to Rip Marvin RN (oncoming nurse).  
 
Renata Cain RN

## 2018-10-21 LAB
ANION GAP SERPL CALC-SCNC: 4 MMOL/L (ref 5–15)
APTT PPP: 30.6 SEC (ref 22.1–32)
BASOPHILS # BLD: 0 K/UL (ref 0–0.1)
BASOPHILS NFR BLD: 0 % (ref 0–1)
BUN SERPL-MCNC: 28 MG/DL (ref 6–20)
BUN/CREAT SERPL: 29 (ref 12–20)
CALCIUM SERPL-MCNC: 8.9 MG/DL (ref 8.5–10.1)
CHLORIDE SERPL-SCNC: 104 MMOL/L (ref 97–108)
CO2 SERPL-SCNC: 34 MMOL/L (ref 21–32)
CREAT SERPL-MCNC: 0.98 MG/DL (ref 0.7–1.3)
DIFFERENTIAL METHOD BLD: ABNORMAL
EOSINOPHIL # BLD: 0.1 K/UL (ref 0–0.4)
EOSINOPHIL NFR BLD: 1 % (ref 0–7)
ERYTHROCYTE [DISTWIDTH] IN BLOOD BY AUTOMATED COUNT: 21.5 % (ref 11.5–14.5)
FIBRINOGEN PPP-MCNC: 694 MG/DL (ref 200–475)
GLUCOSE SERPL-MCNC: 110 MG/DL (ref 65–100)
HCT VFR BLD AUTO: 25.7 % (ref 36.6–50.3)
HGB BLD-MCNC: 7.8 G/DL (ref 12.1–17)
IMM GRANULOCYTES # BLD: 0.1 K/UL (ref 0–0.04)
IMM GRANULOCYTES NFR BLD AUTO: 2 % (ref 0–0.5)
INR PPP: 1.1 (ref 0.9–1.1)
LYMPHOCYTES # BLD: 0.8 K/UL (ref 0.8–3.5)
LYMPHOCYTES NFR BLD: 13 % (ref 12–49)
MCH RBC QN AUTO: 31.7 PG (ref 26–34)
MCHC RBC AUTO-ENTMCNC: 30.4 G/DL (ref 30–36.5)
MCV RBC AUTO: 104.5 FL (ref 80–99)
MONOCYTES # BLD: 0.5 K/UL (ref 0–1)
MONOCYTES NFR BLD: 8 % (ref 5–13)
NEUTS SEG # BLD: 4.9 K/UL (ref 1.8–8)
NEUTS SEG NFR BLD: 76 % (ref 32–75)
NRBC # BLD: 0 K/UL (ref 0–0.01)
NRBC BLD-RTO: 0 PER 100 WBC
PHOSPHATE SERPL-MCNC: 2.7 MG/DL (ref 2.6–4.7)
PLATELET # BLD AUTO: 184 K/UL (ref 150–400)
PMV BLD AUTO: 11.3 FL (ref 8.9–12.9)
POTASSIUM SERPL-SCNC: 4.6 MMOL/L (ref 3.5–5.1)
PROTHROMBIN TIME: 11.4 SEC (ref 9–11.1)
RBC # BLD AUTO: 2.46 M/UL (ref 4.1–5.7)
RBC MORPH BLD: ABNORMAL
SODIUM SERPL-SCNC: 142 MMOL/L (ref 136–145)
THERAPEUTIC RANGE,PTTT: ABNORMAL SECS (ref 58–77)
WBC # BLD AUTO: 6.4 K/UL (ref 4.1–11.1)

## 2018-10-21 PROCEDURE — 36415 COLL VENOUS BLD VENIPUNCTURE: CPT | Performed by: INTERNAL MEDICINE

## 2018-10-21 PROCEDURE — 74011250636 HC RX REV CODE- 250/636: Performed by: INTERNAL MEDICINE

## 2018-10-21 PROCEDURE — 74011250637 HC RX REV CODE- 250/637: Performed by: INTERNAL MEDICINE

## 2018-10-21 PROCEDURE — 84100 ASSAY OF PHOSPHORUS: CPT | Performed by: INTERNAL MEDICINE

## 2018-10-21 PROCEDURE — 85025 COMPLETE CBC W/AUTO DIFF WBC: CPT | Performed by: INTERNAL MEDICINE

## 2018-10-21 PROCEDURE — 80048 BASIC METABOLIC PNL TOTAL CA: CPT | Performed by: INTERNAL MEDICINE

## 2018-10-21 PROCEDURE — 77010033678 HC OXYGEN DAILY

## 2018-10-21 PROCEDURE — 94003 VENT MGMT INPAT SUBQ DAY: CPT

## 2018-10-21 PROCEDURE — 74011000250 HC RX REV CODE- 250: Performed by: INTERNAL MEDICINE

## 2018-10-21 PROCEDURE — 77030018798 HC PMP KT ENTRL FED COVD -A

## 2018-10-21 PROCEDURE — 94640 AIRWAY INHALATION TREATMENT: CPT

## 2018-10-21 PROCEDURE — 85610 PROTHROMBIN TIME: CPT | Performed by: INTERNAL MEDICINE

## 2018-10-21 PROCEDURE — 65610000006 HC RM INTENSIVE CARE

## 2018-10-21 RX ADMIN — IPRATROPIUM BROMIDE AND ALBUTEROL SULFATE 3 ML: .5; 3 SOLUTION RESPIRATORY (INHALATION) at 07:56

## 2018-10-21 RX ADMIN — LEVOFLOXACIN 750 MG: 750 TABLET, FILM COATED ORAL at 21:08

## 2018-10-21 RX ADMIN — Medication: at 20:00

## 2018-10-21 RX ADMIN — FUROSEMIDE 20 MG: 10 INJECTION, SOLUTION INTRAMUSCULAR; INTRAVENOUS at 16:52

## 2018-10-21 RX ADMIN — Medication 10 ML: at 15:04

## 2018-10-21 RX ADMIN — ENOXAPARIN SODIUM 40 MG: 40 INJECTION, SOLUTION INTRAVENOUS; SUBCUTANEOUS at 15:12

## 2018-10-21 RX ADMIN — CHLORHEXIDINE GLUCONATE 15 ML: 1.2 RINSE ORAL at 09:02

## 2018-10-21 RX ADMIN — DORZOLAMIDE HYDROCHLORIDE AND TIMOLOL MALEATE 1 DROP: 20; 5 SOLUTION/ DROPS OPHTHALMIC at 18:44

## 2018-10-21 RX ADMIN — CHLORHEXIDINE GLUCONATE 15 ML: 1.2 RINSE ORAL at 21:19

## 2018-10-21 RX ADMIN — FAMOTIDINE 20 MG: 20 TABLET ORAL at 18:40

## 2018-10-21 RX ADMIN — Medication 10 ML: at 21:10

## 2018-10-21 RX ADMIN — LATANOPROST 1 DROP: 50 SOLUTION OPHTHALMIC at 21:09

## 2018-10-21 RX ADMIN — FAMOTIDINE 20 MG: 20 TABLET ORAL at 09:10

## 2018-10-21 RX ADMIN — Medication 10 ML: at 05:59

## 2018-10-21 RX ADMIN — IPRATROPIUM BROMIDE AND ALBUTEROL SULFATE 3 ML: .5; 3 SOLUTION RESPIRATORY (INHALATION) at 19:43

## 2018-10-21 RX ADMIN — FUROSEMIDE 20 MG: 10 INJECTION, SOLUTION INTRAMUSCULAR; INTRAVENOUS at 08:59

## 2018-10-21 RX ADMIN — Medication: at 15:23

## 2018-10-21 RX ADMIN — Medication: at 04:37

## 2018-10-21 RX ADMIN — DORZOLAMIDE HYDROCHLORIDE AND TIMOLOL MALEATE 1 DROP: 20; 5 SOLUTION/ DROPS OPHTHALMIC at 09:03

## 2018-10-21 NOTE — PROGRESS NOTES
Dr. Noemy Velasquez in to see patient. 1030 Patient incontinent of moderate amount of brown loose stool . Patient cleaned and linens changed. 1202 [facility] Dr. Sadie Ellis in to see patient. Patient tolerating trach collar well.  
1642 Patient incontinent of large loose stool. Patient cleaned and bed linens changed. 1652 Patient suctioned via trach for moderate amount of thick white secretions. Patient tolerated well and patients wife at bedside. 1713 Patient diaphoretic and having difficulty breathing at this time. Patient O2 sats down to 80, patient bagged with 100% O2 and respiratory called to come place patient back on the ventilator. O2 sats coming up with bagging. [id number] Patient placed back on the vent at this time. O2 sat up to 93%. Patient less diaphoretic, able to hear patients voice some with trach balloon inflated. Respiratory working with trach. 1721 No longer to hear patient vocalizing with trach in. Patient tolerating mechanical ventilation well. O2 sat 97%. RR 26.

## 2018-10-21 NOTE — PROGRESS NOTES
2000, Bedside and Verbal shift change report given to PADDY Toro (oncoming nurse) by Ralph Bowman RN (offgoing nurse). Report included the following information SBAR, Kardex, Intake/Output, MAR, Accordion, Recent Results, Med Rec Status and Cardiac Rhythm A Fib/Flutter with Paced . Temp;  Afebrile Neuro; Alert and calm, follow command, eyes contact, pupils reactive, nod appropriately,   GCS;  Eye; 4, verbal; 1 for Trach, motor; 6. 
Reassessment; No changes Respiratory; Sat 98% on Ventilator A/C mode, , RR 12, Peep 6, FiO2 40%, Tracheostomy, secretion; small, thick Tan, coarse diminished lung sounds. Reassessment;  TC 40% at am,  
Cardiac;  Fib/Flutter with Paced (PPM at right side) with PVCs, 64 B/min, NIBP 107/40 mmHg, on Levophed at 8 marino/min. Reassessment; no changes GI; NPO with PEG tube ( 3.5 cm at skin level) Towcal HN at 50 ml/h, free water 100 ml Q 4 hr, residual; nil, obese Abd semi soft with active bowel sound, Fl;exiseal  present Reassessment;  Flexiseal removed as been for 29 days, Renal; condom cath with adequate urine out put. Reassessment; no changes Skin; redness at trach site, excoriation at anal area. Endo; none, Lines; Trach, Left PICC > purple clotted, condom cath . Code; Full. Lab; , 
DVT; SCD both legs. Plan; ventilator management, pain and agitation management, follow lab tomorrow,  Follow with Jimmy Salazar accepting  
0700, Bedside and Verbal shift change report given to Lucio Roth RN (oncoming nurse) by Tarun Estrella RN (offgoing nurse). Report included the following information SBAR, Kardex, Intake/Output, MAR, Accordion, Recent Results, Med Rec Status and Cardiac Rhythm Fib/Flutter with Paced.

## 2018-10-21 NOTE — PROGRESS NOTES
Hospitalist Progress Note NAME: Pretty Talavera  
:  1934 MRN:  [id number] Assessment / Plan: 
80-year-old male with past medical history of coronary artery disease, atrial fibrillation on anticoagulation with warfarin, HTN and HLD who presented to ED on  with vomiting blood 2 days PTA. Acute encephalopathy in setting of acute hypoxic respiratory failure and resultant PEA cardiac arrest : s/p 6 min CPR and epi with ROSC after IR arteriogram. More alert the last few days. - CT head  showed small age-indeterminate infarct in the left corona radiata - CT head 10/11 with no evidence of acute intracranial abnormality. 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 Bilateral tympanomastoid effusions. - s/p tracheostomy. continue trach collar trials. Awaiting bed in [place] [id number]. Pulmonary hemorrhage s/p pulmonary artery embolization with RLL lung mass (ca vs inflammatory pseudotumor) and COPD: extubated on  but reintubated , now s/p trach placement 10/3, remains on vent via trach. Still with lots of secretions. - CT chest  with patchy airspace disease in the right lung base with an associated hyperdense 5.2 x 4.3 cm oval lesion.  
- CTA chest  due to persistent bloody pulmonary secretions. Right lower lobe masslike abnormality demonstrates increased internal density and has increased in size measuring 6.7 x 6.7 cm, compared to 5.7 x 4.4 cm. This is compatible with internal hemorrhage. There is new moderate right lower lobe partial collapse/atelectasis. - con't lasix as above; monitor I/Os - s/p arteriogram : Thoracic aortic and intercostal arteriograms demonstrating no 
enlarged bronchial artery supplying the right lower lobe of the lung. Normal appearing and normal sized right bronchial artery is seen without any hyperplasia or dominant right lower lobe supply. No embolization was performed. - s/p diagnostic bronch on 9/16. No endobronchial lesions seen. - blood cultures negative. Cytology from bronchial washing on 9/16 is atypical, favor benign reactive process. - work-up for rheumatologic causes of hemoptysis negative: ANCA, anti-GBM, MPO Ab, CT-3 Ab, SSA/SSB, RNP Ab, Arredondo/RNP, dsDNA, and Arredondo Ab all negative. JOSS+. - completed zosyn for possible lung abscess (9/20). R pleural effusion CT chest 10/11 with worsening of the appearance of the right hemithorax with increased pleural effusion and consolidation. 
- most recent CXR 10/13 with focal area of parenchymal consolidation at the right lung base which persists. Cardiomegaly unchanged. - R chest tube placed 10/11 with blood-tinged L pleural fluid drainage. S/p chest tube removal 10/18. On lasix BID Sepsis due to Pseudomonas catheter-associated UTI and right pleural fluid, not present on admission 
- Urine and pleural fluid cultures (1 swab) both pansensitive pseudomonas.  Sputum culture with normal respiratory ernestina and rare yeast.  Blood cultures no growth. - off emperic vancomycin and zosyn. On Levaquin until 10/26. Acute blood loss anemia and anemia of chronic disease Hgb stable. . No external bleeding. Close monitor. INR at 1.2. High fibrinogen. Platelets WNL. Elevated LFTs of unclear etiology: 
stopped statin for now LFT trended down. CAD s/p PCI, atrial fibrillation on chronic anticoagulation, and benign HTN: 
- holding amiodarone and diltiazem; holding anticoagulation (home coumadin) and pravachol 
- con't IV lasix (on oral lasix outpatient) ALAN: resolved UTI, acute cystitis w/ hematuria, POA:  Required almaraz placed by urology Hyperlipidemia: statin held due to elevated LFTs as above Hypernatremia, resolved Obesity (Body mass index is 32.27 kg/(m^2) 
   
Code: Full (wife and children are decision makers) DVT prophylaxis: heparin Subjective: Chief Complaint / Reason for Physician Visit Awake. Open eyes. Attempting to speak. Clear secretions at trach site. Incontinence of stool. Discussed with RN events overnight. No hemoptysis. Review of Systems: 
 
Could NOT obtain due to: tracheostomy Objective: VITALS:  
Last 24hrs VS reviewed since prior progress note. Most recent are: 
Patient Vitals for the past 24 hrs: 
 Temp Pulse Resp BP SpO2  
10/21/18 1000  86 14 127/71 94 % 10/21/18 0900  89 23 100/40 95 % 10/21/18 0859  85  100/40   
10/21/18 0800 98.9 °F (37.2 °C) 92 26 98/57 93 % 10/21/18 0757     94 % 10/21/18 0746     93 % 10/21/18 0700  87 18 105/61 95 % 10/21/18 0658     95 % 10/21/18 0635  72 15  98 % 10/21/18 0600  81 17 93/55 99 % 10/21/18 0500  78 18 92/48 98 % 10/21/18 0420  74 23  100 % 10/21/18 0400 97.6 °F (36.4 °C) 75 22 102/65 100 % 10/21/18 0300  77 19 104/59 100 % 10/21/18 0200  79 13 93/43 100 % 10/21/18 0100  79 16 90/44 100 % 10/21/18 0000 98.3 °F (36.8 °C) 73 15 108/55 100 % 10/20/18 2315  84 25  100 % 10/20/18 2300  78 20 99/46 99 % 10/20/18 2200  78 16 97/42 99 % 10/20/18 2100  72 15 101/50 96 % 10/20/18 2000 97 °F (36.1 °C) 78 16 96/52 99 % 10/20/18 1945  73 20  99 % 10/20/18 1943     99 % 10/20/18 1800  80 21 102/57 98 % 10/20/18 1700  71 17 99/54 98 % 10/20/18 1627  78 16  100 % 10/20/18 1600  75 17 92/55 98 % 10/20/18 1500 97.7 °F (36.5 °C) 73 20 98/62 99 % 10/20/18 1302  78 21 101/51 100 % 10/20/18 1200  86 20 114/57 91 % Intake/Output Summary (Last 24 hours) at 10/21/2018 1100 Last data filed at 10/21/2018 1046 Gross per 24 hour Intake 2160 ml Output 1255 ml Net 905 ml PHYSICAL EXAM: 
General: Overweight. Alert, no acute distress   
EENT:  Anicteric sclerae. mildline trach tube with clear secretion. Resp:  Decreased air entry at right lower lung field. no wheezing or rales. CV:  Regular rate,  No LE edema GI:  Soft, moderately distended, Non tender.  +BS Neurologic:  Awake, attempting to speak. Reviewed most current lab test results and cultures  YES Reviewed most current radiology test results   YES Review and summation of old records today    NO Reviewed patient's current orders and MAR    YES 
PMH/SH reviewed - no change compared to H&P 
________________________________________________________________________ 
________________________________________________________________________ Nellie Neves MD  
 
Procedures: see electronic medical records for all procedures/Xrays and details which were not copied into this note but were reviewed prior to creation of Plan. LABS: 
I reviewed today's most current labs and imaging studies. Pertinent labs include: 
Recent Labs 10/21/18 
4921 10/20/18 
5783 10/19/18 
8569 WBC 6.4 7.2 7.5 HGB 7.8* 7.0* 7.8* HCT 25.7* 23.1* 26.1*  
 176 165 Recent Labs 10/21/18 
5758 10/20/18 
8596 10/19/18 
8907  142 142  
K 4.6 3.8 3.7  104 104 CO2 34* 33* 35* * 108* 128* BUN 28* 28* 27* CREA 0.98 1.00 1.03  
CA 8.9 8.3* 8.6 MG  --  2.3 2.4 PHOS 2.7 2.2* 2.4* INR 1.1 1.1 1.1 Signed: Nellie Neves MD

## 2018-10-21 NOTE — PROGRESS NOTES
Dr. Taylor Goodrich in to see patient. 1030 Patient incontinent of moderate amount of brown loose stool . Patient cleaned and linens changed. 36 Dr. Candi Askew in to see patient. Cuco Nate

## 2018-10-21 NOTE — PROGRESS NOTES
[facility] Pulmonary Consult Service NotePulmonary, Critical Care, and Sleep Medicine Name: Maritza Kinney MRN: [id number] : 1934 Hospital: [facility] Date: 10/21/2018   Hospital Day: 39 IMPRESSION:  
1. 18 cardiopulmonary arrest with  6 min of CPR then ROSC. 2. Acute severe  respiratory failure-on vent 3. Acute hemoptysis- with suspected lung ca 4. New worsening anemia hgb 7.8 5. Moderate right pleural effusion, s/p prior drain 6. S/P pulmonary artery embolization for hemoptysis per IR of suspected lung mass. 7. COPD moderate to severe at baseline 8. Anemia and prior coagulopathy on coumadin 9. Encehalopathy/Debility multifactorial - has hearing loss as well 10. Hx of CAD s/p PCI, afib on amio, former smoker, prior cva RECOMMENDATIONS/PLAN:  
1. Continue TC trials as tolerated 2. nebs 3. Gentle diuresis 4. Enteral feedings 5. Glucose monitoring and SSI 6. PT/OT 7. DVT/GI prophylaxis 8. Will need LTAC I personally reviewed meds, labs and images Discussed with ICU RN He remains critically ill req vent support and ICU care Subjective/Initial History:  
10-18: no overnight events. More alert today 10-17: still desaturating with TC trials 10-16: no events. Desaturated with TC trials yesterday. 10-15: no events. No new reported issues 10-14:  No major events. Received transfusion yesterday 10-13:  No changes. Spoke to wife who agrees to transfusion. Cytology still pending 10-12-18: Pt seems to be much more alert when seen this am. Had near 3.5-4L of right pleural fluid output. NO ROS or HPI are obtainable from pt.  
 
 
10-11-18: Pt has been slow to respond. Still not waking up. Had increased secretion from trach and from his penis. Not follow any commands. Not able to get ROS or HPI from pt.  
 
 
10-10-18: No major changes or events over last 24 hrs. Not able to obtain ROS or HPI from pt. Some thick yellow secretions present around this trach. 10-9-18: Not able to get hx  Or ROS from pt. No acute changes overnight. Had some moderate secretions this am.  
 
 
10-8-18: Pt still not waking up. Has ongoing issues with blood clots in urine. Per nurses has been with recurrent issues with clotting and require frequent flushing of the almaraz. Pt has not been able to tolerate trach collar. Unable to get ROS or HPI from pt. MAR reviewed and pertinent medications noted or modified as needed Current Facility-Administered Medications Medication  enoxaparin (LOVENOX) injection 40 mg  
 levoFLOXacin (LEVAQUIN) tablet 750 mg  
 famotidine (PEPCID) tablet 20 mg  
 influenza vaccine 2018-19 (6 mos+)(PF) (FLUARIX QUAD/FLULAVAL QUAD) injection 0.5 mL  furosemide (LASIX) injection 20 mg  
 albuterol-ipratropium (DUO-NEB) 2.5 MG-0.5 MG/3 ML  
 fentaNYL citrate (PF) injection 25 mcg  zinc oxide-cod liver oil (DESITIN) 40 % paste  acetaminophen (TYLENOL) solution 650 mg  
 sodium chloride (NS) flush 10-30 mL  sodium chloride (NS) flush 10 mL  sodium chloride (NS) flush 10-40 mL  heparin (porcine) pf 300 Units  glycopyrrolate (ROBINUL) injection 0.1 mg  
 chlorhexidine (PERIDEX) 0.12 % mouthwash 15 mL  albuterol (PROVENTIL HFA, VENTOLIN HFA, PROAIR HFA) inhaler 2 Puff  dorzolamide-timolol (COSOPT) 22.3-6.8 mg/mL ophthalmic solution 1 Drop  latanoprost (XALATAN) 0.005 % ophthalmic solution 1 Drop  sodium chloride (NS) flush 5-10 mL  sodium chloride (NS) flush 5-10 mL  ondansetron (ZOFRAN) injection 4 mg ROS:A comprehensive review of systems was negative except for that written in the HPI. Objective: 
 
Vital Signs: Telemetry:    normal sinus rhythmIntake/Output:  
Visit Vitals /71 Pulse 86 Temp 98.9 °F (37.2 °C) Resp 14 Ht 6' 2\" (1.88 m) Wt 106.4 kg (234 lb 9.1 oz) SpO2 94% BMI 30.12 kg/m² Temp (24hrs), Av.9 °F (36.6 °C), Min:97 °F (36.1 °C), Max:98.9 °F (37.2 °C) O2 Device: Tracheal collar O2 Flow Rate (L/min): 10 l/min Wt Readings from Last 4 Encounters:  
10/20/18 106.4 kg (234 lb 9.1 oz) 16 111.1 kg (245 lb)  
16 106.6 kg (235 lb) 04/08/15 103.4 kg (228 lb) Intake/Output Summary (Last 24 hours) at 10/21/2018 1241 Last data filed at 10/21/2018 1046 Gross per 24 hour Intake 2110 ml Output 1035 ml Net 1075 ml Last shift:      10/21 0701 - 10/21 1900 In: 250 Out: 460 [Urine:460] Last 3 shifts: 10/19 1901 - 10/21 07 In: 0186 Out: 2195 [RDRWT:9477; Drains:500] Physical Exam:  
 General:  No distress, trach and vent support. HEAD: Normocephalic, without obvious abnormality, atraumatic EYES: conjunctivae clear. anicteric sclerae NOSE: nares normal, no drainage, no nasal flaring, Neck: Supple, symmetrical, trachea midline, has trach in place, on Mechanical vent support. Chest: increased AP diameter Lungs:   clear anterioly. Trached on vent. Heart: Regular rate and rhythm nl s1,2. Abdomen: soft, non-tender, +BS, pt has almaraz in place. Obese. Musculoskeletal: no gross deformities Neuro:  Intermittently follows commands. Psych: Not able to assess; no agitation. NO anxiety or depression when seen. Data:  
 
Current Facility-Administered Medications Medication Dose Route Frequency  enoxaparin (LOVENOX) injection 40 mg  40 mg SubCUTAneous Q24H  
 levoFLOXacin (LEVAQUIN) tablet 750 mg  750 mg Oral Q24H  
 famotidine (PEPCID) tablet 20 mg  20 mg Per G Tube BID  influenza vaccine - (6 mos+)(PF) (FLUARIX QUAD/FLULAVAL QUAD) injection 0.5 mL  0.5 mL IntraMUSCular PRIOR TO DISCHARGE  
 furosemide (LASIX) injection 20 mg  20 mg IntraVENous ACB&D  
 albuterol-ipratropium (DUO-NEB) 2.5 MG-0.5 MG/3 ML  3 mL Nebulization BID RT  
 sodium chloride (NS) flush 10-40 mL  10-40 mL InterCATHeter Q8H  
  chlorhexidine (PERIDEX) 0.12 % mouthwash 15 mL  15 mL Oral BID  dorzolamide-timolol (COSOPT) 22.3-6.8 mg/mL ophthalmic solution 1 Drop  1 Drop Both Eyes BID  latanoprost (XALATAN) 0.005 % ophthalmic solution 1 Drop  1 Drop Both Eyes QHS  sodium chloride (NS) flush 5-10 mL  5-10 mL IntraVENous Q8H Labs: 
Recent Labs 10/21/18 
1683 10/20/18 
1798 10/19/18 
8522 WBC 6.4 7.2 7.5 HGB 7.8* 7.0* 7.8* HCT 25.7* 23.1* 26.1*  
 176 165 Recent Labs 10/21/18 
5144 10/20/18 
6633 10/19/18 
0982  142 142  
K 4.6 3.8 3.7  104 104 CO2 34* 33* 35* * 108* 128* BUN 28* 28* 27* CREA 0.98 1.00 1.03  
CA 8.9 8.3* 8.6 MG  --  2.3 2.4 PHOS 2.7 2.2* 2.4* INR 1.1 1.1 1.1 Recent Labs 10/20/18 
1276 PH 7.52* PCO2 39 PO2 110* HCO3 31* FIO2 40 Imaging: 
I have personally reviewed the patients radiographs and have reviewed the reports: 
None today Total critical care time exclusive of procedures: 25 minutes Marcy Napier MD

## 2018-10-21 NOTE — PROGRESS NOTES
Dr. Eder Werner in to see patient. 1030 Patient incontinent of moderate amount of brown loose stool . Patient cleaned and linens changed. [id number] Dr. Abe Blackwell in to see patient. Patient tolerating trach collar well.  
1642 Patient incontinent of large loose stool. Patient cleaned and bed linens changed. 1652 Patient suctioned via trach for moderate amount of thick white secretions. Patient tolerated well and patients wife at bedside. 1713 Patient diaphoretic and having difficulty breathing at this time. Patient O2 sats down to 80, patient bagged with 100% O2 and respiratory called to come place patient back on the ventilator. O2 sats coming up with bagging. Bibasilar crackles noted at this time. Patient received dose of lasix 20mg at 1652. 
1715 Patient placed back on the vent at this time. O2 sat up to 93%. Patient less diaphoretic, able to hear patients voice some with trach balloon inflated. Respiratory working with trach. 1721 No longer to hear patient vocalizing with trach in. Patient tolerating mechanical ventilation well. O2 sat 97%. RR 26.  
1900 No changes. Report to [facility] RN.

## 2018-10-22 LAB
ANION GAP SERPL CALC-SCNC: 3 MMOL/L (ref 5–15)
APTT PPP: 31 SEC (ref 22.1–32)
BUN SERPL-MCNC: 30 MG/DL (ref 6–20)
BUN/CREAT SERPL: 28 (ref 12–20)
CALCIUM SERPL-MCNC: 8.5 MG/DL (ref 8.5–10.1)
CHLORIDE SERPL-SCNC: 104 MMOL/L (ref 97–108)
CO2 SERPL-SCNC: 34 MMOL/L (ref 21–32)
CREAT SERPL-MCNC: 1.08 MG/DL (ref 0.7–1.3)
ERYTHROCYTE [DISTWIDTH] IN BLOOD BY AUTOMATED COUNT: 21.3 % (ref 11.5–14.5)
FIBRINOGEN PPP-MCNC: 640 MG/DL (ref 200–475)
GLUCOSE SERPL-MCNC: 120 MG/DL (ref 65–100)
HCT VFR BLD AUTO: 24.5 % (ref 36.6–50.3)
HGB BLD-MCNC: 7.5 G/DL (ref 12.1–17)
INR PPP: 1.1 (ref 0.9–1.1)
MAGNESIUM SERPL-MCNC: 2.2 MG/DL (ref 1.6–2.4)
MCH RBC QN AUTO: 31.5 PG (ref 26–34)
MCHC RBC AUTO-ENTMCNC: 30.6 G/DL (ref 30–36.5)
MCV RBC AUTO: 102.9 FL (ref 80–99)
NRBC # BLD: 0 K/UL (ref 0–0.01)
NRBC BLD-RTO: 0 PER 100 WBC
PHOSPHATE SERPL-MCNC: 2.6 MG/DL (ref 2.6–4.7)
PLATELET # BLD AUTO: 177 K/UL (ref 150–400)
PMV BLD AUTO: 11.1 FL (ref 8.9–12.9)
POTASSIUM SERPL-SCNC: 4 MMOL/L (ref 3.5–5.1)
PROTHROMBIN TIME: 11.5 SEC (ref 9–11.1)
RBC # BLD AUTO: 2.38 M/UL (ref 4.1–5.7)
SODIUM SERPL-SCNC: 141 MMOL/L (ref 136–145)
THERAPEUTIC RANGE,PTTT: ABNORMAL SECS (ref 58–77)
WBC # BLD AUTO: 6.5 K/UL (ref 4.1–11.1)

## 2018-10-22 PROCEDURE — 74011250636 HC RX REV CODE- 250/636: Performed by: INTERNAL MEDICINE

## 2018-10-22 PROCEDURE — 74011250637 HC RX REV CODE- 250/637: Performed by: INTERNAL MEDICINE

## 2018-10-22 PROCEDURE — 74011000250 HC RX REV CODE- 250: Performed by: INTERNAL MEDICINE

## 2018-10-22 PROCEDURE — 83735 ASSAY OF MAGNESIUM: CPT | Performed by: INTERNAL MEDICINE

## 2018-10-22 PROCEDURE — 36415 COLL VENOUS BLD VENIPUNCTURE: CPT | Performed by: INTERNAL MEDICINE

## 2018-10-22 PROCEDURE — 85027 COMPLETE CBC AUTOMATED: CPT | Performed by: INTERNAL MEDICINE

## 2018-10-22 PROCEDURE — L8501 TRACHEOSTOMY SPEAKING VALVE: HCPCS

## 2018-10-22 PROCEDURE — 97535 SELF CARE MNGMENT TRAINING: CPT

## 2018-10-22 PROCEDURE — 85610 PROTHROMBIN TIME: CPT | Performed by: INTERNAL MEDICINE

## 2018-10-22 PROCEDURE — 97530 THERAPEUTIC ACTIVITIES: CPT

## 2018-10-22 PROCEDURE — 65610000006 HC RM INTENSIVE CARE

## 2018-10-22 PROCEDURE — 80048 BASIC METABOLIC PNL TOTAL CA: CPT | Performed by: INTERNAL MEDICINE

## 2018-10-22 PROCEDURE — 77030021668 HC NEB PREFIL KT VYRM -A

## 2018-10-22 PROCEDURE — 94640 AIRWAY INHALATION TREATMENT: CPT

## 2018-10-22 PROCEDURE — 84100 ASSAY OF PHOSPHORUS: CPT | Performed by: INTERNAL MEDICINE

## 2018-10-22 PROCEDURE — 94003 VENT MGMT INPAT SUBQ DAY: CPT

## 2018-10-22 RX ORDER — SODIUM CHLORIDE 0.9 % (FLUSH) 0.9 %
SYRINGE (ML) INJECTION
Status: COMPLETED
Start: 2018-10-22 | End: 2018-10-22

## 2018-10-22 RX ADMIN — FUROSEMIDE 20 MG: 10 INJECTION, SOLUTION INTRAMUSCULAR; INTRAVENOUS at 09:52

## 2018-10-22 RX ADMIN — FAMOTIDINE 20 MG: 20 TABLET ORAL at 18:16

## 2018-10-22 RX ADMIN — WATER 1 MG: 1 INJECTION INTRAMUSCULAR; INTRAVENOUS; SUBCUTANEOUS at 12:52

## 2018-10-22 RX ADMIN — Medication: at 22:15

## 2018-10-22 RX ADMIN — Medication: at 01:59

## 2018-10-22 RX ADMIN — FUROSEMIDE 20 MG: 10 INJECTION, SOLUTION INTRAMUSCULAR; INTRAVENOUS at 18:16

## 2018-10-22 RX ADMIN — Medication 10 ML: at 15:32

## 2018-10-22 RX ADMIN — LATANOPROST 1 DROP: 50 SOLUTION OPHTHALMIC at 21:17

## 2018-10-22 RX ADMIN — LEVOFLOXACIN 750 MG: 750 TABLET, FILM COATED ORAL at 21:16

## 2018-10-22 RX ADMIN — IPRATROPIUM BROMIDE AND ALBUTEROL SULFATE 3 ML: .5; 3 SOLUTION RESPIRATORY (INHALATION) at 19:43

## 2018-10-22 RX ADMIN — Medication 20 ML: at 21:17

## 2018-10-22 RX ADMIN — Medication: at 05:00

## 2018-10-22 RX ADMIN — Medication 10 ML: at 05:00

## 2018-10-22 RX ADMIN — CHLORHEXIDINE GLUCONATE 15 ML: 1.2 RINSE ORAL at 21:16

## 2018-10-22 RX ADMIN — IPRATROPIUM BROMIDE AND ALBUTEROL SULFATE 3 ML: .5; 3 SOLUTION RESPIRATORY (INHALATION) at 07:56

## 2018-10-22 RX ADMIN — DORZOLAMIDE HYDROCHLORIDE AND TIMOLOL MALEATE 1 DROP: 20; 5 SOLUTION/ DROPS OPHTHALMIC at 18:18

## 2018-10-22 RX ADMIN — DORZOLAMIDE HYDROCHLORIDE AND TIMOLOL MALEATE 1 DROP: 20; 5 SOLUTION/ DROPS OPHTHALMIC at 09:55

## 2018-10-22 RX ADMIN — CHLORHEXIDINE GLUCONATE 15 ML: 1.2 RINSE ORAL at 09:55

## 2018-10-22 RX ADMIN — FAMOTIDINE 20 MG: 20 TABLET ORAL at 09:54

## 2018-10-22 RX ADMIN — ENOXAPARIN SODIUM 40 MG: 40 INJECTION, SOLUTION INTRAVENOUS; SUBCUTANEOUS at 15:32

## 2018-10-22 NOTE — PROGRESS NOTES
Hospitalist Progress Note NAME: Dayron Villa  
:  1934 MRN:  [id number] Assessment / Plan: 
51-year-old male with past medical history of coronary artery disease, atrial fibrillation on anticoagulation with warfarin, HTN and HLD who presented to ED on  with vomiting blood 2 days PTA. Acute encephalopathy in setting of acute hypoxic respiratory failure and resultant PEA cardiac arrest : s/p 6 min CPR and epi with ROSC after IR arteriogram. More alert the last few days. - CT head  showed small age-indeterminate infarct in the left corona radiata - CT head 10/11 with no evidence of acute intracranial abnormality. 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 Bilateral tympanomastoid effusions. - s/p tracheostomy. continue trach collar trials. Awaiting bed in [place] [id number]. Pulmonary hemorrhage s/p pulmonary artery embolization with RLL lung mass (ca vs inflammatory pseudotumor) and COPD: extubated on  but reintubated , now s/p trach placement 10/3, remains on vent via trach. Still with lots of secretions. - CT chest  with patchy airspace disease in the right lung base with an associated hyperdense 5.2 x 4.3 cm oval lesion.  
- CTA chest  due to persistent bloody pulmonary secretions. Right lower lobe masslike abnormality demonstrates increased internal density and has increased in size measuring 6.7 x 6.7 cm, compared to 5.7 x 4.4 cm. This is compatible with internal hemorrhage. There is new moderate right lower lobe partial collapse/atelectasis. - con't lasix as above; monitor I/Os - s/p arteriogram : Thoracic aortic and intercostal arteriograms demonstrating no 
enlarged bronchial artery supplying the right lower lobe of the lung. Normal appearing and normal sized right bronchial artery is seen without any hyperplasia or dominant right lower lobe supply. No embolization was performed. - s/p diagnostic bronch on 9/16. No endobronchial lesions seen. - blood cultures negative. Cytology from bronchial washing on 9/16 is atypical, favor benign reactive process. - work-up for rheumatologic causes of hemoptysis negative: ANCA, anti-GBM, MPO Ab, [place]-3 Ab, SSA/SSB, RNP Ab, Arredondo/RNP, dsDNA, and Arredondo Ab all negative. JOSS+. - completed zosyn for possible lung abscess (9/20). R pleural effusion CT chest 10/11 with worsening of the appearance of the right hemithorax with increased pleural effusion and consolidation. 
- most recent CXR 10/13 with focal area of parenchymal consolidation at the right lung base which persists. Cardiomegaly unchanged. - R chest tube placed 10/11 with blood-tinged L pleural fluid drainage. S/p chest tube removal 10/18. On lasix BID Sepsis due to Pseudomonas catheter-associated UTI and right pleural fluid, not present on admission 
- Urine and pleural fluid cultures (1 swab) both pansensitive pseudomonas.  Sputum culture with normal respiratory ernestina and rare yeast.  Blood cultures no growth. - off emperic vancomycin and zosyn. On Levaquin until 10/26. Acute blood loss anemia and anemia of chronic disease Hgb stable. . No external bleeding. Close monitor. INR at 1.2. High fibrinogen. Platelets WNL. Elevated LFTs of unclear etiology: 
stopped statin for now LFT trended down. CAD s/p PCI, atrial fibrillation on chronic anticoagulation, and benign HTN: 
- holding amiodarone and diltiazem; holding anticoagulation (home coumadin) and pravachol 
- con't IV lasix (on oral lasix outpatient) ALAN: resolved UTI, acute cystitis w/ hematuria, POA:  Required almaraz placed by urology Hyperlipidemia: statin held due to elevated LFTs as above Hypernatremia, resolved Obesity (Body mass index is 32.27 kg/(m^2) 
   
Code: Full (wife and children are decision makers) DVT prophylaxis: heparin Subjective: Chief Complaint / Reason for Physician Visit Awake. Follows commands. Review of Systems: 
 
Could NOT obtain due to: tracheostomy Objective: VITALS:  
Last 24hrs VS reviewed since prior progress note. Most recent are: 
Patient Vitals for the past 24 hrs: 
 Temp Pulse Resp BP SpO2  
10/22/18 1128     94 % 10/22/18 1100  82 22 106/50 92 % 10/22/18 1000  89 22 108/46 95 % 10/22/18 0910     100 % 10/22/18 0900  85 14 95/51 (!) 84 % 10/22/18 0800 98.8 °F (37.1 °C) 82 15 103/53 95 % 10/22/18 0756  87 21  95 % 10/22/18 0700  83 12 114/67 100 % 10/22/18 0600  81 18 106/42 99 % 10/22/18 0500  73 22 118/59 98 % 10/22/18 0400  77 16 107/59 100 % 10/22/18 0337  85 19  99 % 10/22/18 0300 99.2 °F (37.3 °C) 79 18 101/54 98 % 10/22/18 0200  81 16 93/57 100 % 10/22/18 0100  79 12 94/56   
10/22/18 0000 97.8 °F (36.6 °C) 80 12 91/57 99 % 10/21/18 2338  71 17  100 % 10/21/18 2300  72 18 109/58 97 % 10/21/18 2200  77 20 102/59 100 % 10/21/18 2100  72 20 108/60 100 % 10/21/18 2000 98.2 °F (36.8 °C) 79 23 120/68 100 % 10/21/18 1943     99 % 10/21/18 1900  78 16 104/52 100 % 10/21/18 1800  94 21 113/62 100 % 10/21/18 1731  92 20  98 % 10/21/18 1700  88 25 128/66 90 % 10/21/18 1652  83  114/51   
10/21/18 1643     93 % 10/21/18 1600 98.3 °F (36.8 °C) 87 25 114/51 91 % 10/21/18 1500  85 25 99/53 94 % 10/21/18 1400  86 25 104/60 94 % Intake/Output Summary (Last 24 hours) at 10/22/2018 1335 Last data filed at 10/22/2018 1200 Gross per 24 hour Intake 1750 ml Output 1045 ml Net 705 ml PHYSICAL EXAM: 
General: Overweight. Alert, no acute distress   
EENT:  Anicteric sclerae. mildline trach tube with clear secretion. Resp:  Decreased air entry at right lower lung field. no wheezing or rales. CV:  Regular rate,  No LE edema GI:  Soft, moderately distended, Non tender.  +BS Neurologic:  Awake, follows commands. Reviewed most current lab test results and cultures  YES Reviewed most current radiology test results   YES Review and summation of old records today    NO Reviewed patient's current orders and MAR    YES 
PMH/SH reviewed - no change compared to H&P 
________________________________________________________________________ 
________________________________________________________________________ James Rucker MD  
 
Procedures: see electronic medical records for all procedures/Xrays and details which were not copied into this note but were reviewed prior to creation of Plan. LABS: 
I reviewed today's most current labs and imaging studies. Pertinent labs include: 
Recent Labs 10/22/18 
0403 10/21/18 
9825 10/20/18 
4632 WBC 6.5 6.4 7.2 HGB 7.5* 7.8* 7.0*  
HCT 24.5* 25.7* 23.1*  
 184 176 Recent Labs 10/22/18 
0403 10/21/18 
5305 10/20/18 
0556  142 142  
K 4.0 4.6 3.8  104 104 CO2 34* 34* 33* * 110* 108* BUN 30* 28* 28* CREA 1.08 0.98 1.00  
CA 8.5 8.9 8.3*  
MG 2.2  --  2.3 PHOS 2.6 2.7 2.2* INR 1.1 1.1 1.1 Signed: James Rucker MD

## 2018-10-22 NOTE — PROGRESS NOTES
Problem: Mobility Impaired (Adult and Pediatric) Goal: *Acute Goals and Plan of Care (Insert Text) Physical Therapy Goals Initiated 10/20/2018 1. Patient will move from supine to sit and sit to supine , scoot up and down and roll side to side in bed with moderate assistance x 1 within 7 day(s). 2.  Patient will transfer from bed to chair and chair to bed with maximal assistance using the least restrictive device within 7 day(s). 3.  Patient will perform sit to stand with maximal assistance within 7 day(s). 4.  Patient will sit on EOB x 20 minutes with intact sitting balance within 7 days to prepare for standing and increased activity tolerance. Will address ambulation goals once appropriate. physical Therapy TREATMENT Patient: Katerina Meneses (45 y.o. male) Date: 10/22/2018 Diagnosis: Acute GI bleeding Pulmonary hemorrhage Acute GI Bleeding 
dysphasia <principal problem not specified> Procedure(s) (LRB): ESOPHAGOGASTRODUODENOSCOPY (EGD) (N/A) PERCUTANEOUS ENDOSCOPIC GASTROSTOMY TUBE INSERTION (N/A) 19 Days Post-Op Precautions: Fall Chart, physical therapy assessment, plan of care and goals were reviewed. ASSESSMENT: 
Pt received supine in bed on 40% trach collar and agreeable to PT intervention. Pt cleared by nursing for mobility. VSS at rest. No pain complaints. Pt with increased fatigue and drowsiness this date, which limited progress and active participation during session. Continues to require max A x 2 for all bed mobility, including rolling, supine<>sit transfers, and scooting. He sat on EOB x 15 minutes, exhibiting initially fair sitting balance with L lateral lean, however progressing to good sitting balance without support with increased time sitting. Attempted to have patient transfer sit>stand x 2 from EOB, however pt unable to clear bottom from EOB despite total A x 2 and rocking for momentum.  Pt participated in exercises and ADLs in sitting, however needed frequent cueing to attend to task at hand due to drowsiness. VSS in sitting. Pt was returned supine in bed and placed in modified chair position. He was left with all needs met, RN aware, and VSS on 40% trach collar. Continue to recommend pt discharge to LTAC to improve mobility and independence. Progression toward goals: 
[]    Improving appropriately and progressing toward goals [x]    Improving slowly and progressing toward goals 
[]    Not making progress toward goals and plan of care will be adjusted PLAN: 
Patient continues to benefit from skilled intervention to address the above impairments. Continue treatment per established plan of care. Discharge Recommendations:  LTAC Further Equipment Recommendations for Discharge:  TBD by facility SUBJECTIVE:  
Patient stated Ulysses Valle. OBJECTIVE DATA SUMMARY:  
Critical Behavior: 
Neurologic State: Alert Orientation Level: Unable to verbalize Cognition: Follows commands Safety/Judgement: Fall prevention, Awareness of environment Functional Mobility Training: 
Bed Mobility: 
Rolling: Maximum assistance;Assist x2 Supine to Sit: Maximum assistance;Assist x2 Sit to Supine: Maximum assistance;Assist x2 Scooting: Maximum assistance;Assist x2 Transfers: 
Sit to Stand: Total assistance;Assist x2(unable to clear bottom from EOB) Balance: 
Sitting: Impaired Sitting - Static: Good (unsupported); Fair (occasional) Sitting - Dynamic: Fair (occasional) Therapeutic Exercises: BUE shoulder flexion AAROM x3 BLE LAQ AAROM x 3 Pain: 
Pain Scale 1: Behavioral Pain Scale (BPS) Pain Intensity 1: 3 Activity Tolerance:  
Fair - increased fatigue and drowsiness with activity; VSS on 40% trach collar during activity Please refer to the flowsheet for vital signs taken during this treatment. After treatment:  
[]    Patient left in no apparent distress sitting up in chair [x]    Patient left in no apparent distress in bed - modified chair position 
[x]    Call bell left within reach [x]    Nursing notified 
[]    Caregiver present 
[]    Bed alarm activated COMMUNICATION/COLLABORATION:  
The patients plan of care was discussed with: Physical Therapist, Occupational Therapist and Registered Nurse Renae Shay, PT, DPT Time Calculation: 36 mins

## 2018-10-22 NOTE — PROGRESS NOTES
Care Management: 
 
Patient is making progress and may be ready for transfer to [place] [address] soon. I called and spoke with DTR Yvan Johnson about [facility] and to ask if they had a chance to look into the facility. She says they are coming to [facility] from [place] on Wednesday and will go tour the facility with their mother. They have looked at it on line as well at [facility]. They feel [facility] is too far away. Chart has been reviewed and I have updated [facility]. [phone]

## 2018-10-22 NOTE — PROGRESS NOTES
Problem: Self Care Deficits Care Plan (Adult) Goal: *Acute Goals and Plan of Care (Insert Text) Occupational Therapy Goals Initiated 10/20/2018 1. Patient will perform upper body dressing with supervision/set-up within 7 day(s). 2.  Patient will perform lower body dressing with minimal assistance/contact guard assist within 7 day(s). 3.  Patient will perform grooming with modified independence within 7 day(s). 4.  Patient will perform toilet transfers with moderate assistance  within 7 day(s). 5.  Patient will perform all aspects of toileting with minimal assistance/contact guard assist within 7 day(s). 6.  Patient will participate in upper extremity therapeutic exercise/activities with supervision/set-up for 5 minutes within 7 day(s). 7.  Patient will utilize energy conservation techniques during functional activities with verbal cues within 7 day(s). Occupational Therapy TREATMENT Patient: Viki Russo (86 y.o. male) Date: 10/22/2018 Diagnosis: Acute GI bleeding Pulmonary hemorrhage Acute GI Bleeding 
dysphasia <principal problem not specified> Procedure(s) (LRB): ESOPHAGOGASTRODUODENOSCOPY (EGD) (N/A) PERCUTANEOUS ENDOSCOPIC GASTROSTOMY TUBE INSERTION (N/A) 19 Days Post-Op Precautions: Fall Chart, occupational therapy assessment, plan of care, and goals were reviewed. ASSESSMENT: 
Pt was cleared to be seen for therapy and all VSS and pt was on trach collar, and supine in bed. Pt was drowsy and needed verbal encouragement to work with therapy. Pt was max assist of 2 for bed and was able to sit on EOB with fair to good balance. She was max to scoot to EOB and he was at first leaning to the right and then after sitting up for some time, on EOb he was able to sit in midline. OT worked with pt on BUE ex and gave pt a wash cloth and he was able to wash his face with CGA in sitting.   Worked with pt on leaning over to reach for his socks and he was mod assist to lean forward but was able to come back to sitting. Therapy attempted to stand pt 2 times and he was not able to clear his buttocks from the bed. Pt was max to total assist for sit to supine after sitting up for approx 12 minutes. Pt was left in chair position and recommend that pt have further therapy at discharge at rehab, [place] [id number]. Progression toward goals: 
[]       Improving appropriately and progressing toward goals 
[]       Improving slowly and progressing toward goals 
[]       Not making progress toward goals and plan of care will be adjusted PLAN: 
Patient continues to benefit from skilled intervention to address the above impairments. Continue treatment per established plan of care. Discharge Recommendations:  [facility] Further Equipment Recommendations for Discharge:  tbd SUBJECTIVE:  
Patient stated I have three daughters.  OBJECTIVE DATA SUMMARY:  
Cognitive/Behavioral Status: 
Neurologic State: Drowsy Orientation Level: Oriented to person Cognition: Impaired decision making;Memory loss;Decreased attention/concentration Perception: Appears intact Perseveration: No perseveration noted Safety/Judgement: Fall prevention Functional Mobility and Transfers for ADLs:Bed Mobility: 
Rolling: Maximum assistance;Assist x2 Supine to Sit: Maximum assistance;Assist x2 Sit to Supine: Maximum assistance;Assist x2 Scooting: Maximum assistance;Assist x2 Transfers: 
Sit to Stand: Total assistance;Assist x2(unable to clear bottom from EOB) Balance: 
Sitting: Impaired Sitting - Static: Good (unsupported); Fair (occasional) Sitting - Dynamic: Fair (occasional) ADL Intervention: 
  
Pt is total assist for ADLs Toileting Toileting Assistance: Total assistance(dependent) Cognitive Retraining Safety/Judgement: Fall prevention Therapeutic Exercises:  
Worked in BUE ex while sitting on EObPain: 
Pain Scale 1: Behavioral Pain Scale (BPS) Pain Intensity 1: 3 Activity Tolerance:  
poor Please refer to the flowsheet for vital signs taken during this treatment. After treatment:  
[x] Patient left in no apparent distress sitting up in chair 
[] Patient left in no apparent distress in bed 
[x] Call bell left within reach [x] Nursing notified 
[] Caregiver present 
[] Bed alarm activated COMMUNICATION/COLLABORATION:  
The patients plan of care was discussed with: Physical Therapist and Registered Nurse Es Lezama OT Time Calculation: 33 mins

## 2018-10-22 NOTE — PROGRESS NOTES
Nutrition Assessment: 
 
INTERVENTIONS/RECOMMENDATIONS:  
Enteral/Parenteral Nutrition: Other(Continue TF as ordered) ASSESSMENT:  
Chart reviewed and pt discussed on CCU rounds. Pt tolerating TF well. He received 90% of ordered TF over the past 72 hrs. Diet Order: Other (comment)(TF via PEG: TwoCal @ 50mL/h + 100mL H2O flush q 4h (provides 2400kcals/100gPro/1440mL) ) 
% Eaten:  No data found. Pertinent Medications: [x]Reviewed: pepcid, lasix, Pertinent Labs: [x]Reviewed:  
Food Allergies: [x]NKFA  []Other Last BM:  10/22 Edema:      []RUE   []LUE   []RLE   []LLE Pressure Ulcer:      [] Stage I   [] Stage II   [] Stage III   [] Stage IV Anthropometrics: Height: 6' 2\" (188 cm) Weight: 106.4 kg (234 lb 9.1 oz) IBW (%IBW):   ( ) UBW (%UBW):   (  %) BMI: Body mass index is 30.12 kg/m². This BMI is indicative of: 
[]Underweight   []Normal   []Overweight   [x] Obesity   [] Extreme Obesity (BMI>40) Last Weight Metrics: 
Weight Loss Metrics 10/20/2018 5/5/2016 3/25/2016 4/8/2015 3/25/2015 1/16/2013 1/3/2013 Today's Wt 234 lb 9.1 oz 245 lb 235 lb 228 lb 222 lb 232 lb 3.2 oz 227 lb 11.8 oz BMI 30.12 kg/m2 31.44 kg/m2 30.16 kg/m2 29.26 kg/m2 28.49 kg/m2 29.8 kg/m2 29.23 kg/m2 Estimated Nutrition Needs (Based on): 9557 Kcals/day(PSU (MSJ 1900)) , 91 g(-114 (0.8-1gPro/kg)) Protein Carbohydrate: At Least 130 g/day  Fluids: 2256 mL/day or per primary team 
 
NUTRITION DIAGNOSES:  
Problem:  No nutritional diagnosis at this time Etiology: related to Signs/Symptoms: as evidenced by  
   
Previous Nutrition Dx:  [x] Resolved  [] Unresolved           [] Progressing NUTRITION INTERVENTIONS: 
  Enteral/Parenteral Nutrition: Other(Continue TF as ordered) GOAL:  
Pt will tolerate TF @ goal rate with residuals <250mL in 3-5 days. NUTRITION MONITORING AND EVALUATION Food/Nutrient Intake Outcomes: Enteral/parenteral nutrition intake Physical Signs/Symptoms Outcomes: Electrolyte and renal profile, GI profile, Weight/weight change, Glucose profile, GI 
 
Previous Goal Met: 
 [x] Met              [] Progressing Towards Goal              [] Not Progressing Towards Goal  
Previous Recommendations: 
 [x] Implemented          [] Not Implemented          [] Not Applicable LEARNING NEEDS (Diet, Food/Nutrient-Drug Interaction):  
 [x] None Identified 
 [] Identified and Education Provided/Documented 
 [] Identified and Pt declined/was not appropriate Cultural, Christianity, OR Ethnic Dietary Needs:  
 [x] None Identified 
 [] Identified and Addressed 
 
 [x] Interdisciplinary Care Plan Reviewed/Documented  
 [x] Discharge Planning: continue current TF [x] Participated in Interdisciplinary Rounds NUTRITION RISK:  
 [] High              [x] Moderate           []  Low  []  Minimal/Uncompromised Edna Drake RDN Pager [phone] Weekend Pager [phone]

## 2018-10-22 NOTE — PROGRESS NOTES
Critical care interdisciplinary rounds held on [id number] by Dr. Eddy Senior and Dr. Windy Mcneill. Following members present, Pharmacy, Diabetes Treatment, Case Management, Occupational Therapy, Physical Therapy, and Nutrition. Daily trach collar trials. Patient continues weak. Family input needed regarding  transition to LTC facility. Plan of care discussed. See clinical pathway fo plan of care and interventions and desired outcomes.

## 2018-10-22 NOTE — PROGRESS NOTES
Bedside shift change report given to Mark Jimenez (oncoming nurse) by Shad Torres RN (offgoing nurse). Report included the following information SBAR.  
 
0800: AM assessment complete. See flow sheet. 1200: Reassessment complete. No changes at this time. 1552: PICC team instilled cath flow in purple port of PICC line. 1520: OK to use PICC line per PICC team. Both ports flush and give good blood return. 1600: Reassessment complete. No changes at this time. 1900: Bedside shift change report given to Inga Huang RN (oncoming nurse) by Patrick Gage RN (offgoing nurse). Report included the following information SBAR.

## 2018-10-23 LAB
ANION GAP SERPL CALC-SCNC: 4 MMOL/L (ref 5–15)
APTT PPP: 32.1 SEC (ref 22.1–32)
ARTERIAL PATENCY WRIST A: YES
BASE EXCESS BLDA CALC-SCNC: 9.5 MMOL/L
BDY SITE: ABNORMAL
BREATHS.SPONTANEOUS ON VENT: 22
BUN SERPL-MCNC: 31 MG/DL (ref 6–20)
BUN/CREAT SERPL: 30 (ref 12–20)
CALCIUM SERPL-MCNC: 8.5 MG/DL (ref 8.5–10.1)
CHLORIDE SERPL-SCNC: 104 MMOL/L (ref 97–108)
CO2 SERPL-SCNC: 33 MMOL/L (ref 21–32)
CREAT SERPL-MCNC: 1.04 MG/DL (ref 0.7–1.3)
FIBRINOGEN PPP-MCNC: 695 MG/DL (ref 200–475)
FIO2 ON VENT: 40 %
GLUCOSE SERPL-MCNC: 114 MG/DL (ref 65–100)
HCO3 BLDA-SCNC: 35 MMOL/L (ref 22–26)
INR PPP: 1.1 (ref 0.9–1.1)
PCO2 BLDA: 51 MMHG (ref 35–45)
PH BLDA: 7.46 [PH] (ref 7.35–7.45)
PHOSPHATE SERPL-MCNC: 2.7 MG/DL (ref 2.6–4.7)
PO2 BLDA: 60 MMHG (ref 80–100)
POTASSIUM SERPL-SCNC: 3.8 MMOL/L (ref 3.5–5.1)
PROTHROMBIN TIME: 11.5 SEC (ref 9–11.1)
SAO2 % BLD: 92 % (ref 92–97)
SAO2% DEVICE SAO2% SENSOR NAME: ABNORMAL
SODIUM SERPL-SCNC: 141 MMOL/L (ref 136–145)
SPECIMEN SITE: ABNORMAL
THERAPEUTIC RANGE,PTTT: ABNORMAL SECS (ref 58–77)

## 2018-10-23 PROCEDURE — 77030018798 HC PMP KT ENTRL FED COVD -A

## 2018-10-23 PROCEDURE — 36600 WITHDRAWAL OF ARTERIAL BLOOD: CPT | Performed by: INTERNAL MEDICINE

## 2018-10-23 PROCEDURE — 77030032490 HC SLV COMPR SCD KNE COVD -B

## 2018-10-23 PROCEDURE — 97535 SELF CARE MNGMENT TRAINING: CPT

## 2018-10-23 PROCEDURE — 36592 COLLECT BLOOD FROM PICC: CPT

## 2018-10-23 PROCEDURE — 82803 BLOOD GASES ANY COMBINATION: CPT | Performed by: INTERNAL MEDICINE

## 2018-10-23 PROCEDURE — 74011250637 HC RX REV CODE- 250/637: Performed by: INTERNAL MEDICINE

## 2018-10-23 PROCEDURE — 84100 ASSAY OF PHOSPHORUS: CPT | Performed by: INTERNAL MEDICINE

## 2018-10-23 PROCEDURE — 94640 AIRWAY INHALATION TREATMENT: CPT

## 2018-10-23 PROCEDURE — 77010033678 HC OXYGEN DAILY

## 2018-10-23 PROCEDURE — 74011250636 HC RX REV CODE- 250/636: Performed by: INTERNAL MEDICINE

## 2018-10-23 PROCEDURE — 36415 COLL VENOUS BLD VENIPUNCTURE: CPT | Performed by: INTERNAL MEDICINE

## 2018-10-23 PROCEDURE — 97530 THERAPEUTIC ACTIVITIES: CPT

## 2018-10-23 PROCEDURE — 65610000006 HC RM INTENSIVE CARE

## 2018-10-23 PROCEDURE — 80048 BASIC METABOLIC PNL TOTAL CA: CPT | Performed by: INTERNAL MEDICINE

## 2018-10-23 PROCEDURE — 85610 PROTHROMBIN TIME: CPT | Performed by: INTERNAL MEDICINE

## 2018-10-23 PROCEDURE — 92597 ORAL SPEECH DEVICE EVAL: CPT

## 2018-10-23 PROCEDURE — 74011000250 HC RX REV CODE- 250: Performed by: INTERNAL MEDICINE

## 2018-10-23 RX ADMIN — FUROSEMIDE 20 MG: 10 INJECTION, SOLUTION INTRAMUSCULAR; INTRAVENOUS at 08:18

## 2018-10-23 RX ADMIN — DORZOLAMIDE HYDROCHLORIDE AND TIMOLOL MALEATE 1 DROP: 20; 5 SOLUTION/ DROPS OPHTHALMIC at 08:19

## 2018-10-23 RX ADMIN — Medication 10 ML: at 16:15

## 2018-10-23 RX ADMIN — Medication 10 ML: at 22:08

## 2018-10-23 RX ADMIN — DORZOLAMIDE HYDROCHLORIDE AND TIMOLOL MALEATE 1 DROP: 20; 5 SOLUTION/ DROPS OPHTHALMIC at 17:12

## 2018-10-23 RX ADMIN — FUROSEMIDE 20 MG: 10 INJECTION, SOLUTION INTRAMUSCULAR; INTRAVENOUS at 16:14

## 2018-10-23 RX ADMIN — FAMOTIDINE 20 MG: 20 TABLET ORAL at 08:18

## 2018-10-23 RX ADMIN — Medication 40 ML: at 16:15

## 2018-10-23 RX ADMIN — FAMOTIDINE 20 MG: 20 TABLET ORAL at 17:11

## 2018-10-23 RX ADMIN — CHLORHEXIDINE GLUCONATE 15 ML: 1.2 RINSE ORAL at 22:07

## 2018-10-23 RX ADMIN — LATANOPROST 1 DROP: 50 SOLUTION OPHTHALMIC at 22:09

## 2018-10-23 RX ADMIN — IPRATROPIUM BROMIDE AND ALBUTEROL SULFATE 3 ML: .5; 3 SOLUTION RESPIRATORY (INHALATION) at 20:43

## 2018-10-23 RX ADMIN — LEVOFLOXACIN 750 MG: 750 TABLET, FILM COATED ORAL at 22:16

## 2018-10-23 RX ADMIN — IPRATROPIUM BROMIDE AND ALBUTEROL SULFATE 3 ML: .5; 3 SOLUTION RESPIRATORY (INHALATION) at 07:45

## 2018-10-23 RX ADMIN — Medication 10 ML: at 07:33

## 2018-10-23 RX ADMIN — ENOXAPARIN SODIUM 40 MG: 40 INJECTION, SOLUTION INTRAVENOUS; SUBCUTANEOUS at 16:14

## 2018-10-23 RX ADMIN — CHLORHEXIDINE GLUCONATE 15 ML: 1.2 RINSE ORAL at 08:18

## 2018-10-23 NOTE — PROGRESS NOTES
Problem: Voice Impaired (Adult) Goal: *Acute Goals and Plan of Care (Insert Text) Speech pathology goals Initiated 10/23/2018 1. Patient will tolerate PMV for 20 minutes with no change in vital signs within 7 days 2. Patient will participate in swallowing evaluation within 7 days Speech LAnguage Pathology evaluation Patient: Mikayla Valdes (49 y.o. male) Date: 10/23/2018 Primary Diagnosis: Acute GI bleeding Pulmonary hemorrhage Acute GI Bleeding 
dysphasia Procedure(s) (LRB): ESOPHAGOGASTRODUODENOSCOPY (EGD) (N/A) PERCUTANEOUS ENDOSCOPIC GASTROSTOMY TUBE INSERTION (N/A) 20 Days Post-Op Precautions:   Fall ASSESSMENT : 
Based on the objective data described below, the patient presents with excellent tolerance of PMV. Patient with #6 [place]iley cuffed trach, cuff deflated. O2 sats 91-95% and RR 25 on 40%, 10LPM FiO2 via trach collar. Patient tolerated finger occlusion well with no change in vital signs or significant back pressure. PMV placed and VSS and patient reported good tolerance. Patient tolerated PMV for 5 minutes prior to becoming drowsy and PMV removed. Patient oriented to person and place, and hoarse vocal quality noted, however this was overall WFL. Recommend patient wear PMV as tolerated with SLP, RT, and RN supervision, and SLP to follow for tolerance of PMV for longer periods of time. Discussed with patient and RN. Patient will benefit from skilled intervention to address the above impairments. Patients rehabilitation potential is considered to be Good Factors which may influence rehabilitation potential include:  
[]              None noted [x]              Mental ability/status [x]              Medical condition []              Home/family situation and support systems []              Safety awareness []              Pain tolerance/management 
[]              Other: PLAN : 
Recommendations and Planned Interventions: 
--PMV placement with SLP/RT/RN only with supervision --SLP to follow for tolerance of PMV and swallowing evaluation Speaking Valve Placement: 
[]  SLP only 
[]  SLP/RT only 
[x]  SLP/RT/RN only with supervision 
[]  SLP/RT/RN and patient/family Recommended Speaking Valve Wearing Schedule: 
[x]  With SLP or RN present only Frequency/Duration: Patient will be followed by speech-language pathology 3 times a week to address goals. Discharge Recommendations: [facility] SUBJECTIVE:  
Patient stated I have to use the commode.  Patient alert, drowsy as session progressed. Oriented to person and place only. OBJECTIVE:  
 
Past Medical History:  
Diagnosis Date  Aneurysm ([facility] [address] AAA  Arrhythmia   
 atrial fibrillation 2016  Hypertension  Other ill-defined conditions(799.89)   
 hx of broken arm left/cast  
 Other ill-defined conditions(799.89)   
 glaucoma  Other ill-defined conditions(799.89)   
 elevated cholesterol  Other ill-defined conditions(799.89)   
 hx bursitis knees Past Surgical History:  
Procedure Laterality Date  ABDOMEN SURGERY PROC UNLISTED  1/16/13 AORTIC ABDOMINAL ANUERYSM REPAIR ENDOVASCULAR   
 BRONCHOSCOPY DIAGNOSTIC  9/28/2018  HX HEENT    
 bilat cataract  HX ORTHOPAEDIC    
 ligament surgery left arm  HX OTHER SURGICAL    
 broken left foot casted and healed  PLACE PERCUT GASTROSTOMY TUBE  10/3/2018  UPPER GI ENDOSCOPY,BIOPSY  4/10/2015 Prior Level of Function/Home Situation:  
Home Situation Home Environment: Private residence # Steps to Enter: 14 One/Two Story Residence: Two story Living Alone: No 
Support Systems: (unable to clarify further staus due to trach) Patient Expects to be Discharged to[de-identified] Patient room Current DME Used/Available at Home: Oxygen, portable Oxygen Therapy O2 Sat (%): 95 % Respiratory Rate: 25 Pulse via Oximetry: 85 beats per minute O2 Device: Tracheal collar FIO2 (%): 40 %, 10LPM 
 
 Patient with #6 Shiley cuffed trach, cuff deflated. Patient on 40% FiO2, 10LPM via trach collar. O2 sats 91-95% and RR 25 at rest. Attempted finger occlusion trials and patient with hoarse vocal quality that was overall WFL. No significant back pressure noted. Patient oriented to person and place with finger occlusion trials, and told SLP he needed the \"commode. \" Break taken for this. Upon SLP re-entry to room, patient ready for PMV. Provided education regarding need for cuff deflation, normal physiology of respiration/phonation, patient's physiology of respiration/phonation with trach in place, and change in physiology with placing PMV. Provided education regarding change in sensation with PMV in place, including tickling/coughing with air passing up through pharynx. Patient verbalized understanding and readiness for PMV. PMV placed and VSS with O2 sats 91-93% and RR 25. Patient reported good tolerance with no coughing or change in sensation. Patient tolerated PMV for 5 minutes prior to becoming drowsy. Hoarse vocal quality persisted, and per patient, \"It sounds like someone trying to imitate me. \" PMV removed prior to SLP leaving room. NOMS: 
The NOMS functional outcome measure was used to quantify this patient's level of voice  impairment. Based on the NOMS, the patient was determined to be at level 4 for voice function. G Codes: In compliance with CMSs Claims Based Outcome Reporting, the following G-code set was chosen for this patient based the use of the NOMS functional outcome to quantify this patient's level of voice impairment. Using the NOMS, the patient was determined to be at level 4 for voice which correlates with the CK= 40-59% level of severity. Based on the objective assessment provided within this note, the current, goal, and discharge g-codes are as follows: Voice   Voice Current Status CK= 40-59%  Voice Goal Status CJ= 20-39% NOMS Voice: Level 1 (CN): Unable to use voice to communicate. Needs AAC. Level 2 (CM): Voice not functional most of time. Level 3 (CL): Voice functional but distracting & interferes with communication. Participation in activities is limited most of time. Level 4 (CK): Voice is functional and sometimes distracting. High vocal demand consistently impacted. Level 5 (CJ): Voice occasionally sounds normal with self monitoring; high vocal demand occasionally impacted. Level 6 (CI): Voice sounds normal across most situations. High vocal demand rarely impacted. Level 7 ([address] [place] [address]): Voice is normal and self monitoring is effective but only occasionally needed. JOAO. (2003). National Outcomes Measurement System (NOMS): Adult Speech-Language Pathology User's Guide. Pain: 
Pain Scale 1: Behavioral Pain Scale (BPS) Pain Intensity 1: 3 After treatment:  
[]              Patient left in no apparent distress sitting up in chair 
[x]              Patient left in no apparent distress in bed 
[x]              Call bell left within reach [x]              Nursing notified 
[]              Caregiver present 
[]              Bed alarm activated []                 Patient left with [place] valve on, nursing staff or respiratory therapist notified COMMUNICATION/EDUCATION:  
The patients plan of care including recommendations, planned interventions, and recommended diet changes were discussed with: Registered Nurse. Education was provided to patient and staff regarding speaking valve placement, wearing schedule, safety precautions including cuff deflation and removal when sleeping, and care/cleaning guidelines. [x] Patient/family have participated as able in goal setting and plan of care. [x] Patient/family agree to work toward stated goals and plan of care. [] Patient understands intent and goals of therapy, but is neutral about his/her participation. [] Patient is unable to participate in goal setting and plan of care. Thank you for this referral. 
Dennis Chowdhury, SLP Time Calculation: 26 mins

## 2018-10-23 NOTE — PROGRESS NOTES
Problem: Mobility Impaired (Adult and Pediatric) Goal: *Acute Goals and Plan of Care (Insert Text) Physical Therapy Goals Initiated 10/20/2018 1. Patient will move from supine to sit and sit to supine , scoot up and down and roll side to side in bed with moderate assistance x 1 within 7 day(s). 2.  Patient will transfer from bed to chair and chair to bed with maximal assistance using the least restrictive device within 7 day(s). 3.  Patient will perform sit to stand with maximal assistance within 7 day(s). 4.  Patient will sit on EOB x 20 minutes with intact sitting balance within 7 days to prepare for standing and increased activity tolerance. Will address ambulation goals once appropriate. physical Therapy TREATMENT Patient: Rubia Pradhan (10 y.o. male) Date: 10/23/2018 Diagnosis: Acute GI bleeding Pulmonary hemorrhage Acute GI Bleeding 
dysphasia <principal problem not specified> Procedure(s) (LRB): ESOPHAGOGASTRODUODENOSCOPY (EGD) (N/A) PERCUTANEOUS ENDOSCOPIC GASTROSTOMY TUBE INSERTION (N/A) 20 Days Post-Op Precautions: Fall Chart, physical therapy assessment, plan of care and goals were reviewed. ASSESSMENT: 
Pt received supine in bed on 40% trach collar and agreeable to PT intervention. Pt cleared by nursing for mobility. VSS at rest. Pt with improved independence and activity this date. Placed PMV on to converse with patient, which greatly improved patient-therapist communication and active participation in acute PT. Bed mobility performed with mod A x 2 and VCs for use of bed railings and proper technique. Sitting balance good-fair. SaO2 84% on 40% trach collar; nursing present and increased FiO2 to 100% for mobility. Pt able to recover >90% at rest prior to further mobility. He performed sit<>stand transfers from EOB x 2, requiring max-total A x 2 and needing max A x 2 to remain upright.  Pt only able to tolerate standing ~ 25 seconds at the longest. Pt able to clear bottom from EOB, however with crouched stance despite multimodal cueing to improve trunk, hip, and knee extension. Pt with increased fatigue, therefore was returned supine in bed with mod-max A x 2. Pt was left supine in bed with all needs met, RN present, and VSS on 100% trach collar following therapy session. Recommend pt discharge to LTAC to address mobility impairments and improve independence. Progression toward goals: 
[]    Improving appropriately and progressing toward goals [x]    Improving slowly and progressing toward goals 
[]    Not making progress toward goals and plan of care will be adjusted PLAN: 
Patient continues to benefit from skilled intervention to address the above impairments. Continue treatment per established plan of care. Discharge Recommendations:  LTAC Further Equipment Recommendations for Discharge:  TBD by facility SUBJECTIVE:  
Patient stated This is wonderful.  OBJECTIVE DATA SUMMARY:  
Critical Behavior: 
Neurologic State: Drowsy, Eyes open spontaneously Orientation Level: Disoriented to situation, Disoriented to time, Oriented to person, Oriented to [employer]) Cognition: Follows commands Safety/Judgement: Fall prevention Functional Mobility Training: 
Bed Mobility: 
Rolling: Moderate assistance Supine to Sit: Moderate assistance;Assist x2 Sit to Supine: Moderate assistance;Maximum assistance;Assist x2 Scooting: Minimum assistance Transfers: 
Sit to Stand: Maximum assistance; Total assistance;Assist x2 Stand to Sit: Total assistance Balance: 
Sitting: Impaired Sitting - Static: Good (unsupported) Sitting - Dynamic: Fair (occasional) Standing: Impaired; With support Standing - Static: PoorPain: 
Pain Scale 1: Behavioral Pain Scale (BPS) Pain Intensity 1: 3 Activity Tolerance:  
Fair/improving - increased activity; SaO2 84% at the lowest on 40% trach collar; no pain complaints Please refer to the flowsheet for vital signs taken during this treatment. After treatment:  
[]    Patient left in no apparent distress sitting up in chair 
[x]    Patient left in no apparent distress in bed 
[x]    Call bell left within reach [x]    Nursing notified 
[]    Caregiver present 
[]    Bed alarm activated COMMUNICATION/COLLABORATION:  
The patients plan of care was discussed with: Physical Therapist, Occupational Therapist and Registered Nurse Derik Trejo PT, DPT Time Calculation: 23 mins

## 2018-10-23 NOTE — PROGRESS NOTES
[facility] Pulmonary Consult Service NotePulmonary, Critical Care, and Sleep Medicine Name: Shari Heredia MRN: [id number] : 1934 Hospital: [facility] Date: 10/23/2018   Hospital Day: 37 IMPRESSION:  
1. 18 cardiopulmonary arrest with  6 min of CPR then ROSC. 2. Encephalopathy/Debility multifactorial -sluggish and slow but following commands; has hearing loss as well 3. Acute and now chronic respiratory failure-on vent 4. S/p tracheostomy 10/2/18 5. Acute hemoptysis-  
6. RLL with suspected lung ca but cytology negative x 2( from bronch) 7. New worsening anemia hgb 7.8 8. Moderate right pleural effusion, s/p prior drain 9. S/P pulmonary artery embolization for hemoptysis per IR of suspected lung mass. 10. COPD moderate to severe at baseline 11. Anemia and prior coagulopathy on coumadin 12. Hx of CAD s/p PCI, afib on amio, former smoker, prior cva RECOMMENDATIONS/PLAN:  
1. Continue TC trials as tolerated during the day and push pt with more Pt, OT 2. Rest on vent at bedtime or sooner prn- needs to build up strength and stamina so he is at less risk for respiratory fatigue, acidosis 3. Trach replacement- still has sutures 4. nebs 5. Gentle diuresis 6. Enteral feedings 7. Glucose monitoring and SSI 8. PT/OT 9. DVT/GI prophylaxis 10. Will need LTAC - d/w Case management: Daughter will be here Wednesday 11. PT, OT 
12. misssed Mrs. Valle the other day. Will reach out today I personally reviewed meds, labs and images Subjective/Initial History:  
 
10-: No new secretions. TC all night. ABG with some CO2 retention. Shallow breathes. 10-22: went up to 8 hours off vent, 40% TC ; working with PT, OT. sittign up EOB with assistance 10-18: no overnight events. More alert today 10-17: still desaturating with TC trials 10-16: no events. Desaturated with TC trials yesterday. 10-15: no events. No new reported issues 10-14:  No major events. Received transfusion yesterday 10-13:  No changes. Spoke to wife who agrees to transfusion. Cytology still pending 10-12-18: Pt seems to be much more alert when seen this am. Had near 3.5-4L of right pleural fluid output. NO ROS or HPI are obtainable from pt.  
 
 
10-11-18: Pt has been slow to respond. Still not waking up. Had increased secretion from trach and from his penis. Not follow any commands. Not able to get ROS or HPI from pt.  
 
 
10-10-18: No major changes or events over last 24 hrs. Not able to obtain ROS or HPI from pt. Some thick yellow secretions present around this trach. 10-9-18: Not able to get hx  Or ROS from pt. No acute changes overnight. Had some moderate secretions this am.  
 
 
10-8-18: Pt still not waking up. Has ongoing issues with blood clots in urine. Per nurses has been with recurrent issues with clotting and require frequent flushing of the almaraz. Pt has not been able to tolerate trach collar. Unable to get ROS or HPI from pt. MAR reviewed and pertinent medications noted or modified as needed Current Facility-Administered Medications Medication  alteplase (CATHFLO) 0.5 mg in sterile water (preservative free) 0.5 mL injection  enoxaparin (LOVENOX) injection 40 mg  
 levoFLOXacin (LEVAQUIN) tablet 750 mg  
 famotidine (PEPCID) tablet 20 mg  
 influenza vaccine 2018-19 (6 mos+)(PF) (FLUARIX QUAD/FLULAVAL QUAD) injection 0.5 mL  furosemide (LASIX) injection 20 mg  
 albuterol-ipratropium (DUO-NEB) 2.5 MG-0.5 MG/3 ML  
 fentaNYL citrate (PF) injection 25 mcg  zinc oxide-cod liver oil (DESITIN) 40 % paste  acetaminophen (TYLENOL) solution 650 mg  
 sodium chloride (NS) flush 10-30 mL  sodium chloride (NS) flush 10 mL  sodium chloride (NS) flush 10-40 mL  heparin (porcine) pf 300 Units  glycopyrrolate (ROBINUL) injection 0.1 mg  
  chlorhexidine (PERIDEX) 0.12 % mouthwash 15 mL  albuterol (PROVENTIL HFA, VENTOLIN HFA, PROAIR HFA) inhaler 2 Puff  dorzolamide-timolol (COSOPT) 22.3-6.8 mg/mL ophthalmic solution 1 Drop  latanoprost (XALATAN) 0.005 % ophthalmic solution 1 Drop  sodium chloride (NS) flush 5-10 mL  sodium chloride (NS) flush 5-10 mL  ondansetron (ZOFRAN) injection 4 mg ROS:A comprehensive review of systems was negative except for that written in the HPI. Objective: 
 
Vital Signs: Telemetry:    normal sinus rhythmIntake/Output:  
Visit Vitals /45 Pulse 84 Temp 97.6 °F (36.4 °C) Resp 25 Ht 6' 2\" (1.88 m) Wt 104.2 kg (229 lb 11.5 oz) SpO2 92% BMI 29.49 kg/m² Temp (24hrs), Av.2 °F (36.8 °C), Min:97.6 °F (36.4 °C), Max:98.5 °F (36.9 °C) O2 Device: Tracheal collar O2 Flow Rate (L/min): 10 l/min Wt Readings from Last 4 Encounters:  
10/23/18 104.2 kg (229 lb 11.5 oz) 16 111.1 kg (245 lb)  
16 106.6 kg (235 lb) 04/08/15 103.4 kg (228 lb) Intake/Output Summary (Last 24 hours) at 10/23/2018 1357 Last data filed at 10/23/2018 0800 Gross per 24 hour Intake 1480 ml Output 1430 ml Net 50 ml Last shift:      10/23 0701 - 10/23 1900 In: 150 Out: - Last 3 shifts: 10/21 1901 - 10/23 0700 In: 3130 Out: 2250 [Urine:2250] Physical Exam:  
 General:  AAM No distress, trach and vent support. HEAD: Normocephalic, without obvious abnormality, atraumatic EYES: conjunctivae clear. anicteric sclerae NOSE: nares normal, no drainage, no nasal flaring, Neck: Supple, symmetrical, trachea midline, has trach in place, on Mechanical vent support. Chest: increased AP diameter Lungs:   clear anterioly. Trached on vent. Heart: Regular rate and rhythm nl s1,2. Abdomen: soft, non-tender, +BS, pt has almaraz in place. Obese. Musculoskeletal: no gross deformities Neuro:  Intermittently follows commands. Psych: Not able to assess; no agitation. NO anxiety or depression when seen. Data:  
            
Labs: 
Recent Labs 10/22/18 
0403 10/21/18 
9403 WBC 6.5 6.4 HGB 7.5* 7.8* HCT 24.5* 25.7*  
 184 Recent Labs 10/23/18 
0400 10/22/18 
0403 10/21/18 
0434  141 142  
K 3.8 4.0 4.6  104 104 CO2 33* 34* 34* * 120* 110* BUN 31* 30* 28* CREA 1.04 1.08 0.98  
CA 8.5 8.5 8.9 MG  --  2.2  --   
PHOS 2.7 2.6 2.7 INR 1.1 1.1 1.1 Recent Labs 10/23/18 
0900 PH 7.46* PCO2 51* PO2 60* HCO3 35* FIO2 40 Imaging: 
I have personally reviewed the patients radiographs and have reviewed the reports: 
None today Total critical care time exclusive of procedures: 25 minutes Bj Sears MD

## 2018-10-23 NOTE — PROGRESS NOTES
Goals are clear case discussed with Dr Stevenson Davila and Dr Anurag Nettles , will sign off . 
 
 we are available for any [facility] assistance . Thank you for including palliative care service in the care of the patient . Albino Arauz MD 
 
[phone]

## 2018-10-23 NOTE — PROGRESS NOTES
19:00 - Bedside shift change report given to PADDY Okeefe (oncoming nurse) by Melvin Alexandra RN (offgoing nurse). Report included the following information SBAR, Kardex, Intake/Output, MAR, Recent Results and Cardiac Rhythm AFib. 22:00 - Patient incontinent of stool. Bathed and linens changed. 00:00 - Patient remaining on trach collar without difficulty. VSS. Coughs up moderate amounts of thick white/clear sputum. 04:00 - Patient VSS, remains off vent, on 40% trach collar. Trach care performed, tolerated well.   
07:30 - Bedside shift change report given to Wendy Ramirez RN (oncoming nurse) by Choco Longoria RN (offgoing nurse). Report included the following information SBAR, Kardex, Intake/Output, MAR, Recent Results and Cardiac Rhythm AFib.

## 2018-10-23 NOTE — PROGRESS NOTES
Interdisciplinary team rounds were held 10/23/2018  with the following team members:Care Management, Diabetes Treatment Specialist, Nursing, Nutrition, Pharmacy, Physical Therapy, Physician,  Respiratory Therapy and clinical leader. Plan of care discussed. Goal: See MD orders and progress notes for further  interventions and desired outcomes.

## 2018-10-23 NOTE — PROGRESS NOTES
Hospitalist Progress Note NAME: Loren Higginbotham  
:  1934 MRN:  [id number] Assessment / Plan: 
80-year-old male with past medical history of coronary artery disease, atrial fibrillation on anticoagulation with warfarin, HTN and HLD who presented to ED on  with vomiting blood 2 days PTA. 
  
Acute encephalopathy in setting of acute hypoxic respiratory failure and resultant PEA cardiac arrest : s/p 6 min CPR and epi with ROSC after IR arteriogram. More alert the last few days. - CT head  showed small age-indeterminate infarct in the left corona radiata - CT head 10/11 with no evidence of acute intracranial abnormality. 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 Bilateral tympanomastoid effusions. - s/p tracheostomy. continue trach collar trials. Awaiting bed in [facility]. 
  
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  
-10/23 CO2 retention noted on ABG. Appears to be chronic retainer prior to vent. Baseline CO2 appears to be 45-50 
extubated on  but reintubated , now s/p trach placement 10/3, remains on vent via trach. Minimal secretions. - CT chest  with patchy airspace disease in the right lung base with an associated hyperdense 5.2 x 4.3 cm oval lesion.  
- CTA chest  due to persistent bloody pulmonary secretions. Right lower lobe masslike abnormality demonstrates increased internal density and has increased in size measuring 6.7 x 6.7 cm, compared to 5.7 x 4.4 cm. This is compatible with internal hemorrhage. There is new moderate right lower lobe partial collapse/atelectasis. - con't lasix as above; monitor I/Os - s/p arteriogram : Thoracic aortic and intercostal arteriograms demonstrating no 
enlarged bronchial artery supplying the right lower lobe of the lung.  Normal appearing and normal sized right bronchial artery is seen without any hyperplasia or dominant right lower lobe supply. No embolization was performed. - s/p diagnostic bronch on 9/16. No endobronchial lesions seen. - blood cultures negative. Cytology from bronchial washing on 9/16 is atypical, favor benign reactive process. - work-up for rheumatologic causes of hemoptysis negative: ANCA, anti-GBM, MPO Ab, HI-3 Ab, SSA/SSB, RNP Ab, Arredondo/RNP, dsDNA, and Arredondo Ab all negative. JOSS+. - completed zosyn for possible lung abscess (9/20).   
R pleural effusion CT chest 10/11 with worsening of the appearance of the right hemithorax with increased pleural effusion and consolidation. 
- most recent CXR 10/13 with focal area of parenchymal consolidation at the right lung base which persists. Cardiomegaly unchanged. - R chest tube placed 10/11 with blood-tinged L pleural fluid drainage. S/p chest tube removal 10/18. On lasix BID 
  
Sepsis due to Pseudomonas catheter-associated UTI and right pleural fluid, not present on admission 
- Urine and pleural fluid cultures (1 swab) both pansensitive pseudomonas.  Sputum culture with normal respiratory ernestina and rare yeast.  Blood cultures no growth. - off emperic vancomycin and zosyn. On Levaquin until 10/26. 
  
Acute blood loss anemia and anemia of chronic disease Hgb stable. . No external bleeding. Close monitor. INR at 1.2. High fibrinogen. Platelets WNL.  
  
Elevated LFTs of unclear etiology: 
stopped statin for now LFT trended down.  
  
CAD s/p PCI, atrial fibrillation on chronic anticoagulation, and benign HTN: 
- holding amiodarone and diltiazem; holding anticoagulation (home coumadin) and pravachol 
- con't IV lasix (on oral lasix outpatient) 
  
ALAN: resolved UTI, acute cystitis w/ hematuria, POA:  Required almaraz placed by urology Hyperlipidemia: statin held due to elevated LFTs as above Hypernatremia, resolved Obesity (Body mass index is 32.27 kg/(m^2) 
   
Code: Full (wife and children are decision makers) DVT prophylaxis: heparin Subjective: Chief Complaint / Reason for Physician Visit Resting in bed in CCU with TC on. Responsive to very loud speech. Denies acute chest pain, N/V, abd pain. Review of Systems: 
Symptom Y/N Comments  Symptom Y/N Comments Fever/Chills    Chest Pain Poor Appetite    Edema Cough    Abdominal Pain Sputum    Joint Pain SOB/ROY    Pruritis/Rash Nausea/vomit    Tolerating PT/OT Diarrhea    Tolerating Diet Constipation    Other Could NOT obtain due to: Limited d/t slow mentation, hearing difficulty Objective: VITALS:  
Last 24hrs VS reviewed since prior progress note. Most recent are: 
Patient Vitals for the past 24 hrs: 
 Temp Pulse Resp BP SpO2  
10/23/18 1700  87 20 122/58 100 % 10/23/18 1600 97.8 °F (36.6 °C) 86 25 120/68 99 % 10/23/18 1520  82 22  100 % 10/23/18 1500  82 26 125/67 98 % 10/23/18 1400  88 21 121/59 95 % 10/23/18 1300  84 25 129/63 97 % 10/23/18 1200 98 °F (36.7 °C) 89 25 126/62 95 % 10/23/18 1111     92 % 10/23/18 1100  85 24 109/58 93 % 10/23/18 1000  84 25 110/45 93 % 10/23/18 0900  89 24 116/66 94 % 10/23/18 0800 97.6 °F (36.4 °C) 82 21 108/59 98 % 10/23/18 0746     96 % 10/23/18 0744     97 % 10/23/18 0700  89 22 106/73 96 % 10/23/18 0600  92 19 126/65 92 % 10/23/18 0545  89  133/72 91 % 10/23/18 0400 98.5 °F (36.9 °C) 94 25 105/56 94 % 10/23/18 0200  84 23 101/58 94 % 10/23/18 0100  82 23 119/65 94 % 10/23/18 0000 98.5 °F (36.9 °C) 89 23 110/58 92 % 10/22/18 2300  84 17 110/43 93 % 10/22/18 2205  83 24 101/68 98 % 10/22/18 2200  84 21 (!) 83/46   
10/22/18 2100  80 19 94/52 90 % 10/22/18 2000 98.1 °F (36.7 °C) 78 23 104/49 94 % 10/22/18 1944     93 % Intake/Output Summary (Last 24 hours) at 10/23/2018 1909 Last data filed at 10/23/2018 1600 Gross per 24 hour Intake 1680 ml Output 1205 ml Net 475 ml PHYSICAL EXAM: 
General: WD, WN. Alert, cooperative, no acute distress   
EENT:  EOMI. Anicteric sclerae. +trach, MMM Resp:  CTA bilaterally, no wheezing or rales. No accessory muscle use CV:  Regular  rhythm,  No edema GI:  Soft, Non distended, Non tender.  +Bowel sounds Neurologic:  Alert and oriented to person, slow speech, Psych:   Not anxious nor agitated Skin:  No rashes. No jaundice Reviewed most current lab test results and cultures  YES Reviewed most current radiology test results   YES Review and summation of old records today    NO Reviewed patient's current orders and MAR    YES 
PMH/SH reviewed - no change compared to H&P 
________________________________________________________________________ Care Plan discussed with: 
  Comments Patient x Family RN x Care Manager Consultant Multidiciplinary team rounds were held today with , nursing, pharmacist and clinical coordinator. Patient's plan of care was discussed; medications were reviewed and discharge planning was addressed. ________________________________________________________________________ Total NON critical care TIME: 30   Minutes Total CRITICAL CARE TIME Spent:   Minutes non procedure based Comments >50% of visit spent in counseling and coordination of care    
________________________________________________________________________ Lyricrijoy Notch, DO  
 
Procedures: see electronic medical records for all procedures/Xrays and details which were not copied into this note but were reviewed prior to creation of Plan. LABS: 
I reviewed today's most current labs and imaging studies. Pertinent labs include: 
Recent Labs 10/22/18 
0403 10/21/18 
8186 WBC 6.5 6.4 HGB 7.5* 7.8* HCT 24.5* 25.7*  
 184 Recent Labs 10/23/18 
0400 10/22/18 
0403 10/21/18 
0434  141 142  
K 3.8 4.0 4.6  104 104 CO2 33* 34* 34* * 120* 110* BUN 31* 30* 28* CREA 1.04 1.08 0.98  
CA 8.5 8.5 8.9 MG  --  2.2  --   
PHOS 2.7 2.6 2.7 INR 1.1 1.1 1.1 Signed: Huy Parsons DO

## 2018-10-23 NOTE — PROGRESS NOTES
0715-Bedside and Verbal shift change report given to Ginna Jauregui, PADDY (oncoming nurse) by Jake Riojas RN (offgoing nurse). Report included the following information SBAR, Kardex, ED Summary, Procedure Summary, Intake/Output, MAR, Recent Results and Cardiac Rhythm A fib.  
 
0945-Patient working with [employer]. 1000-Patient voided in bed (condom cath came off in the bed). Pebbles care provided and gown and linen changed. 1445-Patient stood at side of bed with PT/OT 2x. 
 
1510-Patient placed back on Vent to rest.  Desatted a little with PT down in to the upper 80's but appears more lethargic than this am. 
 
1630-Bedside and Verbal shift change report given to Rashid Ortiz RN (oncoming nurse) by Ginna Jauregui RN (offgoing nurse). Report included the following information SBAR, Kardex, ED Summary, Procedure Summary, Intake/Output, MAR, Recent Results and Cardiac Rhythm A fib.

## 2018-10-23 NOTE — PROGRESS NOTES
Problem: Self Care Deficits Care Plan (Adult) Goal: *Acute Goals and Plan of Care (Insert Text) Occupational Therapy Goals Initiated 10/20/2018 1. Patient will perform upper body dressing with supervision/set-up within 7 day(s). 2.  Patient will perform lower body dressing with minimal assistance/contact guard assist within 7 day(s). 3.  Patient will perform grooming with modified independence within 7 day(s). 4.  Patient will perform toilet transfers with moderate assistance  within 7 day(s). 5.  Patient will perform all aspects of toileting with minimal assistance/contact guard assist within 7 day(s). 6.  Patient will participate in upper extremity therapeutic exercise/activities with supervision/set-up for 5 minutes within 7 day(s). 7.  Patient will utilize energy conservation techniques during functional activities with verbal cues within 7 day(s). Occupational Therapy TREATMENT Patient: Surendra Moscoso (9 y.o. male) Date: 10/23/2018 Diagnosis: Acute GI bleeding Pulmonary hemorrhage Acute GI Bleeding 
dysphasia <principal problem not specified> Procedure(s) (LRB): ESOPHAGOGASTRODUODENOSCOPY (EGD) (N/A) PERCUTANEOUS ENDOSCOPIC GASTROSTOMY TUBE INSERTION (N/A) 20 Days Post-Op Precautions: Fall Chart, occupational therapy assessment, plan of care, and goals were reviewed. ASSESSMENT: 
Pt was cleared to be seen and was on trach collar at 40% and all VSS. With PMV and pt was able to talk while laying in bed. Pt was mod assist for supine to sit and once he was sitting up he stated that he was having difficulty with breathing and noted that O2% dropped to 84-87% and nursing increased FiO2 to 100% and his O2 increased to the 90's. Pt was able to stand with max assist of 2 and able to stand approx 25 seconds. He rested and attempted to stand once more and he was not able to come to full stand and was tired and put back to bed with max assist of 2.   Pt was left in bed with call bell with in reach and nursing notified. Pt is making good progress and recommend that he have further therapy at discharge at rehab at [facility]. Progression toward goals: 
[x]       Improving appropriately and progressing toward goals 
[]       Improving slowly and progressing toward goals 
[]       Not making progress toward goals and plan of care will be adjusted PLAN: 
Patient continues to benefit from skilled intervention to address the above impairments. Continue treatment per established plan of care. Discharge Recommendations:  Kiet Mccracken Further Equipment Recommendations for Discharge:  tbd SUBJECTIVE:  
Patient stated My granddaughter and my grandson .  OBJECTIVE DATA SUMMARY:  
Cognitive/Behavioral Status: 
Neurologic State: Drowsy Orientation Level: Oriented to person;Oriented to place Cognition: Impaired decision making Perception: Appears intact Perseveration: No perseveration noted Safety/Judgement: Fall prevention Functional Mobility and Transfers for ADLs:Bed Mobility: 
Rolling: Moderate assistance Supine to Sit: Moderate assistance;Assist x2 Sit to Supine: Moderate assistance;Maximum assistance;Assist x2 Scooting: Minimum assistance Transfers: 
Sit to Stand: Maximum assistance; Total assistance;Assist x2 Balance: 
Sitting: Impaired; Without support Sitting - Static: Good (unsupported) Sitting - Dynamic: Fair (occasional) Standing: Impaired; With support Standing - Static: Poor ADL Intervention: 
  
 
 pt is max for UB and LB ADLs and pt is min assist for washing his face. Toileting Toileting Assistance: Total assistance(dependent) Cognitive Retraining Safety/Judgement: Fall prevention Pain: 
Pain Scale 1: Behavioral Pain Scale (BPS) Pain Intensity 1: 3 Activity Tolerance:  
fair Please refer to the flowsheet for vital signs taken during this treatment. After treatment: [] Patient left in no apparent distress sitting up in chair 
[x] Patient left in no apparent distress in bed 
[x] Call bell left within reach [x] Nursing notified 
[] Caregiver present 
[] Bed alarm activated COMMUNICATION/COLLABORATION:  
The patients plan of care was discussed with: Physical Therapist and Registered Nurse LISA Cordova Time Calculation: 24 mins

## 2018-10-24 ENCOUNTER — APPOINTMENT (OUTPATIENT)
Dept: GENERAL RADIOLOGY | Age: 83
DRG: 003 | End: 2018-10-24
Attending: SURGERY
Payer: MEDICARE

## 2018-10-24 LAB
ANION GAP SERPL CALC-SCNC: 4 MMOL/L (ref 5–15)
APTT PPP: 30.4 SEC (ref 22.1–32)
ARTERIAL PATENCY WRIST A: YES
BASE EXCESS BLDA CALC-SCNC: 10.9 MMOL/L
BDY SITE: ABNORMAL
BREATHS.SPONTANEOUS ON VENT: 23
BUN SERPL-MCNC: 35 MG/DL (ref 6–20)
BUN/CREAT SERPL: 31 (ref 12–20)
CALCIUM SERPL-MCNC: 8.7 MG/DL (ref 8.5–10.1)
CHLORIDE SERPL-SCNC: 103 MMOL/L (ref 97–108)
CO2 SERPL-SCNC: 34 MMOL/L (ref 21–32)
CREAT SERPL-MCNC: 1.13 MG/DL (ref 0.7–1.3)
FIBRINOGEN PPP-MCNC: 679 MG/DL (ref 200–475)
FIO2 ON VENT: 40 %
GLUCOSE SERPL-MCNC: 120 MG/DL (ref 65–100)
HCO3 BLDA-SCNC: 37 MMOL/L (ref 22–26)
INR PPP: 1.1 (ref 0.9–1.1)
PCO2 BLDA: 53 MMHG (ref 35–45)
PH BLDA: 7.46 [PH] (ref 7.35–7.45)
PHOSPHATE SERPL-MCNC: 2.7 MG/DL (ref 2.6–4.7)
PO2 BLDA: 62 MMHG (ref 80–100)
POTASSIUM SERPL-SCNC: 3.7 MMOL/L (ref 3.5–5.1)
PROTHROMBIN TIME: 11.7 SEC (ref 9–11.1)
SAO2 % BLD: 93 % (ref 92–97)
SAO2% DEVICE SAO2% SENSOR NAME: ABNORMAL
SODIUM SERPL-SCNC: 141 MMOL/L (ref 136–145)
SPECIMEN SITE: ABNORMAL
THERAPEUTIC RANGE,PTTT: ABNORMAL SECS (ref 58–77)

## 2018-10-24 PROCEDURE — 94640 AIRWAY INHALATION TREATMENT: CPT

## 2018-10-24 PROCEDURE — 84100 ASSAY OF PHOSPHORUS: CPT | Performed by: INTERNAL MEDICINE

## 2018-10-24 PROCEDURE — 74011000250 HC RX REV CODE- 250: Performed by: INTERNAL MEDICINE

## 2018-10-24 PROCEDURE — 82803 BLOOD GASES ANY COMBINATION: CPT | Performed by: INTERNAL MEDICINE

## 2018-10-24 PROCEDURE — 85610 PROTHROMBIN TIME: CPT | Performed by: INTERNAL MEDICINE

## 2018-10-24 PROCEDURE — 36415 COLL VENOUS BLD VENIPUNCTURE: CPT | Performed by: INTERNAL MEDICINE

## 2018-10-24 PROCEDURE — 74011250637 HC RX REV CODE- 250/637: Performed by: INTERNAL MEDICINE

## 2018-10-24 PROCEDURE — 74011250636 HC RX REV CODE- 250/636: Performed by: INTERNAL MEDICINE

## 2018-10-24 PROCEDURE — 65610000006 HC RM INTENSIVE CARE

## 2018-10-24 PROCEDURE — 77010033678 HC OXYGEN DAILY

## 2018-10-24 PROCEDURE — 71045 X-RAY EXAM CHEST 1 VIEW: CPT

## 2018-10-24 PROCEDURE — 77030018861

## 2018-10-24 PROCEDURE — 80048 BASIC METABOLIC PNL TOTAL CA: CPT | Performed by: INTERNAL MEDICINE

## 2018-10-24 PROCEDURE — 36600 WITHDRAWAL OF ARTERIAL BLOOD: CPT | Performed by: INTERNAL MEDICINE

## 2018-10-24 PROCEDURE — 94003 VENT MGMT INPAT SUBQ DAY: CPT

## 2018-10-24 RX ADMIN — FUROSEMIDE 20 MG: 10 INJECTION, SOLUTION INTRAMUSCULAR; INTRAVENOUS at 09:31

## 2018-10-24 RX ADMIN — CHLORHEXIDINE GLUCONATE 15 ML: 1.2 RINSE ORAL at 09:32

## 2018-10-24 RX ADMIN — Medication 10 ML: at 21:44

## 2018-10-24 RX ADMIN — LEVOFLOXACIN 750 MG: 750 TABLET, FILM COATED ORAL at 20:03

## 2018-10-24 RX ADMIN — LATANOPROST 1 DROP: 50 SOLUTION OPHTHALMIC at 21:43

## 2018-10-24 RX ADMIN — Medication 10 ML: at 13:32

## 2018-10-24 RX ADMIN — ENOXAPARIN SODIUM 40 MG: 40 INJECTION, SOLUTION INTRAVENOUS; SUBCUTANEOUS at 13:32

## 2018-10-24 RX ADMIN — IPRATROPIUM BROMIDE AND ALBUTEROL SULFATE 3 ML: .5; 3 SOLUTION RESPIRATORY (INHALATION) at 08:26

## 2018-10-24 RX ADMIN — Medication: at 13:29

## 2018-10-24 RX ADMIN — FAMOTIDINE 20 MG: 20 TABLET ORAL at 09:31

## 2018-10-24 RX ADMIN — Medication: at 22:40

## 2018-10-24 RX ADMIN — DORZOLAMIDE HYDROCHLORIDE AND TIMOLOL MALEATE 1 DROP: 20; 5 SOLUTION/ DROPS OPHTHALMIC at 17:24

## 2018-10-24 RX ADMIN — SODIUM CHLORIDE 500 ML: 900 INJECTION, SOLUTION INTRAVENOUS at 13:36

## 2018-10-24 RX ADMIN — IPRATROPIUM BROMIDE AND ALBUTEROL SULFATE 3 ML: .5; 3 SOLUTION RESPIRATORY (INHALATION) at 19:28

## 2018-10-24 RX ADMIN — CHLORHEXIDINE GLUCONATE 15 ML: 1.2 RINSE ORAL at 20:03

## 2018-10-24 RX ADMIN — Medication 10 ML: at 05:38

## 2018-10-24 RX ADMIN — DORZOLAMIDE HYDROCHLORIDE AND TIMOLOL MALEATE 1 DROP: 20; 5 SOLUTION/ DROPS OPHTHALMIC at 09:32

## 2018-10-24 RX ADMIN — FAMOTIDINE 20 MG: 20 TABLET ORAL at 17:16

## 2018-10-24 NOTE — PROGRESS NOTES
Chart reviewed. Spoke with nursing. RN requests OT defer therapy this date as pt is very tired, needing ventilator support, and wants patient to rest. Will defer and continue to follow

## 2018-10-24 NOTE — PROGRESS NOTES
RN notified RT that patient's vent is alarming with Circuit disconnect. Vent circuit was connected to patient's trach.  balloon was flat upon inspection, air placed into cuff with manometer. No volumes with a very long inspiratory time seen on ventilator with  alarming that the inspiratory phase is too long after air was placed.  balloon re inspected only to find flat once again. Air reinserted and after a short period balloon flat once more. Patient then placed on Trach Collar 40%, in no distress with O2 sats 95%. RN notified pulmonary and orders received to leave patient on trach collar over night and get an ABG in the morning.

## 2018-10-24 NOTE — PROGRESS NOTES
Physical Therapy Note Chart reviewed. Spoke with nursing. RN requests PT defer therapy this date as pt is very tired, needing ventilator support, and wants patient to rest. Will defer and continue to follow.  
 
Thank you, 
Artis Bell, PT, DPT

## 2018-10-24 NOTE — PROGRESS NOTES
2028- MD paged regarding trach cuff leak. 2032- Spoke to Dr. Hermse Donnelly MD updated on patient, MD informed of patient's trach cuff leak. 2045- Spoke to Dr. Angélica Wolff, will leave patient on trach collar and call Dr. Montez Moore if patient needs trach switched out.  
0700- Report to Harmony Leon and Caio Burnett RN. Patient had no complaints of pain overnight.

## 2018-10-24 NOTE — PROGRESS NOTES
Interdisciplinary team rounds were held 10/24/2018  with the following team members:Care Management, Diabetes Treatment Specialist, Nursing, Nutrition, Pharmacy, Physical Therapy, Physician, Respiratory Therapy and Clinical Coordinator. Plan of care discussed. Goal: See MD orders and progress notes for further  interventions and desired outcomes.

## 2018-10-24 NOTE — PROGRESS NOTES
Hospitalist Progress Note NAME: Bravo Cool  
:  1934 MRN:  [id number] Assessment / Plan: 
45-year-old male with past medical history of coronary artery disease, atrial fibrillation on anticoagulation with warfarin, HTN and HLD who presented to ED on  with vomiting blood 2 days PTA. 
  
Acute encephalopathy in setting of acute hypoxic respiratory failure and resultant PEA cardiac arrest : s/p 6 min CPR and epi with ROSC after IR arteriogram. More alert the last few days. - CT head  showed small age-indeterminate infarct in the left corona radiata - CT head 10/11 with no evidence of acute intracranial abnormality. 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 Bilateral tympanomastoid effusions. - s/p tracheostomy. continue trach collar trials. Awaiting bed in [facility]. 
  
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  
-10/23 CO2 retention noted on ABG. Appears to be chronic retainer prior to vent. Baseline CO2 appears to be 45-50 
extubated on  but reintubated , now s/p trach placement 10/3, remains on vent via trach. Minimal secretions. - CT chest  with patchy airspace disease in the right lung base with an associated hyperdense 5.2 x 4.3 cm oval lesion.  
- CTA chest  due to persistent bloody pulmonary secretions. Right lower lobe masslike abnormality demonstrates increased internal density and has increased in size measuring 6.7 x 6.7 cm, compared to 5.7 x 4.4 cm. This is compatible with internal hemorrhage. There is new moderate right lower lobe partial collapse/atelectasis. - con't lasix as above; monitor I/Os - s/p arteriogram : Thoracic aortic and intercostal arteriograms demonstrating no 
enlarged bronchial artery supplying the right lower lobe of the lung.  Normal appearing and normal sized right bronchial artery is seen without any hyperplasia or dominant right lower lobe supply. No embolization was performed. - s/p diagnostic bronch on 9/16. No endobronchial lesions seen. - blood cultures negative. Cytology from bronchial washing on 9/16 is atypical, favor benign reactive process. - work-up for rheumatologic causes of hemoptysis negative: ANCA, anti-GBM, MPO Ab, ME-3 Ab, SSA/SSB, RNP Ab, Arredondo/RNP, dsDNA, and Arredondo Ab all negative. JOSS+. - completed zosyn for possible lung abscess (9/20).   
R pleural effusion CT chest 10/11 with worsening of the appearance of the right hemithorax with increased pleural effusion and consolidation. 
- most recent CXR 10/13 with focal area of parenchymal consolidation at the right lung base which persists. Cardiomegaly unchanged. - R chest tube placed 10/11 with blood-tinged L pleural fluid drainage. S/p chest tube removal 10/18. On lasix BID 
  
Sepsis due to Pseudomonas catheter-associated UTI and right pleural fluid, not present on admission 
- Urine and pleural fluid cultures (1 swab) both pansensitive pseudomonas.  Sputum culture with normal respiratory ernestina and rare yeast.  Blood cultures no growth. - off emperic vancomycin and zosyn. On Levaquin until 10/26. 
  
Acute blood loss anemia and anemia of chronic disease Hgb stable. . No external bleeding. Close monitor. INR at 1.2. High fibrinogen. Platelets WNL.  
  
Elevated LFTs of unclear etiology: 
stopped statin for now LFT trended down.  
  
CAD s/p PCI, atrial fibrillation on chronic anticoagulation, and benign HTN: 
- holding amiodarone and diltiazem; holding anticoagulation (home coumadin) and pravachol 
- con't IV lasix (on oral lasix outpatient) 
  
ALAN: resolved UTI, acute cystitis w/ hematuria, POA:  Required almaraz placed by urology Hyperlipidemia: statin held due to elevated LFTs as above Hypernatremia, resolved Obesity (Body mass index is 32.27 kg/(m^2) 
   
Code: Full (wife and children are decision makers) DVT prophylaxis: heparin Subjective: Chief Complaint / Reason for Physician Visit Resting in bed, trach to vent Review of Systems: 
Symptom Y/N Comments  Symptom Y/N Comments Fever/Chills    Chest Pain Poor Appetite    Edema Cough    Abdominal Pain Sputum    Joint Pain SOB/ROY    Pruritis/Rash Nausea/vomit    Tolerating PT/OT Diarrhea    Tolerating Diet Constipation    Other Could NOT obtain due to: Limited d/t slow mentation, hearing difficulty Objective: VITALS:  
Last 24hrs VS reviewed since prior progress note. Most recent are: 
Patient Vitals for the past 24 hrs: 
 Temp Pulse Resp BP SpO2  
10/24/18 1400  72 16 131/62 99 % 10/24/18 1300  72 15 117/81 99 % 10/24/18 1200 99.7 °F (37.6 °C) 70 19 106/63 99 % 10/24/18 1124  70 18  97 % 10/24/18 1100  67 15 100/53 99 % 10/24/18 1000  82 16 103/67 100 % 10/24/18 0931  84  105/77   
10/24/18 0900  81 21 96/49 98 % 10/24/18 0826     93 % 10/24/18 0800 98.7 °F (37.1 °C) 89 17 121/70 92 % 10/24/18 0700  92 26 90/54 97 % 10/24/18 0600  88 26 106/77 93 % 10/24/18 0500  89 26 113/79 92 % 10/24/18 0400 98.7 °F (37.1 °C) 84 23 110/46 91 % 10/24/18 0300  87 23 106/53 94 % 10/24/18 0200  82 22 107/53 94 % 10/24/18 0100  87 27 100/61 90 % 10/24/18 0000 97.7 °F (36.5 °C) 79 22 107/51 95 % 10/23/18 2300  82 22 92/48 94 % 10/23/18 2200  83 26 92/66 92 % 10/23/18 2100  83 26 105/60 94 % 10/23/18 2044     94 % 10/23/18 2000 97.5 °F (36.4 °C) 79 20 117/63 100 % 10/23/18 1900  73 23 130/54 98 % 10/23/18 1800  78 17 123/66 98 % 10/23/18 1700  87 20 122/58 100 % 10/23/18 1600 97.8 °F (36.6 °C) 86 25 120/68 99 % 10/23/18 1520  82 22  100 % 10/23/18 1500  82 26 125/67 98 % Intake/Output Summary (Last 24 hours) at 10/24/2018 1442 Last data filed at 10/24/2018 1400 Gross per 24 hour Intake 1400 ml Output 563 ml Net 837 ml  
  
 
 PHYSICAL EXAM: 
General: WD, WN. Alert, cooperative, no acute distress   
EENT:  EOMI. Anicteric sclerae. +trach, MMM Resp:  CTA bilaterally, no wheezing or rales. No accessory muscle use CV:  Regular  rhythm,  No edema GI:  Soft, Non distended, Non tender.  +Bowel sounds Neurologic:  Alert and oriented to person, slow speech, Psych:   Not anxious nor agitated Skin:  No rashes. No jaundice Reviewed most current lab test results and cultures  YES Reviewed most current radiology test results   YES Review and summation of old records today    NO Reviewed patient's current orders and MAR    YES 
PMH/SH reviewed - no change compared to H&P 
________________________________________________________________________ Care Plan discussed with: 
  Comments Patient x Family RN x Care Manager Consultant Multidiciplinary team rounds were held today with , nursing, pharmacist and clinical coordinator. Patient's plan of care was discussed; medications were reviewed and discharge planning was addressed. ________________________________________________________________________ Total NON critical care TIME: 30   Minutes Total CRITICAL CARE TIME Spent:   Minutes non procedure based Comments >50% of visit spent in counseling and coordination of care    
________________________________________________________________________ Gabino Burrows DO  
 
Procedures: see electronic medical records for all procedures/Xrays and details which were not copied into this note but were reviewed prior to creation of Plan. LABS: 
I reviewed today's most current labs and imaging studies. Pertinent labs include: 
Recent Labs 10/22/18 
0403 WBC 6.5 HGB 7.5* HCT 24.5*  
 Recent Labs 10/24/18 
0411 10/23/18 
0400 10/22/18 
0403  141 141  
K 3.7 3.8 4.0  
 104 104 CO2 34* 33* 34* * 114* 120* BUN 35* 31* 30* CREA 1.13 1.04 1.08  
CA 8.7 8.5 8.5 MG  --   --  2.2 PHOS 2.7 2.7 2.6 INR 1.1 1.1 1.1 Signed: Stephanie Albarran DO

## 2018-10-24 NOTE — PROGRESS NOTES
Speech pathology note Note patient placed back on the ventilator after being on trach collar for ~48 hours. Will hold PMV trials today and follow up tomorrow as medically appropriate. Thank you. Ke Fish Chi Officer., CCC-SLP

## 2018-10-24 NOTE — PROGRESS NOTES
0730  Bedside and Verbal shift change report given to Cricket Mueller, RN and Jaleesa Antunez RN (oncoming nurse) by Stanford Cool RN (offgoing nurse). Report included the following information SBAR, Kardex, OR Summary, Procedure Summary, Intake/Output, MAR, Recent Results and Cardiac Rhythm A-fib.  
 
0800  Shift assessment complete, see flowsheets for details. 0830  Dr. Marcelle Starks at bedside, changed patients trach due to cuff leak, and RT placed patient back on the ventilator. Patient tolerating well at this time. 1200  Reassessment complete, see flowsheets for details. [place] Dr. Rhys Sheikh about patients minimal urine output. He advised he will put in order for NS and to discontinue the lasix. 1600  Reassessment complete, see flowsheets for details. 1630  Patient very restless, sliding down in the bed, denies pain, discomfort, denies being hot/cold. Patient turned and repositioned at this time. 1640  Patient placed back on rate on vent due to complaint of SOB per Dr. Rhys Sheikh. 
 
1800  Patient continues to pull his condom catheter off.  
 
1900  Bedside and Verbal shift change report given to Mari Zuleta RN (oncoming nurse) by Cricket Mueller, PADDY and Jaleesa Antunez RN (offgoing nurse). Report included the following information SBAR, Kardex, ED Summary, Intake/Output, MAR, Recent Results and Cardiac Rhythm A-fib.

## 2018-10-24 NOTE — PROGRESS NOTES
Care Management: DTR's have arrived from [place] . I spoke with them this afternoon and they plan to visit [facility] tomorrow and will give us a decision after the visit. Patient remains vented in the ICU. Working with PT and OT. Anticipate transfer to [place] [id number] in near future. Fernanda Godwin [facility] ac [id number]

## 2018-10-25 LAB
ANION GAP SERPL CALC-SCNC: 4 MMOL/L (ref 5–15)
APTT PPP: 30.7 SEC (ref 22.1–32)
BASOPHILS # BLD: 0 K/UL (ref 0–0.1)
BASOPHILS NFR BLD: 0 % (ref 0–1)
BUN SERPL-MCNC: 37 MG/DL (ref 6–20)
BUN/CREAT SERPL: 34 (ref 12–20)
CALCIUM SERPL-MCNC: 8.3 MG/DL (ref 8.5–10.1)
CHLORIDE SERPL-SCNC: 107 MMOL/L (ref 97–108)
CO2 SERPL-SCNC: 34 MMOL/L (ref 21–32)
CREAT SERPL-MCNC: 1.1 MG/DL (ref 0.7–1.3)
DIFFERENTIAL METHOD BLD: ABNORMAL
EOSINOPHIL # BLD: 0.1 K/UL (ref 0–0.4)
EOSINOPHIL NFR BLD: 2 % (ref 0–7)
ERYTHROCYTE [DISTWIDTH] IN BLOOD BY AUTOMATED COUNT: 21.7 % (ref 11.5–14.5)
FIBRINOGEN PPP-MCNC: 638 MG/DL (ref 200–475)
GLUCOSE SERPL-MCNC: 118 MG/DL (ref 65–100)
HCT VFR BLD AUTO: 27.1 % (ref 36.6–50.3)
HGB BLD-MCNC: 8 G/DL (ref 12.1–17)
IMM GRANULOCYTES # BLD: 0.1 K/UL (ref 0–0.04)
IMM GRANULOCYTES NFR BLD AUTO: 1 % (ref 0–0.5)
INR PPP: 1.1 (ref 0.9–1.1)
LYMPHOCYTES # BLD: 0.9 K/UL (ref 0.8–3.5)
LYMPHOCYTES NFR BLD: 13 % (ref 12–49)
MCH RBC QN AUTO: 31.7 PG (ref 26–34)
MCHC RBC AUTO-ENTMCNC: 29.5 G/DL (ref 30–36.5)
MCV RBC AUTO: 107.5 FL (ref 80–99)
MONOCYTES # BLD: 0.7 K/UL (ref 0–1)
MONOCYTES NFR BLD: 10 % (ref 5–13)
NEUTS SEG # BLD: 5.1 K/UL (ref 1.8–8)
NEUTS SEG NFR BLD: 74 % (ref 32–75)
NRBC # BLD: 0 K/UL (ref 0–0.01)
NRBC BLD-RTO: 0 PER 100 WBC
PHOSPHATE SERPL-MCNC: 2.7 MG/DL (ref 2.6–4.7)
PLATELET # BLD AUTO: 170 K/UL (ref 150–400)
PMV BLD AUTO: 11.5 FL (ref 8.9–12.9)
POTASSIUM SERPL-SCNC: 3.9 MMOL/L (ref 3.5–5.1)
PROTHROMBIN TIME: 11.5 SEC (ref 9–11.1)
RBC # BLD AUTO: 2.52 M/UL (ref 4.1–5.7)
RBC MORPH BLD: ABNORMAL
SODIUM SERPL-SCNC: 145 MMOL/L (ref 136–145)
THERAPEUTIC RANGE,PTTT: ABNORMAL SECS (ref 58–77)
WBC # BLD AUTO: 6.9 K/UL (ref 4.1–11.1)

## 2018-10-25 PROCEDURE — 85025 COMPLETE CBC W/AUTO DIFF WBC: CPT | Performed by: INTERNAL MEDICINE

## 2018-10-25 PROCEDURE — 94760 N-INVAS EAR/PLS OXIMETRY 1: CPT

## 2018-10-25 PROCEDURE — 94003 VENT MGMT INPAT SUBQ DAY: CPT

## 2018-10-25 PROCEDURE — 36415 COLL VENOUS BLD VENIPUNCTURE: CPT | Performed by: INTERNAL MEDICINE

## 2018-10-25 PROCEDURE — 74011250637 HC RX REV CODE- 250/637: Performed by: INTERNAL MEDICINE

## 2018-10-25 PROCEDURE — 84100 ASSAY OF PHOSPHORUS: CPT | Performed by: INTERNAL MEDICINE

## 2018-10-25 PROCEDURE — 85610 PROTHROMBIN TIME: CPT | Performed by: INTERNAL MEDICINE

## 2018-10-25 PROCEDURE — 94640 AIRWAY INHALATION TREATMENT: CPT

## 2018-10-25 PROCEDURE — 74011250636 HC RX REV CODE- 250/636: Performed by: INTERNAL MEDICINE

## 2018-10-25 PROCEDURE — 97535 SELF CARE MNGMENT TRAINING: CPT

## 2018-10-25 PROCEDURE — 74011000250 HC RX REV CODE- 250: Performed by: INTERNAL MEDICINE

## 2018-10-25 PROCEDURE — 65610000006 HC RM INTENSIVE CARE

## 2018-10-25 PROCEDURE — 77010033678 HC OXYGEN DAILY

## 2018-10-25 PROCEDURE — 97530 THERAPEUTIC ACTIVITIES: CPT

## 2018-10-25 PROCEDURE — 80048 BASIC METABOLIC PNL TOTAL CA: CPT | Performed by: INTERNAL MEDICINE

## 2018-10-25 RX ADMIN — Medication 10 ML: at 06:00

## 2018-10-25 RX ADMIN — IPRATROPIUM BROMIDE AND ALBUTEROL SULFATE 3 ML: .5; 3 SOLUTION RESPIRATORY (INHALATION) at 07:59

## 2018-10-25 RX ADMIN — DORZOLAMIDE HYDROCHLORIDE AND TIMOLOL MALEATE 1 DROP: 20; 5 SOLUTION/ DROPS OPHTHALMIC at 18:23

## 2018-10-25 RX ADMIN — DORZOLAMIDE HYDROCHLORIDE AND TIMOLOL MALEATE 1 DROP: 20; 5 SOLUTION/ DROPS OPHTHALMIC at 08:28

## 2018-10-25 RX ADMIN — FAMOTIDINE 20 MG: 20 TABLET ORAL at 17:57

## 2018-10-25 RX ADMIN — Medication 10 ML: at 21:45

## 2018-10-25 RX ADMIN — LATANOPROST 1 DROP: 50 SOLUTION OPHTHALMIC at 21:45

## 2018-10-25 RX ADMIN — Medication 10 ML: at 14:31

## 2018-10-25 RX ADMIN — Medication 20 ML: at 21:44

## 2018-10-25 RX ADMIN — ENOXAPARIN SODIUM 40 MG: 40 INJECTION, SOLUTION INTRAVENOUS; SUBCUTANEOUS at 13:21

## 2018-10-25 RX ADMIN — IPRATROPIUM BROMIDE AND ALBUTEROL SULFATE 3 ML: .5; 3 SOLUTION RESPIRATORY (INHALATION) at 19:24

## 2018-10-25 RX ADMIN — Medication: at 21:44

## 2018-10-25 RX ADMIN — LEVOFLOXACIN 750 MG: 750 TABLET, FILM COATED ORAL at 20:12

## 2018-10-25 RX ADMIN — CHLORHEXIDINE GLUCONATE 15 ML: 1.2 RINSE ORAL at 08:25

## 2018-10-25 RX ADMIN — FAMOTIDINE 20 MG: 20 TABLET ORAL at 08:42

## 2018-10-25 RX ADMIN — CHLORHEXIDINE GLUCONATE 15 ML: 1.2 RINSE ORAL at 20:12

## 2018-10-25 NOTE — PROGRESS NOTES
Speech Therapy Patient on trach collar. Patient appears to be sleeping. OK per RN and MD for PMV trials today, but request to allow patient to rest and try to see when his wife is here today. Will f/u this afternoon as appropriate. Thank you Ale Woodor, M.S., CCC-SLP
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
## 2018-10-25 NOTE — PROGRESS NOTES
Speech Therapy Patient is now in bed, fatigued after physical therapy. Per RN, unable to tolerate PMV earlier today when family was present due to significant secretions. Will hold off on treatment for today. Will continue to follow as appropriate. Ale Mclaughlin M.S., CCC-SLP

## 2018-10-25 NOTE — PROGRESS NOTES
Problem: Falls - Risk of 
Goal: *Absence of Falls Document Char Pavon Fall Risk and appropriate interventions in the flowsheet. Outcome: Progressing Towards Goal 
Fall Risk Interventions: 
Mobility Interventions: Bed/chair exit alarm Mentation Interventions: Bed/chair exit alarm Medication Interventions: Bed/chair exit alarm Elimination Interventions: Call light in reach History of Falls Interventions: Bed/chair exit alarm Problem: Pressure Injury - Risk of 
Goal: *Prevention of pressure injury Document Ayaz Scale and appropriate interventions in the flowsheet. Outcome: Progressing Towards Goal 
Pressure Injury Interventions: 
Sensory Interventions: Assess changes in LOC, Assess need for specialty bed, Avoid rigorous massage over bony prominences, Check visual cues for pain, Discuss PT/OT consult with provider, Float heels, Keep linens dry and wrinkle-free, Maintain/enhance activity level, Minimize linen layers, Monitor skin under medical devices, Pad between skin to skin, Pressure redistribution bed/mattress (bed type), Use 30-degree side-lying position, Turn and reposition approx. every two hours (pillows and wedges if needed) Moisture Interventions: Absorbent underpads, Apply protective barrier, creams and emollients, Assess need for specialty bed, Check for incontinence Q2 hours and as needed, Contain wound drainage, Internal/External urinary devices, Limit adult briefs, Maintain skin hydration (lotion/cream), Minimize layers, Moisture barrier, Offer toileting Q_hr Activity Interventions: Assess need for specialty bed, Chair cushion, Increase time out of bed, Pressure redistribution bed/mattress(bed type), PT/OT evaluation Mobility Interventions: Assess need for specialty bed, Chair cushion, HOB 30 degrees or less, Pressure redistribution bed/mattress (bed type), PT/OT evaluation, Turn and reposition approx. every two hours(pillow and wedges) Nutrition Interventions: Document food/fluid/supplement intake, Discuss nutritional consult with provider, Offer support with meals,snacks and hydration Friction and Shear Interventions: Apply protective barrier, creams and emollients, Foam dressings/transparent film/skin sealants, HOB 30 degrees or less, Lift team/patient mobility team, Lift sheet, Minimize layers, Transferring/repositioning devices

## 2018-10-25 NOTE — PROGRESS NOTES
Critical care interdisciplinary rounds held on 10/25/2018. Following members present, Pharmacy, Diabetes Treatment,  Physical Therapy and Nutrition. Led by MIGUEL Aguilar RN and Dr. Albania Erwin and Dr. Lolis Michael. Plan of care discussed. See clinical pathway for plan of care and interventions and desired outcomes.

## 2018-10-25 NOTE — PROGRESS NOTES
[facility] Pulmonary Consult Service NotePulmonary, Critical Care, and Sleep Medicine Name: Edgardo Mace MRN: [id number] : 1934 Hospital: [facility] Date: 10/25/2018   Hospital Day: 39 IMPRESSION:  
1. 18 cardiopulmonary arrest with  6 min of CPR then ROSC. 2. Encephalopathy/Debility multifactorial -sluggish and slow but following commands; has hearing loss as well 3. Acute and now chronic respiratory failure-on vent 4. S/p tracheostomy 10/2/18; new # 6 shiley 10/24/18 5. Acute hemoptysis POA 6. RLL with suspected lung ca but cytology negative x 2( from bronch) 7. New worsening anemia hgb 7.8 8. Moderate right pleural effusion, s/p prior drain 9. S/P pulmonary artery embolization for hemoptysis per IR of suspected lung mass. 10. COPD moderate to severe at baseline 11. Anemia and prior coagulopathy on coumadin 12. Hx of CAD s/p PCI, afib on amio, former smoker, prior cva RECOMMENDATIONS/PLAN:  
1. Continue TC trials as tolerated during the day and push pt with more Pt, OT 2. Rest on vent at bedtime or sooner prn- needs to build up strength and stamina so he is at less risk for respiratory fatigue, acidosis 3. Passe lilia valve for brief 15 min periods and monitored at bedside- too many secretions otherwise and not a lot of reserve 4. Trach replacement- still has sutures 5. nebs 6. Gentle diuresis 7. Enteral feedings 8. Glucose monitoring and SSI 9. PT/OT 10. DVT/GI prophylaxis 11. Will need LTACH 12. PT, OT 
 
I personally reviewed meds, labs and images Subjective/Initial History:  
 
10-25- pale mucous from trach. Tolerating TF. Daughters and wife checking out [facility] facility; no fever 10-24 new trach'original replaced by Dr. Elias Regalado; became displaced night before; discussed management with wife, daughters. Pt chilly at times 10-23: No new secretions. TC all night. ABG with some CO2 retention. Shallow breathes. 10-22: went up to 8 hours off vent, 40% TC Sunday; working with PT, OT. sittign up EOB with assistance 10-18: no overnight events. More alert today 10-17: still desaturating with TC trials 10-16: no events. Desaturated with TC trials yesterday. 10-15: no events. No new reported issues 10-14:  No major events. Received transfusion yesterday 10-13:  No changes. Spoke to wife who agrees to transfusion. Cytology still pending 10-12-18: Pt seems to be much more alert when seen this am. Had near 3.5-4L of right pleural fluid output. NO ROS or HPI are obtainable from pt.  
 
 
10-11-18: Pt has been slow to respond. Still not waking up. Had increased secretion from trach and from his penis. Not follow any commands. Not able to get ROS or HPI from pt.  
 
 
10-10-18: No major changes or events over last 24 hrs. Not able to obtain ROS or HPI from pt. Some thick yellow secretions present around this trach. 10-9-18: Not able to get hx  Or ROS from pt. No acute changes overnight. Had some moderate secretions this am.  
 
 
10-8-18: Pt still not waking up. Has ongoing issues with blood clots in urine. Per nurses has been with recurrent issues with clotting and require frequent flushing of the almaraz. Pt has not been able to tolerate trach collar. Unable to get ROS or HPI from pt. MAR reviewed and pertinent medications noted or modified as needed Current Facility-Administered Medications Medication  alteplase (CATHFLO) 0.5 mg in sterile water (preservative free) 0.5 mL injection  enoxaparin (LOVENOX) injection 40 mg  
 levoFLOXacin (LEVAQUIN) tablet 750 mg  
 famotidine (PEPCID) tablet 20 mg  
 influenza vaccine 2018-19 (6 mos+)(PF) (FLUARIX QUAD/FLULAVAL QUAD) injection 0.5 mL  albuterol-ipratropium (DUO-NEB) 2.5 MG-0.5 MG/3 ML  
 fentaNYL citrate (PF) injection 25 mcg  zinc oxide-cod liver oil (DESITIN) 40 % paste  acetaminophen (TYLENOL) solution 650 mg  
 sodium chloride (NS) flush 10-30 mL  sodium chloride (NS) flush 10 mL  sodium chloride (NS) flush 10-40 mL  heparin (porcine) pf 300 Units  glycopyrrolate (ROBINUL) injection 0.1 mg  
 chlorhexidine (PERIDEX) 0.12 % mouthwash 15 mL  albuterol (PROVENTIL HFA, VENTOLIN HFA, PROAIR HFA) inhaler 2 Puff  dorzolamide-timolol (COSOPT) 22.3-6.8 mg/mL ophthalmic solution 1 Drop  latanoprost (XALATAN) 0.005 % ophthalmic solution 1 Drop  sodium chloride (NS) flush 5-10 mL  sodium chloride (NS) flush 5-10 mL  ondansetron (ZOFRAN) injection 4 mg ROS:A comprehensive review of systems was negative except for that written in the HPI. Objective: 
 
Vital Signs: Telemetry:    normal sinus rhythmIntake/Output:  
Visit Vitals BP 98/49 Pulse 94 Temp 99.1 °F (37.3 °C) Resp 20 Ht 6' 2\" (1.88 m) Wt 100.3 kg (221 lb 1.9 oz) SpO2 97% BMI 28.39 kg/m² Temp (24hrs), Av.7 °F (37.1 °C), Min:98.4 °F (36.9 °C), Max:99.1 °F (37.3 °C) O2 Device: Tracheal collar O2 Flow Rate (L/min): 10 l/min Wt Readings from Last 4 Encounters:  
10/25/18 100.3 kg (221 lb 1.9 oz) 16 111.1 kg (245 lb)  
16 106.6 kg (235 lb) 04/08/15 103.4 kg (228 lb) Intake/Output Summary (Last 24 hours) at 10/25/2018 1347 Last data filed at 10/25/2018 1000 Gross per 24 hour Intake 2395 ml Output 690 ml Net 1705 ml Last shift:      10/25 0701 - 10/25 1900 In: 250 Out: - Last 3 shifts: 10/23 1901 - 10/25 0700 In: 6218 [I.V.:425] Out: 1238 [FIHSD:5136] Physical Exam:  
 General:  AAM No distress, trach and vent support. HEAD: Normocephalic, without obvious abnormality, atraumatic EYES: conjunctivae clear. anicteric sclerae NOSE: nares normal, no drainage, no nasal flaring, Neck: Supple, symmetrical, trachea midline, has trach in place, on Mechanical vent support. Chest: increased AP diameter Lungs:   clear anterioly. Trached on vent. Heart: Regular rate and rhythm nl s1,2. Abdomen: soft, non-tender, +BS, pt has almaraz in place. Obese. Musculoskeletal: no gross deformities Neuro:  Intermittently follows commands. Psych: Not able to assess; no agitation. NO anxiety or depression when seen. Data:  
            
Labs: 
Recent Labs 10/25/18 
6661 WBC 6.9 HGB 8.0*  
HCT 27.1*  
 Recent Labs 10/25/18 
0442 10/24/18 
0411 10/23/18 
0400  141 141  
K 3.9 3.7 3.8  103 104 CO2 34* 34* 33* * 120* 114* BUN 37* 35* 31* CREA 1.10 1.13 1.04  
CA 8.3* 8.7 8.5 PHOS 2.7 2.7 2.7 INR 1.1 1.1 1.1 Recent Labs 10/24/18 
0520 10/23/18 
0900 PH 7.46* 7.46* PCO2 53* 51* PO2 62* 60* HCO3 37* 35* FIO2 40 40 Imaging: 
I have personally reviewed the patients radiographs and have reviewed the reports: 
None today Total critical care time exclusive of procedures: 25 minutes Cary Fry MD

## 2018-10-25 NOTE — ROUTINE PROCESS
Change of shift report received at bedside from [address]. Initial physical assessment complete. See flow sheet for details.

## 2018-10-25 NOTE — PROGRESS NOTES
CM was advised by Mei Gonzalez, RN liaison at [facility], that pt's family toured facility today and seemed pleased with what they saw. [facility] and can accept pt when deemed medically stable. CM will check in with wife and rachael's re: their visit. Elizabeth Dean, MSW

## 2018-10-25 NOTE — PROGRESS NOTES
1900  Bedside report received from [facility] 
  
2003  Shift assessment complete, SEE ASSESSMENT. Trach care complete. 2240  CHG bath complete. 2300  Reassessment complete, SEE ASSESSMENT. Left, upper arm PICC dressing changed. PEG site cleaned with Chlor Prep and dressing changed. Trach care complete. #6 mike inner canula changed. 0430  Reassessment complete, SEE ASSESSMENT. Incontinence care done. Pt has repeatedly pulled his condom cath off tonight. It has been replaced 3 times. His penis is red and irritated. I took the condom cath off and place the pt on a bed pad. Labs drawn and sent. 0700  Bedside report given to Mat Arriola RN.

## 2018-10-25 NOTE — PROGRESS NOTES
PT in and working with patient. Patient OOB to chair.  Patient tired and wants to lie down in bed. BP 82/34. Mobility team called to assist in getting patient back to bed. 1316 Patient back to bed at this time. Patient able to stand, but weak when trying to take steps toward bed. BP 98/49. 
1627 Passy-lilia valve on trach at this time. Patient talking with family. 1700 patient getting tired. Passy-lilia removed. 1900 No changes. Report to [facility].

## 2018-10-25 NOTE — PROGRESS NOTES
10/25/18 5793 ABCDEF Bundle SBT Safety Screen Passed Yes SBT Trial Passed No  
SBT Trial Reason for Failure Respiratory rate > 35;Low tidal volume;RSBI>105

## 2018-10-25 NOTE — PROGRESS NOTES
Placed patient back on the vent settings PS6 / 40% +6 with stable vitals HR 82 / RR 20 / SpO2 66% with no complications noted. Patient suctioned Moderate amount thick secretions. Ambu bag and spare Trach size #6 Shiley at the bedside. RN notified. RT will continue monitor.

## 2018-10-25 NOTE — PROGRESS NOTES
Nutrition Assessment: 
 
INTERVENTIONS/RECOMMENDATIONS:  
Enteral Nutrition: Continue TF as ordered ASSESSMENT:  
Chart reviewed and pt discussed on CCU rounds. Pt continues to tolerate TF well. Pt has met 92% of ordered TF in the past 72 hrs. Diet Order: Other (comment)(TF via PEG: TwoCal @ 50mL/h + 100mL H2O flush q 4h (provides 2400kcals/100gPro/1440mL) ) 
% Eaten:   
Patient Vitals for the past 72 hrs: 
 % Diet Eaten 10/23/18 2000 0 % Pertinent Medications: [x]Reviewed: pepcid Pertinent Labs: [x]Reviewed:  
Food Allergies: [x]NKFA  []Other Last BM:  10/24 Edema:      []RUE   []LUE   []RLE   []LLE Pressure Ulcer:      [] Stage I   [] Stage II   [] Stage III   [] Stage IV Anthropometrics: Height: 6' 2\" (188 cm) Weight: 100.3 kg (221 lb 1.9 oz) IBW (%IBW):   ( ) UBW (%UBW):   (  %) BMI: Body mass index is 28.39 kg/m². This BMI is indicative of: 
[]Underweight   []Normal   [x]Overweight   [] Obesity   [] Extreme Obesity (BMI>40) Last Weight Metrics: 
Weight Loss Metrics 10/25/2018 5/5/2016 3/25/2016 4/8/2015 3/25/2015 1/16/2013 1/3/2013 Today's Wt 221 lb 1.9 oz 245 lb 235 lb 228 lb 222 lb 232 lb 3.2 oz 227 lb 11.8 oz  
BMI 28.39 kg/m2 31.44 kg/m2 30.16 kg/m2 29.26 kg/m2 28.49 kg/m2 29.8 kg/m2 29.23 kg/m2 Estimated Nutrition Needs (Based on): 3524 Kcals/day(PSU (MSJ 1900)) , 91 g(-114 (0.8-1gPro/kg)) Protein Carbohydrate: At Least 130 g/day  Fluids: 2256 mL/day or per primary team 
 
NUTRITION DIAGNOSES:  
Problem:  No nutritional diagnosis at this time Etiology: related to Signs/Symptoms: as evidenced by    
Previous Nutrition Dx:  [x] Resolved  [] Unresolved           [] Progressing NUTRITION INTERVENTIONS: 
  Enteral/Parenteral Nutrition: Other(Continue TF as ordered) GOAL:  
Pt will tolerate TF @ goal rate with residuals <250mL in 3-5 days.   
 
NUTRITION MONITORING AND EVALUATION  
  
 Food/Nutrient Intake Outcomes: Enteral/parenteral nutrition intake Physical Signs/Symptoms Outcomes: Electrolyte and renal profile, GI profile, Weight/weight change, Glucose profile, GI 
 
Previous Goal Met: 
 [x] Met              [] Progressing Towards Goal              [] Not Progressing Towards Goal  
Previous Recommendations: 
 [x] Implemented          [] Not Implemented          [] Not Applicable LEARNING NEEDS (Diet, Food/Nutrient-Drug Interaction):  
 [x] None Identified 
 [] Identified and Education Provided/Documented 
 [] Identified and Pt declined/was not appropriate Cultural, Confucianism, OR Ethnic Dietary Needs:  
 [x] None Identified 
 [] Identified and Addressed 
 
 [x] Interdisciplinary Care Plan Reviewed/Documented  
 [x] Discharge Planning: TBD [] Participated in Interdisciplinary Rounds NUTRITION RISK:  
 [] High              [x] Moderate           []  Low  []  Minimal/Uncompromised Dina Gurrola RDN Pager [phone] Weekend Pager [phone]

## 2018-10-25 NOTE — PROGRESS NOTES
Problem: Mobility Impaired (Adult and Pediatric) Goal: *Acute Goals and Plan of Care (Insert Text) Physical Therapy Goals Initiated 10/20/2018 1. Patient will move from supine to sit and sit to supine , scoot up and down and roll side to side in bed with moderate assistance x 1 within 7 day(s). 2.  Patient will transfer from bed to chair and chair to bed with maximal assistance using the least restrictive device within 7 day(s). 3.  Patient will perform sit to stand with maximal assistance within 7 day(s). 4.  Patient will sit on EOB x 20 minutes with intact sitting balance within 7 days to prepare for standing and increased activity tolerance. Will address ambulation goals once appropriate. physical Therapy TREATMENT Patient: Elana Alexandre (41 y.o. male) Date: 10/25/2018 Diagnosis: Acute GI bleeding Pulmonary hemorrhage Acute GI Bleeding 
dysphasia <principal problem not specified> Procedure(s) (LRB): ESOPHAGOGASTRODUODENOSCOPY (EGD) (N/A) PERCUTANEOUS ENDOSCOPIC GASTROSTOMY TUBE INSERTION (N/A) 22 Days Post-Op Precautions: Bed Alarm, Fall Chart, physical therapy assessment, plan of care and goals were reviewed. ASSESSMENT: 
Patient received resting in bed, agreeable to PT. Patient is confused although following commands. Patient required mod A x 2 for bed mobility. Patient with fair sitting balance, requiring constant supervision for sitting balance. Patient tolerated sitting x 5 minutes with supervision, coughing at times. Patient required mod-max A x 2 for sit <> stand. Patient able to tolerate standing x 5 seconds, quickly sinking although able to pivot to the bedside chair with max A x 2. Patient incontinent of bowel and required mod-max A x 2 for sit <> stand. Patient tolerated standing for another minute with min A x 2 while OT performed cleaning.  Patient left sitting in the chair with the bed alarm on, clayton pad placed, and VSS on 10L O2, FiO2 40% via trach collar. Patient will benefit from SNF at discharge. Progression toward goals: 
[x]    Improving appropriately and progressing toward goals 
[]    Improving slowly and progressing toward goals 
[]    Not making progress toward goals and plan of care will be adjusted PLAN: 
Patient continues to benefit from skilled intervention to address the above impairments. Continue treatment per established plan of care. Discharge Recommendations:  Kiet Mccracken Further Equipment Recommendations for Discharge:  TBD SUBJECTIVE:  
Patient stated I want some cereal, milk, and a budweiser.  OBJECTIVE DATA SUMMARY:  
Critical Behavior: 
Neurologic State: Alert, Confused Orientation Level: Oriented to person Cognition: Impaired decision making, Memory loss, Decreased attention/concentration Safety/Judgement: Fall prevention Functional Mobility Training: 
Bed Mobility: 
Rolling: Moderate assistance;Assist x2 Supine to Sit: Moderate assistance;Assist x2 Scooting: Contact guard assistance Transfers: 
Sit to Stand: Maximum assistance; Moderate assistance;Assist x2 Bed to Chair: Maximum assistance;Assist x2 Balance: 
Sitting: Impaired Sitting - Static: Good (unsupported) Sitting - Dynamic: Fair (occasional) Standing: Impaired; Without support Standing - Static: Fair Standing - Dynamic : PoorAmbulation/Gait Training: 
  
  
  
  
  
  
  
  
  
  
 
Pain: 
Pain Scale 1: Adult Nonverbal Pain Scale Pain Intensity 1: 0 Activity Tolerance:  
Fair, VSS. Please refer to the flowsheet for vital signs taken during this treatment. After treatment:  
[x]    Patient left in no apparent distress sitting up in chair with clayton pad 
[]    Patient left in no apparent distress in bed 
[x]    Call bell left within reach [x]    Nursing notified 
[]    Caregiver present [x]    Bed alarm activated COMMUNICATION/COLLABORATION:  
The patients plan of care was discussed with: Occupational Therapist, Registered Nurse and  Terri Kovacs, PT, DPT Time Calculation: 39 mins

## 2018-10-26 ENCOUNTER — APPOINTMENT (OUTPATIENT)
Dept: GENERAL RADIOLOGY | Age: 83
DRG: 003 | End: 2018-10-26
Attending: INTERNAL MEDICINE
Payer: MEDICARE

## 2018-10-26 LAB
ALBUMIN SERPL-MCNC: 1.9 G/DL (ref 3.5–5)
ALBUMIN/GLOB SERPL: 0.3 {RATIO} (ref 1.1–2.2)
ALP SERPL-CCNC: 137 U/L (ref 45–117)
ALT SERPL-CCNC: 38 U/L (ref 12–78)
ANION GAP SERPL CALC-SCNC: 4 MMOL/L (ref 5–15)
AST SERPL-CCNC: 37 U/L (ref 15–37)
BILIRUB SERPL-MCNC: 0.3 MG/DL (ref 0.2–1)
BUN SERPL-MCNC: 34 MG/DL (ref 6–20)
BUN/CREAT SERPL: 30 (ref 12–20)
CALCIUM SERPL-MCNC: 8.4 MG/DL (ref 8.5–10.1)
CHLORIDE SERPL-SCNC: 107 MMOL/L (ref 97–108)
CO2 SERPL-SCNC: 33 MMOL/L (ref 21–32)
CREAT SERPL-MCNC: 1.15 MG/DL (ref 0.7–1.3)
ERYTHROCYTE [DISTWIDTH] IN BLOOD BY AUTOMATED COUNT: 22.1 % (ref 11.5–14.5)
GLOBULIN SER CALC-MCNC: 5.7 G/DL (ref 2–4)
GLUCOSE SERPL-MCNC: 119 MG/DL (ref 65–100)
HCT VFR BLD AUTO: 24.7 % (ref 36.6–50.3)
HGB BLD-MCNC: 7.5 G/DL (ref 12.1–17)
MAGNESIUM SERPL-MCNC: 2.6 MG/DL (ref 1.6–2.4)
MCH RBC QN AUTO: 32.3 PG (ref 26–34)
MCHC RBC AUTO-ENTMCNC: 30.4 G/DL (ref 30–36.5)
MCV RBC AUTO: 106.5 FL (ref 80–99)
NRBC # BLD: 0 K/UL (ref 0–0.01)
NRBC BLD-RTO: 0 PER 100 WBC
PHOSPHATE SERPL-MCNC: 2.6 MG/DL (ref 2.6–4.7)
PLATELET # BLD AUTO: 163 K/UL (ref 150–400)
PMV BLD AUTO: 11.6 FL (ref 8.9–12.9)
POTASSIUM SERPL-SCNC: 3.7 MMOL/L (ref 3.5–5.1)
PROT SERPL-MCNC: 7.6 G/DL (ref 6.4–8.2)
RBC # BLD AUTO: 2.32 M/UL (ref 4.1–5.7)
SODIUM SERPL-SCNC: 144 MMOL/L (ref 136–145)
WBC # BLD AUTO: 6.7 K/UL (ref 4.1–11.1)

## 2018-10-26 PROCEDURE — 94640 AIRWAY INHALATION TREATMENT: CPT

## 2018-10-26 PROCEDURE — 94003 VENT MGMT INPAT SUBQ DAY: CPT

## 2018-10-26 PROCEDURE — 74011250637 HC RX REV CODE- 250/637: Performed by: INTERNAL MEDICINE

## 2018-10-26 PROCEDURE — 92507 TX SP LANG VOICE COMM INDIV: CPT

## 2018-10-26 PROCEDURE — 77030032490 HC SLV COMPR SCD KNE COVD -B

## 2018-10-26 PROCEDURE — 36415 COLL VENOUS BLD VENIPUNCTURE: CPT | Performed by: INTERNAL MEDICINE

## 2018-10-26 PROCEDURE — 74011250636 HC RX REV CODE- 250/636: Performed by: INTERNAL MEDICINE

## 2018-10-26 PROCEDURE — 83735 ASSAY OF MAGNESIUM: CPT | Performed by: INTERNAL MEDICINE

## 2018-10-26 PROCEDURE — 85027 COMPLETE CBC AUTOMATED: CPT | Performed by: INTERNAL MEDICINE

## 2018-10-26 PROCEDURE — 71045 X-RAY EXAM CHEST 1 VIEW: CPT

## 2018-10-26 PROCEDURE — 80053 COMPREHEN METABOLIC PANEL: CPT | Performed by: INTERNAL MEDICINE

## 2018-10-26 PROCEDURE — 77030021668 HC NEB PREFIL KT VYRM -A

## 2018-10-26 PROCEDURE — 65610000006 HC RM INTENSIVE CARE

## 2018-10-26 PROCEDURE — 74011000250 HC RX REV CODE- 250: Performed by: INTERNAL MEDICINE

## 2018-10-26 PROCEDURE — 84100 ASSAY OF PHOSPHORUS: CPT | Performed by: INTERNAL MEDICINE

## 2018-10-26 RX ADMIN — Medication 10 ML: at 21:21

## 2018-10-26 RX ADMIN — ENOXAPARIN SODIUM 40 MG: 40 INJECTION, SOLUTION INTRAVENOUS; SUBCUTANEOUS at 13:17

## 2018-10-26 RX ADMIN — IPRATROPIUM BROMIDE AND ALBUTEROL SULFATE 3 ML: .5; 3 SOLUTION RESPIRATORY (INHALATION) at 07:39

## 2018-10-26 RX ADMIN — CHLORHEXIDINE GLUCONATE 15 ML: 1.2 RINSE ORAL at 21:22

## 2018-10-26 RX ADMIN — Medication 10 ML: at 13:17

## 2018-10-26 RX ADMIN — DORZOLAMIDE HYDROCHLORIDE AND TIMOLOL MALEATE 1 DROP: 20; 5 SOLUTION/ DROPS OPHTHALMIC at 11:02

## 2018-10-26 RX ADMIN — LATANOPROST 1 DROP: 50 SOLUTION OPHTHALMIC at 21:22

## 2018-10-26 RX ADMIN — CHLORHEXIDINE GLUCONATE 15 ML: 1.2 RINSE ORAL at 11:02

## 2018-10-26 RX ADMIN — Medication 10 ML: at 06:12

## 2018-10-26 RX ADMIN — IPRATROPIUM BROMIDE AND ALBUTEROL SULFATE 3 ML: .5; 3 SOLUTION RESPIRATORY (INHALATION) at 19:55

## 2018-10-26 RX ADMIN — FAMOTIDINE 20 MG: 20 TABLET ORAL at 11:01

## 2018-10-26 RX ADMIN — DORZOLAMIDE HYDROCHLORIDE AND TIMOLOL MALEATE 1 DROP: 20; 5 SOLUTION/ DROPS OPHTHALMIC at 21:22

## 2018-10-26 RX ADMIN — LEVOFLOXACIN 750 MG: 750 TABLET, FILM COATED ORAL at 21:24

## 2018-10-26 RX ADMIN — Medication 10 ML: at 06:11

## 2018-10-26 NOTE — PROGRESS NOTES
Problem: Voice Impaired (Adult) Goal: *Acute Goals and Plan of Care (Insert Text) Speech pathology goals Initiated 10/23/2018 1. Patient will tolerate PMV for 20 minutes with no change in vital signs within 7 days 2. Patient will participate in swallowing evaluation within 7 days Speech language pathology treatment Patient: Katerina Meneses (12 y.o. male) Date: 10/26/2018 Diagnosis: Acute GI bleeding Pulmonary hemorrhage Acute GI Bleeding 
dysphasia <principal problem not specified> Procedure(s) (LRB): ESOPHAGOGASTRODUODENOSCOPY (EGD) (N/A) PERCUTANEOUS ENDOSCOPIC GASTROSTOMY TUBE INSERTION (N/A) 23 Days Post-Op Precautions: Bed Alarm, Fall ASSESSMENT: 
Pt's cuff was inflated on his #6 shiley trach. It was deflated and he was suctioned by nsg. Around the trach tube were clear secretions pooled. Once suctioned the PMV was placed on the pt for 15 minutes with no change in VS. Voicing was strong and speech fully intelligible. He was very confused and confabulating. He recalled social sec no., age and  acc. Could id family relationships when names were mentioned. OX1 plus year. Stated he was at Jordyn Obrien MD. 2018. Progression toward goals: 
[x]       Improving appropriately and progressing toward goals 
[]       Improving slowly and progressing toward goals 
[]       Not making progress toward goals and plan of care will be adjusted PLAN: 
Patient continues to benefit from skilled intervention to address the above impairments. Continue treatment per established plan of care. Speaking Valve Placement Recommendations: 
[]    SLP only 
[]    SLP/RT only 
[x]    SLP/RT/RN only 
[]    SLP/RT/RN and patient/family Recommended Speaking Valve Wearing Schedule: 
[]     
[x]    As tolerated Discharge Recommendations:  None SUBJECTIVE:  
Patient was confused and confabulating. OBJECTIVE:  
Treatment & Interventions: 
Tracheostomy Data/Eval 
Trach Size/Status: #6 ;Cuffed, cuff deflated Passy-Max Placement: SLP On Ventilator: No 
  
  
Airway Clearance Suction: [place] Suction Device: Inline suction catheter Sputum Method Obtained: Tracheal 
Sputum Amount: Small Sputum Color/Odor: Tan 
Sputum Consistency: Thick Oxygen Therapy O2 Sat (%): 92 % Pulse via Oximetry: 75 beats per minute O2 Device: Tracheal collar O2 Flow Rate (L/min): 10 l/min O2 Temperature: 98.6 °F (37 °C) FIO2 (%): 40 % Airway Clearance Suction: [place] Suction Device: Inline suction catheter Sputum Method Obtained: Tracheal 
Sputum Amount: Small Sputum Color/Odor: Tan 
Sputum Consistency: Thick Pain: 
 none reported After treatment:  
[]       Patient left in no apparent distress sitting up in chair 
[x]       Patient left in no apparent distress in bed 
[x]       Call bell left within reach [x]       Nursing notified 
[]       Caregiver present 
[]       Bed alarm activated 
[]       Patient left with Passy-Max valve on, nursing staff or respiratory therapist notified COMMUNICATION/COLLABORATION:  
Patient was educated regarding His deficit(s) of voice as this relates to His diagnosis of respiratory difficulty. He demonstrated Guarded understanding as evidenced by his confusion. The patients plan of care was discussed with: the patient, nsg and [facility] rep Education was provided to patient, family, and staff regarding speaking valve placement, wearing schedule, safety precautions including cuff deflation and removal when sleeping, and care/cleaning guidelines. ISABELLA May Time Calculation: 17 mins

## 2018-10-26 NOTE — PROGRESS NOTES
0710 - Bedside and Verbal shift change report given to Sara Valdes RN (oncoming nurse) by Mike Yao RN (offgoing nurse). Report included the following information SBAR, Kardex, Procedure Summary, Intake/Output, MAR, Recent Results and Cardiac Rhythm Afib.  
1550 - Family present at bedside. Placed passy lilia valve over tracheostomy. SPO2 97%. 1650 - SPO2 86%. Removed passy lilia valve, placed patient back on trach collar. SPO2 climbed to 92%. Will continue to monitor. 1905 - Bedside and Verbal shift change report given to Mike Yao RN (oncoming nurse) by Sara Valdes RN (offgoing nurse). Report included the following information SBAR, Kardex, Procedure Summary, Intake/Output, MAR, Recent Results and Cardiac Rhythm Afib.

## 2018-10-26 NOTE — INTERDISCIPLINARY ROUNDS
Interdisciplinary team rounds were held 10/26/2018  with the following team members:Care Management, Diabetes Treatment Specialist, Nursing, Nutrition, Pharmacy, Physical Therapy, Physician, Respiratory Therapy and Clinical Coordinator.  
  
Plan of care discussed. Goal: Planning for [facility] discharge, awaiting family decision.   See MD orders and progress notes for further  interventions and desired outcomes.

## 2018-10-26 NOTE — PROGRESS NOTES
Brief Nutrition Notes Chart reviewed and pt discussed during CCU rounds. Pt possibly d/c to [facility] over the weekend. TF recs for d/c: 
TwoCal HN @ 50 ml/hr + 200 ml water flush q 4 hrs. Provides 2200 kcal, 92 g protein and 1900 ml free water. Estimated Nutrition Needs (Based on): 4120 Kcals/day(PSU (MSJ 1900)) , 91 g(-114 (0.8-1gPro/kg)) Protein Carbohydrate: At Least 130 g/day  Fluids: 2256 mL/day or per primary team 
 
Christine Shukla RDN Pager: [phone]

## 2018-10-26 NOTE — PROGRESS NOTES
Following pt's progress. Pt is tolerating TF and trach collar trials. Speaking well with PM valve. Medical team feels that pt is medically stable for dc to [facility] on Sat, 10/27. Pt will be going to Room 210. Nursing Report to be called to [phone] Nursing is requested to send hard copies of MARS, AVS abd DC Summary (if avbl). Dr. Patel Friday is to call MD Report to Dr. Daryl iSdhu, Accepting MD, at [phone]. ALS with Vent has been arranged through [facility] for 1200 p/u. PCS, H & P and Face Sheet on pt's bedside chart for ambo crew. This transfer will require EMTALA documentation. All info for RN and MD sections can be found above. Liaison at [facility] for weekend is Becky - [phone]. Weekend CM is [facility] - [phone]. Family aware and will come to hospital to accompany pt. Darylene Scale, MSW

## 2018-10-26 NOTE — PROGRESS NOTES
Problem: Falls - Risk of 
Goal: *Absence of Falls Document [facility] Fall Risk and appropriate interventions in the flowsheet. Outcome: Progressing Towards Goal 
Fall Risk Interventions: 
Mobility Interventions: Bed/chair exit alarm, Communicate number of staff needed for ambulation/transfer, Mechanical lift Mentation Interventions: Adequate sleep, hydration, pain control, Bed/chair exit alarm, Door open when patient unattended, Evaluate medications/consider consulting pharmacy, More frequent rounding, Reorient patient Medication Interventions: Bed/chair exit alarm, Evaluate medications/consider consulting pharmacy, Patient to call before getting OOB, Teach patient to arise slowly Elimination Interventions: Bed/chair exit alarm, Call light in reach, Elevated toilet seat, Patient to call for help with toileting needs, Toileting schedule/hourly rounds History of Falls Interventions: Bed/chair exit alarm, Consult care management for discharge planning, Door open when patient unattended, Evaluate medications/consider consulting pharmacy, Room close to nurse's station Problem: Pressure Injury - Risk of 
Goal: *Prevention of pressure injury Document Ayaz Scale and appropriate interventions in the flowsheet. Outcome: Progressing Towards Goal 
Pressure Injury Interventions: 
Sensory Interventions: Assess changes in LOC, Assess need for specialty bed, Check visual cues for pain, Float heels, Keep linens dry and wrinkle-free Moisture Interventions: Absorbent underpads Activity Interventions: Assess need for specialty bed, Increase time out of bed, Pressure redistribution bed/mattress(bed type) Mobility Interventions: Assess need for specialty bed, Float heels, Pressure redistribution bed/mattress (bed type) Nutrition Interventions: Document food/fluid/supplement intake, Discuss nutritional consult with provider Friction and Shear Interventions: Apply protective barrier, creams and emollients, Feet elevated on foot rest, Lift sheet, Lift team/patient mobility team, Minimize layers

## 2018-10-26 NOTE — PROGRESS NOTES
Hospitalist Progress Note NAME: Susan Baxter  
:  1934 MRN:  [id number] Assessment / Plan: 
51-year-old male with past medical history of coronary artery disease, atrial fibrillation on anticoagulation with warfarin, HTN and HLD who presented to ED on  with vomiting blood 2 days PTA. 
  
Acute encephalopathy in setting of acute hypoxic respiratory failure and resultant PEA cardiac arrest : s/p 6 min CPR and epi with ROSC after IR arteriogram 
- CT head  showed small age-indeterminate infarct in the left corona radiata - CT head 10/11 with no evidence of acute intracranial abnormality. 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 Bilateral tympanomastoid effusions. - s/p tracheostomy. continue trach collar trials. Awaiting bed in [place] [address]. 
  
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  
-COPD mod to severe at baseline 
-trach replaced by surg 10/24 
-copious secretions 
-TC trials as tolerated per CCU team 
-10/23 CO2 retention noted on ABG. Appears to be chronic retainer prior to vent. Baseline CO2 appears to be 45-50. 
- CT chest  with patchy airspace disease in the right lung base with an associated hyperdense 5.2 x 4.3 cm oval lesion.  
- CTA chest : RLL masslike lesion, increased in size and internal density, compatible w internal hemorrhage, +RLL atelectasis 
-RLL suspected lung CA, negative cytology from bronch x 2 
- s/p arteriogram : No embolization was performed. - work-up for rheumatologic causes of hemoptysis negative: ANCA, anti-GBM, MPO Ab, [place]-3 Ab, SSA/SSB, RNP Ab, Arredondo/RNP, dsDNA, and Arredondo Ab all negative. JOSS+. - completed zosyn for possible lung abscess ().   
R pleural effusion CT chest 10/11 with worsening of the appearance of the right hemithorax with increased pleural effusion and consolidation. - most recent CXR 10/13 with focal area of parenchymal consolidation at the right lung base which persists. Cardiomegaly unchanged. - R chest tube placed 10/11 with blood-tinged L pleural fluid drainage. S/p chest tube removal 10/18. On lasix BID 
  
Sepsis due to Pseudomonas catheter-associated UTI and right pleural fluid, not present on admission 
- Urine and pleural fluid cultures (1 swab) both pansensitive pseudomonas.  Sputum culture with normal respiratory ernestina and rare yeast.  Blood cultures no growth. - off emperic vancomycin and zosyn. On Levaquin until 10/26. 
  
Acute blood loss anemia and anemia of chronic disease Hgb stable. . No external bleeding. Close monitor. INR at 1.2. High fibrinogen. Platelets WNL.  
  
Elevated LFTs of unclear etiology: 
stopped statin for now LFT trended down.  
  
CAD s/p PCI, atrial fibrillation on chronic anticoagulation, and benign HTN: 
- holding amiodarone and diltiazem; holding anticoagulation (home coumadin) and pravachol 
- con't IV lasix (on oral lasix outpatient) 
  
ALAN: resolved UTI, acute cystitis w/ hematuria, POA:  Required almaraz placed by urology Hyperlipidemia: statin held due to elevated LFTs as above Hypernatremia, resolved Obesity (Body mass index is 32.27 kg/(m^2) 
   
Code: Full (wife and children are decision makers) DVT prophylaxis: heparin Subjective: Chief Complaint / Reason for Physician Visit Resting in bed, no acute distress, verbal communication challenging Review of Systems: 
Symptom Y/N Comments  Symptom Y/N Comments Fever/Chills    Chest Pain Poor Appetite    Edema Cough    Abdominal Pain Sputum    Joint Pain SOB/ROY    Pruritis/Rash Nausea/vomit    Tolerating PT/OT Diarrhea    Tolerating Diet Constipation    Other Could NOT obtain due to: Limited d/t slow mentation, hearing difficulty Objective: VITALS:  
Last 24hrs VS reviewed since prior progress note. Most recent are: Patient Vitals for the past 24 hrs: 
 Temp Pulse Resp BP SpO2  
10/25/18 2300  86 16 102/56 100 % 10/25/18 2200  85 20 (!) 85/54 99 % 10/25/18 2100  83 17 93/52 99 % 10/25/18 2000 98.3 °F (36.8 °C) 84 22 97/50 99 % 10/25/18 1941     99 % 10/25/18 1924  90 20  100 % 10/25/18 1900  90 18 110/57 95 % 10/25/18 1800  81 18 98/58 97 % 10/25/18 1700  86 20 101/46 94 % 10/25/18 1600 98.8 °F (37.1 °C) 83 20 110/47 90 % 10/25/18 1500  76 21 111/67 97 % 10/25/18 1458     99 % 10/25/18 1400  77 17 117/54 99 % 10/25/18 1316  94 20 98/49   
10/25/18 1300  84 19 (!) 82/34 97 % 10/25/18 1200 98.9 °F (37.2 °C) 93 15 104/66 91 % 10/25/18 1100  83 18 91/45 94 % 10/25/18 1059     95 % 10/25/18 1000  85 17 102/55 94 % 10/25/18 0900  88 20 100/46 97 % 10/25/18 0813     94 % 10/25/18 0801  87 12  100 % 10/25/18 0800 99.1 °F (37.3 °C) 79 16 95/77 100 % 10/25/18 0759     100 % 10/25/18 0700  86 11 97/55 100 % 10/25/18 0500  90 19 92/63 100 % 10/25/18 0403  75 17  100 % 10/25/18 0400 98.8 °F (37.1 °C) 84 19 101/57 100 % 10/25/18 0300  84 18 (!) 89/51 100 % 10/25/18 0200  86 23 106/81 100 % 10/25/18 0100  81 21 100/50 100 % 10/25/18 0000  83 12  100 % Intake/Output Summary (Last 24 hours) at 10/25/2018 2330 Last data filed at 10/25/2018 2000 Gross per 24 hour Intake 1700 ml Output 1050 ml Net 650 ml PHYSICAL EXAM: 
General: WD, WN. Alert, cooperative, no acute distress   
EENT:  EOMI. Anicteric sclerae. +trach, MMM Resp:  CTA bilaterally, no wheezing or rales. No accessory muscle use CV:  Regular  rhythm,  No edema GI:  Soft, Non distended, Non tender.  +Bowel sounds Neurologic:  Alert and oriented to person, slow speech, Psych:   Not anxious nor agitated Skin:  No rashes. No jaundice Reviewed most current lab test results and cultures  YES Reviewed most current radiology test results   YES 
 Review and summation of old records today    NO Reviewed patient's current orders and MAR    YES 
PMH/SH reviewed - no change compared to H&P 
________________________________________________________________________ Care Plan discussed with: 
  Comments Patient x Family RN x Care Manager Consultant Multidiciplinary team rounds were held today with , nursing, pharmacist and clinical coordinator. Patient's plan of care was discussed; medications were reviewed and discharge planning was addressed. ________________________________________________________________________ Total NON critical care TIME: 30   Minutes Total CRITICAL CARE TIME Spent:   Minutes non procedure based Comments >50% of visit spent in counseling and coordination of care    
________________________________________________________________________ Esther Appl, DO  
 
Procedures: see electronic medical records for all procedures/Xrays and details which were not copied into this note but were reviewed prior to creation of Plan. LABS: 
I reviewed today's most current labs and imaging studies. Pertinent labs include: 
Recent Labs 10/25/18 
3525 WBC 6.9 HGB 8.0*  
HCT 27.1*  
 Recent Labs 10/25/18 
0442 10/24/18 
0411 10/23/18 
0400  141 141  
K 3.9 3.7 3.8  103 104 CO2 34* 34* 33* * 120* 114* BUN 37* 35* 31* CREA 1.10 1.13 1.04  
CA 8.3* 8.7 8.5 PHOS 2.7 2.7 2.7 INR 1.1 1.1 1.1 Signed: Esther Krishnan DO

## 2018-10-27 VITALS
HEART RATE: 83 BPM | TEMPERATURE: 98.2 F | HEIGHT: 74 IN | SYSTOLIC BLOOD PRESSURE: 90 MMHG | BODY MASS INDEX: 28.38 KG/M2 | WEIGHT: 221.12 LBS | RESPIRATION RATE: 16 BRPM | DIASTOLIC BLOOD PRESSURE: 44 MMHG | OXYGEN SATURATION: 93 %

## 2018-10-27 LAB
ANION GAP SERPL CALC-SCNC: 6 MMOL/L (ref 5–15)
BUN SERPL-MCNC: 32 MG/DL (ref 6–20)
BUN/CREAT SERPL: 29 (ref 12–20)
CALCIUM SERPL-MCNC: 8.6 MG/DL (ref 8.5–10.1)
CHLORIDE SERPL-SCNC: 110 MMOL/L (ref 97–108)
CO2 SERPL-SCNC: 30 MMOL/L (ref 21–32)
COMMENT, HOLDF: NORMAL
CREAT SERPL-MCNC: 1.11 MG/DL (ref 0.7–1.3)
GLUCOSE SERPL-MCNC: 96 MG/DL (ref 65–100)
PHOSPHATE SERPL-MCNC: 3.2 MG/DL (ref 2.6–4.7)
POTASSIUM SERPL-SCNC: 4.1 MMOL/L (ref 3.5–5.1)
SAMPLES BEING HELD,HOLD: NORMAL
SODIUM SERPL-SCNC: 146 MMOL/L (ref 136–145)

## 2018-10-27 PROCEDURE — 36415 COLL VENOUS BLD VENIPUNCTURE: CPT | Performed by: INTERNAL MEDICINE

## 2018-10-27 PROCEDURE — 77030009834 HC MSK O2 TRACH VYRM -A

## 2018-10-27 PROCEDURE — 94640 AIRWAY INHALATION TREATMENT: CPT

## 2018-10-27 PROCEDURE — 74011250636 HC RX REV CODE- 250/636: Performed by: INTERNAL MEDICINE

## 2018-10-27 PROCEDURE — 80048 BASIC METABOLIC PNL TOTAL CA: CPT | Performed by: INTERNAL MEDICINE

## 2018-10-27 PROCEDURE — 74011250637 HC RX REV CODE- 250/637: Performed by: INTERNAL MEDICINE

## 2018-10-27 PROCEDURE — 74011000250 HC RX REV CODE- 250: Performed by: INTERNAL MEDICINE

## 2018-10-27 PROCEDURE — 84100 ASSAY OF PHOSPHORUS: CPT | Performed by: INTERNAL MEDICINE

## 2018-10-27 PROCEDURE — 77030021668 HC NEB PREFIL KT VYRM -A

## 2018-10-27 PROCEDURE — 90686 IIV4 VACC NO PRSV 0.5 ML IM: CPT | Performed by: INTERNAL MEDICINE

## 2018-10-27 PROCEDURE — 94003 VENT MGMT INPAT SUBQ DAY: CPT

## 2018-10-27 PROCEDURE — 90471 IMMUNIZATION ADMIN: CPT

## 2018-10-27 PROCEDURE — 77010033678 HC OXYGEN DAILY

## 2018-10-27 RX ADMIN — Medication 10 ML: at 05:18

## 2018-10-27 RX ADMIN — INFLUENZA VIRUS VACCINE 0.5 ML: 15; 15; 15; 15 SUSPENSION INTRAMUSCULAR at 12:10

## 2018-10-27 RX ADMIN — FAMOTIDINE 20 MG: 20 TABLET ORAL at 08:17

## 2018-10-27 RX ADMIN — IPRATROPIUM BROMIDE AND ALBUTEROL SULFATE 3 ML: .5; 3 SOLUTION RESPIRATORY (INHALATION) at 08:20

## 2018-10-27 RX ADMIN — CHLORHEXIDINE GLUCONATE 15 ML: 1.2 RINSE ORAL at 08:17

## 2018-10-27 RX ADMIN — DORZOLAMIDE HYDROCHLORIDE AND TIMOLOL MALEATE 1 DROP: 20; 5 SOLUTION/ DROPS OPHTHALMIC at 08:17

## 2018-10-27 NOTE — DISCHARGE SUMMARY
Hospitalist Discharge Summary     Patient ID:  Mikayla Valdes  [id number]  80 y.o.  1934    PCP on record: Unknown, Provider    Admit date: 9/11/2018  Discharge date and time: 10/27/2018      DISCHARGE DIAGNOSIS:  Acute encephalopathy in setting of acute hypoxic respiratory failure, ecephalopathy resolved  Acute on chronic hypoxic respiratory failure   PEA cardiac arrest 9/14 with ROSC  Pulmonary hemorrhage s/p pulmonary artery embolization   RLL lung mass (ca vs inflammatory pseudotumor) and severe COPD:   Severe COPD  Sepsis due to Pseudomonas catheter-associated UTI and right pleural fluid  Acute blood loss anemia and anemia of chronic disease  Elevated LFTs of unclear etiology:  CAD s/p PCI, atrial fibrillation on chronic anticoagulation, and benign HTN:  ALAN, resolved  UTI, acute cystitis w/ hematuria, POA, treated with antibiotics  Hyperlipidemia  Hypernatremia, resolved  Elevated LFTs  Obesity (Body mass index is 32.27 kg/(m^2)      Code: Full (wife and children are decision makers)  DVT prophylaxis: heparin      CONSULTATIONS:  IP CONSULT TO GASTROENTEROLOGY  IP CONSULT TO UROLOGY  IP CONSULT TO PULMONOLOGY    Excerpted HPI from H&P of Meghana Ibrhaim MD:  This is a 61-year-old male with past medical history of coronary artery disease, atrial fibrillation on anticoagulation with warfarin is coming to the hospital with chief complaints of vomiting blood since the last 2 days.  Patient reports being in his usual state of health until about 2 days ago when he started not feeling well. [facility] reports that he initially is noticed blood in his phlegm upon coughing and subsequently went on to develop bloody vomiting's.  He also reports that yesterday he developed abdominal pain in his abdomen became tense was distended.  He also reports noticing dark stools in the last few days. [facility] also reports increasing shortness of breath since the last few days.  He does not report any chest pain, palpitations or syncopal episodes. Chely Thompson reports that he occasionally takes Motrin for musculoskeletal pain    On arrival to the hospital he was noted to have borderline fever of 100.1 and was also hypoxic on 2.5 L per ER documentation.  On lab work he was noted to have a white count of 20,000. Chely Thompson was also noted to have a creatinine of 2.0 with a lactic acid of 1.4.  G was contacted and was started on ceftriaxone and azithromycin.    ______________________________________________________________________  DISCHARGE SUMMARY/HOSPITAL COURSE:  for full details see H&P, daily progress notes, labs, consult notes. 59-year-old male with past medical history of coronary artery disease, atrial fibrillation on anticoagulation with warfarin, HTN and HLD who presented to ED on 09/12 with vomiting blood 2 days PTA.     Acute encephalopathy in setting of acute hypoxic respiratory failure and resultant PEA cardiac arrest 9/14: s/p 6 min CPR and epi with ROSC after IR arteriogram  - CT head 9/25 showed small age-indeterminate infarct in the left corona radiata  - CT head 10/11 with no evidence of acute intracranial abnormality. 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 Bilateral tympanomastoid effusions. - s/p tracheostomy. continue trach collar trials. Awaiting bed in [place] [address].   -encephalopathy resolved     Pulmonary hemorrhage s/p pulmonary artery embolization with RLL lung mass (ca vs inflammatory pseudotumor) and COPD:   -COPD mod to severe at baseline, previously on 2L NC O2  -Appreciate pulmonology/intensivist recommendations:  -Pt medically stable for transfer to [facility]- d/w Care Management  -Continue TC trials as tolerated during the day and push pt with more Pt, OT  -Rest on vent at bedtime or sooner prn- needs to build up strength and stamina so he is at less risk for respiratory fatigue, acidosis- in 1-2 week could extend time off vent  -Passe lilia valve for brief 15 min periods and monitored at bedside- too many secretions otherwise and not a lot of reserve  -Trach replacement monthly    -trach replaced by surg 10/24  -copious secretions  -TC trials as tolerated per CCU team  -used juma rodrigues while family present in [address] [place] [address] on 10/26 for 45min without issue  -has been on HHF O2 at 10Lmin, 50% O2 throughout the day, resting on vent at night  - CT chest 9/12 with patchy airspace disease in the right lung base with an associated hyperdense 5.2 x 4.3 cm oval lesion.   - CTA chest 9/17: RLL masslike lesion, increased in size and internal density, compatible w internal hemorrhage, +RLL atelectasis  -RLL suspected lung CA, negative cytology from bronch x 2  - s/p arteriogram 9/14: No embolization was performed. - work-up for rheumatologic causes of hemoptysis negative: ANCA, anti-GBM, MPO Ab, AL-3 Ab, SSA/SSB, RNP Ab, Arredondo/RNP, dsDNA, and Arredondo Ab all negative. JOSS+. - completed zosyn for possible lung abscess (9/20).    R pleural effusion  CT chest 10/11 with worsening of the appearance of the right hemithorax with increased pleural effusion and consolidation.  - most recent CXR 10/13 with focal area of parenchymal consolidation at the right lung base which persists. Cardiomegaly unchanged. - R chest tube placed 10/11 with blood-tinged L pleural fluid drainage. S/p chest tube removal 10/18. On lasix BID     Sepsis due to Pseudomonas catheter-associated UTI and right pleural fluid, not present on admission  - Urine and pleural fluid cultures (1 swab) both pansensitive pseudomonas.  Sputum culture with normal respiratory ernestina and rare yeast.  Blood cultures no growth. - off emperic vancomycin and zosyn. Completed levaquin 10/26     Acute blood loss anemia and anemia of chronic disease  Hgb stable. . No external bleeding. Close monitor. INR at 1.2. High fibrinogen.  Platelets WNL.      Elevated LFTs of unclear etiology:  stopped statin for now  LFT trended down.      CAD s/p PCI, atrial fibrillation on chronic anticoagulation, and benign HTN:  - holding amiodarone and diltiazem; holding anticoagulation (home coumadin) and pravachol  - con't IV lasix (on oral lasix outpatient)     ALAN: resolved  UTI, acute cystitis w/ hematuria, POA:  Required almaraz placed by urology  Hyperlipidemia: statin held due to elevated LFTs as above  Hypernatremia, resolved  Obesity (Body mass index is 32.27 kg/(m^2)      Code: Full (wife and children are decision makers)  DVT prophylaxis: heparin      _______________________________________________________________________  Patient seen and examined by me on discharge day. Pertinent Findings:  Gen:    Not in distress  Chest: Clear lungs  CVS:   Regular rhythm. No edema  Abd:  Soft, not distended, not tender  Neuro:  Alert, oriented x 3  _______________________________________________________________________  DISCHARGE MEDICATIONS:   Current Discharge Medication List          My Recommended Diet, Activity, Wound Care, and follow-up labs are listed in the patient's Discharge Insturctions which I have personally completed and reviewed.     ______________________________________________________________________    Risk of deterioration: High    Condition at Discharge:  Stable  ______________________________________________________________________    Disposition  SNF/LTC  ______________________________________________________________________    Care Plan discussed with:   Patient, Family, RN, Care Manager, Consultant    ______________________________________________________________________    Code Status: Full Code  ______________________________________________________________________      Follow up with:   PCP : Unknown, Provider  Follow-up Information     Follow up With Specialties Details Why Contact Info    Unknown, Provider    Patient not available to ask                Total time in minutes spent coordinating this discharge (includes going over instructions, follow-up, prescriptions, and preparing report for sign off to her PCP) :  45 minutes    Signed:  Sherwin Browning, DO

## 2018-10-27 NOTE — PROGRESS NOTES
Hospitalist Progress Note NAME: Katerina Meneses  
:  1934 MRN:  [id number] Assessment / Plan: 
80-year-old male with past medical history of coronary artery disease, atrial fibrillation on anticoagulation with warfarin, HTN and HLD who presented to ED on  with vomiting blood 2 days PTA. 
  
Acute encephalopathy in setting of acute hypoxic respiratory failure and resultant PEA cardiac arrest : s/p 6 min CPR and epi with ROSC after IR arteriogram 
- CT head  showed small age-indeterminate infarct in the left corona radiata - CT head 10/11 with no evidence of acute intracranial abnormality. 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 Bilateral tympanomastoid effusions. - s/p tracheostomy. continue trach collar trials. Awaiting bed in [place] [address]. 
  
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  
-COPD mod to severe at baseline 
-trach replaced by surg 10/24 
-copious secretions 
-TC trials as tolerated per CCU team 
-10/23 CO2 retention noted on ABG. Appears to be chronic retainer prior to vent. Baseline CO2 appears to be 45-50. 
- CT chest  with patchy airspace disease in the right lung base with an associated hyperdense 5.2 x 4.3 cm oval lesion.  
- CTA chest : RLL masslike lesion, increased in size and internal density, compatible w internal hemorrhage, +RLL atelectasis 
-RLL suspected lung CA, negative cytology from bronch x 2 
- s/p arteriogram : No embolization was performed. - work-up for rheumatologic causes of hemoptysis negative: ANCA, anti-GBM, MPO Ab, [place]-3 Ab, SSA/SSB, RNP Ab, Arredondo/RNP, dsDNA, and Arredondo Ab all negative. JOSS+. - completed zosyn for possible lung abscess ().   
R pleural effusion CT chest 10/11 with worsening of the appearance of the right hemithorax with increased pleural effusion and consolidation. - most recent CXR 10/13 with focal area of parenchymal consolidation at the right lung base which persists. Cardiomegaly unchanged. - R chest tube placed 10/11 with blood-tinged L pleural fluid drainage. S/p chest tube removal 10/18. On lasix BID 
  
Sepsis due to Pseudomonas catheter-associated UTI and right pleural fluid, not present on admission 
- Urine and pleural fluid cultures (1 swab) both pansensitive pseudomonas.  Sputum culture with normal respiratory ernestina and rare yeast.  Blood cultures no growth. - off emperic vancomycin and zosyn. On Levaquin until 10/26. 
  
Acute blood loss anemia and anemia of chronic disease Hgb stable. . No external bleeding. Close monitor. INR at 1.2. High fibrinogen. Platelets WNL.  
  
Elevated LFTs of unclear etiology: 
stopped statin for now LFT trended down.  
  
CAD s/p PCI, atrial fibrillation on chronic anticoagulation, and benign HTN: 
- holding amiodarone and diltiazem; holding anticoagulation (home coumadin) and pravachol 
- con't IV lasix (on oral lasix outpatient) 
  
ALAN: resolved UTI, acute cystitis w/ hematuria, POA:  Required almaraz placed by urology Hyperlipidemia: statin held due to elevated LFTs as above Hypernatremia, resolved Obesity (Body mass index is 32.27 kg/(m^2) 
   
Code: Full (wife and children are decision makers) DVT prophylaxis: heparin Subjective: Chief Complaint / Reason for Physician Visit Resting in bed, no acute distress, verbal communication challenging Review of Systems: 
Symptom Y/N Comments  Symptom Y/N Comments Fever/Chills    Chest Pain Poor Appetite    Edema Cough    Abdominal Pain Sputum    Joint Pain SOB/ROY    Pruritis/Rash Nausea/vomit    Tolerating PT/OT Diarrhea    Tolerating Diet Constipation    Other Could NOT obtain due to: Limited verbal communication d/t trach, on HF O2  
 
Objective: VITALS:  
Last 24hrs VS reviewed since prior progress note. Most recent are: Patient Vitals for the past 24 hrs: 
 Temp Pulse Resp BP SpO2  
10/26/18 2300  95 20 105/70 99 % 10/26/18 2200  86 15 117/64 99 % 10/26/18 2100  88 22 106/53 99 % 10/26/18 2000 98.3 °F (36.8 °C) 93 24 103/56 100 % 10/26/18 1955  89 21  100 % 10/26/18 1900  86 17 97/52 (!) 89 % 10/26/18 1800  85 20 109/65 91 % 10/26/18 1700  81 28 111/59 93 % 10/26/18 1600  87 22 98/59 94 % 10/26/18 1550  84 30  97 % 10/26/18 1351     90 % 10/26/18 1300  72 19 103/55 96 % 10/26/18 1200  78 19 106/57 92 % 10/26/18 1100 98.8 °F (37.1 °C) 88 18 110/61 100 % 10/26/18 1049     100 % 10/26/18 1000  88 23 108/51 91 % 10/26/18 0921  100 13 105/60 96 % 10/26/18 0900  (!) 102 16 (!) 82/62 93 % 10/26/18 0800  84 16 112/55 100 % 10/26/18 0758     100 % 10/26/18 0740  78 13  100 % 10/26/18 0700  80 16 113/53 99 % 10/26/18 0600  86 15 93/78 99 % 10/26/18 0500  87 18 114/63 100 % 10/26/18 0400 98.3 °F (36.8 °C) 83  102/53 100 % 10/26/18 0300  83 16 90/53 99 % 10/26/18 0259  97 12  100 % 10/26/18 0200  77 14 96/62 100 % 10/26/18 0100  88 17 98/49 100 % 10/26/18 0000 98.4 °F (36.9 °C)   (!) 83/67 100 % Intake/Output Summary (Last 24 hours) at 10/26/2018 2351 Last data filed at 10/26/2018 2300 Gross per 24 hour Intake 2400 ml Output 925 ml Net 1475 ml PHYSICAL EXAM: 
General: WD, WN. Alert, cooperative, no acute distress   
EENT:  EOMI. Anicteric sclerae. +trach, MMM Resp:  CTA bilaterally, no wheezing or rales. No accessory muscle use CV:  Regular  rhythm,  No edema GI:  Soft, Non distended, Non tender.  +Bowel sounds Neurologic:  Alert and oriented to person, slow speech, Psych:   Not anxious nor agitated Skin:  No rashes. No jaundice Reviewed most current lab test results and cultures  YES Reviewed most current radiology test results   YES Review and summation of old records today    NO 
 Reviewed patient's current orders and MAR    YES 
PMH/SH reviewed - no change compared to H&P 
________________________________________________________________________ Care Plan discussed with: 
  Comments Patient x Family RN x Care Manager Consultant Multidiciplinary team rounds were held today with , nursing, pharmacist and clinical coordinator. Patient's plan of care was discussed; medications were reviewed and discharge planning was addressed. ________________________________________________________________________ Total NON critical care TIME: 30   Minutes Total CRITICAL CARE TIME Spent:   Minutes non procedure based Comments >50% of visit spent in counseling and coordination of care    
________________________________________________________________________ Walter Horns, DO  
 
Procedures: see electronic medical records for all procedures/Xrays and details which were not copied into this note but were reviewed prior to creation of Plan. LABS: 
I reviewed today's most current labs and imaging studies. Pertinent labs include: 
Recent Labs 10/26/18 
1251 10/25/18 
4016 WBC 6.7 6.9 HGB 7.5* 8.0*  
HCT 24.7* 27.1*  
 170 Recent Labs 10/26/18 
7807 10/25/18 
3895 10/24/18 
0411  145 141  
K 3.7 3.9 3.7  107 103 CO2 33* 34* 34* * 118* 120* BUN 34* 37* 35* CREA 1.15 1.10 1.13  
CA 8.4* 8.3* 8.7 MG 2.6*  --   --   
PHOS 2.6 2.7 2.7 ALB 1.9*  --   --   
TBILI 0.3  --   --   
SGOT 37  --   --   
ALT 38  --   --   
INR  --  1.1 1.1 Signed: Walter Monreal DO

## 2018-10-27 NOTE — PROGRESS NOTES
0710 - Bedside and Verbal shift change report given to John Pulido RN (oncoming nurse) by Bebeto Richmond RN (offgoing nurse). Report included the following information SBAR, Kardex, Intake/Output, MAR, Recent Results and Cardiac Rhythm Afib. Provided patient with tracheal suction. Moderate amount of thick tan secretions removed from trach. Cough with suction noted. No change in SPO2.   
 
1130 - Telephone report given to Trever Vallejo LPN with [facility].   
 
1200 - Patient transport at bedside for transfer to [address] [place] [address].

## 2018-10-27 NOTE — PROGRESS NOTES
0 Dr. Brendan Lopez aware of need for EMTALA documentation to be completed. Nursing supervisor, Soraya Miller, aware. MD to MD communication not necessary for transfer to [address] [place] [address].

## 2018-10-27 NOTE — PROGRESS NOTES
Weekend CM reviewed chart, no further needs identified. Care Management Interventions PCP Verified by CM: Yes(Dr. Srinivasan's practice in [facility]) Mode of Transport at Discharge: Other (see comment)(Pt's wife ) Transition of Care Consult (CM Consult): Discharge Planning Discharge Durable Medical Equipment: No 
Physical Therapy Consult: No 
Occupational Therapy Consult: No 
Speech Therapy Consult: No 
Current Support Network: Own Home, Lives with Spouse Confirm Follow Up Transport: Family Plan discussed with Pt/Family/Caregiver: Yes Discharge Location Discharge Placement: Home with family assistance BHAVANI Reyes, CM [facility] [phone]

## 2018-10-27 NOTE — PROGRESS NOTES
[facility] Pulmonary Consult Service NotePulmonary, Critical Care, and Sleep Medicine Name: Robles Parsons MRN: [id number] : 1934 Hospital: [facility] Date: 10/26/2018   Hospital Day: 55 IMPRESSION:  
1. 18 cardiopulmonary arrest with  6 min of CPR then ROSC. 2. Encephalopathy/Debility multifactorial -sluggish and slow but following commands; has hearing loss as well 3. Acute and now chronic respiratory failure-on vent 4. S/p tracheostomy 10/2/18; new # 6 shiley 10/24/18 5. Acute massive hemoptysis POA 6. RLL with suspected lung ca but cytology negative x 2( from bronch) 7. anemia 8. Moderate right pleural effusion- resolved, s/p prior drain 9. S/P pulmonary artery embolization for hemoptysis per IR of suspected lung mass. 10. COPD moderate to severe at baseline 11. Anemia and prior coagulopathy on coumadin 12. Hx of CAD s/p PCI, afib on amio, former smoker, prior cva RECOMMENDATIONS/PLAN:  
1. Pt medically stable for transfer to [facility]- d/w Care Management 2. Continue TC trials as tolerated during the day and push pt with more Pt, OT 3. Rest on vent at bedtime or sooner prn- needs to build up strength and stamina so he is at less risk for respiratory fatigue, acidosis- in 1-2 week could extend time off vent 4. Passe lilia valve for brief 15 min periods and monitored at bedside- too many secretions otherwise and not a lot of reserve 5. Trach replacement monthly 6. nebs 7. Gentle diuresis tokeep euvolemic 8. Enteral feedings per nutrition 9. Glucose monitoring and SSI 10. PT/OT 11. DVT/GI prophylaxis 12. Will need LTACH 13. PT, OT,SLP 15. F/u in our office when d/c'd from [facility], rehab to followup RLL lung mass, he will need repeat CT scan. Family well aware this has not resolved, and hopefully will not interfere with his recovery I personally reviewed meds, labs and images Subjective/Initial History: 10-26: sitting EOB and even full assist stand. Mucoid secretions. Able to phonate with PM valve and monitoring. Labs reviewed 10-25- pale mucous from trach. Tolerating TF. Daughters and wife checking out [facility] facility; no fever 10-24 new trach'original replaced by Dr. Byron Coleman; became displaced night before; discussed management with wife, daughters. Pt chilly at times 10-23: No new secretions. TC all night. ABG with some CO2 retention. Shallow breathes. 10-22: went up to 8 hours off vent, 40% TC Sunday; working with PT, OT. sittign up EOB with assistance 10-18: no overnight events. More alert today 10-17: still desaturating with TC trials 10-16: no events. Desaturated with TC trials yesterday. 10-15: no events. No new reported issues 10-14:  No major events. Received transfusion yesterday 10-13:  No changes. Spoke to wife who agrees to transfusion. Cytology still pending 10-12-18: Pt seems to be much more alert when seen this am. Had near 3.5-4L of right pleural fluid output. NO ROS or HPI are obtainable from pt.  
 
 
10-11-18: Pt has been slow to respond. Still not waking up. Had increased secretion from trach and from his penis. Not follow any commands. Not able to get ROS or HPI from pt.  
 
 
10-10-18: No major changes or events over last 24 hrs. Not able to obtain ROS or HPI from pt. Some thick yellow secretions present around this trach. 10-9-18: Not able to get hx  Or ROS from pt. No acute changes overnight. Had some moderate secretions this am.  
 
 
10-8-18: Pt still not waking up. Has ongoing issues with blood clots in urine. Per nurses has been with recurrent issues with clotting and require frequent flushing of the almaraz. Pt has not been able to tolerate trach collar. Unable to get ROS or HPI from pt. MAR reviewed and pertinent medications noted or modified as needed Current Facility-Administered Medications Medication  alteplase (CATHFLO) 0.5 mg in sterile water (preservative free) 0.5 mL injection  enoxaparin (LOVENOX) injection 40 mg  
 levoFLOXacin (LEVAQUIN) tablet 750 mg  
 famotidine (PEPCID) tablet 20 mg  
 influenza vaccine - (6 mos+)(PF) (FLUARIX QUAD/FLULAVAL QUAD) injection 0.5 mL  albuterol-ipratropium (DUO-NEB) 2.5 MG-0.5 MG/3 ML  
 fentaNYL citrate (PF) injection 25 mcg  zinc oxide-cod liver oil (DESITIN) 40 % paste  acetaminophen (TYLENOL) solution 650 mg  
 sodium chloride (NS) flush 10-30 mL  sodium chloride (NS) flush 10 mL  sodium chloride (NS) flush 10-40 mL  heparin (porcine) pf 300 Units  chlorhexidine (PERIDEX) 0.12 % mouthwash 15 mL  albuterol (PROVENTIL HFA, VENTOLIN HFA, PROAIR HFA) inhaler 2 Puff  dorzolamide-timolol (COSOPT) 22.3-6.8 mg/mL ophthalmic solution 1 Drop  latanoprost (XALATAN) 0.005 % ophthalmic solution 1 Drop  sodium chloride (NS) flush 5-10 mL  sodium chloride (NS) flush 5-10 mL  ondansetron (ZOFRAN) injection 4 mg ROS:A comprehensive review of systems was negative except for that written in the HPI. Objective: 
 
Vital Signs: Telemetry:    normal sinus rhythmIntake/Output:  
Visit Vitals BP 97/52 Pulse 89 Temp 98.8 °F (37.1 °C) Resp 21 Ht 6' 2\" (1.88 m) Wt 100.3 kg (221 lb 1.9 oz) SpO2 100% BMI 28.39 kg/m² Temp (24hrs), Av.5 °F (36.9 °C), Min:98.3 °F (36.8 °C), Max:98.8 °F (37.1 °C) O2 Device: Ventilator O2 Flow Rate (L/min): 10 l/min Wt Readings from Last 4 Encounters:  
10/25/18 100.3 kg (221 lb 1.9 oz) 16 111.1 kg (245 lb)  
16 106.6 kg (235 lb) 04/08/15 103.4 kg (228 lb) Intake/Output Summary (Last 24 hours) at 10/26/2018 2024 Last data filed at 10/26/2018 1900 Gross per 24 hour Intake 2950 ml Output 750 ml Net 2200 ml Last shift:      No intake/output data recorded. Last 3 shifts: 10/25 07 - 10/26 1900 In: 4100 Out: 1275 [[id number]] Physical Exam:  
 General:  AAM No distress, trach and vent support. HEAD: Normocephalic, without obvious abnormality, atraumatic EYES: conjunctivae clear. anicteric sclerae NOSE: nares normal, no drainage, no nasal flaring, Neck: Supple, symmetrical, trachea midline, has trach in place, on Mechanical vent support. Chest: increased AP diameter Lungs:   clear anterioly. Trached on vent. Heart: Regular rate and rhythm nl s1,2. Abdomen: soft, non-tender, +BS, pt has almaraz in place. Obese. Musculoskeletal: no gross deformities Neuro:  Intermittently follows commands. Psych: Not able to assess; no agitation. NO anxiety or depression when seen. Data:  
            
Labs: 
Recent Labs 10/26/18 
9737 10/25/18 
7024 WBC 6.7 6.9 HGB 7.5* 8.0*  
HCT 24.7* 27.1*  
 170 Recent Labs 10/26/18 
4457 10/25/18 
1548 10/24/18 
0411  145 141  
K 3.7 3.9 3.7  107 103 CO2 33* 34* 34* * 118* 120* BUN 34* 37* 35* CREA 1.15 1.10 1.13  
CA 8.4* 8.3* 8.7 MG 2.6*  --   --   
PHOS 2.6 2.7 2.7 ALB 1.9*  --   --   
TBILI 0.3  --   --   
SGOT 37  --   --   
ALT 38  --   --   
INR  --  1.1 1.1 Recent Labs 10/24/18 
5348 PH 7.46* PCO2 53* PO2 62* HCO3 37* FIO2 40 Imaging: 
I have personally reviewed the patients radiographs and have reviewed the reports: 
None today Total critical care time exclusive of procedures: 25 minutes Adore Beltre MD

## 2018-10-27 NOTE — ROUTINE PROCESS
Problem: Falls - Risk of 
Goal: *Absence of Falls Document Brody Trejo Fall Risk and appropriate interventions in the flowsheet. Outcome: Progressing Towards Goal 
Fall Risk Interventions: 
Mobility Interventions: Bed/chair exit alarm, Communicate number of staff needed for ambulation/transfer, Mechanical lift 
  
Mentation Interventions: Adequate sleep, hydration, pain control, Bed/chair exit alarm, Door open when patient unattended, Evaluate medications/consider consulting pharmacy, More frequent rounding, Reorient patient 
  
Medication Interventions: Bed/chair exit alarm, Evaluate medications/consider consulting pharmacy, Patient to call before getting OOB, Teach patient to arise slowly 
  
Elimination Interventions: Bed/chair exit alarm, Call light in reach, Elevated toilet seat, Patient to call for help with toileting needs, Toileting schedule/hourly rounds 
  
History of Falls Interventions: Bed/chair exit alarm, Consult care management for discharge planning, Door open when patient unattended, Evaluate medications/consider consulting pharmacy, Room close to nurse's station 
  
  
  
Problem: Pressure Injury - Risk of 
Goal: *Prevention of pressure injury Document Ayaz Scale and appropriate interventions in the flowsheet.   
Outcome: Progressing Towards Goal 
Pressure Injury Interventions: 
Sensory Interventions: Assess changes in LOC, Assess need for specialty bed, Check visual cues for pain, Float heels, Keep linens dry and wrinkle-free 
  
Moisture Interventions: Absorbent underpads 
  
Activity Interventions: Assess need for specialty bed, Increase time out of bed, Pressure redistribution bed/mattress(bed type) 
  
Mobility Interventions: Assess need for specialty bed, Float heels, Pressure redistribution bed/mattress (bed type) 
  
Nutrition Interventions: Document food/fluid/supplement intake, Discuss nutritional consult with provider 
  
 Friction and Shear Interventions: Apply protective barrier, creams and emollients, Feet elevated on foot rest, Lift sheet, Lift team/patient mobility team, Minimize layers

## 2018-10-31 LAB
ACID FAST STN SPEC: NEGATIVE
MYCOBACTERIUM SPEC QL CULT: NEGATIVE
SPECIMEN PREPARATION: NORMAL
SPECIMEN SOURCE: NORMAL

## 2018-11-02 LAB
Lab: NORMAL
REFERENCE LAB,REFLB: NORMAL
TEST DESCRIPTION:,ATST: NORMAL

## 2024-02-10 NOTE — PROGRESS NOTES
0700 Report received from Carmen Torres RN 
0930 Rounds completed. 1500 Bedside shift change report given to [facility] (oncoming nurse) by Gautam Figueroa (offgoing nurse). Report included the following information SBAR, Kardex, ED Summary, OR Summary, Procedure Summary, Intake/Output, MAR, Accordion, Recent Results, Med Rec Status, Cardiac Rhythm Afib/Aflutter/Paced and Alarm Parameters . Name band;

## 2025-01-23 NOTE — PROGRESS NOTES
0710 
Bedside and Verbal shift change report received from PADDY Stevens (outgoing nurse) to Kalkeya Tip, [address]. Report included the following information SBAR, Kardex, ED Summary, OR Summary, Procedure Summary, Intake/Output, MAR, Accordion, Recent Results, Med Rec Status, Cardiac Rhythm NSR, Alarm Parameters , Pre Procedure Checklist, Procedure Verification and Quality Measures. Assess: 0800 Mr. Eliel Colby is intubated and sedated. Withdraws and grimaces to pain. Lung sounds are coarse; Removing maroon colored sputum from STT with inline suctioning. Bowel sounds are very hypoactive. Pulses are palpable. Right IJ with Normal Saline infusing @ 50mL/hr, Propofol @ 30mcg/kg/min, Levophed @ 2mcg/min. Overton catheter in place draining clear paige urine. Right groin insertion site from IR with dressing clean, dry and intact. No bleeding, No hematoma at Rt groin site. 0820: Changes to medications: 
Levo gtt increased from 2-4mcg (MAP62) Propofol decreased from 30-25mcg Discussed care with Dr. Shaunna Lanes. Plan to given PRBC got Hgb of 6.8 and then d/c Levo gtt if possible. [address] PRBC's x2 units infused without SE noted. Levo off at 10:30 AM, maintain MAP 60-65. Propofol at 16 mcg/kg/min. Following small commands (head nod, moves fingers and toes). 1800 Bath given. Large BM, dark, thick and loose. Overton patent, sediment noted in dark paige urine, adequate UO.  
 
1930 Report given to Ale Morris RN. Report included the following information SBAR, Kardex, ED Summary, OR Summary, Procedure Summary, Intake/Output, MAR, Accordion, Recent Results, Med Rec Status, Cardiac Rhythm NSR, Alarm Parameters , Pre Procedure Checklist, Procedure Verification and Quality Measures. Olumiant Counseling: I discussed with the patient the risks of Olumiant therapy including but not limited to upper respiratory tract infections, shingles, cold sores, and nausea. Live vaccines should be avoided.  This medication has been linked to serious infections; higher rate of mortality; malignancy and lymphoproliferative disorders; major adverse cardiovascular events; thrombosis; gastrointestinal perforations; neutropenia; lymphopenia; anemia; liver enzyme elevations; and lipid elevations. Bimzelx Pregnancy And Lactation Text: This medication crosses the placenta and the safety is uncertain during pregnancy. It is unknown if this medication is present in breast milk. Minocycline Counseling: Patient advised regarding possible photosensitivity and discoloration of the teeth, skin, lips, tongue and gums.  Patient instructed to avoid sunlight, if possible.  When exposed to sunlight, patients should wear protective clothing, sunglasses, and sunscreen.  The patient was instructed to call the office immediately if the following severe adverse effects occur:  hearing changes, easy bruising/bleeding, severe headache, or vision changes.  The patient verbalized understanding of the proper use and possible adverse effects of minocycline.  All of the patient's questions and concerns were addressed. Birth Control Pills Counseling: Birth Control Pill Counseling: I discussed with the patient the potential side effects of OCPs including but not limited to increased risk of stroke, heart attack, thrombophlebitis, deep venous thrombosis, hepatic adenomas, breast changes, GI upset, headaches, and depression.  The patient verbalized understanding of the proper use and possible adverse effects of OCPs. All of the patient's questions and concerns were addressed. Imiquimod Counseling:  I discussed with the patient the risks of imiquimod including but not limited to erythema, scaling, itching, weeping, crusting, and pain.  Patient understands that the inflammatory response to imiquimod is variable from person to person and was educated regarded proper titration schedule.  If flu-like symptoms develop, patient knows to discontinue the medication and contact us. Acitretin Pregnancy And Lactation Text: This medication is Pregnancy Category X and should not be given to women who are pregnant or may become pregnant in the future. This medication is excreted in breast milk. Otezla Counseling: The side effects of Otezla were discussed with the patient, including but not limited to worsening or new depression, weight loss, diarrhea, nausea, upper respiratory tract infection, and headache. Patient instructed to call the office should any adverse effect occur.  The patient verbalized understanding of the proper use and possible adverse effects of Otezla.  All the patient's questions and concerns were addressed. Taltz Pregnancy And Lactation Text: The risk during pregnancy and breastfeeding is uncertain with this medication. Topical Clindamycin Counseling: Patient counseled that this medication may cause skin irritation or allergic reactions.  In the event of skin irritation, the patient was advised to reduce the amount of the drug applied or use it less frequently.   The patient verbalized understanding of the proper use and possible adverse effects of clindamycin.  All of the patient's questions and concerns were addressed. 5-Fu Pregnancy And Lactation Text: This medication is Pregnancy Category X and contraindicated in pregnancy and in women who may become pregnant. It is unknown if this medication is excreted in breast milk. Colchicine Pregnancy And Lactation Text: This medication is Pregnancy Category C and isn't considered safe during pregnancy. It is excreted in breast milk. Cephalexin Pregnancy And Lactation Text: This medication is Pregnancy Category B and considered safe during pregnancy.  It is also excreted in breast milk but can be used safely for shorter doses. Hydroxyzine Pregnancy And Lactation Text: This medication is not safe during pregnancy and should not be taken. It is also excreted in breast milk and breast feeding isn't recommended. Picato Counseling:  I discussed with the patient the risks of Picato including but not limited to erythema, scaling, itching, weeping, crusting, and pain. Zyclara Pregnancy And Lactation Text: This medication is Pregnancy Category C. It is unknown if this medication is excreted in breast milk. Odomzo Counseling- I discussed with the patient the risks of Odomzo including but not limited to nausea, vomiting, diarrhea, constipation, weight loss, changes in the sense of taste, decreased appetite, muscle spasms, and hair loss.  The patient verbalized understanding of the proper use and possible adverse effects of Odomzo.  All of the patient's questions and concerns were addressed. Humira Pregnancy And Lactation Text: This medication is Pregnancy Category B and is considered safe during pregnancy. It is unknown if this medication is excreted in breast milk. Detail Level: Simple Valtrex Counseling: I discussed with the patient the risks of valacyclovir including but not limited to kidney damage, nausea, vomiting and severe allergy.  The patient understands that if the infection seems to be worsening or is not improving, they are to call. Siliq Counseling:  I discussed with the patient the risks of Siliq including but not limited to new or worsening depression, suicidal thoughts and behavior, immunosuppression, malignancy, posterior leukoencephalopathy syndrome, and serious infections.  The patient understands that monitoring is required including a PPD at baseline and must alert us or the primary physician if symptoms of infection or other concerning signs are noted. There is also a special program designed to monitor depression which is required with Siliq. Azelaic Acid Counseling: Patient counseled that medicine may cause skin irritation and to avoid applying near the eyes.  In the event of skin irritation, the patient was advised to reduce the amount of the drug applied or use it less frequently.   The patient verbalized understanding of the proper use and possible adverse effects of azelaic acid.  All of the patient's questions and concerns were addressed. Rhofade Pregnancy And Lactation Text: This medication has not been assigned a Pregnancy Risk Category. It is unknown if the medication is excreted in breast milk. Methotrexate Counseling:  Patient counseled regarding adverse effects of methotrexate including but not limited to nausea, vomiting, abnormalities in liver function tests. Patients may develop mouth sores, rash, diarrhea, and abnormalities in blood counts. The patient understands that monitoring is required including LFT's and blood counts.  There is a rare possibility of scarring of the liver and lung problems that can occur when taking methotrexate. Persistent nausea, loss of appetite, pale stools, dark urine, cough, and shortness of breath should be reported immediately. Patient advised to discontinue methotrexate treatment at least three months before attempting to become pregnant.  I discussed the need for folate supplements while taking methotrexate.  These supplements can decrease side effects during methotrexate treatment. The patient verbalized understanding of the proper use and possible adverse effects of methotrexate.  All of the patient's questions and concerns were addressed. Birth Control Pills Pregnancy And Lactation Text: This medication should be avoided if pregnant and for the first 30 days post-partum. Terbinafine Pregnancy And Lactation Text: This medication is Pregnancy Category B and is considered safe during pregnancy. It is also excreted in breast milk and breast feeding isn't recommended. Low Dose Naltrexone Pregnancy And Lactation Text: Naltrexone is pregnancy category C.  There have been no adequate and well-controlled studies in pregnant women.  It should be used in pregnancy only if the potential benefit justifies the potential risk to the fetus.   Limited data indicates that naltrexone is minimally excreted into breastmilk. Bexarotene Counseling:  I discussed with the patient the risks of bexarotene including but not limited to hair loss, dry lips/skin/eyes, liver abnormalities, hyperlipidemia, pancreatitis, depression/suicidal ideation, photosensitivity, drug rash/allergic reactions, hypothyroidism, anemia, leukopenia, infection, cataracts, and teratogenicity.  Patient understands that they will need regular blood tests to check lipid profile, liver function tests, white blood cell count, thyroid function tests and pregnancy test if applicable. Wartpeel Counseling:  I discussed with the patient the risks of Wartpeel including but not limited to erythema, scaling, itching, weeping, crusting, and pain. Olumiant Pregnancy And Lactation Text: Based on animal studies, Olumiant may cause embryo-fetal harm when administered to pregnant women.  The medication should not be used in pregnancy.  Breastfeeding is not recommended during treatment. Minocycline Pregnancy And Lactation Text: This medication is Pregnancy Category D and not consider safe during pregnancy. It is also excreted in breast milk. Cimzia Counseling:  I discussed with the patient the risks of Cimzia including but not limited to immunosuppression, allergic reactions and infections.  The patient understands that monitoring is required including a PPD at baseline and must alert us or the primary physician if symptoms of infection or other concerning signs are noted. Otezla Pregnancy And Lactation Text: This medication is Pregnancy Category C and it isn't known if it is safe during pregnancy. It is unknown if it is excreted in breast milk. Clindamycin Counseling: I counseled the patient regarding use of clindamycin as an antibiotic for prophylactic and/or therapeutic purposes. Clindamycin is active against numerous classes of bacteria, including skin bacteria. Side effects may include nausea, diarrhea, gastrointestinal upset, rash, hives, yeast infections, and in rare cases, colitis. Fluconazole Counseling:  Patient counseled regarding adverse effects of fluconazole including but not limited to headache, diarrhea, nausea, upset stomach, liver function test abnormalities, taste disturbance, and stomach pain.  There is a rare possibility of liver failure that can occur when taking fluconazole.  The patient understands that monitoring of LFTs and kidney function test may be required, especially at baseline. The patient verbalized understanding of the proper use and possible adverse effects of fluconazole.  All of the patient's questions and concerns were addressed. Dapsone Counseling: I discussed with the patient the risks of dapsone including but not limited to hemolytic anemia, agranulocytosis, rashes, methemoglobinemia, kidney failure, peripheral neuropathy, headaches, GI upset, and liver toxicity.  Patients who start dapsone require monitoring including baseline LFTs and weekly CBCs for the first month, then every month thereafter.  The patient verbalized understanding of the proper use and possible adverse effects of dapsone.  All of the patient's questions and concerns were addressed. Topical Clindamycin Pregnancy And Lactation Text: This medication is Pregnancy Category B and is considered safe during pregnancy. It is unknown if it is excreted in breast milk. Tremfya Counseling: I discussed with the patient the risks of guselkumab including but not limited to immunosuppression, serious infections, and drug reactions.  The patient understands that monitoring is required including a PPD at baseline and must alert us or the primary physician if symptoms of infection or other concerning signs are noted. Drysol Counseling:  I discussed with the patient the risks of drysol/aluminum chloride including but not limited to skin rash, itching, irritation, burning. Azelaic Acid Pregnancy And Lactation Text: This medication is considered safe during pregnancy and breast feeding. Solaraze Counseling:  I discussed with the patient the risks of Solaraze including but not limited to erythema, scaling, itching, weeping, crusting, and pain. Valtrex Pregnancy And Lactation Text: this medication is Pregnancy Category B and is considered safe during pregnancy. This medication is not directly found in breast milk but it's metabolite acyclovir is present. Include Pregnancy/Lactation Warning?: No Odomzo Pregnancy And Lactation Text: This medication is Pregnancy Category X and is absolutely contraindicated during pregnancy. It is unknown if it is excreted in breast milk. Methotrexate Pregnancy And Lactation Text: This medication is Pregnancy Category X and is known to cause fetal harm. This medication is excreted in breast milk. Niacinamide Counseling: I recommended taking niacin or niacinamide, also know as vitamin B3, twice daily. Recent evidence suggests that taking vitamin B3 (500 mg twice daily) can reduce the risk of actinic keratoses and non-melanoma skin cancers. Side effects of vitamin B3 include flushing and headache. Albendazole Counseling:  I discussed with the patient the risks of albendazole including but not limited to cytopenia, kidney damage, nausea/vomiting and severe allergy.  The patient understands that this medication is being used in an off-label manner. Opioid Counseling: I discussed with the patient the potential side effects of opioids including but not limited to addiction, altered mental status, and depression. I stressed avoiding alcohol, benzodiazepines, muscle relaxants and sleep aids unless specifically okayed by a physician. The patient verbalized understanding of the proper use and possible adverse effects of opioids. All of the patient's questions and concerns were addressed. They were instructed to flush the remaining pills down the toilet if they did not need them for pain. Bexarotene Pregnancy And Lactation Text: This medication is Pregnancy Category X and should not be given to women who are pregnant or may become pregnant. This medication should not be used if you are breast feeding. Rinvoq Counseling: I discussed with the patient the risks of Rinvoq therapy including but not limited to upper respiratory tract infections, shingles, cold sores, bronchitis, nausea, cough, fever, acne, and headache. Live vaccines should be avoided.  This medication has been linked to serious infections; higher rate of mortality; malignancy and lymphoproliferative disorders; major adverse cardiovascular events; thrombosis; thrombocytopenia, anemia, and neutropenia; lipid elevations; liver enzyme elevations; and gastrointestinal perforations. Klisyri Counseling:  I discussed with the patient the risks of Klisyri including but not limited to erythema, scaling, itching, weeping, crusting, and pain. Dapsone Pregnancy And Lactation Text: This medication is Pregnancy Category C and is not considered safe during pregnancy or breast feeding. Spironolactone Counseling: Patient advised regarding risks of diarrhea, abdominal pain, hyperkalemia, birth defects (for female patients), liver toxicity and renal toxicity. The patient may need blood work to monitor liver and kidney function and potassium levels while on therapy. The patient verbalized understanding of the proper use and possible adverse effects of spironolactone.  All of the patient's questions and concerns were addressed. Hyrimoz Counseling:  I discussed with the patient the risks of adalimumab including but not limited to myelosuppression, immunosuppression, autoimmune hepatitis, demyelinating diseases, lymphoma, and serious infections.  The patient understands that monitoring is required including a PPD at baseline and must alert us or the primary physician if symptoms of infection or other concerning signs are noted. Topical Ketoconazole Counseling: Patient counseled that this medication may cause skin irritation or allergic reactions.  In the event of skin irritation, the patient was advised to reduce the amount of the drug applied or use it less frequently.   The patient verbalized understanding of the proper use and possible adverse effects of ketoconazole.  All of the patient's questions and concerns were addressed. Fluconazole Pregnancy And Lactation Text: This medication is Pregnancy Category C and it isn't know if it is safe during pregnancy. It is also excreted in breast milk. Quinolones Counseling:  I discussed with the patient the risks of fluoroquinolones including but not limited to GI upset, allergic reaction, drug rash, diarrhea, dizziness, photosensitivity, yeast infections, liver function test abnormalities, tendonitis/tendon rupture. Cimzia Pregnancy And Lactation Text: This medication crosses the placenta but can be considered safe in certain situations. Cimzia may be excreted in breast milk. Cimetidine Counseling:  I discussed with the patient the risks of Cimetidine including but not limited to gynecomastia, headache, diarrhea, nausea, drowsiness, arrhythmias, pancreatitis, skin rashes, psychosis, bone marrow suppression and kidney toxicity. Clindamycin Pregnancy And Lactation Text: This medication can be used in pregnancy if certain situations. Clindamycin is also present in breast milk. Solaraze Pregnancy And Lactation Text: This medication is Pregnancy Category B and is considered safe. There is some data to suggest avoiding during the third trimester. It is unknown if this medication is excreted in breast milk. Niacinamide Pregnancy And Lactation Text: These medications are considered safe during pregnancy. Simponi Counseling:  I discussed with the patient the risks of golimumab including but not limited to myelosuppression, immunosuppression, autoimmune hepatitis, demyelinating diseases, lymphoma, and serious infections.  The patient understands that monitoring is required including a PPD at baseline and must alert us or the primary physician if symptoms of infection or other concerning signs are noted. Protopic Counseling: Patient may experience a mild burning sensation during topical application. Protopic is not approved in children less than 2 years of age. There have been case reports of hematologic and skin malignancies in patients using topical calcineurin inhibitors although causality is questionable. Albendazole Pregnancy And Lactation Text: This medication is Pregnancy Category C and it isn't known if it is safe during pregnancy. It is also excreted in breast milk. Benzoyl Peroxide Counseling: Patient counseled that medicine may cause skin irritation and bleach clothing.  In the event of skin irritation, the patient was advised to reduce the amount of the drug applied or use it less frequently.   The patient verbalized understanding of the proper use and possible adverse effects of benzoyl peroxide.  All of the patient's questions and concerns were addressed. Oxybutynin Counseling:  I discussed with the patient the risks of oxybutynin including but not limited to skin rash, drowsiness, dry mouth, difficulty urinating, and blurred vision. Winlevi Counseling:  I discussed with the patient the risks of topical clascoterone including but not limited to erythema, scaling, itching, and stinging. Patient voiced their understanding. Rinvoq Pregnancy And Lactation Text: Based on animal studies, Rinvoq may cause embryo-fetal harm when administered to pregnant women.  The medication should not be used in pregnancy.  Breastfeeding is not recommended during treatment and for 6 days after the last dose. Klisyri Pregnancy And Lactation Text: It is unknown if this medication can harm a developing fetus or if it is excreted in breast milk. Prednisone Counseling:  I discussed with the patient the risks of prolonged use of prednisone including but not limited to weight gain, insomnia, osteoporosis, mood changes, diabetes, susceptibility to infection, glaucoma and high blood pressure.  In cases where prednisone use is prolonged, patients should be monitored with blood pressure checks, serum glucose levels and an eye exam.  Additionally, the patient may need to be placed on GI prophylaxis, PCP prophylaxis, and calcium and vitamin D supplementation and/or a bisphosphonate.  The patient verbalized understanding of the proper use and the possible adverse effects of prednisone.  All of the patient's questions and concerns were addressed. Opioid Pregnancy And Lactation Text: These medications can lead to premature delivery and should be avoided during pregnancy. These medications are also present in breast milk in small amounts. Isotretinoin Counseling: Patient should get monthly blood tests, not donate blood, not drive at night if vision affected, not share medication, and not undergo elective surgery for 6 months after tx completed. Side effects reviewed, pt to contact office should one occur. Spironolactone Pregnancy And Lactation Text: This medication can cause feminization of the male fetus and should be avoided during pregnancy. The active metabolite is also found in breast milk. Griseofulvin Counseling:  I discussed with the patient the risks of griseofulvin including but not limited to photosensitivity, cytopenia, liver damage, nausea/vomiting and severe allergy.  The patient understands that this medication is best absorbed when taken with a fatty meal (e.g., ice cream or french fries). Dutasteride Male Counseling: Dustasteride Counseling:  I discussed with the patient the risks of use of dutasteride including but not limited to decreased libido, decreased ejaculate volume, and gynecomastia. Women who can become pregnant should not handle medication.  All of the patient's questions and concerns were addressed. Cosentyx Counseling:  I discussed with the patient the risks of Cosentyx including but not limited to worsening of Crohn's disease, immunosuppression, allergic reactions and infections.  The patient understands that monitoring is required including a PPD at baseline and must alert us or the primary physician if symptoms of infection or other concerning signs are noted. Gabapentin Counseling: I discussed with the patient the risks of gabapentin including but not limited to dizziness, somnolence, fatigue and ataxia. Elidel Counseling: Patient may experience a mild burning sensation during topical application. Elidel is not approved in children less than 2 years of age. There have been case reports of hematologic and skin malignancies in patients using topical calcineurin inhibitors although causality is questionable. Doxycycline Counseling:  Patient counseled regarding possible photosensitivity and increased risk for sunburn.  Patient instructed to avoid sunlight, if possible.  When exposed to sunlight, patients should wear protective clothing, sunglasses, and sunscreen.  The patient was instructed to call the office immediately if the following severe adverse effects occur:  hearing changes, easy bruising/bleeding, severe headache, or vision changes.  The patient verbalized understanding of the proper use and possible adverse effects of doxycycline.  All of the patient's questions and concerns were addressed. Protopic Pregnancy And Lactation Text: This medication is Pregnancy Category C. It is unknown if this medication is excreted in breast milk when applied topically. Xolair Counseling:  Patient informed of potential adverse effects including but not limited to fever, muscle aches, rash and allergic reactions.  The patient verbalized understanding of the proper use and possible adverse effects of Xolair.  All of the patient's questions and concerns were addressed. Soolantra Counseling: I discussed with the patients the risks of topial Soolantra. This is a medicine which decreases the number of mites and inflammation in the skin. You experience burning, stinging, eye irritation or allergic reactions.  Please call our office if you develop any problems from using this medication. Benzoyl Peroxide Pregnancy And Lactation Text: This medication is Pregnancy Category C. It is unknown if benzoyl peroxide is excreted in breast milk. Prednisone Pregnancy And Lactation Text: This medication is Pregnancy Category C and it isn't know if it is safe during pregnancy. This medication is excreted in breast milk. Ilumya Counseling: I discussed with the patient the risks of tildrakizumab including but not limited to immunosuppression, malignancy, posterior leukoencephalopathy syndrome, and serious infections.  The patient understands that monitoring is required including a PPD at baseline and must alert us or the primary physician if symptoms of infection or other concerning signs are noted. Nsaids Counseling: NSAID Counseling: I discussed with the patient that NSAIDs should be taken with food. Prolonged use of NSAIDs can result in the development of stomach ulcers.  Patient advised to stop taking NSAIDs if abdominal pain occurs.  The patient verbalized understanding of the proper use and possible adverse effects of NSAIDs.  All of the patient's questions and concerns were addressed. Winlevi Pregnancy And Lactation Text: This medication is considered safe during pregnancy and breastfeeding. Minoxidil Counseling: Minoxidil is a topical medication which can increase blood flow where it is applied. It is uncertain how this medication increases hair growth. Side effects are uncommon and include stinging and allergic reactions. Isotretinoin Pregnancy And Lactation Text: This medication is Pregnancy Category X and is considered extremely dangerous during pregnancy. It is unknown if it is excreted in breast milk. Sotyktu Counseling:  I discussed the most common side effects of Sotyktu including: common cold, sore throat, sinus infections, cold sores, canker sores, folliculitis, and acne.? I also discussed more serious side effects of Sotyktu including but not limited to: serious allergic reactions; increased risk for infections such as TB; cancers such as lymphomas; rhabdomyolysis and elevated CPK; and elevated triglycerides and liver enzymes.? SSKI Counseling:  I discussed with the patient the risks of SSKI including but not limited to thyroid abnormalities, metallic taste, GI upset, fever, headache, acne, arthralgias, paraesthesias, lymphadenopathy, easy bleeding, arrhythmias, and allergic reaction. Griseofulvin Pregnancy And Lactation Text: This medication is Pregnancy Category X and is known to cause serious birth defects. It is unknown if this medication is excreted in breast milk but breast feeding should be avoided. Rifampin Counseling: I discussed with the patient the risks of rifampin including but not limited to liver damage, kidney damage, red-orange body fluids, nausea/vomiting and severe allergy. Ivermectin Counseling:  Patient instructed to take medication on an empty stomach with a full glass of water.  Patient informed of potential adverse effects including but not limited to nausea, diarrhea, dizziness, itching, and swelling of the extremities or lymph nodes.  The patient verbalized understanding of the proper use and possible adverse effects of ivermectin.  All of the patient's questions and concerns were addressed. Propranolol Counseling:  I discussed with the patient the risks of propranolol including but not limited to low heart rate, low blood pressure, low blood sugar, restlessness and increased cold sensitivity. They should call the office if they experience any of these side effects. Doxepin Counseling:  Patient advised that the medication is sedating and not to drive a car after taking this medication. Patient informed of potential adverse effects including but not limited to dry mouth, urinary retention, and blurry vision.  The patient verbalized understanding of the proper use and possible adverse effects of doxepin.  All of the patient's questions and concerns were addressed. Arava Counseling:  Patient counseled regarding adverse effects of Arava including but not limited to nausea, vomiting, abnormalities in liver function tests. Patients may develop mouth sores, rash, diarrhea, and abnormalities in blood counts. The patient understands that monitoring is required including LFTs and blood counts.  There is a rare possibility of scarring of the liver and lung problems that can occur when taking methotrexate. Persistent nausea, loss of appetite, pale stools, dark urine, cough, and shortness of breath should be reported immediately. Patient advised to discontinue Arava treatment and consult with a physician prior to attempting conception. The patient will have to undergo a treatment to eliminate Arava from the body prior to conception. Xolair Pregnancy And Lactation Text: This medication is Pregnancy Category B and is considered safe during pregnancy. This medication is excreted in breast milk. Soolantra Pregnancy And Lactation Text: This medication is Pregnancy Category C. This medication is considered safe during breast feeding. Dutasteride Female Counseling: Dutasteride Counseling:  I discussed with the patient the risks of use of dutasteride including but not limited to decreased libido and sexual dysfunction. Explained the teratogenic nature of the medication and stressed the importance of not getting pregnant during treatment. All of the patient's questions and concerns were addressed. Doxycycline Pregnancy And Lactation Text: This medication is Pregnancy Category D and not consider safe during pregnancy. It is also excreted in breast milk but is considered safe for shorter treatment courses. Azathioprine Counseling:  I discussed with the patient the risks of azathioprine including but not limited to myelosuppression, immunosuppression, hepatotoxicity, lymphoma, and infections.  The patient understands that monitoring is required including baseline LFTs, Creatinine, possible TPMP genotyping and weekly CBCs for the first month and then every 2 weeks thereafter.  The patient verbalized understanding of the proper use and possible adverse effects of azathioprine.  All of the patient's questions and concerns were addressed. Topical Metronidazole Counseling: Metronidazole is a topical antibiotic medication. You may experience burning, stinging, redness, or allergic reactions.  Please call our office if you develop any problems from using this medication. Qbrexza Counseling:  I discussed with the patient the risks of Qbrexza including but not limited to headache, mydriasis, blurred vision, dry eyes, nasal dryness, dry mouth, dry throat, dry skin, urinary hesitation, and constipation.  Local skin reactions including erythema, burning, stinging, and itching can also occur. Sotyktu Pregnancy And Lactation Text: There is insufficient data to evaluate whether or not Sotyktu is safe to use during pregnancy.? ?It is not known if Sotyktu passes into breast milk and whether or not it is safe to use when breastfeeding.?? Carac Counseling:  I discussed with the patient the risks of Carac including but not limited to erythema, scaling, itching, weeping, crusting, and pain. Skyrizi Counseling: I discussed with the patient the risks of risankizumab-rzaa including but not limited to immunosuppression, and serious infections.  The patient understands that monitoring is required including a PPD at baseline and must alert us or the primary physician if symptoms of infection or other concerning signs are noted. Azithromycin Counseling:  I discussed with the patient the risks of azithromycin including but not limited to GI upset, allergic reaction, drug rash, diarrhea, and yeast infections. Minoxidil Pregnancy And Lactation Text: This medication has not been assigned a Pregnancy Risk Category but animal studies failed to show danger with the topical medication. It is unknown if the medication is excreted in breast milk. Nsaids Pregnancy And Lactation Text: These medications are considered safe up to 30 weeks gestation. It is excreted in breast milk. Itraconazole Counseling:  I discussed with the patient the risks of itraconazole including but not limited to liver damage, nausea/vomiting, neuropathy, and severe allergy.  The patient understands that this medication is best absorbed when taken with acidic beverages such as non-diet cola or ginger ale.  The patient understands that monitoring is required including baseline LFTs and repeat LFTs at intervals.  The patient understands that they are to contact us or the primary physician if concerning signs are noted. High Dose Vitamin A Counseling: Side effects reviewed, pt to contact office should one occur. VTAMA Counseling: I discussed with the patient that VTAMA is not for use in the eyes, mouth or mouth. They should call the office if they develop any signs of allergic reactions to VTAMA. The patient verbalized understanding of the proper use and possible adverse effects of VTAMA.  All of the patient's questions and concerns were addressed. Dupixent Counseling: I discussed with the patient the risks of dupilumab including but not limited to eye infection and irritation, cold sores, injection site reactions, worsening of asthma, allergic reactions and increased risk of parasitic infection.  Live vaccines should be avoided while taking dupilumab. Dupilumab will also interact with certain medications such as warfarin and cyclosporine. The patient understands that monitoring is required and they must alert us or the primary physician if symptoms of infection or other concerning signs are noted. Sski Pregnancy And Lactation Text: This medication is Pregnancy Category D and isn't considered safe during pregnancy. It is excreted in breast milk. Rifampin Pregnancy And Lactation Text: This medication is Pregnancy Category C and it isn't know if it is safe during pregnancy. It is also excreted in breast milk and should not be used if you are breast feeding. Cibinqo Counseling: I discussed with the patient the risks of Cibinqo therapy including but not limited to common cold, nausea, headache, cold sores, increased blood CPK levels, dizziness, UTIs, fatigue, acne, and vomitting. Live vaccines should be avoided.  This medication has been linked to serious infections; higher rate of mortality; malignancy and lymphoproliferative disorders; major adverse cardiovascular events; thrombosis; thrombocytopenia and lymphopenia; lipid elevations; and retinal detachment. Eucrisa Counseling: Patient may experience a mild burning sensation during topical application. Eucrisa is not approved in children less than 3 months of age. Topical Metronidazole Pregnancy And Lactation Text: This medication is Pregnancy Category B and considered safe during pregnancy.  It is also considered safe to use while breastfeeding. Propranolol Pregnancy And Lactation Text: This medication is Pregnancy Category C and it isn't known if it is safe during pregnancy. It is excreted in breast milk. Doxepin Pregnancy And Lactation Text: This medication is Pregnancy Category C and it isn't known if it is safe during pregnancy. It is also excreted in breast milk and breast feeding isn't recommended. Erythromycin Counseling:  I discussed with the patient the risks of erythromycin including but not limited to GI upset, allergic reaction, drug rash, diarrhea, increase in liver enzymes, and yeast infections. Dutasteride Pregnancy And Lactation Text: This medication is absolutely contraindicated in women, especially during pregnancy and breast feeding. Feminization of male fetuses is possible if taking while pregnant. Glycopyrrolate Counseling:  I discussed with the patient the risks of glycopyrrolate including but not limited to skin rash, drowsiness, dry mouth, difficulty urinating, and blurred vision. Azathioprine Pregnancy And Lactation Text: This medication is Pregnancy Category D and isn't considered safe during pregnancy. It is unknown if this medication is excreted in breast milk. Azithromycin Pregnancy And Lactation Text: This medication is considered safe during pregnancy and is also secreted in breast milk. Vtama Pregnancy And Lactation Text: It is unknown if this medication can cause problems during pregnancy and breastfeeding. Xeljanz Counseling: I discussed with the patient the risks of Xeljanz therapy including increased risk of infection, liver issues, headache, diarrhea, or cold symptoms. Live vaccines should be avoided. They were instructed to call if they have any problems. Topical Retinoid counseling:  Patient advised to apply a pea-sized amount only at bedtime and wait 30 minutes after washing their face before applying.  If too drying, patient may add a non-comedogenic moisturizer. The patient verbalized understanding of the proper use and possible adverse effects of retinoids.  All of the patient's questions and concerns were addressed. Thalidomide Counseling: I discussed with the patient the risks of thalidomide including but not limited to birth defects, anxiety, weakness, chest pain, dizziness, cough and severe allergy. Sarecycline Counseling: Patient advised regarding possible photosensitivity and discoloration of the teeth, skin, lips, tongue and gums.  Patient instructed to avoid sunlight, if possible.  When exposed to sunlight, patients should wear protective clothing, sunglasses, and sunscreen.  The patient was instructed to call the office immediately if the following severe adverse effects occur:  hearing changes, easy bruising/bleeding, severe headache, or vision changes.  The patient verbalized understanding of the proper use and possible adverse effects of sarecycline.  All of the patient's questions and concerns were addressed. Infliximab Counseling:  I discussed with the patient the risks of infliximab including but not limited to myelosuppression, immunosuppression, autoimmune hepatitis, demyelinating diseases, lymphoma, and serious infections.  The patient understands that monitoring is required including a PPD at baseline and must alert us or the primary physician if symptoms of infection or other concerning signs are noted. Olanzapine Counseling- I discussed with the patient the common side effects of olanzapine including but are not limited to: lack of energy, dry mouth, increased appetite, sleepiness, tremor, constipation, dizziness, changes in behavior, or restlessness.  Explained that teenagers are more likely to experience headaches, abdominal pain, pain in the arms or legs, tiredness, and sleepiness.  Serious side effects include but are not limited: increased risk of death in elderly patients who are confused, have memory loss, or dementia-related psychosis; hyperglycemia; increased cholesterol and triglycerides; and weight gain. Erivedge Counseling- I discussed with the patient the risks of Erivedge including but not limited to nausea, vomiting, diarrhea, constipation, weight loss, changes in the sense of taste, decreased appetite, muscle spasms, and hair loss.  The patient verbalized understanding of the proper use and possible adverse effects of Erivedge.  All of the patient's questions and concerns were addressed. High Dose Vitamin A Pregnancy And Lactation Text: High dose vitamin A therapy is contraindicated during pregnancy and breast feeding. Mirvaso Counseling: Mirvaso is a topical medication which can decrease superficial blood flow where applied. Side effects are uncommon and include stinging, redness and allergic reactions. Finasteride Male Counseling: Finasteride Counseling:  I discussed with the patient the risks of use of finasteride including but not limited to decreased libido, decreased ejaculate volume, gynecomastia, and depression. Women should not handle medication.  All of the patient's questions and concerns were addressed. Glycopyrrolate Pregnancy And Lactation Text: This medication is Pregnancy Category B and is considered safe during pregnancy. It is unknown if it is excreted breast milk. Topical Steroids Counseling: I discussed with the patient that prolonged use of topical steroids can result in the increased appearance of superficial blood vessels (telangiectasias), lightening (hypopigmentation) and thinning of the skin (atrophy).  Patient understands to avoid using high potency steroids in skin folds, the groin or the face.  The patient verbalized understanding of the proper use and possible adverse effects of topical steroids.  All of the patient's questions and concerns were addressed. Dupixent Pregnancy And Lactation Text: This medication likely crosses the placenta but the risk for the fetus is uncertain. This medication is excreted in breast milk. Cibinqo Pregnancy And Lactation Text: It is unknown if this medication will adversely affect pregnancy or breast feeding.  You should not take this medication if you are currently pregnant or planning a pregnancy or while breastfeeding. Adbry Counseling: I discussed with the patient the risks of tralokinumab including but not limited to eye infection and irritation, cold sores, injection site reactions, worsening of asthma, allergic reactions and increased risk of parasitic infection.  Live vaccines should be avoided while taking tralokinumab. The patient understands that monitoring is required and they must alert us or the primary physician if symptoms of infection or other concerning signs are noted. Erythromycin Pregnancy And Lactation Text: This medication is Pregnancy Category B and is considered safe during pregnancy. It is also excreted in breast milk. Hydroxyzine Counseling: Patient advised that the medication is sedating and not to drive a car after taking this medication.  Patient informed of potential adverse effects including but not limited to dry mouth, urinary retention, and blurry vision.  The patient verbalized understanding of the proper use and possible adverse effects of hydroxyzine.  All of the patient's questions and concerns were addressed. Cellcept Counseling:  I discussed with the patient the risks of mycophenolate mofetil including but not limited to infection/immunosuppression, GI upset, hypokalemia, hypercholesterolemia, bone marrow suppression, lymphoproliferative disorders, malignancy, GI ulceration/bleed/perforation, colitis, interstitial lung disease, kidney failure, progressive multifocal leukoencephalopathy, and birth defects.  The patient understands that monitoring is required including a baseline creatinine and regular CBC testing. In addition, patient must alert us immediately if symptoms of infection or other concerning signs are noted. Clofazimine Counseling:  I discussed with the patient the risks of clofazimine including but not limited to skin and eye pigmentation, liver damage, nausea/vomiting, gastrointestinal bleeding and allergy. Olanzapine Pregnancy And Lactation Text: This medication is pregnancy category C.   There are no adequate and well controlled trials with olanzapine in pregnant females.  Olanzapine should be used during pregnancy only if the potential benefit justifies the potential risk to the fetus.   In a study in lactating healthy women, olanzapine was excreted in breast milk.  It is recommended that women taking olanzapine should not breast feed. Stelara Counseling:  I discussed with the patient the risks of ustekinumab including but not limited to immunosuppression, malignancy, posterior leukoencephalopathy syndrome, and serious infections.  The patient understands that monitoring is required including a PPD at baseline and must alert us or the primary physician if symptoms of infection or other concerning signs are noted. Zoryve Counseling:  I discussed with the patient that Zoryve is not for use in the eyes, mouth or vagina. The most commonly reported side effects include diarrhea, headache, insomnia, application site pain, upper respiratory tract infections, and urinary tract infections.  All of the patient's questions and concerns were addressed. Calcipotriene Counseling:  I discussed with the patient the risks of calcipotriene including but not limited to erythema, scaling, itching, and irritation. Bactrim Counseling:  I discussed with the patient the risks of sulfa antibiotics including but not limited to GI upset, allergic reaction, drug rash, diarrhea, dizziness, photosensitivity, and yeast infections.  Rarely, more serious reactions can occur including but not limited to aplastic anemia, agranulocytosis, methemoglobinemia, blood dyscrasias, liver or kidney failure, lung infiltrates or desquamative/blistering drug rashes. Xelmanuelz Pregnancy And Lactation Text: This medication is Pregnancy Category D and is not considered safe during pregnancy.  The risk during breast feeding is also uncertain. Calcipotriene Pregnancy And Lactation Text: The use of this medication during pregnancy or lactation is not recommended as there is insufficient data. Litfulo Counseling: I discussed with the patient the risks of Litfulo therapy including but not limited to upper respiratory tract infections, shingles, cold sores, and nausea. Live vaccines should be avoided.  This medication has been linked to serious infections; higher rate of mortality; malignancy and lymphoproliferative disorders; major adverse cardiovascular events; thrombosis; gastrointestinal perforations; neutropenia; lymphopenia; anemia; liver enzyme elevations; and lipid elevations. Cyclophosphamide Pregnancy And Lactation Text: This medication is Pregnancy Category D and it isn't considered safe during pregnancy. This medication is excreted in breast milk. Ketoconazole Counseling:   Patient counseled regarding improving absorption with orange juice.  Adverse effects include but are not limited to breast enlargement, headache, diarrhea, nausea, upset stomach, liver function test abnormalities, taste disturbance, and stomach pain.  There is a rare possibility of liver failure that can occur when taking ketoconazole. The patient understands that monitoring of LFTs may be required, especially at baseline. The patient verbalized understanding of the proper use and possible adverse effects of ketoconazole.  All of the patient's questions and concerns were addressed. Enbrel Counseling:  I discussed with the patient the risks of etanercept including but not limited to myelosuppression, immunosuppression, autoimmune hepatitis, demyelinating diseases, lymphoma, and infections.  The patient understands that monitoring is required including a PPD at baseline and must alert us or the primary physician if symptoms of infection or other concerning signs are noted. Hydroquinone Counseling:  Patient advised that medication may result in skin irritation, lightening (hypopigmentation), dryness, and burning.  In the event of skin irritation, the patient was advised to reduce the amount of the drug applied or use it less frequently.  Rarely, spots that are treated with hydroquinone can become darker (pseudoochronosis).  Should this occur, patient instructed to stop medication and call the office. The patient verbalized understanding of the proper use and possible adverse effects of hydroquinone.  All of the patient's questions and concerns were addressed. Hydroxychloroquine Counseling:  I discussed with the patient that a baseline ophthalmologic exam is needed at the start of therapy and every year thereafter while on therapy. A CBC may also be warranted for monitoring.  The side effects of this medication were discussed with the patient, including but not limited to agranulocytosis, aplastic anemia, seizures, rashes, retinopathy, and liver toxicity. Patient instructed to call the office should any adverse effect occur.  The patient verbalized understanding of the proper use and possible adverse effects of Plaquenil.  All the patient's questions and concerns were addressed. Finasteride Female Counseling: Finasteride Counseling:  I discussed with the patient the risks of use of finasteride including but not limited to decreased libido and sexual dysfunction. Explained the teratogenic nature of the medication and stressed the importance of not getting pregnant during treatment. All of the patient's questions and concerns were addressed. Topical Steroids Applications Pregnancy And Lactation Text: Most topical steroids are considered safe to use during pregnancy and lactation.  Any topical steroid applied to the breast or nipple should be washed off before breastfeeding. Metronidazole Counseling:  I discussed with the patient the risks of metronidazole including but not limited to seizures, nausea/vomiting, a metallic taste in the mouth, nausea/vomiting and severe allergy. Adbry Pregnancy And Lactation Text: It is unknown if this medication will adversely affect pregnancy or breast feeding. Bactrim Pregnancy And Lactation Text: This medication is Pregnancy Category D and is known to cause fetal risk.  It is also excreted in breast milk. Cantharidin Counseling:  I discussed with the patient the risks of Cantharidin including but not limited to pain, redness, burning, itching, and blistering. Tazorac Counseling:  Patient advised that medication is irritating and drying.  Patient may need to apply sparingly and wash off after an hour before eventually leaving it on overnight.  The patient verbalized understanding of the proper use and possible adverse effects of tazorac.  All of the patient's questions and concerns were addressed. Rituxan Counseling:  I discussed with the patient the risks of Rituxan infusions. Side effects can include infusion reactions, severe drug rashes including mucocutaneous reactions, reactivation of latent hepatitis and other infections and rarely progressive multifocal leukoencephalopathy.  All of the patient's questions and concerns were addressed. Opzelura Counseling:  I discussed with the patient the risks of Opzelura including but not limited to nasopharngitis, bronchitis, ear infection, eosinophila, hives, diarrhea, folliculitis, tonsillitis, and rhinorrhea.  Taken orally, this medication has been linked to serious infections; higher rate of mortality; malignancy and lymphoproliferative disorders; major adverse cardiovascular events; thrombosis; thrombocytopenia, anemia, and neutropenia; and lipid elevations. Oral Minoxidil Counseling- I discussed with the patient the risks of oral minoxidil including but not limited to shortness of breath, swelling of the feet or ankles, dizziness, lightheadedness, unwanted hair growth and allergic reaction.  The patient verbalized understanding of the proper use and possible adverse effects of oral minoxidil.  All of the patient's questions and concerns were addressed. Aklief counseling:  Patient advised to apply a pea-sized amount only at bedtime and wait 30 minutes after washing their face before applying.  If too drying, patient may add a non-comedogenic moisturizer.  The most commonly reported side effects including irritation, redness, scaling, dryness, stinging, burning, itching, and increased risk of sunburn.  The patient verbalized understanding of the proper use and possible adverse effects of retinoids.  All of the patient's questions and concerns were addressed. Topical Sulfur Applications Counseling: Topical Sulfur Counseling: Patient counseled that this medication may cause skin irritation or allergic reactions.  In the event of skin irritation, the patient was advised to reduce the amount of the drug applied or use it less frequently.   The patient verbalized understanding of the proper use and possible adverse effects of topical sulfur application.  All of the patient's questions and concerns were addressed. Ketoconazole Pregnancy And Lactation Text: This medication is Pregnancy Category C and it isn't know if it is safe during pregnancy. It is also excreted in breast milk and breast feeding isn't recommended. Tranexamic Acid Counseling:  Patient advised of the small risk of bleeding problems with tranexamic acid. They were also instructed to call if they developed any nausea, vomiting or diarrhea. All of the patient's questions and concerns were addressed. Cyclosporine Counseling:  I discussed with the patient the risks of cyclosporine including but not limited to hypertension, gingival hyperplasia,myelosuppression, immunosuppression, liver damage, kidney damage, neurotoxicity, lymphoma, and serious infections. The patient understands that monitoring is required including baseline blood pressure, CBC, CMP, lipid panel and uric acid, and then 1-2 times monthly CMP and blood pressure. Cantharidin Pregnancy And Lactation Text: This medication has not been proven safe during pregnancy. It is unknown if this medication is excreted in breast milk. Tetracycline Counseling: Patient counseled regarding possible photosensitivity and increased risk for sunburn.  Patient instructed to avoid sunlight, if possible.  When exposed to sunlight, patients should wear protective clothing, sunglasses, and sunscreen.  The patient was instructed to call the office immediately if the following severe adverse effects occur:  hearing changes, easy bruising/bleeding, severe headache, or vision changes.  The patient verbalized understanding of the proper use and possible adverse effects of tetracycline.  All of the patient's questions and concerns were addressed. Patient understands to avoid pregnancy while on therapy due to potential birth defects. Qbrexza Pregnancy And Lactation Text: There is no available data on Qbrexza use in pregnant women.  There is no available data on Qbrexza use in lactation. Metronidazole Pregnancy And Lactation Text: This medication is Pregnancy Category B and considered safe during pregnancy.  It is also excreted in breast milk. Bimzelx Counseling:  I discussed with the patient the risks of Bimzelx including but not limited to depression, immunosuppression, allergic reactions and infections.  The patient understands that monitoring is required including a PPD at baseline and must alert us or the primary physician if symptoms of infection or other concerning signs are noted. Libtayo Counseling- I discussed with the patient the risks of Libtayo including but not limited to nausea, vomiting, diarrhea, and bone or muscle pain.  The patient verbalized understanding of the proper use and possible adverse effects of Libtayo.  All of the patient's questions and concerns were addressed. Hydroxychloroquine Pregnancy And Lactation Text: This medication has been shown to cause fetal harm but it isn't assigned a Pregnancy Risk Category. There are small amounts excreted in breast milk. Finasteride Pregnancy And Lactation Text: This medication is absolutely contraindicated during pregnancy. It is unknown if it is excreted in breast milk. Acitretin Counseling:  I discussed with the patient the risks of acitretin including but not limited to hair loss, dry lips/skin/eyes, liver damage, hyperlipidemia, depression/suicidal ideation, photosensitivity.  Serious rare side effects can include but are not limited to pancreatitis, pseudotumor cerebri, bony changes, clot formation/stroke/heart attack.  Patient understands that alcohol is contraindicated since it can result in liver toxicity and significantly prolong the elimination of the drug by many years. Colchicine Counseling:  Patient counseled regarding adverse effects including but not limited to stomach upset (nausea, vomiting, stomach pain, or diarrhea).  Patient instructed to limit alcohol consumption while taking this medication.  Colchicine may reduce blood counts especially with prolonged use.  The patient understands that monitoring of kidney function and blood counts may be required, especially at baseline. The patient verbalized understanding of the proper use and possible adverse effects of colchicine.  All of the patient's questions and concerns were addressed. Litfulo Pregnancy And Lactation Text: Based on animal studies, Lifulo may cause embryo-fetal harm when administered to pregnant women.  The medication should not be used in pregnancy.  Breastfeeding is not recommended during treatment. Tazorac Pregnancy And Lactation Text: This medication is not safe during pregnancy. It is unknown if this medication is excreted in breast milk. Cyclophosphamide Counseling:  I discussed with the patient the risks of cyclophosphamide including but not limited to hair loss, hormonal abnormalities, decreased fertility, abdominal pain, diarrhea, nausea and vomiting, bone marrow suppression and infection. The patient understands that monitoring is required while taking this medication. Cephalexin Counseling: I counseled the patient regarding use of cephalexin as an antibiotic for prophylactic and/or therapeutic purposes. Cephalexin (commonly prescribed under brand name Keflex) is a cephalosporin antibiotic which is active against numerous classes of bacteria, including most skin bacteria. Side effects may include nausea, diarrhea, gastrointestinal upset, rash, hives, yeast infections, and in rare cases, hepatitis, kidney disease, seizures, fever, confusion, neurologic symptoms, and others. Patients with severe allergies to penicillin medications are cautioned that there is about a 10% incidence of cross-reactivity with cephalosporins. When possible, patients with penicillin allergies should use alternatives to cephalosporins for antibiotic therapy. Taltz Counseling: I discussed with the patient the risks of ixekizumab including but not limited to immunosuppression, serious infections, worsening of inflammatory bowel disease and drug reactions.  The patient understands that monitoring is required including a PPD at baseline and must alert us or the primary physician if symptoms of infection or other concerning signs are noted. Oral Minoxidil Pregnancy And Lactation Text: This medication should only be used when clearly needed if you are pregnant, attempting to become pregnant or breast feeding. 5-Fu Counseling: 5-Fluorouracil Counseling:  I discussed with the patient the risks of 5-fluorouracil including but not limited to erythema, scaling, itching, weeping, crusting, and pain. Zyclara Counseling:  I discussed with the patient the risks of imiquimod including but not limited to erythema, scaling, itching, weeping, crusting, and pain.  Patient understands that the inflammatory response to imiquimod is variable from person to person and was educated regarded proper titration schedule.  If flu-like symptoms develop, patient knows to discontinue the medication and contact us. Low Dose Naltrexone Counseling- I discussed with the patient the potential risks and side effects of low dose naltrexone including but not limited to: more vivid dreams, headaches, nausea, vomiting, abdominal pain, fatigue, dizziness, and anxiety. Humira Counseling:  I discussed with the patient the risks of adalimumab including but not limited to myelosuppression, immunosuppression, autoimmune hepatitis, demyelinating diseases, lymphoma, and serious infections.  The patient understands that monitoring is required including a PPD at baseline and must alert us or the primary physician if symptoms of infection or other concerning signs are noted. Rhofade Counseling: Rhofade is a topical medication which can decrease superficial blood flow where applied. Side effects are uncommon and include stinging, redness and allergic reactions. Topical Sulfur Applications Pregnancy And Lactation Text: This medication is considered safe during pregnancy and breast feeding secondary to limited systemic absorption. Aklief Pregnancy And Lactation Text: It is unknown if this medication is safe to use during pregnancy.  It is unknown if this medication is excreted in breast milk.  Breastfeeding women should use the topical cream on the smallest area of the skin for the shortest time needed while breastfeeding.  Do not apply to nipple and areola. Opzelura Pregnancy And Lactation Text: There is insufficient data to evaluate drug-associated risk for major birth defects, miscarriage, or other adverse maternal or fetal outcomes.  There is a pregnancy registry that monitors pregnancy outcomes in pregnant persons exposed to the medication during pregnancy.  It is unknown if this medication is excreted in breast milk.  Do not breastfeed during treatment and for about 4 weeks after the last dose. Rituxan Pregnancy And Lactation Text: This medication is Pregnancy Category C and it isn't know if it is safe during pregnancy. It is unknown if this medication is excreted in breast milk but similar antibodies are known to be excreted. Terbinafine Counseling: Patient counseling regarding adverse effects of terbinafine including but not limited to headache, diarrhea, rash, upset stomach, liver function test abnormalities, itching, taste/smell disturbance, nausea, abdominal pain, and flatulence.  There is a rare possibility of liver failure that can occur when taking terbinafine.  The patient understands that a baseline LFT and kidney function test may be required. The patient verbalized understanding of the proper use and possible adverse effects of terbinafine.  All of the patient's questions and concerns were addressed. Libtayo Pregnancy And Lactation Text: This medication is contraindicated in pregnancy and when breast feeding. Tranexamic Acid Pregnancy And Lactation Text: It is unknown if this medication is safe during pregnancy or breast feeding.

## 2025-07-03 NOTE — PROGRESS NOTES
After getting approval from pcp about taking his daughter danii on as a patient did attempt to call her but she has a mail box that has not yet been set up, father is aware that doctor julianna said yes was the one that gave her name and number will try again before end of day to reach her to get her    Bedside shift change report given to Narayan Mendiola (oncoming nurse) by [facility] (offgoing nurse). Report included the following information SBAR, Kardex and MAR.  
7296- Patient assessed. Neuro: Patient does not open his eyes to any stimulus; withdraws in all extremities; pupils are unequal, but irregularly shaped; both pupils react briskly to light; does not follow commands. Cardiac:  Monitor displays atrial fibrillation with occasional pacer spikes;  2+ edema in right upper extremity and bilateral lower extremities; 1+ edema in left upper extremity; pulses palpable in all extremities. Resp: Patient ventilated through a #6 Shiley tracheotomy; vent settings A/c 12; ; fio2 40%; Peep 6; suctioning a moderate amount of clear secretions through tracheostomy tube. GI: Abdomen soft and obese; active bowel sounds; tube feedings infusing through PEG tube (Two deidre @ 45 cc hr with a 150 cc water flush); flexiseal in place draining watery, liquid stool. : [place] draining brown; hazy urine with sediment. Skin: Intact. IV: LUE PICC in place. 0800- Propofol off.  
0810- Patient placed on CPAP with a pressure support of 6.  RR 28; 's; SPO2 94%. 0830- Dr. Sterling Gracia notified neuro assessment is different from last night. No orders received. Will continue to monitor neuro assessment. 1100- Reassessed. Lung sounds diminished; dyspnea noted on exertion. Patient seen spontaneously moving upper extremities. No other changes to assessment. 1400- Patient's wife called. Updated her on the patient's status and plan of care. Trach care done 1430- Catheter irrigated. Removed several small blood clots. 1500- Reassessed. Patient is sounding more coarse. Suctioned. [address] given for secretions 1645-Respiratory rate 28 and labored. TV in the 500's. Patient diaphoretic. Notified respiratory who will place the patient back on assist control. 1900-Bedside shift change report given to Sobia (oncoming nurse) by Annalise Siddiqui (offgoing nurse). Report included the following information SBAR, Kardex and MAR.

## (undated) DEVICE — SPONGE: SPECIALTY PEANUT XR 100/CS: Brand: MEDICAL ACTION INDUSTRIES

## (undated) DEVICE — SOLUTION IV 1000ML 0.9% SOD CHL

## (undated) DEVICE — Device

## (undated) DEVICE — INTENDED FOR TISSUE SEPARATION, AND OTHER PROCEDURES THAT REQUIRE A SHARP SURGICAL BLADE TO PUNCTURE OR CUT.: Brand: BARD-PARKER ® CARBON RIB-BACK BLADES

## (undated) DEVICE — MEDI-VAC YANK SUCT HNDL W/TPRD BULBOUS TIP: Brand: CARDINAL HEALTH

## (undated) DEVICE — BLOCK BITE ENDOSCP AD 21 MM W/ DIL BLU LF DISP

## (undated) DEVICE — 3M™ DURAPORE™ SURGICAL TAPE 1538-3, 3 INCH X 10 YARD (7,5CM X 9,1M), 4 ROLLS/BOX: Brand: 3M™ DURAPORE™

## (undated) DEVICE — HANDLE LT SNAP ON ULT DURABLE LENS FOR TRUMPF ALC DISPOSABLE

## (undated) DEVICE — SYR 3ML LL TIP 1/10ML GRAD --

## (undated) DEVICE — MEDI-VAC NON-CONDUCTIVE SUCTION TUBING: Brand: CARDINAL HEALTH

## (undated) DEVICE — SOLIDIFIER MEDC 1200ML -- CONVERT TO 356117

## (undated) DEVICE — STERILE POLYISOPRENE POWDER-FREE SURGICAL GLOVES WITH EMOLLIENT COATING: Brand: PROTEXIS

## (undated) DEVICE — NEEDLE HYPO 18GA L1.5IN PNK S STL HUB POLYPR SHLD REG BVL

## (undated) DEVICE — TRACH TUBE HOLDER, FOAM: Brand: DEROYAL

## (undated) DEVICE — STERILE POLYISOPRENE POWDER-FREE SURGICAL GLOVES: Brand: PROTEXIS

## (undated) DEVICE — GOWN,SIRUS,NONRNF,SETINSLV,XL,20/CS: Brand: MEDLINE

## (undated) DEVICE — (D)PREP SKN CHLRAPRP APPL 26ML -- CONVERT TO ITEM 371833

## (undated) DEVICE — TOWEL 4 PLY TISS 19X30 SUE WHT

## (undated) DEVICE — 1200 GUARD II KIT W/5MM TUBE W/O VAC TUBE: Brand: GUARDIAN

## (undated) DEVICE — SYR 10ML LUER LOK 1/5ML GRAD --

## (undated) DEVICE — KENDALL RADIOLUCENT FOAM MONITORING ELECTRODE RECTANGULAR SHAPE: Brand: KENDALL

## (undated) DEVICE — SUTURE PROL 2-0 L48IN NONABSORBABLE BLU SH L26MM 1/2 CIR 8533H

## (undated) DEVICE — NEEDLE HYPO 25GA L1.5IN BVL ORIENTED ECLIPSE

## (undated) DEVICE — PACK,EENT,TURBAN DRAPE,PK II: Brand: MEDLINE

## (undated) DEVICE — NEONATAL-ADULT SPO2 SENSOR: Brand: NELLCOR

## (undated) DEVICE — CATH IV AUTOGRD BC PNK 20GA 25 -- INSYTE

## (undated) DEVICE — SET ADMIN 16ML TBNG L100IN 2 Y INJ SITE IV PIGGY BK DISP

## (undated) DEVICE — FRAZIER SUCTION INSTRUMENT 12 FR W/CONTROL VENT & OBTURATOR: Brand: FRAZIER

## (undated) DEVICE — MASTISOL ADHESIVE LIQ 2/3ML

## (undated) DEVICE — BASIN EMSIS 16OZ GRAPHITE PLAS KID SHP MOLD GRAD FOR ORAL

## (undated) DEVICE — SUTURE CHROMIC GUT SZ 2-0 L27IN ABSRB BRN L26MM UR-6 5/8 N878H

## (undated) DEVICE — SUT SLK 2-0SH 30IN BLK --

## (undated) DEVICE — DEVON™ KNEE AND BODY STRAP 60" X 3" (1.5 M X 7.6 CM): Brand: DEVON

## (undated) DEVICE — SUTURE ETHLN SZ 2-0 L18IN NONABSORBABLE BLK L19MM PS-2 PRIM 593H

## (undated) DEVICE — (D)STRIP SKN CLSR 0.5X4IN WHT --

## (undated) DEVICE — PLUS BARRIER ISLAND DRESSING: Brand: TELFA

## (undated) DEVICE — SUTURE PERMAHAND SZ 2-0 L30IN NONABSORBABLE BLK SILK W/O A305H

## (undated) DEVICE — INFECTION CONTROL KIT SYS

## (undated) DEVICE — SUTURE PROL SZ 2-0 L36IN NONABSORBABLE BLU RB-1 L17MM 1/2 8559H

## (undated) DEVICE — Z DISCONTINUED PER MEDLINE LINE GAS SAMPLING O2/CO2 LNG AD 13 FT NSL W/ TBNG FILTERLINE

## (undated) DEVICE — REM POLYHESIVE ADULT PATIENT RETURN ELECTRODE: Brand: VALLEYLAB

## (undated) DEVICE — TOWEL SURG W17XL27IN STD BLU COT NONFENESTRATED PREWASHED